# Patient Record
Sex: MALE | Race: WHITE | NOT HISPANIC OR LATINO | Employment: OTHER | URBAN - METROPOLITAN AREA
[De-identification: names, ages, dates, MRNs, and addresses within clinical notes are randomized per-mention and may not be internally consistent; named-entity substitution may affect disease eponyms.]

---

## 2017-01-11 ENCOUNTER — HOSPITAL ENCOUNTER (OUTPATIENT)
Dept: NUCLEAR MEDICINE | Facility: CLINIC | Age: 52
Setting detail: NUCLEAR MEDICINE
Discharge: HOME | End: 2017-01-11
Attending: PSYCHIATRY & NEUROLOGY | Admitting: PSYCHIATRY & NEUROLOGY
Payer: MEDICAID

## 2017-01-11 ENCOUNTER — HOSPITAL ENCOUNTER (OUTPATIENT)
Dept: NUCLEAR MEDICINE | Facility: CLINIC | Age: 52
Setting detail: NUCLEAR MEDICINE
End: 2017-01-11
Attending: PSYCHIATRY & NEUROLOGY
Payer: MEDICAID

## 2017-01-11 DIAGNOSIS — R53.1 LEFT-SIDED WEAKNESS: ICD-10-CM

## 2017-01-11 PROCEDURE — 34300033 ZZH RX 343

## 2017-01-11 PROCEDURE — A9584 IODINE I-123 IOFLUPANE: HCPCS

## 2017-01-11 PROCEDURE — 25000132 ZZH RX MED GY IP 250 OP 250 PS 637

## 2017-01-11 PROCEDURE — 78607 NM BRAIN IMAGING TOMOGRAPHIC (SPECT) DATSCAN: CPT

## 2017-01-11 RX ADMIN — POTASSIUM IODIDE 130 MG: 1 SOLUTION ORAL at 09:55

## 2017-01-11 RX ADMIN — IOFLUPANE I-123 4.17 MCI.: 2 INJECTION, SOLUTION INTRAVENOUS at 12:07

## 2017-05-16 ENCOUNTER — RECORDS - HEALTHEAST (OUTPATIENT)
Dept: LAB | Facility: CLINIC | Age: 52
End: 2017-05-16

## 2017-05-16 LAB
CHOLEST SERPL-MCNC: 120 MG/DL
FASTING STATUS PATIENT QL REPORTED: NORMAL
HDLC SERPL-MCNC: 44 MG/DL
LDLC SERPL CALC-MCNC: 51 MG/DL
PSA SERPL-MCNC: 0.5 NG/ML (ref 0–3.5)
TRIGL SERPL-MCNC: 127 MG/DL

## 2019-04-22 ENCOUNTER — RECORDS - HEALTHEAST (OUTPATIENT)
Dept: LAB | Facility: CLINIC | Age: 54
End: 2019-04-22

## 2019-04-22 LAB
BASOPHILS # BLD AUTO: 0 THOU/UL (ref 0–0.2)
BASOPHILS NFR BLD AUTO: 0 % (ref 0–2)
EOSINOPHIL # BLD AUTO: 0.2 THOU/UL (ref 0–0.4)
EOSINOPHIL NFR BLD AUTO: 3 % (ref 0–6)
ERYTHROCYTE [DISTWIDTH] IN BLOOD BY AUTOMATED COUNT: 15.1 % (ref 11–14.5)
HCT VFR BLD AUTO: 38.1 % (ref 40–54)
HGB BLD-MCNC: 11.5 G/DL (ref 14–18)
LYMPHOCYTES # BLD AUTO: 1.6 THOU/UL (ref 0.8–4.4)
LYMPHOCYTES NFR BLD AUTO: 22 % (ref 20–40)
MCH RBC QN AUTO: 29.9 PG (ref 27–34)
MCHC RBC AUTO-ENTMCNC: 30.2 G/DL (ref 32–36)
MCV RBC AUTO: 99 FL (ref 80–100)
MONOCYTES # BLD AUTO: 0.3 THOU/UL (ref 0–0.9)
MONOCYTES NFR BLD AUTO: 5 % (ref 2–10)
NEUTROPHILS # BLD AUTO: 5.1 THOU/UL (ref 2–7.7)
NEUTROPHILS NFR BLD AUTO: 71 % (ref 50–70)
PLATELET # BLD AUTO: 265 THOU/UL (ref 140–440)
PMV BLD AUTO: 9.3 FL (ref 8.5–12.5)
RBC # BLD AUTO: 3.84 MILL/UL (ref 4.4–6.2)
WBC: 7.4 THOU/UL (ref 4–11)

## 2019-04-30 ENCOUNTER — RECORDS - HEALTHEAST (OUTPATIENT)
Dept: LAB | Facility: CLINIC | Age: 54
End: 2019-04-30

## 2019-04-30 LAB
BASOPHILS # BLD AUTO: 0 THOU/UL (ref 0–0.2)
BASOPHILS NFR BLD AUTO: 0 % (ref 0–2)
EOSINOPHIL # BLD AUTO: 0.5 THOU/UL (ref 0–0.4)
EOSINOPHIL NFR BLD AUTO: 6 % (ref 0–6)
ERYTHROCYTE [DISTWIDTH] IN BLOOD BY AUTOMATED COUNT: 15 % (ref 11–14.5)
HCT VFR BLD AUTO: 37.6 % (ref 40–54)
HGB BLD-MCNC: 11.6 G/DL (ref 14–18)
LYMPHOCYTES # BLD AUTO: 2.4 THOU/UL (ref 0.8–4.4)
LYMPHOCYTES NFR BLD AUTO: 29 % (ref 20–40)
MCH RBC QN AUTO: 30.4 PG (ref 27–34)
MCHC RBC AUTO-ENTMCNC: 30.9 G/DL (ref 32–36)
MCV RBC AUTO: 99 FL (ref 80–100)
MONOCYTES # BLD AUTO: 0.5 THOU/UL (ref 0–0.9)
MONOCYTES NFR BLD AUTO: 6 % (ref 2–10)
NEUTROPHILS # BLD AUTO: 4.8 THOU/UL (ref 2–7.7)
NEUTROPHILS NFR BLD AUTO: 59 % (ref 50–70)
PLATELET # BLD AUTO: 288 THOU/UL (ref 140–440)
PMV BLD AUTO: 9.4 FL (ref 8.5–12.5)
RBC # BLD AUTO: 3.81 MILL/UL (ref 4.4–6.2)
WBC: 8.2 THOU/UL (ref 4–11)

## 2019-05-06 ENCOUNTER — RECORDS - HEALTHEAST (OUTPATIENT)
Dept: LAB | Facility: CLINIC | Age: 54
End: 2019-05-06

## 2019-05-06 LAB
BASOPHILS # BLD AUTO: 0 THOU/UL (ref 0–0.2)
BASOPHILS NFR BLD AUTO: 1 % (ref 0–2)
EOSINOPHIL # BLD AUTO: 0.4 THOU/UL (ref 0–0.4)
EOSINOPHIL NFR BLD AUTO: 5 % (ref 0–6)
ERYTHROCYTE [DISTWIDTH] IN BLOOD BY AUTOMATED COUNT: 15 % (ref 11–14.5)
HCT VFR BLD AUTO: 39.6 % (ref 40–54)
HGB BLD-MCNC: 12.2 G/DL (ref 14–18)
LYMPHOCYTES # BLD AUTO: 2.3 THOU/UL (ref 0.8–4.4)
LYMPHOCYTES NFR BLD AUTO: 29 % (ref 20–40)
MCH RBC QN AUTO: 30.4 PG (ref 27–34)
MCHC RBC AUTO-ENTMCNC: 30.8 G/DL (ref 32–36)
MCV RBC AUTO: 99 FL (ref 80–100)
MONOCYTES # BLD AUTO: 0.6 THOU/UL (ref 0–0.9)
MONOCYTES NFR BLD AUTO: 7 % (ref 2–10)
NEUTROPHILS # BLD AUTO: 4.7 THOU/UL (ref 2–7.7)
NEUTROPHILS NFR BLD AUTO: 59 % (ref 50–70)
PLATELET # BLD AUTO: 312 THOU/UL (ref 140–440)
PMV BLD AUTO: 9.1 FL (ref 8.5–12.5)
RBC # BLD AUTO: 4.01 MILL/UL (ref 4.4–6.2)
WBC: 8.2 THOU/UL (ref 4–11)

## 2019-05-13 ENCOUNTER — RECORDS - HEALTHEAST (OUTPATIENT)
Dept: LAB | Facility: CLINIC | Age: 54
End: 2019-05-13

## 2019-05-13 LAB
BASOPHILS # BLD AUTO: 0 THOU/UL (ref 0–0.2)
BASOPHILS NFR BLD AUTO: 0 % (ref 0–2)
EOSINOPHIL # BLD AUTO: 0.3 THOU/UL (ref 0–0.4)
EOSINOPHIL NFR BLD AUTO: 4 % (ref 0–6)
ERYTHROCYTE [DISTWIDTH] IN BLOOD BY AUTOMATED COUNT: 14.8 % (ref 11–14.5)
HCT VFR BLD AUTO: 40.7 % (ref 40–54)
HGB BLD-MCNC: 12.4 G/DL (ref 14–18)
LYMPHOCYTES # BLD AUTO: 2 THOU/UL (ref 0.8–4.4)
LYMPHOCYTES NFR BLD AUTO: 26 % (ref 20–40)
MCH RBC QN AUTO: 30.3 PG (ref 27–34)
MCHC RBC AUTO-ENTMCNC: 30.5 G/DL (ref 32–36)
MCV RBC AUTO: 100 FL (ref 80–100)
MONOCYTES # BLD AUTO: 0.4 THOU/UL (ref 0–0.9)
MONOCYTES NFR BLD AUTO: 6 % (ref 2–10)
NEUTROPHILS # BLD AUTO: 4.9 THOU/UL (ref 2–7.7)
NEUTROPHILS NFR BLD AUTO: 64 % (ref 50–70)
PLATELET # BLD AUTO: 272 THOU/UL (ref 140–440)
PMV BLD AUTO: 8.9 FL (ref 8.5–12.5)
RBC # BLD AUTO: 4.09 MILL/UL (ref 4.4–6.2)
WBC: 7.7 THOU/UL (ref 4–11)

## 2019-05-31 ENCOUNTER — RECORDS - HEALTHEAST (OUTPATIENT)
Dept: LAB | Facility: CLINIC | Age: 54
End: 2019-05-31

## 2019-05-31 LAB
ALBUMIN SERPL-MCNC: 3.4 G/DL (ref 3.5–5)
ALP SERPL-CCNC: 96 U/L (ref 45–120)
ALT SERPL W P-5'-P-CCNC: 20 U/L (ref 0–45)
AST SERPL W P-5'-P-CCNC: 14 U/L (ref 0–40)
BILIRUB DIRECT SERPL-MCNC: 0.1 MG/DL
BILIRUB SERPL-MCNC: 0.2 MG/DL (ref 0–1)
CHOLEST SERPL-MCNC: 131 MG/DL
FASTING STATUS PATIENT QL REPORTED: NORMAL
HDLC SERPL-MCNC: 48 MG/DL
LDLC SERPL CALC-MCNC: 54 MG/DL
PROT SERPL-MCNC: 7.1 G/DL (ref 6–8)
TRIGL SERPL-MCNC: 144 MG/DL

## 2019-07-15 ENCOUNTER — RECORDS - HEALTHEAST (OUTPATIENT)
Dept: LAB | Facility: CLINIC | Age: 54
End: 2019-07-15

## 2019-07-15 LAB
ALBUMIN SERPL-MCNC: 3.8 G/DL (ref 3.5–5)
ANION GAP SERPL CALCULATED.3IONS-SCNC: 12 MMOL/L (ref 5–18)
BNP SERPL-MCNC: <10 PG/ML (ref 0–45)
BUN SERPL-MCNC: 24 MG/DL (ref 8–22)
CALCIUM SERPL-MCNC: 9.8 MG/DL (ref 8.5–10.5)
CHLORIDE BLD-SCNC: 103 MMOL/L (ref 98–107)
CO2 SERPL-SCNC: 26 MMOL/L (ref 22–31)
CREAT SERPL-MCNC: 1 MG/DL (ref 0.7–1.3)
GFR SERPL CREATININE-BSD FRML MDRD: >60 ML/MIN/1.73M2
GLUCOSE BLD-MCNC: 130 MG/DL (ref 70–125)
POTASSIUM BLD-SCNC: 4.2 MMOL/L (ref 3.5–5)
PROT SERPL-MCNC: 7.8 G/DL (ref 6–8)
SODIUM SERPL-SCNC: 141 MMOL/L (ref 136–145)

## 2019-10-07 ENCOUNTER — RECORDS - HEALTHEAST (OUTPATIENT)
Dept: LAB | Facility: CLINIC | Age: 54
End: 2019-10-07

## 2019-10-07 LAB
CHOLEST SERPL-MCNC: 160 MG/DL
FASTING STATUS PATIENT QL REPORTED: ABNORMAL
HDLC SERPL-MCNC: 46 MG/DL
LDLC SERPL CALC-MCNC: 82 MG/DL
TRIGL SERPL-MCNC: 158 MG/DL

## 2020-01-03 ENCOUNTER — RECORDS - HEALTHEAST (OUTPATIENT)
Dept: LAB | Facility: CLINIC | Age: 55
End: 2020-01-03

## 2020-01-06 LAB
CHOLEST SERPL-MCNC: 214 MG/DL
FASTING STATUS PATIENT QL REPORTED: YES
HDLC SERPL-MCNC: 44 MG/DL
LDLC SERPL CALC-MCNC: 120 MG/DL
TRIGL SERPL-MCNC: 252 MG/DL

## 2020-06-24 ENCOUNTER — RECORDS - HEALTHEAST (OUTPATIENT)
Dept: LAB | Facility: CLINIC | Age: 55
End: 2020-06-24

## 2020-06-24 LAB
BASOPHILS # BLD AUTO: 0 THOU/UL (ref 0–0.2)
BASOPHILS NFR BLD AUTO: 0 % (ref 0–2)
EOSINOPHIL # BLD AUTO: 0.1 THOU/UL (ref 0–0.4)
EOSINOPHIL NFR BLD AUTO: 2 % (ref 0–6)
ERYTHROCYTE [DISTWIDTH] IN BLOOD BY AUTOMATED COUNT: 13.2 % (ref 11–14.5)
HCT VFR BLD AUTO: 39.1 % (ref 40–54)
HGB BLD-MCNC: 12.1 G/DL (ref 14–18)
LYMPHOCYTES # BLD AUTO: 2.5 THOU/UL (ref 0.8–4.4)
LYMPHOCYTES NFR BLD AUTO: 40 % (ref 20–40)
MCH RBC QN AUTO: 29.9 PG (ref 27–34)
MCHC RBC AUTO-ENTMCNC: 30.9 G/DL (ref 32–36)
MCV RBC AUTO: 97 FL (ref 80–100)
MONOCYTES # BLD AUTO: 0.4 THOU/UL (ref 0–0.9)
MONOCYTES NFR BLD AUTO: 7 % (ref 2–10)
NEUTROPHILS # BLD AUTO: 3.2 THOU/UL (ref 2–7.7)
NEUTROPHILS NFR BLD AUTO: 51 % (ref 50–70)
PLATELET # BLD AUTO: 250 THOU/UL (ref 140–440)
PMV BLD AUTO: 10.1 FL (ref 8.5–12.5)
RBC # BLD AUTO: 4.05 MILL/UL (ref 4.4–6.2)
WBC: 6.2 THOU/UL (ref 4–11)

## 2020-07-11 ENCOUNTER — HOSPITAL ENCOUNTER (INPATIENT)
Facility: CLINIC | Age: 55
LOS: 18 days | Discharge: PSYCHIATRIC OR SUBSTANCE USE FACILITY | DRG: 885 | End: 2020-07-29
Attending: EMERGENCY MEDICINE | Admitting: PSYCHIATRY & NEUROLOGY
Payer: COMMERCIAL

## 2020-07-11 ENCOUNTER — TELEPHONE (OUTPATIENT)
Dept: BEHAVIORAL HEALTH | Facility: CLINIC | Age: 55
End: 2020-07-11

## 2020-07-11 DIAGNOSIS — F41.9 ANXIETY DISORDER, UNSPECIFIED TYPE: ICD-10-CM

## 2020-07-11 DIAGNOSIS — F33.1 MAJOR DEPRESSIVE DISORDER, RECURRENT EPISODE, MODERATE (H): ICD-10-CM

## 2020-07-11 DIAGNOSIS — R45.851 SUICIDAL IDEATION: ICD-10-CM

## 2020-07-11 DIAGNOSIS — Z20.822 COVID-19 RULED OUT: ICD-10-CM

## 2020-07-11 LAB
AMPHETAMINES UR QL SCN: NEGATIVE
BARBITURATES UR QL: NEGATIVE
BENZODIAZ UR QL: NEGATIVE
CANNABINOIDS UR QL SCN: NEGATIVE
COCAINE UR QL: NEGATIVE
ETHANOL UR QL SCN: NEGATIVE
OPIATES UR QL SCN: NEGATIVE
SARS-COV-2 PCR COMMENT: NORMAL
SARS-COV-2 RNA SPEC QL NAA+PROBE: NEGATIVE
SARS-COV-2 RNA SPEC QL NAA+PROBE: NORMAL
SPECIMEN SOURCE: NORMAL
SPECIMEN SOURCE: NORMAL

## 2020-07-11 PROCEDURE — U0003 INFECTIOUS AGENT DETECTION BY NUCLEIC ACID (DNA OR RNA); SEVERE ACUTE RESPIRATORY SYNDROME CORONAVIRUS 2 (SARS-COV-2) (CORONAVIRUS DISEASE [COVID-19]), AMPLIFIED PROBE TECHNIQUE, MAKING USE OF HIGH THROUGHPUT TECHNOLOGIES AS DESCRIBED BY CMS-2020-01-R: HCPCS | Performed by: EMERGENCY MEDICINE

## 2020-07-11 PROCEDURE — 12400002 ZZH R&B MH SENIOR/ADOLESCENT

## 2020-07-11 PROCEDURE — 90791 PSYCH DIAGNOSTIC EVALUATION: CPT

## 2020-07-11 PROCEDURE — 99285 EMERGENCY DEPT VISIT HI MDM: CPT | Mod: Z6 | Performed by: EMERGENCY MEDICINE

## 2020-07-11 PROCEDURE — 99285 EMERGENCY DEPT VISIT HI MDM: CPT | Mod: 25 | Performed by: EMERGENCY MEDICINE

## 2020-07-11 PROCEDURE — 80307 DRUG TEST PRSMV CHEM ANLYZR: CPT | Performed by: FAMILY MEDICINE

## 2020-07-11 PROCEDURE — 80320 DRUG SCREEN QUANTALCOHOLS: CPT | Performed by: FAMILY MEDICINE

## 2020-07-11 PROCEDURE — C9803 HOPD COVID-19 SPEC COLLECT: HCPCS | Performed by: EMERGENCY MEDICINE

## 2020-07-11 PROCEDURE — 25000132 ZZH RX MED GY IP 250 OP 250 PS 637: Performed by: PSYCHIATRY & NEUROLOGY

## 2020-07-11 RX ORDER — DOCUSATE SODIUM 100 MG/1
100 CAPSULE, LIQUID FILLED ORAL 2 TIMES DAILY
Status: DISCONTINUED | OUTPATIENT
Start: 2020-07-11 | End: 2020-07-29 | Stop reason: HOSPADM

## 2020-07-11 RX ORDER — GABAPENTIN 800 MG/1
800 TABLET ORAL 3 TIMES DAILY
Status: DISCONTINUED | OUTPATIENT
Start: 2020-07-11 | End: 2020-07-29 | Stop reason: HOSPADM

## 2020-07-11 RX ORDER — TAMSULOSIN HYDROCHLORIDE 0.4 MG/1
CAPSULE ORAL
Status: ON HOLD | COMMUNITY
End: 2022-05-31

## 2020-07-11 RX ORDER — VILAZODONE HYDROCHLORIDE 40 MG/1
40 TABLET ORAL DAILY
COMMUNITY
End: 2021-07-07

## 2020-07-11 RX ORDER — ACETAMINOPHEN 500 MG
TABLET ORAL
Status: ON HOLD | COMMUNITY
End: 2023-05-25

## 2020-07-11 RX ORDER — METOPROLOL TARTRATE 50 MG
50 TABLET ORAL 3 TIMES DAILY
COMMUNITY
End: 2020-07-11

## 2020-07-11 RX ORDER — POLYETHYLENE GLYCOL 3350 17 G/17G
17 POWDER, FOR SOLUTION ORAL DAILY
Status: DISCONTINUED | OUTPATIENT
Start: 2020-07-12 | End: 2020-07-29 | Stop reason: HOSPADM

## 2020-07-11 RX ORDER — ACETAMINOPHEN 325 MG/1
650 TABLET ORAL EVERY 4 HOURS PRN
Status: DISCONTINUED | OUTPATIENT
Start: 2020-07-11 | End: 2020-07-11

## 2020-07-11 RX ORDER — CLOZAPINE 50 MG/1
50 TABLET ORAL AT BEDTIME
Status: ON HOLD | COMMUNITY
End: 2023-05-25

## 2020-07-11 RX ORDER — BISACODYL 10 MG
10 SUPPOSITORY, RECTAL RECTAL DAILY PRN
Status: DISCONTINUED | OUTPATIENT
Start: 2020-07-11 | End: 2020-07-29 | Stop reason: HOSPADM

## 2020-07-11 RX ORDER — CLONAZEPAM 1 MG/1
2 TABLET ORAL AT BEDTIME
Status: DISCONTINUED | OUTPATIENT
Start: 2020-07-11 | End: 2020-07-13

## 2020-07-11 RX ORDER — ASPIRIN 81 MG/1
81 TABLET ORAL DAILY
COMMUNITY
End: 2021-07-07

## 2020-07-11 RX ORDER — METOPROLOL SUCCINATE 25 MG/1
TABLET, EXTENDED RELEASE ORAL
Status: ON HOLD | COMMUNITY
End: 2023-05-25

## 2020-07-11 RX ORDER — CYCLOBENZAPRINE HCL 5 MG
5 TABLET ORAL 3 TIMES DAILY PRN
COMMUNITY
End: 2020-07-11

## 2020-07-11 RX ORDER — ACETAMINOPHEN 500 MG
1000 TABLET ORAL 3 TIMES DAILY
Status: DISCONTINUED | OUTPATIENT
Start: 2020-07-11 | End: 2020-07-29 | Stop reason: HOSPADM

## 2020-07-11 RX ORDER — TRAZODONE HYDROCHLORIDE 50 MG/1
50 TABLET, FILM COATED ORAL
Status: DISCONTINUED | OUTPATIENT
Start: 2020-07-11 | End: 2020-07-11

## 2020-07-11 RX ORDER — GABAPENTIN 800 MG/1
TABLET ORAL
Status: ON HOLD | COMMUNITY
End: 2023-05-25

## 2020-07-11 RX ORDER — BACLOFEN 10 MG/1
10 TABLET ORAL 2 TIMES DAILY
Status: DISCONTINUED | OUTPATIENT
Start: 2020-07-11 | End: 2020-07-29 | Stop reason: HOSPADM

## 2020-07-11 RX ORDER — TRIAMCINOLONE ACETONIDE 1 MG/G
CREAM TOPICAL 2 TIMES DAILY PRN
Status: ON HOLD | COMMUNITY
End: 2021-07-08

## 2020-07-11 RX ORDER — DONEPEZIL HYDROCHLORIDE 10 MG/1
10 TABLET, FILM COATED ORAL DAILY
Status: DISCONTINUED | OUTPATIENT
Start: 2020-07-12 | End: 2020-07-29 | Stop reason: HOSPADM

## 2020-07-11 RX ORDER — CARBIDOPA AND LEVODOPA 25; 100 MG/1; MG/1
2 TABLET ORAL 2 TIMES DAILY
Status: DISCONTINUED | OUTPATIENT
Start: 2020-07-12 | End: 2020-07-12

## 2020-07-11 RX ORDER — DONEPEZIL HYDROCHLORIDE 10 MG/1
10 TABLET, FILM COATED ORAL DAILY
COMMUNITY
End: 2021-07-07

## 2020-07-11 RX ORDER — ALUMINA, MAGNESIA, AND SIMETHICONE 2400; 2400; 240 MG/30ML; MG/30ML; MG/30ML
30 SUSPENSION ORAL EVERY 4 HOURS PRN
Status: DISCONTINUED | OUTPATIENT
Start: 2020-07-11 | End: 2020-07-29 | Stop reason: HOSPADM

## 2020-07-11 RX ORDER — SENNOSIDES 8.6 MG
TABLET ORAL
COMMUNITY
End: 2022-08-01

## 2020-07-11 RX ORDER — TRIAMCINOLONE ACETONIDE 1 MG/G
CREAM TOPICAL 2 TIMES DAILY PRN
Status: DISCONTINUED | OUTPATIENT
Start: 2020-07-11 | End: 2020-07-29 | Stop reason: HOSPADM

## 2020-07-11 RX ORDER — HYDROXYZINE HYDROCHLORIDE 25 MG/1
25 TABLET, FILM COATED ORAL EVERY 4 HOURS PRN
Status: DISCONTINUED | OUTPATIENT
Start: 2020-07-11 | End: 2020-07-29 | Stop reason: HOSPADM

## 2020-07-11 RX ORDER — OLANZAPINE 10 MG/2ML
5 INJECTION, POWDER, FOR SOLUTION INTRAMUSCULAR
Status: DISCONTINUED | OUTPATIENT
Start: 2020-07-11 | End: 2020-07-29 | Stop reason: HOSPADM

## 2020-07-11 RX ORDER — DOCUSATE SODIUM 100 MG/1
100 CAPSULE, LIQUID FILLED ORAL 2 TIMES DAILY
Status: ON HOLD | COMMUNITY
End: 2021-07-07

## 2020-07-11 RX ORDER — CARBIDOPA AND LEVODOPA 25; 100 MG/1; MG/1
TABLET ORAL
COMMUNITY
End: 2022-01-19

## 2020-07-11 RX ORDER — ATORVASTATIN CALCIUM 10 MG/1
40 TABLET, FILM COATED ORAL AT BEDTIME
COMMUNITY
End: 2021-07-07

## 2020-07-11 RX ORDER — TRIHEXYPHENIDYL HYDROCHLORIDE 2 MG/1
3 TABLET ORAL 3 TIMES DAILY
COMMUNITY
End: 2021-07-07

## 2020-07-11 RX ORDER — SENNOSIDES 8.6 MG
1 TABLET ORAL 2 TIMES DAILY
Status: DISCONTINUED | OUTPATIENT
Start: 2020-07-11 | End: 2020-07-29 | Stop reason: HOSPADM

## 2020-07-11 RX ORDER — ATORVASTATIN CALCIUM 40 MG/1
40 TABLET, FILM COATED ORAL AT BEDTIME
Status: DISCONTINUED | OUTPATIENT
Start: 2020-07-11 | End: 2020-07-29 | Stop reason: HOSPADM

## 2020-07-11 RX ORDER — OLANZAPINE 2.5 MG/1
2.5-5 TABLET, FILM COATED ORAL
Status: DISCONTINUED | OUTPATIENT
Start: 2020-07-11 | End: 2020-07-29 | Stop reason: HOSPADM

## 2020-07-11 RX ORDER — ASPIRIN 81 MG/1
81 TABLET ORAL DAILY
Status: DISCONTINUED | OUTPATIENT
Start: 2020-07-12 | End: 2020-07-29 | Stop reason: HOSPADM

## 2020-07-11 RX ORDER — CARBIDOPA AND LEVODOPA 25; 100 MG/1; MG/1
2 TABLET ORAL 2 TIMES DAILY
COMMUNITY
End: 2021-07-07

## 2020-07-11 RX ORDER — BACLOFEN 10 MG/1
10 TABLET ORAL 2 TIMES DAILY
COMMUNITY
End: 2021-07-07

## 2020-07-11 RX ORDER — TAMSULOSIN HYDROCHLORIDE 0.4 MG/1
0.4 CAPSULE ORAL DAILY
Status: DISCONTINUED | OUTPATIENT
Start: 2020-07-12 | End: 2020-07-29 | Stop reason: HOSPADM

## 2020-07-11 RX ORDER — BACLOFEN 10 MG/1
TABLET ORAL
COMMUNITY
End: 2022-08-01

## 2020-07-11 RX ORDER — TRAZODONE HYDROCHLORIDE 50 MG/1
75 TABLET, FILM COATED ORAL AT BEDTIME
COMMUNITY
End: 2021-07-07

## 2020-07-11 RX ORDER — CLOZAPINE 25 MG/1
25 TABLET ORAL DAILY
Status: DISCONTINUED | OUTPATIENT
Start: 2020-07-12 | End: 2020-07-12

## 2020-07-11 RX ORDER — VILAZODONE HYDROCHLORIDE 10 MG/1
40 TABLET ORAL DAILY
Status: DISCONTINUED | OUTPATIENT
Start: 2020-07-12 | End: 2020-07-29 | Stop reason: HOSPADM

## 2020-07-11 RX ORDER — CLONAZEPAM 1 MG/1
2 TABLET ORAL AT BEDTIME
Status: ON HOLD | COMMUNITY
End: 2020-07-27

## 2020-07-11 RX ORDER — CARBIDOPA/LEVODOPA 25MG-250MG
1 TABLET ORAL 2 TIMES DAILY
COMMUNITY
End: 2020-07-11

## 2020-07-11 RX ORDER — POLYETHYLENE GLYCOL 3350 17 G/17G
1 POWDER, FOR SOLUTION ORAL DAILY
Status: ON HOLD | COMMUNITY
End: 2021-07-07

## 2020-07-11 RX ORDER — DOXYCYCLINE HYCLATE 50 MG/1
40 CAPSULE ORAL DAILY
COMMUNITY
End: 2020-07-11

## 2020-07-11 RX ORDER — BACLOFEN 10 MG/1
15 TABLET ORAL AT BEDTIME
Status: DISCONTINUED | OUTPATIENT
Start: 2020-07-11 | End: 2020-07-29 | Stop reason: HOSPADM

## 2020-07-11 RX ADMIN — ACETAMINOPHEN 1000 MG: 500 TABLET, FILM COATED ORAL at 22:47

## 2020-07-11 RX ADMIN — Medication 15 MG: at 22:47

## 2020-07-11 RX ADMIN — Medication 3 MG: at 22:47

## 2020-07-11 RX ADMIN — RIVAROXABAN 20 MG: 10 TABLET, FILM COATED ORAL at 22:48

## 2020-07-11 RX ADMIN — SENNOSIDES 1 TABLET: 8.6 TABLET ORAL at 22:47

## 2020-07-11 RX ADMIN — CLONAZEPAM 2 MG: 1 TABLET ORAL at 22:47

## 2020-07-11 RX ADMIN — DOCUSATE SODIUM 100 MG: 100 CAPSULE, LIQUID FILLED ORAL at 22:48

## 2020-07-11 RX ADMIN — Medication 3 HALF-TAB: at 22:47

## 2020-07-11 RX ADMIN — ATORVASTATIN CALCIUM 40 MG: 40 TABLET, FILM COATED ORAL at 22:48

## 2020-07-11 RX ADMIN — GABAPENTIN 800 MG: 800 TABLET, FILM COATED ORAL at 22:48

## 2020-07-11 ASSESSMENT — ACTIVITIES OF DAILY LIVING (ADL)
RETIRED_EATING: 0-->INDEPENDENT
SWALLOWING: 0-->SWALLOWS FOODS/LIQUIDS WITHOUT DIFFICULTY
RETIRED_COMMUNICATION: 0-->UNDERSTANDS/COMMUNICATES WITHOUT DIFFICULTY
BATHING: 2-->ASSISTIVE PERSON
AMBULATION: 1-->ASSISTIVE EQUIPMENT
FALL_HISTORY_WITHIN_LAST_SIX_MONTHS: NO
TRANSFERRING: 0-->INDEPENDENT
COGNITION: 2 - DIFFICULTY WITH ORGANIZING THOUGHTS
DRESS: 0-->INDEPENDENT
TOILETING: 0-->INDEPENDENT

## 2020-07-11 ASSESSMENT — ENCOUNTER SYMPTOMS: DYSPHORIC MOOD: 1

## 2020-07-11 NOTE — ED PROVIDER NOTES
[facility] EMERGENCY DEPARTMENT ([facility])     July 11, 2020    History     Chief Complaint   Patient presents with     Suicidal     Been at [facility]; issues with staff; troubles with med changes and mad about the changes. [place]      The history is provided by the patient and medical records.     Benjamin Mathews is a 55 year old male with a past medical history significant for SI, depression, stroke, DVT, PE, Parkinson's disease, HTN and hyperlipidemia who presents to the Emergency Department for evaluation of SI.    Patient reports SI with a plan. He notes a long history of depression and SI, states this is not uncommon for him. Patient states he is not upset or angry at anyone but is just done with the systems in place against him. Patient notes he dislikes being disabled and his nursing home. Patient reports stronger SI's for the past few weeks due to medication changes. Patient states he was going to use a wire to complete his plan but when he left the facility today, his caretaker rushed out to get him.     PAST MEDICAL HISTORY: No past medical history on file.    PAST SURGICAL HISTORY: No past surgical history on file.    Past medical history, past surgical history, medications, and allergies were reviewed with the patient. Additional pertinent items: None    FAMILY HISTORY: No family history on file.    SOCIAL HISTORY:   Social History     Tobacco Use     Smoking status: Not on file   Substance Use Topics     Alcohol use: Not on file     Social history was reviewed with the patient. Additional pertinent items: None      Patient's Medications    No medications on file        Allergies not on file     Review of Systems   Psychiatric/Behavioral: Positive for dysphoric mood and suicidal ideas.   All other systems reviewed and are negative.    A complete review of systems was performed with pertinent positives and negatives noted in the HPI,  and all other systems negative.    Physical Exam   Pulse: 108  Temp: 98.1  F (36.7  C)  Resp: 18  SpO2: 96 %      Physical Exam  Vitals signs and nursing note reviewed.   Constitutional:       General: He is not in acute distress.     Appearance: Normal appearance. He is not diaphoretic.   HENT:      Head: Atraumatic.   Eyes:      General: No scleral icterus.     Pupils: Pupils are equal, round, and reactive to light.   Cardiovascular:      Rate and Rhythm: Normal rate and regular rhythm.      Heart sounds: Normal heart sounds.   Pulmonary:      Effort: No respiratory distress.      Breath sounds: Normal breath sounds.   Abdominal:      General: Bowel sounds are normal.      Palpations: Abdomen is soft.      Tenderness: There is no abdominal tenderness.   Musculoskeletal:         General: No tenderness.   Skin:     General: Skin is warm.      Findings: No rash.   Neurological:      Mental Status: He is alert and oriented to person, place, and time. Mental status is at baseline.         ED Course   2:15 PM  The patient was seen and examined by Dr. Rich in Room ED10.        Procedures                           No results found for this or any previous visit (from the past 24 hour(s)).  Medications - No data to display          Assessments & Plan (with Medical Decision Making)     55 year old male with a past medical history significant for SI, depression, stroke, DVT, PE, Parkinson's disease, HTN and hyperlipidemia who presents to the Emergency Department for evaluation of SI.  Patient vocalized specific plan which he intended to carry out today while eloping from his nursing home but was stopped with apprehension by police.  Case discussed with behavioral crisis  who will plan more extensive evaluation with likely inpatient hospitalization for suicidal ideation and plan.    I have reviewed the nursing notes.    I have reviewed the findings, diagnosis, plan and need for follow up with the patient.    New  Prescriptions    No medications on file       Final diagnoses:   Suicidal ideation     Ochoa THOMPSON, am serving as a trained medical scribe to document services personally performed by Geri Rich MD, based on the provider's statements to me.     Geri THOMPSON MD, was physically present and have reviewed and verified the accuracy of this note documented by Ochoa Sterling.    7/11/2020   [facility] [place], EMERGENCY DEPARTMENT     Geri Rich MD  07/12/20 1153

## 2020-07-11 NOTE — TELEPHONE ENCOUNTER
S: Pt is a 55 yrs old male in the [facility] ED for a suicidal attempt, report by Dania at 4:17PM.    B:  Pt came in from a skilled nursing facility, [facility] [place]; he has lived there for 1 and 1/2 years.  [place].  SNF staff reports that Pt tried to kill himself rolling wheelchair into traffic.  Pt explained to  what he described as a rope used by both hands to cut his throat.  Pt reports feeling suicidal in the last few weeks due to what's been going on at the nursing facility.  Pt denies HI and psychosis.  Pt is not psychotic.  Pt reports a recent med change.  Pt reports being sober for 1 and 1/2 yrs.  Denies using any substances.  Pt has hx of Parkinson's, Anxiety, Depression and psychotic with delusions and Dementia.  Pt is wheelchair bound.  Cannot ambulate on his own.  Needs transfer assist.  Pt is medically cleared. Pt is asymptomatic.    Pt was swabbed for a COVID test.  He is asymptomatic.  Utox was negative.   Vitals are normal.    A: Vol.  Pt is his own guardian.    R: Due to Dementia dx, Pt is being referred to memory care facility. Intake provided info to .      5:15PM-  called back and said Pt does not have dementia.  [facility] is not sure where the dx came from as it is in their documentation.  Intake inquired a mini cog. test.       5:20PM- Intake discussed with unit charge.  Pt would require a 1:1 sitter due to active SI.  They do not have staffing for that at the moment.     5:54PM- ED MD completed the mini cog test and Pt scored a 5/5.       730PM- [facility] 3B charge confirmed she met with Pt in the ED and said he would be fine for 3B.  Pt does not have dementia. He was able to do and say normal stuff.  He will not need a 1 to 1 and they are over staffed for 1 to 1 anyhow.  They will get a hospital bed for Pt in wheelchair.        7:40PM- Dr. Hernandez accepted for 3B/[facility].  Pt will be seen by Karlee  Varun.  Unit and ED notified.  Text page to ED at 749PM.        Patient cleared and ready for behavioral bed placement: Yes

## 2020-07-11 NOTE — ED NOTES
ED to Behavioral Floor Handoff    SITUATION  Benjamin Mathews is a 55 year old male who speaks English and lives in an assisted living with others The patient arrived in the ED by ambulance from home with a complaint of Suicidal (Been at [facility]; issues with staff; troubles with med changes and mad about the changes. [place] )  .The patient's current symptoms started/worsened 1 and 2 day(s) ago and during this time the symptoms have increased.   In the ED, pt was diagnosed with   Final diagnoses:   None        Initial vitals were: BP: 112/63  Pulse: 108  Temp: 98.1  F (36.7  C)  Resp: 18  SpO2: 96 %   --------  Is the patient diabetic? No   If yes, last blood glucose? --     If yes, was this treated in the ED? --  --------  Is the patient inebriated (ETOH) No or Impaired on other substances? No  MSSA done? N/A  Last MSSA score: --    Were withdrawal symptoms treated? N/A  Does the patient have a seizure history? No. If yes, date of most recent seizure--  --------  Is the patient patient experiencing suicidal ideation? reports the following suicide factors: hanging self    Homicidal ideation? denies current or recent homicidal ideation or behaviors.    Self-injurious behavior/urges? denies current or recent self injurious behavior or ideation.  ------  Was pt aggressive in the ED No  Was a code called No  Is the pt now cooperative? Yes  -------  Meds given in ED: Medications - No data to display   Family present during ED course? No  Family currently present? No    BACKGROUND  Does the patient have a cognitive impairment or developmental disability? No  Allergies:   Allergies   Allergen Reactions     Seroquel [Quetiapine] GI Disturbance   .   Social demographics are   Social History     Socioeconomic History     Marital status: Single     Spouse name: None     Number of children: None     Years of education: None     Highest education level: None    Occupational History     None   Social Needs     Financial resource strain: None     Food insecurity     Worry: None     Inability: None     Transportation needs     Medical: None     Non-medical: None   Tobacco Use     Smoking status: Current Every Day Smoker     Types: Pipe     Smokeless tobacco: Never Used   Substance and Sexual Activity     Alcohol use: Not Currently     Comment: sober x 18 months     Drug use: Not Currently     Sexual activity: None   Lifestyle     Physical activity     Days per week: None     Minutes per session: None     Stress: None   Relationships     Social connections     Talks on phone: None     Gets together: None     Attends Worship service: None     Active member of club or organization: None     Attends meetings of clubs or organizations: None     Relationship status: None     Intimate partner violence     Fear of current or ex partner: None     Emotionally abused: None     Physically abused: None     Forced sexual activity: None   Other Topics Concern     None   Social History Narrative     None        ASSESSMENT  Labs results Labs Ordered and Resulted from Time of ED Arrival Up to the Time of Departure from the ED - No data to display   Imaging Studies: No results found for this or any previous visit (from the past 24 hour(s)).   Most recent vital signs /63   Pulse 105   Temp 99  F (37.2  C)   Resp 18   SpO2 98%    Abnormal labs/tests/findings requiring intervention:---   Pain control: pt had none  Nausea control: pt had none    RECOMMENDATION  Are any infection precautions needed (MRSA, VRE, etc.)? No If yes, what infection? --  ---  Does the patient have mobility issues? with stand-by assist. If yes, what device does the pt use? ---  ---  Is patient on 72 hour hold or commitment? No If on 72 hour hold, have hold and rights been given to patient? N/A  Are admitting orders written if after 10 p.m. ?N/A  Tasks needing to be completed:---     Justa Uribe RN     [phone] [facility]

## 2020-07-11 NOTE — PHARMACY-ADMISSION MEDICATION HISTORY
Admission Medication History Completed by Pharmacy    See [facility] Admission Navigator for allergy information, preferred outpatient pharmacy, prior to admission medications and immunization status.     Medication History Sources:     MAR from nursing home    Changes made to PTA medication list (reason):    Added: Viibrid 40mg    Deleted:   o Metoprolol tartrate 50mg TID (now on succinate)  o Doxycycline hyclate 50mg PO daily (not on MAR)    Changed:   o APAP 500mg tabs: 1-2 tabs TID PRN ---> 2 tabs TID  o Atorvastatin 10mg daily ---> 40mg HS  o Baclofen 10mg TID ---> 10mg BID and 15mg HS  o Carbidopa-levodopa  BID --->  2 tabs BID and 1.5mg BID  o Donepezil 10mg HS --> AM  o Trazodone 50mg HS --> 75mg HS  o Triamcinolone TID --> BID PRN  o trihexyphenidyl 2mg TID --> 3mg TID    Additional Information:    None    Prior to Admission medications    Medication Sig Last Dose Taking? Auth Provider   acetaminophen (TYLENOL) 500 MG tablet Take 1,000 mg by mouth 3 times daily  7/11/2020 at 0900 Yes Reported, Patient   aspirin 81 MG EC tablet Take 81 mg by mouth daily 7/11/2020 at 0900 Yes Reported, Patient   atorvastatin (LIPITOR) 10 MG tablet Take 40 mg by mouth At Bedtime  7/10/2020 at 2100 Yes Reported, Patient   baclofen (LIORESAL) 10 MG tablet Take 10 mg by mouth 2 times daily  7/11/2020 at 0900 Yes Reported, Patient   baclofen (LIORESAL) 10 MG tablet Take 15 mg by mouth At Bedtime 7/10/2020 at 2100 Yes Unknown, Entered By History   carbidopa-levodopa (SINEMET)  MG tablet Take 2 tablets by mouth 2 times daily 7/11/2020 at 1100 Yes Unknown, Entered By History   carbidopa-levodopa (SINEMET)  MG tablet Take 1.5 tablets by mouth 2 times daily 7/10/2020 at 2100 Yes Unknown, Entered By History   clonazePAM (KLONOPIN) 1 MG tablet Take 2 mg by mouth At Bedtime  7/10/2020 at 2100 Yes Reported, Patient   cloZAPine (CLOZARIL) 25 MG tablet Take 25 mg by mouth daily 7/10/2020 at 2100 Yes Reported, Patient    docusate sodium (COLACE) 100 MG capsule Take 100 mg by mouth 2 times daily 7/11/2020 at 0900 Yes Reported, Patient   donepezil (ARICEPT) 10 MG tablet Take 10 mg by mouth daily  7/11/2020 at 0900 Yes Reported, Patient   gabapentin (NEURONTIN) 800 MG tablet Take 800 mg by mouth 3 times daily 7/11/2020 at 0900 Yes Reported, Patient   metoprolol succinate ER (TOPROL-XL) 25 MG 24 hr tablet Take 75 mg by mouth daily  7/11/2020 at 0900 Yes Reported, Patient   polyethylene glycol (MIRALAX) 17 g packet Take 1 packet by mouth daily 7/11/2020 at 0900 Yes Reported, Patient   rivaroxaban ANTICOAGULANT (XARELTO) 20 MG TABS tablet Take 20 mg by mouth daily (with dinner) 7/10/2020 at 1645 Yes Reported, Patient   sennosides (SENOKOT) 8.6 MG tablet Take 1 tablet by mouth 2 times daily 7/11/2020 at 0900 Yes Reported, Patient   tamsulosin (FLOMAX) 0.4 MG capsule Take 0.4 mg by mouth daily 7/11/2020 at 0900 Yes Reported, Patient   traZODone (DESYREL) 50 MG tablet Take 75 mg by mouth At Bedtime  7/10/2020 at 2100 Yes Reported, Patient   triamcinolone (KENALOG) 0.1 % external cream Apply topically 2 times daily as needed (facial dermatitis)  7/8/2020 at 1045 Yes Reported, Patient   trihexyphenidyl (ARTANE) 2 MG tablet Take 3 mg by mouth 3 times daily  7/11/2020 at 0900 Yes Reported, Patient   vilazodone (VIIBRYD) 40 MG TABS tablet Take 40 mg by mouth daily 7/11/2020 at 0900 Yes Unknown, Entered By History       Date completed: 07/11/20    Medication history completed by: Inge Thorne

## 2020-07-12 LAB
ALBUMIN SERPL-MCNC: 4.4 G/DL (ref 3.4–5)
ALP SERPL-CCNC: 94 U/L (ref 40–150)
ALT SERPL W P-5'-P-CCNC: 11 U/L (ref 0–70)
ANION GAP SERPL CALCULATED.3IONS-SCNC: 5 MMOL/L (ref 3–14)
AST SERPL W P-5'-P-CCNC: 12 U/L (ref 0–45)
BASOPHILS # BLD AUTO: 0 10E9/L (ref 0–0.2)
BASOPHILS NFR BLD AUTO: 0.3 %
BILIRUB SERPL-MCNC: 0.4 MG/DL (ref 0.2–1.3)
BUN SERPL-MCNC: 13 MG/DL (ref 7–30)
CALCIUM SERPL-MCNC: 9.6 MG/DL (ref 8.5–10.1)
CHLORIDE SERPL-SCNC: 105 MMOL/L (ref 94–109)
CHOLEST SERPL-MCNC: 121 MG/DL
CO2 SERPL-SCNC: 30 MMOL/L (ref 20–32)
CREAT SERPL-MCNC: 0.87 MG/DL (ref 0.66–1.25)
DIFFERENTIAL METHOD BLD: ABNORMAL
EOSINOPHIL # BLD AUTO: 0.1 10E9/L (ref 0–0.7)
EOSINOPHIL NFR BLD AUTO: 1 %
ERYTHROCYTE [DISTWIDTH] IN BLOOD BY AUTOMATED COUNT: 13.2 % (ref 10–15)
FOLATE SERPL-MCNC: 5.6 NG/ML
GFR SERPL CREATININE-BSD FRML MDRD: >90 ML/MIN/{1.73_M2}
GLUCOSE SERPL-MCNC: 107 MG/DL (ref 70–99)
HCT VFR BLD AUTO: 44.8 % (ref 40–53)
HDLC SERPL-MCNC: 58 MG/DL
HGB BLD-MCNC: 14 G/DL (ref 13.3–17.7)
IMM GRANULOCYTES # BLD: 0 10E9/L (ref 0–0.4)
IMM GRANULOCYTES NFR BLD: 0.4 %
LDLC SERPL CALC-MCNC: 44 MG/DL
LYMPHOCYTES # BLD AUTO: 1.7 10E9/L (ref 0.8–5.3)
LYMPHOCYTES NFR BLD AUTO: 24.7 %
MCH RBC QN AUTO: 29.8 PG (ref 26.5–33)
MCHC RBC AUTO-ENTMCNC: 31.3 G/DL (ref 31.5–36.5)
MCV RBC AUTO: 95 FL (ref 78–100)
MONOCYTES # BLD AUTO: 0.4 10E9/L (ref 0–1.3)
MONOCYTES NFR BLD AUTO: 6.2 %
NEUTROPHILS # BLD AUTO: 4.7 10E9/L (ref 1.6–8.3)
NEUTROPHILS NFR BLD AUTO: 67.4 %
NONHDLC SERPL-MCNC: 63 MG/DL
NRBC # BLD AUTO: 0 10*3/UL
NRBC BLD AUTO-RTO: 0 /100
PLATELET # BLD AUTO: 272 10E9/L (ref 150–450)
POTASSIUM SERPL-SCNC: 3.8 MMOL/L (ref 3.4–5.3)
PROT SERPL-MCNC: 8.9 G/DL (ref 6.8–8.8)
RBC # BLD AUTO: 4.7 10E12/L (ref 4.4–5.9)
SODIUM SERPL-SCNC: 140 MMOL/L (ref 133–144)
TRIGL SERPL-MCNC: 94 MG/DL
TSH SERPL DL<=0.005 MIU/L-ACNC: 2.33 MU/L (ref 0.4–4)
VIT B12 SERPL-MCNC: 305 PG/ML (ref 193–986)
WBC # BLD AUTO: 7 10E9/L (ref 4–11)

## 2020-07-12 PROCEDURE — 84443 ASSAY THYROID STIM HORMONE: CPT | Performed by: PSYCHIATRY & NEUROLOGY

## 2020-07-12 PROCEDURE — 85025 COMPLETE CBC W/AUTO DIFF WBC: CPT | Performed by: PSYCHIATRY & NEUROLOGY

## 2020-07-12 PROCEDURE — 82306 VITAMIN D 25 HYDROXY: CPT | Performed by: PSYCHIATRY & NEUROLOGY

## 2020-07-12 PROCEDURE — 80061 LIPID PANEL: CPT | Performed by: PSYCHIATRY & NEUROLOGY

## 2020-07-12 PROCEDURE — 25000132 ZZH RX MED GY IP 250 OP 250 PS 637: Performed by: PSYCHIATRY & NEUROLOGY

## 2020-07-12 PROCEDURE — 99232 SBSQ HOSP IP/OBS MODERATE 35: CPT | Performed by: PHYSICIAN ASSISTANT

## 2020-07-12 PROCEDURE — 25000132 ZZH RX MED GY IP 250 OP 250 PS 637: Performed by: PHYSICIAN ASSISTANT

## 2020-07-12 PROCEDURE — 80053 COMPREHEN METABOLIC PANEL: CPT | Performed by: PSYCHIATRY & NEUROLOGY

## 2020-07-12 PROCEDURE — 99222 1ST HOSP IP/OBS MODERATE 55: CPT | Mod: GT | Performed by: PSYCHIATRY & NEUROLOGY

## 2020-07-12 PROCEDURE — 82607 VITAMIN B-12: CPT | Performed by: PSYCHIATRY & NEUROLOGY

## 2020-07-12 PROCEDURE — 99207 ZZC CONSULT E&M CHANGED TO SUBSEQUENT LEVEL: CPT | Performed by: PHYSICIAN ASSISTANT

## 2020-07-12 PROCEDURE — 36415 COLL VENOUS BLD VENIPUNCTURE: CPT | Performed by: PSYCHIATRY & NEUROLOGY

## 2020-07-12 PROCEDURE — 82746 ASSAY OF FOLIC ACID SERUM: CPT | Performed by: PSYCHIATRY & NEUROLOGY

## 2020-07-12 PROCEDURE — H2032 ACTIVITY THERAPY, PER 15 MIN: HCPCS

## 2020-07-12 PROCEDURE — 12400002 ZZH R&B MH SENIOR/ADOLESCENT

## 2020-07-12 RX ORDER — CARBIDOPA AND LEVODOPA 25; 100 MG/1; MG/1
2 TABLET ORAL 2 TIMES DAILY
Status: DISCONTINUED | OUTPATIENT
Start: 2020-07-12 | End: 2020-07-29 | Stop reason: HOSPADM

## 2020-07-12 RX ORDER — CLOZAPINE 25 MG/1
25 TABLET ORAL AT BEDTIME
Status: DISCONTINUED | OUTPATIENT
Start: 2020-07-12 | End: 2020-07-29 | Stop reason: HOSPADM

## 2020-07-12 RX ORDER — BUPROPION HYDROCHLORIDE 150 MG/1
150 TABLET ORAL DAILY
Status: DISCONTINUED | OUTPATIENT
Start: 2020-07-12 | End: 2020-07-13

## 2020-07-12 RX ADMIN — SENNOSIDES 1 TABLET: 8.6 TABLET ORAL at 20:59

## 2020-07-12 RX ADMIN — SENNOSIDES 1 TABLET: 8.6 TABLET ORAL at 08:13

## 2020-07-12 RX ADMIN — Medication 3 MG: at 08:13

## 2020-07-12 RX ADMIN — GABAPENTIN 800 MG: 800 TABLET, FILM COATED ORAL at 14:18

## 2020-07-12 RX ADMIN — Medication 3 MG: at 20:59

## 2020-07-12 RX ADMIN — CLOZAPINE 25 MG: 25 TABLET ORAL at 20:59

## 2020-07-12 RX ADMIN — ACETAMINOPHEN 1000 MG: 500 TABLET, FILM COATED ORAL at 14:18

## 2020-07-12 RX ADMIN — ASPIRIN 81 MG: 81 TABLET ORAL at 08:12

## 2020-07-12 RX ADMIN — GABAPENTIN 800 MG: 800 TABLET, FILM COATED ORAL at 08:12

## 2020-07-12 RX ADMIN — Medication 3 MG: at 14:18

## 2020-07-12 RX ADMIN — Medication 3 HALF-TAB: at 20:58

## 2020-07-12 RX ADMIN — Medication 75 MG: at 20:59

## 2020-07-12 RX ADMIN — ACETAMINOPHEN 1000 MG: 500 TABLET, FILM COATED ORAL at 08:12

## 2020-07-12 RX ADMIN — DOCUSATE SODIUM 100 MG: 100 CAPSULE, LIQUID FILLED ORAL at 08:12

## 2020-07-12 RX ADMIN — ACETAMINOPHEN 1000 MG: 500 TABLET, FILM COATED ORAL at 20:59

## 2020-07-12 RX ADMIN — CARBIDOPA AND LEVODOPA 2 TABLET: 25; 100 TABLET ORAL at 12:09

## 2020-07-12 RX ADMIN — Medication 3 HALF-TAB: at 17:13

## 2020-07-12 RX ADMIN — CLONAZEPAM 2 MG: 1 TABLET ORAL at 20:58

## 2020-07-12 RX ADMIN — TAMSULOSIN HYDROCHLORIDE 0.4 MG: 0.4 CAPSULE ORAL at 08:12

## 2020-07-12 RX ADMIN — DONEPEZIL HYDROCHLORIDE 10 MG: 10 TABLET, FILM COATED ORAL at 08:12

## 2020-07-12 RX ADMIN — POLYETHYLENE GLYCOL 3350 17 G: 17 POWDER, FOR SOLUTION ORAL at 08:12

## 2020-07-12 RX ADMIN — BUPROPION HYDROCHLORIDE 150 MG: 150 TABLET, EXTENDED RELEASE ORAL at 12:09

## 2020-07-12 RX ADMIN — ATORVASTATIN CALCIUM 40 MG: 40 TABLET, FILM COATED ORAL at 21:00

## 2020-07-12 RX ADMIN — GABAPENTIN 800 MG: 800 TABLET, FILM COATED ORAL at 21:00

## 2020-07-12 RX ADMIN — Medication 15 MG: at 20:58

## 2020-07-12 RX ADMIN — RIVAROXABAN 20 MG: 10 TABLET, FILM COATED ORAL at 17:13

## 2020-07-12 RX ADMIN — CARBIDOPA AND LEVODOPA 2 TABLET: 25; 100 TABLET ORAL at 08:12

## 2020-07-12 RX ADMIN — VILAZODONE HYDROCHLORIDE 40 MG: 10 TABLET ORAL at 08:12

## 2020-07-12 RX ADMIN — Medication 10 MG: at 08:12

## 2020-07-12 RX ADMIN — DOCUSATE SODIUM 100 MG: 100 CAPSULE, LIQUID FILLED ORAL at 20:59

## 2020-07-12 ASSESSMENT — ACTIVITIES OF DAILY LIVING (ADL)
HYGIENE/GROOMING: INDEPENDENT
DRESS: INDEPENDENT
LAUNDRY: WITH SUPERVISION
ORAL_HYGIENE: INDEPENDENT

## 2020-07-12 NOTE — PHARMACY
Pharmacy Clozapine Note    Date of Service: 2020  Patient's : 1965  55 year old, male    Current clozapine regimen: 25mg po daily  Has there been a known interruption in therapy for greater than/equal to 48 hours? No    Recent ANC Value(s):  Recent Labs   Lab Test 20  0938   ANEU 4.7       Is the patient enrolled in the clozapine REMS program? Yes  Ordering prescriber: Dr. Hernandez  Is this provider certified in the clozapine REMS program? Yes  Is the ANC within recommended limits? Yes  Does the patient have any signs or symptoms of infection, including fever or sore throat? No    Plan:  1. Continue clozapine therapy at 25mg PO daily.  2. A WBC with differential will be ordered at least weekly.  ANC values will be entered into the REMS program.  3. Signs/symptoms of infection will be monitored daily.    Yari Martínez, PharmD, BCPS  Phone: [phone]

## 2020-07-12 NOTE — PROGRESS NOTES
07/11/20 2059   Patient Belongings   Did you bring any home meds/supplements to the hospital?  No   Patient Belongings locker   Patient Belongings Put in Hospital Secure Location (Security or Locker, etc.) jewelry;shoes;clothing   Belongings Search Yes   Clothing Search Yes   Second Staff Dewayne JACK       Pt locker: Gray sweat pants, red hat, watch, two gold rings, gray socks, and black sneakers.    With Pt: Compression wraps and Brown t-shirt.      A               Admission:  I am responsible for any personal items that are not sent to the safe or pharmacy.  [facility] is not responsible for loss, theft or damage of any property in my possession.    Signature:  _________________________________ Date: _______  Time: _____                                              Staff Signature:  ____________________________ Date: ________  Time: _____      2nd Staff person, if patient is unable/unwilling to sign:    Signature: ________________________________ Date: ________  Time: _____     Discharge:  [facility] has returned all of my personal belongings:    Signature: _________________________________ Date: ________  Time: _____                                          Staff Signature:  ____________________________ Date: ________  Time: _____

## 2020-07-12 NOTE — PROGRESS NOTES
Patient reported to this writer that he uses an electrical wheelchair to move around.  However the wheelchair is still at the nursing home where he previously resided. Patient states that due to left sided weakness, he struggles to wheel himself with the a regular wheel chair, especially for long distances.  Patient express the desire to utilize his electronic wheelchair on the unit.    Patient was able to transfer from his medical bed to wheelchair independently this AM.  Patients request passed on to day shift team.

## 2020-07-12 NOTE — PLAN OF CARE
"  ADMISSION    Per Behavioral Intake:    \"S: Pt is a 55 yrs old male in the [facility] ED for a suicidal attempt, report by Dania at 4:17PM.     B:  Pt came in from a skilled nursing facility, [facility] in [place]; he has lived there for 1 and 1/2 years.  [place].  SNF staff reports that Pt tried to kill himself rolling wheelchair into traffic.  Pt explained to  what he described as a rope used by both hands to cut his throat.  Pt reports feeling suicidal in the last few weeks due to what's been going on at the nursing facility.  Pt denies HI and psychosis.  Pt is not psychotic.  Pt reports a recent med change.  Pt reports being sober for 1 and 1/2 yrs.  Denies using any substances.  Pt has hx of Parkinson's, Anxiety, Depression and psychotic with delusions and Dementia.  Pt is wheelchair bound.  Cannot ambulate on his own.  Needs transfer assist.  Pt is medically cleared. Pt is asymptomatic.     Pt was swabbed for a COVID test.  He is asymptomatic.  Utox was negative.   Vitals are normal.     A: Vol.  Pt is his own guardian.     R: Due to Dementia dx, Pt is being referred to memory care facility. Intake provided info to .       5:15PM-  called back and said Pt does not have dementia.  SNF is not sure where the dx came from as it is in their documentation.  Intake inquired a mini cog. test.        5:20PM- Intake discussed with unit charge.  Pt would require a 1:1 sitter due to active SI.  They do not have staffing for that at the moment.      5:54PM- ED MD completed the mini cog test and Pt scored a 5/5.\"     COVID test unresulted at present. Pt is DNR/DNI. POLST document in chart and pt confirms verbally on arrival to unit. In addition to above, pt has following dx/hx: CVA, DVT, PE, Parkinsons, HTN, hyperlipidemia. Also Lewy body dementia. Weak gait, in wheelchair most of day but transfers independently and dresses himself. States he " "may require SBA at night to go to bathroom and agrees to use tap bell provided at bedside. Placed in medical bed. Orthostasis ongoing since arrival on unit. Fluids pushed. See flowsheet. Pt states this is the reason his metoprolol tartrate was changed to metoprolol succinate. \"At first they discontinued it. My blood pressure went up, but my pulse went through the roof.\" Clozapine not verified until CBC results are in in AM per pharmacy. Alert & oriented. Pleasant, polite, calm, cooperative. BLE edema; arrived with long ACE wraps. Anti-embolism stockings ordered instead and placed in pt's locked cabinet for tomorrow. Safety search completed. Belongings documented. Oriented to unit, room. Telephone orders obtained from on-call provider, per protocol. Care plan initiated. Status 15 initiated. Fall & suicide precautions initiated. Patient profile completed by Myranda BURKETT, see her note..   "
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
## 2020-07-12 NOTE — PLAN OF CARE
Patient appeared calm and attending unit activities. He endorses high anxiety and depression related to his living situation. Patient states he wants to find a different living arrangement. He rates anxiety and depression at 8/10, denies any thoughts of self harm.

## 2020-07-12 NOTE — PROGRESS NOTES
07/11/20 2111   Patient Belongings   Did you bring any home meds/supplements to the hospital?  No   Patient Belongings remains with patient;locker   Patient Belongings Remaining with Patient clothing;other (see comments)   Patient Belongings Put in Hospital Secure Location (Security or Locker, etc.) jewelry;shoes;ring;watch;clothing   Belongings Search Yes   Clothing Search Yes   Second Staff Dewayne JACK     Pt locker: [employer] sweatpants, red hat, watch, two gold rings, black sneakers, and socks.    With Pt: Brown t-shirt and compression wraps.    A               Admission:  I am responsible for any personal items that are not sent to the safe or pharmacy.  [facility] is not responsible for loss, theft or damage of any property in my possession.    Signature:  _________________________________ Date: _______  Time: _____                                              Staff Signature:  ____________________________ Date: ________  Time: _____      2nd Staff person, if patient is unable/unwilling to sign:    Signature: ________________________________ Date: ________  Time: _____     Discharge:  [facility] has returned all of my personal belongings:    Signature: _________________________________ Date: ________  Time: _____                                          Staff Signature:  ____________________________ Date: ________  Time: _____
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
## 2020-07-12 NOTE — PROVIDER NOTIFICATION
07/11/20 2030 07/11/20 2038 07/11/20 2219   Sitting Orthostatic BP   Sitting Orthostatic BP 97/66 114/74  (BP was retaken after RN had pt drink a cup of water) 105/70   Sitting Orthostatic Pulse 82 bpm 80 bpm 85 bpm   Standing Orthostatic BP   Standing Orthostatic BP 80/55 87/60  (BP retaken after RN had pt drink a cup of water) 84/51   Standing Orthostatic Pulse 93 bpm 90 bpm 96 bpm     Dr. Hernandez informed by phone. He ordered trazodone be held tonight and metoprolol XL held in AM until pt is seen and assessed by internal medicine.

## 2020-07-13 LAB — DEPRECATED CALCIDIOL+CALCIFEROL SERPL-MC: 35 UG/L (ref 20–75)

## 2020-07-13 PROCEDURE — 99232 SBSQ HOSP IP/OBS MODERATE 35: CPT | Mod: GT | Performed by: NURSE PRACTITIONER

## 2020-07-13 PROCEDURE — 99207 ZZC CDG-MDM COMPONENT: MEETS MODERATE - DOWN CODED: CPT | Performed by: NURSE PRACTITIONER

## 2020-07-13 PROCEDURE — H2032 ACTIVITY THERAPY, PER 15 MIN: HCPCS

## 2020-07-13 PROCEDURE — 25000132 ZZH RX MED GY IP 250 OP 250 PS 637: Performed by: PHYSICIAN ASSISTANT

## 2020-07-13 PROCEDURE — 25000132 ZZH RX MED GY IP 250 OP 250 PS 637: Performed by: PSYCHIATRY & NEUROLOGY

## 2020-07-13 PROCEDURE — 25000132 ZZH RX MED GY IP 250 OP 250 PS 637: Performed by: NURSE PRACTITIONER

## 2020-07-13 PROCEDURE — 12400002 ZZH R&B MH SENIOR/ADOLESCENT

## 2020-07-13 RX ORDER — CLONAZEPAM 1 MG/1
1 TABLET ORAL AT BEDTIME
Status: DISCONTINUED | OUTPATIENT
Start: 2020-07-13 | End: 2020-07-29 | Stop reason: HOSPADM

## 2020-07-13 RX ADMIN — TAMSULOSIN HYDROCHLORIDE 0.4 MG: 0.4 CAPSULE ORAL at 08:14

## 2020-07-13 RX ADMIN — DOCUSATE SODIUM 100 MG: 100 CAPSULE, LIQUID FILLED ORAL at 08:14

## 2020-07-13 RX ADMIN — CLONAZEPAM 1 MG: 1 TABLET ORAL at 20:45

## 2020-07-13 RX ADMIN — SENNOSIDES 1 TABLET: 8.6 TABLET ORAL at 20:46

## 2020-07-13 RX ADMIN — Medication 10 MG: at 13:44

## 2020-07-13 RX ADMIN — Medication 10 MG: at 08:13

## 2020-07-13 RX ADMIN — RIVAROXABAN 20 MG: 10 TABLET, FILM COATED ORAL at 16:35

## 2020-07-13 RX ADMIN — Medication 15 MG: at 20:45

## 2020-07-13 RX ADMIN — ATORVASTATIN CALCIUM 40 MG: 40 TABLET, FILM COATED ORAL at 20:46

## 2020-07-13 RX ADMIN — DONEPEZIL HYDROCHLORIDE 10 MG: 10 TABLET, FILM COATED ORAL at 08:13

## 2020-07-13 RX ADMIN — BUPROPION HYDROCHLORIDE 150 MG: 150 TABLET, EXTENDED RELEASE ORAL at 08:13

## 2020-07-13 RX ADMIN — GABAPENTIN 800 MG: 800 TABLET, FILM COATED ORAL at 13:45

## 2020-07-13 RX ADMIN — CLOZAPINE 25 MG: 25 TABLET ORAL at 20:46

## 2020-07-13 RX ADMIN — Medication 3 HALF-TAB: at 20:45

## 2020-07-13 RX ADMIN — GABAPENTIN 800 MG: 800 TABLET, FILM COATED ORAL at 20:46

## 2020-07-13 RX ADMIN — POLYETHYLENE GLYCOL 3350 17 G: 17 POWDER, FOR SOLUTION ORAL at 08:14

## 2020-07-13 RX ADMIN — DOCUSATE SODIUM 100 MG: 100 CAPSULE, LIQUID FILLED ORAL at 20:46

## 2020-07-13 RX ADMIN — ACETAMINOPHEN 1000 MG: 500 TABLET, FILM COATED ORAL at 13:44

## 2020-07-13 RX ADMIN — Medication 75 MG: at 20:46

## 2020-07-13 RX ADMIN — ACETAMINOPHEN 1000 MG: 500 TABLET, FILM COATED ORAL at 08:14

## 2020-07-13 RX ADMIN — METOPROLOL SUCCINATE 75 MG: 25 TABLET, EXTENDED RELEASE ORAL at 08:14

## 2020-07-13 RX ADMIN — GABAPENTIN 800 MG: 800 TABLET, FILM COATED ORAL at 08:14

## 2020-07-13 RX ADMIN — Medication 3 MG: at 13:44

## 2020-07-13 RX ADMIN — Medication 3 HALF-TAB: at 16:35

## 2020-07-13 RX ADMIN — VILAZODONE HYDROCHLORIDE 40 MG: 10 TABLET ORAL at 08:13

## 2020-07-13 RX ADMIN — Medication 3 MG: at 20:45

## 2020-07-13 RX ADMIN — CARBIDOPA AND LEVODOPA 2 TABLET: 25; 100 TABLET ORAL at 13:44

## 2020-07-13 RX ADMIN — SENNOSIDES 1 TABLET: 8.6 TABLET ORAL at 08:13

## 2020-07-13 RX ADMIN — ACETAMINOPHEN 1000 MG: 500 TABLET, FILM COATED ORAL at 20:46

## 2020-07-13 RX ADMIN — ASPIRIN 81 MG: 81 TABLET ORAL at 08:14

## 2020-07-13 RX ADMIN — Medication 3 MG: at 08:13

## 2020-07-13 RX ADMIN — CARBIDOPA AND LEVODOPA 2 TABLET: 25; 100 TABLET ORAL at 08:14

## 2020-07-13 ASSESSMENT — ACTIVITIES OF DAILY LIVING (ADL)
LAUNDRY: WITH SUPERVISION
ORAL_HYGIENE: INDEPENDENT
DRESS: INDEPENDENT
HYGIENE/GROOMING: INDEPENDENT
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
## 2020-07-13 NOTE — PROGRESS NOTES
"   07/13/20 1500   Music Therapy   Type of Participation Music therapy group   Response Participates independently   Hours 2     Benjamin participated in 2/2 MT groups this afternoon.      Group 1-    \"My Life as a Song\"-    Patients wrote down and discussed how their life would be represented by a song, if it were music.  Tempo, dynamics, radha, harmony, rhythm, instrumentation, theme, and silence were discussed with prompting questions for each category.  A reference sheet for each was also distributed, and patients found helpful.    eBnjamin was not able to write d/t his tremors during group, but did answer the questions as we went around the room.  He stated his song would have: \"no radha and no harmony\".  The theme for his life song, he stated, would be \"loss\".  He spoke of his current understanding is that he is being reduced on his Parkinson's medications.  He is concerned about his because he states he has dystonia in his foot, and it can be very painful.  He also mentioned his heart racing and having tremors.  He did appear uncomfortable.  He stated that people have been telling him \"just have a positive attitude and listen to music\".  He relayed that he did not feel heard, and he wishes he had an advocate.    Group 2-therapeutic group music listening    Benjamin selected several older school country songs and participated respectfully in this group.  Goals of group were relaxation and socialization.  His affect appeared calmer at group end, and he expressed gratitude to MT.    "

## 2020-07-13 NOTE — PROGRESS NOTES
Work Completed: Spoke with pt about CD treatment. Pt reports he does not wish for referral to treatment. Pt would like referral to SNF. Left voice message at [employer]. Spoke to Caro at [facility] to determine if pt is on a commitment. She will return call to provide information.    Update:  Spoke with Caro who reports that pt is NOT committed to their facility, however, is welcome to return.      Discharge plan or goal: Refer to SNF                Barriers to discharge: None identified at this time.

## 2020-07-13 NOTE — PROGRESS NOTES
07/12/20 1300   Art Therapy   Type of Intervention structured groups   Response Participates with encouragement   Hours 1   Treatment Detail    (Art Therapy)drawing- theme of hope   Objective- Patients use art media, the creative process & resulting artwork to:explore feelings,reconcile emotions, gain self-awareness/ self esteem, manage behaviors, develop social skills, improve reality orientation, & reduce anxiety /depression.     Outcome-Pt was pleasant, cooperative and engaged. Pt says his positive coping skills include: radhika, patience and perseverance. When he described art it was an esoteric description, but nothing very tangential. Pt was a positive participant.
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
## 2020-07-13 NOTE — PROGRESS NOTES
PRESCRIPTIONS  Total Prescriptions: 69  Total Private Pay: 0  Fill Date ID Written Drug Qty Days Prescriber Rx # Pharmacy Refill Daily Dose * Pymt Type   07/07/2020 1 03/26/2020 Clonazepam 1 Mg Tablet  28.00 14 Te Taw [id number] Ali ([id number]) 0/0 4.00 [place]  07/04/2020 1 02/03/2020 Gabapentin 800 Mg Tablet  42.00 14 Re Harlan [id number] Ali (1191) 0/0  [place]  06/24/2020 1 03/26/2020 Clonazepam 1 Mg Tablet  28.00 14 Te Taw [id number] Ali (1191) 0/0 4.[place]  06/23/2020 1 02/03/2020 Gabapentin 800 Mg Tablet  42.00 14 Re Harlan [id number] Ali (1191) 0/0  [place]  06/12/2020 1 02/03/2020 Gabapentin 800 Mg Tablet  42.00 14 Re Harlan [id number] Ali (1191) 0/0  [place]  06/12/2020 1 03/26/2020 Clonazepam 1 Mg Tablet  28.00 14 Te Taw [id number] Ali (1191) 0/0 4.[place]  06/01/2020 1 02/03/2020 Gabapentin 800 Mg Tablet  42.00 14 Re Harlan [id number] Ali (1191[place] 0/0  [place]  05/27/2020 1 03/26/2020 Clonazepam 1 Mg Tablet  28.00 14 Te Taw [id number] Ali (1191) 0/0 4.[place]  05/16/2020 1 02/03/2020 Gabapentin 800 Mg Tablet  42.00 14 Re Harlan [id number] Ali (1191[place] 0/0  [place]  05/14/2020 1 03/26/2020 Clonazepam 1 Mg Tablet  28.00 14 Te Taw [id number] Ali (1191) 0/0 4.[place]  05/02/2020 1 02/03/2020 Gabapentin 800 Mg Tablet  42.00 14 Re Harlan [id number] Ali (1191) 0/0  [place]  05/01/2020 1 03/26/2020 Clonazepam 1 Mg Tablet  28.00 14 Te Taw [id number] Ali (1191) 0/0 4.[place]  04/18/2020 1 02/03/2020 Gabapentin 800 Mg Tablet  42.00 14 Re Harlan [id number] Ali (1191) 0/0  [place]  04/18/2020 1 03/26/2020 Clonazepam 1 Mg Tablet  28.00 14 Te Taw [id number] Ali (1191) 0/0 4.[place]  04/03/2020 1 02/03/2020 Gabapentin 800 Mg Tablet  42.00 14 Re Harlan [id number] Ali (1191) 0/0  [place]  04/02/2020 1 03/26/2020 Clonazepam 1 Mg Tablet  28.00 14 Te Taw [id number] Ali (1191) 0/0 4.[place]  03/20/2020 1 02/03/2020 Gabapentin 800 Mg Tablet  42.00 14 Re Harlan [id number] Ali  (1191) 0/0  [place]  03/18/2020 1 03/04/2020 Clonazepam 1 Mg Tablet  28.00 14 Ab Faw [id number] Ali (1191) 0/0 4.[place]  03/09/2020 1 02/03/2020 Gabapentin 800 Mg Tablet  42.00 14 Re Harlan [id number] Ali (1191[place] 0/0  [place]  03/06/2020 1 03/04/2020 Clonazepam 1 Mg Tablet  28.00 14 Ab Faw [id number] Ali (1191) 0/0 4.[place]  02/24/2020 1 02/23/2020 Clonazepam 1 Mg Tablet  28.00 14 Re Harlan [id number] Ali (1191) 0/0 4.[place]  02/22/2020 1 02/03/2020 Gabapentin 800 Mg Tablet  42.00 14 Re Harlan [id number] Ali (1191[place] 0/0  [place]  02/09/2020 1 02/09/2020 Clonazepam 1 Mg Tablet  14.00 4 Me Wag [id number] Ali (1191) 0/0 7.00 [place]  02/07/2020 1 02/07/2020 Clonazepam 1 Mg Tablet  30.00 5 Re Harlan [id number] Ali (1191) 0/0 12.00 [place]  02/04/2020 1 01/13/2020 Clonazepam 2 Mg Tablet  28.00 14 Ab Faw [id number] Ali (1191) 0/0 8.[place]  02/03/2020 1 02/03/2020 Gabapentin 800 Mg Tablet  42.00 14 Re Harlan [id number] Ali (1191[place] 0/0  [place]  01/19/2020 1 01/13/2020 Clonazepam 2 Mg Tablet  28.00 14 Ab Faw [id number] Ali (1191) 0/0 8.[place]  01/15/2020 1 01/13/2020 Clonazepam 1 Mg Tablet  42.00 14 Ab Faw [id number] Ali (1191) 0/0 6.[place]  01/14/2020 1 12/04/2019 Gabapentin 800 Mg Tablet  42.00 14 Ab Faw [id number] Ali (1191) 0/0  [place]  01/13/2020 1 01/12/2020 Clonazepam 2 Mg Tablet  10.00 5 Me Shantell [id number] Ali (1191) 0/0 8.00 [place]  01/04/2020 1 10/09/2019 Clonazepam 1 Mg Tablet  42.00 14 Ab Faw [id number] Ali (1191) 0/0 6.[place]  01/02/2020 1 10/09/2019 Clonazepam 2 Mg Tablet  12.00 6 Ab Faw [id number] Ali (1191) 0/0 8.[place]  01/01/2020 1 12/04/2019 Gabapentin 800 Mg Tablet  42.00 14 Ab Faw [id number] Ali (1191[place] 0/0  [place]  12/22/2019 1 10/09/2019 Clonazepam 1 Mg Tablet  42.00 14 Ab Faw [id number] Ali (1191) 0/0 6.[place]  12/21/2019 1 10/09/2019 Clonazepam 2 Mg Tablet  28.00 14 Ab Faw [id number] N[place]  (1191) 0/0 8.00 [place]  12/16/2019 1 12/04/2019 Gabapentin 800 Mg Tablet  42.00 14 Ab Faw [id number] Ali (1191[place] 0/0  [place]  12/10/2019 1 10/09/2019 Clonazepam 1 Mg Tablet  42.00 14 Ab Faw [id number] Ali (1191) 0/0 6.[place]  12/09/2019 1 10/09/2019 Clonazepam 2 Mg Tablet  28.00 14 Ab Faw [id number] Ali (1191) 0/0 8.[place]  12/05/2019 1 12/04/2019 Gabapentin 800 Mg Tablet  42.00 14 Ab Faw [id number] Ali (1191) 0/0  [place]  11/26/2019 1 10/09/2019 Clonazepam 1 Mg Tablet  42.00 14 Ab Faw [id number] Ali (1191) 0/0 6.[place]  11/23/2019 1 10/09/2019 Clonazepam 2 Mg Tablet  28.00 14 Ab Faw [id number] Ali (1191) 0/0 8.[place]  11/15/2019 1 04/15/2019 Gabapentin 800 Mg Tablet  56.00 14 Re Harlan [id number] [place] (1191[place]  [place]  11/12/2019 1 10/09/2019 Clonazepam 1 Mg Tablet  42.00 14 Ab Faw [id number] Ali (1191) 0/0 6.[place]  11/12/2019 1 10/09/2019 Clonazepam 2 Mg Tablet  28.00 14 Ab Faw [id number] Ali (1191) 0/0 8.[place]  10/30/2019 1 04/15/2019 Gabapentin 800 Mg Tablet  56.00 14 Re Harlan [id number] [place] (1191[place]  10/28/2019 1 10/09/2019 Clonazepam 1 Mg Tablet  42.00 14 Ab Faw [id number] Ali (1191) 0/0 6.[place]  10/28/2019 1 10/09/2019 Clonazepam 2 Mg Tablet  28.00 14 Ab Faw [id number] Ali (1191) 0/0 8.[place]  10/19/2019 1 04/15/2019 Gabapentin 800 Mg Tablet  56.00 14 Re Harlan [id number] [place]1191[place]  10/10/2019 1 10/09/2019 Clonazepam 1 Mg Tablet  42.00 14 Ab Faw [id number] Ali (1191) 0/0 6.[place]  10/10/2019 1 10/09/2019 Clonazepam 2 Mg Tablet  28.00 14 Ab Faw [id number] Ali (1191) 0/0 8.[place]  10/03/2019 1 04/15/2019 Gabapentin 800 Mg Tablet  56.00 14 Re Harlan [id number] [place]1191[place]  09/30/2019 1 04/19/2019 Clonazepam 1 Mg Tablet  42.00 14 Ab Faw [id number] Ali (1191) 0/0 6.[place]  09/29/2019 1 04/19/2019 Clonazepam 2 Mg Tablet  28.00 14 Ab Faw [id number] Ali (1191) 0/0 8.[place]  [place]  09/21/2019 1 04/15/2019 Gabapentin 800 Mg Tablet  56.00 14 Re Harlan [id number] [place]1191[place]  09/16/2019 1 04/19/2019 Clonazepam 2 Mg Tablet  28.00 14 Ab Faw [id number] Ali (1191) 0/0 8.[place]  09/16/2019 1 04/19/2019 Clonazepam 1 Mg Tablet  42.00 14 Ab Faw [id number] Ali (1191) 0/0 6.[place]  09/09/2019 1 04/15/2019 Gabapentin 800 Mg Tablet  56.00 14 Re Harlan [id number] [place]1191[place]  09/02/2019 1 04/19/2019 Clonazepam 2 Mg Tablet  28.00 14 Ab Faw [id number] Ali (1191) 0/0 8.[place]  08/30/2019 1 04/19/2019 Clonazepam 1 Mg Tablet  42.00 14 Ab Faw [id number] Ali (1191[place] 0/0 6.[place]  08/24/2019 1 04/15/2019 Gabapentin 800 Mg Tablet  56.00 14 Re Harlan [id number] [place]1191[place]  08/20/2019 1 04/19/2019 Clonazepam 2 Mg Tablet  28.00 14 Ab Faw [id number] Ali (1191[place] 0/0 8.[place]  08/18/2019 1 04/19/2019 Clonazepam 1 Mg Tablet  42.00 14 Ab Faw [id number] Ali (1191) 0/0 6.[place]  08/06/2019 1 04/19/2019 Clonazepam 1 Mg Tablet  42.00 14 Ab Faw [id number] Ali (1191[place] 0/0 6.[place]  08/05/2019 1 04/15/2019 Gabapentin 800 Mg Tablet  56.00 14 Re Harlan [id number] [place]1191[place]  08/02/2019 1 04/19/2019 Clonazepam 2 Mg Tablet  28.00 14 Ab Faw [id number] Ali (1191) 0/0 8.[place]  07/25/2019 1 04/15/2019 Gabapentin 800 Mg Tablet  56.00 14 Re Harlan [id number] Ali (1191[place] 0/0  [place]  07/23/2019 1 04/19/2019 Clonazepam 1 Mg Tablet  42.00 14 Ab Faw [id number] Ali (1191) 0/0 6.00 [place]  07/20/2019 1 04/19/2019 Clonazepam 2 Mg Tablet  28.00 14 Ab Faw [id number] Ali (1191) 0/0 8.00 [place]  07/14/2019 1 04/15/2019 Gabapentin 800 Mg Tablet  56.00 14 Re Harlan [id number] Ali (1191) 0/0  [place]    Providers  Total Providers: 5  Name Address [facility] Zipcode Phone  Lety Ames Cnp [address] [facility] [id number] [phone]  Ashley Jefferson [address] [facility] [address] [phone]  Juan Pablo ROSS  [place], [place] [address] [facility] [address] [phone]  Rewati Teeparti [address] [place] [address] [phone]  Teresa Marie Peyton [place], [place] [address] [facility] [phone]  Pharmacies  Total Pharmacies: 1  Name Address City State Zipcode Phone  [employer] [phone]) [address] [facility] [address] [phone]

## 2020-07-14 PROCEDURE — 99232 SBSQ HOSP IP/OBS MODERATE 35: CPT | Mod: GT | Performed by: NURSE PRACTITIONER

## 2020-07-14 PROCEDURE — G0177 OPPS/PHP; TRAIN & EDUC SERV: HCPCS

## 2020-07-14 PROCEDURE — H2032 ACTIVITY THERAPY, PER 15 MIN: HCPCS

## 2020-07-14 PROCEDURE — 99207 ZZC CDG-MDM COMPONENT: MEETS MODERATE - DOWN CODED: CPT | Performed by: NURSE PRACTITIONER

## 2020-07-14 PROCEDURE — 12400002 ZZH R&B MH SENIOR/ADOLESCENT

## 2020-07-14 PROCEDURE — 25000132 ZZH RX MED GY IP 250 OP 250 PS 637: Performed by: NURSE PRACTITIONER

## 2020-07-14 PROCEDURE — 25000132 ZZH RX MED GY IP 250 OP 250 PS 637: Performed by: PSYCHIATRY & NEUROLOGY

## 2020-07-14 PROCEDURE — 25000132 ZZH RX MED GY IP 250 OP 250 PS 637: Performed by: PHYSICIAN ASSISTANT

## 2020-07-14 RX ADMIN — CLONAZEPAM 1 MG: 1 TABLET ORAL at 22:12

## 2020-07-14 RX ADMIN — DOCUSATE SODIUM 100 MG: 100 CAPSULE, LIQUID FILLED ORAL at 22:13

## 2020-07-14 RX ADMIN — DOCUSATE SODIUM 100 MG: 100 CAPSULE, LIQUID FILLED ORAL at 08:20

## 2020-07-14 RX ADMIN — Medication 3 MG: at 22:12

## 2020-07-14 RX ADMIN — POLYETHYLENE GLYCOL 3350 17 G: 17 POWDER, FOR SOLUTION ORAL at 08:21

## 2020-07-14 RX ADMIN — TAMSULOSIN HYDROCHLORIDE 0.4 MG: 0.4 CAPSULE ORAL at 08:21

## 2020-07-14 RX ADMIN — METOPROLOL SUCCINATE 75 MG: 25 TABLET, EXTENDED RELEASE ORAL at 08:21

## 2020-07-14 RX ADMIN — ACETAMINOPHEN 1000 MG: 500 TABLET, FILM COATED ORAL at 22:13

## 2020-07-14 RX ADMIN — Medication 75 MG: at 22:12

## 2020-07-14 RX ADMIN — Medication 10 MG: at 08:20

## 2020-07-14 RX ADMIN — Medication 3 MG: at 08:20

## 2020-07-14 RX ADMIN — Medication 10 MG: at 14:09

## 2020-07-14 RX ADMIN — Medication 3 HALF-TAB: at 22:12

## 2020-07-14 RX ADMIN — ASPIRIN 81 MG: 81 TABLET ORAL at 08:21

## 2020-07-14 RX ADMIN — ACETAMINOPHEN 1000 MG: 500 TABLET, FILM COATED ORAL at 14:09

## 2020-07-14 RX ADMIN — GABAPENTIN 800 MG: 800 TABLET, FILM COATED ORAL at 14:09

## 2020-07-14 RX ADMIN — SENNOSIDES 1 TABLET: 8.6 TABLET ORAL at 22:13

## 2020-07-14 RX ADMIN — SENNOSIDES 1 TABLET: 8.6 TABLET ORAL at 08:21

## 2020-07-14 RX ADMIN — CARBIDOPA AND LEVODOPA 2 TABLET: 25; 100 TABLET ORAL at 08:21

## 2020-07-14 RX ADMIN — VILAZODONE HYDROCHLORIDE 40 MG: 10 TABLET ORAL at 08:20

## 2020-07-14 RX ADMIN — CARBIDOPA AND LEVODOPA 2 TABLET: 25; 100 TABLET ORAL at 12:09

## 2020-07-14 RX ADMIN — Medication 3 HALF-TAB: at 16:51

## 2020-07-14 RX ADMIN — ACETAMINOPHEN 1000 MG: 500 TABLET, FILM COATED ORAL at 08:21

## 2020-07-14 RX ADMIN — DONEPEZIL HYDROCHLORIDE 10 MG: 10 TABLET, FILM COATED ORAL at 08:20

## 2020-07-14 RX ADMIN — RIVAROXABAN 20 MG: 10 TABLET, FILM COATED ORAL at 16:51

## 2020-07-14 RX ADMIN — ATORVASTATIN CALCIUM 40 MG: 40 TABLET, FILM COATED ORAL at 22:13

## 2020-07-14 RX ADMIN — CLOZAPINE 25 MG: 25 TABLET ORAL at 22:13

## 2020-07-14 RX ADMIN — GABAPENTIN 800 MG: 800 TABLET, FILM COATED ORAL at 22:13

## 2020-07-14 RX ADMIN — GABAPENTIN 800 MG: 800 TABLET, FILM COATED ORAL at 08:20

## 2020-07-14 RX ADMIN — Medication 3 MG: at 14:09

## 2020-07-14 RX ADMIN — Medication 15 MG: at 22:12

## 2020-07-14 ASSESSMENT — ACTIVITIES OF DAILY LIVING (ADL)
HYGIENE/GROOMING: INDEPENDENT
ORAL_HYGIENE: INDEPENDENT
LAUNDRY: WITH SUPERVISION
DRESS: INDEPENDENT
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
## 2020-07-14 NOTE — PROGRESS NOTES
INITIAL OT ASSESSMENT       07/14/20 1500   General Information   Date Initially Attended OT 07/14/20   Clinical Impression   Affect Flat   Orientation Oriented to person, place and time   Appearance and ADLs General cleanliness observed in most areas   Attention to Internal Stimuli No observed signs   Interaction Skills Withdrawn;Interacts appropriately with staff;Interacts appropriately with peers   Ability to Communicate Needs Does so with prompts   Verbal Content Clear;Appropriate to topic   Ability to Maintain Boundaries Maintains appropriate physical boundaries;Maintains appropriate verbal boundaries   Participation Independently participates   Concentration Concentrates 30+ minutes   Ability to Concentrate With structure   Follows and Comprehends Directions Independently follows multi-step directions   Memory Needs further assessment   Organization Independently organizes medium tasks   Decision Making Independent   Planning and Problem Solving Independently plans ahead   Ability to Apply and Learn Concepts Applies within group structure   Frustrations / Stress Tolerance Independently identifies sources of frustration/stress   Level of Insight Some insight   Self Esteem Can identify positives;Takes risks with support and encouragement;Poor self esteem  (identified goal to improve self-esteem)   Social Supports Has knowledge of support systems     Pt attended 3 out of 3 OT groups offered. Pt actively participated in occupational therapy clinic. Pt was able to ask for assistance as needed, and independently initiated a structured creative expression task. Pt demonstrated good focus, planning, and attention to detail. Pt appeared comfortable interacting with peers when conversation was initiated with him, otherwise he spent a majority of group quietly engaged in his task. Pt actively participated in a mental health management group with a focus on coping through movement to facilitate relaxation and stress  management via chair yoga. Pt followed and engaged in about 25% of the yoga poses, and appeared calm at the end of group. Pt was given and completed a written self-assessment form. OT staff reviewed with pt and explained the value of having them involved in their treatment plan, and provided options to meet current needs/self-identified goals. Selected goals including improve self-esteem, improve focus and concentration, improve problem solving and independence, and feel more comfortable interacting with others. PLAN: Will provide structure, support, and encouragement. Offer education on coping strategies and life management skills. Assist pt to increase self awareness regarding mental health issues and expand ideas. Will assess further in the areas of organization, problem solving, and concentration.

## 2020-07-14 NOTE — PROGRESS NOTES
Sent referrals to following SNF's:  [facility]  [facility]  [facility]  [facility]  [facility]  [facility]

## 2020-07-14 NOTE — PLAN OF CARE
Problem: Adult Inpatient Plan of Care  Goal: Plan of Care Review  Outcome: No Change     Pt denied SI/HI/SIB. Pt was visible in the milieu. He appeared a bit confused and in the beginning of shift. He thought he was being committed which is not the case. Once the plan of care was explained to him, he was appreciative and accepting of the information. Pt stated he would like to go to SNF but not the one he came from. Pt's vitals within normal limits.     /69   Pulse 68   Temp 98  F (36.7  C) (Oral)   Resp 16   Wt 103.3 kg (227 lb 12.8 oz)   SpO2 97%

## 2020-07-14 NOTE — PROGRESS NOTES
PATIENT DEMOGRAPHICS:   Name: Benjamin [facility] MRN: [id number]   Address: [address] [place] [address] Sex: Male   City/St/Zip: [place] [address] : 1965 (55 yrs)   Home Phone: [phone] Work Phone:     [facility]: [place] Cell Phone:      Needed: No [2] Language English [1]   Ethnicity: American [13] Race: White [1]   Country of Origin: [place] [1] Lutheran: None [25]      EMERGENCY CONTACT:  Contact Name: Daniela [facility] Relationship: Relative   Home Phone: [phone] Work Phone:         Cell Phone:        GUARANTOR INFORMATION: Relationship to Patient: Self  Name: Benjamin [facility]       Address: [address] [place] [address]  Account Type: Behavioral   City/St/[place] [address] Employer:     Home Phone: [phone] Work Phone:          COVERAGE INFORMATION:  Primary Payor: [employer] Plan: [employer]*   Subscriber: Benjamin [facility] Group #: [id number]   Subscriber Sex: Male       Subscriber : 1965 Patient Rel'ship: Self   Subscriber Effective Date:   Member Effective Date: 2019    Subscriber ID [id number] Member ID: [id number]      Secondary Payor:   Plan:     Subscriber:   Group #:     Subscriber Sex:         Subscriber :   Patient Rel'ship:     Subscriber Effective Date:   Member Effective Date:     Subscriber ID   Member ID:        PROVIDER INFORMATION:  Referring Physician:   Referring Address:     Referring Phone: N/A Referring Fax:     Primary Physician: No Ref-Primary, Physician Primary Address: No address on file   Primary Phone: None Primary Fax: [phone]

## 2020-07-14 NOTE — PROGRESS NOTES
07/13/20 2100   Therapeutic Recreation   Type of Intervention structured groups   Activity game   Response Participates, initiates socially appropriate     Pt attended the structured Therapeutic Recreation group, participating in a group activity. Pt participated in leisure participation and social engagement to gain self-esteem, manage behaviors, develop social skills, interpersonal skills, communication skills, and reduce anxiety/depression.    Pt entered group really late, participating in the activity for the last 10 minutes. (No charge).

## 2020-07-14 NOTE — PLAN OF CARE
Patient reports high anxiety and depression rating at 8/10 with 10 being the worst. Patient states most of his anxiety is coming from not wanting to return to previous facility. Patient mood appeared flat and depressed, he denies any thoughts of self harm. He attended and participated in group activities.

## 2020-07-15 PROCEDURE — 12400002 ZZH R&B MH SENIOR/ADOLESCENT

## 2020-07-15 PROCEDURE — H2032 ACTIVITY THERAPY, PER 15 MIN: HCPCS

## 2020-07-15 PROCEDURE — 99232 SBSQ HOSP IP/OBS MODERATE 35: CPT | Mod: GT | Performed by: NURSE PRACTITIONER

## 2020-07-15 PROCEDURE — 25000132 ZZH RX MED GY IP 250 OP 250 PS 637: Performed by: PSYCHIATRY & NEUROLOGY

## 2020-07-15 PROCEDURE — 99207 ZZC CDG-MDM COMPONENT: MEETS MODERATE - DOWN CODED: CPT | Performed by: NURSE PRACTITIONER

## 2020-07-15 PROCEDURE — 25000132 ZZH RX MED GY IP 250 OP 250 PS 637: Performed by: NURSE PRACTITIONER

## 2020-07-15 PROCEDURE — 25000132 ZZH RX MED GY IP 250 OP 250 PS 637: Performed by: PHYSICIAN ASSISTANT

## 2020-07-15 RX ADMIN — SENNOSIDES 1 TABLET: 8.6 TABLET ORAL at 08:07

## 2020-07-15 RX ADMIN — GABAPENTIN 800 MG: 800 TABLET, FILM COATED ORAL at 13:35

## 2020-07-15 RX ADMIN — Medication 3 MG: at 08:06

## 2020-07-15 RX ADMIN — CARBIDOPA AND LEVODOPA 2 TABLET: 25; 100 TABLET ORAL at 12:25

## 2020-07-15 RX ADMIN — Medication 3 HALF-TAB: at 21:00

## 2020-07-15 RX ADMIN — ACETAMINOPHEN 1000 MG: 500 TABLET, FILM COATED ORAL at 13:35

## 2020-07-15 RX ADMIN — CARBIDOPA AND LEVODOPA 2 TABLET: 25; 100 TABLET ORAL at 08:07

## 2020-07-15 RX ADMIN — ACETAMINOPHEN 1000 MG: 500 TABLET, FILM COATED ORAL at 08:07

## 2020-07-15 RX ADMIN — ACETAMINOPHEN 1000 MG: 500 TABLET, FILM COATED ORAL at 21:00

## 2020-07-15 RX ADMIN — GABAPENTIN 800 MG: 800 TABLET, FILM COATED ORAL at 08:06

## 2020-07-15 RX ADMIN — DONEPEZIL HYDROCHLORIDE 10 MG: 10 TABLET, FILM COATED ORAL at 08:06

## 2020-07-15 RX ADMIN — Medication 3 HALF-TAB: at 16:48

## 2020-07-15 RX ADMIN — DOCUSATE SODIUM 100 MG: 100 CAPSULE, LIQUID FILLED ORAL at 21:00

## 2020-07-15 RX ADMIN — ATORVASTATIN CALCIUM 40 MG: 40 TABLET, FILM COATED ORAL at 21:00

## 2020-07-15 RX ADMIN — Medication 15 MG: at 21:00

## 2020-07-15 RX ADMIN — GABAPENTIN 800 MG: 800 TABLET, FILM COATED ORAL at 21:00

## 2020-07-15 RX ADMIN — DOCUSATE SODIUM 100 MG: 100 CAPSULE, LIQUID FILLED ORAL at 08:07

## 2020-07-15 RX ADMIN — TAMSULOSIN HYDROCHLORIDE 0.4 MG: 0.4 CAPSULE ORAL at 08:07

## 2020-07-15 RX ADMIN — ASPIRIN 81 MG: 81 TABLET ORAL at 08:07

## 2020-07-15 RX ADMIN — Medication 3 MG: at 21:00

## 2020-07-15 RX ADMIN — VILAZODONE HYDROCHLORIDE 40 MG: 10 TABLET ORAL at 08:06

## 2020-07-15 RX ADMIN — CLOZAPINE 25 MG: 25 TABLET ORAL at 21:00

## 2020-07-15 RX ADMIN — CLONAZEPAM 1 MG: 1 TABLET ORAL at 21:00

## 2020-07-15 RX ADMIN — Medication 3 MG: at 13:35

## 2020-07-15 RX ADMIN — RIVAROXABAN 20 MG: 10 TABLET, FILM COATED ORAL at 16:48

## 2020-07-15 RX ADMIN — SENNOSIDES 1 TABLET: 8.6 TABLET ORAL at 21:00

## 2020-07-15 RX ADMIN — Medication 75 MG: at 21:00

## 2020-07-15 RX ADMIN — POLYETHYLENE GLYCOL 3350 17 G: 17 POWDER, FOR SOLUTION ORAL at 08:07

## 2020-07-15 RX ADMIN — METOPROLOL SUCCINATE 75 MG: 25 TABLET, EXTENDED RELEASE ORAL at 08:07

## 2020-07-15 RX ADMIN — Medication 10 MG: at 08:06

## 2020-07-15 RX ADMIN — Medication 10 MG: at 13:35

## 2020-07-15 ASSESSMENT — ACTIVITIES OF DAILY LIVING (ADL)
DRESS: INDEPENDENT
ORAL_HYGIENE: INDEPENDENT
HYGIENE/GROOMING: WITH ASSISTANCE;INDEPENDENT
LAUNDRY: UNABLE TO COMPLETE
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
## 2020-07-15 NOTE — PROGRESS NOTES
[facility], [place]   Psychiatric Progress Note        Interim History:   Telemedicine Visit: The patient's condition can be safely assessed and treated via synchronous audio and visual telemedicine encounter.    Start time: 0936  Stop time:  0945  Reason for Telemedicine Visit: Covid-19   Originating Site (Patient Location): [facility]   Distant Site (Provider Location): home office   Consent:  The patient/guardian has verbally consented to: the potential risks and benefits of telemedicine (video visit) versus in person care; bill my insurance or make self-payment for services provided; and responsibility for payment of non-covered services.    Mode of Communication:  Video Conference via Skype   As the provider I attest to compliance with applicable laws and regulations related to telemedicine.     From H&P: The patient is a 55-year-old single  male, who arrived to this facility from assisted living facility called [facility] in [place], [place] because of suicide attempt. The patient also has stated to the DEC 's that he would kill himself if he had to return back to his nursing home.     Team meeting report: The patient's care was discussed with the treatment team during the daily team meeting and/or staff's chart notes were reviewed.  Staff report patient has been calm, pleasant, cooperative.  Was still somewhat confused in the evening believing that he is currently on a court hold, calmer after the staff told him this is not the case.  He is currently voluntary.  The patient attend some of the groups.  Is visible in the milieu.  He is eating well.  Slept 9 hours.    Met with patient.  Feels better in terms of depression and anxiety, rating both as moderate.  States that that the medications have been helpful as well as the knowledge that he is on the go back to his nursing home.  He is appreciative of the staff's help.  Denies auditory and visual  hallucinations, paranoia, and delusions.  His slept really good.  Appetite continues to be low, and has been such in the last 6 or 7 months.  Reports losing 25 pounds.  Discussed the level of help he needs.  Patient states that the only help he needs is to transfer from his wheelchair to his bed and help in the shower.  He can change his own clothes and use the bathroom by himself.  The patient is hoping to find a new place soon.         Medications:       acetaminophen  1,000 mg Oral TID     aspirin  81 mg Oral Daily     atorvastatin  40 mg Oral At Bedtime     baclofen  10 mg Oral BID     baclofen  15 mg Oral At Bedtime     carbidopa-levodopa  2 tablet Oral BID     carbidopa-levodopa  3 half-tab Oral BID     clonazePAM  1 mg Oral At Bedtime     cloZAPine  25 mg Oral At Bedtime     docusate sodium  100 mg Oral BID     donepezil  10 mg Oral Daily     gabapentin  800 mg Oral TID     metoprolol succinate ER  75 mg Oral Daily     polyethylene glycol  17 g Oral Daily     rivaroxaban ANTICOAGULANT  20 mg Oral Daily with supper     sennosides  1 tablet Oral BID     tamsulosin  0.4 mg Oral Daily     traZODone  75 mg Oral At Bedtime     trihexyphenidyl  3 mg Oral TID     vilazodone  40 mg Oral Daily          Allergies:     Allergies   Allergen Reactions     Seroquel [Quetiapine] GI Disturbance          Labs:     Recent Results (from the past 672 hour(s))   COVID-19 Virus (Coronavirus) PCR to [facility]    Collection Time: 06/18/20  3:00 PM    Specimen type and source: Respiratory,    Result Value Ref Range    COVID-19 Virus PCR to [facility] - Source Nasopharyngeal     COVID-19 Virus PCR to [facility] - Result Not Detected    COVID-19 VIRUS (CORONAVIRUS) PCR TO [facility]    Collection Time: 07/02/20 10:30 AM    Specimen type and source: Respiratory, Nasopharyngeal swab (specimen)   Result Value Ref Range    COVID-19 Virus PCR Source Nasopharyngeal     COVID-19 Virus PCR to [facility] - Result Undetected  Undetected   Asymptomatic COVID-19 Virus (Coronavirus) by PCR    Collection Time: 07/11/20  5:20 PM    Specimen: Nasopharyngeal   Result Value Ref Range    COVID-19 Virus PCR to [facility] - Source Nasopharyngeal     COVID-19 Virus PCR to [facility] - Result       Test received-See reflex to IDDL test SARS CoV2 (COVID-19) Virus RT-PCR   Drug abuse screen 6 urine (tox)    Collection Time: 07/11/20  5:20 PM   Result Value Ref Range    Amphetamine Qual Urine Negative NEG^Negative    Barbiturates Qual Urine Negative NEG^Negative    Benzodiazepine Qual Urine Negative NEG^Negative    Cannabinoids Qual Urine Negative NEG^Negative    Cocaine Qual Urine Negative NEG^Negative    Ethanol Qual Urine Negative NEG^Negative    Opiates Qualitative Urine Negative NEG^Negative   SARS-CoV-2 COVID-19 Virus (Coronavirus) RT-PCR Nasopharyngeal    Collection Time: 07/11/20  5:20 PM    Specimen: Nasopharyngeal   Result Value Ref Range    SARS-CoV-2 Virus Specimen Source Nasopharyngeal     SARS-CoV-2 PCR Result NEGATIVE     SARS-CoV-2 PCR Comment       Testing was performed using the Xpert Xpress SARS-CoV-2 Assay on the Cepheid Gene-Xpert   Instrument Systems. Additional information about this Emergency Use Authorization (EUA)   assay can be found via the Lab Guide.     CBC with platelets differential    Collection Time: 07/12/20  9:38 AM   Result Value Ref Range    WBC 7.0 4.0 - 11.0 10e9/L    RBC Count 4.70 4.4 - 5.9 10e12/L    Hemoglobin 14.0 13.3 - 17.7 g/dL    Hematocrit 44.8 40.0 - 53.0 %    MCV 95 78 - 100 fl    MCH 29.8 26.5 - 33.0 pg    MCHC 31.3 (L) 31.5 - 36.5 g/dL    RDW 13.2 10.0 - 15.0 %    Platelet Count 272 150 - 450 10e9/L    Diff Method Automated Method     % Neutrophils 67.4 %    % Lymphocytes 24.7 %    % Monocytes 6.2 %    % Eosinophils 1.0 %    % Basophils 0.3 %    % Immature Granulocytes 0.4 %    Nucleated RBCs 0 0 /100    Absolute Neutrophil 4.7 1.6 - 8.3 10e9/L    Absolute Lymphocytes 1.7 0.8 - 5.3 10e9/L    Absolute  Monocytes 0.4 0.0 - 1.3 10e9/L    Absolute Eosinophils 0.1 0.0 - 0.7 10e9/L    Absolute Basophils 0.0 0.0 - 0.2 10e9/L    Abs Immature Granulocytes 0.0 0 - 0.4 10e9/L    Absolute Nucleated RBC 0.0    Comprehensive metabolic panel    Collection Time: 07/12/20  9:38 AM   Result Value Ref Range    Sodium 140 133 - 144 mmol/L    Potassium 3.8 3.4 - 5.3 mmol/L    Chloride 105 94 - 109 mmol/L    Carbon Dioxide 30 20 - 32 mmol/L    Anion Gap 5 3 - 14 mmol/L    Glucose 107 (H) 70 - 99 mg/dL    Urea Nitrogen 13 7 - 30 mg/dL    Creatinine 0.87 0.66 - 1.25 mg/dL    GFR Estimate >90 >60 mL/min/[1.73_m2]    GFR Estimate If Black >90 >60 mL/min/[1.73_m2]    Calcium 9.6 8.5 - 10.1 mg/dL    Bilirubin Total 0.4 0.2 - 1.3 mg/dL    Albumin 4.4 3.4 - 5.0 g/dL    Protein Total 8.9 (H) 6.8 - 8.8 g/dL    Alkaline Phosphatase 94 40 - 150 U/L    ALT 11 0 - 70 U/L    AST 12 0 - 45 U/L   TSH with free T4 reflex and/or T3 as indicated    Collection Time: 07/12/20  9:38 AM   Result Value Ref Range    TSH 2.33 0.40 - 4.00 mU/L   Vitamin B12    Collection Time: 07/12/20  9:38 AM   Result Value Ref Range    Vitamin B12 305 193 - 986 pg/mL   Folate    Collection Time: 07/12/20  9:38 AM   Result Value Ref Range    Folate 5.6 >5.4 ng/mL   Vitamin D Deficiency    Collection Time: 07/12/20  9:38 AM   Result Value Ref Range    Vitamin D Deficiency screening 35 20 - 75 ug/L   Lipid profile    Collection Time: 07/12/20  9:38 AM   Result Value Ref Range    Cholesterol 121 <200 mg/dL    Triglycerides 94 <150 mg/dL    HDL Cholesterol 58 >39 mg/dL    LDL Cholesterol Calculated 44 <100 mg/dL    Non HDL Cholesterol 63 <130 mg/dL            Psychiatric Examination:   Temp: 98.2  F (36.8  C) Temp src: Oral BP: 110/65 Pulse: 81   Resp: 16 SpO2: 93 % O2 Device: None (Room air)    Weight is 227 lbs 12.8 oz  There is no height or weight on file to calculate BMI.  Mental Status Exam  Appearance:  awake, alert, neatly groomed.  Attitude:  pleasant, cooperative  Eye  Contact: Normal intensity  Mood: depressed, anxious  Affect:  Flat, blunted  Speech:  clear, coherent,   Psychomotor Behavior:  no evidence of tardive dyskinesia, dystonia, or tics and intact gait and muscle tone  Thought Process: Linear and goal directed, much organized  Associations:  no looseness of associations  Thought Content:  no evidence of suicidal ideation or homicidal ideation, some delusional thought content  Insight: poor  Judgment:  impaired  Oriented to:  time, person, and place  Attention Span and Concentration:  intact  Recent and Remote Memory:  intact  Language: Intact  Fund of Knowledge: Normal  Muscle Strength and Tone: normal            Precautions:     Behavioral Orders   Procedures     Code 1 - Restrict to Unit     Fall precautions     Routine Programming     As clinically indicated     Status 15     Every 15 minutes.     Suicide precautions     Patients on Suicide Precautions should have a Combination Diet ordered that includes a Diet selection(s) AND a Behavioral Tray selection for Safe Tray - with utensils, or Safe Tray - NO utensils            DIagnoses:   1. Major depressive disorder, recurrent, severe, with possible psychosis  2.  Alcohol use disorder in remission.   3.  Rule out neurocognitive disorder    4.  Parkinson's disease (psychosis could be connected with Parkinson's disease or to the medication the patient has been treated with for Parkinson's.   5.  Medical problems include: Multiple pulmonary embolisms, history of TIA, supraventricular tachycardia, hyperlipidemia, neuropathic pain, BPH.            Plan:   --  Baclofen 10 mg 2 times a day and 15 mg at bedtime.   --  Sinemet 200 mg 2 times a day and Sinemet 150 mg 2 times a day.    --  Artane 3 mg 3 times a day.   --  Decrease Klonopin to 1 mg at bedtime,   --  Clozapine 25 mg daily.   --  Aricept 10 mg daily.   --  Gabapentin 800 mg 3 times a day.   --  Trazodone 75 mg at bedtime.     --  Vilazodone 40 mg daily. May change to  Cymbalta.     Disposition Plan   Reason for ongoing admission: is unable to care for self due to psychosis.  Disposition: TBD  Estimated length of stay: 5-7 days  Legal Status:  Voluntary  Discharge will be granted once symptoms improved.    Karlee PHAM CNP  Date: 07/15/20  Time: 2:00 PM      More than 30 minutes were spent for assessment, documentation, and coordination of care.       This note was created with the help of Dragon dictation system. All grammatical/typing errors or context distortion are unintentional and inherent to software.

## 2020-07-15 NOTE — PROGRESS NOTES
Pt visible in milieu. Pt pleasant upon check in. Pt states he is still feeling depressed but this has improved slightly since admission. Pt denies SI/SIB. No other concerns to report at this time.    Pt transferring independently and uses wheelchair.     07/14/20 1900   Behavioral Health   Hallucinations denies / not responding to hallucinations   Thinking intact   Orientation person: oriented;place: oriented;date: oriented;time: oriented   Memory baseline memory   Insight insight appropriate to situation   Judgement impaired   Eye Contact at examiner   Affect full range affect   Mood depressed;mood is calm   Physical Appearance/Attire disheveled   Hygiene neglected grooming - unclean body, hair, teeth   Suicidality other (see comments)  (denies)   1. Wish to be Dead (Recent) No   2. Non-Specific Active Suicidal Thoughts (Recent) No   Self Injury   (denies)   Elopement   (none)   Activity   (visible)   Speech clear;coherent   Medication Sensitivity no stated side effects;no observed side effects   Psychomotor / Gait   (wheelchair)   Psycho Education   Type of Intervention 1:1 intervention   Response participates, initiates socially appropriate   Hours 0.5   Treatment Detail Check in

## 2020-07-15 NOTE — PROGRESS NOTES
07/14/20 2200   Therapeutic Recreation   Type of Intervention structured groups   Activity game   Response Participates, initiates socially appropriate   Hours 1     Pt participated in Therapeutic Recreation group with focus on leisure participation, communication skills, and critical thinking. Engaged and focused in the group recreational activity via a group game.  Pt was a full participant throughout the entire duration of group. Pt was a quiet participant, but still was actively participating.

## 2020-07-16 PROCEDURE — 25000132 ZZH RX MED GY IP 250 OP 250 PS 637: Performed by: NURSE PRACTITIONER

## 2020-07-16 PROCEDURE — 12400002 ZZH R&B MH SENIOR/ADOLESCENT

## 2020-07-16 PROCEDURE — 25000132 ZZH RX MED GY IP 250 OP 250 PS 637: Performed by: PSYCHIATRY & NEUROLOGY

## 2020-07-16 PROCEDURE — 25000132 ZZH RX MED GY IP 250 OP 250 PS 637: Performed by: PHYSICIAN ASSISTANT

## 2020-07-16 PROCEDURE — G0177 OPPS/PHP; TRAIN & EDUC SERV: HCPCS

## 2020-07-16 PROCEDURE — H2032 ACTIVITY THERAPY, PER 15 MIN: HCPCS

## 2020-07-16 PROCEDURE — 99232 SBSQ HOSP IP/OBS MODERATE 35: CPT | Mod: GT | Performed by: NURSE PRACTITIONER

## 2020-07-16 PROCEDURE — 99207 ZZC CDG-MDM COMPONENT: MEETS MODERATE - DOWN CODED: CPT | Performed by: NURSE PRACTITIONER

## 2020-07-16 RX ADMIN — GABAPENTIN 800 MG: 800 TABLET, FILM COATED ORAL at 21:04

## 2020-07-16 RX ADMIN — CARBIDOPA AND LEVODOPA 2 TABLET: 25; 100 TABLET ORAL at 12:15

## 2020-07-16 RX ADMIN — Medication 10 MG: at 08:05

## 2020-07-16 RX ADMIN — ASPIRIN 81 MG: 81 TABLET ORAL at 08:05

## 2020-07-16 RX ADMIN — DOCUSATE SODIUM 100 MG: 100 CAPSULE, LIQUID FILLED ORAL at 21:05

## 2020-07-16 RX ADMIN — Medication 10 MG: at 14:18

## 2020-07-16 RX ADMIN — Medication 3 HALF-TAB: at 21:04

## 2020-07-16 RX ADMIN — Medication 3 HALF-TAB: at 16:39

## 2020-07-16 RX ADMIN — DOCUSATE SODIUM 100 MG: 100 CAPSULE, LIQUID FILLED ORAL at 08:06

## 2020-07-16 RX ADMIN — SENNOSIDES 1 TABLET: 8.6 TABLET ORAL at 21:04

## 2020-07-16 RX ADMIN — ATORVASTATIN CALCIUM 40 MG: 40 TABLET, FILM COATED ORAL at 21:04

## 2020-07-16 RX ADMIN — ACETAMINOPHEN 1000 MG: 500 TABLET, FILM COATED ORAL at 14:18

## 2020-07-16 RX ADMIN — ACETAMINOPHEN 1000 MG: 500 TABLET, FILM COATED ORAL at 08:04

## 2020-07-16 RX ADMIN — Medication 3 MG: at 14:18

## 2020-07-16 RX ADMIN — POLYETHYLENE GLYCOL 3350 17 G: 17 POWDER, FOR SOLUTION ORAL at 08:06

## 2020-07-16 RX ADMIN — Medication 15 MG: at 21:04

## 2020-07-16 RX ADMIN — Medication 75 MG: at 21:04

## 2020-07-16 RX ADMIN — GABAPENTIN 800 MG: 800 TABLET, FILM COATED ORAL at 14:18

## 2020-07-16 RX ADMIN — Medication 3 MG: at 21:04

## 2020-07-16 RX ADMIN — ACETAMINOPHEN 1000 MG: 500 TABLET, FILM COATED ORAL at 21:04

## 2020-07-16 RX ADMIN — VILAZODONE HYDROCHLORIDE 40 MG: 10 TABLET ORAL at 08:03

## 2020-07-16 RX ADMIN — RIVAROXABAN 20 MG: 10 TABLET, FILM COATED ORAL at 16:39

## 2020-07-16 RX ADMIN — SENNOSIDES 1 TABLET: 8.6 TABLET ORAL at 08:05

## 2020-07-16 RX ADMIN — Medication 3 MG: at 08:03

## 2020-07-16 RX ADMIN — CARBIDOPA AND LEVODOPA 2 TABLET: 25; 100 TABLET ORAL at 08:06

## 2020-07-16 RX ADMIN — CLOZAPINE 25 MG: 25 TABLET ORAL at 21:04

## 2020-07-16 RX ADMIN — METOPROLOL SUCCINATE 75 MG: 25 TABLET, EXTENDED RELEASE ORAL at 08:04

## 2020-07-16 RX ADMIN — DONEPEZIL HYDROCHLORIDE 10 MG: 10 TABLET, FILM COATED ORAL at 08:05

## 2020-07-16 RX ADMIN — GABAPENTIN 800 MG: 800 TABLET, FILM COATED ORAL at 08:05

## 2020-07-16 RX ADMIN — TAMSULOSIN HYDROCHLORIDE 0.4 MG: 0.4 CAPSULE ORAL at 08:05

## 2020-07-16 RX ADMIN — CLONAZEPAM 1 MG: 1 TABLET ORAL at 21:04

## 2020-07-16 ASSESSMENT — ACTIVITIES OF DAILY LIVING (ADL)
ORAL_HYGIENE: INDEPENDENT;PROMPTS
HYGIENE/GROOMING: INDEPENDENT;PROMPTS;WITH ASSISTANCE
HYGIENE/GROOMING: INDEPENDENT;WITH ASSISTANCE
ORAL_HYGIENE: INDEPENDENT;PROMPTS
DRESS: INDEPENDENT
DRESS: INDEPENDENT
LAUNDRY: UNABLE TO COMPLETE

## 2020-07-16 NOTE — PROGRESS NOTES
[facility] [place] [facility], [place]   Psychiatric Progress Note        Interim History:   Telemedicine Visit: The patient's condition can be safely assessed and treated via synchronous audio and visual telemedicine encounter.    Start time: 0943  Stop time:  0957  Reason for Telemedicine Visit: Covid-19   Originating Site (Patient Location): [facility]   Distant Site (Provider Location): home office   Consent:  The patient/guardian has verbally consented to: the potential risks and benefits of telemedicine (video visit) versus in person care; bill my insurance or make self-payment for services provided; and responsibility for payment of non-covered services.    Mode of Communication:  Video Conference via Skype   As the provider I attest to compliance with applicable laws and regulations related to telemedicine.     From H&P: The patient is a 55-year-old single  male, who arrived to this facility from assisted living facility called [facility] in [place], [place] because of suicide attempt. The patient also has stated to the DEC 's that he would kill himself if he had to return back to his nursing home.     Team meeting report: The patient's care was discussed with the treatment team during the daily team meeting and/or staff's chart notes were reviewed.  Staff report patient has been calm, pleasant, cooperative.  Attended groups.  Visible in the milieu.  Denies depression and anxiety in the evening.  He is hopeful.  Anxiety and depression were high in the morning.  He has been bright and social.  Denies suicidal ideation.  He is eating well.  Slept 7 hours.    Met with patient.  He is doing relatively well.  Depression and anxiety are rated as 6 out of 10, 10 being the worst.  No bizarre statements.  No suicidal ideation.  He is eating and sleeping well.  He is attending all groups.  Patient is grateful for the help he is getting.  He is very pleasant.         Medications:        acetaminophen  1,000 mg Oral TID     aspirin  81 mg Oral Daily     atorvastatin  40 mg Oral At Bedtime     baclofen  10 mg Oral BID     baclofen  15 mg Oral At Bedtime     carbidopa-levodopa  2 tablet Oral BID     carbidopa-levodopa  3 half-tab Oral BID     clonazePAM  1 mg Oral At Bedtime     cloZAPine  25 mg Oral At Bedtime     docusate sodium  100 mg Oral BID     donepezil  10 mg Oral Daily     gabapentin  800 mg Oral TID     metoprolol succinate ER  75 mg Oral Daily     polyethylene glycol  17 g Oral Daily     rivaroxaban ANTICOAGULANT  20 mg Oral Daily with supper     sennosides  1 tablet Oral BID     tamsulosin  0.4 mg Oral Daily     traZODone  75 mg Oral At Bedtime     trihexyphenidyl  3 mg Oral TID     vilazodone  40 mg Oral Daily          Allergies:     Allergies   Allergen Reactions     Seroquel [Quetiapine] GI Disturbance          Labs:     Recent Results (from the past 672 hour(s))   COVID-19 Virus (Coronavirus) PCR to [facility]    Collection Time: 06/18/20  3:00 PM    Specimen type and source: Respiratory,    Result Value Ref Range    COVID-19 Virus PCR to [facility] - Source Nasopharyngeal     COVID-19 Virus PCR to [facility] - Result Not Detected    COVID-19 VIRUS (CORONAVIRUS) PCR TO [facility]    Collection Time: 07/02/20 10:30 AM    Specimen type and source: Respiratory, Nasopharyngeal swab (specimen)   Result Value Ref Range    COVID-19 Virus PCR Source Nasopharyngeal     COVID-19 Virus PCR to [facility] - Result Undetected Undetected   Asymptomatic COVID-19 Virus (Coronavirus) by PCR    Collection Time: 07/11/20  5:20 PM    Specimen: Nasopharyngeal   Result Value Ref Range    COVID-19 Virus PCR to [facility] - Source Nasopharyngeal     COVID-19 Virus PCR to [facility] - Result       Test received-See reflex to IDDL test SARS CoV2 (COVID-19) Virus RT-PCR   Drug abuse screen 6 urine (tox)    Collection Time: 07/11/20  5:20 PM   Result Value Ref Range    Amphetamine Qual Urine Negative  NEG^Negative    Barbiturates Qual Urine Negative NEG^Negative    Benzodiazepine Qual Urine Negative NEG^Negative    Cannabinoids Qual Urine Negative NEG^Negative    Cocaine Qual Urine Negative NEG^Negative    Ethanol Qual Urine Negative NEG^Negative    Opiates Qualitative Urine Negative NEG^Negative   SARS-CoV-2 COVID-19 Virus (Coronavirus) RT-PCR Nasopharyngeal    Collection Time: 07/11/20  5:20 PM    Specimen: Nasopharyngeal   Result Value Ref Range    SARS-CoV-2 Virus Specimen Source Nasopharyngeal     SARS-CoV-2 PCR Result NEGATIVE     SARS-CoV-2 PCR Comment       Testing was performed using the Xpert Xpress SARS-CoV-2 Assay on the Cepheid Gene-Xpert   Instrument Systems. Additional information about this Emergency Use Authorization (EUA)   assay can be found via the Lab Guide.     CBC with platelets differential    Collection Time: 07/12/20  9:38 AM   Result Value Ref Range    WBC 7.0 4.0 - 11.0 10e9/L    RBC Count 4.70 4.4 - 5.9 10e12/L    Hemoglobin 14.0 13.3 - 17.7 g/dL    Hematocrit 44.8 40.0 - 53.0 %    MCV 95 78 - 100 fl    MCH 29.8 26.5 - 33.0 pg    MCHC 31.3 (L) 31.5 - 36.5 g/dL    RDW 13.2 10.0 - 15.0 %    Platelet Count 272 150 - 450 10e9/L    Diff Method Automated Method     % Neutrophils 67.4 %    % Lymphocytes 24.7 %    % Monocytes 6.2 %    % Eosinophils 1.0 %    % Basophils 0.3 %    % Immature Granulocytes 0.4 %    Nucleated RBCs 0 0 /100    Absolute Neutrophil 4.7 1.6 - 8.3 10e9/L    Absolute Lymphocytes 1.7 0.8 - 5.3 10e9/L    Absolute Monocytes 0.4 0.0 - 1.3 10e9/L    Absolute Eosinophils 0.1 0.0 - 0.7 10e9/L    Absolute Basophils 0.0 0.0 - 0.2 10e9/L    Abs Immature Granulocytes 0.0 0 - 0.4 10e9/L    Absolute Nucleated RBC 0.0    Comprehensive metabolic panel    Collection Time: 07/12/20  9:38 AM   Result Value Ref Range    Sodium 140 133 - 144 mmol/L    Potassium 3.8 3.4 - 5.3 mmol/L    Chloride 105 94 - 109 mmol/L    Carbon Dioxide 30 20 - 32 mmol/L    Anion Gap 5 3 - 14 mmol/L    Glucose  107 (H) 70 - 99 mg/dL    Urea Nitrogen 13 7 - 30 mg/dL    Creatinine 0.87 0.66 - 1.25 mg/dL    GFR Estimate >90 >60 mL/min/[1.73_m2]    GFR Estimate If Black >90 >60 mL/min/[1.73_m2]    Calcium 9.6 8.5 - 10.1 mg/dL    Bilirubin Total 0.4 0.2 - 1.3 mg/dL    Albumin 4.4 3.4 - 5.0 g/dL    Protein Total 8.9 (H) 6.8 - 8.8 g/dL    Alkaline Phosphatase 94 40 - 150 U/L    ALT 11 0 - 70 U/L    AST 12 0 - 45 U/L   TSH with free T4 reflex and/or T3 as indicated    Collection Time: 07/12/20  9:38 AM   Result Value Ref Range    TSH 2.33 0.40 - 4.00 mU/L   Vitamin B12    Collection Time: 07/12/20  9:38 AM   Result Value Ref Range    Vitamin B12 305 193 - 986 pg/mL   Folate    Collection Time: 07/12/20  9:38 AM   Result Value Ref Range    Folate 5.6 >5.4 ng/mL   Vitamin D Deficiency    Collection Time: 07/12/20  9:38 AM   Result Value Ref Range    Vitamin D Deficiency screening 35 20 - 75 ug/L   Lipid profile    Collection Time: 07/12/20  9:38 AM   Result Value Ref Range    Cholesterol 121 <200 mg/dL    Triglycerides 94 <150 mg/dL    HDL Cholesterol 58 >39 mg/dL    LDL Cholesterol Calculated 44 <100 mg/dL    Non HDL Cholesterol 63 <130 mg/dL            Psychiatric Examination:   Temp: 98.3  F (36.8  C) Temp src: Oral BP: 116/67 Pulse: 73   Resp: 16 SpO2: 97 % O2 Device: None (Room air)    Weight is 230 lbs 4.8 oz  There is no height or weight on file to calculate BMI.  Mental Status Exam  Appearance:  awake, alert, neatly groomed.  Attitude:  pleasant, cooperative  Eye Contact: Normal intensity  Mood: depressed, anxious  Affect:  Flat, blunted  Speech:  clear, coherent,   Psychomotor Behavior:  no evidence of tardive dyskinesia, dystonia, or tics and intact gait and muscle tone  Thought Process: Linear and goal directed, much organized  Associations:  no looseness of associations  Thought Content:  no evidence of suicidal ideation or homicidal ideation, some delusional thought content  Insight: poor  Judgment:  impaired  Oriented  to:  time, person, and place  Attention Span and Concentration:  intact  Recent and Remote Memory:  intact  Language: Intact  Fund of Knowledge: Normal  Muscle Strength and Tone: normal            Precautions:     Behavioral Orders   Procedures     Code 1 - Restrict to Unit     Fall precautions     Routine Programming     As clinically indicated     Status 15     Every 15 minutes.     Suicide precautions     Patients on Suicide Precautions should have a Combination Diet ordered that includes a Diet selection(s) AND a Behavioral Tray selection for Safe Tray - with utensils, or Safe Tray - NO utensils            DIagnoses:   1. Major depressive disorder, recurrent, severe, with possible psychosis  2.  Alcohol use disorder in remission.   3.  Rule out neurocognitive disorder    4.  Parkinson's disease (psychosis could be connected with Parkinson's disease or to the medication the patient has been treated with for Parkinson's.   5.  Medical problems include: Multiple pulmonary embolisms, history of TIA, supraventricular tachycardia, hyperlipidemia, neuropathic pain, BPH.            Plan:   --  Baclofen 10 mg 2 times a day and 15 mg at bedtime.   --  Sinemet 200 mg 2 times a day and Sinemet 150 mg 2 times a day.    --  Artane 3 mg 3 times a day.   --  Decrease Klonopin to 1 mg at bedtime,   --  Clozapine 25 mg daily.   --  Aricept 10 mg daily.   --  Gabapentin 800 mg 3 times a day.   --  Trazodone 75 mg at bedtime.     --  Vilazodone 40 mg daily. May change to Cymbalta.     Disposition Plan   Reason for ongoing admission: is unable to care for self due to psychosis.  Disposition: TBD  Estimated length of stay: 5-7 days  Legal Status:  Voluntary  Discharge will be granted once symptoms improved.    Karlee PHAM CNP  Date: 07/16/20  Time: 3:00 PM      More than 30 minutes were spent for assessment, documentation, and coordination of care.       This note was created with the help of Dragon dictation system. All  grammatical/typing errors or context distortion are unintentional and inherent to software.

## 2020-07-16 NOTE — PROGRESS NOTES
During shift Pt spent time in lounge and attended group, interacting with staff and peers. Pt presented as calm and with elevated mood during check-in. Pt stated they are feeling nothing like when they got admitted, that they no longer possess any suicidal ideation, and that they are feeling pretty good. Pt stated they have some medical concerns but know they will be addressed with primary doctor on discharge. Pt stated they would be even better than good if they found placement. Pt denies SIB, Hallucinations, Wishing to be dead.       07/15/20 2154   Behavioral Health   Hallucinations denies / not responding to hallucinations   Thinking intact   Orientation person: oriented;place: oriented;date: oriented;time: oriented   Memory baseline memory   Insight admits / accepts   Judgement intact   Affect full range affect   Mood mood is calm   Physical Appearance/Attire attire appropriate to age and situation   Hygiene neglected grooming - unclean body, hair, teeth   Suicidality other (see comments)  (denies)   1. Wish to be Dead (Recent) No   2. Non-Specific Active Suicidal Thoughts (Recent) No   Self Injury   (denies)   Activity other (see comment)  (active in milieu and groups, social with staff and peers)   Speech clear;coherent   Medication Sensitivity no stated side effects;no observed side effects   Psychomotor / Gait unsteady;slow   Psycho Education   Type of Intervention 1:1 intervention   Response participates, initiates socially appropriate   Hours 0.5   Treatment Detail check-in   Safety   Suicidality Status 15   Activities of Daily Living   Hygiene/Grooming with assistance;independent   Oral Hygiene independent   Dress independent   Laundry unable to complete   Room Organization independent   Activity   Activity Assistance Provided assistance, 1 person;independent   Assistive Device Utilized other (see comments)  (wheelchair)

## 2020-07-16 NOTE — PROGRESS NOTES
Work Completed: Faxed referrals to [facility], [place], [facility]    Discharge plan or goal: Discharge to SNF                Barriers to discharge: placement issues

## 2020-07-16 NOTE — PROGRESS NOTES
07/15/20 2200   Art Therapy   Type of Intervention structured groups   Response participates with encouragement   Hours 4   Treatment Detail    (Art Therapy) coping skills, coloring and DBT   Art Therapy Goal-to cope, express, contribute, regulate and sublimate emotions through the creative arts process and Art Therapy directives within a group setting.     Outcome- Pt was engaged, pleasant, cooperative and social.  He participated in group conversations and was added a lot to group with philosophical comments. He participated in projects and worked on drawing and coloring sheets.

## 2020-07-16 NOTE — PLAN OF CARE
Problem: OT General Care Plan  Goal: OT Goal 1  Description: Will attend OT groups improve concentration and motivation by engaging in more challenging and success oriented options while also improving insight with comments/answers made about mental health needs      Note: Attended 3 of 3 OT groups. He was actively involved, social, offered support to others and talked about his appreciation of life, his strength and ability to move through his challenges with improving his resiliency and strength. He talked about his time of taking care of a parent for 3 and 1/2 years and the appreciation of his ability to also do that.  He initiated speaking up and participated in an activity also focused on general Resiliency. Comments were in context and offered with clear and elaborated information.

## 2020-07-16 NOTE — PROGRESS NOTES
Pt has been out in the milieu and had a good breakfast. Medication complaint. Pt is pleasant,cooperative, and interacts with peers appropriately. Pt attends groups and participates. Pt states his mood is good and he feels much better. Affect is bright. Pt denies any hallucinations or display of delusional thinking. Pt denies suicidal ideation.

## 2020-07-17 PROCEDURE — 25000132 ZZH RX MED GY IP 250 OP 250 PS 637: Performed by: PHYSICIAN ASSISTANT

## 2020-07-17 PROCEDURE — 25000132 ZZH RX MED GY IP 250 OP 250 PS 637: Performed by: PSYCHIATRY & NEUROLOGY

## 2020-07-17 PROCEDURE — 12400002 ZZH R&B MH SENIOR/ADOLESCENT

## 2020-07-17 PROCEDURE — 99232 SBSQ HOSP IP/OBS MODERATE 35: CPT | Mod: GT | Performed by: NURSE PRACTITIONER

## 2020-07-17 PROCEDURE — H2032 ACTIVITY THERAPY, PER 15 MIN: HCPCS

## 2020-07-17 PROCEDURE — 25000132 ZZH RX MED GY IP 250 OP 250 PS 637: Performed by: NURSE PRACTITIONER

## 2020-07-17 PROCEDURE — G0177 OPPS/PHP; TRAIN & EDUC SERV: HCPCS

## 2020-07-17 PROCEDURE — 99207 ZZC CDG-MDM COMPONENT: MEETS MODERATE - DOWN CODED: CPT | Performed by: NURSE PRACTITIONER

## 2020-07-17 RX ADMIN — METOPROLOL SUCCINATE 75 MG: 25 TABLET, EXTENDED RELEASE ORAL at 08:15

## 2020-07-17 RX ADMIN — VILAZODONE HYDROCHLORIDE 40 MG: 10 TABLET ORAL at 08:14

## 2020-07-17 RX ADMIN — CARBIDOPA AND LEVODOPA 2 TABLET: 25; 100 TABLET ORAL at 08:14

## 2020-07-17 RX ADMIN — SENNOSIDES 1 TABLET: 8.6 TABLET ORAL at 08:14

## 2020-07-17 RX ADMIN — GABAPENTIN 800 MG: 800 TABLET, FILM COATED ORAL at 13:24

## 2020-07-17 RX ADMIN — ACETAMINOPHEN 1000 MG: 500 TABLET, FILM COATED ORAL at 21:05

## 2020-07-17 RX ADMIN — Medication 3 HALF-TAB: at 16:14

## 2020-07-17 RX ADMIN — Medication 3 HALF-TAB: at 21:04

## 2020-07-17 RX ADMIN — SENNOSIDES 1 TABLET: 8.6 TABLET ORAL at 21:05

## 2020-07-17 RX ADMIN — ATORVASTATIN CALCIUM 40 MG: 40 TABLET, FILM COATED ORAL at 21:05

## 2020-07-17 RX ADMIN — DOCUSATE SODIUM 100 MG: 100 CAPSULE, LIQUID FILLED ORAL at 08:16

## 2020-07-17 RX ADMIN — POLYETHYLENE GLYCOL 3350 17 G: 17 POWDER, FOR SOLUTION ORAL at 08:14

## 2020-07-17 RX ADMIN — GABAPENTIN 800 MG: 800 TABLET, FILM COATED ORAL at 21:05

## 2020-07-17 RX ADMIN — RIVAROXABAN 20 MG: 10 TABLET, FILM COATED ORAL at 16:14

## 2020-07-17 RX ADMIN — Medication 10 MG: at 08:14

## 2020-07-17 RX ADMIN — TAMSULOSIN HYDROCHLORIDE 0.4 MG: 0.4 CAPSULE ORAL at 08:15

## 2020-07-17 RX ADMIN — ACETAMINOPHEN 1000 MG: 500 TABLET, FILM COATED ORAL at 08:14

## 2020-07-17 RX ADMIN — DONEPEZIL HYDROCHLORIDE 10 MG: 10 TABLET, FILM COATED ORAL at 08:16

## 2020-07-17 RX ADMIN — ASPIRIN 81 MG: 81 TABLET ORAL at 08:16

## 2020-07-17 RX ADMIN — Medication 3 MG: at 21:05

## 2020-07-17 RX ADMIN — Medication 15 MG: at 21:05

## 2020-07-17 RX ADMIN — DOCUSATE SODIUM 100 MG: 100 CAPSULE, LIQUID FILLED ORAL at 21:05

## 2020-07-17 RX ADMIN — CLOZAPINE 25 MG: 25 TABLET ORAL at 21:04

## 2020-07-17 RX ADMIN — CARBIDOPA AND LEVODOPA 2 TABLET: 25; 100 TABLET ORAL at 13:24

## 2020-07-17 RX ADMIN — Medication 10 MG: at 13:24

## 2020-07-17 RX ADMIN — GABAPENTIN 800 MG: 800 TABLET, FILM COATED ORAL at 08:14

## 2020-07-17 RX ADMIN — Medication 3 MG: at 08:14

## 2020-07-17 RX ADMIN — CLONAZEPAM 1 MG: 1 TABLET ORAL at 21:05

## 2020-07-17 RX ADMIN — Medication 3 MG: at 13:25

## 2020-07-17 RX ADMIN — Medication 75 MG: at 21:05

## 2020-07-17 RX ADMIN — ACETAMINOPHEN 1000 MG: 500 TABLET, FILM COATED ORAL at 13:25

## 2020-07-17 ASSESSMENT — ACTIVITIES OF DAILY LIVING (ADL)
ORAL_HYGIENE: INDEPENDENT
HYGIENE/GROOMING: INDEPENDENT
HYGIENE/GROOMING: INDEPENDENT
LAUNDRY: UNABLE TO COMPLETE
DRESS: INDEPENDENT
DRESS: SCRUBS (BEHAVIORAL HEALTH)
ORAL_HYGIENE: INDEPENDENT

## 2020-07-17 NOTE — PROGRESS NOTES
Contacted [facility] to determine if they received referral for placement. Writer informed to contact Dougie at [facility] ([phone]). Spoke with Dougie who will review referral and get back to writer.

## 2020-07-17 NOTE — PROGRESS NOTES
"Patient attended a mental health education group, duration 1 hour, on the topic of \"Ten Challenging Ideas\".  Participation was appropriate, patient initiated discussion and otherwise contributed in a positive way.    "

## 2020-07-17 NOTE — PROGRESS NOTES
"   07/16/20 2051   Behavioral Health   Hallucinations denies / not responding to hallucinations   Thinking poor concentration   Orientation person: oriented;place: oriented;date: oriented;time: oriented   Memory baseline memory   Insight poor   Judgement   (fair)   Eye Contact at examiner   Affect full range affect   Mood mood is calm   Physical Appearance/Attire untidy   Hygiene neglected grooming - unclean body, hair, teeth   Suicidality other (see comments)  (denies)   1. Wish to be Dead (Recent) No   2. Non-Specific Active Suicidal Thoughts (Recent) No   Self Injury other (see comment)  (denies)   Elopement   (none)   Activity other (see comment)  (visible in the milieu)   Speech clear;coherent   Medication Sensitivity no stated side effects;no observed side effects   Psychomotor / Gait slow;unsteady  (uses wheelchair)   Psycho Education   Type of Intervention 1:1 intervention   Response participates, initiates socially appropriate   Hours 0.5   Treatment Detail check in   Activities of Daily Living   Hygiene/Grooming independent;with assistance   Oral Hygiene independent;prompts   Dress independent   Laundry unable to complete   Room Organization independent     Pt says that he feels depressed but improving. Pt rated feelings of depression and anxiety at a \"6\" (on a scale from 1 to 10, 10 being the most severe). Pt said that he has been sleeping well and his appetite is poor (attributed to depression). Pt attended both community meeting and the music group this evening. Pt has been in the lounge for most of the shift. Pt said that he feels more tired during the evening. No further concerns.  "

## 2020-07-17 NOTE — PROGRESS NOTES
[facility], [place]   Psychiatric Progress Note        Interim History:   Telemedicine Visit: The patient's condition can be safely assessed and treated via synchronous audio and visual telemedicine encounter.    Start time: 0923  Stop time:  0940  Reason for Telemedicine Visit: Covid-19   Originating Site (Patient Location): [facility]   Distant Site (Provider Location): home office   Consent:  The patient/guardian has verbally consented to: the potential risks and benefits of telemedicine (video visit) versus in person care; bill my insurance or make self-payment for services provided; and responsibility for payment of non-covered services.    Mode of Communication:  Video Conference via Skype   As the provider I attest to compliance with applicable laws and regulations related to telemedicine.     From H&P: The patient is a 55-year-old single  male, who arrived to this facility from assisted living facility called [facility] in [place], [place] because of suicide attempt. The patient also has stated to the DEC 's that he would kill himself if he had to return back to his nursing home.     Team meeting report: The patient's care was discussed with the treatment team during the daily team meeting and/or staff's chart notes were reviewed.  Staff report patient has been calm, pleasant, cooperative.  Attended groups.  Visible in the milieu.  Denies depression and anxiety in the evening.  He is hopeful.  Anxiety and depression are rated as 6/10 (10 being the worst).  He has been bright and social.  Denies suicidal ideation.  Appetite is poor.  Slept 7 hours.    Met with patient. He is doing relatively well, states it is about the same as yesterday.  Depression and anxiety are rated as 6 out of 10, 10 being the worst.  No bizarre statements.  No suicidal ideation.  He is eating and sleeping well.  He is attending all groups.  Patient is grateful for the help he is  getting.  He is very pleasant.  No questions or concerns.         Medications:       acetaminophen  1,000 mg Oral TID     aspirin  81 mg Oral Daily     atorvastatin  40 mg Oral At Bedtime     baclofen  10 mg Oral BID     baclofen  15 mg Oral At Bedtime     carbidopa-levodopa  2 tablet Oral BID     carbidopa-levodopa  3 half-tab Oral BID     clonazePAM  1 mg Oral At Bedtime     cloZAPine  25 mg Oral At Bedtime     docusate sodium  100 mg Oral BID     donepezil  10 mg Oral Daily     gabapentin  800 mg Oral TID     metoprolol succinate ER  75 mg Oral Daily     polyethylene glycol  17 g Oral Daily     rivaroxaban ANTICOAGULANT  20 mg Oral Daily with supper     sennosides  1 tablet Oral BID     tamsulosin  0.4 mg Oral Daily     traZODone  75 mg Oral At Bedtime     trihexyphenidyl  3 mg Oral TID     vilazodone  40 mg Oral Daily          Allergies:     Allergies   Allergen Reactions     Seroquel [Quetiapine] GI Disturbance          Labs:     Recent Results (from the past 672 hour(s))   COVID-19 VIRUS (CORONAVIRUS) PCR TO [facility]    Collection Time: 07/02/20 10:30 AM    Specimen type and source: Respiratory, Nasopharyngeal swab (specimen)   Result Value Ref Range    COVID-19 Virus PCR Source Nasopharyngeal     COVID-19 Virus PCR to [facility] - Result Undetected Undetected   Asymptomatic COVID-19 Virus (Coronavirus) by PCR    Collection Time: 07/11/20  5:20 PM    Specimen: Nasopharyngeal   Result Value Ref Range    COVID-19 Virus PCR to [facility] - Source Nasopharyngeal     COVID-19 Virus PCR to [facility] - Result       Test received-See reflex to IDDL test SARS CoV2 (COVID-19) Virus RT-PCR   Drug abuse screen 6 urine (tox)    Collection Time: 07/11/20  5:20 PM   Result Value Ref Range    Amphetamine Qual Urine Negative NEG^Negative    Barbiturates Qual Urine Negative NEG^Negative    Benzodiazepine Qual Urine Negative NEG^Negative    Cannabinoids Qual Urine Negative NEG^Negative    Cocaine Qual Urine Negative NEG^Negative     Ethanol Qual Urine Negative NEG^Negative    Opiates Qualitative Urine Negative NEG^Negative   SARS-CoV-2 COVID-19 Virus (Coronavirus) RT-PCR Nasopharyngeal    Collection Time: 07/11/20  5:20 PM    Specimen: Nasopharyngeal   Result Value Ref Range    SARS-CoV-2 Virus Specimen Source Nasopharyngeal     SARS-CoV-2 PCR Result NEGATIVE     SARS-CoV-2 PCR Comment       Testing was performed using the Xpert Xpress SARS-CoV-2 Assay on the Cepheid Gene-Xpert   Instrument Systems. Additional information about this Emergency Use Authorization (EUA)   assay can be found via the Lab Guide.     CBC with platelets differential    Collection Time: 07/12/20  9:38 AM   Result Value Ref Range    WBC 7.0 4.0 - 11.0 10e9/L    RBC Count 4.70 4.4 - 5.9 10e12/L    Hemoglobin 14.0 13.3 - 17.7 g/dL    Hematocrit 44.8 40.0 - 53.0 %    MCV 95 78 - 100 fl    MCH 29.8 26.5 - 33.0 pg    MCHC 31.3 (L) 31.5 - 36.5 g/dL    RDW 13.2 10.0 - 15.0 %    Platelet Count 272 150 - 450 10e9/L    Diff Method Automated Method     % Neutrophils 67.4 %    % Lymphocytes 24.7 %    % Monocytes 6.2 %    % Eosinophils 1.0 %    % Basophils 0.3 %    % Immature Granulocytes 0.4 %    Nucleated RBCs 0 0 /100    Absolute Neutrophil 4.7 1.6 - 8.3 10e9/L    Absolute Lymphocytes 1.7 0.8 - 5.3 10e9/L    Absolute Monocytes 0.4 0.0 - 1.3 10e9/L    Absolute Eosinophils 0.1 0.0 - 0.7 10e9/L    Absolute Basophils 0.0 0.0 - 0.2 10e9/L    Abs Immature Granulocytes 0.0 0 - 0.4 10e9/L    Absolute Nucleated RBC 0.0    Comprehensive metabolic panel    Collection Time: 07/12/20  9:38 AM   Result Value Ref Range    Sodium 140 133 - 144 mmol/L    Potassium 3.8 3.4 - 5.3 mmol/L    Chloride 105 94 - 109 mmol/L    Carbon Dioxide 30 20 - 32 mmol/L    Anion Gap 5 3 - 14 mmol/L    Glucose 107 (H) 70 - 99 mg/dL    Urea Nitrogen 13 7 - 30 mg/dL    Creatinine 0.87 0.66 - 1.25 mg/dL    GFR Estimate >90 >60 mL/min/[1.73_m2]    GFR Estimate If Black >90 >60 mL/min/[1.73_m2]    Calcium 9.6 8.5 - 10.1  mg/dL    Bilirubin Total 0.4 0.2 - 1.3 mg/dL    Albumin 4.4 3.4 - 5.0 g/dL    Protein Total 8.9 (H) 6.8 - 8.8 g/dL    Alkaline Phosphatase 94 40 - 150 U/L    ALT 11 0 - 70 U/L    AST 12 0 - 45 U/L   TSH with free T4 reflex and/or T3 as indicated    Collection Time: 07/12/20  9:38 AM   Result Value Ref Range    TSH 2.33 0.40 - 4.00 mU/L   Vitamin B12    Collection Time: 07/12/20  9:38 AM   Result Value Ref Range    Vitamin B12 305 193 - 986 pg/mL   Folate    Collection Time: 07/12/20  9:38 AM   Result Value Ref Range    Folate 5.6 >5.4 ng/mL   Vitamin D Deficiency    Collection Time: 07/12/20  9:38 AM   Result Value Ref Range    Vitamin D Deficiency screening 35 20 - 75 ug/L   Lipid profile    Collection Time: 07/12/20  9:38 AM   Result Value Ref Range    Cholesterol 121 <200 mg/dL    Triglycerides 94 <150 mg/dL    HDL Cholesterol 58 >39 mg/dL    LDL Cholesterol Calculated 44 <100 mg/dL    Non HDL Cholesterol 63 <130 mg/dL            Psychiatric Examination:   Temp: 99.1  F (37.3  C) Temp src: Oral BP: 112/80 Pulse: 76     SpO2: 96 %      Weight is 230 lbs 4.8 oz  There is no height or weight on file to calculate BMI.  Mental Status Exam  Appearance:  awake, alert, neatly groomed.  Attitude:  pleasant, cooperative  Eye Contact: Normal intensity  Mood: depressed, anxious  Affect:  Flat, blunted  Speech:  clear, coherent,   Psychomotor Behavior:  no evidence of tardive dyskinesia, dystonia, or tics and intact gait and muscle tone  Thought Process: Linear and goal directed, much organized  Associations:  no looseness of associations  Thought Content:  no evidence of suicidal ideation or homicidal ideation, some delusional thought content  Insight: poor  Judgment:  impaired  Oriented to:  time, person, and place  Attention Span and Concentration:  intact  Recent and Remote Memory:  intact  Language: Intact  Fund of Knowledge: Normal  Muscle Strength and Tone: normal            Precautions:     Behavioral Orders    Procedures     Code 1 - Restrict to Unit     Fall precautions     Routine Programming     As clinically indicated     Status 15     Every 15 minutes.     Suicide precautions     Patients on Suicide Precautions should have a Combination Diet ordered that includes a Diet selection(s) AND a Behavioral Tray selection for Safe Tray - with utensils, or Safe Tray - NO utensils            DIagnoses:   1. Major depressive disorder, recurrent, severe, with possible psychosis  2.  Alcohol use disorder in remission.   3.  Rule out neurocognitive disorder    4.  Parkinson's disease (psychosis could be connected with Parkinson's disease or to the medication the patient has been treated with for Parkinson's.   5.  Medical problems include: Multiple pulmonary embolisms, history of TIA, supraventricular tachycardia, hyperlipidemia, neuropathic pain, BPH.            Plan:   --  Baclofen 10 mg 2 times a day and 15 mg at bedtime.   --  Sinemet 200 mg 2 times a day and Sinemet 150 mg 2 times a day.    --  Artane 3 mg 3 times a day.   --  Decrease Klonopin to 1 mg at bedtime,   --  Clozapine 25 mg daily.   --  Aricept 10 mg daily.   --  Gabapentin 800 mg 3 times a day.   --  Trazodone 75 mg at bedtime.     --  Vilazodone 40 mg daily. May change to Cymbalta.     Disposition Plan   Reason for ongoing admission: is unable to care for self due to psychosis.  Disposition: TBD  Estimated length of stay: 5-7 days  Legal Status:  Voluntary  Discharge will be granted once symptoms improved.    Karlee PHAM CNP  Date: 07/17/20  Time: 1:30 PM        More than 30 minutes were spent for assessment, documentation, and coordination of care.       This note was created with the help of Dragon dictation system. All grammatical/typing errors or context distortion are unintentional and inherent to software.

## 2020-07-17 NOTE — PLAN OF CARE
Problem: OT General Care Plan  Goal: OT Goal 1  Description: Will attend OT groups improve concentration and motivation by engaging in more challenging and success oriented options while also improving insight with comments/answers made about mental health needs      7/17/2020 1503 by Hawa Jefferson, OT  Note: Attended 2 of 2 OT groups. He was quick to be involved, worked on a less complex 1 step task though was assertive in explaining this to be helpful and preferred. He was social with others, joe others into conversation. He participated in an activity on the topic of Affirmations, choosing ones that would be helpful to work on and explain the reasons why they were chosen. He added some creative aspects to his work and talked about how writing things down helps him remember better. He offered insightful comments.

## 2020-07-17 NOTE — PLAN OF CARE
Pt is out in the milieu and social with peers. Pt is medication complaint. Pt states he feels his depression is much less and rates it at a 3/10. Pt states he feels he will always have anxiety of some kind and rates it at 5/10.Suresh socks on. Pt denies suicidal ideation or self injury. Pt has has made no delusional or paranoid statements. Pleasant and cooperative. Pt participates in routine programming.    48 Hour Nursing Observation     Suicidal ideation    Admit Date: 7/11/2020    Length of Stay: 6    Patient evaluation continues. Assessed mood,anxiety,thoughts and behavior. Patient is progressing towards goals. Patient is encouraged to participate in groups and assisted to develop healthy coping skills.  Patient denies auditory or visual hallucinations. /80   Pulse 76   Temp 97.7  F (36.5  C) (Oral)   Resp 16   Wt 104.5 kg (230 lb 4.8 oz)   SpO2 96%     Mood: good    Patient reports depression 3/10 and reports anxiety 5/10  Affect: bright    Sleep: 6-7 hours    Appetite: good    SI: denies    HI: denies    SIB: denies    Group participation: participates    ADL's: independant    Fall risk interventions: proper footwear, monitor B/P     Guillermo Score Interventions: none    Discharge planning waiting placement    Refer to daily team meeting notes for individualized plan of care. Nursing will continue to assess.    *Scale is 1-10 and 10 is the worst.

## 2020-07-17 NOTE — PROGRESS NOTES
07/16/20 2100   Music Therapy   Type of Participation Music therapy group   Response Participates independently   Hours 1   Benjamin appeared much more relaxed today.  He played in [employer] choir and also played the [employer].  He would at times confused which hand of chime to play when it was his turn, but remained in good spirits.  Goals of group were social cohesion, sustaining attention and relaxation.

## 2020-07-17 NOTE — PLAN OF CARE
Problem: Adult Behavioral Health Plan of Care  Goal: Team Discussion  Description: Team Plan:  Outcome: Improving  Note: BEHAVIORAL TEAM DISCUSSION    Participants: VERO Barraza, GRETTA, Charmaine Camarena RN, KIRA Wilder, Hawa Jefferson, OT  Progress: Improving  Anticipated length of stay: 7-10 days  Continued Stay Criteria/Rationale: Waiting to obtain placement  Medical/Physical: See medical notes  Precautions:   Behavioral Orders   Procedures    Code 1 - Restrict to Unit    Fall precautions    Routine Programming     As clinically indicated    Status 15     Every 15 minutes.    Suicide precautions     Patients on Suicide Precautions should have a Combination Diet ordered that includes a Diet selection(s) AND a Behavioral Tray selection for Safe Tray - with utensils, or Safe Tray - NO utensils       Plan: Pt will discharge once placement has been obtained  Rationale for change in precautions or plan: N/A

## 2020-07-18 PROCEDURE — 25000132 ZZH RX MED GY IP 250 OP 250 PS 637: Performed by: PSYCHIATRY & NEUROLOGY

## 2020-07-18 PROCEDURE — 25000132 ZZH RX MED GY IP 250 OP 250 PS 637: Performed by: PHYSICIAN ASSISTANT

## 2020-07-18 PROCEDURE — 12400002 ZZH R&B MH SENIOR/ADOLESCENT

## 2020-07-18 PROCEDURE — 25000132 ZZH RX MED GY IP 250 OP 250 PS 637: Performed by: NURSE PRACTITIONER

## 2020-07-18 RX ADMIN — Medication 3 MG: at 13:44

## 2020-07-18 RX ADMIN — SENNOSIDES 1 TABLET: 8.6 TABLET ORAL at 21:26

## 2020-07-18 RX ADMIN — Medication 3 MG: at 21:26

## 2020-07-18 RX ADMIN — TAMSULOSIN HYDROCHLORIDE 0.4 MG: 0.4 CAPSULE ORAL at 08:51

## 2020-07-18 RX ADMIN — METOPROLOL SUCCINATE 75 MG: 25 TABLET, EXTENDED RELEASE ORAL at 08:51

## 2020-07-18 RX ADMIN — CLOZAPINE 25 MG: 25 TABLET ORAL at 21:26

## 2020-07-18 RX ADMIN — RIVAROXABAN 20 MG: 10 TABLET, FILM COATED ORAL at 16:03

## 2020-07-18 RX ADMIN — Medication 15 MG: at 21:26

## 2020-07-18 RX ADMIN — GABAPENTIN 800 MG: 800 TABLET, FILM COATED ORAL at 13:44

## 2020-07-18 RX ADMIN — VILAZODONE HYDROCHLORIDE 40 MG: 10 TABLET ORAL at 08:48

## 2020-07-18 RX ADMIN — CLONAZEPAM 1 MG: 1 TABLET ORAL at 21:26

## 2020-07-18 RX ADMIN — ACETAMINOPHEN 1000 MG: 500 TABLET, FILM COATED ORAL at 13:44

## 2020-07-18 RX ADMIN — ACETAMINOPHEN 1000 MG: 500 TABLET, FILM COATED ORAL at 08:50

## 2020-07-18 RX ADMIN — DONEPEZIL HYDROCHLORIDE 10 MG: 10 TABLET, FILM COATED ORAL at 08:48

## 2020-07-18 RX ADMIN — GABAPENTIN 800 MG: 800 TABLET, FILM COATED ORAL at 21:26

## 2020-07-18 RX ADMIN — Medication 3 HALF-TAB: at 16:03

## 2020-07-18 RX ADMIN — SENNOSIDES 1 TABLET: 8.6 TABLET ORAL at 08:49

## 2020-07-18 RX ADMIN — POLYETHYLENE GLYCOL 3350 17 G: 17 POWDER, FOR SOLUTION ORAL at 08:48

## 2020-07-18 RX ADMIN — Medication 10 MG: at 08:49

## 2020-07-18 RX ADMIN — CARBIDOPA AND LEVODOPA 2 TABLET: 25; 100 TABLET ORAL at 12:22

## 2020-07-18 RX ADMIN — CARBIDOPA AND LEVODOPA 2 TABLET: 25; 100 TABLET ORAL at 08:49

## 2020-07-18 RX ADMIN — ATORVASTATIN CALCIUM 40 MG: 40 TABLET, FILM COATED ORAL at 21:25

## 2020-07-18 RX ADMIN — GABAPENTIN 800 MG: 800 TABLET, FILM COATED ORAL at 08:50

## 2020-07-18 RX ADMIN — DOCUSATE SODIUM 100 MG: 100 CAPSULE, LIQUID FILLED ORAL at 21:25

## 2020-07-18 RX ADMIN — Medication 75 MG: at 21:25

## 2020-07-18 RX ADMIN — Medication 3 MG: at 08:50

## 2020-07-18 RX ADMIN — DOCUSATE SODIUM 100 MG: 100 CAPSULE, LIQUID FILLED ORAL at 08:50

## 2020-07-18 RX ADMIN — Medication 3 HALF-TAB: at 21:24

## 2020-07-18 RX ADMIN — ACETAMINOPHEN 1000 MG: 500 TABLET, FILM COATED ORAL at 21:25

## 2020-07-18 RX ADMIN — ASPIRIN 81 MG: 81 TABLET ORAL at 08:50

## 2020-07-18 RX ADMIN — Medication 10 MG: at 13:44

## 2020-07-18 ASSESSMENT — ACTIVITIES OF DAILY LIVING (ADL)
LAUNDRY: UNABLE TO COMPLETE
DRESS: WITH ASSISTANCE
HYGIENE/GROOMING: SHOWER;WITH ASSISTANCE
HYGIENE/GROOMING: INDEPENDENT
ORAL_HYGIENE: INDEPENDENT
DRESS: WITH ASSISTANCE
LAUNDRY: UNABLE TO COMPLETE
ORAL_HYGIENE: INDEPENDENT

## 2020-07-18 NOTE — PLAN OF CARE
"Pt presents with blunted, flat affect and calm mood. Denies SI/SIB and hallucinations at this time. Rates anxiety and depression 6/10 which he reports is better for him. Pt spent majority of the evening in the lounge watching television with peers. Pt utilizes wheelchair due to Parkinson's. At the beginning of the shift pt reported that he had a wound on his buttocks that he wanted staff to look out. He reports that this has been an issue for him before hospitalization but has come back. Pt was brought to his room and it was noted that he has a 2\" abrasion/skin tear to the L gluteal fold. There was no drainage noted from the site. Mepilex was placed on the abrasion to protect the skin. WOC consult was placed and patient care order was placed so area can be monitored until seen by WOC. At the end of the evening pt was assisted back to his room, he reports that he was exhausted from the day. Pt states that he has been pushing himself a lot. No other concerns or complaints noted.   "

## 2020-07-18 NOTE — PLAN OF CARE
Pt with skin sheer on Bilateral buttocks. Pt provided with cushion for to sit on while in a chair. Pt is compliant with this.

## 2020-07-18 NOTE — PROGRESS NOTES
07/17/20 2100   Therapeutic Recreation   Type of Intervention structured groups   Activity exercise   Response Participates, initiates socially appropriate   Hours 1     Pt actively participated in a structured Therapeutic Recreation group with a focus on leisure participation, relaxation, and exercise. Pt participated in the guided exercise for the full duration of the group. Pt followed along with a majority of the movements, engaged in the guided chair exercise routine. Pt also slightly added to the discussion prior to the exercise.

## 2020-07-18 NOTE — PROGRESS NOTES
Pt is very pleasant. Pt has been up for meals and is eating/drinking well. Pt requested a shower and this was completed. Pt needed assist with this.      Pt was noted yesterday to have a skin sheer abrasion on his buttocks. This was observed. Pt with reddened R buttock. No open area noted. On Pt L buttock there is also redness and a narrow, maybe 1 inch superficial abrasion. This is cleaned and a mepilex border is applied for protection from further sheer. A mepilex is also applied to R buttocks for protection. Pt is also provided with a cushion for his wheelchair and or chair to prevent future injury. Pt also encouraged to lay on side when in bed. Pt is compliant and understands these interventions.     Pt mood is pleasant. He visits and jokes some. Pt dislikes the group home he lives at.

## 2020-07-19 LAB
BASOPHILS # BLD AUTO: 0 10E9/L (ref 0–0.2)
BASOPHILS NFR BLD AUTO: 0.2 %
DIFFERENTIAL METHOD BLD: NORMAL
EOSINOPHIL # BLD AUTO: 0.1 10E9/L (ref 0–0.7)
EOSINOPHIL NFR BLD AUTO: 2.4 %
IMM GRANULOCYTES # BLD: 0 10E9/L (ref 0–0.4)
IMM GRANULOCYTES NFR BLD: 0.4 %
LYMPHOCYTES # BLD AUTO: 2 10E9/L (ref 0.8–5.3)
LYMPHOCYTES NFR BLD AUTO: 36.1 %
MONOCYTES # BLD AUTO: 0.5 10E9/L (ref 0–1.3)
MONOCYTES NFR BLD AUTO: 8.4 %
NEUTROPHILS # BLD AUTO: 2.9 10E9/L (ref 1.6–8.3)
NEUTROPHILS NFR BLD AUTO: 52.5 %
NRBC # BLD AUTO: 0 10*3/UL
NRBC BLD AUTO-RTO: 0 /100
WBC # BLD AUTO: 5.5 10E9/L (ref 4–11)

## 2020-07-19 PROCEDURE — 25000132 ZZH RX MED GY IP 250 OP 250 PS 637: Performed by: NURSE PRACTITIONER

## 2020-07-19 PROCEDURE — 25000132 ZZH RX MED GY IP 250 OP 250 PS 637: Performed by: PHYSICIAN ASSISTANT

## 2020-07-19 PROCEDURE — 25000132 ZZH RX MED GY IP 250 OP 250 PS 637: Performed by: PSYCHIATRY & NEUROLOGY

## 2020-07-19 PROCEDURE — 36415 COLL VENOUS BLD VENIPUNCTURE: CPT | Performed by: PSYCHIATRY & NEUROLOGY

## 2020-07-19 PROCEDURE — H2032 ACTIVITY THERAPY, PER 15 MIN: HCPCS

## 2020-07-19 PROCEDURE — 85048 AUTOMATED LEUKOCYTE COUNT: CPT | Performed by: PSYCHIATRY & NEUROLOGY

## 2020-07-19 PROCEDURE — 12400002 ZZH R&B MH SENIOR/ADOLESCENT

## 2020-07-19 PROCEDURE — 85004 AUTOMATED DIFF WBC COUNT: CPT | Performed by: PSYCHIATRY & NEUROLOGY

## 2020-07-19 RX ADMIN — Medication 3 MG: at 13:48

## 2020-07-19 RX ADMIN — ASPIRIN 81 MG: 81 TABLET ORAL at 09:14

## 2020-07-19 RX ADMIN — POLYETHYLENE GLYCOL 3350 17 G: 17 POWDER, FOR SOLUTION ORAL at 09:13

## 2020-07-19 RX ADMIN — ACETAMINOPHEN 1000 MG: 500 TABLET, FILM COATED ORAL at 21:16

## 2020-07-19 RX ADMIN — CARBIDOPA AND LEVODOPA 2 TABLET: 25; 100 TABLET ORAL at 09:13

## 2020-07-19 RX ADMIN — Medication 75 MG: at 21:17

## 2020-07-19 RX ADMIN — Medication 10 MG: at 09:13

## 2020-07-19 RX ADMIN — Medication 15 MG: at 21:15

## 2020-07-19 RX ADMIN — Medication 3 MG: at 09:13

## 2020-07-19 RX ADMIN — Medication 3 HALF-TAB: at 22:24

## 2020-07-19 RX ADMIN — TAMSULOSIN HYDROCHLORIDE 0.4 MG: 0.4 CAPSULE ORAL at 09:14

## 2020-07-19 RX ADMIN — Medication 10 MG: at 13:48

## 2020-07-19 RX ADMIN — GABAPENTIN 800 MG: 800 TABLET, FILM COATED ORAL at 09:14

## 2020-07-19 RX ADMIN — ATORVASTATIN CALCIUM 40 MG: 40 TABLET, FILM COATED ORAL at 21:17

## 2020-07-19 RX ADMIN — ACETAMINOPHEN 1000 MG: 500 TABLET, FILM COATED ORAL at 13:48

## 2020-07-19 RX ADMIN — DONEPEZIL HYDROCHLORIDE 10 MG: 10 TABLET, FILM COATED ORAL at 09:13

## 2020-07-19 RX ADMIN — GABAPENTIN 800 MG: 800 TABLET, FILM COATED ORAL at 21:17

## 2020-07-19 RX ADMIN — SENNOSIDES 1 TABLET: 8.6 TABLET ORAL at 21:15

## 2020-07-19 RX ADMIN — Medication 3 MG: at 21:16

## 2020-07-19 RX ADMIN — CLONAZEPAM 1 MG: 1 TABLET ORAL at 21:17

## 2020-07-19 RX ADMIN — DOCUSATE SODIUM 100 MG: 100 CAPSULE, LIQUID FILLED ORAL at 21:17

## 2020-07-19 RX ADMIN — Medication 3 HALF-TAB: at 16:49

## 2020-07-19 RX ADMIN — METOPROLOL SUCCINATE 75 MG: 25 TABLET, EXTENDED RELEASE ORAL at 09:14

## 2020-07-19 RX ADMIN — VILAZODONE HYDROCHLORIDE 40 MG: 10 TABLET ORAL at 09:13

## 2020-07-19 RX ADMIN — ACETAMINOPHEN 1000 MG: 500 TABLET, FILM COATED ORAL at 09:14

## 2020-07-19 RX ADMIN — RIVAROXABAN 20 MG: 10 TABLET, FILM COATED ORAL at 17:24

## 2020-07-19 RX ADMIN — CARBIDOPA AND LEVODOPA 2 TABLET: 25; 100 TABLET ORAL at 12:13

## 2020-07-19 RX ADMIN — DOCUSATE SODIUM 100 MG: 100 CAPSULE, LIQUID FILLED ORAL at 09:13

## 2020-07-19 RX ADMIN — GABAPENTIN 800 MG: 800 TABLET, FILM COATED ORAL at 13:48

## 2020-07-19 RX ADMIN — SENNOSIDES 1 TABLET: 8.6 TABLET ORAL at 09:14

## 2020-07-19 RX ADMIN — CLOZAPINE 25 MG: 25 TABLET ORAL at 21:17

## 2020-07-19 ASSESSMENT — ACTIVITIES OF DAILY LIVING (ADL)
LAUNDRY: UNABLE TO COMPLETE
HYGIENE/GROOMING: HANDWASHING;INDEPENDENT
ORAL_HYGIENE: INDEPENDENT
DRESS: WITH ASSISTANCE

## 2020-07-19 NOTE — PROGRESS NOTES
Pt with bilateral reddened buttocks. Pt wheel chair bound. Pt allowed mepilex  removal, cleansing with scrub stat,redressing with mepilex.     Bilateral buttocks continue reddened. L cheek continues with superficially abraded area. No drainage,edema etc noted.     Pt is re encouraged to use his chair cushion when up in the chair, and to lay on his side when in bed.

## 2020-07-19 NOTE — PROGRESS NOTES
Pt reports he slept well, but feels stiff in the AM.Pt up for breakfast, ate fair, reports his appetite is still marginal. Pt tegaderm on his buttocks for protection from sheer is intact. Pt DIONISIO socks applied. Pt is med compliant. Pt visits with this staff. Jokes some. Pt does walk steps using hand holds to bathroom from bed.     Order placed for P.T. to assess and treat pt. ambulation abilities.

## 2020-07-19 NOTE — PLAN OF CARE
"S: Safety shift check-in    B: Pt. assessed for mood, anxiety, thoughts and behavior. Pt immediately, during the check in said, \"I'm not suicidal, I don't plan to hurt myself or someone else, I have been eating good and I don't hear any voices.\" Rated both anxiety and depression at 6/10 and thinks both are related to \"my parkinson's.\" Denies SI/SIB/AH/VH/HI. Endorses good appetite       A: Provided with active listening, emotional encouragement and goal setting, discussed on utilizing coping skills and identifying needed safety resources. On safety checks every 15 min.      R: Pt demonstrated appropriate behavior this shift and coping strategies.     "

## 2020-07-20 ENCOUNTER — APPOINTMENT (OUTPATIENT)
Dept: PHYSICAL THERAPY | Facility: CLINIC | Age: 55
DRG: 885 | End: 2020-07-20
Attending: NURSE PRACTITIONER
Payer: COMMERCIAL

## 2020-07-20 PROCEDURE — 99232 SBSQ HOSP IP/OBS MODERATE 35: CPT | Mod: GT | Performed by: NURSE PRACTITIONER

## 2020-07-20 PROCEDURE — 25000132 ZZH RX MED GY IP 250 OP 250 PS 637: Performed by: NURSE PRACTITIONER

## 2020-07-20 PROCEDURE — 99207 ZZC CDG-MDM COMPONENT: MEETS MODERATE - DOWN CODED: CPT | Performed by: NURSE PRACTITIONER

## 2020-07-20 PROCEDURE — 25000132 ZZH RX MED GY IP 250 OP 250 PS 637: Performed by: PHYSICIAN ASSISTANT

## 2020-07-20 PROCEDURE — G0463 HOSPITAL OUTPT CLINIC VISIT: HCPCS

## 2020-07-20 PROCEDURE — 97530 THERAPEUTIC ACTIVITIES: CPT | Mod: GP

## 2020-07-20 PROCEDURE — 25000132 ZZH RX MED GY IP 250 OP 250 PS 637: Performed by: PSYCHIATRY & NEUROLOGY

## 2020-07-20 PROCEDURE — 97116 GAIT TRAINING THERAPY: CPT | Mod: GP

## 2020-07-20 PROCEDURE — 12400002 ZZH R&B MH SENIOR/ADOLESCENT

## 2020-07-20 PROCEDURE — 97161 PT EVAL LOW COMPLEX 20 MIN: CPT | Mod: GP

## 2020-07-20 PROCEDURE — G0177 OPPS/PHP; TRAIN & EDUC SERV: HCPCS

## 2020-07-20 RX ADMIN — Medication 10 MG: at 08:13

## 2020-07-20 RX ADMIN — Medication 10 MG: at 14:21

## 2020-07-20 RX ADMIN — CARBIDOPA AND LEVODOPA 2 TABLET: 25; 100 TABLET ORAL at 12:13

## 2020-07-20 RX ADMIN — Medication 75 MG: at 21:06

## 2020-07-20 RX ADMIN — Medication 3 MG: at 14:21

## 2020-07-20 RX ADMIN — TAMSULOSIN HYDROCHLORIDE 0.4 MG: 0.4 CAPSULE ORAL at 08:13

## 2020-07-20 RX ADMIN — RIVAROXABAN 20 MG: 10 TABLET, FILM COATED ORAL at 16:33

## 2020-07-20 RX ADMIN — CLONAZEPAM 1 MG: 1 TABLET ORAL at 21:06

## 2020-07-20 RX ADMIN — Medication 3 HALF-TAB: at 16:33

## 2020-07-20 RX ADMIN — SENNOSIDES 1 TABLET: 8.6 TABLET ORAL at 08:13

## 2020-07-20 RX ADMIN — Medication 3 MG: at 21:06

## 2020-07-20 RX ADMIN — POLYETHYLENE GLYCOL 3350 17 G: 17 POWDER, FOR SOLUTION ORAL at 08:13

## 2020-07-20 RX ADMIN — ACETAMINOPHEN 1000 MG: 500 TABLET, FILM COATED ORAL at 21:06

## 2020-07-20 RX ADMIN — VILAZODONE HYDROCHLORIDE 40 MG: 10 TABLET ORAL at 08:13

## 2020-07-20 RX ADMIN — ATORVASTATIN CALCIUM 40 MG: 40 TABLET, FILM COATED ORAL at 21:06

## 2020-07-20 RX ADMIN — DOCUSATE SODIUM 100 MG: 100 CAPSULE, LIQUID FILLED ORAL at 21:05

## 2020-07-20 RX ADMIN — CLOZAPINE 25 MG: 25 TABLET ORAL at 21:06

## 2020-07-20 RX ADMIN — Medication 3 HALF-TAB: at 21:06

## 2020-07-20 RX ADMIN — METOPROLOL SUCCINATE 75 MG: 25 TABLET, EXTENDED RELEASE ORAL at 08:13

## 2020-07-20 RX ADMIN — ACETAMINOPHEN 1000 MG: 500 TABLET, FILM COATED ORAL at 08:14

## 2020-07-20 RX ADMIN — DONEPEZIL HYDROCHLORIDE 10 MG: 10 TABLET, FILM COATED ORAL at 08:14

## 2020-07-20 RX ADMIN — CARBIDOPA AND LEVODOPA 2 TABLET: 25; 100 TABLET ORAL at 08:14

## 2020-07-20 RX ADMIN — ASPIRIN 81 MG: 81 TABLET ORAL at 08:14

## 2020-07-20 RX ADMIN — DOCUSATE SODIUM 100 MG: 100 CAPSULE, LIQUID FILLED ORAL at 08:14

## 2020-07-20 RX ADMIN — GABAPENTIN 800 MG: 800 TABLET, FILM COATED ORAL at 14:21

## 2020-07-20 RX ADMIN — Medication 15 MG: at 21:06

## 2020-07-20 RX ADMIN — SENNOSIDES 1 TABLET: 8.6 TABLET ORAL at 21:05

## 2020-07-20 RX ADMIN — GABAPENTIN 800 MG: 800 TABLET, FILM COATED ORAL at 08:14

## 2020-07-20 RX ADMIN — Medication 3 MG: at 08:13

## 2020-07-20 RX ADMIN — GABAPENTIN 800 MG: 800 TABLET, FILM COATED ORAL at 21:06

## 2020-07-20 RX ADMIN — ACETAMINOPHEN 1000 MG: 500 TABLET, FILM COATED ORAL at 14:21

## 2020-07-20 ASSESSMENT — ACTIVITIES OF DAILY LIVING (ADL)
ORAL_HYGIENE: INDEPENDENT
LAUNDRY: WITH SUPERVISION
LAUNDRY: WITH SUPERVISION
HYGIENE/GROOMING: INDEPENDENT
ORAL_HYGIENE: INDEPENDENT
DRESS: SCRUBS (BEHAVIORAL HEALTH)
HYGIENE/GROOMING: INDEPENDENT
DRESS: INDEPENDENT

## 2020-07-20 NOTE — PROGRESS NOTES
07/19/20 2200   Therapeutic Recreation   Type of Intervention structured groups   Activity game   Response Participates, initiates socially appropriate   Hours 1     Pt attended the structured Therapeutic Recreation group, participating in a group activity. Pt participated in group discussion, leisure participation, and social engagement to gain self-esteem, manage behaviors, develop social skills, interpersonal skills, communication skills, and reduce anxiety/depression.   Pt remained focused and engaged throughout group activity.  Pt mood was sociable and was appropriate with interactions. Pt actively gave clues and descriptions for others for their turns.

## 2020-07-20 NOTE — PROGRESS NOTES
Patient requested and received prn Zyprexa 5 mg for increased agitation and AH. No other complaints or concern voiced by patient. Will continue to monitor and update if there are changes.

## 2020-07-20 NOTE — PROGRESS NOTES
[facility], [place]   Psychiatric Progress Note        Interim History:   Telemedicine Visit: The patient's condition can be safely assessed and treated via synchronous audio and visual telemedicine encounter.    Start time: 0923  Stop time:  0940  Reason for Telemedicine Visit: Covid-19   Originating Site (Patient Location): Station [facility]   Distant Site (Provider Location): home office   Consent:  The patient/guardian has verbally consented to: the potential risks and benefits of telemedicine (video visit) versus in person care; bill my insurance or make self-payment for services provided; and responsibility for payment of non-covered services.    Mode of Communication:  Video Conference via Skype   As the provider I attest to compliance with applicable laws and regulations related to telemedicine.     From H&P: The patient is a 55-year-old single  male, who arrived to this facility from assisted living facility called [facility] in [place], [place] because of suicide attempt. The patient also has stated to the DEC 's that he would kill himself if he had to return back to his nursing home.     Team meeting report: The patient's care was discussed with the treatment team during the daily team meeting and/or staff's chart notes were reviewed.  Staff report patient has been calm, pleasant, cooperative.  Attended groups.  Visible in the milieu.  Denies depression and anxiety in the evening.  He is hopeful.  Anxiety and depression are rated as 6/10 (10 being the worst).  He has been bright and social.  Denies suicidal ideation.  Appetite is poor.  Slept 7 hours.    Met with patient.  Reports having a good weekend.  He was watching movies with other patients, attending groups, and socializing.  Sleep is good, appetite is getting better.  No suicidal ideation.  No paranoia or complaints about the staff.         Medications:       acetaminophen  1,000 mg Oral TID     aspirin   81 mg Oral Daily     atorvastatin  40 mg Oral At Bedtime     baclofen  10 mg Oral BID     baclofen  15 mg Oral At Bedtime     carbidopa-levodopa  2 tablet Oral BID     carbidopa-levodopa  3 half-tab Oral BID     clonazePAM  1 mg Oral At Bedtime     cloZAPine  25 mg Oral At Bedtime     docusate sodium  100 mg Oral BID     donepezil  10 mg Oral Daily     gabapentin  800 mg Oral TID     metoprolol succinate ER  75 mg Oral Daily     polyethylene glycol  17 g Oral Daily     rivaroxaban ANTICOAGULANT  20 mg Oral Daily with supper     sennosides  1 tablet Oral BID     tamsulosin  0.4 mg Oral Daily     traZODone  75 mg Oral At Bedtime     trihexyphenidyl  3 mg Oral TID     vilazodone  40 mg Oral Daily          Allergies:     Allergies   Allergen Reactions     Seroquel [Quetiapine] GI Disturbance          Labs:     Recent Results (from the past 672 hour(s))   COVID-19 VIRUS (CORONAVIRUS) PCR TO [facility]    Collection Time: 07/02/20 10:30 AM    Specimen type and source: Respiratory, Nasopharyngeal swab (specimen)   Result Value Ref Range    COVID-19 Virus PCR Source Nasopharyngeal     COVID-19 Virus PCR to [place] - Result Undetected Undetected   Asymptomatic COVID-19 Virus (Coronavirus) by PCR    Collection Time: 07/11/20  5:20 PM    Specimen: Nasopharyngeal   Result Value Ref Range    COVID-19 Virus PCR to [facility] - Source Nasopharyngeal     COVID-19 Virus PCR to [facility] - Result       Test received-See reflex to IDDL test SARS CoV2 (COVID-19) Virus RT-PCR   Drug abuse screen 6 urine (tox)    Collection Time: 07/11/20  5:20 PM   Result Value Ref Range    Amphetamine Qual Urine Negative NEG^Negative    Barbiturates Qual Urine Negative NEG^Negative    Benzodiazepine Qual Urine Negative NEG^Negative    Cannabinoids Qual Urine Negative NEG^Negative    Cocaine Qual Urine Negative NEG^Negative    Ethanol Qual Urine Negative NEG^Negative    Opiates Qualitative Urine Negative NEG^Negative   SARS-CoV-2 COVID-19 Virus  (Coronavirus) RT-PCR Nasopharyngeal    Collection Time: 07/11/20  5:20 PM    Specimen: Nasopharyngeal   Result Value Ref Range    SARS-CoV-2 Virus Specimen Source Nasopharyngeal     SARS-CoV-2 PCR Result NEGATIVE     SARS-CoV-2 PCR Comment       Testing was performed using the Xpert Xpress SARS-CoV-2 Assay on the Cepheid Gene-Xpert   Instrument Systems. Additional information about this Emergency Use Authorization (EUA)   assay can be found via the Lab Guide.     CBC with platelets differential    Collection Time: 07/12/20  9:38 AM   Result Value Ref Range    WBC 7.0 4.0 - 11.0 10e9/L    RBC Count 4.70 4.4 - 5.9 10e12/L    Hemoglobin 14.0 13.3 - 17.7 g/dL    Hematocrit 44.8 40.0 - 53.0 %    MCV 95 78 - 100 fl    MCH 29.8 26.5 - 33.0 pg    MCHC 31.3 (L) 31.5 - 36.5 g/dL    RDW 13.2 10.0 - 15.0 %    Platelet Count 272 150 - 450 10e9/L    Diff Method Automated Method     % Neutrophils 67.4 %    % Lymphocytes 24.7 %    % Monocytes 6.2 %    % Eosinophils 1.0 %    % Basophils 0.3 %    % Immature Granulocytes 0.4 %    Nucleated RBCs 0 0 /100    Absolute Neutrophil 4.7 1.6 - 8.3 10e9/L    Absolute Lymphocytes 1.7 0.8 - 5.3 10e9/L    Absolute Monocytes 0.4 0.0 - 1.3 10e9/L    Absolute Eosinophils 0.1 0.0 - 0.7 10e9/L    Absolute Basophils 0.0 0.0 - 0.2 10e9/L    Abs Immature Granulocytes 0.0 0 - 0.4 10e9/L    Absolute Nucleated RBC 0.0    Comprehensive metabolic panel    Collection Time: 07/12/20  9:38 AM   Result Value Ref Range    Sodium 140 133 - 144 mmol/L    Potassium 3.8 3.4 - 5.3 mmol/L    Chloride 105 94 - 109 mmol/L    Carbon Dioxide 30 20 - 32 mmol/L    Anion Gap 5 3 - 14 mmol/L    Glucose 107 (H) 70 - 99 mg/dL    Urea Nitrogen 13 7 - 30 mg/dL    Creatinine 0.87 0.66 - 1.25 mg/dL    GFR Estimate >90 >60 mL/min/[1.73_m2]    GFR Estimate If Black >90 >60 mL/min/[1.73_m2]    Calcium 9.6 8.5 - 10.1 mg/dL    Bilirubin Total 0.4 0.2 - 1.3 mg/dL    Albumin 4.4 3.4 - 5.0 g/dL    Protein Total 8.9 (H) 6.8 - 8.8 g/dL     Alkaline Phosphatase 94 40 - 150 U/L    ALT 11 0 - 70 U/L    AST 12 0 - 45 U/L   TSH with free T4 reflex and/or T3 as indicated    Collection Time: 07/12/20  9:38 AM   Result Value Ref Range    TSH 2.33 0.40 - 4.00 mU/L   Vitamin B12    Collection Time: 07/12/20  9:38 AM   Result Value Ref Range    Vitamin B12 305 193 - 986 pg/mL   Folate    Collection Time: 07/12/20  9:38 AM   Result Value Ref Range    Folate 5.6 >5.4 ng/mL   Vitamin D Deficiency    Collection Time: 07/12/20  9:38 AM   Result Value Ref Range    Vitamin D Deficiency screening 35 20 - 75 ug/L   Lipid profile    Collection Time: 07/12/20  9:38 AM   Result Value Ref Range    Cholesterol 121 <200 mg/dL    Triglycerides 94 <150 mg/dL    HDL Cholesterol 58 >39 mg/dL    LDL Cholesterol Calculated 44 <100 mg/dL    Non HDL Cholesterol 63 <130 mg/dL   WBC and differential    Collection Time: 07/19/20  7:26 AM   Result Value Ref Range    WBC 5.5 4.0 - 11.0 10e9/L    Diff Method Automated Method     % Neutrophils 52.5 %    % Lymphocytes 36.1 %    % Monocytes 8.4 %    % Eosinophils 2.4 %    % Basophils 0.2 %    % Immature Granulocytes 0.4 %    Nucleated RBCs 0 0 /100    Absolute Neutrophil 2.9 1.6 - 8.3 10e9/L    Absolute Lymphocytes 2.0 0.8 - 5.3 10e9/L    Absolute Monocytes 0.5 0.0 - 1.3 10e9/L    Absolute Eosinophils 0.1 0.0 - 0.7 10e9/L    Absolute Basophils 0.0 0.0 - 0.2 10e9/L    Abs Immature Granulocytes 0.0 0 - 0.4 10e9/L    Absolute Nucleated RBC 0.0             Psychiatric Examination:   Temp: 98.8  F (37.1  C) Temp src: Tympanic BP: 107/69 Pulse: 62   Resp: 16 SpO2: 92 % O2 Device: None (Room air)    Weight is 230 lbs 4.8 oz  There is no height or weight on file to calculate BMI.  Mental Status Exam  Appearance:  awake, alert, neatly groomed.  Attitude:  pleasant, cooperative  Eye Contact: Normal intensity  Mood: depressed, anxious  Affect:  Flat, blunted  Speech:  clear, coherent,   Psychomotor Behavior:  no evidence of tardive dyskinesia, dystonia,  or tics and intact gait and muscle tone  Thought Process: Linear and goal directed, much organized  Associations:  no looseness of associations  Thought Content:  no evidence of suicidal ideation or homicidal ideation, some delusional thought content  Insight: poor  Judgment:  impaired  Oriented to:  time, person, and place  Attention Span and Concentration:  intact  Recent and Remote Memory:  intact  Language: Intact  Fund of Knowledge: Normal  Muscle Strength and Tone: normal            Precautions:     Behavioral Orders   Procedures     Code 1 - Restrict to Unit     Fall precautions     Routine Programming     As clinically indicated     Status 15     Every 15 minutes.     Suicide precautions     Patients on Suicide Precautions should have a Combination Diet ordered that includes a Diet selection(s) AND a Behavioral Tray selection for Safe Tray - with utensils, or Safe Tray - NO utensils            DIagnoses:   1. Major depressive disorder, recurrent, severe, with possible psychosis  2.  Alcohol use disorder in remission.   3.  Rule out neurocognitive disorder    4.  Parkinson's disease (psychosis could be connected with Parkinson's disease or to the medication the patient has been treated with for Parkinson's.   5.  Medical problems include: Multiple pulmonary embolisms, history of TIA, supraventricular tachycardia, hyperlipidemia, neuropathic pain, BPH.            Plan:   --  Baclofen 10 mg 2 times a day and 15 mg at bedtime.   --  Sinemet 200 mg 2 times a day and Sinemet 150 mg 2 times a day.    --  Artane 3 mg 3 times a day.   --  Decrease Klonopin to 1 mg at bedtime,   --  Clozapine 25 mg daily.   --  Aricept 10 mg daily.   --  Gabapentin 800 mg 3 times a day.   --  Trazodone 75 mg at bedtime.     --  Vilazodone 40 mg daily. May change to Cymbalta.     Disposition Plan   Reason for ongoing admission: is unable to care for self due to psychosis.  Disposition: TBD  Estimated length of stay: 5-7 days  Legal  Status:  Voluntary  Discharge will be granted once symptoms improved.    Karlee PHAM CNP  Date: 07/20/20  Time: 3:03 PM    More than 30 minutes were spent for assessment, documentation, and coordination of care.       This note was created with the help of Dragon dictation system. All grammatical/typing errors or context distortion are unintentional and inherent to software.

## 2020-07-20 NOTE — CONSULTS
WO Nurse Inpatient Wound Assessment   Reason for consultation: Bilateral buttocks wound     Assessment  Inner gluteus wound due to Friction and Moisture Associated Skin Damage (MASD)  Status: initial assessment    Treatment Plan  Inner gluteus  wound: Every 3 days:  Cleanse with saline or wound cleanser, cover with Mepilex dressing; use foam cushion for sitting, encourage to shift weight every hour    Orders Written  Recommended provider order: NA  WOC Nurse follow-up plan:weekly  Nursing to notify the Provider(s) and re-consult the WOC Nurse if wound(s) deteriorates or new skin concern.    Patient History  According to provider note(s):  Major depressive disorder, recurrent, severe, with possible psychosis  2.  Alcohol use disorder in remission.   3.  Rule out neurocognitive disorder    4.  Parkinson's disease (psychosis could be connected with Parkinson's disease or to the medication the patient has been treated with for Parkinson's.   5.  Medical problems include: Multiple pulmonary embolisms, history of TIA, supraventricular tachycardia, hyperlipidemia, neuropathic pain, BPH.     Objective Data  Containment of urine/stool: Continent of bladder and Continent of bowel    Active Diet Order  Orders Placed This Encounter      Regular Diet Adult      Output:   No intake/output data recorded.    Risk Assessment:   Sensory Perception: 3-->slightly limited  Moisture: 4-->rarely moist  Activity: 3-->walks occasionally  Mobility: 2-->very limited  Nutrition: 4-->excellent  Friction and Shear: 3-->no apparent problem  Guillermo Score: 19                          Labs:   Recent Labs   Lab 07/19/20  0726   WBC 5.5       Physical Exam  Skin inspection: focused Bilateral buttocks    7/20  Wound Location:  Left inner gluteus  Date of last photo 7/20  Wound History: He reports previously having diarrhea , he is somewhat immobile and uses Wheelchair for mobility, however he can transfer with standby assist   Measurements (length x  width x depth, in cm) 3.2  x 0.5  x  0.1 cm   Wound Base: 100 % dermis and superficial scab  Palpation of the wound bed: normal   Periwound skin: erythema- blanchable  Periwound Color: red  Periwound Temperature: normal   Drainage:, scant  Description of drainage: serosanguinous  Odor: none  Pain: denies ,     Interventions  Current support surface: Standard  Foam mattress  Current off-loading measures: Chair cushion  Visual inspection and assessment completed   Wound Care: completed by RN  Supplies: reviewed  Education provided: plan of care  Discussed plan of care with Patient    PHILL DUMONT RN, CWON

## 2020-07-20 NOTE — PLAN OF CARE
Problem: OT General Care Plan  Goal: OT Goal 1  Description: Will attend OT groups improve concentration and motivation by engaging in more challenging and success oriented options while also improving insight with comments/answers made about mental health needs      Note: Attended 2 of 2 OT groups. He was pleasant, worked on a task requiring using visuospatial problem solving. He was successful in finding solutions. Participated in a group focused on the topic of identifying progress noted since admission, coping strategies that were helpful in the past, what is helping currently and setting a goal for the day.  He stated feeling better and appreciates  being busy, having support with staff and opportunities to be involved.

## 2020-07-20 NOTE — PLAN OF CARE
Patient appearing calm and visible in the milieu. He attended and participated group activities. Patient was seen by PT and WOC nurse. Patient reports mild anxiety and depression, denies SI/SIB.

## 2020-07-20 NOTE — PROGRESS NOTES
Faxed referral to the [facility].    Update:  Dougie from the [facility] contacted writer and is unable to offer placement due to suicidal history.

## 2020-07-20 NOTE — PROGRESS NOTES
"   07/20/20 1145   Quick Adds   Type of Visit Initial PT Evaluation       Present no   Language [place]   Living Environment   Lives With facility resident   Living Arrangements assisted living   Home Accessibility no concerns   Transportation Anticipated health plan transportation   Self-Care   Usual Activity Tolerance moderate   Current Activity Tolerance fair   Regular Exercise Yes   Activity/Exercise Type   (\"stretches\")   Exercise Amount/Frequency daily   Equipment Currently Used at Home wheelchair, power;walker, rolling   Functional Level Prior   Ambulation 1-->assistive equipment   Transferring 1-->assistive equipment   Toileting 0-->independent   Bathing 0-->independent   Communication 0-->understands/communicates without difficulty   Swallowing 0-->swallows foods/liquids without difficulty   Cognition 2 - difficulty with organizing thoughts   Fall history within last six months no   Which of the above functional risks had a recent onset or change? ambulation;transferring   General Information   Onset of Illness/Injury or Date of Surgery - Date 07/20/20   Referring Physician Karlee Bond, [facility]    Patient/Family Goals Statement Did not endorse   Pertinent History of Current Problem (include personal factors and/or comorbidities that impact the POC) 55-year-old single  male, who arrived to this facility from assisted living facility called [facility] in [place], [place] because of suicide attempt. The patient also has stated to the DEC 's that he would kill himself if he had to return back to his nursing home. History of parkinson's   Precautions/Limitations fall precautions   Weight-Bearing Status - LUE full weight-bearing   Weight-Bearing Status - RUE full weight-bearing   Weight-Bearing Status - LLE full weight-bearing   Weight-Bearing Status - RLE full weight-bearing   General Observations sitting in up w/c upon arrival, agreeable   Cognitive " Status Examination   Orientation orientation to person, place and time   Level of Consciousness alert   Follows Commands and Answers Questions 100% of the time;able to follow multistep instructions   Personal Safety and Judgment intact   Memory impaired   Pain Assessment   Patient Currently in Pain No   Integumentary/Edema   Integumentary/Edema no deficits were identifed   Posture    Posture Forward head position;Protracted shoulders;Kyphosis   Posture Comments Moderate sitting EOB and standing   Range of Motion (ROM)   ROM Comment Did not formally assess, demonstrates functional ROm with mobility   Strength   Strength Comments Did not formally assess, functional strength noted but generally deconditioned with decreased activity tolerance   Bed Mobility   Bed Mobility Comments Completes supine<>sit transfer with supervision   Transfer Skills   Transfer Comments Completes sit<>stand transfer and bed<>w/c transfer with supervision   Gait   Gait Comments Tolerated ambulating in sloan with walker and close SBA, w/c follow for safety and fatigue   Balance   Balance Comments Independent sitting balance, supervision level standing balance with UE support from walker   Sensory Examination   Sensory Perception no deficits were identified   General Therapy Interventions   Planned Therapy Interventions balance training;gait training;strengthening;transfer training;risk factor education;home program guidelines;progressive activity/exercise   Clinical Impression   Criteria for Skilled Therapeutic Intervention yes, treatment indicated   PT Diagnosis impaired tolerance for functional mobility   Influenced by the following impairments impaired activity tolerance, decreased standing balance   Functional limitations due to impairments impaired tolerance for functional activity   Clinical Presentation Stable/Uncomplicated   Clinical Presentation Rationale 55-year-old single  male, who arrived to this facility from assisted  "living facility called [facility] in [place], [place] because of suicide attempt. The patient also has stated to the DEC 's that he would kill himself if he had to return back to his nursing home. Mobilizing well   Clinical Decision Making (Complexity) Low complexity   Therapy Frequency 3x/week   Predicted Duration of Therapy Intervention (days/wks) 7 days   Anticipated Equipment Needs at Discharge   (has equipment)   Anticipated Discharge Disposition Long Term Care Facility  (Return to NH)   Risk & Benefits of therapy have been explained Yes   Patient, Family & other staff in agreement with plan of care Yes   [facility] TM \"6 Clicks\"   2016, Trustees of [facility], under license to [employer].  All rights reserved.   6 Clicks Short Forms Basic Mobility Inpatient Short Form   [facility]  \"6 Clicks\" V.2 Basic Mobility Inpatient Short Form   1. Turning from your back to your side while in a flat bed without using bedrails? 4 - None   2. Moving from lying on your back to sitting on the side of a flat bed without using bedrails? 4 - None   3. Moving to and from a bed to a chair (including a wheelchair)? 4 - None   4. Standing up from a chair using your arms (e.g., wheelchair, or bedside chair)? 4 - None   5. To walk in hospital room? 4 - None   6. Climbing 3-5 steps with a railing? 3 - A Little   Basic Mobility Raw Score (Score out of 24.Lower scores equate to lower levels of function) 23   Total Evaluation Time   Total Evaluation Time (Minutes) 10     "

## 2020-07-20 NOTE — PLAN OF CARE
Discharge Planner PT   Patient plan for discharge: NH  Current status: PT evaluation complete and treatment initiated. Completes supine<>sit transfer with supervision. Sit EOB with good tolerance. Completes sit<>stand transfer with supervision with and without use of walker. Completes bed<>w/c transfer supervision level. WOrked on propelling w/c in sloan, some difficulty noted with veering to the R (despite L sided weakness). Ambulates with walker and SBA, w/c follow for safety and fatigue,. Ended in group. Recommend patient ambulate with staff 3-4 times/day.  Barriers to return to prior living situation: No PT barriers  Recommendations for discharge: NH  Rationale for recommendations: Near baseline level of function       Entered by: Lety Maldonado 07/20/2020 12:04 PM

## 2020-07-20 NOTE — PROGRESS NOTES
1 Hour Skills Group  Topic- Mindful Breathing  Pt joined in guided meditation focusing on mindful breathing.  Joined discuss after meditation and shared with group his feelings of relaxation and also noticing how his mind wandered off.

## 2020-07-20 NOTE — PROGRESS NOTES
07/19/20 2043   Behavioral Health   Thoughts/Cognition (WDL) ex   Hallucinations denies / not responding to hallucinations   Orientation person: oriented;place: oriented;date: oriented;time: oriented   Memory baseline memory   Insight poor   Judgement impaired   Affect/Mood (WDL) WDL   Affect blunted, flat;sad   Mood depressed;anxious;mood is calm   ADL Assessment (WDL) WDL   Physical Appearance/Attire attire appropriate to age and situation;neat   Hygiene well groomed   Suicidality (WDL) WDL   Suicidality chronic thoughts with no stated plan   1. Wish to be Dead (Recent) No   2. Non-Specific Active Suicidal Thoughts (Recent) No   3. Active Sucidal Ideation with any Methods (Not Plan) Without Intent to Act (Recent) No   4. Active Suicidal Ideation with Some Intent to Act, Without Specific Plan (Recent) No   5. Active Suicidal Ideation with Specific Plan and Intent (Recent) No   Elopement (WDL) WDL   Activity (WDL) WDL   Speech (WDL) WDL   Medication Sensitivity (WDL) WDL   pt was visible in the milieu watching tv for about 75 percent of the evening shift. Pt attended community meeting/group activities and participated. Patient stated that his depression and anxiety rate is at 6. His appetite (is getting better) and he has been sleeping well. He also complained about being a little tired during the evening.  Patient denied SI/Sibs, Overall patient had a good shift.

## 2020-07-21 PROCEDURE — 25000132 ZZH RX MED GY IP 250 OP 250 PS 637: Performed by: PSYCHIATRY & NEUROLOGY

## 2020-07-21 PROCEDURE — H2032 ACTIVITY THERAPY, PER 15 MIN: HCPCS

## 2020-07-21 PROCEDURE — G0177 OPPS/PHP; TRAIN & EDUC SERV: HCPCS

## 2020-07-21 PROCEDURE — 25000132 ZZH RX MED GY IP 250 OP 250 PS 637: Performed by: PHYSICIAN ASSISTANT

## 2020-07-21 PROCEDURE — 99232 SBSQ HOSP IP/OBS MODERATE 35: CPT | Mod: GT | Performed by: NURSE PRACTITIONER

## 2020-07-21 PROCEDURE — 12400002 ZZH R&B MH SENIOR/ADOLESCENT

## 2020-07-21 PROCEDURE — 99207 ZZC CDG-MDM COMPONENT: MEETS MODERATE - DOWN CODED: CPT | Performed by: NURSE PRACTITIONER

## 2020-07-21 PROCEDURE — 25000132 ZZH RX MED GY IP 250 OP 250 PS 637: Performed by: NURSE PRACTITIONER

## 2020-07-21 RX ADMIN — DOCUSATE SODIUM 100 MG: 100 CAPSULE, LIQUID FILLED ORAL at 08:26

## 2020-07-21 RX ADMIN — Medication 10 MG: at 08:25

## 2020-07-21 RX ADMIN — CARBIDOPA AND LEVODOPA 2 TABLET: 25; 100 TABLET ORAL at 08:25

## 2020-07-21 RX ADMIN — Medication 3 MG: at 14:31

## 2020-07-21 RX ADMIN — Medication 3 HALF-TAB: at 20:40

## 2020-07-21 RX ADMIN — POLYETHYLENE GLYCOL 3350 17 G: 17 POWDER, FOR SOLUTION ORAL at 08:25

## 2020-07-21 RX ADMIN — DOCUSATE SODIUM 100 MG: 100 CAPSULE, LIQUID FILLED ORAL at 20:41

## 2020-07-21 RX ADMIN — Medication 15 MG: at 20:40

## 2020-07-21 RX ADMIN — GABAPENTIN 800 MG: 800 TABLET, FILM COATED ORAL at 20:41

## 2020-07-21 RX ADMIN — Medication 3 MG: at 20:41

## 2020-07-21 RX ADMIN — ACETAMINOPHEN 1000 MG: 500 TABLET, FILM COATED ORAL at 08:26

## 2020-07-21 RX ADMIN — Medication 10 MG: at 14:31

## 2020-07-21 RX ADMIN — TAMSULOSIN HYDROCHLORIDE 0.4 MG: 0.4 CAPSULE ORAL at 08:26

## 2020-07-21 RX ADMIN — Medication 75 MG: at 20:41

## 2020-07-21 RX ADMIN — CLONAZEPAM 1 MG: 1 TABLET ORAL at 20:40

## 2020-07-21 RX ADMIN — CARBIDOPA AND LEVODOPA 2 TABLET: 25; 100 TABLET ORAL at 12:04

## 2020-07-21 RX ADMIN — ATORVASTATIN CALCIUM 40 MG: 40 TABLET, FILM COATED ORAL at 20:41

## 2020-07-21 RX ADMIN — ASPIRIN 81 MG: 81 TABLET ORAL at 08:26

## 2020-07-21 RX ADMIN — ACETAMINOPHEN 1000 MG: 500 TABLET, FILM COATED ORAL at 20:41

## 2020-07-21 RX ADMIN — Medication 3 HALF-TAB: at 16:28

## 2020-07-21 RX ADMIN — VILAZODONE HYDROCHLORIDE 40 MG: 10 TABLET ORAL at 08:25

## 2020-07-21 RX ADMIN — SENNOSIDES 1 TABLET: 8.6 TABLET ORAL at 08:26

## 2020-07-21 RX ADMIN — Medication 3 MG: at 08:25

## 2020-07-21 RX ADMIN — CLOZAPINE 25 MG: 25 TABLET ORAL at 20:41

## 2020-07-21 RX ADMIN — METOPROLOL SUCCINATE 75 MG: 25 TABLET, EXTENDED RELEASE ORAL at 08:25

## 2020-07-21 RX ADMIN — ACETAMINOPHEN 1000 MG: 500 TABLET, FILM COATED ORAL at 14:31

## 2020-07-21 RX ADMIN — GABAPENTIN 800 MG: 800 TABLET, FILM COATED ORAL at 14:31

## 2020-07-21 RX ADMIN — GABAPENTIN 800 MG: 800 TABLET, FILM COATED ORAL at 08:26

## 2020-07-21 RX ADMIN — RIVAROXABAN 20 MG: 10 TABLET, FILM COATED ORAL at 16:28

## 2020-07-21 RX ADMIN — TRIAMCINOLONE ACETONIDE: 1 CREAM TOPICAL at 19:43

## 2020-07-21 RX ADMIN — SENNOSIDES 1 TABLET: 8.6 TABLET ORAL at 20:42

## 2020-07-21 RX ADMIN — DONEPEZIL HYDROCHLORIDE 10 MG: 10 TABLET, FILM COATED ORAL at 08:25

## 2020-07-21 ASSESSMENT — ACTIVITIES OF DAILY LIVING (ADL)
DRESS: INDEPENDENT
DRESS: SCRUBS (BEHAVIORAL HEALTH);INDEPENDENT
HYGIENE/GROOMING: INDEPENDENT
HYGIENE/GROOMING: INDEPENDENT
ORAL_HYGIENE: INDEPENDENT
LAUNDRY: WITH SUPERVISION
ORAL_HYGIENE: INDEPENDENT
LAUNDRY: WITH SUPERVISION

## 2020-07-21 NOTE — PLAN OF CARE
"Patient reports increased anxiety and depression related to not being able to find placement. He stated \"I did not know I was such a terrible person\". Pt re assured and encouraged to use positive thinking. Patient has been calm, polite, attending and participating in group activities. He rates anxiety and depression at 8/10, denies any thoughts of self harm.   "
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
## 2020-07-21 NOTE — PROGRESS NOTES
Work Completed: Contacted [facility]. No availability. [facility] - They sent application for pt to complete.  Contacted [facility]. Sent referral at [phone].Contacted [facility] at [phone]. They will return call.    Discharge plan or goal: Discharge to SNF                Barriers to discharge: mental health history

## 2020-07-21 NOTE — PLAN OF CARE
Problem: OT General Care Plan  Goal: OT Goal 1  Description: Will attend OT groups improve concentration and motivation by engaging in more challenging and success oriented options while also improving insight with comments/answers made about mental health needs      Note: Attended 3 of 3 OT groups. He worked at a relaxed pace on a task requiring using visuospatial concepts which he was successful with. He participated in an activity focused on defining stress and identifying symptoms and also the benefits of stress. He explained benefit of stress in a clear manner. He offered elaboration on comments on approach though was a bit quieter today. He stated having difficulty thinking of positive ideas or advice though with time and encouragement offered an insightful idea.  In the afternoon group, he participated in a continuation of the activity about Stress management, identifying areas on one's life that are balanced and areas to chosen to focus on by setting helpful goals. He offered positive affirmations about self and joked on several occasions.  He again talked about how he appreciates being busy though stated his Parkinson's interferes with much of his physical activities. He was pleasant and more social on approach.

## 2020-07-21 NOTE — PROGRESS NOTES
Pt observed in milieu however mostly kept to himself. Rated 5/10 on both depression and anxiety. Had not questions or concerns and denied SI/SIB.            07/20/20 2200   Behavioral Health   Hallucinations denies / not responding to hallucinations   Thinking distractable   Orientation time: oriented;date: oriented;place: oriented;person: oriented   Memory baseline memory   Insight poor   Judgement impaired   Eye Contact at examiner   Affect full range affect   Mood anxious   Physical Appearance/Attire disheveled   Hygiene neglected grooming - unclean body, hair, teeth   1. Wish to be Dead (Recent) No   2. Non-Specific Active Suicidal Thoughts (Recent) No   Elopement   (no concerns)   Activity isolative   Speech clear;coherent   Medication Sensitivity no stated side effects   Psychomotor / Gait balanced;steady   Activities of Daily Living   Hygiene/Grooming independent   Oral Hygiene independent   Dress scrubs (behavioral health)   Laundry with supervision   Room Organization independent

## 2020-07-22 PROCEDURE — 25000132 ZZH RX MED GY IP 250 OP 250 PS 637: Performed by: PHYSICIAN ASSISTANT

## 2020-07-22 PROCEDURE — H2032 ACTIVITY THERAPY, PER 15 MIN: HCPCS

## 2020-07-22 PROCEDURE — 12400002 ZZH R&B MH SENIOR/ADOLESCENT

## 2020-07-22 PROCEDURE — G0177 OPPS/PHP; TRAIN & EDUC SERV: HCPCS

## 2020-07-22 PROCEDURE — 25000132 ZZH RX MED GY IP 250 OP 250 PS 637: Performed by: NURSE PRACTITIONER

## 2020-07-22 PROCEDURE — 25000132 ZZH RX MED GY IP 250 OP 250 PS 637: Performed by: PSYCHIATRY & NEUROLOGY

## 2020-07-22 PROCEDURE — 99207 ZZC CDG-MDM COMPONENT: MEETS MODERATE - DOWN CODED: CPT | Performed by: NURSE PRACTITIONER

## 2020-07-22 PROCEDURE — 99232 SBSQ HOSP IP/OBS MODERATE 35: CPT | Mod: GT | Performed by: NURSE PRACTITIONER

## 2020-07-22 RX ADMIN — ACETAMINOPHEN 1000 MG: 500 TABLET, FILM COATED ORAL at 14:41

## 2020-07-22 RX ADMIN — GABAPENTIN 800 MG: 800 TABLET, FILM COATED ORAL at 08:23

## 2020-07-22 RX ADMIN — Medication 15 MG: at 21:56

## 2020-07-22 RX ADMIN — SENNOSIDES 1 TABLET: 8.6 TABLET ORAL at 08:23

## 2020-07-22 RX ADMIN — Medication 3 MG: at 14:41

## 2020-07-22 RX ADMIN — DONEPEZIL HYDROCHLORIDE 10 MG: 10 TABLET, FILM COATED ORAL at 08:23

## 2020-07-22 RX ADMIN — Medication 3 HALF-TAB: at 17:20

## 2020-07-22 RX ADMIN — CLONAZEPAM 1 MG: 1 TABLET ORAL at 21:56

## 2020-07-22 RX ADMIN — Medication 3 MG: at 21:56

## 2020-07-22 RX ADMIN — ACETAMINOPHEN 1000 MG: 500 TABLET, FILM COATED ORAL at 08:23

## 2020-07-22 RX ADMIN — Medication 3 MG: at 08:23

## 2020-07-22 RX ADMIN — Medication 75 MG: at 21:56

## 2020-07-22 RX ADMIN — DOCUSATE SODIUM 100 MG: 100 CAPSULE, LIQUID FILLED ORAL at 08:23

## 2020-07-22 RX ADMIN — RIVAROXABAN 20 MG: 10 TABLET, FILM COATED ORAL at 17:19

## 2020-07-22 RX ADMIN — VILAZODONE HYDROCHLORIDE 40 MG: 10 TABLET ORAL at 08:23

## 2020-07-22 RX ADMIN — Medication 3 HALF-TAB: at 21:56

## 2020-07-22 RX ADMIN — GABAPENTIN 800 MG: 800 TABLET, FILM COATED ORAL at 21:56

## 2020-07-22 RX ADMIN — METOPROLOL SUCCINATE 75 MG: 25 TABLET, EXTENDED RELEASE ORAL at 08:23

## 2020-07-22 RX ADMIN — TRIAMCINOLONE ACETONIDE: 1 CREAM TOPICAL at 08:29

## 2020-07-22 RX ADMIN — TAMSULOSIN HYDROCHLORIDE 0.4 MG: 0.4 CAPSULE ORAL at 08:23

## 2020-07-22 RX ADMIN — Medication 10 MG: at 08:22

## 2020-07-22 RX ADMIN — ASPIRIN 81 MG: 81 TABLET ORAL at 08:23

## 2020-07-22 RX ADMIN — GABAPENTIN 800 MG: 800 TABLET, FILM COATED ORAL at 14:41

## 2020-07-22 RX ADMIN — Medication 10 MG: at 14:40

## 2020-07-22 RX ADMIN — CLOZAPINE 25 MG: 25 TABLET ORAL at 21:56

## 2020-07-22 RX ADMIN — POLYETHYLENE GLYCOL 3350 17 G: 17 POWDER, FOR SOLUTION ORAL at 08:23

## 2020-07-22 RX ADMIN — ACETAMINOPHEN 1000 MG: 500 TABLET, FILM COATED ORAL at 21:56

## 2020-07-22 RX ADMIN — CARBIDOPA AND LEVODOPA 2 TABLET: 25; 100 TABLET ORAL at 08:23

## 2020-07-22 RX ADMIN — CARBIDOPA AND LEVODOPA 2 TABLET: 25; 100 TABLET ORAL at 12:09

## 2020-07-22 RX ADMIN — SENNOSIDES 1 TABLET: 8.6 TABLET ORAL at 21:56

## 2020-07-22 RX ADMIN — DOCUSATE SODIUM 100 MG: 100 CAPSULE, LIQUID FILLED ORAL at 21:56

## 2020-07-22 RX ADMIN — ATORVASTATIN CALCIUM 40 MG: 40 TABLET, FILM COATED ORAL at 21:56

## 2020-07-22 ASSESSMENT — ACTIVITIES OF DAILY LIVING (ADL)
ORAL_HYGIENE: INDEPENDENT
HYGIENE/GROOMING: INDEPENDENT
DRESS: INDEPENDENT
HYGIENE/GROOMING: INDEPENDENT;WITH ASSISTANCE
DRESS: INDEPENDENT;SCRUBS (BEHAVIORAL HEALTH)
LAUNDRY: WITH SUPERVISION
LAUNDRY: WITH SUPERVISION
ORAL_HYGIENE: INDEPENDENT

## 2020-07-22 NOTE — PROGRESS NOTES
Pt appeared to sleep all night and was checked on q15 min. Nursing will handoff and continue to monitor and assess.     Sleep Hours: 9

## 2020-07-22 NOTE — PROGRESS NOTES
07/21/20 1900   Therapeutic Recreation   Type of Intervention structured groups   Activity exercise   Response Participates, initiates socially appropriate   Hours 0.5     Pt actively participated in a structured Therapeutic Recreation group with a focus on leisure participation, relaxation, and exercise. Pt participated in the guided exercise for the full duration of the group. Pt followed along with a majority of the movements, engaged in the guided chair exercise routine. Pt was a quiet participant during the activity, but contributed prior to the exercise. Pt used positive imagery and guided exercises to end group on a calming, peaceful note. Pt stated he thought of [place] as a positive image.
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
## 2020-07-22 NOTE — PLAN OF CARE
Patient calm, cooperative and attending group activities. He reports sadness regarding difficulty finding placement, denies other mental health symptoms, no SI/SIB.

## 2020-07-22 NOTE — PROGRESS NOTES
"   07/22/20 1813   Behavioral Health   Hallucinations denies / not responding to hallucinations   Thinking intact   Orientation person: oriented;place: oriented;date: oriented;time: oriented   Memory baseline memory   Insight admits / accepts   Judgement impaired   Eye Contact at examiner   Affect full range affect   Mood depressed;anxious   Physical Appearance/Attire untidy   Hygiene neglected grooming - unclean body, hair, teeth   Suicidality other (see comments)  (denies)   1. Wish to be Dead (Recent) No   2. Non-Specific Active Suicidal Thoughts (Recent) No   Self Injury other (see comment)  (none)   Elopement   (none)   Activity withdrawn;other (see comment)  (visible in the milieu)   Speech clear;coherent   Medication Sensitivity no stated side effects;no observed side effects   Psychomotor / Gait unsteady;slow  (uses wheelchair)   Psycho Education   Type of Intervention 1:1 intervention   Response participates, initiates socially appropriate   Hours 0.5   Treatment Detail check in   Activities of Daily Living   Hygiene/Grooming independent;with assistance   Oral Hygiene independent   Dress independent;scrubs (behavioral health)   Laundry with supervision   Room Organization independent     Pt said that he feels good this evening. Pt said that he feels better this evening compared to last evening. Pt stated that he would rate his anxiety and depression at a \"7\" (on a scale from 1 to 10; 10 being the most severe). Pt said that his appetite has improved. Pt said that he has been sleeping well. Pt attended community meeting. Pt has been out in the lounge for most of the evening and keeps to himself. Pt attended art therapy.  "

## 2020-07-22 NOTE — PLAN OF CARE
"Patient visible and present in milieu this evening, affect full range, patient reports \"staying patient until they can find a place for me\", pt denies any new concerns this evening and rates his depression and anxiety about a 5-6/10 being worst, chronic SI with no plan or intent and denies any concerns with medications. Patient social, makes jokes about his situation to cope and watching television; no aggression toward self or others and no falls.  "

## 2020-07-22 NOTE — PROGRESS NOTES
Work Completed: Faxed referral to [facility] at [phone].    Discharge plan or goal: Discharge to [facility]                Barriers to discharge: Obtaining placement.

## 2020-07-22 NOTE — PLAN OF CARE
Problem: OT General Care Plan  Goal: OT Goal 1  Description: Will attend OT groups improve concentration and motivation by engaging in more challenging and success oriented options while also improving insight with comments/answers made about mental health needs      Note: Attended 2 of 2 OT groups. He was quick to be involved, worked successfully on a familiar step though complex task requiring finding solutions using problem solving. He initiated occasional comments. He participated in a group focused on the Process of Recovery, identifying healthy perspectives and ways in managing one's positive growth. He initiated speaking up, appeared insightful, interested in being involved and attentive to others.

## 2020-07-23 ENCOUNTER — APPOINTMENT (OUTPATIENT)
Dept: PHYSICAL THERAPY | Facility: CLINIC | Age: 55
DRG: 885 | End: 2020-07-23
Payer: COMMERCIAL

## 2020-07-23 PROCEDURE — 25000132 ZZH RX MED GY IP 250 OP 250 PS 637: Performed by: PHYSICIAN ASSISTANT

## 2020-07-23 PROCEDURE — G0177 OPPS/PHP; TRAIN & EDUC SERV: HCPCS

## 2020-07-23 PROCEDURE — 97110 THERAPEUTIC EXERCISES: CPT | Mod: GP

## 2020-07-23 PROCEDURE — 97112 NEUROMUSCULAR REEDUCATION: CPT | Mod: GP

## 2020-07-23 PROCEDURE — 12400002 ZZH R&B MH SENIOR/ADOLESCENT

## 2020-07-23 PROCEDURE — 25000132 ZZH RX MED GY IP 250 OP 250 PS 637: Performed by: PSYCHIATRY & NEUROLOGY

## 2020-07-23 PROCEDURE — 25000132 ZZH RX MED GY IP 250 OP 250 PS 637: Performed by: NURSE PRACTITIONER

## 2020-07-23 PROCEDURE — 99232 SBSQ HOSP IP/OBS MODERATE 35: CPT | Mod: GT | Performed by: NURSE PRACTITIONER

## 2020-07-23 PROCEDURE — 99207 ZZC CDG-MDM COMPONENT: MEETS MODERATE - DOWN CODED: CPT | Performed by: NURSE PRACTITIONER

## 2020-07-23 RX ADMIN — CARBIDOPA AND LEVODOPA 2 TABLET: 25; 100 TABLET ORAL at 12:12

## 2020-07-23 RX ADMIN — CLONAZEPAM 1 MG: 1 TABLET ORAL at 20:58

## 2020-07-23 RX ADMIN — Medication 10 MG: at 08:36

## 2020-07-23 RX ADMIN — ASPIRIN 81 MG: 81 TABLET ORAL at 08:37

## 2020-07-23 RX ADMIN — TAMSULOSIN HYDROCHLORIDE 0.4 MG: 0.4 CAPSULE ORAL at 08:37

## 2020-07-23 RX ADMIN — GABAPENTIN 800 MG: 800 TABLET, FILM COATED ORAL at 14:19

## 2020-07-23 RX ADMIN — DOCUSATE SODIUM 100 MG: 100 CAPSULE, LIQUID FILLED ORAL at 08:37

## 2020-07-23 RX ADMIN — ATORVASTATIN CALCIUM 40 MG: 40 TABLET, FILM COATED ORAL at 20:58

## 2020-07-23 RX ADMIN — Medication 3 HALF-TAB: at 20:58

## 2020-07-23 RX ADMIN — GABAPENTIN 800 MG: 800 TABLET, FILM COATED ORAL at 08:37

## 2020-07-23 RX ADMIN — SENNOSIDES 1 TABLET: 8.6 TABLET ORAL at 20:57

## 2020-07-23 RX ADMIN — TRIAMCINOLONE ACETONIDE: 1 CREAM TOPICAL at 09:05

## 2020-07-23 RX ADMIN — DOCUSATE SODIUM 100 MG: 100 CAPSULE, LIQUID FILLED ORAL at 20:57

## 2020-07-23 RX ADMIN — Medication 10 MG: at 14:19

## 2020-07-23 RX ADMIN — Medication 3 HALF-TAB: at 16:39

## 2020-07-23 RX ADMIN — POLYETHYLENE GLYCOL 3350 17 G: 17 POWDER, FOR SOLUTION ORAL at 08:38

## 2020-07-23 RX ADMIN — METOPROLOL SUCCINATE 75 MG: 25 TABLET, EXTENDED RELEASE ORAL at 08:38

## 2020-07-23 RX ADMIN — ACETAMINOPHEN 1000 MG: 500 TABLET, FILM COATED ORAL at 08:36

## 2020-07-23 RX ADMIN — ACETAMINOPHEN 1000 MG: 500 TABLET, FILM COATED ORAL at 14:19

## 2020-07-23 RX ADMIN — GABAPENTIN 800 MG: 800 TABLET, FILM COATED ORAL at 20:58

## 2020-07-23 RX ADMIN — CLOZAPINE 25 MG: 25 TABLET ORAL at 20:57

## 2020-07-23 RX ADMIN — Medication 75 MG: at 20:58

## 2020-07-23 RX ADMIN — DONEPEZIL HYDROCHLORIDE 10 MG: 10 TABLET, FILM COATED ORAL at 08:37

## 2020-07-23 RX ADMIN — Medication 3 MG: at 20:57

## 2020-07-23 RX ADMIN — Medication 3 MG: at 14:19

## 2020-07-23 RX ADMIN — RIVAROXABAN 20 MG: 10 TABLET, FILM COATED ORAL at 17:23

## 2020-07-23 RX ADMIN — SENNOSIDES 1 TABLET: 8.6 TABLET ORAL at 08:37

## 2020-07-23 RX ADMIN — Medication 15 MG: at 20:57

## 2020-07-23 RX ADMIN — ACETAMINOPHEN 1000 MG: 500 TABLET, FILM COATED ORAL at 20:57

## 2020-07-23 RX ADMIN — CARBIDOPA AND LEVODOPA 2 TABLET: 25; 100 TABLET ORAL at 08:37

## 2020-07-23 RX ADMIN — VILAZODONE HYDROCHLORIDE 40 MG: 10 TABLET ORAL at 08:36

## 2020-07-23 RX ADMIN — Medication 3 MG: at 08:36

## 2020-07-23 ASSESSMENT — ACTIVITIES OF DAILY LIVING (ADL)
ORAL_HYGIENE: INDEPENDENT
DRESS: WITH ASSISTANCE
LAUNDRY: UNABLE TO COMPLETE
HYGIENE/GROOMING: HANDWASHING;INDEPENDENT

## 2020-07-23 NOTE — PROGRESS NOTES
Work Completed: Received call from [facility] who report that they cannot meet pt's needs at their facility. Left voice message for Lana [facility] [place] ([phone]).    Discharge plan or goal: Discharge to [facility].                Barriers to discharge: Obtaining placement.

## 2020-07-23 NOTE — PLAN OF CARE
Discharge Planner PT   [facility]  Current status: Pt is SBA for transfers and gait w/ FWW. Focus on endurance w/ three 100' bouts, pt tolerates well. Engaged in static balance training w/ close SBA and intermittent UE support needed. Pt would benefit from ambulation bouts throughout the day w/ supervision from nursing staff.   Barriers to return to prior living situation: no PT barriers  Recommendations for discharge: [facility]  Rationale for recommendations: Pt mobilizing well, will continue to see to progress endurance and functional mobility.       Entered by: Juju Madrid 07/23/2020 1:36 PM

## 2020-07-23 NOTE — PROGRESS NOTES
[facility], [place]   Psychiatric Progress Note        Interim History:   Telemedicine Visit: The patient's condition can be safely assessed and treated via synchronous audio and visual telemedicine encounter.    Start time: 1027  Stop time:  1040  Reason for Telemedicine Visit: Covid-19   Originating Site (Patient Location): [facility]   Distant Site (Provider Location): home office   Consent:  The patient/guardian has verbally consented to: the potential risks and benefits of telemedicine (video visit) versus in person care; bill my insurance or make self-payment for services provided; and responsibility for payment of non-covered services.    Mode of Communication:  Video Conference via Skype   As the provider I attest to compliance with applicable laws and regulations related to telemedicine.     From H&P: The patient is a 55-year-old single  male, who arrived to this facility from assisted living facility called [facility] in [place], [place] because of suicide attempt. The patient also has stated to the DEC 's that he would kill himself if he had to return back to his nursing home.     Team meeting report: The patient's care was discussed with the treatment team during the daily team meeting and/or staff's chart notes were reviewed.  Staff report patient has been calm, pleasant, cooperative.  Attended groups.  Visible in the milieu. He is hopeful.  Anxiety and depression are improving. He has been bright and social.  Denies suicidal ideation.  Appetite is better. Slept 9 hours.    Met with patient.  No changes in his mood since yesterday.  Depression and anxiety are moderate.  No suicidal ideation.  No manic and psychotic symptoms.  No complaints of the staff.  The patient is very pleasant.  He goes to all groups.  He is cooperative with treatment.  We had a lengthy conversation about discharge.  The patient is disappointed there is no place that wants to  "take him.  Discussed the reason for it, he understands.  \"People look at the papers and not me\".  He is hopeful that something will come up.         Medications:       acetaminophen  1,000 mg Oral TID     aspirin  81 mg Oral Daily     atorvastatin  40 mg Oral At Bedtime     baclofen  10 mg Oral BID     baclofen  15 mg Oral At Bedtime     carbidopa-levodopa  2 tablet Oral BID     carbidopa-levodopa  3 half-tab Oral BID     clonazePAM  1 mg Oral At Bedtime     cloZAPine  25 mg Oral At Bedtime     docusate sodium  100 mg Oral BID     donepezil  10 mg Oral Daily     gabapentin  800 mg Oral TID     metoprolol succinate ER  75 mg Oral Daily     polyethylene glycol  17 g Oral Daily     rivaroxaban ANTICOAGULANT  20 mg Oral Daily with supper     sennosides  1 tablet Oral BID     tamsulosin  0.4 mg Oral Daily     traZODone  75 mg Oral At Bedtime     trihexyphenidyl  3 mg Oral TID     vilazodone  40 mg Oral Daily          Allergies:     Allergies   Allergen Reactions     Seroquel [Quetiapine] GI Disturbance          Labs:     Recent Results (from the past 672 hour(s))   COVID-19 VIRUS (CORONAVIRUS) PCR TO [facility]    Collection Time: 07/02/20 10:30 AM    Specimen type and source: Respiratory, Nasopharyngeal swab (specimen)   Result Value Ref Range    COVID-19 Virus PCR Source Nasopharyngeal     COVID-19 Virus PCR to [facility] - Result Undetected Undetected   Asymptomatic COVID-19 Virus (Coronavirus) by PCR    Collection Time: 07/11/20  5:20 PM    Specimen: Nasopharyngeal   Result Value Ref Range    COVID-19 Virus PCR to [facility] - Source Nasopharyngeal     COVID-19 Virus PCR to [facility] - Result       Test received-See reflex to IDDL test SARS CoV2 (COVID-19) Virus RT-PCR   Drug abuse screen 6 urine (tox)    Collection Time: 07/11/20  5:20 PM   Result Value Ref Range    Amphetamine Qual Urine Negative NEG^Negative    Barbiturates Qual Urine Negative NEG^Negative    Benzodiazepine Qual Urine Negative NEG^Negative    " Cannabinoids Qual Urine Negative NEG^Negative    Cocaine Qual Urine Negative NEG^Negative    Ethanol Qual Urine Negative NEG^Negative    Opiates Qualitative Urine Negative NEG^Negative   SARS-CoV-2 COVID-19 Virus (Coronavirus) RT-PCR Nasopharyngeal    Collection Time: 07/11/20  5:20 PM    Specimen: Nasopharyngeal   Result Value Ref Range    SARS-CoV-2 Virus Specimen Source Nasopharyngeal     SARS-CoV-2 PCR Result NEGATIVE     SARS-CoV-2 PCR Comment       Testing was performed using the Xpert Xpress SARS-CoV-2 Assay on the Cepheid Gene-Xpert   Instrument Systems. Additional information about this Emergency Use Authorization (EUA)   assay can be found via the Lab Guide.     CBC with platelets differential    Collection Time: 07/12/20  9:38 AM   Result Value Ref Range    WBC 7.0 4.0 - 11.0 10e9/L    RBC Count 4.70 4.4 - 5.9 10e12/L    Hemoglobin 14.0 13.3 - 17.7 g/dL    Hematocrit 44.8 40.0 - 53.0 %    MCV 95 78 - 100 fl    MCH 29.8 26.5 - 33.0 pg    MCHC 31.3 (L) 31.5 - 36.5 g/dL    RDW 13.2 10.0 - 15.0 %    Platelet Count 272 150 - 450 10e9/L    Diff Method Automated Method     % Neutrophils 67.4 %    % Lymphocytes 24.7 %    % Monocytes 6.2 %    % Eosinophils 1.0 %    % Basophils 0.3 %    % Immature Granulocytes 0.4 %    Nucleated RBCs 0 0 /100    Absolute Neutrophil 4.7 1.6 - 8.3 10e9/L    Absolute Lymphocytes 1.7 0.8 - 5.3 10e9/L    Absolute Monocytes 0.4 0.0 - 1.3 10e9/L    Absolute Eosinophils 0.1 0.0 - 0.7 10e9/L    Absolute Basophils 0.0 0.0 - 0.2 10e9/L    Abs Immature Granulocytes 0.0 0 - 0.4 10e9/L    Absolute Nucleated RBC 0.0    Comprehensive metabolic panel    Collection Time: 07/12/20  9:38 AM   Result Value Ref Range    Sodium 140 133 - 144 mmol/L    Potassium 3.8 3.4 - 5.3 mmol/L    Chloride 105 94 - 109 mmol/L    Carbon Dioxide 30 20 - 32 mmol/L    Anion Gap 5 3 - 14 mmol/L    Glucose 107 (H) 70 - 99 mg/dL    Urea Nitrogen 13 7 - 30 mg/dL    Creatinine 0.87 0.66 - 1.25 mg/dL    GFR Estimate >90 >60  mL/min/[1.73_m2]    GFR Estimate If Black >90 >60 mL/min/[1.73_m2]    Calcium 9.6 8.5 - 10.1 mg/dL    Bilirubin Total 0.4 0.2 - 1.3 mg/dL    Albumin 4.4 3.4 - 5.0 g/dL    Protein Total 8.9 (H) 6.8 - 8.8 g/dL    Alkaline Phosphatase 94 40 - 150 U/L    ALT 11 0 - 70 U/L    AST 12 0 - 45 U/L   TSH with free T4 reflex and/or T3 as indicated    Collection Time: 07/12/20  9:38 AM   Result Value Ref Range    TSH 2.33 0.40 - 4.00 mU/L   Vitamin B12    Collection Time: 07/12/20  9:38 AM   Result Value Ref Range    Vitamin B12 305 193 - 986 pg/mL   Folate    Collection Time: 07/12/20  9:38 AM   Result Value Ref Range    Folate 5.6 >5.4 ng/mL   Vitamin D Deficiency    Collection Time: 07/12/20  9:38 AM   Result Value Ref Range    Vitamin D Deficiency screening 35 20 - 75 ug/L   Lipid profile    Collection Time: 07/12/20  9:38 AM   Result Value Ref Range    Cholesterol 121 <200 mg/dL    Triglycerides 94 <150 mg/dL    HDL Cholesterol 58 >39 mg/dL    LDL Cholesterol Calculated 44 <100 mg/dL    Non HDL Cholesterol 63 <130 mg/dL   WBC and differential    Collection Time: 07/19/20  7:26 AM   Result Value Ref Range    WBC 5.5 4.0 - 11.0 10e9/L    Diff Method Automated Method     % Neutrophils 52.5 %    % Lymphocytes 36.1 %    % Monocytes 8.4 %    % Eosinophils 2.4 %    % Basophils 0.2 %    % Immature Granulocytes 0.4 %    Nucleated RBCs 0 0 /100    Absolute Neutrophil 2.9 1.6 - 8.3 10e9/L    Absolute Lymphocytes 2.0 0.8 - 5.3 10e9/L    Absolute Monocytes 0.5 0.0 - 1.3 10e9/L    Absolute Eosinophils 0.1 0.0 - 0.7 10e9/L    Absolute Basophils 0.0 0.0 - 0.2 10e9/L    Abs Immature Granulocytes 0.0 0 - 0.4 10e9/L    Absolute Nucleated RBC 0.0             Psychiatric Examination:   Temp: 97.3  F (36.3  C) Temp src: Oral BP: 129/75 Pulse: 63     SpO2: 96 %      Weight is 234 lbs 9.6 oz  There is no height or weight on file to calculate BMI.  Mental Status Exam  Appearance:  awake, alert, neatly groomed.  Attitude:  pleasant,  cooperative  Eye Contact: Normal intensity  Mood: depressed, anxious  Affect:  Flat, blunted  Speech:  clear, coherent,   Psychomotor Behavior:  no evidence of tardive dyskinesia, dystonia, or tics and intact gait and muscle tone  Thought Process: Linear and goal directed, much organized  Associations:  no looseness of associations  Thought Content:  no evidence of suicidal ideation or homicidal ideation, some delusional thought content  Insight: poor  Judgment:  impaired  Oriented to:  time, person, and place  Attention Span and Concentration:  intact  Recent and Remote Memory:  intact  Language: Intact  Fund of Knowledge: Normal  Muscle Strength and Tone: normal            Precautions:     Behavioral Orders   Procedures     Code 1 - Restrict to Unit     Fall precautions     Routine Programming     As clinically indicated     Status 15     Every 15 minutes.     Suicide precautions     Patients on Suicide Precautions should have a Combination Diet ordered that includes a Diet selection(s) AND a Behavioral Tray selection for Safe Tray - with utensils, or Safe Tray - NO utensils            DIagnoses:   1. Major depressive disorder, recurrent, severe, with possible psychosis  2.  Alcohol use disorder in remission.   3.  Rule out neurocognitive disorder    4.  Parkinson's disease (psychosis could be connected with Parkinson's disease or to the medication the patient has been treated with for Parkinson's.   5.  Medical problems include: Multiple pulmonary embolisms, history of TIA, supraventricular tachycardia, hyperlipidemia, neuropathic pain, BPH.            Plan:   --  Baclofen 10 mg 2 times a day and 15 mg at bedtime.   --  Sinemet 200 mg 2 times a day and Sinemet 150 mg 2 times a day.    --  Artane 3 mg 3 times a day.   --  Decrease Klonopin to 1 mg at bedtime   --  Clozapine 25 mg daily.   The patient will remain on Klonopin and Clozaril due to risk of reemerging psychosis and agitation.  Klonopin was decreased to  1 mg.  We will continue to taper down as tolerated.   --  Aricept 10 mg daily.   --  Gabapentin 800 mg 3 times a day.   --  Trazodone 75 mg at bedtime.     --  Vilazodone 40 mg daily. May change to Cymbalta.     Disposition Plan   Reason for ongoing admission: is unable to care for self due to psychosis.  Disposition: TBD  Estimated length of stay: 5-7 days  Legal Status:  Voluntary  Discharge will be granted once symptoms improved.    Karlee PHAM CNP  Date: 07/23/20  Time: 2:38 PM    More than 30 minutes were spent for assessment, documentation, and coordination of care.       This note was created with the help of Dragon dictation system. All grammatical/typing errors or context distortion are unintentional and inherent to software.

## 2020-07-23 NOTE — PROGRESS NOTES
Pt has been in the lounge this AM. Pt is pleasant. Pt has eaten well. Pt is med compliant. Pt DIONISIO socks are applied. Pt has been sitting on his chair cushion while in his wheel chair and moves it to a regular chair PRN. Pt mood seems good. Pleasant. laughes some.

## 2020-07-23 NOTE — PROGRESS NOTES
07/22/20 1500   Art Therapy   Type of Intervention structured groups   Response participates with encouragement   Hours 1   Treatment Detail Art Therapy- journal making and miladys prompt   Art Therapy Goal-to cope, express, contribute, regulate and sublimate emotions through the creative arts process and Art Therapy directives within a group setting.     Outcome- pt attended evening group. He was quietly engaged. He participated in journal making and group discussion about miladys, he has good insights and philosophies in group discussions. Pt was pleasant and cooperative.

## 2020-07-23 NOTE — PLAN OF CARE
Problem: OT General Care Plan  Goal: OT Goal 1  Description: Will attend OT groups improve concentration and motivation by engaging in more challenging and success oriented options while also improving insight with comments/answers made about mental health needs      Note: Attended 3 of 3 OT groups. He continued work on a task of more complex problem solving with success. He participated in an activity on Gratitude, identifying things and people he felt thankful for, ways to show gratitude and the personal effect having gratitude has. He stated feeling badly it has been difficult finding a placement for him and stated he is feeling so much better here and now. He talked and explained thoughts in more detail and with more elaboration than previous. He offered more abstract and in context answers.He appears involved, engaged, interested. In the afternoon group, he participated in an activity requiring quick and creative thinking for a shortened group. He offered answers and appeared attentive. He was pleasant with others.

## 2020-07-24 ENCOUNTER — APPOINTMENT (OUTPATIENT)
Dept: PHYSICAL THERAPY | Facility: CLINIC | Age: 55
DRG: 885 | End: 2020-07-24
Payer: COMMERCIAL

## 2020-07-24 PROCEDURE — 97110 THERAPEUTIC EXERCISES: CPT | Mod: GP

## 2020-07-24 PROCEDURE — 25000132 ZZH RX MED GY IP 250 OP 250 PS 637: Performed by: PSYCHIATRY & NEUROLOGY

## 2020-07-24 PROCEDURE — 12400002 ZZH R&B MH SENIOR/ADOLESCENT

## 2020-07-24 PROCEDURE — G0177 OPPS/PHP; TRAIN & EDUC SERV: HCPCS

## 2020-07-24 PROCEDURE — 99232 SBSQ HOSP IP/OBS MODERATE 35: CPT | Mod: GT | Performed by: NURSE PRACTITIONER

## 2020-07-24 PROCEDURE — 99207 ZZC CDG-MDM COMPONENT: MEETS MODERATE - DOWN CODED: CPT | Performed by: NURSE PRACTITIONER

## 2020-07-24 PROCEDURE — 25000132 ZZH RX MED GY IP 250 OP 250 PS 637: Performed by: PHYSICIAN ASSISTANT

## 2020-07-24 PROCEDURE — H2032 ACTIVITY THERAPY, PER 15 MIN: HCPCS

## 2020-07-24 PROCEDURE — 25000132 ZZH RX MED GY IP 250 OP 250 PS 637: Performed by: NURSE PRACTITIONER

## 2020-07-24 RX ADMIN — POLYETHYLENE GLYCOL 3350 17 G: 17 POWDER, FOR SOLUTION ORAL at 08:15

## 2020-07-24 RX ADMIN — Medication 3 HALF-TAB: at 21:23

## 2020-07-24 RX ADMIN — METOPROLOL SUCCINATE 75 MG: 25 TABLET, EXTENDED RELEASE ORAL at 08:12

## 2020-07-24 RX ADMIN — Medication 3 HALF-TAB: at 17:22

## 2020-07-24 RX ADMIN — DOCUSATE SODIUM 100 MG: 100 CAPSULE, LIQUID FILLED ORAL at 21:23

## 2020-07-24 RX ADMIN — CLOZAPINE 25 MG: 25 TABLET ORAL at 21:23

## 2020-07-24 RX ADMIN — SENNOSIDES 1 TABLET: 8.6 TABLET ORAL at 08:15

## 2020-07-24 RX ADMIN — TRIAMCINOLONE ACETONIDE: 1 CREAM TOPICAL at 09:18

## 2020-07-24 RX ADMIN — ACETAMINOPHEN 1000 MG: 500 TABLET, FILM COATED ORAL at 08:15

## 2020-07-24 RX ADMIN — VILAZODONE HYDROCHLORIDE 40 MG: 10 TABLET ORAL at 08:15

## 2020-07-24 RX ADMIN — CARBIDOPA AND LEVODOPA 2 TABLET: 25; 100 TABLET ORAL at 08:15

## 2020-07-24 RX ADMIN — GABAPENTIN 800 MG: 800 TABLET, FILM COATED ORAL at 13:54

## 2020-07-24 RX ADMIN — Medication 3 MG: at 13:53

## 2020-07-24 RX ADMIN — SENNOSIDES 1 TABLET: 8.6 TABLET ORAL at 21:23

## 2020-07-24 RX ADMIN — ACETAMINOPHEN 1000 MG: 500 TABLET, FILM COATED ORAL at 21:23

## 2020-07-24 RX ADMIN — ACETAMINOPHEN 1000 MG: 500 TABLET, FILM COATED ORAL at 13:53

## 2020-07-24 RX ADMIN — Medication 15 MG: at 21:24

## 2020-07-24 RX ADMIN — ASPIRIN 81 MG: 81 TABLET ORAL at 08:15

## 2020-07-24 RX ADMIN — Medication 75 MG: at 21:23

## 2020-07-24 RX ADMIN — DOCUSATE SODIUM 100 MG: 100 CAPSULE, LIQUID FILLED ORAL at 08:15

## 2020-07-24 RX ADMIN — Medication 3 MG: at 21:23

## 2020-07-24 RX ADMIN — GABAPENTIN 800 MG: 800 TABLET, FILM COATED ORAL at 21:23

## 2020-07-24 RX ADMIN — GABAPENTIN 800 MG: 800 TABLET, FILM COATED ORAL at 08:15

## 2020-07-24 RX ADMIN — Medication 10 MG: at 09:24

## 2020-07-24 RX ADMIN — TAMSULOSIN HYDROCHLORIDE 0.4 MG: 0.4 CAPSULE ORAL at 08:12

## 2020-07-24 RX ADMIN — CARBIDOPA AND LEVODOPA 2 TABLET: 25; 100 TABLET ORAL at 12:23

## 2020-07-24 RX ADMIN — Medication 10 MG: at 14:30

## 2020-07-24 RX ADMIN — Medication 3 MG: at 08:12

## 2020-07-24 RX ADMIN — DONEPEZIL HYDROCHLORIDE 10 MG: 10 TABLET, FILM COATED ORAL at 08:15

## 2020-07-24 RX ADMIN — RIVAROXABAN 20 MG: 10 TABLET, FILM COATED ORAL at 17:22

## 2020-07-24 RX ADMIN — ATORVASTATIN CALCIUM 40 MG: 40 TABLET, FILM COATED ORAL at 21:23

## 2020-07-24 RX ADMIN — CLONAZEPAM 1 MG: 1 TABLET ORAL at 21:23

## 2020-07-24 ASSESSMENT — ACTIVITIES OF DAILY LIVING (ADL)
LAUNDRY: UNABLE TO COMPLETE
DRESS: INDEPENDENT;SCRUBS (BEHAVIORAL HEALTH)
DRESS: INDEPENDENT;PROMPTS
HYGIENE/GROOMING: INDEPENDENT
ORAL_HYGIENE: INDEPENDENT
ORAL_HYGIENE: INDEPENDENT;PROMPTS
HYGIENE/GROOMING: INDEPENDENT;PROMPTS

## 2020-07-24 NOTE — PLAN OF CARE
"Patient visible in milieu, affect brighter with engagement, mood is \"okay\" rates his depression and anxiety 5-6/10, 10 being worst, denies SI/SIB, appetite good; quiet/calm demeanor, responses simple and brief with occasional elaboration.  Assisted with shower this evening, buttock area breakdown with redness and medical acuity nurse recommend Ilex barrier cream for gluteal folds in the upper area in between the mepilex dressings; patient reports occasional incontinence and doesn't or is not able to advocate for himself;  Patient care order of Toilet and/or promote activity every two hours placed to ensure patient receives care as needed to prevent further breakdown.  Takes medications, attends group,  watching television and very social; cooperative, and thankful for care and help.  "

## 2020-07-24 NOTE — PROGRESS NOTES
[facility], [place]   Psychiatric Progress Note        Interim History:   Telemedicine Visit: The patient's condition can be safely assessed and treated via synchronous audio and visual telemedicine encounter.    Start time: 1042  Stop time:  1057  Reason for Telemedicine Visit: Covid-19   Originating Site (Patient Location): [facility]   Distant Site (Provider Location): home office   Consent:  The patient/guardian has verbally consented to: the potential risks and benefits of telemedicine (video visit) versus in person care; bill my insurance or make self-payment for services provided; and responsibility for payment of non-covered services.    Mode of Communication:  Video Conference via Skype   As the provider I attest to compliance with applicable laws and regulations related to telemedicine.     From H&P: The patient is a 55-year-old single  male, who arrived to this facility from assisted living facility called [facility] in [place], [place] because of suicide attempt. The patient also has stated to the DEC 's that he would kill himself if he had to return back to his nursing home.     Team meeting report: The patient's care was discussed with the treatment team during the daily team meeting and/or staff's chart notes were reviewed.  Staff report patient has been calm, pleasant, cooperative.  Attended groups.  Visible in the milieu. He is hopeful.  Anxiety and depression are improving. He has been bright and social.  Denies suicidal ideation.  Appetite is better. Took a shower with staff's help. Attending groups. Slept 7 hours.    Met with patient.  No changes in his mood since yesterday.  Depression and anxiety are moderate.  No suicidal ideation.  No manic and psychotic symptoms.  No complaints of the staff.  The patient is very pleasant.  He goes to all groups.  He is cooperative with treatment.  We had a lengthy conversation about discharge.  He is  agreeable with going to a group home.          Medications:       acetaminophen  1,000 mg Oral TID     aspirin  81 mg Oral Daily     atorvastatin  40 mg Oral At Bedtime     baclofen  10 mg Oral BID     baclofen  15 mg Oral At Bedtime     carbidopa-levodopa  2 tablet Oral BID     carbidopa-levodopa  3 half-tab Oral BID     clonazePAM  1 mg Oral At Bedtime     cloZAPine  25 mg Oral At Bedtime     docusate sodium  100 mg Oral BID     donepezil  10 mg Oral Daily     gabapentin  800 mg Oral TID     metoprolol succinate ER  75 mg Oral Daily     polyethylene glycol  17 g Oral Daily     rivaroxaban ANTICOAGULANT  20 mg Oral Daily with supper     sennosides  1 tablet Oral BID     tamsulosin  0.4 mg Oral Daily     traZODone  75 mg Oral At Bedtime     trihexyphenidyl  3 mg Oral TID     vilazodone  40 mg Oral Daily          Allergies:     Allergies   Allergen Reactions     Seroquel [Quetiapine] GI Disturbance          Labs:     Recent Results (from the past 672 hour(s))   COVID-19 VIRUS (CORONAVIRUS) PCR TO [facility]    Collection Time: 07/02/20 10:30 AM    Specimen type and source: Respiratory, Nasopharyngeal swab (specimen)   Result Value Ref Range    COVID-19 Virus PCR Source Nasopharyngeal     COVID-19 Virus PCR to [facility] - Result Undetected Undetected   Asymptomatic COVID-19 Virus (Coronavirus) by PCR    Collection Time: 07/11/20  5:20 PM    Specimen: Nasopharyngeal   Result Value Ref Range    COVID-19 Virus PCR to [facility] - Source Nasopharyngeal     COVID-19 Virus PCR to [facility] - Result       Test received-See reflex to IDDL test SARS CoV2 (COVID-19) Virus RT-PCR   Drug abuse screen 6 urine (tox)    Collection Time: 07/11/20  5:20 PM   Result Value Ref Range    Amphetamine Qual Urine Negative NEG^Negative    Barbiturates Qual Urine Negative NEG^Negative    Benzodiazepine Qual Urine Negative NEG^Negative    Cannabinoids Qual Urine Negative NEG^Negative    Cocaine Qual Urine Negative NEG^Negative    Ethanol Qual  Urine Negative NEG^Negative    Opiates Qualitative Urine Negative NEG^Negative   SARS-CoV-2 COVID-19 Virus (Coronavirus) RT-PCR Nasopharyngeal    Collection Time: 07/11/20  5:20 PM    Specimen: Nasopharyngeal   Result Value Ref Range    SARS-CoV-2 Virus Specimen Source Nasopharyngeal     SARS-CoV-2 PCR Result NEGATIVE     SARS-CoV-2 PCR Comment       Testing was performed using the Xpert Xpress SARS-CoV-2 Assay on the Cepheid Gene-Xpert   Instrument Systems. Additional information about this Emergency Use Authorization (EUA)   assay can be found via the Lab Guide.     CBC with platelets differential    Collection Time: 07/12/20  9:38 AM   Result Value Ref Range    WBC 7.0 4.0 - 11.0 10e9/L    RBC Count 4.70 4.4 - 5.9 10e12/L    Hemoglobin 14.0 13.3 - 17.7 g/dL    Hematocrit 44.8 40.0 - 53.0 %    MCV 95 78 - 100 fl    MCH 29.8 26.5 - 33.0 pg    MCHC 31.3 (L) 31.5 - 36.5 g/dL    RDW 13.2 10.0 - 15.0 %    Platelet Count 272 150 - 450 10e9/L    Diff Method Automated Method     % Neutrophils 67.4 %    % Lymphocytes 24.7 %    % Monocytes 6.2 %    % Eosinophils 1.0 %    % Basophils 0.3 %    % Immature Granulocytes 0.4 %    Nucleated RBCs 0 0 /100    Absolute Neutrophil 4.7 1.6 - 8.3 10e9/L    Absolute Lymphocytes 1.7 0.8 - 5.3 10e9/L    Absolute Monocytes 0.4 0.0 - 1.3 10e9/L    Absolute Eosinophils 0.1 0.0 - 0.7 10e9/L    Absolute Basophils 0.0 0.0 - 0.2 10e9/L    Abs Immature Granulocytes 0.0 0 - 0.4 10e9/L    Absolute Nucleated RBC 0.0    Comprehensive metabolic panel    Collection Time: 07/12/20  9:38 AM   Result Value Ref Range    Sodium 140 133 - 144 mmol/L    Potassium 3.8 3.4 - 5.3 mmol/L    Chloride 105 94 - 109 mmol/L    Carbon Dioxide 30 20 - 32 mmol/L    Anion Gap 5 3 - 14 mmol/L    Glucose 107 (H) 70 - 99 mg/dL    Urea Nitrogen 13 7 - 30 mg/dL    Creatinine 0.87 0.66 - 1.25 mg/dL    GFR Estimate >90 >60 mL/min/[1.73_m2]    GFR Estimate If Black >90 >60 mL/min/[1.73_m2]    Calcium 9.6 8.5 - 10.1 mg/dL     Bilirubin Total 0.4 0.2 - 1.3 mg/dL    Albumin 4.4 3.4 - 5.0 g/dL    Protein Total 8.9 (H) 6.8 - 8.8 g/dL    Alkaline Phosphatase 94 40 - 150 U/L    ALT 11 0 - 70 U/L    AST 12 0 - 45 U/L   TSH with free T4 reflex and/or T3 as indicated    Collection Time: 07/12/20  9:38 AM   Result Value Ref Range    TSH 2.33 0.40 - 4.00 mU/L   Vitamin B12    Collection Time: 07/12/20  9:38 AM   Result Value Ref Range    Vitamin B12 305 193 - 986 pg/mL   Folate    Collection Time: 07/12/20  9:38 AM   Result Value Ref Range    Folate 5.6 >5.4 ng/mL   Vitamin D Deficiency    Collection Time: 07/12/20  9:38 AM   Result Value Ref Range    Vitamin D Deficiency screening 35 20 - 75 ug/L   Lipid profile    Collection Time: 07/12/20  9:38 AM   Result Value Ref Range    Cholesterol 121 <200 mg/dL    Triglycerides 94 <150 mg/dL    HDL Cholesterol 58 >39 mg/dL    LDL Cholesterol Calculated 44 <100 mg/dL    Non HDL Cholesterol 63 <130 mg/dL   WBC and differential    Collection Time: 07/19/20  7:26 AM   Result Value Ref Range    WBC 5.5 4.0 - 11.0 10e9/L    Diff Method Automated Method     % Neutrophils 52.5 %    % Lymphocytes 36.1 %    % Monocytes 8.4 %    % Eosinophils 2.4 %    % Basophils 0.2 %    % Immature Granulocytes 0.4 %    Nucleated RBCs 0 0 /100    Absolute Neutrophil 2.9 1.6 - 8.3 10e9/L    Absolute Lymphocytes 2.0 0.8 - 5.3 10e9/L    Absolute Monocytes 0.5 0.0 - 1.3 10e9/L    Absolute Eosinophils 0.1 0.0 - 0.7 10e9/L    Absolute Basophils 0.0 0.0 - 0.2 10e9/L    Abs Immature Granulocytes 0.0 0 - 0.4 10e9/L    Absolute Nucleated RBC 0.0             Psychiatric Examination:   Temp: 98.4  F (36.9  C) Temp src: Oral BP: 106/68 Pulse: 63   Resp: 18 SpO2: 96 % O2 Device: None (Room air)    Weight is 234 lbs 9.6 oz  There is no height or weight on file to calculate BMI.  Mental Status Exam  Appearance:  awake, alert, neatly groomed.  Attitude:  pleasant, cooperative  Eye Contact: Normal intensity  Mood: depressed, anxious  Affect:  Flat,  blunted  Speech:  clear, coherent,   Psychomotor Behavior:  no evidence of tardive dyskinesia, dystonia, or tics and intact gait and muscle tone  Thought Process: Linear and goal directed, much organized  Associations:  no looseness of associations  Thought Content:  no evidence of suicidal ideation or homicidal ideation, some delusional thought content  Insight: poor  Judgment:  impaired  Oriented to:  time, person, and place  Attention Span and Concentration:  intact  Recent and Remote Memory:  intact  Language: Intact  Fund of Knowledge: Normal  Muscle Strength and Tone: normal            Precautions:     Behavioral Orders   Procedures     Code 1 - Restrict to Unit     Fall precautions     Routine Programming     As clinically indicated     Status 15     Every 15 minutes.     Suicide precautions     Patients on Suicide Precautions should have a Combination Diet ordered that includes a Diet selection(s) AND a Behavioral Tray selection for Safe Tray - with utensils, or Safe Tray - NO utensils            DIagnoses:   1. Major depressive disorder, recurrent, severe, with possible psychosis  2.  Alcohol use disorder in remission.   3.  Rule out neurocognitive disorder    4.  Parkinson's disease (psychosis could be connected with Parkinson's disease or to the medication the patient has been treated with for Parkinson's.   5.  Medical problems include: Multiple pulmonary embolisms, history of TIA, supraventricular tachycardia, hyperlipidemia, neuropathic pain, BPH.            Plan:   --  Baclofen 10 mg 2 times a day and 15 mg at bedtime.   --  Sinemet 200 mg 2 times a day and Sinemet 150 mg 2 times a day.    --  Artane 3 mg 3 times a day.   --  Decrease Klonopin to 1 mg at bedtime   --  Clozapine 25 mg daily.   The patient will remain on Klonopin and Clozaril due to risk of reemerging psychosis and agitation.  Klonopin was decreased to 1 mg.  We will continue to taper down as tolerated.   --  Aricept 10 mg daily.   --   Gabapentin 800 mg 3 times a day.   --  Trazodone 75 mg at bedtime.     --  Vilazodone 40 mg daily. May change to Cymbalta.     Disposition Plan   Reason for ongoing admission: is unable to care for self due to psychosis.  Disposition: TBD  Estimated length of stay: 5-7 days  Legal Status:  Voluntary  Discharge will be granted once symptoms improved.    Karlee PHAM CNP  Date: 07/24/20  Time: 12:23 PM      More than 30 minutes were spent for assessment, documentation, and coordination of care.       This note was created with the help of Dragon dictation system. All grammatical/typing errors or context distortion are unintentional and inherent to software.

## 2020-07-24 NOTE — PLAN OF CARE
Karlee PHAM CNP  Date: 07/27/20  Time: 7:19 AM      Problem: Adult Behavioral Health Plan of Care  Goal: Team Discussion  Description: Team Plan:  Outcome: Improving  Note: BEHAVIORAL TEAM DISCUSSION    Participants: VERO Barraza, GRETTA, Charmaine Camarena RN, Betty Gamez, [facility], [place]a [place], OT  Progress: Significant improvement  Anticipated length of stay: 7-10 days  Continued Stay Criteria/Rationale: Placement  Medical/Physical: See medical notes  Precautions:   Behavioral Orders   Procedures     Code 1 - Restrict to Unit     Fall precautions     Routine Programming     As clinically indicated     Status 15     Every 15 minutes.     Suicide precautions     Patients on Suicide Precautions should have a Combination Diet ordered that includes a Diet selection(s) AND a Behavioral Tray selection for Safe Tray - with utensils, or Safe Tray - NO utensils       Plan: Pt will discharge when an appropriate placement has been obtained.  Rationale for change in precautions or plan: N/A

## 2020-07-24 NOTE — PROGRESS NOTES
Dressings changed to buttocks as they are peeling off. Skin has improved from prior assessment. Patient states he is trying to move around so that he keeps pressure off of the area for too long. Ilex cream applied to gluteal cleft. This area appears less macerated that previously. Encouraged patient to continue with habit of movement. Patient was calm and cooperative throughout interaction. Will continue to monitor.

## 2020-07-24 NOTE — PROGRESS NOTES
Pt was up and out in the milieu and medication compliant this shift. Pt pleasant and cooperative with all cares. Affect is flat until approached and then brightens. Pt appetite good. Suresh socks applied. Cream to reddened areas on face applied. Pt is encouraged to use bathroom and move in chair every hour. Pt reports depression at a 6/10 and anxiety 6/10. Denies suicidal ideation.

## 2020-07-24 NOTE — PLAN OF CARE
Problem: OT General Care Plan  Goal: OT Goal 1  Description: Will attend OT groups improve concentration and motivation by engaging in more challenging and success oriented options while also improving insight with comments/answers made about mental health needs      Note: Attended 2 of 2 OT groups. He was more social, initiated comments and elaborated on ideas. He worked successfully on a familiar task using visuospatial concepts and requested a more challenging same step activity. He joked and teased some with conversation. He appears engaged, interested and motivated in taking an active part in all opportunities.

## 2020-07-24 NOTE — PLAN OF CARE
Discharge Planner PT   Patient plan for discharge: return to [facility]  Current status: Pt engaged in ambulation bouts for activity tolerance 150' x2 w/ FWW and standing LE exercises. Pt w/ fair tolerance, needs seated rest breaks between each activity for fatigue management. Steady on feet.   Barriers to return to prior living situation: medical, mobility  Recommendations for discharge: HANNAH  Rationale for recommendations: Pt mobilizing well, will continue to see to progress activity tolerance, strength and balance while inpatient.        Entered by: Juju Madrid 07/24/2020 12:20 PM

## 2020-07-24 NOTE — PROGRESS NOTES
Left voice message for Trinity Richard from [facility] ([phone]) who is reportedly working on CADI waiver services for pt.

## 2020-07-25 PROCEDURE — 25000132 ZZH RX MED GY IP 250 OP 250 PS 637: Performed by: PSYCHIATRY & NEUROLOGY

## 2020-07-25 PROCEDURE — 25000132 ZZH RX MED GY IP 250 OP 250 PS 637: Performed by: NURSE PRACTITIONER

## 2020-07-25 PROCEDURE — 25000132 ZZH RX MED GY IP 250 OP 250 PS 637: Performed by: PHYSICIAN ASSISTANT

## 2020-07-25 PROCEDURE — 12400002 ZZH R&B MH SENIOR/ADOLESCENT

## 2020-07-25 RX ADMIN — GABAPENTIN 800 MG: 800 TABLET, FILM COATED ORAL at 08:45

## 2020-07-25 RX ADMIN — ATORVASTATIN CALCIUM 40 MG: 40 TABLET, FILM COATED ORAL at 21:31

## 2020-07-25 RX ADMIN — GABAPENTIN 800 MG: 800 TABLET, FILM COATED ORAL at 14:46

## 2020-07-25 RX ADMIN — ACETAMINOPHEN 1000 MG: 500 TABLET, FILM COATED ORAL at 08:43

## 2020-07-25 RX ADMIN — VILAZODONE HYDROCHLORIDE 40 MG: 10 TABLET ORAL at 08:44

## 2020-07-25 RX ADMIN — RIVAROXABAN 20 MG: 10 TABLET, FILM COATED ORAL at 17:25

## 2020-07-25 RX ADMIN — Medication 15 MG: at 21:31

## 2020-07-25 RX ADMIN — Medication 3 MG: at 21:31

## 2020-07-25 RX ADMIN — Medication 10 MG: at 14:46

## 2020-07-25 RX ADMIN — CLOZAPINE 25 MG: 25 TABLET ORAL at 21:31

## 2020-07-25 RX ADMIN — CARBIDOPA AND LEVODOPA 2 TABLET: 25; 100 TABLET ORAL at 11:58

## 2020-07-25 RX ADMIN — Medication 3 MG: at 14:46

## 2020-07-25 RX ADMIN — Medication 10 MG: at 08:42

## 2020-07-25 RX ADMIN — ACETAMINOPHEN 1000 MG: 500 TABLET, FILM COATED ORAL at 14:46

## 2020-07-25 RX ADMIN — CLONAZEPAM 1 MG: 1 TABLET ORAL at 21:31

## 2020-07-25 RX ADMIN — TAMSULOSIN HYDROCHLORIDE 0.4 MG: 0.4 CAPSULE ORAL at 08:44

## 2020-07-25 RX ADMIN — GABAPENTIN 800 MG: 800 TABLET, FILM COATED ORAL at 21:32

## 2020-07-25 RX ADMIN — DONEPEZIL HYDROCHLORIDE 10 MG: 10 TABLET, FILM COATED ORAL at 08:46

## 2020-07-25 RX ADMIN — DOCUSATE SODIUM 100 MG: 100 CAPSULE, LIQUID FILLED ORAL at 21:31

## 2020-07-25 RX ADMIN — POLYETHYLENE GLYCOL 3350 17 G: 17 POWDER, FOR SOLUTION ORAL at 08:46

## 2020-07-25 RX ADMIN — METOPROLOL SUCCINATE 75 MG: 25 TABLET, EXTENDED RELEASE ORAL at 08:45

## 2020-07-25 RX ADMIN — SENNOSIDES 1 TABLET: 8.6 TABLET ORAL at 21:31

## 2020-07-25 RX ADMIN — Medication 3 MG: at 08:42

## 2020-07-25 RX ADMIN — ACETAMINOPHEN 1000 MG: 500 TABLET, FILM COATED ORAL at 21:31

## 2020-07-25 RX ADMIN — DOCUSATE SODIUM 100 MG: 100 CAPSULE, LIQUID FILLED ORAL at 08:46

## 2020-07-25 RX ADMIN — Medication 3 HALF-TAB: at 21:31

## 2020-07-25 RX ADMIN — CARBIDOPA AND LEVODOPA 2 TABLET: 25; 100 TABLET ORAL at 08:45

## 2020-07-25 RX ADMIN — Medication 75 MG: at 21:31

## 2020-07-25 RX ADMIN — ASPIRIN 81 MG: 81 TABLET ORAL at 08:43

## 2020-07-25 RX ADMIN — SENNOSIDES 1 TABLET: 8.6 TABLET ORAL at 08:44

## 2020-07-25 RX ADMIN — Medication 3 HALF-TAB: at 16:05

## 2020-07-25 ASSESSMENT — ACTIVITIES OF DAILY LIVING (ADL)
HYGIENE/GROOMING: INDEPENDENT
ORAL_HYGIENE: INDEPENDENT;PROMPTS

## 2020-07-25 NOTE — PLAN OF CARE
Pt visible in milieu. Affect blunted. Calm. Reports improvement in depressive symptoms and anxiety since admission, both manageable. No PRN's requested or given past 48 hours except for facial dermatitis, which also appears improved. Denies SI. Uses wheelchair consistently; independent with transferring & toileting. Wears compression stockings during day, off at HS. Pleasant, cooperative, med-compliant. Sleep and appetite good. Hygiene adequate, showered yesterday. Continue with current treatment plan and recommendations. Continue to monitor and reassess symptoms. Monitor response to medications. Monitor progress towards treatment goals. Encourage groups and participation.

## 2020-07-25 NOTE — PROGRESS NOTES
Pt was in the milieu most of the shift. Pt was out for meals and groups. Pt attended and participated in all groups this evening. Pt reports good appetite and sleep. Pt is pleasant, cooperative and mood is calm. Pt is withdrawn and isolative but brightens upon approach. Pt denied all mental health symptoms including SI/SIB. Pt had no other concerns.         07/24/20 2200   Behavioral Health   Hallucinations denies / not responding to hallucinations   Thinking poor concentration   Orientation person: oriented;place: oriented   Memory baseline memory   Insight poor   Judgement intact   Eye Contact at examiner   Affect blunted, flat;tense   Mood mood is calm   Physical Appearance/Attire disheveled   Hygiene neglected grooming - unclean body, hair, teeth   Suicidality   (pt denies)   1. Wish to be Dead (Recent) No   2. Non-Specific Active Suicidal Thoughts (Recent) No   Self Injury   (None )   Elopement   (None )   Activity withdrawn;isolative   Speech coherent;clear   Medication Sensitivity no observed side effects;no stated side effects   Psychomotor / Gait unsteady   Activities of Daily Living   Hygiene/Grooming independent   Oral Hygiene independent   Dress independent;scrubs (behavioral health)   Laundry unable to complete   Room Organization independent   Activity   Activity Assistance Provided independent

## 2020-07-25 NOTE — PROGRESS NOTES
Pt has been out in milieu. Pt up in wheel chair and doing exercises. Pt medication compliant. Pt attended group. Denies suicidal ideation or self injury. Pt pleasant and cooperative with all aspects of care. Suresh socks applied.Depression 5/10 and anxiety 5/10.

## 2020-07-25 NOTE — PROGRESS NOTES
" 07/24/20 1500   Art Therapy   Type of Intervention structured groups   Response participates with encouragement   Hours 1   Treatment Detail Art Therapy- mindfulness art process   Art Therapy Goal-to cope, express, contribute, regulate and sublimate emotions through the creative arts process and Art Therapy directives within a group setting.     Outcome- pt attended evening group. He was pleasant, cooperative, social and engaged. He was very free with the process. He talked about \" dadaism\". He know a lot about philosophy and history. He was very experimental, not only dotting with qtips, but splattering and tossing them on the page, not making a mess but just being free and playful with the art. He seemed pleased with his process.    "

## 2020-07-26 LAB
BASOPHILS # BLD AUTO: 0 10E9/L (ref 0–0.2)
BASOPHILS NFR BLD AUTO: 0 %
DIFFERENTIAL METHOD BLD: NORMAL
EOSINOPHIL # BLD AUTO: 0.1 10E9/L (ref 0–0.7)
EOSINOPHIL NFR BLD AUTO: 1.7 %
IMM GRANULOCYTES # BLD: 0 10E9/L (ref 0–0.4)
IMM GRANULOCYTES NFR BLD: 0.5 %
LYMPHOCYTES # BLD AUTO: 1.9 10E9/L (ref 0.8–5.3)
LYMPHOCYTES NFR BLD AUTO: 33 %
MONOCYTES # BLD AUTO: 0.4 10E9/L (ref 0–1.3)
MONOCYTES NFR BLD AUTO: 7.4 %
NEUTROPHILS # BLD AUTO: 3.3 10E9/L (ref 1.6–8.3)
NEUTROPHILS NFR BLD AUTO: 57.4 %
NRBC # BLD AUTO: 0 10*3/UL
NRBC BLD AUTO-RTO: 0 /100
WBC # BLD AUTO: 5.8 10E9/L (ref 4–11)

## 2020-07-26 PROCEDURE — 12400002 ZZH R&B MH SENIOR/ADOLESCENT

## 2020-07-26 PROCEDURE — 25000132 ZZH RX MED GY IP 250 OP 250 PS 637: Performed by: PSYCHIATRY & NEUROLOGY

## 2020-07-26 PROCEDURE — 36415 COLL VENOUS BLD VENIPUNCTURE: CPT | Performed by: PSYCHIATRY & NEUROLOGY

## 2020-07-26 PROCEDURE — 85004 AUTOMATED DIFF WBC COUNT: CPT | Performed by: PSYCHIATRY & NEUROLOGY

## 2020-07-26 PROCEDURE — 25000132 ZZH RX MED GY IP 250 OP 250 PS 637: Performed by: NURSE PRACTITIONER

## 2020-07-26 PROCEDURE — 85048 AUTOMATED LEUKOCYTE COUNT: CPT | Performed by: PSYCHIATRY & NEUROLOGY

## 2020-07-26 PROCEDURE — 25000132 ZZH RX MED GY IP 250 OP 250 PS 637: Performed by: PHYSICIAN ASSISTANT

## 2020-07-26 RX ADMIN — ACETAMINOPHEN 1000 MG: 500 TABLET, FILM COATED ORAL at 14:09

## 2020-07-26 RX ADMIN — METOPROLOL SUCCINATE 75 MG: 25 TABLET, EXTENDED RELEASE ORAL at 09:11

## 2020-07-26 RX ADMIN — Medication 3 HALF-TAB: at 21:02

## 2020-07-26 RX ADMIN — GABAPENTIN 800 MG: 800 TABLET, FILM COATED ORAL at 14:10

## 2020-07-26 RX ADMIN — GABAPENTIN 800 MG: 800 TABLET, FILM COATED ORAL at 21:03

## 2020-07-26 RX ADMIN — TAMSULOSIN HYDROCHLORIDE 0.4 MG: 0.4 CAPSULE ORAL at 09:12

## 2020-07-26 RX ADMIN — DONEPEZIL HYDROCHLORIDE 10 MG: 10 TABLET, FILM COATED ORAL at 09:10

## 2020-07-26 RX ADMIN — Medication 3 HALF-TAB: at 16:07

## 2020-07-26 RX ADMIN — DOCUSATE SODIUM 100 MG: 100 CAPSULE, LIQUID FILLED ORAL at 21:03

## 2020-07-26 RX ADMIN — Medication 15 MG: at 21:02

## 2020-07-26 RX ADMIN — RIVAROXABAN 20 MG: 10 TABLET, FILM COATED ORAL at 17:34

## 2020-07-26 RX ADMIN — Medication 3 MG: at 14:10

## 2020-07-26 RX ADMIN — TRIAMCINOLONE ACETONIDE: 1 CREAM TOPICAL at 10:39

## 2020-07-26 RX ADMIN — Medication 10 MG: at 09:07

## 2020-07-26 RX ADMIN — DOCUSATE SODIUM 100 MG: 100 CAPSULE, LIQUID FILLED ORAL at 09:08

## 2020-07-26 RX ADMIN — VILAZODONE HYDROCHLORIDE 40 MG: 10 TABLET ORAL at 09:12

## 2020-07-26 RX ADMIN — ACETAMINOPHEN 1000 MG: 500 TABLET, FILM COATED ORAL at 21:03

## 2020-07-26 RX ADMIN — Medication 75 MG: at 21:02

## 2020-07-26 RX ADMIN — Medication 3 MG: at 21:02

## 2020-07-26 RX ADMIN — CLONAZEPAM 1 MG: 1 TABLET ORAL at 21:02

## 2020-07-26 RX ADMIN — GABAPENTIN 800 MG: 800 TABLET, FILM COATED ORAL at 09:10

## 2020-07-26 RX ADMIN — ASPIRIN 81 MG: 81 TABLET ORAL at 09:07

## 2020-07-26 RX ADMIN — Medication 10 MG: at 14:10

## 2020-07-26 RX ADMIN — Medication 3 MG: at 09:12

## 2020-07-26 RX ADMIN — CARBIDOPA AND LEVODOPA 2 TABLET: 25; 100 TABLET ORAL at 13:01

## 2020-07-26 RX ADMIN — ATORVASTATIN CALCIUM 40 MG: 40 TABLET, FILM COATED ORAL at 21:03

## 2020-07-26 RX ADMIN — CLOZAPINE 25 MG: 25 TABLET ORAL at 21:03

## 2020-07-26 RX ADMIN — ACETAMINOPHEN 1000 MG: 500 TABLET, FILM COATED ORAL at 09:07

## 2020-07-26 RX ADMIN — SENNOSIDES 1 TABLET: 8.6 TABLET ORAL at 09:11

## 2020-07-26 RX ADMIN — SENNOSIDES 1 TABLET: 8.6 TABLET ORAL at 21:03

## 2020-07-26 RX ADMIN — CARBIDOPA AND LEVODOPA 2 TABLET: 25; 100 TABLET ORAL at 09:08

## 2020-07-26 ASSESSMENT — ACTIVITIES OF DAILY LIVING (ADL)
ORAL_HYGIENE: INDEPENDENT
DRESS: SCRUBS (BEHAVIORAL HEALTH)
HYGIENE/GROOMING: INDEPENDENT
LAUNDRY: UNABLE TO COMPLETE

## 2020-07-26 NOTE — PROGRESS NOTES
Patient showered and dressings replaced to buttocks. See flowsheet for details. Patient was calm and cooperative throughout interaction.

## 2020-07-26 NOTE — PLAN OF CARE
Patient pleasant and cooperative, visible in milieu.  Flat affect, denies SI/SIB/HI, rated depression 5/10, anxiety 5/10.  Patient quiet, watched movie with peers.  Good appetite at breakfast and lunch.  Took scheduled medications with no problem.  Currently putting on his sam stockings, also dressing change done by resource nurse.  Patient resting comfortably in room, will continue to monitor closely.

## 2020-07-26 NOTE — PROGRESS NOTES
07/25/20 1500    Art Therapy   Type of Intervention structured groups   Response participates with encouragement   Hours 1   Treatment Detail Art Therapy- mindfulness art process   Art Therapy Goal-to cope, express, contribute, regulate and sublimate emotions through the creative arts process and Art Therapy directives within a group setting.     Outcome- pt attended evening group. He was fairly talkative this eveing. He was talking about the history of pandemics. He made a thoughtful abstract piece. He is comfortable and has confidence when making abstract art. He also , when others present their art, he at times gives historical references of what art movement anabell period their art looks like it fits. Pt is pleasant, cooperative and engaged.

## 2020-07-27 LAB
LABORATORY COMMENT REPORT: NORMAL
SARS-COV-2 RNA SPEC QL NAA+PROBE: NEGATIVE
SARS-COV-2 RNA SPEC QL NAA+PROBE: NORMAL
SPECIMEN SOURCE: NORMAL
SPECIMEN SOURCE: NORMAL

## 2020-07-27 PROCEDURE — 25000132 ZZH RX MED GY IP 250 OP 250 PS 637: Performed by: PHYSICIAN ASSISTANT

## 2020-07-27 PROCEDURE — 99207 ZZC CDG-MDM COMPONENT: MEETS MODERATE - DOWN CODED: CPT | Performed by: NURSE PRACTITIONER

## 2020-07-27 PROCEDURE — 25000132 ZZH RX MED GY IP 250 OP 250 PS 637: Performed by: PSYCHIATRY & NEUROLOGY

## 2020-07-27 PROCEDURE — U0003 INFECTIOUS AGENT DETECTION BY NUCLEIC ACID (DNA OR RNA); SEVERE ACUTE RESPIRATORY SYNDROME CORONAVIRUS 2 (SARS-COV-2) (CORONAVIRUS DISEASE [COVID-19]), AMPLIFIED PROBE TECHNIQUE, MAKING USE OF HIGH THROUGHPUT TECHNOLOGIES AS DESCRIBED BY CMS-2020-01-R: HCPCS | Performed by: NURSE PRACTITIONER

## 2020-07-27 PROCEDURE — 99232 SBSQ HOSP IP/OBS MODERATE 35: CPT | Mod: GT | Performed by: NURSE PRACTITIONER

## 2020-07-27 PROCEDURE — 12400002 ZZH R&B MH SENIOR/ADOLESCENT

## 2020-07-27 PROCEDURE — 25000132 ZZH RX MED GY IP 250 OP 250 PS 637: Performed by: NURSE PRACTITIONER

## 2020-07-27 RX ORDER — CLONAZEPAM 1 MG/1
1 TABLET ORAL AT BEDTIME
Qty: 30 TABLET | Refills: 0 | Status: SHIPPED | OUTPATIENT
Start: 2020-07-27 | End: 2020-07-28

## 2020-07-27 RX ADMIN — ACETAMINOPHEN 1000 MG: 500 TABLET, FILM COATED ORAL at 14:18

## 2020-07-27 RX ADMIN — Medication 3 MG: at 12:57

## 2020-07-27 RX ADMIN — DOCUSATE SODIUM 100 MG: 100 CAPSULE, LIQUID FILLED ORAL at 20:21

## 2020-07-27 RX ADMIN — SENNOSIDES 1 TABLET: 8.6 TABLET ORAL at 20:20

## 2020-07-27 RX ADMIN — Medication 3 MG: at 20:20

## 2020-07-27 RX ADMIN — Medication 3 HALF-TAB: at 20:19

## 2020-07-27 RX ADMIN — CARBIDOPA AND LEVODOPA 2 TABLET: 25; 100 TABLET ORAL at 08:11

## 2020-07-27 RX ADMIN — Medication 75 MG: at 21:22

## 2020-07-27 RX ADMIN — ATORVASTATIN CALCIUM 40 MG: 40 TABLET, FILM COATED ORAL at 21:23

## 2020-07-27 RX ADMIN — CLOZAPINE 25 MG: 25 TABLET ORAL at 21:22

## 2020-07-27 RX ADMIN — Medication 10 MG: at 08:12

## 2020-07-27 RX ADMIN — CLONAZEPAM 1 MG: 1 TABLET ORAL at 21:22

## 2020-07-27 RX ADMIN — Medication 10 MG: at 12:57

## 2020-07-27 RX ADMIN — GABAPENTIN 800 MG: 800 TABLET, FILM COATED ORAL at 20:20

## 2020-07-27 RX ADMIN — RIVAROXABAN 20 MG: 10 TABLET, FILM COATED ORAL at 17:26

## 2020-07-27 RX ADMIN — DOCUSATE SODIUM 100 MG: 100 CAPSULE, LIQUID FILLED ORAL at 08:12

## 2020-07-27 RX ADMIN — SENNOSIDES 1 TABLET: 8.6 TABLET ORAL at 08:12

## 2020-07-27 RX ADMIN — TAMSULOSIN HYDROCHLORIDE 0.4 MG: 0.4 CAPSULE ORAL at 08:11

## 2020-07-27 RX ADMIN — Medication 3 MG: at 08:11

## 2020-07-27 RX ADMIN — GABAPENTIN 800 MG: 800 TABLET, FILM COATED ORAL at 13:00

## 2020-07-27 RX ADMIN — ACETAMINOPHEN 1000 MG: 500 TABLET, FILM COATED ORAL at 20:20

## 2020-07-27 RX ADMIN — METOPROLOL SUCCINATE 75 MG: 25 TABLET, EXTENDED RELEASE ORAL at 08:11

## 2020-07-27 RX ADMIN — Medication 15 MG: at 21:23

## 2020-07-27 RX ADMIN — Medication 3 HALF-TAB: at 17:26

## 2020-07-27 RX ADMIN — POLYETHYLENE GLYCOL 3350 17 G: 17 POWDER, FOR SOLUTION ORAL at 08:09

## 2020-07-27 RX ADMIN — VILAZODONE HYDROCHLORIDE 40 MG: 10 TABLET ORAL at 08:10

## 2020-07-27 RX ADMIN — GABAPENTIN 800 MG: 800 TABLET, FILM COATED ORAL at 08:11

## 2020-07-27 RX ADMIN — ASPIRIN 81 MG: 81 TABLET ORAL at 08:11

## 2020-07-27 RX ADMIN — DONEPEZIL HYDROCHLORIDE 10 MG: 10 TABLET, FILM COATED ORAL at 08:11

## 2020-07-27 RX ADMIN — ACETAMINOPHEN 1000 MG: 500 TABLET, FILM COATED ORAL at 08:10

## 2020-07-27 RX ADMIN — CARBIDOPA AND LEVODOPA 2 TABLET: 25; 100 TABLET ORAL at 12:56

## 2020-07-27 NOTE — PLAN OF CARE
Problem: OT General Care Plan  Goal: OT Goal 1  Description: Will attend OT groups improve concentration and motivation by engaging in more challenging and success oriented options while also improving insight with comments/answers made about mental health needs      Note: Attended 2 of 2 OT groups. He stated feeling badly about whether he had a place to move or return to from here. He initially came [place] group, hands seemed shaky with him staring ahead, pausing before beginning work and quick to answer questions though. With encouragement, he started working, appeared able to re focus, shakiness in hands resolved and appearing more relaxed. With encouragement he seems able to redirect his attention and become easily involved.  He also participated in an activity focused on identifying helpful ideas for calming using the 5 senses, and practicing a grounding technique with this focus. He was quick to speak up, offer answers in context and appeared comfortable being involved.

## 2020-07-27 NOTE — PROGRESS NOTES
Pt visible in milieu. Affect blunted but occasionally smiles and laughs during conversation at appropriate moments. Calm. Reports improvement in depressive symptoms and anxiety since admission, rates both at 5/10 in severity. No PRN's requested or given past 48 hours except for facial dermatitis, which also appears improved. Denies SI. Uses wheelchair consistently; independent with transferring & toileting. Wears compression stockings during day, off at HS. Pleasant, cooperative, med-compliant. Sleep and appetite good. Hygiene good, showered this evening. Dressings at upper gluteal fold changed by behavioral medical flyer (see flowsheet). She reported to writer a significant improvement in the wound since she first saw it. Continue with current treatment plan and recommendations. Continue to monitor and reassess symptoms. Monitor response to medications. Monitor progress towards treatment goals. Encourage groups and participation.

## 2020-07-27 NOTE — PROGRESS NOTES
[facility], [place]   Psychiatric Progress Note        Interim History:   Telemedicine Visit: The patient's condition can be safely assessed and treated via synchronous audio and visual telemedicine encounter.    Start time: 0937  Stop time:  0950  Reason for Telemedicine Visit: Covid-19   Originating Site (Patient Location): [facility]   Distant Site (Provider Location): home office   Consent:  The patient/guardian has verbally consented to: the potential risks and benefits of telemedicine (video visit) versus in person care; bill my insurance or make self-payment for services provided; and responsibility for payment of non-covered services.    Mode of Communication:  Video Conference via Skype   As the provider I attest to compliance with applicable laws and regulations related to telemedicine.     From H&P: The patient is a 55-year-old single  male, who arrived to this facility from assisted living facility called [facility] in [place], [place] because of suicide attempt. The patient also has stated to the DEC 's that he would kill himself if he had to return back to his nursing home.     Team meeting report: The patient's care was discussed with the treatment team during the daily team meeting and/or staff's chart notes were reviewed.  Staff report patient has been calm, pleasant, cooperative. He had a good weekend.  Attended groups.  Visible in the milieu. He is hopeful.  Anxiety and depression are improving. He has been bright and social.  Denies suicidal ideation.  Appetite is better. Took a shower with staff's help. Attending groups. Slept 7 hours.    Met with patient.  The weekend was good. No changes in his mood since last week.  Depression and anxiety are moderate.  No suicidal ideation.  No manic and psychotic symptoms.  No complaints of the staff.  The patient is very pleasant.  He goes to all groups.  He is cooperative with treatment.  We had a  lengthy conversation about discharge.  He is agreeable to discharge to his old nursing home.         Medications:       acetaminophen  1,000 mg Oral TID     aspirin  81 mg Oral Daily     atorvastatin  40 mg Oral At Bedtime     baclofen  10 mg Oral BID     baclofen  15 mg Oral At Bedtime     carbidopa-levodopa  2 tablet Oral BID     carbidopa-levodopa  3 half-tab Oral BID     clonazePAM  1 mg Oral At Bedtime     cloZAPine  25 mg Oral At Bedtime     docusate sodium  100 mg Oral BID     donepezil  10 mg Oral Daily     gabapentin  800 mg Oral TID     metoprolol succinate ER  75 mg Oral Daily     polyethylene glycol  17 g Oral Daily     rivaroxaban ANTICOAGULANT  20 mg Oral Daily with supper     sennosides  1 tablet Oral BID     tamsulosin  0.4 mg Oral Daily     traZODone  75 mg Oral At Bedtime     trihexyphenidyl  3 mg Oral TID     vilazodone  40 mg Oral Daily          Allergies:     Allergies   Allergen Reactions     Seroquel [Quetiapine] GI Disturbance          Labs:     Recent Results (from the past 672 hour(s))   COVID-19 VIRUS (CORONAVIRUS) PCR TO [facility]    Collection Time: 07/02/20 10:30 AM    Specimen type and source: Respiratory, Nasopharyngeal swab (specimen)   Result Value Ref Range    COVID-19 Virus PCR Source Nasopharyngeal     COVID-19 Virus PCR to [facility] - Result Undetected Undetected   Asymptomatic COVID-19 Virus (Coronavirus) by PCR    Collection Time: 07/11/20  5:20 PM    Specimen: Nasopharyngeal   Result Value Ref Range    COVID-19 Virus PCR to [facility] - Source Nasopharyngeal     COVID-19 Virus PCR to [facility] - Result       Test received-See reflex to IDDL test SARS CoV2 (COVID-19) Virus RT-PCR   Drug abuse screen 6 urine (tox)    Collection Time: 07/11/20  5:20 PM   Result Value Ref Range    Amphetamine Qual Urine Negative NEG^Negative    Barbiturates Qual Urine Negative NEG^Negative    Benzodiazepine Qual Urine Negative NEG^Negative    Cannabinoids Qual Urine Negative NEG^Negative    Cocaine  Qual Urine Negative NEG^Negative    Ethanol Qual Urine Negative NEG^Negative    Opiates Qualitative Urine Negative NEG^Negative   SARS-CoV-2 COVID-19 Virus (Coronavirus) RT-PCR Nasopharyngeal    Collection Time: 07/11/20  5:20 PM    Specimen: Nasopharyngeal   Result Value Ref Range    SARS-CoV-2 Virus Specimen Source Nasopharyngeal     SARS-CoV-2 PCR Result NEGATIVE     SARS-CoV-2 PCR Comment       Testing was performed using the Xpert Xpress SARS-CoV-2 Assay on the Cepheid Gene-Xpert   Instrument Systems. Additional information about this Emergency Use Authorization (EUA)   assay can be found via the Lab Guide.     CBC with platelets differential    Collection Time: 07/12/20  9:38 AM   Result Value Ref Range    WBC 7.0 4.0 - 11.0 10e9/L    RBC Count 4.70 4.4 - 5.9 10e12/L    Hemoglobin 14.0 13.3 - 17.7 g/dL    Hematocrit 44.8 40.0 - 53.0 %    MCV 95 78 - 100 fl    MCH 29.8 26.5 - 33.0 pg    MCHC 31.3 (L) 31.5 - 36.5 g/dL    RDW 13.2 10.0 - 15.0 %    Platelet Count 272 150 - 450 10e9/L    Diff Method Automated Method     % Neutrophils 67.4 %    % Lymphocytes 24.7 %    % Monocytes 6.2 %    % Eosinophils 1.0 %    % Basophils 0.3 %    % Immature Granulocytes 0.4 %    Nucleated RBCs 0 0 /100    Absolute Neutrophil 4.7 1.6 - 8.3 10e9/L    Absolute Lymphocytes 1.7 0.8 - 5.3 10e9/L    Absolute Monocytes 0.4 0.0 - 1.3 10e9/L    Absolute Eosinophils 0.1 0.0 - 0.7 10e9/L    Absolute Basophils 0.0 0.0 - 0.2 10e9/L    Abs Immature Granulocytes 0.0 0 - 0.4 10e9/L    Absolute Nucleated RBC 0.0    Comprehensive metabolic panel    Collection Time: 07/12/20  9:38 AM   Result Value Ref Range    Sodium 140 133 - 144 mmol/L    Potassium 3.8 3.4 - 5.3 mmol/L    Chloride 105 94 - 109 mmol/L    Carbon Dioxide 30 20 - 32 mmol/L    Anion Gap 5 3 - 14 mmol/L    Glucose 107 (H) 70 - 99 mg/dL    Urea Nitrogen 13 7 - 30 mg/dL    Creatinine 0.87 0.66 - 1.25 mg/dL    GFR Estimate >90 >60 mL/min/[1.73_m2]    GFR Estimate If Black >90 >60  mL/min/[1.73_m2]    Calcium 9.6 8.5 - 10.1 mg/dL    Bilirubin Total 0.4 0.2 - 1.3 mg/dL    Albumin 4.4 3.4 - 5.0 g/dL    Protein Total 8.9 (H) 6.8 - 8.8 g/dL    Alkaline Phosphatase 94 40 - 150 U/L    ALT 11 0 - 70 U/L    AST 12 0 - 45 U/L   TSH with free T4 reflex and/or T3 as indicated    Collection Time: 07/12/20  9:38 AM   Result Value Ref Range    TSH 2.33 0.40 - 4.00 mU/L   Vitamin B12    Collection Time: 07/12/20  9:38 AM   Result Value Ref Range    Vitamin B12 305 193 - 986 pg/mL   Folate    Collection Time: 07/12/20  9:38 AM   Result Value Ref Range    Folate 5.6 >5.4 ng/mL   Vitamin D Deficiency    Collection Time: 07/12/20  9:38 AM   Result Value Ref Range    Vitamin D Deficiency screening 35 20 - 75 ug/L   Lipid profile    Collection Time: 07/12/20  9:38 AM   Result Value Ref Range    Cholesterol 121 <200 mg/dL    Triglycerides 94 <150 mg/dL    HDL Cholesterol 58 >39 mg/dL    LDL Cholesterol Calculated 44 <100 mg/dL    Non HDL Cholesterol 63 <130 mg/dL   WBC and differential    Collection Time: 07/19/20  7:26 AM   Result Value Ref Range    WBC 5.5 4.0 - 11.0 10e9/L    Diff Method Automated Method     % Neutrophils 52.5 %    % Lymphocytes 36.1 %    % Monocytes 8.4 %    % Eosinophils 2.4 %    % Basophils 0.2 %    % Immature Granulocytes 0.4 %    Nucleated RBCs 0 0 /100    Absolute Neutrophil 2.9 1.6 - 8.3 10e9/L    Absolute Lymphocytes 2.0 0.8 - 5.3 10e9/L    Absolute Monocytes 0.5 0.0 - 1.3 10e9/L    Absolute Eosinophils 0.1 0.0 - 0.7 10e9/L    Absolute Basophils 0.0 0.0 - 0.2 10e9/L    Abs Immature Granulocytes 0.0 0 - 0.4 10e9/L    Absolute Nucleated RBC 0.0    WBC and differential    Collection Time: 07/26/20  7:28 AM   Result Value Ref Range    WBC 5.8 4.0 - 11.0 10e9/L    Diff Method Automated Method     % Neutrophils 57.4 %    % Lymphocytes 33.0 %    % Monocytes 7.4 %    % Eosinophils 1.7 %    % Basophils 0.0 %    % Immature Granulocytes 0.5 %    Nucleated RBCs 0 0 /100    Absolute Neutrophil 3.3  1.6 - 8.3 10e9/L    Absolute Lymphocytes 1.9 0.8 - 5.3 10e9/L    Absolute Monocytes 0.4 0.0 - 1.3 10e9/L    Absolute Eosinophils 0.1 0.0 - 0.7 10e9/L    Absolute Basophils 0.0 0.0 - 0.2 10e9/L    Abs Immature Granulocytes 0.0 0 - 0.4 10e9/L    Absolute Nucleated RBC 0.0             Psychiatric Examination:   Temp: 97.7  F (36.5  C) Temp src: Oral BP: 112/69 Pulse: 62     SpO2: 97 % O2 Device: None (Room air)    Weight is 234 lbs 4.8 oz  There is no height or weight on file to calculate BMI.  Mental Status Exam  Appearance:  awake, alert, neatly groomed.  Attitude:  pleasant, cooperative  Eye Contact: Normal intensity  Mood: depressed, anxious  Affect:  Flat, blunted  Speech:  clear, coherent,   Psychomotor Behavior:  no evidence of tardive dyskinesia, dystonia, or tics and intact gait and muscle tone  Thought Process: Linear and goal directed, much organized  Associations:  no looseness of associations  Thought Content:  no evidence of suicidal ideation or homicidal ideation, some delusional thought content  Insight: poor  Judgment:  impaired  Oriented to:  time, person, and place  Attention Span and Concentration:  intact  Recent and Remote Memory:  intact  Language: Intact  Fund of Knowledge: Normal  Muscle Strength and Tone: normal            Precautions:     Behavioral Orders   Procedures     Code 1 - Restrict to Unit     Fall precautions     Routine Programming     As clinically indicated     Status 15     Every 15 minutes.     Suicide precautions     Patients on Suicide Precautions should have a Combination Diet ordered that includes a Diet selection(s) AND a Behavioral Tray selection for Safe Tray - with utensils, or Safe Tray - NO utensils            DIagnoses:   1. Major depressive disorder, recurrent, severe, with possible psychosis  2.  Alcohol use disorder in remission.   3.  Rule out neurocognitive disorder    4.  Parkinson's disease (psychosis could be connected with Parkinson's disease or to the  medication the patient has been treated with for Parkinson's.   5.  Medical problems include: Multiple pulmonary embolisms, history of TIA, supraventricular tachycardia, hyperlipidemia, neuropathic pain, BPH.            Plan:   --  Baclofen 10 mg 2 times a day and 15 mg at bedtime.   --  Sinemet 200 mg 2 times a day and Sinemet 150 mg 2 times a day.    --  Artane 3 mg 3 times a day.   --  Decrease Klonopin to 1 mg at bedtime   --  Clozapine 25 mg daily.   The patient will remain on Klonopin and Clozaril due to risk of reemerging psychosis and agitation.  Klonopin was decreased to 1 mg.  We will continue to taper down as tolerated.   --  Aricept 10 mg daily.   --  Gabapentin 800 mg 3 times a day.   --  Trazodone 75 mg at bedtime.     --  Vilazodone 40 mg daily. May change to Cymbalta.     Disposition Plan   Reason for ongoing admission: is unable to care for self due to psychosis.  Disposition: TBD  Estimated length of stay: 5-7 days  Legal Status:  Voluntary  Discharge will be granted once symptoms improved.    Karlee PHAM CNP  Date: 07/27/20  Time: 1:32 PM    More than 30 minutes were spent for assessment, documentation, and coordination of care.       This note was created with the help of Dragon dictation system. All grammatical/typing errors or context distortion are unintentional and inherent to software.

## 2020-07-27 NOTE — PROGRESS NOTES
07/26/20 1500     Art Therapy   Type of Intervention structured groups   Response participates with encouragement   Hours 1   Treatment Detail Art Therapy- collage   Art Therapy Goal-to cope, express, contribute, regulate and sublimate emotions through the creative arts process and Art Therapy directives within a group setting.     Outcome- pt attended evening group. He said he felt good. He was very engaged in the collage process and made several small collages. His art showed dry wit and humor. Pt was pleasant, cooperative and engaged.

## 2020-07-27 NOTE — PLAN OF CARE
"  Problem: General Plan of Care (Inpatient Behavioral)  Goal: Discharge Planning  Outcome: Improving     Behavioral  Pt oriented x 4; denied SI, SIB, HI, and hallucinations. Pt pleasant and cooperative upon appraoch; isolative and withdrawn to room/self; pt attended groups. Pt affect flat and blunted; pt exhibits poor concentration. Pt indicated that he does not want to return to his residence. He did not elaborate on why he did not go back, he only said \"I do not have a choice.\" He said he did not make complaints about the nursing facility, they  were about the group home. Missions attempted to speak with pt. However pt refused.     Medical  Pt endorses no new medical complaints    Plan  Possible discharge 7/28/20 to [facility].  "

## 2020-07-27 NOTE — PROGRESS NOTES
Pt reports no SI or SIB. Pt reports no hallucinations or hearing voices. Pt has been activity on the milieu either socializing in the lounge, watching tv and attending groups. Pt reported being in a good mood but wasn't interested in conversing for long. Pt had all meals and took a nap after lunch. Pt meet with providers during the afternoon and appeared to have a constructive conversation with staff.        07/27/20 1300   Behavioral Health   Hallucinations denies / not responding to hallucinations   Thinking poor concentration   Orientation person: oriented;place: oriented;time: oriented   Memory baseline memory   Insight poor   Judgement impaired   Eye Contact at examiner   Affect full range affect;blunted, flat   Mood mood is calm   Physical Appearance/Attire attire appropriate to age and situation   Hygiene well groomed   Suicidality other (see comments)  (Denies)   1. Wish to be Dead (Recent) No   2. Non-Specific Active Suicidal Thoughts (Recent) No   3. Active Sucidal Ideation with any Methods (Not Plan) Without Intent to Act (Recent) No

## 2020-07-27 NOTE — PROGRESS NOTES
Contacted Adult [employer] to determine if pt is able to return back to his prior facility given the accusations he reported to hospital staff and, thus, reported to APS. Informed by APS that writer would need to contact [facility] at [phone]. Left voice message at that number.

## 2020-07-28 ENCOUNTER — APPOINTMENT (OUTPATIENT)
Dept: PHYSICAL THERAPY | Facility: CLINIC | Age: 55
DRG: 885 | End: 2020-07-28
Payer: COMMERCIAL

## 2020-07-28 PROCEDURE — 97110 THERAPEUTIC EXERCISES: CPT | Mod: GP

## 2020-07-28 PROCEDURE — 99207 ZZC CDG-MDM COMPONENT: MEETS MODERATE - DOWN CODED: CPT | Performed by: NURSE PRACTITIONER

## 2020-07-28 PROCEDURE — 25000132 ZZH RX MED GY IP 250 OP 250 PS 637: Performed by: PSYCHIATRY & NEUROLOGY

## 2020-07-28 PROCEDURE — 25000132 ZZH RX MED GY IP 250 OP 250 PS 637: Performed by: NURSE PRACTITIONER

## 2020-07-28 PROCEDURE — G0463 HOSPITAL OUTPT CLINIC VISIT: HCPCS

## 2020-07-28 PROCEDURE — 12400002 ZZH R&B MH SENIOR/ADOLESCENT

## 2020-07-28 PROCEDURE — 25000132 ZZH RX MED GY IP 250 OP 250 PS 637: Performed by: PHYSICIAN ASSISTANT

## 2020-07-28 PROCEDURE — 97116 GAIT TRAINING THERAPY: CPT | Mod: GP

## 2020-07-28 PROCEDURE — 99232 SBSQ HOSP IP/OBS MODERATE 35: CPT | Mod: GT | Performed by: NURSE PRACTITIONER

## 2020-07-28 RX ORDER — CLONAZEPAM 1 MG/1
1 TABLET ORAL AT BEDTIME
Qty: 30 TABLET | Refills: 0 | Status: ON HOLD | OUTPATIENT
Start: 2020-07-28 | End: 2021-07-09

## 2020-07-28 RX ADMIN — Medication 75 MG: at 21:10

## 2020-07-28 RX ADMIN — DOCUSATE SODIUM 100 MG: 100 CAPSULE, LIQUID FILLED ORAL at 21:10

## 2020-07-28 RX ADMIN — CARBIDOPA AND LEVODOPA 2 TABLET: 25; 100 TABLET ORAL at 12:07

## 2020-07-28 RX ADMIN — GABAPENTIN 800 MG: 800 TABLET, FILM COATED ORAL at 08:14

## 2020-07-28 RX ADMIN — ATORVASTATIN CALCIUM 40 MG: 40 TABLET, FILM COATED ORAL at 21:10

## 2020-07-28 RX ADMIN — ACETAMINOPHEN 1000 MG: 500 TABLET, FILM COATED ORAL at 14:19

## 2020-07-28 RX ADMIN — GABAPENTIN 800 MG: 800 TABLET, FILM COATED ORAL at 14:19

## 2020-07-28 RX ADMIN — Medication 3 HALF-TAB: at 21:10

## 2020-07-28 RX ADMIN — ACETAMINOPHEN 1000 MG: 500 TABLET, FILM COATED ORAL at 21:10

## 2020-07-28 RX ADMIN — CARBIDOPA AND LEVODOPA 2 TABLET: 25; 100 TABLET ORAL at 08:14

## 2020-07-28 RX ADMIN — CLOZAPINE 25 MG: 25 TABLET ORAL at 21:10

## 2020-07-28 RX ADMIN — ACETAMINOPHEN 1000 MG: 500 TABLET, FILM COATED ORAL at 08:14

## 2020-07-28 RX ADMIN — Medication 10 MG: at 14:19

## 2020-07-28 RX ADMIN — Medication 3 MG: at 14:19

## 2020-07-28 RX ADMIN — Medication 3 MG: at 21:10

## 2020-07-28 RX ADMIN — DONEPEZIL HYDROCHLORIDE 10 MG: 10 TABLET, FILM COATED ORAL at 08:14

## 2020-07-28 RX ADMIN — Medication 10 MG: at 08:14

## 2020-07-28 RX ADMIN — POLYETHYLENE GLYCOL 3350 17 G: 17 POWDER, FOR SOLUTION ORAL at 08:15

## 2020-07-28 RX ADMIN — ASPIRIN 81 MG: 81 TABLET ORAL at 08:14

## 2020-07-28 RX ADMIN — VILAZODONE HYDROCHLORIDE 40 MG: 10 TABLET ORAL at 08:14

## 2020-07-28 RX ADMIN — Medication 15 MG: at 21:10

## 2020-07-28 RX ADMIN — GABAPENTIN 800 MG: 800 TABLET, FILM COATED ORAL at 21:10

## 2020-07-28 RX ADMIN — Medication 3 HALF-TAB: at 16:21

## 2020-07-28 RX ADMIN — TRIAMCINOLONE ACETONIDE: 1 CREAM TOPICAL at 12:11

## 2020-07-28 RX ADMIN — SENNOSIDES 1 TABLET: 8.6 TABLET ORAL at 21:10

## 2020-07-28 RX ADMIN — SENNOSIDES 1 TABLET: 8.6 TABLET ORAL at 08:14

## 2020-07-28 RX ADMIN — DOCUSATE SODIUM 100 MG: 100 CAPSULE, LIQUID FILLED ORAL at 08:14

## 2020-07-28 RX ADMIN — RIVAROXABAN 20 MG: 10 TABLET, FILM COATED ORAL at 17:32

## 2020-07-28 RX ADMIN — CLONAZEPAM 1 MG: 1 TABLET ORAL at 21:10

## 2020-07-28 RX ADMIN — Medication 3 MG: at 08:14

## 2020-07-28 RX ADMIN — METOPROLOL SUCCINATE 75 MG: 25 TABLET, EXTENDED RELEASE ORAL at 08:14

## 2020-07-28 RX ADMIN — TAMSULOSIN HYDROCHLORIDE 0.4 MG: 0.4 CAPSULE ORAL at 08:14

## 2020-07-28 NOTE — PROGRESS NOTES
Spoke with Jozef at [facility] who reports that pt can discharge to their facility tomorrow at approximately noon.  Scheduled transportation for 1:30pm. Jozef requests signed orders faxed to [phone]. Jozef requested scripts for narcotics and Schedule 2 medications. She also requested copy of face sheet and COVID-19 test. Faxed test results and face sheet to [phone].

## 2020-07-28 NOTE — PLAN OF CARE
Discharge Planner PT   Patient plan for discharge: Return to [facility]  Current status: Goals adequately met for discharge. Independent in room. Ambulates with use of walker, safe throughout. Able to self propel w/c. Issued and reviewed standing HEP. No further questions or concerns endorsed.   Barriers to return to prior living situation: No PT barriers  Recommendations for discharge: Return to [facility]  Rationale for recommendations: Patient at or near baseline level of function    Physical Therapy Discharge Summary    Reason for therapy discharge:    All goals and outcomes met, no further needs identified.    Progress towards therapy goal(s). See goals on Care Plan in [facility] electronic health record for goal details.  Goals met    Therapy recommendation(s):    Continue home exercise program.           Entered by: Lety Maldonado 07/28/2020 2:33 PM

## 2020-07-28 NOTE — PLAN OF CARE
"  Problem: OT General Care Plan  Goal: OT Goal 1  Description: Will attend OT groups improve concentration and motivation by engaging in more challenging and success oriented options while also improving insight with comments/answers made about mental health needs      Note: Attended 3 of 3 OT groups. He was social, stated being pleased he has discharge plans tomorrow and teased on and off during the group. He initiated comments and joe peers into conversation. 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 He became an RN after taking care of his parents in the last 3 years of their life and decided Nursing would have the impact he felt in his life to help others. 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 He talked about the difficulty he feels being on the \"cared for\" end instead of \"caring for\" side since his Parkinson's was diagnosed. We discussed the idea of writing an article or a book about this which seemed to be an idea he might consider. \"I could do that, that would be good for me\". He participated in an activity focused on Affirmations and identified several of value and explained the reasons why. He was pleasant, involved and seemed attentive to others. \"I like to be involved.\"    "

## 2020-07-28 NOTE — PLAN OF CARE
Patient was calm, cooperative and attending group activities. He reports mild anxiety and depression, denies SI/SIB. Pt is agreeing to discharge back to [facility] tomorrow.

## 2020-07-28 NOTE — PROGRESS NOTES
WO Nurse Inpatient Wound Assessment   Reason for consultation: Bilateral buttocks wound     Assessment  Inner gluteus wound due to Friction and Moisture Associated Skin Damage (MASD)  Status:healed    Treatment Plan  Inner gluteus wound: No treatment needed, if becomes incontinent protect skin with Criticaide   Orders Revised  Recommended provider order: NA  WOC Nurse follow-up plan:weekly  Nursing to notify the Provider(s) and re-consult the WOC Nurse if wound(s) deteriorates or new skin concern.    Patient History  According to provider note(s):  Major depressive disorder, recurrent, severe, with possible psychosis  2.  Alcohol use disorder in remission.   3.  Rule out neurocognitive disorder    4.  Parkinson's disease (psychosis could be connected with Parkinson's disease or to the medication the patient has been treated with for Parkinson's.   5.  Medical problems include: Multiple pulmonary embolisms, history of TIA, supraventricular tachycardia, hyperlipidemia, neuropathic pain, BPH.     Objective Data  Containment of urine/stool: Continent of bladder and Continent of bowel    Active Diet Order  Orders Placed This Encounter      Regular Diet Adult      Output:   No intake/output data recorded.    Risk Assessment:   Sensory Perception: 3-->slightly limited  Moisture: 3-->occasionally moist  Activity: 2-->chairfast  Mobility: 2-->very limited  Nutrition: 2-->probably inadequate  Friction and Shear: 2-->potential problem  Guillermo Score: 14                          Labs:   Recent Labs   Lab 07/26/20  0728   WBC 5.8       Physical Exam  Skin inspection: focused Bilateral buttocks    7/20 7/28  Wound Location:  Left inner gluteus  Date of last photo 7/28  Wound History: He reports previously having diarrhea , he is somewhat immobile and uses Wheelchair for mobility, however he can transfer with standby assist   Measurements (length x  width x depth, in cm) skin healed and intact  Wound Base: 100 % epidermois      Interventions  Current support surface: Standard  Foam mattress  Current off-loading measures: Chair cushion  Visual inspection and assessment completed   Wound Care: NA  Supplies: reviewed  Education provided: plan of care  Discussed plan of care with Patient,RN    PHILL DUMONT RN, CWON

## 2020-07-28 NOTE — PROGRESS NOTES
07/27/20 [facility]   Therapeutic Recreation   Type of Intervention structured groups   Activity game   Response Participates, initiates socially appropriate   Hours 1     Pt participated in Therapeutic Recreation group with focus on leisure participation, communication skills, and critical thinking. Engaged and focused in the group recreational activity via a group game.  Pt was a full participant throughout the entire duration of group. Pt was cooperative in the activity as well as the group discussion prior.

## 2020-07-28 NOTE — PROGRESS NOTES
Spoke with Meagan Downs at [facility] (as directed by [facility]) [phone] to determine if it was safe for pt to return to [facility] given the complaints he has made against [facility] (and has since detracted). Meagan reports that there is no reason that pt will not be able to return to [facility] as their are no complaints made by pt.

## 2020-07-28 NOTE — PROGRESS NOTES
[facility], [place]   Psychiatric Progress Note        Interim History:   Telemedicine Visit: The patient's condition can be safely assessed and treated via synchronous audio and visual telemedicine encounter.    Start time: 0945  Stop time:  0959  Reason for Telemedicine Visit: Covid-19   Originating Site (Patient Location): [facility]   Distant Site (Provider Location): home office   Consent:  The patient/guardian has verbally consented to: the potential risks and benefits of telemedicine (video visit) versus in person care; bill my insurance or make self-payment for services provided; and responsibility for payment of non-covered services.    Mode of Communication:  Video Conference via Skype   As the provider I attest to compliance with applicable laws and regulations related to telemedicine.     From H&P: The patient is a 55-year-old single  male, who arrived to this facility from assisted living facility called [facility] in [place], [place] because of suicide attempt. The patient also has stated to the DEC 's that he would kill himself if he had to return back to his nursing home.     Team meeting report: The patient's care was discussed with the treatment team during the daily team meeting and/or staff's chart notes were reviewed.  Staff report patient has been calm, pleasant, cooperative. He had a good weekend.  Attended groups.  Visible in the milieu. He is hopeful.  Anxiety and depression are improving. He has been bright and social.  Denies suicidal ideation.  Appetite is better. Took a shower with staff's help. Attending groups. Slept 7 hours.    Met with patient.  The weekend was good. No changes in his mood since last week.  Depression and anxiety are moderate.  No suicidal ideation.  No manic and psychotic symptoms.  No complaints of the staff.  The patient is very pleasant.  He goes to all groups.  He is cooperative with treatment.           Medications:       acetaminophen  1,000 mg Oral TID     aspirin  81 mg Oral Daily     atorvastatin  40 mg Oral At Bedtime     baclofen  10 mg Oral BID     baclofen  15 mg Oral At Bedtime     carbidopa-levodopa  2 tablet Oral BID     carbidopa-levodopa  3 half-tab Oral BID     clonazePAM  1 mg Oral At Bedtime     cloZAPine  25 mg Oral At Bedtime     docusate sodium  100 mg Oral BID     donepezil  10 mg Oral Daily     gabapentin  800 mg Oral TID     metoprolol succinate ER  75 mg Oral Daily     polyethylene glycol  17 g Oral Daily     rivaroxaban ANTICOAGULANT  20 mg Oral Daily with supper     sennosides  1 tablet Oral BID     tamsulosin  0.4 mg Oral Daily     traZODone  75 mg Oral At Bedtime     trihexyphenidyl  3 mg Oral TID     vilazodone  40 mg Oral Daily          Allergies:     Allergies   Allergen Reactions     Seroquel [Quetiapine] GI Disturbance          Labs:     Recent Results (from the past 672 hour(s))   COVID-19 VIRUS (CORONAVIRUS) PCR TO [facility]    Collection Time: 07/02/20 10:30 AM    Specimen type and source: Respiratory, Nasopharyngeal swab (specimen)   Result Value Ref Range    COVID-19 Virus PCR Source Nasopharyngeal     COVID-19 Virus PCR to [facility] - Result Undetected Undetected   Asymptomatic COVID-19 Virus (Coronavirus) by PCR    Collection Time: 07/11/20  5:20 PM    Specimen: Nasopharyngeal   Result Value Ref Range    COVID-19 Virus PCR to [facility] - Source Nasopharyngeal     COVID-19 Virus PCR to [facility] - Result       Test received-See reflex to IDDL test SARS CoV2 (COVID-19) Virus RT-PCR   Drug abuse screen 6 urine (tox)    Collection Time: 07/11/20  5:20 PM   Result Value Ref Range    Amphetamine Qual Urine Negative NEG^Negative    Barbiturates Qual Urine Negative NEG^Negative    Benzodiazepine Qual Urine Negative NEG^Negative    Cannabinoids Qual Urine Negative NEG^Negative    Cocaine Qual Urine Negative NEG^Negative    Ethanol Qual Urine Negative NEG^Negative    Opiates Qualitative  Urine Negative NEG^Negative   SARS-CoV-2 COVID-19 Virus (Coronavirus) RT-PCR Nasopharyngeal    Collection Time: 07/11/20  5:20 PM    Specimen: Nasopharyngeal   Result Value Ref Range    SARS-CoV-2 Virus Specimen Source Nasopharyngeal     SARS-CoV-2 PCR Result NEGATIVE     SARS-CoV-2 PCR Comment       Testing was performed using the Xpert Xpress SARS-CoV-2 Assay on the Cepheid Gene-Xpert   Instrument Systems. Additional information about this Emergency Use Authorization (EUA)   assay can be found via the Lab Guide.     CBC with platelets differential    Collection Time: 07/12/20  9:38 AM   Result Value Ref Range    WBC 7.0 4.0 - 11.0 10e9/L    RBC Count 4.70 4.4 - 5.9 10e12/L    Hemoglobin 14.0 13.3 - 17.7 g/dL    Hematocrit 44.8 40.0 - 53.0 %    MCV 95 78 - 100 fl    MCH 29.8 26.5 - 33.0 pg    MCHC 31.3 (L) 31.5 - 36.5 g/dL    RDW 13.2 10.0 - 15.0 %    Platelet Count 272 150 - 450 10e9/L    Diff Method Automated Method     % Neutrophils 67.4 %    % Lymphocytes 24.7 %    % Monocytes 6.2 %    % Eosinophils 1.0 %    % Basophils 0.3 %    % Immature Granulocytes 0.4 %    Nucleated RBCs 0 0 /100    Absolute Neutrophil 4.7 1.6 - 8.3 10e9/L    Absolute Lymphocytes 1.7 0.8 - 5.3 10e9/L    Absolute Monocytes 0.4 0.0 - 1.3 10e9/L    Absolute Eosinophils 0.1 0.0 - 0.7 10e9/L    Absolute Basophils 0.0 0.0 - 0.2 10e9/L    Abs Immature Granulocytes 0.0 0 - 0.4 10e9/L    Absolute Nucleated RBC 0.0    Comprehensive metabolic panel    Collection Time: 07/12/20  9:38 AM   Result Value Ref Range    Sodium 140 133 - 144 mmol/L    Potassium 3.8 3.4 - 5.3 mmol/L    Chloride 105 94 - 109 mmol/L    Carbon Dioxide 30 20 - 32 mmol/L    Anion Gap 5 3 - 14 mmol/L    Glucose 107 (H) 70 - 99 mg/dL    Urea Nitrogen 13 7 - 30 mg/dL    Creatinine 0.87 0.66 - 1.25 mg/dL    GFR Estimate >90 >60 mL/min/[1.73_m2]    GFR Estimate If Black >90 >60 mL/min/[1.73_m2]    Calcium 9.6 8.5 - 10.1 mg/dL    Bilirubin Total 0.4 0.2 - 1.3 mg/dL    Albumin 4.4 3.4 -  5.0 g/dL    Protein Total 8.9 (H) 6.8 - 8.8 g/dL    Alkaline Phosphatase 94 40 - 150 U/L    ALT 11 0 - 70 U/L    AST 12 0 - 45 U/L   TSH with free T4 reflex and/or T3 as indicated    Collection Time: 07/12/20  9:38 AM   Result Value Ref Range    TSH 2.33 0.40 - 4.00 mU/L   Vitamin B12    Collection Time: 07/12/20  9:38 AM   Result Value Ref Range    Vitamin B12 305 193 - 986 pg/mL   Folate    Collection Time: 07/12/20  9:38 AM   Result Value Ref Range    Folate 5.6 >5.4 ng/mL   Vitamin D Deficiency    Collection Time: 07/12/20  9:38 AM   Result Value Ref Range    Vitamin D Deficiency screening 35 20 - 75 ug/L   Lipid profile    Collection Time: 07/12/20  9:38 AM   Result Value Ref Range    Cholesterol 121 <200 mg/dL    Triglycerides 94 <150 mg/dL    HDL Cholesterol 58 >39 mg/dL    LDL Cholesterol Calculated 44 <100 mg/dL    Non HDL Cholesterol 63 <130 mg/dL   WBC and differential    Collection Time: 07/19/20  7:26 AM   Result Value Ref Range    WBC 5.5 4.0 - 11.0 10e9/L    Diff Method Automated Method     % Neutrophils 52.5 %    % Lymphocytes 36.1 %    % Monocytes 8.4 %    % Eosinophils 2.4 %    % Basophils 0.2 %    % Immature Granulocytes 0.4 %    Nucleated RBCs 0 0 /100    Absolute Neutrophil 2.9 1.6 - 8.3 10e9/L    Absolute Lymphocytes 2.0 0.8 - 5.3 10e9/L    Absolute Monocytes 0.5 0.0 - 1.3 10e9/L    Absolute Eosinophils 0.1 0.0 - 0.7 10e9/L    Absolute Basophils 0.0 0.0 - 0.2 10e9/L    Abs Immature Granulocytes 0.0 0 - 0.4 10e9/L    Absolute Nucleated RBC 0.0    WBC and differential    Collection Time: 07/26/20  7:28 AM   Result Value Ref Range    WBC 5.8 4.0 - 11.0 10e9/L    Diff Method Automated Method     % Neutrophils 57.4 %    % Lymphocytes 33.0 %    % Monocytes 7.4 %    % Eosinophils 1.7 %    % Basophils 0.0 %    % Immature Granulocytes 0.5 %    Nucleated RBCs 0 0 /100    Absolute Neutrophil 3.3 1.6 - 8.3 10e9/L    Absolute Lymphocytes 1.9 0.8 - 5.3 10e9/L    Absolute Monocytes 0.4 0.0 - 1.3 10e9/L     Absolute Eosinophils 0.1 0.0 - 0.7 10e9/L    Absolute Basophils 0.0 0.0 - 0.2 10e9/L    Abs Immature Granulocytes 0.0 0 - 0.4 10e9/L    Absolute Nucleated RBC 0.0    Asymptomatic COVID-19 Virus (Coronavirus) by PCR    Collection Time: 07/27/20  2:25 PM    Specimen: Nasopharyngeal   Result Value Ref Range    COVID-19 Virus PCR to [facility] - Source Nasopharyngeal     COVID-19 Virus PCR to [facility] - Result       Test received-See reflex to IDDL test SARS CoV2 (COVID-19) Virus RT-PCR   SARS-CoV-2 COVID-19 Virus (Coronavirus) RT-PCR Nasopharyngeal    Collection Time: 07/27/20  2:25 PM    Specimen: Nasopharyngeal   Result Value Ref Range    SARS-CoV-2 Virus Specimen Source Nasopharyngeal     SARS-CoV-2 PCR Result NEGATIVE     SARS-CoV-2 PCR Comment       Testing was performed using the Xpert Xpress SARS-CoV-2 Assay on the Cepheid Gene-Xpert   Instrument Systems. Additional information about this Emergency Use Authorization (EUA)   assay can be found via the Lab Guide.              Psychiatric Examination:   Temp: 98.4  F (36.9  C) Temp src: Oral BP: 136/84 Pulse: 101   Resp: 16 SpO2: 90 %      Weight is 231 lbs 3.2 oz  There is no height or weight on file to calculate BMI.  Mental Status Exam  Appearance:  awake, alert, neatly groomed.  Attitude:  pleasant, cooperative  Eye Contact: Normal intensity  Mood: depressed, anxious  Affect:  Flat, blunted  Speech:  clear, coherent,   Psychomotor Behavior:  no evidence of tardive dyskinesia, dystonia, or tics and intact gait and muscle tone  Thought Process: Linear and goal directed, much organized  Associations:  no looseness of associations  Thought Content:  no evidence of suicidal ideation or homicidal ideation, some delusional thought content  Insight: poor  Judgment:  impaired  Oriented to:  time, person, and place  Attention Span and Concentration:  intact  Recent and Remote Memory:  intact  Language: Intact  Fund of Knowledge: Normal  Muscle Strength and Tone: normal             Precautions:     Behavioral Orders   Procedures     Code 1 - Restrict to Unit     Fall precautions     Routine Programming     As clinically indicated     Status 15     Every 15 minutes.     Suicide precautions     Patients on Suicide Precautions should have a Combination Diet ordered that includes a Diet selection(s) AND a Behavioral Tray selection for Safe Tray - with utensils, or Safe Tray - NO utensils            DIagnoses:   1. Major depressive disorder, recurrent, severe, with possible psychosis  2.  Alcohol use disorder in remission.   3.  Rule out neurocognitive disorder    4.  Parkinson's disease (psychosis could be connected with Parkinson's disease or to the medication the patient has been treated with for Parkinson's.   5.  Medical problems include: Multiple pulmonary embolisms, history of TIA, supraventricular tachycardia, hyperlipidemia, neuropathic pain, BPH.            Plan:   --  Baclofen 10 mg 2 times a day and 15 mg at bedtime.   --  Sinemet 200 mg 2 times a day and Sinemet 150 mg 2 times a day.    --  Artane 3 mg 3 times a day.   --  Decrease Klonopin to 1 mg at bedtime   --  Clozapine 25 mg daily.   The patient will remain on Klonopin and Clozaril due to risk of reemerging psychosis and agitation.  Klonopin was decreased to 1 mg.  We will continue to taper down as tolerated.   --  Aricept 10 mg daily.   --  Gabapentin 800 mg 3 times a day.   --  Trazodone 75 mg at bedtime.     --  Vilazodone 40 mg daily. May change to Cymbalta.     Disposition Plan   Reason for ongoing admission: is unable to care for self due to psychosis.  Disposition: TBD  Estimated length of stay: 5-7 days  Legal Status:  Voluntary  Discharge will be granted once symptoms improved.    Karlee PHAM CNP  Date: 07/28/20  Time: 1:23 PM      More than 30 minutes were spent for assessment, documentation, and coordination of care.       This note was created with the help of Dragon dictation system. All  grammatical/typing errors or context distortion are unintentional and inherent to software.

## 2020-07-28 NOTE — DISCHARGE INSTRUCTIONS
Behavioral Discharge Planning and Instructions      Summary:  You were admitted on 7/11/2020  due to Suicidal Ideations.  You were treated by VERO Barraza DNP and discharged on 07/29/2020 from [facility] to Skilled Nursing.    [facility]  [address] [place] [address]   Phone: [phone]    Principal Diagnosis:   1.  Major depressive disorder, recurrent, severe.   2.  Alcohol use disorder in remission.   3.  Unspecified psychosis?     4.  Parkinson's disease (psychosis could be connected with Parkinson's disease or to the medication the patient has been treated with for Parkinson's.     Health Care Follow-up Appointments:   PCP:   David Thao MD - (Pt will be seen by medical and psychiatric providers at the facility)  [phone]    Attend all scheduled appointments with your outpatient providers. Call at least 24 hours in advance if you need to reschedule an appointment to ensure continued access to your outpatient providers.   Major Treatments, Procedures and Findings:  You were provided with: a psychiatric assessment, assessed for medical stability, medication evaluation and/or management, milieu management and medical interventions    Occupational Therapy: You attended Occupational Therapy groups. You seemed interested in being involved and took active roles in attending. You were a delight to have in OT groups with your insightful comments and interest as well as willingness to actively participate in all opportunities.    Symptoms to Report: feeling more aggressive, increased confusion, losing more sleep, mood getting worse or thoughts of suicide    Early warning signs can include: increased depression or anxiety sleep disturbances increased thoughts or behaviors of suicide or self-harm  increased unusual thinking, such as paranoia or hearing voices    Safety and Wellness:  Take all medicines as directed.  Make no changes unless your doctor suggests them.       Follow treatment recommendations.  Refrain from alcohol and non-prescribed drugs.  If there is a concern for safety, call 911.    Resources:   Crisis Intervention: 717.496.2454 or 950-579-1977 (TTY: 171.697.2600).  Call anytime for help.  National [place] on Mental Illness (www.[place].jay jay.org): 492.765.2774 or 096-456-4407.  Suicide Awareness Voices of Education (SAVE) (www.save.org): 314-150-VHDS (9624)  National Suicide Prevention Line (www.mentalhealthmn.org): 735-904-VZTJ (0289)  Mental Health Consumer/Survivor Network of [place] (www.mhcsn.net): 621.603.9076 or 967-899-7307  Mental Health Association of [place] (www.mentalhealth.org): 210.619.6867 or 080-746-4955  Self- Management and Recovery Training., SMART-- Toll free: 424.168.2197  www.[employer].org  [facility] Crisis (COPE) Response - Adult [phone]      The treatment team has appreciated the opportunity to work with you.     If you have any questions or concerns our unit number is [phone].

## 2020-07-29 VITALS
OXYGEN SATURATION: 95 % | DIASTOLIC BLOOD PRESSURE: 57 MMHG | TEMPERATURE: 98.2 F | SYSTOLIC BLOOD PRESSURE: 99 MMHG | WEIGHT: 231.2 LBS | RESPIRATION RATE: 16 BRPM | HEART RATE: 74 BPM

## 2020-07-29 PROCEDURE — 25000132 ZZH RX MED GY IP 250 OP 250 PS 637: Performed by: PSYCHIATRY & NEUROLOGY

## 2020-07-29 PROCEDURE — 99239 HOSP IP/OBS DSCHRG MGMT >30: CPT | Mod: GT | Performed by: NURSE PRACTITIONER

## 2020-07-29 PROCEDURE — 25000132 ZZH RX MED GY IP 250 OP 250 PS 637: Performed by: PHYSICIAN ASSISTANT

## 2020-07-29 RX ADMIN — SENNOSIDES 1 TABLET: 8.6 TABLET ORAL at 08:31

## 2020-07-29 RX ADMIN — TAMSULOSIN HYDROCHLORIDE 0.4 MG: 0.4 CAPSULE ORAL at 08:32

## 2020-07-29 RX ADMIN — ACETAMINOPHEN 1000 MG: 500 TABLET, FILM COATED ORAL at 13:25

## 2020-07-29 RX ADMIN — VILAZODONE HYDROCHLORIDE 40 MG: 10 TABLET ORAL at 08:31

## 2020-07-29 RX ADMIN — DONEPEZIL HYDROCHLORIDE 10 MG: 10 TABLET, FILM COATED ORAL at 08:30

## 2020-07-29 RX ADMIN — Medication 10 MG: at 08:30

## 2020-07-29 RX ADMIN — Medication 3 MG: at 13:25

## 2020-07-29 RX ADMIN — ACETAMINOPHEN 1000 MG: 500 TABLET, FILM COATED ORAL at 08:31

## 2020-07-29 RX ADMIN — METOPROLOL SUCCINATE 75 MG: 25 TABLET, EXTENDED RELEASE ORAL at 08:31

## 2020-07-29 RX ADMIN — DOCUSATE SODIUM 100 MG: 100 CAPSULE, LIQUID FILLED ORAL at 08:31

## 2020-07-29 RX ADMIN — CARBIDOPA AND LEVODOPA 2 TABLET: 25; 100 TABLET ORAL at 08:31

## 2020-07-29 RX ADMIN — POLYETHYLENE GLYCOL 3350 17 G: 17 POWDER, FOR SOLUTION ORAL at 08:32

## 2020-07-29 RX ADMIN — CARBIDOPA AND LEVODOPA 2 TABLET: 25; 100 TABLET ORAL at 12:00

## 2020-07-29 RX ADMIN — GABAPENTIN 800 MG: 800 TABLET, FILM COATED ORAL at 08:31

## 2020-07-29 RX ADMIN — Medication 3 MG: at 08:31

## 2020-07-29 RX ADMIN — Medication 10 MG: at 13:25

## 2020-07-29 RX ADMIN — ASPIRIN 81 MG: 81 TABLET ORAL at 08:30

## 2020-07-29 RX ADMIN — GABAPENTIN 800 MG: 800 TABLET, FILM COATED ORAL at 13:25

## 2020-07-29 ASSESSMENT — ACTIVITIES OF DAILY LIVING (ADL)
ORAL_HYGIENE: INDEPENDENT
LAUNDRY: UNABLE TO COMPLETE
HYGIENE/GROOMING: INDEPENDENT
DRESS: INDEPENDENT

## 2020-07-29 NOTE — PROGRESS NOTES
07/28/20 2200   Therapeutic Recreation   Type of Intervention structured groups   Activity exercise   Response Participates, initiates socially appropriate   Hours 1     Pt actively participated in a structured Therapeutic Recreation group with a focus on leisure participation, relaxation, and exercise. Pt participated in the guided exercise for the full duration of the group. Pt followed along to the majority of the poses, engaged in the guided chair exercise routine. Pt stated he was able to clear his thoughts, instead of thinking of positive imagery, while closing the group and it helped him relax.

## 2020-07-29 NOTE — DISCHARGE SUMMARY
Telemedicine Visit: The patient's condition can be safely assessed and treated via synchronous audio and visual telemedicine encounter.    Start time: 0945  Stop time: 0959  Reason for Telemedicine Visit: Covid-19   Originating Site (Patient Location): [facility]   Distant Site (Provider Location): home office   Consent:  The patient/guardian has verbally consented to: the potential risks and benefits of telemedicine (video visit) versus in person care; bill my insurance or make self-payment for services provided; and responsibility for payment of non-covered services.    Mode of Communication:  Video Conference via Skype   As the provider I attest to compliance with applicable laws and regulations related to telemedicine.    Psychiatric Discharge Summary    Benjamin Mathews MRN# [id number]   Age: 55 year old YOB: 1965     Date of Admission:  7/11/2020  Date of Discharge:  7/29/2020  Admitting Provider:  Jeremy Osei MD  Discharge Provider:  VERO Nielsen CNP         Event Leading to Hospitalization:   The patient is a 55-year-old single  male, who arrived to this facility from assisted living facility called [facility] in [place], [place] because of suicide attempt, so came because of suicidal thoughts. The patient also has stated to the DEC 's that he would kill himself if he had to return back to his nursing home. The patient presents as pretty open, appears to be at least a somewhat reliable historian.  Admits that he has been feeling pretty depressed.  He told us that  he was upset by the nursing home.  He denied homicidal ideation, denied psychosis.  He said that he would like to roll his wheelchair into traffic or use a rope to cut his throat or strangulate himself.  Reported difficulties with sleep, fluctuating appetite.  He said that he felt pretty hopeless, helpless and apparently he also cannot accept due to deteriorating of his physical functions  "because of Parkinson's.  His Parkinson symptoms are worsening.  Describes his depression and \"Parkinson\" and situation.  He also made quite a number of allegations that people at his nursing home steal from him and even they were trying to overmedicate him by giving him an excessive amount of pain killers.  When I asked him to explain how people would intentionally give him controlled substances if he was prescribed only a limited number of pills per day, he responded rather vaguely saying that people who work there can do whatever they want and take medication from him and from other patients and give it to him.  He made it clear that he would try to harm himself if he returns back to that facility.      See Admission note by Russel Hernandez MD, on 7/12/2020 for additional details.          DIagnoses:   1. Major depressive disorder, recurrent, severe, with possible psychosis  2.  Alcohol use disorder in remission.   3.  Rule out neurocognitive disorder    4.  Parkinson's disease (psychosis could be connected with Parkinson's disease or to the medication the patient has been treated with for Parkinson's.   5.  Medical problems include: Multiple pulmonary embolisms, history of TIA, supraventricular tachycardia, hyperlipidemia, neuropathic pain, BPH.          Labs:     Recent Results (from the past 504 hour(s))   Asymptomatic COVID-19 Virus (Coronavirus) by PCR    Collection Time: 07/11/20  5:20 PM    Specimen: Nasopharyngeal   Result Value Ref Range    COVID-19 Virus PCR to [facility] - Source Nasopharyngeal     COVID-19 Virus PCR to [facility] - Result       Test received-See reflex to IDDL test SARS CoV2 (COVID-19) Virus RT-PCR   Drug abuse screen 6 urine (tox)    Collection Time: 07/11/20  5:20 PM   Result Value Ref Range    Amphetamine Qual Urine Negative NEG^Negative    Barbiturates Qual Urine Negative NEG^Negative    Benzodiazepine Qual Urine Negative NEG^Negative    Cannabinoids Qual Urine Negative " NEG^Negative    Cocaine Qual Urine Negative NEG^Negative    Ethanol Qual Urine Negative NEG^Negative    Opiates Qualitative Urine Negative NEG^Negative   SARS-CoV-2 COVID-19 Virus (Coronavirus) RT-PCR Nasopharyngeal    Collection Time: 07/11/20  5:20 PM    Specimen: Nasopharyngeal   Result Value Ref Range    SARS-CoV-2 Virus Specimen Source Nasopharyngeal     SARS-CoV-2 PCR Result NEGATIVE     SARS-CoV-2 PCR Comment       Testing was performed using the [employer] Xpress SARS-CoV-2 Assay on the Cepheid Gene-Xpert   Instrument Systems. Additional information about this Emergency Use Authorization (EUA)   assay can be found via the Lab Guide.     CBC with platelets differential    Collection Time: 07/12/20  9:38 AM   Result Value Ref Range    WBC 7.0 4.0 - 11.0 10e9/L    RBC Count 4.70 4.4 - 5.9 10e12/L    Hemoglobin 14.0 13.3 - 17.7 g/dL    Hematocrit 44.8 40.0 - 53.0 %    MCV 95 78 - 100 fl    MCH 29.8 26.5 - 33.0 pg    MCHC 31.3 (L) 31.5 - 36.5 g/dL    RDW 13.2 10.0 - 15.0 %    Platelet Count 272 150 - 450 10e9/L    Diff Method Automated Method     % Neutrophils 67.4 %    % Lymphocytes 24.7 %    % Monocytes 6.2 %    % Eosinophils 1.0 %    % Basophils 0.3 %    % Immature Granulocytes 0.4 %    Nucleated RBCs 0 0 /100    Absolute Neutrophil 4.7 1.6 - 8.3 10e9/L    Absolute Lymphocytes 1.7 0.8 - 5.3 10e9/L    Absolute Monocytes 0.4 0.0 - 1.3 10e9/L    Absolute Eosinophils 0.1 0.0 - 0.7 10e9/L    Absolute Basophils 0.0 0.0 - 0.2 10e9/L    Abs Immature Granulocytes 0.0 0 - 0.4 10e9/L    Absolute Nucleated RBC 0.0    Comprehensive metabolic panel    Collection Time: 07/12/20  9:38 AM   Result Value Ref Range    Sodium 140 133 - 144 mmol/L    Potassium 3.8 3.4 - 5.3 mmol/L    Chloride 105 94 - 109 mmol/L    Carbon Dioxide 30 20 - 32 mmol/L    Anion Gap 5 3 - 14 mmol/L    Glucose 107 (H) 70 - 99 mg/dL    Urea Nitrogen 13 7 - 30 mg/dL    Creatinine 0.87 0.66 - 1.25 mg/dL    GFR Estimate >90 >60 mL/min/[1.73_m2]    GFR Estimate If  Black >90 >60 mL/min/[1.73_m2]    Calcium 9.6 8.5 - 10.1 mg/dL    Bilirubin Total 0.4 0.2 - 1.3 mg/dL    Albumin 4.4 3.4 - 5.0 g/dL    Protein Total 8.9 (H) 6.8 - 8.8 g/dL    Alkaline Phosphatase 94 40 - 150 U/L    ALT 11 0 - 70 U/L    AST 12 0 - 45 U/L   TSH with free T4 reflex and/or T3 as indicated    Collection Time: 07/12/20  9:38 AM   Result Value Ref Range    TSH 2.33 0.40 - 4.00 mU/L   Vitamin B12    Collection Time: 07/12/20  9:38 AM   Result Value Ref Range    Vitamin B12 305 193 - 986 pg/mL   Folate    Collection Time: 07/12/20  9:38 AM   Result Value Ref Range    Folate 5.6 >5.4 ng/mL   Vitamin D Deficiency    Collection Time: 07/12/20  9:38 AM   Result Value Ref Range    Vitamin D Deficiency screening 35 20 - 75 ug/L   Lipid profile    Collection Time: 07/12/20  9:38 AM   Result Value Ref Range    Cholesterol 121 <200 mg/dL    Triglycerides 94 <150 mg/dL    HDL Cholesterol 58 >39 mg/dL    LDL Cholesterol Calculated 44 <100 mg/dL    Non HDL Cholesterol 63 <130 mg/dL   WBC and differential    Collection Time: 07/19/20  7:26 AM   Result Value Ref Range    WBC 5.5 4.0 - 11.0 10e9/L    Diff Method Automated Method     % Neutrophils 52.5 %    % Lymphocytes 36.1 %    % Monocytes 8.4 %    % Eosinophils 2.4 %    % Basophils 0.2 %    % Immature Granulocytes 0.4 %    Nucleated RBCs 0 0 /100    Absolute Neutrophil 2.9 1.6 - 8.3 10e9/L    Absolute Lymphocytes 2.0 0.8 - 5.3 10e9/L    Absolute Monocytes 0.5 0.0 - 1.3 10e9/L    Absolute Eosinophils 0.1 0.0 - 0.7 10e9/L    Absolute Basophils 0.0 0.0 - 0.2 10e9/L    Abs Immature Granulocytes 0.0 0 - 0.4 10e9/L    Absolute Nucleated RBC 0.0    WBC and differential    Collection Time: 07/26/20  7:28 AM   Result Value Ref Range    WBC 5.8 4.0 - 11.0 10e9/L    Diff Method Automated Method     % Neutrophils 57.4 %    % Lymphocytes 33.0 %    % Monocytes 7.4 %    % Eosinophils 1.7 %    % Basophils 0.0 %    % Immature Granulocytes 0.5 %    Nucleated RBCs 0 0 /100    Absolute  Neutrophil 3.3 1.6 - 8.3 10e9/L    Absolute Lymphocytes 1.9 0.8 - 5.3 10e9/L    Absolute Monocytes 0.4 0.0 - 1.3 10e9/L    Absolute Eosinophils 0.1 0.0 - 0.7 10e9/L    Absolute Basophils 0.0 0.0 - 0.2 10e9/L    Abs Immature Granulocytes 0.0 0 - 0.4 10e9/L    Absolute Nucleated RBC 0.0    Asymptomatic COVID-19 Virus (Coronavirus) by PCR    Collection Time: 07/27/20  2:25 PM    Specimen: Nasopharyngeal   Result Value Ref Range    COVID-19 Virus PCR to [facility] - Source Nasopharyngeal     COVID-19 Virus PCR to [facility] - Result       Test received-See reflex to IDDL test SARS CoV2 (COVID-19) Virus RT-PCR   SARS-CoV-2 COVID-19 Virus (Coronavirus) RT-PCR Nasopharyngeal    Collection Time: 07/27/20  2:25 PM    Specimen: Nasopharyngeal   Result Value Ref Range    SARS-CoV-2 Virus Specimen Source Nasopharyngeal     SARS-CoV-2 PCR Result NEGATIVE     SARS-CoV-2 PCR Comment       Testing was performed using the Xpert Xpress SARS-CoV-2 Assay on the Cepheid Gene-Xpert   Instrument Systems. Additional information about this Emergency Use Authorization (EUA)   assay can be found via the Lab Guide.              Consults:   Consultation during this admission received from internal medicine         Hospital Course:   Benjamin Mathews was admitted to Station [address] [place] [address] with attending Karlee PHAM CNP, as a voluntary patient. The patient was placed under status 15 (15 minute checks) to ensure patient safety.     The following medication changes took place:   --  Baclofen 10 mg 2 times a day and 15 mg at bedtime.   --  Sinemet 200 mg 2 times a day and Sinemet 150 mg 2 times a day.    --  Artane 3 mg 3 times a day.   --  Decrease Klonopin to 1 mg at bedtime   --  Clozapine 25 mg daily.   The patient will remain on Klonopin and Clozaril due to risk of reemerging psychosis and agitation.  Klonopin was decreased to 1 mg.  Will continue to taper down as tolerated.   --  Aricept 10 mg daily.   --  Gabapentin 800 mg 3 times a day.   --   Trazodone 75 mg at bedtime.     --  Vilazodone 40 mg daily. Consider changing to Cymbalta.     The patient tolerated medications well. Reported mood symptoms improved. The patient was active on the unit. The patient was social, engaged and attended groups. No overt caryn, confusion or psychosis noted. The patient maintained denial of SI, HI and YAMILKA. The patient reported moderate to depression and anxiety mainly related to his living situation.  Patient was hoping to find a different living arrangement however, understands that because of his suicidal ideation and allegations against his current nursing home in the previous group home, it is highly unlikely that he will find a suitable placement.  Patient was advised to work with his  in getting a different housing once discharged.  He is aware that there is a better chance for him to find housing from his current nursing home then from the hospital.  Future oriented, feeling hopeful for the future. The patient slept well. Appetite was intact. The patient was compliant with medications and care.     Benjamin Mathews was released to home. At the time of this encounter, Benjamin Mathews was determined to not be a danger to himself or others and symptoms did not meet criteria for involuntary hospitalization.      Safety plan, post discharge recommendations and relapse prevention were discussed with the patient. The patient agreed to call 911 or present to ED if symptoms worsen or developed thoughts of suicide, self harm or homicide.  The patient agreed to continue medications and outpatient care.         Discharge Medications:     Current Discharge Medication List      CONTINUE these medications which have CHANGED    Details   clonazePAM (KLONOPIN) 1 MG tablet Take 1 tablet (1 mg) by mouth At Bedtime  Qty: 30 tablet, Refills: 0    Associated Diagnoses: Major depressive disorder, recurrent episode, moderate (H); Anxiety disorder, unspecified type         CONTINUE  these medications which have NOT CHANGED    Details   acetaminophen (TYLENOL) 500 MG tablet Take 1,000 mg by mouth 3 times daily       aspirin 81 MG EC tablet Take 81 mg by mouth daily      atorvastatin (LIPITOR) 10 MG tablet Take 40 mg by mouth At Bedtime       !! baclofen (LIORESAL) 10 MG tablet Take 10 mg by mouth 2 times daily       !! baclofen (LIORESAL) 10 MG tablet Take 15 mg by mouth At Bedtime      !! carbidopa-levodopa (SINEMET)  MG tablet Take 2 tablets by mouth 2 times daily      !! carbidopa-levodopa (SINEMET)  MG tablet Take 1.5 tablets by mouth 2 times daily      cloZAPine (CLOZARIL) 25 MG tablet Take 25 mg by mouth daily      docusate sodium (COLACE) 100 MG capsule Take 100 mg by mouth 2 times daily      donepezil (ARICEPT) 10 MG tablet Take 10 mg by mouth daily       gabapentin (NEURONTIN) 800 MG tablet Take 800 mg by mouth 3 times daily      metoprolol succinate ER (TOPROL-XL) 25 MG 24 hr tablet Take 75 mg by mouth daily       polyethylene glycol (MIRALAX) 17 g packet Take 1 packet by mouth daily      rivaroxaban ANTICOAGULANT (XARELTO) 20 MG TABS tablet Take 20 mg by mouth daily (with dinner)      sennosides (SENOKOT) 8.6 MG tablet Take 1 tablet by mouth 2 times daily      tamsulosin (FLOMAX) 0.4 MG capsule Take 0.4 mg by mouth daily      traZODone (DESYREL) 50 MG tablet Take 75 mg by mouth At Bedtime       triamcinolone (KENALOG) 0.1 % external cream Apply topically 2 times daily as needed (facial dermatitis)       trihexyphenidyl (ARTANE) 2 MG tablet Take 3 mg by mouth 3 times daily       vilazodone (VIIBRYD) 40 MG TABS tablet Take 40 mg by mouth daily       !! - Potential duplicate medications found. Please discuss with provider.               Psychiatric and Physical Examinations:   Appearance:  well groomed, awake, alert, cooperative, no apparent distress and mildly obese  Attitude:  cooperative  Eye Contact:  good  Mood:  anxious, depressed, better and moderate  Affect:   appropriate and in normal range  Speech:  clear, coherent  Psychomotor Behavior:  no evidence of tardive dyskinesia, dystonia, or tics  Thought Process:  logical and goal oriented  Associations:  no loose associations  Thought Content:  no evidence of suicidal ideation or homicidal ideation  Insight:  good  Judgment:  intact  Oriented to:  time, person, and place  Attention Span and Concentration:  intact  Recent and Remote Memory:  intact  Language and Fund of Knowledge: appropriate  Muscle Strength and Tone: normal  Gait and Station: uses a wheelchair   Vitals:    07/27/20 0700 07/27/20 1628 07/28/20 0924 07/28/20 1746   BP: 134/79  136/84 99/57   Pulse: 75  101 74   Resp: 16  16    Temp: 97.6  F (36.4  C) 97.4  F (36.3  C) 98.4  F (36.9  C) 97.9  F (36.6  C)   TempSrc: Oral Oral Oral Oral   SpO2: 95% 90%     Weight:   104.9 kg (231 lb 3.2 oz)             Discharge Plan:     Follow up Appointment:  Discharged to:   [place] [address]   Phone: [phone]        Health Care Follow-up Appointments:   PCP:   David Thao MD - (Pt will be seen by medical and psychiatric providers at the facility)  [phone]      Attestation:  The patient has been seen and evaluated by me,  Karlee PHAM CNP    More than 30 minutes were spent for assessment, documentation, and coordination of care.

## 2020-07-29 NOTE — PROGRESS NOTES
Pt visible in milieu. Attends programming. Pleasant, polite, cooperative. Affect restricted. Mood calm. Reports he continues to have mild-moderate depressive symptoms and anxiety but they are manageable. Denies SI/SIB. No observable symptoms of psychosis. Thinking appears linear & logical. Feels ready for discharge. Med-compliant. Continues to use wheelchair mostly and transfers/toilets independently. Hygiene, appetite, sleep good.

## 2020-08-12 ENCOUNTER — RECORDS - HEALTHEAST (OUTPATIENT)
Dept: LAB | Facility: CLINIC | Age: 55
End: 2020-08-12

## 2020-08-12 LAB
BASOPHILS # BLD AUTO: 0 THOU/UL (ref 0–0.2)
BASOPHILS NFR BLD AUTO: 0 % (ref 0–2)
EOSINOPHIL # BLD AUTO: 0.1 THOU/UL (ref 0–0.4)
EOSINOPHIL NFR BLD AUTO: 2 % (ref 0–6)
ERYTHROCYTE [DISTWIDTH] IN BLOOD BY AUTOMATED COUNT: 14.1 % (ref 11–14.5)
HCT VFR BLD AUTO: 41 % (ref 40–54)
HGB BLD-MCNC: 12.3 G/DL (ref 14–18)
LYMPHOCYTES # BLD AUTO: 2.1 THOU/UL (ref 0.8–4.4)
LYMPHOCYTES NFR BLD AUTO: 35 % (ref 20–40)
MCH RBC QN AUTO: 29.6 PG (ref 27–34)
MCHC RBC AUTO-ENTMCNC: 30 G/DL (ref 32–36)
MCV RBC AUTO: 99 FL (ref 80–100)
MONOCYTES # BLD AUTO: 0.4 THOU/UL (ref 0–0.9)
MONOCYTES NFR BLD AUTO: 6 % (ref 2–10)
NEUTROPHILS # BLD AUTO: 3.4 THOU/UL (ref 2–7.7)
NEUTROPHILS NFR BLD AUTO: 57 % (ref 50–70)
PLATELET # BLD AUTO: 249 THOU/UL (ref 140–440)
PMV BLD AUTO: 10.2 FL (ref 8.5–12.5)
RBC # BLD AUTO: 4.15 MILL/UL (ref 4.4–6.2)
WBC: 5.9 THOU/UL (ref 4–11)

## 2020-09-10 ENCOUNTER — RECORDS - HEALTHEAST (OUTPATIENT)
Dept: LAB | Facility: CLINIC | Age: 55
End: 2020-09-10

## 2020-09-10 LAB
BASOPHILS # BLD AUTO: 0 THOU/UL (ref 0–0.2)
BASOPHILS NFR BLD AUTO: 0 % (ref 0–2)
EOSINOPHIL # BLD AUTO: 0.1 THOU/UL (ref 0–0.4)
EOSINOPHIL NFR BLD AUTO: 1 % (ref 0–6)
ERYTHROCYTE [DISTWIDTH] IN BLOOD BY AUTOMATED COUNT: 14.4 % (ref 11–14.5)
HCT VFR BLD AUTO: 44.5 % (ref 40–54)
HGB BLD-MCNC: 13.7 G/DL (ref 14–18)
IMM GRANULOCYTES # BLD: 0 THOU/UL
IMM GRANULOCYTES NFR BLD: 1 %
LYMPHOCYTES # BLD AUTO: 2.4 THOU/UL (ref 0.8–4.4)
LYMPHOCYTES NFR BLD AUTO: 40 % (ref 20–40)
MCH RBC QN AUTO: 30.2 PG (ref 27–34)
MCHC RBC AUTO-ENTMCNC: 30.8 G/DL (ref 32–36)
MCV RBC AUTO: 98 FL (ref 80–100)
MONOCYTES # BLD AUTO: 0.4 THOU/UL (ref 0–0.9)
MONOCYTES NFR BLD AUTO: 6 % (ref 2–10)
NEUTROPHILS # BLD AUTO: 3.2 THOU/UL (ref 2–7.7)
NEUTROPHILS NFR BLD AUTO: 52 % (ref 50–70)
PLATELET # BLD AUTO: 242 THOU/UL (ref 140–440)
PMV BLD AUTO: 10.1 FL (ref 8.5–12.5)
RBC # BLD AUTO: 4.54 MILL/UL (ref 4.4–6.2)
WBC: 6.1 THOU/UL (ref 4–11)

## 2020-09-28 ENCOUNTER — RECORDS - HEALTHEAST (OUTPATIENT)
Dept: LAB | Facility: CLINIC | Age: 55
End: 2020-09-28

## 2020-09-28 LAB
ANION GAP SERPL CALCULATED.3IONS-SCNC: 10 MMOL/L (ref 5–18)
BASOPHILS # BLD AUTO: 0 THOU/UL (ref 0–0.2)
BASOPHILS NFR BLD AUTO: 0 % (ref 0–2)
BUN SERPL-MCNC: 15 MG/DL (ref 8–22)
CALCIUM SERPL-MCNC: 9.7 MG/DL (ref 8.5–10.5)
CHLORIDE BLD-SCNC: 102 MMOL/L (ref 98–107)
CO2 SERPL-SCNC: 27 MMOL/L (ref 22–31)
CREAT SERPL-MCNC: 0.9 MG/DL (ref 0.7–1.3)
EOSINOPHIL # BLD AUTO: 0.1 THOU/UL (ref 0–0.4)
EOSINOPHIL NFR BLD AUTO: 2 % (ref 0–6)
ERYTHROCYTE [DISTWIDTH] IN BLOOD BY AUTOMATED COUNT: 14.1 % (ref 11–14.5)
FERRITIN SERPL-MCNC: 104 NG/ML (ref 27–300)
GFR SERPL CREATININE-BSD FRML MDRD: >60 ML/MIN/1.73M2
GLUCOSE BLD-MCNC: 77 MG/DL (ref 70–125)
HCT VFR BLD AUTO: 41.7 % (ref 40–54)
HGB BLD-MCNC: 12.8 G/DL (ref 14–18)
IMM GRANULOCYTES # BLD: 0 THOU/UL
IMM GRANULOCYTES NFR BLD: 0 %
LYMPHOCYTES # BLD AUTO: 1.7 THOU/UL (ref 0.8–4.4)
LYMPHOCYTES NFR BLD AUTO: 30 % (ref 20–40)
MCH RBC QN AUTO: 29.9 PG (ref 27–34)
MCHC RBC AUTO-ENTMCNC: 30.7 G/DL (ref 32–36)
MCV RBC AUTO: 97 FL (ref 80–100)
MONOCYTES # BLD AUTO: 0.4 THOU/UL (ref 0–0.9)
MONOCYTES NFR BLD AUTO: 6 % (ref 2–10)
NEUTROPHILS # BLD AUTO: 3.5 THOU/UL (ref 2–7.7)
NEUTROPHILS NFR BLD AUTO: 62 % (ref 50–70)
PLATELET # BLD AUTO: 265 THOU/UL (ref 140–440)
PMV BLD AUTO: 9.9 FL (ref 8.5–12.5)
POTASSIUM BLD-SCNC: 5.2 MMOL/L (ref 3.5–5)
RBC # BLD AUTO: 4.28 MILL/UL (ref 4.4–6.2)
SODIUM SERPL-SCNC: 139 MMOL/L (ref 136–145)
TSH SERPL DL<=0.005 MIU/L-ACNC: 1.32 UIU/ML (ref 0.3–5)
WBC: 5.6 THOU/UL (ref 4–11)

## 2020-10-14 ENCOUNTER — AMBULATORY - HEALTHEAST (OUTPATIENT)
Dept: OTHER | Facility: CLINIC | Age: 55
End: 2020-10-14

## 2020-10-14 ENCOUNTER — DOCUMENTATION ONLY (OUTPATIENT)
Dept: OTHER | Facility: CLINIC | Age: 55
End: 2020-10-14

## 2020-10-26 ENCOUNTER — RECORDS - HEALTHEAST (OUTPATIENT)
Dept: LAB | Facility: CLINIC | Age: 55
End: 2020-10-26

## 2020-10-26 LAB
ANION GAP SERPL CALCULATED.3IONS-SCNC: 10 MMOL/L (ref 5–18)
BUN SERPL-MCNC: 20 MG/DL (ref 8–22)
CALCIUM SERPL-MCNC: 9.4 MG/DL (ref 8.5–10.5)
CHLORIDE BLD-SCNC: 105 MMOL/L (ref 98–107)
CO2 SERPL-SCNC: 26 MMOL/L (ref 22–31)
CREAT SERPL-MCNC: 0.85 MG/DL (ref 0.7–1.3)
GFR SERPL CREATININE-BSD FRML MDRD: >60 ML/MIN/1.73M2
GLUCOSE BLD-MCNC: 117 MG/DL (ref 70–125)
POTASSIUM BLD-SCNC: 4.8 MMOL/L (ref 3.5–5)
SODIUM SERPL-SCNC: 141 MMOL/L (ref 136–145)
TSH SERPL DL<=0.005 MIU/L-ACNC: 1 UIU/ML (ref 0.3–5)

## 2020-11-04 ENCOUNTER — RECORDS - HEALTHEAST (OUTPATIENT)
Dept: LAB | Facility: CLINIC | Age: 55
End: 2020-11-04

## 2020-11-04 LAB
BASOPHILS # BLD AUTO: 0 THOU/UL (ref 0–0.2)
BASOPHILS NFR BLD AUTO: 0 % (ref 0–2)
EOSINOPHIL # BLD AUTO: 0.1 THOU/UL (ref 0–0.4)
EOSINOPHIL NFR BLD AUTO: 1 % (ref 0–6)
ERYTHROCYTE [DISTWIDTH] IN BLOOD BY AUTOMATED COUNT: 13.5 % (ref 11–14.5)
HCT VFR BLD AUTO: 42.2 % (ref 40–54)
HGB BLD-MCNC: 12.9 G/DL (ref 14–18)
IMM GRANULOCYTES # BLD: 0 THOU/UL
IMM GRANULOCYTES NFR BLD: 0 %
LYMPHOCYTES # BLD AUTO: 1.6 THOU/UL (ref 0.8–4.4)
LYMPHOCYTES NFR BLD AUTO: 27 % (ref 20–40)
MCH RBC QN AUTO: 29.6 PG (ref 27–34)
MCHC RBC AUTO-ENTMCNC: 30.6 G/DL (ref 32–36)
MCV RBC AUTO: 97 FL (ref 80–100)
MONOCYTES # BLD AUTO: 0.3 THOU/UL (ref 0–0.9)
MONOCYTES NFR BLD AUTO: 4 % (ref 2–10)
NEUTROPHILS # BLD AUTO: 4 THOU/UL (ref 2–7.7)
NEUTROPHILS NFR BLD AUTO: 67 % (ref 50–70)
PLATELET # BLD AUTO: 251 THOU/UL (ref 140–440)
PMV BLD AUTO: 9.7 FL (ref 8.5–12.5)
RBC # BLD AUTO: 4.36 MILL/UL (ref 4.4–6.2)
WBC: 6 THOU/UL (ref 4–11)

## 2020-12-02 ENCOUNTER — RECORDS - HEALTHEAST (OUTPATIENT)
Dept: LAB | Facility: CLINIC | Age: 55
End: 2020-12-02

## 2020-12-02 LAB
BASOPHILS # BLD AUTO: 0 THOU/UL (ref 0–0.2)
BASOPHILS NFR BLD AUTO: 0 % (ref 0–2)
EOSINOPHIL # BLD AUTO: 0.1 THOU/UL (ref 0–0.4)
EOSINOPHIL NFR BLD AUTO: 2 % (ref 0–6)
ERYTHROCYTE [DISTWIDTH] IN BLOOD BY AUTOMATED COUNT: 13.1 % (ref 11–14.5)
HCT VFR BLD AUTO: 43.4 % (ref 40–54)
HGB BLD-MCNC: 13.4 G/DL (ref 14–18)
IMM GRANULOCYTES # BLD: 0 THOU/UL
IMM GRANULOCYTES NFR BLD: 0 %
LYMPHOCYTES # BLD AUTO: 2.2 THOU/UL (ref 0.8–4.4)
LYMPHOCYTES NFR BLD AUTO: 41 % (ref 20–40)
MCH RBC QN AUTO: 30.3 PG (ref 27–34)
MCHC RBC AUTO-ENTMCNC: 30.9 G/DL (ref 32–36)
MCV RBC AUTO: 98 FL (ref 80–100)
MONOCYTES # BLD AUTO: 0.3 THOU/UL (ref 0–0.9)
MONOCYTES NFR BLD AUTO: 6 % (ref 2–10)
NEUTROPHILS # BLD AUTO: 2.8 THOU/UL (ref 2–7.7)
NEUTROPHILS NFR BLD AUTO: 51 % (ref 50–70)
PLATELET # BLD AUTO: 238 THOU/UL (ref 140–440)
PMV BLD AUTO: 10.3 FL (ref 8.5–12.5)
RBC # BLD AUTO: 4.42 MILL/UL (ref 4.4–6.2)
WBC: 5.5 THOU/UL (ref 4–11)

## 2021-04-08 ENCOUNTER — MEDICAL CORRESPONDENCE (OUTPATIENT)
Dept: HEALTH INFORMATION MANAGEMENT | Facility: CLINIC | Age: 56
End: 2021-04-08

## 2021-07-07 ENCOUNTER — HOSPITAL ENCOUNTER (INPATIENT)
Facility: CLINIC | Age: 56
LOS: 2 days | Discharge: HOME | DRG: 057 | End: 2021-07-09
Attending: EMERGENCY MEDICINE | Admitting: STUDENT IN AN ORGANIZED HEALTH CARE EDUCATION/TRAINING PROGRAM
Payer: COMMERCIAL

## 2021-07-07 DIAGNOSIS — M62.838 MUSCLE SPASM: ICD-10-CM

## 2021-07-07 DIAGNOSIS — Z11.52 ENCOUNTER FOR SCREENING LABORATORY TESTING FOR SEVERE ACUTE RESPIRATORY SYNDROME CORONAVIRUS 2 (SARS-COV-2): ICD-10-CM

## 2021-07-07 DIAGNOSIS — G20.A1 PARKINSON DISEASE (H): ICD-10-CM

## 2021-07-07 LAB
ALBUMIN SERPL-MCNC: 4 G/DL (ref 3.4–5)
ALP SERPL-CCNC: 82 U/L (ref 40–150)
ALT SERPL W P-5'-P-CCNC: 31 U/L (ref 0–70)
ANION GAP SERPL CALCULATED.3IONS-SCNC: 4 MMOL/L (ref 3–14)
AST SERPL W P-5'-P-CCNC: 20 U/L (ref 0–45)
BASOPHILS # BLD AUTO: 0 10E9/L (ref 0–0.2)
BASOPHILS NFR BLD AUTO: 0.2 %
BILIRUB SERPL-MCNC: 0.3 MG/DL (ref 0.2–1.3)
BUN SERPL-MCNC: 17 MG/DL (ref 7–30)
CALCIUM SERPL-MCNC: 9 MG/DL (ref 8.5–10.1)
CHLORIDE SERPL-SCNC: 109 MMOL/L (ref 94–109)
CK SERPL-CCNC: 100 U/L (ref 30–300)
CO2 SERPL-SCNC: 28 MMOL/L (ref 20–32)
CREAT SERPL-MCNC: 0.82 MG/DL (ref 0.66–1.25)
DIFFERENTIAL METHOD BLD: ABNORMAL
EOSINOPHIL # BLD AUTO: 0 10E9/L (ref 0–0.7)
EOSINOPHIL NFR BLD AUTO: 0.7 %
ERYTHROCYTE [DISTWIDTH] IN BLOOD BY AUTOMATED COUNT: 14.6 % (ref 10–15)
GFR SERPL CREATININE-BSD FRML MDRD: >90 ML/MIN/{1.73_M2}
GLUCOSE SERPL-MCNC: 125 MG/DL (ref 70–99)
HCT VFR BLD AUTO: 43.4 % (ref 40–53)
HGB BLD-MCNC: 13.5 G/DL (ref 13.3–17.7)
IMM GRANULOCYTES # BLD: 0 10E9/L (ref 0–0.4)
IMM GRANULOCYTES NFR BLD: 0.4 %
INTERPRETATION ECG - MUSE: NORMAL
LABORATORY COMMENT REPORT: NORMAL
LYMPHOCYTES # BLD AUTO: 1.2 10E9/L (ref 0.8–5.3)
LYMPHOCYTES NFR BLD AUTO: 21.1 %
MCH RBC QN AUTO: 29.2 PG (ref 26.5–33)
MCHC RBC AUTO-ENTMCNC: 31.1 G/DL (ref 31.5–36.5)
MCV RBC AUTO: 94 FL (ref 78–100)
MONOCYTES # BLD AUTO: 0.4 10E9/L (ref 0–1.3)
MONOCYTES NFR BLD AUTO: 6.4 %
NEUTROPHILS # BLD AUTO: 3.9 10E9/L (ref 1.6–8.3)
NEUTROPHILS NFR BLD AUTO: 71.2 %
NRBC # BLD AUTO: 0 10*3/UL
NRBC BLD AUTO-RTO: 0 /100
PLATELET # BLD AUTO: 233 10E9/L (ref 150–450)
POTASSIUM SERPL-SCNC: 4 MMOL/L (ref 3.4–5.3)
PROT SERPL-MCNC: 7.7 G/DL (ref 6.8–8.8)
RBC # BLD AUTO: 4.62 10E12/L (ref 4.4–5.9)
SARS-COV-2 RNA RESP QL NAA+PROBE: NEGATIVE
SODIUM SERPL-SCNC: 141 MMOL/L (ref 133–144)
SPECIMEN SOURCE: NORMAL
WBC # BLD AUTO: 5.5 10E9/L (ref 4–11)

## 2021-07-07 PROCEDURE — 250N000013 HC RX MED GY IP 250 OP 250 PS 637: Performed by: STUDENT IN AN ORGANIZED HEALTH CARE EDUCATION/TRAINING PROGRAM

## 2021-07-07 PROCEDURE — 93005 ELECTROCARDIOGRAM TRACING: CPT | Performed by: EMERGENCY MEDICINE

## 2021-07-07 PROCEDURE — 93010 ELECTROCARDIOGRAM REPORT: CPT | Performed by: INTERNAL MEDICINE

## 2021-07-07 PROCEDURE — 258N000003 HC RX IP 258 OP 636: Performed by: EMERGENCY MEDICINE

## 2021-07-07 PROCEDURE — U0003 INFECTIOUS AGENT DETECTION BY NUCLEIC ACID (DNA OR RNA); SEVERE ACUTE RESPIRATORY SYNDROME CORONAVIRUS 2 (SARS-COV-2) (CORONAVIRUS DISEASE [COVID-19]), AMPLIFIED PROBE TECHNIQUE, MAKING USE OF HIGH THROUGHPUT TECHNOLOGIES AS DESCRIBED BY CMS-2020-01-R: HCPCS | Performed by: EMERGENCY MEDICINE

## 2021-07-07 PROCEDURE — 99285 EMERGENCY DEPT VISIT HI MDM: CPT | Mod: 25 | Performed by: EMERGENCY MEDICINE

## 2021-07-07 PROCEDURE — 99222 1ST HOSP IP/OBS MODERATE 55: CPT | Mod: GC | Performed by: STUDENT IN AN ORGANIZED HEALTH CARE EDUCATION/TRAINING PROGRAM

## 2021-07-07 PROCEDURE — 99284 EMERGENCY DEPT VISIT MOD MDM: CPT | Performed by: EMERGENCY MEDICINE

## 2021-07-07 PROCEDURE — 85025 COMPLETE CBC W/AUTO DIFF WBC: CPT | Performed by: EMERGENCY MEDICINE

## 2021-07-07 PROCEDURE — U0005 INFEC AGEN DETEC AMPLI PROBE: HCPCS | Performed by: EMERGENCY MEDICINE

## 2021-07-07 PROCEDURE — 80053 COMPREHEN METABOLIC PANEL: CPT | Performed by: EMERGENCY MEDICINE

## 2021-07-07 PROCEDURE — 93005 ELECTROCARDIOGRAM TRACING: CPT

## 2021-07-07 PROCEDURE — C9803 HOPD COVID-19 SPEC COLLECT: HCPCS | Performed by: EMERGENCY MEDICINE

## 2021-07-07 PROCEDURE — 96361 HYDRATE IV INFUSION ADD-ON: CPT | Performed by: EMERGENCY MEDICINE

## 2021-07-07 PROCEDURE — 120N000002 HC R&B MED SURG/OB UMMC

## 2021-07-07 PROCEDURE — 82550 ASSAY OF CK (CPK): CPT | Performed by: EMERGENCY MEDICINE

## 2021-07-07 PROCEDURE — 96360 HYDRATION IV INFUSION INIT: CPT | Performed by: EMERGENCY MEDICINE

## 2021-07-07 RX ORDER — BISACODYL 10 MG
10 SUPPOSITORY, RECTAL RECTAL DAILY PRN
Status: ON HOLD | COMMUNITY
End: 2021-07-07

## 2021-07-07 RX ORDER — PANTOPRAZOLE SODIUM 40 MG/1
40 TABLET, DELAYED RELEASE ORAL
COMMUNITY
End: 2022-02-06

## 2021-07-07 RX ORDER — CLOZAPINE 25 MG/1
25 TABLET ORAL DAILY
Status: DISCONTINUED | OUTPATIENT
Start: 2021-07-08 | End: 2021-07-09 | Stop reason: HOSPADM

## 2021-07-07 RX ORDER — AMOXICILLIN 250 MG
2 CAPSULE ORAL 2 TIMES DAILY PRN
Status: DISCONTINUED | OUTPATIENT
Start: 2021-07-07 | End: 2021-07-09 | Stop reason: HOSPADM

## 2021-07-07 RX ORDER — AMOXICILLIN 250 MG
CAPSULE ORAL
COMMUNITY
End: 2022-08-01

## 2021-07-07 RX ORDER — POLYETHYLENE GLYCOL 3350 17 G/17G
POWDER, FOR SOLUTION ORAL
COMMUNITY
End: 2022-02-07

## 2021-07-07 RX ORDER — TRAZODONE HYDROCHLORIDE 100 MG/1
100 TABLET ORAL AT BEDTIME
Status: DISCONTINUED | OUTPATIENT
Start: 2021-07-08 | End: 2021-07-09 | Stop reason: HOSPADM

## 2021-07-07 RX ORDER — ATORVASTATIN CALCIUM 40 MG/1
TABLET, FILM COATED ORAL
Status: ON HOLD | COMMUNITY
Start: 2021-06-12 | End: 2023-05-25

## 2021-07-07 RX ORDER — ASCORBIC ACID 500 MG
500 TABLET ORAL DAILY
Status: ON HOLD | COMMUNITY
Start: 2021-06-12 | End: 2023-05-25

## 2021-07-07 RX ORDER — SODIUM CHLORIDE 9 MG/ML
INJECTION, SOLUTION INTRAVENOUS CONTINUOUS
Status: DISCONTINUED | OUTPATIENT
Start: 2021-07-07 | End: 2021-07-08 | Stop reason: CLARIF

## 2021-07-07 RX ORDER — ACETAMINOPHEN 325 MG/1
650 TABLET ORAL EVERY 4 HOURS PRN
Status: DISCONTINUED | OUTPATIENT
Start: 2021-07-07 | End: 2021-07-08

## 2021-07-07 RX ORDER — ACETAMINOPHEN 500 MG
1000 TABLET ORAL 3 TIMES DAILY
Status: DISCONTINUED | OUTPATIENT
Start: 2021-07-08 | End: 2021-07-09 | Stop reason: HOSPADM

## 2021-07-07 RX ORDER — ASPIRIN 81 MG/1
81 TABLET ORAL DAILY
Status: DISCONTINUED | OUTPATIENT
Start: 2021-07-08 | End: 2021-07-08

## 2021-07-07 RX ORDER — CLONAZEPAM 1 MG/1
TABLET ORAL
Status: ON HOLD | COMMUNITY
End: 2021-07-07

## 2021-07-07 RX ORDER — TRAZODONE HYDROCHLORIDE 100 MG/1
TABLET ORAL
COMMUNITY
Start: 2021-06-13 | End: 2022-02-06

## 2021-07-07 RX ORDER — AMOXICILLIN 250 MG
1 CAPSULE ORAL 2 TIMES DAILY PRN
Status: DISCONTINUED | OUTPATIENT
Start: 2021-07-07 | End: 2021-07-09 | Stop reason: HOSPADM

## 2021-07-07 RX ORDER — CARBIDOPA AND LEVODOPA 25; 100 MG/1; MG/1
1.5 TABLET ORAL 2 TIMES DAILY
Status: DISCONTINUED | OUTPATIENT
Start: 2021-07-07 | End: 2021-07-07

## 2021-07-07 RX ORDER — POLYETHYLENE GLYCOL 3350 17 G/17G
17 POWDER, FOR SOLUTION ORAL DAILY
Status: DISCONTINUED | OUTPATIENT
Start: 2021-07-08 | End: 2021-07-09 | Stop reason: HOSPADM

## 2021-07-07 RX ORDER — DONEPEZIL HYDROCHLORIDE 10 MG/1
10 TABLET, FILM COATED ORAL DAILY
Status: DISCONTINUED | OUTPATIENT
Start: 2021-07-08 | End: 2021-07-08

## 2021-07-07 RX ORDER — DOCUSATE SODIUM 100 MG/1
100 CAPSULE, LIQUID FILLED ORAL 2 TIMES DAILY PRN
COMMUNITY
End: 2022-08-25

## 2021-07-07 RX ORDER — TRIAMCINOLONE ACETONIDE 1 MG/G
CREAM TOPICAL 2 TIMES DAILY PRN
Status: DISCONTINUED | OUTPATIENT
Start: 2021-07-07 | End: 2021-07-09 | Stop reason: HOSPADM

## 2021-07-07 RX ORDER — CARBIDOPA AND LEVODOPA 25; 100 MG/1; MG/1
2 TABLET ORAL 2 TIMES DAILY
Status: DISCONTINUED | OUTPATIENT
Start: 2021-07-07 | End: 2021-07-07

## 2021-07-07 RX ORDER — BACLOFEN 10 MG/1
10 TABLET ORAL 2 TIMES DAILY
Status: DISCONTINUED | OUTPATIENT
Start: 2021-07-08 | End: 2021-07-08 | Stop reason: DRUGHIGH

## 2021-07-07 RX ORDER — DOCUSATE SODIUM 100 MG/1
100 CAPSULE, LIQUID FILLED ORAL 2 TIMES DAILY
Status: DISCONTINUED | OUTPATIENT
Start: 2021-07-07 | End: 2021-07-09 | Stop reason: HOSPADM

## 2021-07-07 RX ORDER — POLYETHYLENE GLYCOL 3350 17 G/17G
17 POWDER, FOR SOLUTION ORAL DAILY
Status: DISCONTINUED | OUTPATIENT
Start: 2021-07-08 | End: 2021-07-07

## 2021-07-07 RX ORDER — VILAZODONE HYDROCHLORIDE 40 MG/1
40 TABLET ORAL DAILY
Status: DISCONTINUED | OUTPATIENT
Start: 2021-07-08 | End: 2021-07-08

## 2021-07-07 RX ORDER — SENNOSIDES 8.6 MG
1 TABLET ORAL 2 TIMES DAILY
Status: DISCONTINUED | OUTPATIENT
Start: 2021-07-07 | End: 2021-07-09 | Stop reason: HOSPADM

## 2021-07-07 RX ORDER — TAMSULOSIN HYDROCHLORIDE 0.4 MG/1
0.4 CAPSULE ORAL DAILY
Status: DISCONTINUED | OUTPATIENT
Start: 2021-07-08 | End: 2021-07-09 | Stop reason: HOSPADM

## 2021-07-07 RX ORDER — HYDROCORTISONE 10 MG/G
CREAM TOPICAL
Status: ON HOLD | COMMUNITY
End: 2023-05-25

## 2021-07-07 RX ORDER — ONDANSETRON 2 MG/ML
4 INJECTION INTRAMUSCULAR; INTRAVENOUS EVERY 6 HOURS PRN
Status: DISCONTINUED | OUTPATIENT
Start: 2021-07-07 | End: 2021-07-09 | Stop reason: HOSPADM

## 2021-07-07 RX ORDER — METOPROLOL SUCCINATE 25 MG/1
12.5 TABLET, EXTENDED RELEASE ORAL 2 TIMES DAILY
Status: DISCONTINUED | OUTPATIENT
Start: 2021-07-08 | End: 2021-07-08 | Stop reason: DRUGHIGH

## 2021-07-07 RX ORDER — FAMOTIDINE 20 MG
TABLET ORAL
Status: ON HOLD | COMMUNITY
End: 2021-07-08

## 2021-07-07 RX ORDER — MULTIVIT WITH IRON,MINERALS
TABLET ORAL
COMMUNITY
End: 2022-02-06

## 2021-07-07 RX ORDER — CARBIDOPA AND LEVODOPA 25; 100 MG/1; MG/1
1 TABLET ORAL EVERY EVENING
Status: DISCONTINUED | OUTPATIENT
Start: 2021-07-07 | End: 2021-07-08

## 2021-07-07 RX ORDER — LIDOCAINE 40 MG/G
CREAM TOPICAL
Status: DISCONTINUED | OUTPATIENT
Start: 2021-07-07 | End: 2021-07-09 | Stop reason: HOSPADM

## 2021-07-07 RX ORDER — KETOCONAZOLE 20 MG/G
CREAM TOPICAL
Status: ON HOLD | COMMUNITY
End: 2021-07-09

## 2021-07-07 RX ORDER — ATORVASTATIN CALCIUM 40 MG/1
40 TABLET, FILM COATED ORAL AT BEDTIME
Status: DISCONTINUED | OUTPATIENT
Start: 2021-07-07 | End: 2021-07-09 | Stop reason: HOSPADM

## 2021-07-07 RX ORDER — MAGNESIUM CARB/ALUMINUM HYDROX 105-160MG
148 TABLET,CHEWABLE ORAL
Status: DISCONTINUED | OUTPATIENT
Start: 2021-07-07 | End: 2021-07-09 | Stop reason: HOSPADM

## 2021-07-07 RX ORDER — ONDANSETRON 4 MG/1
4 TABLET, ORALLY DISINTEGRATING ORAL EVERY 6 HOURS PRN
Status: DISCONTINUED | OUTPATIENT
Start: 2021-07-07 | End: 2021-07-09 | Stop reason: HOSPADM

## 2021-07-07 RX ORDER — CLONAZEPAM 0.5 MG/1
1 TABLET ORAL AT BEDTIME
Status: DISCONTINUED | OUTPATIENT
Start: 2021-07-08 | End: 2021-07-08

## 2021-07-07 RX ORDER — CLONAZEPAM 0.5 MG/1
1 TABLET ORAL 3 TIMES DAILY
Status: DISCONTINUED | OUTPATIENT
Start: 2021-07-08 | End: 2021-07-07

## 2021-07-07 RX ORDER — BISACODYL 10 MG
SUPPOSITORY, RECTAL RECTAL
Status: ON HOLD | COMMUNITY
Start: 2021-05-07 | End: 2021-07-07

## 2021-07-07 RX ORDER — ACETAMINOPHEN 500 MG
TABLET ORAL
Status: ON HOLD | COMMUNITY
End: 2021-07-07

## 2021-07-07 RX ORDER — CALCIUM POLYCARBOPHIL 625 MG/1
TABLET, FILM COATED ORAL
COMMUNITY
End: 2022-02-07

## 2021-07-07 RX ORDER — GABAPENTIN 800 MG/1
800 TABLET ORAL 3 TIMES DAILY
Status: DISCONTINUED | OUTPATIENT
Start: 2021-07-08 | End: 2021-07-09 | Stop reason: HOSPADM

## 2021-07-07 RX ADMIN — ATORVASTATIN CALCIUM 40 MG: 40 TABLET, FILM COATED ORAL at 23:10

## 2021-07-07 RX ADMIN — SODIUM CHLORIDE 1000 ML: 9 INJECTION, SOLUTION INTRAVENOUS at 10:58

## 2021-07-07 ASSESSMENT — ENCOUNTER SYMPTOMS
ACTIVITY CHANGE: 1
FEVER: 0
DIARRHEA: 1
APPETITE CHANGE: 0
CHOKING: 0
VOMITING: 0
CHEST TIGHTNESS: 1
COUGH: 0

## 2021-07-07 ASSESSMENT — MIFFLIN-ST. JEOR: SCORE: 1804.67

## 2021-07-07 NOTE — H&P
"[facility]: [place]  Neurology History and Physical    Patient Name:  Benjamin Mathews  MRN:  [id number]    :  1965  Date of Admission:  2021  Date of Service:  2021  Primary care provider:  No Ref-Primary, Physician      Chief Complaint:  Muscle spasms     History of Present Illness:   Benjamin Mathews is a 56 year old male with history of atypical parkinson's () who presents with muscle spasms.     Patient reports a clinical history that started 8 months ago with increased frequency over the past 2 months characterized for episodes of muscle spasms that gradually increase in intensity, usually starting unilateral, with initial involvement of fine muscles (usually initial manifestation is twitches in the eye muscles, followed by larger muscle involvement) followed by gradual improvement over 6-7 hours. He has been treated with klonopin and baclofen for these episodes that seemed to initially help and decrease the time in between episodes from every 1 to 2 weeks. He relates it being triggered in the past by movement of fine muscles while working with OT, alleviated by klonopin (intermittent response), heat pads, and massages, not aggravated by any factors that he recognizes, but does seem to have them more after he \"over-exerts himself\".  However, 90% of the attacks seem to occur in the mornings. Notes that baclofen used to help also at the beginning but more recently does not help.     He was last seen by his outpatient neurologist (Dr. De Dios) on 21, felt that increasing carbidopa/levodopa did not help with mobility difficulties and seemed to trigger more frequent muscle spasms and was recommended to decrease his AM dose to 2 pills (from 2.5 pills) and had increased klonopin a couple of days prior to that to assist with muscle spasms.  Patient does not feel that this increased klonopin dose has made a difference.    Baseline functional status: between episodes, " "patient able to walk around his unit with his walker without other assistance.  Lives in assisted living facility.  Able to accomplish all of his ADLs otherwise.  Denies recent falls or unsteadiness.  Does have orthostatic hypotension.    On ROS, notable for 40 pound weight loss in last 6 months.  Also with diarrhea over last 4 days - patient has alternating diarrhea and constipation at baseline, but diarrhea seems more substantial to him over last four days.  Approx 6 episodes per day, loose, but not watery.  ROS otherwise negative.     Noted prior ED visits for similar episodes  -6/26/21: CT head negative, EtOH negative, Hgb 13.1, otherwise BMP wnl. No interventions, discharged.  -4/29/21: Lab workup with hgb 11.4, otherwise BMP wnl, received sinemet 1.5 tablet, clonazepam 0.5mg, discharged.  -11/10/20: No labs, received 2mg IV ativan, discharged.  -11/2/20: Noted episode in NH, given 2mg IV ativan to prevent ED visit, helped.  -10/19/20: Initial report of intermittent painful and debilitating sustained muscle spasms, primarily on R side, seemed to happen after activity during the day such as bathing or during exam movements. Was on PRN cyclobenzaprine and oxycodone and scheduled baclofen 10-10-15mg tid.  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 He is  but no children, sister/brother are very involved in his cares.      ROS  A comprehensive ROS was performed and pertinent findings were included in HPI.      PMH  Past Medical History        Past Medical History:   Diagnosis Date     " Clotting disorder (H)       PE 2019     Dystonia       Parkinson disease (H)           Past Surgical History   No past surgical history on file.        Medications   I have personally reviewed the patient's medication list.      Allergies  I have personally reviewed the patient's allergy list.      Social History  Denies tobacco, alcohol, and recreational drug use      Family History    -Father with parkinson disease  - Brother with cervical dystonia        Physical Examination   Vitals: /73   Pulse 94   Temp 97.8  F (36.6  C) (Oral)   Resp 16   SpO2 100%   General: Elderly male, appears older than stated age.  Lying in bed, NAD.  Diaphoretic, with seborrheic dermatitis on forehead. Neck flexed into rightward position, but able to move towards midline on command.  Feet extended 2/2 increased tone of lower extremities.   Head: NC/AT  Eyes: no icterus, op pink and moist  Cardiac: RRR. Extremities warm, no edema.   Respiratory: non-labored on RA  GI: S/NT/ND  Skin: No rash or lesion on exposed skin  Psych: Mood pleasant, affect congruent.  Flat facial features  Neuro:  Mental status: Awake, alert, attentive, oriented to self, time, place, and circumstance. Language is fluent and coherent with intact comprehension of complex commands, naming, repetition.  Patient 1/3 on delayed recall, bu  Cranial nerves: VFF, PERRL, conjugate gaze, EOMI, facial sensation intact, face symmetric, shoulder shrug strong, tongue/uvula midline, no dysarthria.   Motor: Normal bulk.  Tone increased in neck, facial musculature, and lower extremities, especially in feet and ankles. Cogwheel rigidity noted in bilateral upper extremities.  No abnormal movements. 5/5 strength bilaterally in deltoids, biceps, triceps, hand , hip flexors (4+ in L), hip extensors, knee flexion, knee extension, plantarflexion, dorsiflexion.   Reflexes: Normo-reflexic biceps, brachioradialis, triceps, patellae, and achilles on R side.  Mild hyperreflexia  on L biceps, brachioradialis, patellae. Negative Neal, no clonus, toes down-going.  Sensory: Intact to light touch, pin, vibration, and proprioception in proximal and distal aspects of all 4 extremities   Coordination: FNF and HS without ataxia or dysmetria. Rapid alternating movements intact.   Gait: Unable to assess due to LE spasms     Investigations   I have personally reviewed pertinent labs, tests, and radiological imaging. Discussion of notable findings is included under Impression.      Was patient transferred from outside hospital?   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  Patient being admitted for further workup.    Plan:  Hx Atypical parkinsonism  Muscle spasms  Weight loss  Patient reports a clinical history that started 8 months ago with increased frequency over the past 2 months, characterized for episodes of muscle spasms that gradually increase in intensity, and follow predictable pattern, with initial involvement of fine muscles, followed by gradual improvement over 6-7 hours.  Also with 40 lb weight loss over 6 months, though no other localizing symptoms.  VSS, electrolytes wnl.  Ddx includes dystonia, stiff person syndrome, paraneoplastic/rheumatologic, functional, muscle spasms related to exertion in the setting of Parkinson and past CVA.  Predictable pattern of muscle spasm seems to decrease likelihood of simple muscle spasms.  Dystonia a possibility, given patient has been taking clozapine for two years - patient has had episodes that he describes as dystonia, but they improved with PTA trihexyphenidyl.  Stiff person syndrome fits clinical picture, and is further supported by response to klonopin and baclofen, however, further workup would need to be established.  Significant weight loss can be  seen in Parkinson disease, however, patient with fairly significant weight loss, and given his already atypical presentation of parkinsonism, this progression could also be a manifestation of neoplasm.  Patient agrees to be admitted to neurology for further workup.  - MRI brain and cervical spine ordered  - CT CAP w/wo ordered for evaluation of malignancy  - Will pursue paraneoplastic workup that will include:   - LP, with paraneoplastic panel   - Serum anti-DEE DEE  - TSH and HA1C ordered  - Increase klonopin to 2-1-2mg TID (double home dose, as tolerated)   - klonopin 0.5mg BID PRN for breakthrough spasms  - PTA baclofen 10-10-15mg TID  - PTA trihexyphenidyl 3mg TID  - PTA Sinemet  BID  - PTA donepezil 10mg qd  - Will consider EMG if above is inconclusive    Diarrhea  Patient with four days of loose stools.  Has history of alternating constipation and diarrhea, likely related to Parkinson diagnosis and PTA bowel regimen.  Diarrhea could be contributing to muscle spasm severity at presentation.  No recent antibiotic use, GIB, or other risk factors for C diff infection.  - Hold PTA bowel regimen     Chronic medical problems  #Psychosis related to atypical parkinson's - clozapine  #MDD - vilazodone, trazodone  #Hx CVA - atorvastatin, ASA  #DVT/PE - Xarelto  #Tachycardia - metoprolol succinate  #Constipation - hold PTA bowel regimen in setting of diarrhea  #Peripheral neuropathy - gabapentin  #BPH - tamsulosin  #Facial dermatitis - triamcinolone    FEN: Received 1L NS in ED.  Regular diet  PPx: PTA xarelto  Code: FULL    Patient was seen and discussed with Dr. Graf.     Mario Kowalski MD

## 2021-07-07 NOTE — ED TRIAGE NOTES
"-- Message is from the [facility]--    Reason for Call: patient mother would like to know if patient has had her meningitis shot if so please fax it to fax [phone] and call when this is done      Caller Information       Type Contact Phone    09/03/2019 06:43 AM Phone (Incoming) Judie Segal (Mother) [phone]          Alternative phone number: na    Turnaround time given to caller: ""This message will be sent to [facility] Provider's name]. The clinical team will fulfill your request as soon as they review your message. \""    " Pt BIBA from group home for muscle spasms. Per EMS pt reported he had recently been to his PCP to see if he could get his klonopin increased for muscle spasms which pt states was not enough. Pt would like to have this medication increased. Pt also has a hx of parkinson, recent DVT and stoke a year ago with some left sided deficits. Pt also reports he is on a blood thinner.

## 2021-07-07 NOTE — ED PROVIDER NOTES
"ED Provider Note  [facility]      History     Chief Complaint   Patient presents with     Spasms     The history is provided by the patient.     Benjamin Mathews is a 56 year old male with a medical history significant for DVT/PE (on Xarelto), Parkinson's disease, stroke, hypertension, hyperlipidemia and SVT who presents to the Emergency Department for evaluation of muscle spasms. The patient reports he is having a massive body spasm that starts with the fine motor muscles in his eyes, radiates to his neck and shoulder and then down into his toes. He reports that his muscles \"turn inwards\" and contract to the point where he cannot move. He describes these spasms as severely painful and says that they typically last between 6-7 hours. He reports that the spasms are worse on the right side of his body than the left. The patient endorses some difficulty breathing and says this is due to the muscles in his chest being tight. The patient reports that he sometimes gets heart palpitations as a result of his muscle spasms. He reports that his palpitations have not been bad today. The patient reports that he is typically able to walk with the assistance of a walker, but has not been able to walk at all today. He reports having 2-3 episodes of diarrhea yesterday and describes his stool as green. The patient reports that he lives in a nice assisted living facility with 3 other people. He says that he takes his medications as prescribed and has not missed any doses. The patient reports that his Klonopin was recently increased, but that this has not been helpful. He reports eating and drinking normally. He denies coughing, choking, and fever. He denies any urinary symptoms or development of new pain. He reports asking to be brought to the [facility] ED today because he heard good things about the hospital and wanted a different opinion.      Past Medical History  Past Medical History:   Diagnosis Date     " Clotting disorder (H)     PE 2019     Dystonia      Parkinson disease (H)      No past surgical history on file.  acetaminophen (TYLENOL) 500 MG tablet  acetaminophen (TYLENOL) 500 MG tablet  atorvastatin (LIPITOR) 40 MG tablet  baclofen (LIORESAL) 10 MG tablet  bisacodyl (DULCOLAX) 10 MG suppository  bisacodyl (DULCOLAX) 10 MG suppository  calcium polycarbophil (FIBER-LAX) 625 MG tablet  carbidopa-levodopa (SINEMET)  MG tablet  cholecalciferol (VITAMIN D3) 125 mcg (5000 units) capsule  clonazePAM (KLONOPIN) 1 MG tablet  clonazePAM (KLONOPIN) 1 MG tablet  cloZAPine (CLOZARIL) 25 MG tablet  docusate sodium (COLACE) 100 MG capsule  docusate sodium (COLACE) 100 MG capsule  gabapentin (NEURONTIN) 800 MG tablet  hydrocortisone 1 % CREA cream  ketoconazole (NIZORAL) 2 % external cream  metoprolol succinate ER (TOPROL-XL) 25 MG 24 hr tablet  Multiple Vitamins-Minerals (UNICOMPLEX-M) TABS  pantoprazole (PROTONIX) 40 MG EC tablet  polyethylene glycol (GAVILAX) 17 GM/Dose powder  polyethylene glycol (MIRALAX) 17 g packet  Psyllium (REGULOID PO)  rivaroxaban ANTICOAGULANT (XARELTO) 20 MG TABS tablet  senna-docusate (SENOKOT-S/PERICOLACE) 8.6-50 MG tablet  sennosides (SENOKOT) 8.6 MG tablet  tamsulosin (FLOMAX) 0.4 MG capsule  traZODone (DESYREL) 100 MG tablet  vitamin C (ASCORBIC ACID) 500 MG tablet  Vitamin D, Cholecalciferol, 25 MCG (1000 UT) CAPS  triamcinolone (KENALOG) 0.1 % external cream      Allergies   Allergen Reactions     Seroquel [Quetiapine] GI Disturbance     Family History  No family history on file.  Social History   Social History     Tobacco Use     Smoking status: Current Every Day Smoker     Types: Pipe     Smokeless tobacco: Never Used   Substance Use Topics     Alcohol use: Not Currently     Comment: sober x 18 months     Drug use: Not Currently      Past medical history, past surgical history, medications, allergies, family history, and social history were reviewed with the patient. No additional  pertinent items.       Review of Systems   Constitutional: Positive for activity change (unable to walk today). Negative for appetite change and fever.   Respiratory: Positive for chest tightness. Negative for cough and choking.    Gastrointestinal: Positive for diarrhea. Negative for vomiting.         Physical Exam   BP: 121/69  Pulse: 93  Temp: 97.8  F (36.6  C)  Resp: 16  SpO2: 100 %  Physical Exam  Vitals signs and nursing note reviewed.   Constitutional:       General: He is in acute distress.   HENT:      Head: Atraumatic.      Mouth/Throat:      Mouth: Mucous membranes are moist.   Eyes:      General: No scleral icterus.     Extraocular Movements: Extraocular movements intact.   Neck:      Musculoskeletal: Neck supple.   Cardiovascular:      Rate and Rhythm: Normal rate and regular rhythm.      Heart sounds: Normal heart sounds.   Pulmonary:      Effort: Pulmonary effort is normal. No respiratory distress.   Chest:      Chest wall: No tenderness.   Abdominal:      Palpations: Abdomen is soft.      Tenderness: There is no abdominal tenderness. There is no guarding or rebound.   Musculoskeletal:      Right lower leg: No edema.      Left lower leg: No edema.   Skin:     General: Skin is warm.      Coloration: Skin is not pale.   Neurological:      General: No focal deficit present.      Mental Status: He is alert and oriented to person, place, and time.      GCS: GCS eye subscore is 4. GCS verbal subscore is 5. GCS motor subscore is 6.      Cranial Nerves: Cranial nerves are intact.      Comments: Muscle contraction of the biceps on the right more than the left.  Bilateral toe pointing with spasms of the calves.  Mild cogwheeling on the left side but untestable on the right         ED Course      Procedures     10:11 AM  The patient was seen and examined by Remi Berg MD in Room ED21.                  Results for orders placed or performed during the hospital encounter of 07/07/21   CBC with  platelets differential     Status: Abnormal   Result Value Ref Range    WBC 5.5 4.0 - 11.0 10e9/L    RBC Count 4.62 4.4 - 5.9 10e12/L    Hemoglobin 13.5 13.3 - 17.7 g/dL    Hematocrit 43.4 40.0 - 53.0 %    MCV 94 78 - 100 fl    MCH 29.2 26.5 - 33.0 pg    MCHC 31.1 (L) 31.5 - 36.5 g/dL    RDW 14.6 10.0 - 15.0 %    Platelet Count 233 150 - 450 10e9/L    Diff Method Automated Method     % Neutrophils 71.2 %    % Lymphocytes 21.1 %    % Monocytes 6.4 %    % Eosinophils 0.7 %    % Basophils 0.2 %    % Immature Granulocytes 0.4 %    Nucleated RBCs 0 0 /100    Absolute Neutrophil 3.9 1.6 - 8.3 10e9/L    Absolute Lymphocytes 1.2 0.8 - 5.3 10e9/L    Absolute Monocytes 0.4 0.0 - 1.3 10e9/L    Absolute Eosinophils 0.0 0.0 - 0.7 10e9/L    Absolute Basophils 0.0 0.0 - 0.2 10e9/L    Abs Immature Granulocytes 0.0 0 - 0.4 10e9/L    Absolute Nucleated RBC 0.0    Comprehensive metabolic panel     Status: Abnormal   Result Value Ref Range    Sodium 141 133 - 144 mmol/L    Potassium 4.0 3.4 - 5.3 mmol/L    Chloride 109 94 - 109 mmol/L    Carbon Dioxide 28 20 - 32 mmol/L    Anion Gap 4 3 - 14 mmol/L    Glucose 125 (H) 70 - 99 mg/dL    Urea Nitrogen 17 7 - 30 mg/dL    Creatinine 0.82 0.66 - 1.25 mg/dL    GFR Estimate >90 >60 mL/min/[1.73_m2]    GFR Estimate If Black >90 >60 mL/min/[1.73_m2]    Calcium 9.0 8.5 - 10.1 mg/dL    Bilirubin Total 0.3 0.2 - 1.3 mg/dL    Albumin 4.0 3.4 - 5.0 g/dL    Protein Total 7.7 6.8 - 8.8 g/dL    Alkaline Phosphatase 82 40 - 150 U/L    ALT 31 0 - 70 U/L    AST 20 0 - 45 U/L   CK total     Status: None   Result Value Ref Range    CK Total 100 30 - 300 U/L   Asymptomatic SARS-CoV-2 COVID-19 Virus (Coronavirus) by PCR     Status: None    Specimen: Nasopharyngeal   Result Value Ref Range    SARS-CoV-2 Virus Specimen Source Nasopharyngeal     SARS-CoV-2 PCR Result NEGATIVE     SARS-CoV-2 PCR Comment       Testing was performed using the Xpert Xpress SARS-CoV-2 Assay on the Cepheid Gene-Xpert   Instrument  Systems. Additional information about this Emergency Use Authorization (EUA)   assay can be found via the Lab Guide.     EKG 12-lead, tracing only     Status: None   Result Value Ref Range    Interpretation ECG Click View Image link to view waveform and result      Medications   0.9% sodium chloride BOLUS (0 mLs Intravenous Stopped 7/7/21 1639)     Followed by   sodium chloride 0.9% infusion (0 mLs Intravenous Hold 7/7/21 1640)        Assessments & Plan (with Medical Decision Making)   The patient has a history of Parkinson's but also has episodes of severe muscle spasm that contract his flexor muscles in the arms and bilateral calves.  He normally gets around with a walker or scooter but cannot when his symptoms are this bad.  Through care everywhere I was able to find a note from his neurologist that he agrees this is very typical for Parkinson's but no other clear diagnosis.  They have tried treating him with Klonopin and baclofen and even increasing his Sinemet without any relief in the symptoms.  As the episodes are happening more frequently he was seen by neurology who would like to admit him to their service for further work-up for paraneoplastic syndrome or other causes of possible stiff man syndrome.  The patient did get slow relief in his spasms during his ED stay without medication.  He otherwise appears stable with normal laboratory test.    I have reviewed the nursing notes. I have reviewed the findings, diagnosis, plan and need for follow up with the patient.    New Prescriptions    No medications on file       Final diagnoses:   Muscle spasm     IBenjamin, am serving as a trained medical scribe to document services personally performed by Remi Berg MD, based on the provider's statements to me.     I, Remi Berg MD, was physically present and have reviewed and verified the accuracy of this note documented by Benjamin Gunter.  --  Remi Berg MD  [facility]  [facility] EMERGENCY DEPARTMENT  7/7/2021     Remi Berg MD  07/07/21 8201

## 2021-07-07 NOTE — PHARMACY-ADMISSION MEDICATION HISTORY
I have read and reviewed the student's note.     Admission Medication History Completed by Pharmacy    See [facility] Admission Navigator for allergy information, preferred outpatient pharmacy, prior to admission medications and immunization status.     Medication History Sources:   -Dispense report  -Current medications list via [facility]  -Patient's nurse at [facility] ([phone])    Changes made to PTA medication list (reason):    Added:   o Cholecalciferol (Vitamin D3) (per med list)  o Fiber-Lax Tab 625 mg (per med list)  o Hydrocortisone 1% cream (per med list)  o Ketoconazole 2% cream (per med list)  o Pantoprazole 40 mg Dr tabs (per med list)  o Unicomplex-M tab (per med list)  o Vitamin D3 5000iu (125mcg) cap (per med list)  o Bisacodyl Sup 10 mg prn (per med list)  o Clonazepam tab 1 mg prn (per med list)  o Docusate sodium 100 mg cap prn (per med list)  o Gavilax pow prn (per med list)  o Senna-Plus tab 8.6-50 mg prn (per med list)  o Tylenol (acetaminophen) 500 mg prn (per med list)    Deleted:   o Aspirin 81 mg EC tablet, take 1 tablet by mouth daily (per nurse)  o Atorvastatin (LIPITOR) 40 mg tablet, take 1 tablet by mouth at bedtime (per nurse)  o Baclofen (LIORESAL) 10 mg tablet, take 1 tablet by mouth 2 times daily (per med list)  o Carbidopa-levodopa (SINEMET)  mg tablet, take 2 tablets by mouth 2 times daily (per med list)  o Donepezil (ARICEPT) 10 mg tablet, take 1 tablet by mouth daily (per nurse)  o Trazodone (DESYREL) 50 mg tablet, take 75 mg by mouth at bedtime (per med list)  o Trihexyphenidyl (ARTANE) 2 mg tablet, take 3 mg by mouth 3 times daily (per nurse)  o Vilazodone (VIIBRYD)  40 mg tablets, take 40 mg by mouth daily (per nurse)    Changed:   o Baclofen 10  Mg (per med list)  o Carb/Levo  mg (per med list)  o Metoprolol Succ 25 mg Er (per med list)    Additional Information:    None    Prior to Admission medications    Medication Sig Last Dose  Taking? Auth Provider   acetaminophen (TYLENOL) 500 MG tablet Take 2 tablets by mouth every six-hour as needed for pain. Maximum of 4000 mg of acetaminophen/day from all sources.  Yes Unknown, Entered By History   acetaminophen (TYLENOL) 500 MG tablet Take 1,000 mg by mouth 3 times daily  7/7/2021 at 8AM Yes Reported, Patient   atorvastatin (LIPITOR) 40 MG tablet Take 40 mg by mouth At Bedtime 7/6/2021 at 8PM Yes Reported, Patient   baclofen (LIORESAL) 10 MG tablet Take 15 mg by mouth 4 times daily  7/7/2021 at 8AM Yes Unknown, Entered By History   bisacodyl (DULCOLAX) 10 MG suppository Insert 10 mg rectally daily as needed for Constipation.  Yes Reported, Patient   bisacodyl (DULCOLAX) 10 MG suppository Place 10 mg rectally daily as needed for constipation  Yes Unknown, Entered By History   calcium polycarbophil (FIBER-LAX) 625 MG tablet Take 1 tablet by mouth once daily 7/7/2021 at 8AM Yes Unknown, Entered By History   carbidopa-levodopa (SINEMET)  MG tablet *Take 1 tablet by mouth at bedtime 8:00PM  *Take 2 tablets by mouth once daily at 12:00PM  *Take one and one-half tablets by mouth once daily at 4:00PM  *Take 2 tablets by mouth once daily at 8:00AM 7/7/2021 at 8AM Yes Unknown, Entered By History   cholecalciferol (VITAMIN D3) 125 mcg (5000 units) capsule Take 1 tablet by mouth once daily 7/7/2021 at 8AM Yes Unknown, Entered By History   clonazePAM (KLONOPIN) 1 MG tablet Take 1/2 tablet (0.5mg) by mouth once daily as needed  Yes Unknown, Entered By History   clonazePAM (KLONOPIN) 1 MG tablet Take 1 tablet (1 mg) by mouth At Bedtime  Patient taking differently: *Take 1 tablet by mouth twice daily at 8AM and 8PM  *Take 1/2 tablet (0.5mg) by mouth once daily at 12PM 7/7/2021 at 8AM Yes Karlee Bond APRN CNP   cloZAPine (CLOZARIL) 25 MG tablet Take 25 mg by mouth daily 7/6/2021 at 8PM Yes Reported, Patient   docusate sodium (COLACE) 100 MG capsule Take 100 mg by mouth 2 times daily as needed  "for constipation  Yes Unknown, Entered By History   docusate sodium (COLACE) 100 MG capsule Take 100 mg by mouth 2 times daily  Yes Reported, Patient   gabapentin (NEURONTIN) 800 MG tablet Take 800 mg by mouth 3 times daily 7/7/2021 at 8AM Yes Reported, Patient   hydrocortisone 1 % CREA cream Apply topically to nose and other affected area(s) every morning until resolved  Yes Unknown, Entered By History   ketoconazole (NIZORAL) 2 % external cream Apply topically to facial rash every evening until resolved  Yes Unknown, Entered By History   metoprolol succinate ER (TOPROL-XL) 25 MG 24 hr tablet Take 1/2 tablet (12.5mg) by mouth twice daily 7/7/2021 at 8AM Yes Reported, Patient   Multiple Vitamins-Minerals (UNICOMPLEX-M) TABS Take 1 tablet by mouth once daily 7/7/2021 at 8AM Yes Unknown, Entered By History   pantoprazole (PROTONIX) 40 MG EC tablet 40 mg Take 1 tablet (40mg) by mouth daily 7/7/2021 at 8AM Yes Unknown, Entered By History   polyethylene glycol (GAVILAX) 17 GM/Dose powder Take 1 capful by mouth daily as needed  Yes Unknown, Entered By History   polyethylene glycol (MIRALAX) 17 g packet Take 1 packet by mouth daily  Yes Reported, Patient   Psyllium (REGULOID PO) Take 17 grams of Reguloid Pow Orange by mouth daily. Take 1 capful to the \"17 gm\" tiffanie mixed with a full glass of water every day as directed.  Yes Unknown, Entered By History   rivaroxaban ANTICOAGULANT (XARELTO) 20 MG TABS tablet Take 20 mg by mouth daily (with dinner) 7/6/2021 at 5PM Yes Reported, Patient   senna-docusate (SENOKOT-S/PERICOLACE) 8.6-50 MG tablet Take 1 tablet by mouth 2 times daily as needed for constipation  Yes Unknown, Entered By History   sennosides (SENOKOT) 8.6 MG tablet Take 1 tablet by mouth 2 times daily 7/7/2021 at 8AM Yes Reported, Patient   tamsulosin (FLOMAX) 0.4 MG capsule Take 0.4 mg by mouth daily 7/6/2021 at 8PM Yes Reported, Patient   traZODone (DESYREL) 100 MG tablet Take 100 mg by mouth daily at bedtime. " Indications: Trouble Sleeping 7/6/2021 at 8PM Yes Reported, Patient   vitamin C (ASCORBIC ACID) 500 MG tablet Take 500 mg by mouth two times a day. 7/7/2021 at 8AM Yes Reported, Patient   Vitamin D, Cholecalciferol, 25 MCG (1000 UT) CAPS Take 4 soft-gels  (4000 units) by mouth daily at 8:00AM 7/7/2021 at 8AM Yes Unknown, Entered By History   triamcinolone (KENALOG) 0.1 % external cream Apply topically 2 times daily as needed (facial dermatitis)  Unknown at Unknown time  Reported, Patient         Date completed: 07/07/21    Medication history completed by: Veena Neumann

## 2021-07-08 ENCOUNTER — APPOINTMENT (OUTPATIENT)
Dept: SPEECH THERAPY | Facility: CLINIC | Age: 56
DRG: 057 | End: 2021-07-08
Payer: COMMERCIAL

## 2021-07-08 ENCOUNTER — APPOINTMENT (OUTPATIENT)
Dept: MRI IMAGING | Facility: CLINIC | Age: 56
DRG: 057 | End: 2021-07-08
Payer: COMMERCIAL

## 2021-07-08 ENCOUNTER — APPOINTMENT (OUTPATIENT)
Dept: PHYSICAL THERAPY | Facility: CLINIC | Age: 56
DRG: 057 | End: 2021-07-08
Payer: COMMERCIAL

## 2021-07-08 ENCOUNTER — APPOINTMENT (OUTPATIENT)
Dept: CT IMAGING | Facility: CLINIC | Age: 56
DRG: 057 | End: 2021-07-08
Payer: COMMERCIAL

## 2021-07-08 LAB
ANION GAP SERPL CALCULATED.3IONS-SCNC: 4 MMOL/L (ref 3–14)
BUN SERPL-MCNC: 18 MG/DL (ref 7–30)
CALCIUM SERPL-MCNC: 9.1 MG/DL (ref 8.5–10.1)
CHLORIDE SERPL-SCNC: 111 MMOL/L (ref 94–109)
CO2 SERPL-SCNC: 27 MMOL/L (ref 20–32)
CREAT SERPL-MCNC: 0.75 MG/DL (ref 0.66–1.25)
ERYTHROCYTE [DISTWIDTH] IN BLOOD BY AUTOMATED COUNT: 14.6 % (ref 10–15)
GFR SERPL CREATININE-BSD FRML MDRD: >90 ML/MIN/{1.73_M2}
GLUCOSE BLDC GLUCOMTR-MCNC: 84 MG/DL (ref 70–99)
GLUCOSE SERPL-MCNC: 104 MG/DL (ref 70–99)
HBA1C MFR BLD: 6 % (ref 0–5.6)
HCT VFR BLD AUTO: 40.5 % (ref 40–53)
HGB BLD-MCNC: 12.5 G/DL (ref 13.3–17.7)
INTERPRETATION ECG - MUSE: NORMAL
MCH RBC QN AUTO: 29 PG (ref 26.5–33)
MCHC RBC AUTO-ENTMCNC: 30.9 G/DL (ref 31.5–36.5)
MCV RBC AUTO: 94 FL (ref 78–100)
MISCELLANEOUS TEST: NORMAL
PLATELET # BLD AUTO: 243 10E9/L (ref 150–450)
POTASSIUM SERPL-SCNC: 3.8 MMOL/L (ref 3.4–5.3)
RBC # BLD AUTO: 4.31 10E12/L (ref 4.4–5.9)
SODIUM SERPL-SCNC: 142 MMOL/L (ref 133–144)
TSH SERPL DL<=0.005 MIU/L-ACNC: 2.2 MU/L (ref 0.4–4)
WBC # BLD AUTO: 5.8 10E9/L (ref 4–11)

## 2021-07-08 PROCEDURE — 71260 CT THORAX DX C+: CPT | Mod: 26 | Performed by: RADIOLOGY

## 2021-07-08 PROCEDURE — 72156 MRI NECK SPINE W/O & W/DYE: CPT | Mod: 26 | Performed by: STUDENT IN AN ORGANIZED HEALTH CARE EDUCATION/TRAINING PROGRAM

## 2021-07-08 PROCEDURE — 86255 FLUORESCENT ANTIBODY SCREEN: CPT | Performed by: STUDENT IN AN ORGANIZED HEALTH CARE EDUCATION/TRAINING PROGRAM

## 2021-07-08 PROCEDURE — 83036 HEMOGLOBIN GLYCOSYLATED A1C: CPT | Performed by: STUDENT IN AN ORGANIZED HEALTH CARE EDUCATION/TRAINING PROGRAM

## 2021-07-08 PROCEDURE — 36415 COLL VENOUS BLD VENIPUNCTURE: CPT | Performed by: STUDENT IN AN ORGANIZED HEALTH CARE EDUCATION/TRAINING PROGRAM

## 2021-07-08 PROCEDURE — 86341 ISLET CELL ANTIBODY: CPT | Performed by: STUDENT IN AN ORGANIZED HEALTH CARE EDUCATION/TRAINING PROGRAM

## 2021-07-08 PROCEDURE — 999N000127 HC STATISTIC PERIPHERAL IV START W US GUIDANCE

## 2021-07-08 PROCEDURE — 71260 CT THORAX DX C+: CPT

## 2021-07-08 PROCEDURE — 74177 CT ABD & PELVIS W/CONTRAST: CPT | Mod: 26 | Performed by: RADIOLOGY

## 2021-07-08 PROCEDURE — 80048 BASIC METABOLIC PNL TOTAL CA: CPT | Performed by: STUDENT IN AN ORGANIZED HEALTH CARE EDUCATION/TRAINING PROGRAM

## 2021-07-08 PROCEDURE — 255N000002 HC RX 255 OP 636: Performed by: STUDENT IN AN ORGANIZED HEALTH CARE EDUCATION/TRAINING PROGRAM

## 2021-07-08 PROCEDURE — 72157 MRI CHEST SPINE W/O & W/DYE: CPT | Mod: 26 | Performed by: RADIOLOGY

## 2021-07-08 PROCEDURE — 999N001017 HC STATISTIC GLUCOSE BY METER IP

## 2021-07-08 PROCEDURE — 250N000013 HC RX MED GY IP 250 OP 250 PS 637: Performed by: STUDENT IN AN ORGANIZED HEALTH CARE EDUCATION/TRAINING PROGRAM

## 2021-07-08 PROCEDURE — 70553 MRI BRAIN STEM W/O & W/DYE: CPT | Mod: 26 | Performed by: RADIOLOGY

## 2021-07-08 PROCEDURE — 72157 MRI CHEST SPINE W/O & W/DYE: CPT

## 2021-07-08 PROCEDURE — 83519 RIA NONANTIBODY: CPT | Performed by: STUDENT IN AN ORGANIZED HEALTH CARE EDUCATION/TRAINING PROGRAM

## 2021-07-08 PROCEDURE — 92610 EVALUATE SWALLOWING FUNCTION: CPT | Mod: GN

## 2021-07-08 PROCEDURE — 84999 UNLISTED CHEMISTRY PROCEDURE: CPT | Performed by: STUDENT IN AN ORGANIZED HEALTH CARE EDUCATION/TRAINING PROGRAM

## 2021-07-08 PROCEDURE — 72156 MRI NECK SPINE W/O & W/DYE: CPT

## 2021-07-08 PROCEDURE — A9585 GADOBUTROL INJECTION: HCPCS | Performed by: STUDENT IN AN ORGANIZED HEALTH CARE EDUCATION/TRAINING PROGRAM

## 2021-07-08 PROCEDURE — 85027 COMPLETE CBC AUTOMATED: CPT | Performed by: STUDENT IN AN ORGANIZED HEALTH CARE EDUCATION/TRAINING PROGRAM

## 2021-07-08 PROCEDURE — 99231 SBSQ HOSP IP/OBS SF/LOW 25: CPT | Mod: GC | Performed by: STUDENT IN AN ORGANIZED HEALTH CARE EDUCATION/TRAINING PROGRAM

## 2021-07-08 PROCEDURE — 97530 THERAPEUTIC ACTIVITIES: CPT | Mod: GP

## 2021-07-08 PROCEDURE — 92526 ORAL FUNCTION THERAPY: CPT | Mod: GN

## 2021-07-08 PROCEDURE — 97161 PT EVAL LOW COMPLEX 20 MIN: CPT | Mod: GP

## 2021-07-08 PROCEDURE — 97116 GAIT TRAINING THERAPY: CPT | Mod: GP

## 2021-07-08 PROCEDURE — 70553 MRI BRAIN STEM W/O & W/DYE: CPT

## 2021-07-08 PROCEDURE — 83520 IMMUNOASSAY QUANT NOS NONAB: CPT | Performed by: STUDENT IN AN ORGANIZED HEALTH CARE EDUCATION/TRAINING PROGRAM

## 2021-07-08 PROCEDURE — 120N000002 HC R&B MED SURG/OB UMMC

## 2021-07-08 PROCEDURE — 250N000011 HC RX IP 250 OP 636

## 2021-07-08 PROCEDURE — 84443 ASSAY THYROID STIM HORMONE: CPT | Performed by: STUDENT IN AN ORGANIZED HEALTH CARE EDUCATION/TRAINING PROGRAM

## 2021-07-08 RX ORDER — CARBIDOPA AND LEVODOPA 25; 100 MG/1; MG/1
1 TABLET ORAL DAILY
Status: DISCONTINUED | OUTPATIENT
Start: 2021-07-08 | End: 2021-07-09 | Stop reason: HOSPADM

## 2021-07-08 RX ORDER — CLONAZEPAM 0.5 MG/1
1 TABLET ORAL 2 TIMES DAILY
Status: DISCONTINUED | OUTPATIENT
Start: 2021-07-08 | End: 2021-07-08

## 2021-07-08 RX ORDER — CLONAZEPAM 1 MG/1
0.5 TABLET ORAL DAILY PRN
Status: ON HOLD | COMMUNITY
End: 2021-07-09

## 2021-07-08 RX ORDER — BISACODYL 10 MG
10 SUPPOSITORY, RECTAL RECTAL DAILY PRN
COMMUNITY
End: 2023-02-08

## 2021-07-08 RX ORDER — CARBIDOPA AND LEVODOPA 25; 100 MG/1; MG/1
2 TABLET ORAL 2 TIMES DAILY
Status: DISCONTINUED | OUTPATIENT
Start: 2021-07-08 | End: 2021-07-09 | Stop reason: HOSPADM

## 2021-07-08 RX ORDER — IOPAMIDOL 755 MG/ML
118 INJECTION, SOLUTION INTRAVASCULAR ONCE
Status: COMPLETED | OUTPATIENT
Start: 2021-07-08 | End: 2021-07-08

## 2021-07-08 RX ORDER — CLONAZEPAM 0.5 MG/1
0.5 TABLET ORAL DAILY
Status: DISCONTINUED | OUTPATIENT
Start: 2021-07-08 | End: 2021-07-08

## 2021-07-08 RX ORDER — ACETAMINOPHEN 500 MG
1000 TABLET ORAL EVERY 6 HOURS PRN
Status: ON HOLD | COMMUNITY
End: 2021-07-09

## 2021-07-08 RX ORDER — CARBIDOPA AND LEVODOPA 50; 200 MG/1; MG/1
1 TABLET, EXTENDED RELEASE ORAL AT BEDTIME
Status: DISCONTINUED | OUTPATIENT
Start: 2021-07-08 | End: 2021-07-09 | Stop reason: HOSPADM

## 2021-07-08 RX ORDER — GADOBUTROL 604.72 MG/ML
10 INJECTION INTRAVENOUS ONCE
Status: COMPLETED | OUTPATIENT
Start: 2021-07-08 | End: 2021-07-08

## 2021-07-08 RX ORDER — CLONAZEPAM 0.5 MG/1
2 TABLET ORAL 2 TIMES DAILY
Status: DISCONTINUED | OUTPATIENT
Start: 2021-07-08 | End: 2021-07-09 | Stop reason: HOSPADM

## 2021-07-08 RX ORDER — CARBIDOPA AND LEVODOPA 50; 200 MG/1; MG/1
TABLET, EXTENDED RELEASE ORAL
Status: ON HOLD | COMMUNITY
End: 2023-05-25

## 2021-07-08 RX ORDER — CLONAZEPAM 0.5 MG/1
1 TABLET ORAL DAILY
Status: DISCONTINUED | OUTPATIENT
Start: 2021-07-08 | End: 2021-07-09 | Stop reason: HOSPADM

## 2021-07-08 RX ADMIN — CARBIDOPA AND LEVODOPA 2 TABLET: 25; 100 TABLET ORAL at 07:42

## 2021-07-08 RX ADMIN — Medication 15 MG: at 11:58

## 2021-07-08 RX ADMIN — CLOZAPINE 25 MG: 25 TABLET ORAL at 07:43

## 2021-07-08 RX ADMIN — IOPAMIDOL 118 ML: 755 INJECTION, SOLUTION INTRAVENOUS at 10:26

## 2021-07-08 RX ADMIN — GABAPENTIN 800 MG: 800 TABLET, FILM COATED ORAL at 07:43

## 2021-07-08 RX ADMIN — CLONAZEPAM 1 MG: 0.5 TABLET ORAL at 11:58

## 2021-07-08 RX ADMIN — CLONAZEPAM 1 MG: 0.5 TABLET ORAL at 07:42

## 2021-07-08 RX ADMIN — TRAZODONE HYDROCHLORIDE 100 MG: 100 TABLET ORAL at 21:58

## 2021-07-08 RX ADMIN — RIVAROXABAN 20 MG: 20 TABLET, FILM COATED ORAL at 04:33

## 2021-07-08 RX ADMIN — Medication 15 MG: at 19:31

## 2021-07-08 RX ADMIN — ACETAMINOPHEN 1000 MG: 500 TABLET, FILM COATED ORAL at 07:42

## 2021-07-08 RX ADMIN — GABAPENTIN 800 MG: 800 TABLET, FILM COATED ORAL at 19:31

## 2021-07-08 RX ADMIN — TAMSULOSIN HYDROCHLORIDE 0.4 MG: 0.4 CAPSULE ORAL at 07:43

## 2021-07-08 RX ADMIN — SENNOSIDES 1 TABLET: 8.6 TABLET, FILM COATED ORAL at 07:42

## 2021-07-08 RX ADMIN — CARBIDOPA AND LEVODOPA 1 TABLET: 50; 200 TABLET, EXTENDED RELEASE ORAL at 21:58

## 2021-07-08 RX ADMIN — Medication 15 MG: at 16:07

## 2021-07-08 RX ADMIN — Medication 1 HALF-TAB: at 16:05

## 2021-07-08 RX ADMIN — DOCUSATE SODIUM 100 MG: 100 CAPSULE, LIQUID FILLED ORAL at 07:42

## 2021-07-08 RX ADMIN — GADOBUTROL 8.5 ML: 604.72 INJECTION INTRAVENOUS at 21:24

## 2021-07-08 RX ADMIN — CARBIDOPA AND LEVODOPA 1 TABLET: 50; 200 TABLET, EXTENDED RELEASE ORAL at 01:25

## 2021-07-08 RX ADMIN — TRAZODONE HYDROCHLORIDE 100 MG: 100 TABLET ORAL at 01:25

## 2021-07-08 RX ADMIN — CARBIDOPA AND LEVODOPA 1 TABLET: 25; 100 TABLET ORAL at 16:05

## 2021-07-08 RX ADMIN — POLYETHYLENE GLYCOL 3350 17 G: 17 POWDER, FOR SOLUTION ORAL at 07:41

## 2021-07-08 RX ADMIN — CLONAZEPAM 2 MG: 0.5 TABLET ORAL at 19:36

## 2021-07-08 RX ADMIN — ATORVASTATIN CALCIUM 40 MG: 40 TABLET, FILM COATED ORAL at 21:58

## 2021-07-08 RX ADMIN — CARBIDOPA AND LEVODOPA 2 TABLET: 25; 100 TABLET ORAL at 11:58

## 2021-07-08 RX ADMIN — Medication 15 MG: at 00:08

## 2021-07-08 RX ADMIN — Medication 12.5 MG: at 07:43

## 2021-07-08 RX ADMIN — Medication 15 MG: at 07:44

## 2021-07-08 RX ADMIN — GABAPENTIN 800 MG: 800 TABLET, FILM COATED ORAL at 16:05

## 2021-07-08 RX ADMIN — Medication 12.5 MG: at 19:31

## 2021-07-08 RX ADMIN — ACETAMINOPHEN 1000 MG: 500 TABLET, FILM COATED ORAL at 19:31

## 2021-07-08 ASSESSMENT — ACTIVITIES OF DAILY LIVING (ADL)
ADLS_ACUITY_SCORE: 17
ADLS_ACUITY_SCORE: 18
ADLS_ACUITY_SCORE: 18

## 2021-07-08 NOTE — PLAN OF CARE
Admitted for muscle spasms, unintentional weight loss. Hx Parkinson, depression. VSS on RA. Neuros: BUE tremors with intermittent muscle spasms BUE/BLE. BLE neuropathy. A&Ox4, 5/5 strengths throughout. PIV SL. Regular diet. Up 1/gb/walker. Voiding spnt. CT abd,pelvis,chest done; at MRI now; possible LP. Lives at outside facility. Continue to monitor.

## 2021-07-08 NOTE — PLAN OF CARE
Admitted for muscle spasms, unintentional weight loss. Hx Parkinson, depression. VSS on RA. Neuros: BUE tremors with intermittent muscle spasms BUE/BLE. BLE neuropathy. A&Ox4, 5/5 strengths throughout. PIV SL. Regular diet. Up 1/gb/walker. Voids without difficulty. Denies pain. Plan possible LP. Continue to monitor.

## 2021-07-08 NOTE — UTILIZATION REVIEW
Admission Status; Secondary Review Determination       Under the authority of the Utilization Management Committee, the utilization review process indicated a secondary review on the above patient. The review outcome is based on review of the medical records, discussions with staff, and applying clinical experience noted on the date of the review.     (x) Inpatient Status Appropriate - This patient's medical care is consistent with medical management for inpatient care and reasonable inpatient medical practice.     RATIONALE FOR DETERMINATION   56-year-old man with complex history of atypical parkinsonism now with what sounds like dystonic spells involving the whole body.  Per neurologist: He does have motor and nonmotor features suggestive of IPD, but has had suboptimal response to Sinemet therapy. Left arm dystonia and how whole body spells would be atypical of IPD as well. 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 This may be autoimmune or even paraneoplastic, considering weight loss. Plan to admit to neurology for further work up including brain and spine MRIs, DEE DEE-antibody, CSF for paraneoplastic panel and malignancy eval with CT C/A/P.   The expected length of stay at the time of admission was more than 2 nights because of the severity of illness, intensity of service provided, and risk for adverse outcome. Inpatient admission is appropriate.     This document was produced using voice recognition software       The information on this document is developed by the utilization review team in order for the business office to ensure compliance. This only denotes the appropriateness of proper admission status and does not reflect the quality of care rendered.   The definitions of Inpatient Status and Observation Status used in making the determination above are those provided in the CMS Coverage Manual, Chapter 1 and Chapter 6, section 70.4.    Sincerely,   ALISTAIR MUNGUIA MD   System Medical Director   Utilization Management   [facility].

## 2021-07-08 NOTE — PROGRESS NOTES
Arrived from:  ED 2200  Belongings/meds:  With patient   2 RN Skin Assessment Completed by:  Maury/Veronique MORALES  Non-intact findings documented (yes/no/NA): Yes

## 2021-07-08 NOTE — PROGRESS NOTES
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  Patient being admitted for further workup. Pt denies trouble swallowing PTA. Clinical swallow eval completed per MD orders to further assess oropharyngeal swallow function.    Past History of Dysphagia Per care everywhere, pt with VFSS completed 11/2020 with no evidence of aspiration or penetration. Swallowing WFL. Pt denies trouble swallowing PTA.    Type of Evaluation   Type of Evaluation Swallow Evaluation   Oral Motor   Oral Musculature generally intact   Structural Abnormalities none present   Mucosal Quality adequate   Dentition (Oral Motor)   Dentition (Oral Motor) adequate dentition   Facial Symmetry (Oral Motor)   Facial Symmetry (Oral Motor) bilateral impairment   Left Side Facial Asymmetry minimal impairment   Lip Function (Oral Motor)   Lip Range of Motion (Oral Motor) retraction impairment   Protrusion, Lip Range of Motion minimal impairment   Tongue Function (Oral Motor)   Tongue ROM (Oral Motor) WNL   Jaw Function (Oral Motor)   Jaw Function (Oral Motor) WNL   Cough/Swallow/Gag Reflex (Oral Motor)   Volitional Throat Clear/Cough (Oral Motor) WNL   Vocal Quality/Secretion Management (Oral Motor)   Vocal Quality (Oral Motor) WFL   Secretion Management (Oral Motor) WNL   General Swallowing Observations   Current Diet/Method of Nutritional Intake (General Swallowing Observations, NIS) thin liquids;regular diet   Respiratory Support (General Swallowing Observations) none   Swallowing Evaluation Clinical swallow evaluation   Clinical Swallow Evaluation   Feeding  Assistance set up only required   Clinical Swallow Evaluation Textures Trialed Thin Liquids;Puree Textures;Solid Foods   Clinical Swallow Eval: Thin Liquid Texture Trial   Mode of Presentation, Thin Liquids cup;self-fed   Volume of Liquid or Food Presented 5 oz   Oral Phase of Swallow WFL   Pharyngeal Phase of Swallow intact   Diagnostic Statement No overt s/sx of aspiration   Clinical Swallow Evaluation: Puree Solid Texture Trial   Mode of Presentation, Puree spoon;self-fed   Volume of Puree Presented 3 oz   Oral Phase, Puree WFL   Pharyngeal Phase, Puree intact   Diagnostic Statement No overt s/sx of aspiration   Clinical Swallow Evaluation: Solid Food Texture Trial   Mode of Presentation, Solid self-fed   Volume of Solid Food Presented 4 tbsp   Oral Phase, Solid other (see comments)  (mildly prolonged but functional time for mastication)   Pharyngeal Phase, Solid intact   Diagnostic Statement No overt s/sx of aspiration. Pt required mildly prolonged but functional time for mastication   Swallowing Recommendations   Diet Consistency Recommendations thin liquids;regular diet   Supervision Level for Intake patient independent   Mode of Delivery Recommendations bolus size, small;food moistened;slow rate of intake   Swallowing Maneuver Recommendations alternate food and liquid intake   Monitoring/Assistance Required (Eating/Swallowing) stop eating activities when fatigue is present;monitor for cough or change in vocal quality with intake;cue for finger/lingual sweep if oral pocketing present;optimize oral intake to minimize need for tube feeding   Recommended Feeding/Eating Techniques (Swallow Eval) maintain upright sitting position for eating;maintain upright posture during/after eating for 30 minutes;provide 6 smaller meals throughout day   Medication Administration Recommendations, Swallowing (SLP) as tolerated   General Therapy Interventions   Planned Therapy Interventions Dysphagia Treatment   Dysphagia treatment  Compensatory strategies for swallowing;Instruction of safe swallow strategies   SLP Therapy Assessment/Plan   Criteria for Skilled Therapeutic Interventions Met (SLP Eval) current level of function same as previous level of function   SLP Diagnosis Oropharyngeal swallow WFL   Therapy Frequency (SLP Eval) one time eval and treatment only   Comment, Therapy Assessment/Plan (SLP) Clinical swallow eval completed per MD orders. Pt presents with functional oropharyngeal swallowing mechanism. Oral mech exam remarkable for bilateral facial weakness and reduced lip retraction. Pt tolerated thin liquids, pureed textures, and regular solid textures with no overt s/sx of aspiration. Pt required mildly prolonged but functional time for mastication with regular solid textures. Recommend pt continue regular textures and thin liquids. Pt should be fully upright and alert for all PO, take small sips/bites, slow pacing, and alternate between consistencies. Pt verbalized good understanding of safe swallow strategies. No additional ST needs indicated for dysphagia; will complete orders.    Therapy Plan Review/Discharge Plan (SLP)   Therapy Plan Review (SLP) evaluation/treatment results reviewed;care plan/treatment goals reviewed;risks/benefits reviewed;current/potential barriers reviewed;participants voiced agreement with care plan;participants included;patient   Demonstrates Need for Referral to Another Service (SLP) clinical nutrition services/dietitian;occupational therapist;physical therapist   SLP Discharge Planning    SLP Discharge Recommendation (DC Rec)   (defer to PT/OT)   SLP Rationale for DC Rec suspect pt is at baseline oropharyngeal swallow function.    SLP Brief overview of current status   Recommend pt continue regular textures and thin liquids. Pt should be fully upright and alert for all PO, take small sips/bites, slow pacing, and alternate between consistencies.    Total Evaluation Time   Total Evaluation Time  (Minutes) 14

## 2021-07-08 NOTE — CONSULTS
Care Management Initial Consult    General Information  Assessment completed with: KWADWO Clark by phone. Per bedside RN patient is confused. Per [facility], pt is his own decision maker.   Primary Care Provider verified and updated as needed: Yes(Golden Garay)   Readmission within the last 30 days:     Advance Care Planning: Advance Care Planning Reviewed: present on chart        Communication Assessment  Patient's communication style: spoken language (English or Bilingual)    Hearing Difficulty or Deaf: no   Wear Glasses or Blind: no    Cognitive  Cognitive/Neuro/Behavioral: did not assess    Living Environment:   People in home: facility resident     Current living Arrangements: assisted living                                            Name of Facility: [facility] [place]                                           RN phone: [phone]                                          Other phone: [phone]                                          Fx:[phone]                                          Discharge Pharmacy: [facility]                                          Discharge Transport: W/C transport  Able to return to prior arrangements:  Yes, there are no restrictions on when pt can return per [facility] RN.   .    Family/Social Support:  Care provided by: self([facility])  Provides care for: no one, unable/limited ability to care for self       Description of Support System: [facility] staff. Supportive, Involved. He has brother and JASS - not sure how involved they are.      Current Resources:   Patient receiving home care services: No  Community Resources: OP PT at [facility]. Current PT recs are for OP PT.   Equipment currently used at home: wheelchair, power, walker, rolling  Supplies currently used at home:      Employment/Financial:  Employment Status: disabled     Financial Concerns: No concerns identified           Lifestyle & Psychosocial Needs:        Socioeconomic History     Marital  status: Single     Spouse name: Not on file     Number of children: Not on file     Years of education: Not on file     Highest education level: Not on file     Tobacco Use     Smoking status: Current Every Day Smoker     Types: Pipe     Smokeless tobacco: Never Used   Substance and Sexual Activity     Alcohol use: Not Currently     Comment: sober x 18 months     Drug use: Not Currently       Functional Status:  Prior to admission patient needed assistance: gets help from [facility] with meds, meals, bathing, grooming, etc. He uses walker at home and power w/c outside of home. He does not have power w/c at hospital        Additional Information:  Initial assessment completed due to high risk readmission score. Please see above assessment for details. Care management will follow for any discharge needs.          Nanci Bellamy RN

## 2021-07-08 NOTE — PROGRESS NOTES
"[facility]  General Neurology - Progress Note    Patient Name:  Benjamin Mathews  Date of Service:  July 8, 2021    Subjective:    NAEON.  Patient did not get up from the ED until 11pm, and unfortunately, did not receive his noon medications yesterday.  Was able to get night medications prior to sleep.  He feels his \"Parkinson's is worse\" today, but could not elaborate further, just feels more stiff.  Improved after his morning dose of sinemet.  He is appreciative for the thoroughness of the workup he will be receiving during this hospitalization.    Denies f/c, chest pain, SOB, abdominal pain, n/v/d, leg swelling.  No numbness or tingling, besides his chronic peripheral neuropathy.        Objective:    Vitals: /55   Pulse 83   Temp 96.4  F (35.8  C) (Oral)   Resp 18   Ht 1.93 m (6' 4\")   Wt 87.3 kg (192 lb 8 oz)   SpO2 99%   BMI 23.43 kg/m    General: Lying in bed, NAD.  Significant pill-rolling tremor at rest on LUE.  Head: Atraumatic, normocephalic.  No longer rotated to the R side, able to hold head midline.  Tone normal in cervical neck musculature.  Cardiac: no lower extremity edema  Neurologic:  Mental Status: Fully alert, attentive and oriented x 4. Speech clear and fluent.  Unable to spell world backwards (able to do it forwards).  Unable to state the months of the year backwards (made it through 4/12 before becoming distracted).    Cranial Nerves: EOM intact, without nystagmus. Facial movements symmetric at rest and with activation.   Motor: Moving all 4 extremities antigravity.  Tone increased in BLE in AM, though improved to baseline after morning medications.  Sensory: Intact to light touch x 4 extremities, though decreased sensation to touch and pinprick in bilateral feet.  Station/Gait: Gait shortened, though able to pick feet up off the floor.  Appropriate balance, without assistance.  Able to walk across the room without assistive device.     Pertinent " Investigations:    I have personally reviewed most recent and pertinent labs, tests, and radiological images.     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  Patient being admitted for further workup.     Plan:  Hx Atypical parkinsonism  Hx Dystonia  Muscle spasms  Weight loss  Patient reports a clinical history of muscle spasms that started 8 months ago with increased frequency over the past 2 months, characterized by gradual increase in intensity, and following predictable pattern, with initial involvement of fine muscles, followed by gradual improvement over 6-7 hours.  Also with 40 lb weight loss over 6 months, though no other localizing symptoms.  VSS, electrolytes wnl.  TSH wnl, HA1C 6.0%.  Ddx includes dystonia, stiff person syndrome, paraneoplastic/rheumatologic, functional, muscle spasms related to exertion in the setting of Parkinson and past CVA.  Predictable pattern of muscle spasm seems to decrease likelihood of muscle spasms related to exertion or electrolyte imbalance.  Dystonia a possibility, given patient has been taking clozapine for two years - patient has had episodes that he describes as dystonia, but they usually improved with PTA trihexyphenidyl.  Stiff person syndrome fits clinical picture, and is further supported by response to klonopin and baclofen, however, further workup would need to be established - this can also be seen as a paraneoplastic syndrome.  Significant weight loss can be seen in Parkinson disease, however, patient with fairly significant weight loss over months, and given his already atypical presentation of parkinsonism, this progression could also be a manifestation of neoplasm.   - MRI brain, cervical spine, thoracic spine w/wo contrast ordered  - CT CAP w/wo ordered for evaluation of  malignancy  - Will pursue paraneoplastic workup that will include:             - LP, with paraneoplastic panel             - Serum anti-DEE DEE  - Increase klonopin to 2-1-2mg TID (double home dose, as tolerated)             - klonopin 0.5mg BID PRN for breakthrough spasms  - PTA baclofen 10-10-15mg TID  - PTA trihexyphenidyl 3mg TID  - PTA Sinemet  BID  - PTA donepezil 10mg qd  - Will consider EMG if above is inconclusive     Diarrhea - improved  Patient with four days of loose stools PTA.  Has history of alternating constipation and diarrhea, likely related to Parkinson diagnosis and PTA bowel regimen.  Diarrhea could have been contributing to muscle spasm severity at presentation.  No recent antibiotic use, GIB, or other risk factors for C diff infection.  No BM since admission  - Continue PTA bowel regimen      Chronic medical problems  #Psychosis related to atypical parkinson's - clozapine  #MDD - vilazodone, trazodone  #Hx CVA - atorvastatin, ASA  #DVT/PE - Xarelto  #Tachycardia - metoprolol succinate  #Constipation - hold PTA bowel regimen in setting of diarrhea  #Peripheral neuropathy - gabapentin  #BPH - tamsulosin  #Facial dermatitis - triamcinolone     FEN: Received 1L NS in ED.  Regular diet  PPx: PTA xarelto  Code: FULL     Patient was seen and discussed with Dr. Graf.    Mario Kowalski MD

## 2021-07-08 NOTE — PROGRESS NOTES
"   07/08/21 1418   Quick Adds   Type of Visit Initial PT Evaluation   Living Environment   People in home facility resident  (care home in [place] with 3 other pts)   Current Living Arrangements assisted living;extended care facility   Home Accessibility no concerns   Transportation Anticipated health plan transportation   Living Environment Comments assisted living/long term care facility   Self-Care   Usual Activity Tolerance good   Current Activity Tolerance moderate   Regular Exercise Yes   Activity/Exercise Type walking;strength training  (hip strength, PT at [facility])   Exercise Amount/Frequency 3-5 times/wk   Equipment Currently Used at Home wheelchair, power;walker, rolling   Activity/Exercise/Self-Care Comment Pt has attended OP PT at [facility] in the past. Currently able to walk outside with assist at home.   Disability/Function   Difficulty Eating/Swallowing yes   Walking or Climbing Stairs Difficulty yes   Walking or Climbing Stairs ambulation difficulty, requires equipment   Mobility Management walker   Dressing/Bathing Difficulty yes   Dressing/Bathing bathing difficulty, assistance 1 person;dressing difficulty, assistance 1 person   Dressing/Bathing Management assistance with bathing/dressing at group home   Toileting issues no   Doing Errands Independently Difficulty (such as shopping) yes   Errands Management group home provides assistance    Fall history within last six months no   General Information   Onset of Illness/Injury or Date of Surgery 07/07/21   Referring Physician Viri Mills MD   Patient/Family Therapy Goals Statement (PT) return home, get stronger   Pertinent History of Current Problem (include personal factors and/or comorbidities that impact the POC) Per MD: \"Benjamin Mathews is a 56 year old male with history of atypical parkinson's (2005) who presents with muscle spasms.\"   Existing Precautions/Restrictions fall   General Observations Activity: up with assist "   Cognition   Orientation Status (Cognition) oriented x 3  (says July 28, 2021 for date)   Cognitive Status Comments Pt occasionally impulsive/reduced safety awareness   Pain Assessment   Patient Currently in Pain No   Integumentary/Edema   Integumentary/Edema no deficits were identifed   Posture    Posture Forward head position;Protracted shoulders   Range of Motion (ROM)   ROM Quick Adds ROM WFL   ROM Comment minor deficits in knee flexion and ankle dorsiflexion observed during functional screen.   Strength   Strength Comments >3/5 BLE per functional evaluation (sit<>stand SBA)   Bed Mobility   Comment (Bed Mobility) SBA with all bed mobility   Transfers   Transfer Safety Comments Pt transfers sit<>stand from EOB and recliner SBA with occasional cueing for hand placement and occasional CGA d/t resting tremors   Gait/Stairs (Locomotion)   Comment (Gait/Stairs) Ambulates 40ft in rm CGA with fww; 1 bout of 10' with no AD and close CGA; pt unsteady with NBOS but no LOB.   Balance   Balance Comments Increased sway with static balance; dynamic balance deficits observed during gait   Sensory Examination   Sensory Perception patient reports no sensory changes   Coordination   Coordination no deficits were identified   Muscle Tone   Muscle Tone other (see comments)   Muscle Tone Comments Not formally assessed but intermittent dystonia BLE, resting tremors BUE.   Clinical Impression   Criteria for Skilled Therapeutic Intervention yes, treatment indicated   PT Diagnosis (PT) Impaired functional mobility   Influenced by the following impairments Medical status, tone, weakness   Functional limitations due to impairments Gait, community mobility   Clinical Presentation Stable/Uncomplicated   Clinical Presentation Rationale chart review, clinical judgment   Clinical Decision Making (Complexity) low complexity   Therapy Frequency (PT) 4x/week   Predicted Duration of Therapy Intervention (days/wks) 10 days   Planned Therapy  Interventions (PT) balance training;gait training;home exercise program;neuromuscular re-education;patient/family education;strengthening   Risk & Benefits of therapy have been explained evaluation/treatment results reviewed;care plan/treatment goals reviewed;risks/benefits reviewed;current/potential barriers reviewed;participants voiced agreement with care plan;participants included;patient   PT Discharge Planning    PT Discharge Recommendation (DC Rec) home with outpatient physical therapy   PT Rationale for DC Rec PT: pt is near baseline mobility, and has supportive living facility with medical transportation to outpatient PT.   PT Brief overview of current status  RN: up with gait belt CGA, gait with close CGA and w/c follow using FWW   Total Evaluation Time   Total Evaluation Time (Minutes) 5

## 2021-07-08 NOTE — PROGRESS NOTES
"CLINICAL NUTRITION SERVICES - ASSESSMENT NOTE     Nutrition Prescription    RECOMMENDATIONS FOR MDs/PROVIDERS TO ORDER:  - Encouragement of PO with liberal diet; diet per SLP     Malnutrition Status:    - Unable to determine due to unable to complete all parameter of NFPA     Recommendations already ordered by Registered Dietitian (RD):  - Ordered supplements with meals: Ensure Enlive     Future/Additional Recommendations:  - PO/supp adequacy (nader counts if pt remains inpatient)  - Weight trends      REASON FOR ASSESSMENT  Benjamin Mathews is a/an 56 year old male assessed by the dietitian for Admission Nutrition Risk Screen for positive - weight loss not significant per chart review     Medical History: history of atypical parkinson's (2005) who presents with muscle spasms.    NUTRITION HISTORY  Per chart; attempted visits, pt with other cares/therapies  Diarrhea - 4 days of loose stools (improving). H/o alternating constipation and diarrhea. History of weight loss. Lives in assisted living facility.    CURRENT NUTRITION ORDERS  Diet: Regular  Intake/Tolerance: SLP assessed. Recommended pt continue regular textures and thin liquids. Pt should be fully upright and alert for all PO, take small sips/bites, slow pacing, and alternate between consistencies.    LABS  Labs reviewed  A1C: 6.0 (H)    MEDICATIONS  Medications reviewed  Lipitor   Sinemet  Colace   Senokot   Prn Zofran, Mag Cit    ANTHROPOMETRICS  Height: 193 cm (6' 4\") 76\"   Most Recent Weight: 87.3 kg (192 lb 8 oz)    IBW: 92 kg  BMI: Normal BMI  Weight History:   Wt Readings from Last 9 Encounters:   07/07/21 87.3 kg (192 lb 8 oz)   07/28/20 104.9 kg (231 lb 3.2 oz)   Weight history per chart review:  7/8/21: 87.3 kg   4/29/21: 93.4 kg   2/6/21: 92.4 kg  -------------------  6% weight loss over 3 month     Dosing Weight: 87 kg (Admit weight)     ASSESSED NUTRITION NEEDS  Estimated Energy Needs: 0514-7151 kcals/day (25 - 30 kcals/kg)  Justification: " Maintenance  Estimated Protein Needs:  grams protein/day (1 - 1.2 grams of pro/kg)  Justification: Maintenance  Estimated Fluid Needs: (1 mL/kcal)   Justification: Maintenance and Per provider pending fluid status    PHYSICAL FINDINGS  See malnutrition section below.   From chart  Abd: Soft, non-tender, BS+, no masses appreciated  Extremities: Radial and pedal pulses intact and symmetric, no pedal edema  Neuro: No lateralizing symptoms or focal neurologic deficits    MALNUTRITION  % Intake: Unable to assess  % Weight Loss: Up to 7.5% in 3 months (non-severe)  Subcutaneous Fat Loss: Unable to assess  Muscle Loss: Unable to assess  Fluid Accumulation/Edema: Unable to assess  Malnutrition Diagnosis: Unable to determine due to unable to complete all parameter of NFPA     NUTRITION DIAGNOSIS  Inadequate oral intake related to decreased appetite/neuro status as evidenced by decreased PO with corresponding weight loss PTA      INTERVENTIONS  Implementation  Nutrition Education: Will be provided if education needs arise   Medical food supplement therapy     Goals  Patient to consume % of nutritionally adequate meal trays TID, or the equivalent with supplements/snacks.     Monitoring/Evaluation  Progress toward goals will be monitored and evaluated per protocol.     Brice Casanova RD, LD, [facility]  Neuro ICU  Pager: [phone]

## 2021-07-08 NOTE — PHARMACY-ADMISSION MEDICATION HISTORY
Admission Medication History Completed by Pharmacy    See [facility] Admission Navigator for allergy information, preferred outpatient pharmacy, prior to admission medications and immunization status.      Medication History Sources:   -Dispense report  -Current medications list via [facility]  -Patient's nurse at [facility] ([phone])     Changes made to PTA medication list (reason):  Updated:   - Carbidopa/Levodopa 25/100 mg 2 tab by mouth every day @ 8 am and 12pm, 1.5 tab by mouth every day @ 4pm.   - Cholecalciferol: 5000 international unit(s) every day.     Added:   - Carbidopa/Levodopa 50/200 mg ER 1 tab by mouth every day at bedtime.     Medications Prior to Admission   Medication Sig Dispense Refill Last Dose     acetaminophen (TYLENOL) 500 MG tablet Take 1,000 mg by mouth every 6 hours as needed for pain (up to 2 times a day)        acetaminophen (TYLENOL) 500 MG tablet Take 1,000 mg by mouth 3 times daily         atorvastatin (LIPITOR) 40 MG tablet Take 40 mg by mouth At Bedtime        baclofen (LIORESAL) 10 MG tablet Take 15 mg by mouth 4 times daily         bisacodyl (DULCOLAX) 10 MG suppository Place 10 mg rectally daily as needed for constipation        calcium polycarbophil (FIBER-LAX) 625 MG tablet Take 1 tablet by mouth once daily        carbidopa-levodopa (SINEMET CR)  MG CR tablet Take 1 tablet by mouth At Bedtime        carbidopa-levodopa (SINEMET)  MG tablet *Take 2 tablets by mouth once daily at 12:00PM  *Take one and one-half tablets by mouth once daily at 4:00PM  *Take 2 tablets by mouth once daily at 8:00AM        cholecalciferol (VITAMIN D3) 125 mcg (5000 units) capsule Take 125 mcg by mouth daily        clonazePAM (KLONOPIN) 1 MG tablet Take 0.5 mg by mouth daily as needed for anxiety        clonazePAM (KLONOPIN) 1 MG tablet Take 1 tablet (1 mg) by mouth At Bedtime (Patient taking differently: *Take 1 tablet by mouth twice daily at 8AM and 8PM  *Take  1/2 tablet (0.5mg) by mouth once daily at 12PM) 30 tablet 0      cloZAPine (CLOZARIL) 25 MG tablet Take 25 mg by mouth daily        docusate sodium (COLACE) 100 MG capsule Take 100 mg by mouth 2 times daily as needed for constipation        gabapentin (NEURONTIN) 800 MG tablet Take 800 mg by mouth 3 times daily        hydrocortisone 1 % CREA cream Apply topically to nose and other affected area(s) every morning until resolved        ketoconazole (NIZORAL) 2 % external cream Apply topically to facial rash every evening until resolved        metoprolol succinate ER (TOPROL-XL) 25 MG 24 hr tablet Take 1/2 tablet (12.5mg) by mouth twice daily        Multiple Vitamins-Minerals (UNICOMPLEX-M) TABS Take 1 tablet by mouth once daily        pantoprazole (PROTONIX) 40 MG EC tablet 40 mg Take 1 tablet (40mg) by mouth daily        polyethylene glycol (GAVILAX) 17 GM/Dose powder Take 1 capful by mouth daily as needed        rivaroxaban ANTICOAGULANT (XARELTO) 20 MG TABS tablet Take 20 mg by mouth daily (with dinner)        senna-docusate (SENOKOT-S/PERICOLACE) 8.6-50 MG tablet Take 1 tablet by mouth 2 times daily as needed for constipation        sennosides (SENOKOT) 8.6 MG tablet Take 1 tablet by mouth 2 times daily        tamsulosin (FLOMAX) 0.4 MG capsule Take 0.4 mg by mouth daily        traZODone (DESYREL) 100 MG tablet Take 100 mg by mouth daily at bedtime. Indications: Trouble Sleeping        vitamin C (ASCORBIC ACID) 500 MG tablet Take 500 mg by mouth two times a day.

## 2021-07-09 ENCOUNTER — APPOINTMENT (OUTPATIENT)
Dept: OCCUPATIONAL THERAPY | Facility: CLINIC | Age: 56
DRG: 057 | End: 2021-07-09
Payer: COMMERCIAL

## 2021-07-09 VITALS
HEIGHT: 76 IN | HEART RATE: 71 BPM | RESPIRATION RATE: 16 BRPM | BODY MASS INDEX: 23.44 KG/M2 | WEIGHT: 192.5 LBS | DIASTOLIC BLOOD PRESSURE: 89 MMHG | TEMPERATURE: 98.1 F | SYSTOLIC BLOOD PRESSURE: 110 MMHG | OXYGEN SATURATION: 99 %

## 2021-07-09 LAB
ANION GAP SERPL CALCULATED.3IONS-SCNC: 6 MMOL/L (ref 3–14)
APPEARANCE CSF: CLEAR
BUN SERPL-MCNC: 25 MG/DL (ref 7–30)
CALCIUM SERPL-MCNC: 8.8 MG/DL (ref 8.5–10.1)
CHLORIDE SERPL-SCNC: 108 MMOL/L (ref 94–109)
CO2 SERPL-SCNC: 28 MMOL/L (ref 20–32)
COLOR CSF: COLORLESS
CREAT SERPL-MCNC: 0.77 MG/DL (ref 0.66–1.25)
ERYTHROCYTE [DISTWIDTH] IN BLOOD BY AUTOMATED COUNT: 14.6 % (ref 10–15)
GFR SERPL CREATININE-BSD FRML MDRD: >90 ML/MIN/{1.73_M2}
GLUCOSE CSF-MCNC: 60 MG/DL (ref 40–70)
GLUCOSE SERPL-MCNC: 102 MG/DL (ref 70–99)
GRAM STN SPEC: NORMAL
HCT VFR BLD AUTO: 38.8 % (ref 40–53)
HGB BLD-MCNC: 12 G/DL (ref 13.3–17.7)
MCH RBC QN AUTO: 29.3 PG (ref 26.5–33)
MCHC RBC AUTO-ENTMCNC: 30.9 G/DL (ref 31.5–36.5)
MCV RBC AUTO: 95 FL (ref 78–100)
PLATELET # BLD AUTO: 233 10E9/L (ref 150–450)
POTASSIUM SERPL-SCNC: 3.7 MMOL/L (ref 3.4–5.3)
PROT CSF-MCNC: 72 MG/DL (ref 15–60)
RBC # BLD AUTO: 4.09 10E12/L (ref 4.4–5.9)
RBC # CSF MANUAL: 72 /UL (ref 0–2)
SODIUM SERPL-SCNC: 143 MMOL/L (ref 133–144)
SPECIMEN SOURCE: NORMAL
TUBE # CSF: 4 #
WBC # BLD AUTO: 6.6 10E9/L (ref 4–11)
WBC # CSF MANUAL: 0 /UL (ref 0–5)

## 2021-07-09 PROCEDURE — 36415 COLL VENOUS BLD VENIPUNCTURE: CPT | Performed by: STUDENT IN AN ORGANIZED HEALTH CARE EDUCATION/TRAINING PROGRAM

## 2021-07-09 PROCEDURE — 87070 CULTURE OTHR SPECIMN AEROBIC: CPT | Performed by: STUDENT IN AN ORGANIZED HEALTH CARE EDUCATION/TRAINING PROGRAM

## 2021-07-09 PROCEDURE — 97165 OT EVAL LOW COMPLEX 30 MIN: CPT | Mod: GO | Performed by: OCCUPATIONAL THERAPIST

## 2021-07-09 PROCEDURE — 97535 SELF CARE MNGMENT TRAINING: CPT | Mod: GO | Performed by: OCCUPATIONAL THERAPIST

## 2021-07-09 PROCEDURE — 83916 OLIGOCLONAL BANDS: CPT | Performed by: STUDENT IN AN ORGANIZED HEALTH CARE EDUCATION/TRAINING PROGRAM

## 2021-07-09 PROCEDURE — 86341 ISLET CELL ANTIBODY: CPT | Performed by: STUDENT IN AN ORGANIZED HEALTH CARE EDUCATION/TRAINING PROGRAM

## 2021-07-09 PROCEDURE — 84999 UNLISTED CHEMISTRY PROCEDURE: CPT | Performed by: STUDENT IN AN ORGANIZED HEALTH CARE EDUCATION/TRAINING PROGRAM

## 2021-07-09 PROCEDURE — 82784 ASSAY IGA/IGD/IGG/IGM EACH: CPT | Performed by: STUDENT IN AN ORGANIZED HEALTH CARE EDUCATION/TRAINING PROGRAM

## 2021-07-09 PROCEDURE — 80048 BASIC METABOLIC PNL TOTAL CA: CPT | Performed by: STUDENT IN AN ORGANIZED HEALTH CARE EDUCATION/TRAINING PROGRAM

## 2021-07-09 PROCEDURE — 250N000013 HC RX MED GY IP 250 OP 250 PS 637: Performed by: STUDENT IN AN ORGANIZED HEALTH CARE EDUCATION/TRAINING PROGRAM

## 2021-07-09 PROCEDURE — 87205 SMEAR GRAM STAIN: CPT | Performed by: STUDENT IN AN ORGANIZED HEALTH CARE EDUCATION/TRAINING PROGRAM

## 2021-07-09 PROCEDURE — 82040 ASSAY OF SERUM ALBUMIN: CPT | Performed by: STUDENT IN AN ORGANIZED HEALTH CARE EDUCATION/TRAINING PROGRAM

## 2021-07-09 PROCEDURE — 009U3ZX DRAINAGE OF SPINAL CANAL, PERCUTANEOUS APPROACH, DIAGNOSTIC: ICD-10-PCS | Performed by: STUDENT IN AN ORGANIZED HEALTH CARE EDUCATION/TRAINING PROGRAM

## 2021-07-09 PROCEDURE — 86255 FLUORESCENT ANTIBODY SCREEN: CPT | Performed by: STUDENT IN AN ORGANIZED HEALTH CARE EDUCATION/TRAINING PROGRAM

## 2021-07-09 PROCEDURE — 82042 OTHER SOURCE ALBUMIN QUAN EA: CPT | Performed by: STUDENT IN AN ORGANIZED HEALTH CARE EDUCATION/TRAINING PROGRAM

## 2021-07-09 PROCEDURE — 89050 BODY FLUID CELL COUNT: CPT | Performed by: STUDENT IN AN ORGANIZED HEALTH CARE EDUCATION/TRAINING PROGRAM

## 2021-07-09 PROCEDURE — 87015 SPECIMEN INFECT AGNT CONCNTJ: CPT | Performed by: STUDENT IN AN ORGANIZED HEALTH CARE EDUCATION/TRAINING PROGRAM

## 2021-07-09 PROCEDURE — 99238 HOSP IP/OBS DSCHRG MGMT 30/<: CPT | Mod: GC | Performed by: STUDENT IN AN ORGANIZED HEALTH CARE EDUCATION/TRAINING PROGRAM

## 2021-07-09 PROCEDURE — 84157 ASSAY OF PROTEIN OTHER: CPT | Performed by: STUDENT IN AN ORGANIZED HEALTH CARE EDUCATION/TRAINING PROGRAM

## 2021-07-09 PROCEDURE — 250N000009 HC RX 250: Performed by: STUDENT IN AN ORGANIZED HEALTH CARE EDUCATION/TRAINING PROGRAM

## 2021-07-09 PROCEDURE — 82945 GLUCOSE OTHER FLUID: CPT | Performed by: STUDENT IN AN ORGANIZED HEALTH CARE EDUCATION/TRAINING PROGRAM

## 2021-07-09 PROCEDURE — 85027 COMPLETE CBC AUTOMATED: CPT | Performed by: STUDENT IN AN ORGANIZED HEALTH CARE EDUCATION/TRAINING PROGRAM

## 2021-07-09 RX ORDER — CLONAZEPAM 2 MG/1
2 TABLET ORAL 2 TIMES DAILY
Qty: 60 TABLET | Refills: 0 | Status: SHIPPED | OUTPATIENT
Start: 2021-07-09 | End: 2022-02-07

## 2021-07-09 RX ORDER — CLONAZEPAM 1 MG/1
1 TABLET ORAL DAILY
Qty: 30 TABLET | Refills: 0 | Status: SHIPPED | OUTPATIENT
Start: 2021-07-09 | End: 2022-02-06

## 2021-07-09 RX ADMIN — SENNOSIDES 1 TABLET: 8.6 TABLET, FILM COATED ORAL at 08:15

## 2021-07-09 RX ADMIN — POLYETHYLENE GLYCOL 3350 17 G: 17 POWDER, FOR SOLUTION ORAL at 08:14

## 2021-07-09 RX ADMIN — GABAPENTIN 800 MG: 800 TABLET, FILM COATED ORAL at 08:15

## 2021-07-09 RX ADMIN — CARBIDOPA AND LEVODOPA 2 TABLET: 25; 100 TABLET ORAL at 08:15

## 2021-07-09 RX ADMIN — CARBIDOPA AND LEVODOPA 2 TABLET: 25; 100 TABLET ORAL at 12:31

## 2021-07-09 RX ADMIN — GABAPENTIN 800 MG: 800 TABLET, FILM COATED ORAL at 14:27

## 2021-07-09 RX ADMIN — Medication 15 MG: at 10:19

## 2021-07-09 RX ADMIN — ACETAMINOPHEN 1000 MG: 500 TABLET, FILM COATED ORAL at 08:15

## 2021-07-09 RX ADMIN — Medication 12.5 MG: at 08:14

## 2021-07-09 RX ADMIN — ACETAMINOPHEN 1000 MG: 500 TABLET, FILM COATED ORAL at 14:30

## 2021-07-09 RX ADMIN — TAMSULOSIN HYDROCHLORIDE 0.4 MG: 0.4 CAPSULE ORAL at 08:15

## 2021-07-09 RX ADMIN — Medication 15 MG: at 12:31

## 2021-07-09 RX ADMIN — CLONAZEPAM 2 MG: 0.5 TABLET ORAL at 08:14

## 2021-07-09 RX ADMIN — DOCUSATE SODIUM 100 MG: 100 CAPSULE, LIQUID FILLED ORAL at 08:15

## 2021-07-09 RX ADMIN — LIDOCAINE HYDROCHLORIDE 6 ML: 10 INJECTION, SOLUTION EPIDURAL; INFILTRATION; INTRACAUDAL; PERINEURAL at 09:51

## 2021-07-09 ASSESSMENT — ACTIVITIES OF DAILY LIVING (ADL)
ADLS_ACUITY_SCORE: 17
ADLS_ACUITY_SCORE: 18
ADLS_ACUITY_SCORE: 17

## 2021-07-09 NOTE — PLAN OF CARE
Status: Admitted for muscle spasm  Vitals: VSS on RA  Neuros: Intact ex upper extremity tremors and baseline neuropathy to BLE  IV: PIV removed  Resp/trach: WNL  Diet: Regular  Bowel status: BS+  : Voiding spontaneously  Skin: Intact  Pain: Denies  Activity: Up SBA GB walker  Plan: Discharge to [facility] at 3:30pm

## 2021-07-09 NOTE — PLAN OF CARE
Status: Admitted for muscle spasm  Vitals: VSS on RA  Neuros: [id number] to BUE neuropathy to BLE  IV: 2 PIV SL  Labs/Electrolytes: WDL  Resp/trach: LSCA  Diet: Reg  Bowel status: No Bm this shift  : WDL  Skin: WDL  Pain: Denied  Activity: SBA GB Walker  Plan: Monitor POC

## 2021-07-09 NOTE — PROCEDURES
[facility] Procedure Note          Lumbar Puncture:      Time: 10:00 AM  Performed by: Viri Mills MD  Authorized by: Julieth Graf DO    Indications: abnormal neurologic exam    Consent given by: Patient who states understanding of the procedure being performed after discussing the risks, benefits and alternatives.    Prior to the start of the procedure and with procedural staff participation, I verbally confirmed the patient s identity using two indicators, relevant allergies, that the procedure was appropriate and matched the consent or emergent situation, and that the correct equipment/implants were available. Immediately prior to starting the procedure I conducted the Time Out with the procedural staff and re-confirmed the patient s name, procedure, and site/side. (The Joint Commission universal protocol was followed.) Yes    Under sterile conditions the patient was positioned L Lateral decubitus with knees drawn up. Betadine solution and sterile drapes were utilized.  Local anesthetic at the site: 10 ml of lidocaine 1% without epinephrine from the LP tray  A 21 G  spinal needle was inserted at the L 4-5 interspace.  Opening Pressure was not checked.  A total of 14 mL of clear and colorless spinal fluid was obtained and sent to the laboratory.   After the needle was removed, a bandaid and pressure were applied and the patient was instructed to stay horizontal until the results were back.    Complications:  None    Patient tolerance: Patient tolerated the procedure well with no immediate complications.      Viri Mills MD  Neurology PGY-4

## 2021-07-09 NOTE — PLAN OF CARE
Physical Therapy Discharge Summary    Reason for therapy discharge:    Discharged to home with outpatient therapy.    Progress towards therapy goal(s). See goals on Care Plan in [facility] electronic health record for goal details.  Goals partially met.  Barriers to achieving goals:   discharge from facility.    Therapy recommendation(s):    Continued therapy is recommended.  Rationale/Recommendations:  Progress functional mobility, activity tolerance, balance.

## 2021-07-09 NOTE — PROGRESS NOTES
Afebrile. BP softer, 92/66 MAP 77. Pt denies dizziness. OVSS on RA. Pt went down for MRI at the start of the shift. Reported a headache upon returning, declines interventions. Offers no other complaints. Up SBA with walker. Voiding spontaneously. No BM for shift. PIVs SL. Possible LP tomorrow. Continue POC.

## 2021-07-09 NOTE — DISCHARGE SUMMARY
Neurology Discharge Summary  Benjamin Mathews MRN: [id number]  1965  Primary care provider: Dr. De Dios (Neurology)  ___________________________________          Date of Admission:  7/7/2021  Date of Discharge:  7/9/2021   Admitting Physician:  Julieth Graf DO  Discharge Physician:  Julieth Graf DO   Discharging Service:  Neurology     Primary Provider: No Ref-Primary, Physician         Reason for Admission:   Please see H&P dated 7/7/2021 for full details.    Briefly, Benjamin Mathews is a 56 year old male with history of atypical parkinson's (2005) who presents with muscle spasms.     Patient reports a clinical history that started 8 months ago with increased frequency over the past 2 months characterized for episodes of muscle spasms that gradually increase in intensity, usually starting unilateral, with initial involvement of fine muscles (usually initial manifestation is twitches in the eye muscles, followed by larger muscle involvement) followed by gradual improvement over 6-7 hours.  Patient has presented to the ED several times     Patient has been followed by Dr. De Dios as an outpatient, who has been following these muscle spasms and adjusting both his klonopin and sinemet to try and decrease these spells.          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  Goal of hospitalization was to rule out paraneoplastic phenomena, as patient has had 40 pounds of weight loss over past 6 months.   Workup included CT CAP to assess for malignancy, but was negative.  MRI brain, cervical and thoracic spine also negative.  LP performed during this hospitalization, pending results, culture, paraneoplastic panel.  TSH wnl, HA1C 6.0%.  Ddx still includes dystonia, stiff person syndrome, paraneoplastic/rheumatologic, functional, muscle spasms related to exertion in the setting of Parkinson and past CVA.  Patient with history of dystonia (on clozapine).  Stiff person syndrome can be seen in paraneoplastic process as well.  Muscle spasms improved gradually throughout hospitalization, and patient back to baseline at discharge.  - LP labs pending.  To be followed up in clinic by Dr. De Dios and patient's PCP   - PTA xarelto held prior to procedure.  Patient will resume home dose starting in the evening on 7/9 (day of discharge)  - Increase klonopin to 2-1-2mg TID              - klonopin 0.5mg BID PRN for breakthrough spasms  - PTA baclofen 10-10-15mg TID  - PTA trihexyphenidyl 3mg TID  - PTA Sinemet  BID  - PTA donepezil 10mg qd     Colonic polyps  Patient mentioned that he had significant polyps seen on his last colonoscopy.  Colonoscopy was last performed in 2019, and patient was supposed have repeat colonoscopy in 1 year, but appointment was cancelled 2/2 covid.  - GI consult placed for follow-up colonoscopy  - Patient to follow up with PCP to address post-hospital follow up and age-appropriate cancer/disease screening    Diarrhea - improved  Patient with four days of loose stools PTA.  Has history of alternating constipation and diarrhea, likely related to Parkinson diagnosis and PTA bowel regimen.  Diarrhea could have been contributing to muscle spasm severity at presentation.  No recent antibiotic use, GIB, or other risk factors for C diff infection.  Bowel movements normalized prior to discharge  - Continue PTA bowel regimen      Chronic medical problems  #Psychosis related to atypical parkinson's -  "clozapine  #MDD - vilazodone, trazodone  #Hx CVA - atorvastatin, ASA  #DVT/PE - Xarelto  #Tachycardia - metoprolol succinate  #Constipation -PTA bowel regimen  #Peripheral neuropathy - gabapentin  #BPH - tamsulosin    Physical Exam on day of Discharge:  Blood pressure 106/49, pulse 58, temperature 96.3  F (35.7  C), temperature source Oral, resp. rate 16, height 1.93 m (6' 4\"), weight 87.3 kg (192 lb 8 oz), SpO2 98 %.  Physical Exam:  General: AAOx3, NAD  HEENT: MMM, PERRLA, EOM intact  CV: RRR, normal S1S2, no murmurs  Resp: Clear to auscultation bilaterally, no wheezes, rhonchi  Abd: Soft, non-tender, BS+, no masses appreciated  Extremities: Radial and pedal pulses intact and symmetric, no pedal edema  Neuro:   Mental Status: Fully alert, attentive and oriented x 4. Speech clear and fluent.  Unable to spell WORLD backwards  Cranial Nerves: EOM intact, without nystagmus. Facial movements symmetric at rest and with activation.    Motor: Moving all 4 extremities antigravity.  No pill rolling tremor.  Cogwheel rigidity appreciated in bilateral arms and wrists.  Increased tone in lower extremities, improved from admission.  Sensory: Intact to light touch x 4 extremities, though decreased sensation to touch and pinprick in bilateral feet.  Station/Gait: Gait shortened, though able to pick feet up off the floor.  Appropriate balance, without assistance.  Able to walk across the room without assistive device.     Lines/Tubes:  PIV removed upon discharge         Pending Results:     - LP panel (paraneoplastic, encephalitis, glutamic acid, oligoclonal bands, cell count, glucose, protein, culture)  - Paraneoplastic panel         Discharge Medications:     Current Discharge Medication List      CONTINUE these medications which have CHANGED    Details   !! clonazePAM (KLONOPIN) 1 MG tablet Take 1 tablet (1 mg) by mouth daily At noon  Qty: 30 tablet, Refills: 0    Associated Diagnoses: Muscle spasm      !! clonazePAM (KLONOPIN) " 2 MG tablet Take 1 tablet (2 mg) by mouth 2 times daily  Qty: 60 tablet, Refills: 0    Associated Diagnoses: Muscle spasm       !! - Potential duplicate medications found. Please discuss with provider.      CONTINUE these medications which have NOT CHANGED    Details   acetaminophen (TYLENOL) 500 MG tablet Take 1,000 mg by mouth 3 times daily       atorvastatin (LIPITOR) 40 MG tablet Take 40 mg by mouth At Bedtime      baclofen (LIORESAL) 10 MG tablet Take 15 mg by mouth 4 times daily       bisacodyl (DULCOLAX) 10 MG suppository Place 10 mg rectally daily as needed for constipation      calcium polycarbophil (FIBER-LAX) 625 MG tablet Take 1 tablet by mouth once daily      carbidopa-levodopa (SINEMET CR)  MG CR tablet Take 1 tablet by mouth At Bedtime      carbidopa-levodopa (SINEMET)  MG tablet *Take 2 tablets by mouth once daily at 12:00PM  *Take one and one-half tablets by mouth once daily at 4:00PM  *Take 2 tablets by mouth once daily at 8:00AM      cholecalciferol (VITAMIN D3) 125 mcg (5000 units) capsule Take 125 mcg by mouth daily      cloZAPine (CLOZARIL) 25 MG tablet Take 25 mg by mouth daily      docusate sodium (COLACE) 100 MG capsule Take 100 mg by mouth 2 times daily as needed for constipation      gabapentin (NEURONTIN) 800 MG tablet Take 800 mg by mouth 3 times daily      hydrocortisone 1 % CREA cream Apply topically to nose and other affected area(s) every morning until resolved      metoprolol succinate ER (TOPROL-XL) 25 MG 24 hr tablet Take 1/2 tablet (12.5mg) by mouth twice daily      Multiple Vitamins-Minerals (UNICOMPLEX-M) TABS Take 1 tablet by mouth once daily      pantoprazole (PROTONIX) 40 MG EC tablet 40 mg Take 1 tablet (40mg) by mouth daily      polyethylene glycol (GAVILAX) 17 GM/Dose powder Take 1 capful by mouth daily as needed      rivaroxaban ANTICOAGULANT (XARELTO) 20 MG TABS tablet Take 20 mg by mouth daily (with dinner)      senna-docusate (SENOKOT-S/PERICOLACE) 8.6-50  MG tablet Take 1 tablet by mouth 2 times daily as needed for constipation      sennosides (SENOKOT) 8.6 MG tablet Take 1 tablet by mouth 2 times daily      tamsulosin (FLOMAX) 0.4 MG capsule Take 0.4 mg by mouth daily      traZODone (DESYREL) 100 MG tablet Take 100 mg by mouth daily at bedtime. Indications: Trouble Sleeping      vitamin C (ASCORBIC ACID) 500 MG tablet Take 500 mg by mouth two times a day.         STOP taking these medications       ketoconazole (NIZORAL) 2 % external cream Comments:   Reason for Stopping:         triamcinolone (KENALOG) 0.1 % external cream Comments:   Reason for Stopping:                    Discharge Instructions and Follow-Up:     Discharge Procedure Orders   GASTROENTEROLOGY ADULT REF PROCEDURE ONLY   Standing Status: Future Standing Exp. Date: 07/09/22   Referral Priority: Routine   Requested Specialty: Gastroenterology   Number of Visits Requested: 1     Reason for your hospital stay   Order Comments: Dear Benjamin Mathews    Your were hospitalized at [facility] with muscle spasms. Your treatment consisted of increasing your klonipin dose, which has given you relief in the past.  We also broadened the workup to look for any other causes of your muscle spasms, which consisted of CT of your chest, abdomen, and pelvis, MRI of your brain and spinal cord, and lumbar puncture.  Over your hospitalization your muscle spasms improved and today you are ready to be discharged to your assisted living facility.  If you continue your medication and follow up with Dr. De Dios, you should continue to improve but if you develop fever, shortness of breath, light headedness, chest pain or worsening muscle spasms please seek medical attention.  Not all of your labs have resulted at time of discharge, but you will be alerted as they result or at your next appointment with Dr. De Dios.    It was a pleasure meeting with you today. Thank you for allowing me and my team the  privilege of caring for you today. You are the reason we are here, and I truly hope we provided you with the excellent service you deserve. Please let us know if there is anything else we can do for you so that we can be sure you are leaving completely satisfied with your care experience.    Your hospital unit at the time of discharge is 6B so if you have any questions please call the hospital at [phone] and ask to talk to a nurse on 6B.    Take care!    AMEYA Kowalski MD  Internal Medicine/Neurology  [facility]     Follow Up and recommended labs and tests   Order Comments: Follow up with primary care provider, Physician No Ref-Primary, within two weeks, for hospital follow- up and updated age-appropriate cancer screening. No follow up labs or test are needed.    Follow up with Dr. De Dios (neurology) on 8/11/21.     Activity   Order Comments: Your activity upon discharge: activity as tolerated     Order Specific Question Answer Comments   Is discharge order? Yes      Discharge Instructions   Order Comments: We are suggesting the following medication changes:  - Increase klonipin to 2mg morning, 1mg noon, and 2mg at night.  Please avoid driving or using heavy machinery after taking your klonipin doses, especially as you get adjusted to these new dosages.  - Continue all of your other home medications as prescribed  - You will resume your xarelto tonight (7/9).  It was held yesterday prior to your lumbar puncture.    Please keep appointment with:  - Dr. De Dios (neurology) - 8/11/2021.  This appointment will serve to update you on all the lab and imaging tests that you received while in the hospital    Please set up appointment with:  - Your primary care doctor.  It is important for you to have a checkup in the next couple of weeks after your hospitalization, as well as to make sure you are up to date on all of your age-appropriate cancer screening  - Gastroenterology (we will order) - you require  an updated colonoscopy after abnormal findings on your last colonoscopy report.     Full Code     Order Specific Question Answer Comments   Code status determined by: Discussion with patient/ legal decision maker      Diet   Order Comments: Follow this diet upon discharge: Orders Placed This Encounter      Combination Diet Regular Diet Adult     Order Specific Question Answer Comments   Is discharge order? Yes                  Discharge Disposition:     Assisted living facility ([facility])         Condition on Discharge:     Discharge condition: Fair   Code status on discharge: Full Code        Date of service: 7/9/2021    The patient was discussed with Dr. Graf, who is in agreement of above plan.    AMEYA Kowalski MD  Internal Medicine, PGY-2  [facility]

## 2021-07-09 NOTE — PROGRESS NOTES
Care Management Follow Up    Length of Stay (days): 2    Expected Discharge Date:  07/12/21     Concerns to be Addressed:  Discharge planning     Patient plan of care discussed at interdisciplinary rounds: Yes    Anticipated Discharge Disposition:  [facility]    Patient/family educated on Medicare website which has current facility and service quality ratings:  NA  Education Provided on the Discharge Plan:  Yes   Patient/Family in Agreement with the Plan:  Yes    Referrals Placed by CM/SW:  None  Private pay costs discussed: Not applicable    Additional Information:  In 6A Discharge Rounds nursing reported pt is oriented x4, intermittent confusion, on a regular diet, up with SBA & a walker. MRI & chest CT were done. Plan for lumbar puncture today.  After rounds received message from Dr Cabrera that pt is ready for discharge today. Requested he send any new meds or changes to [facility] in [place].     Called [facility] and requested a w/c ride for a hospital discharge. Their vendors do not have a w/c today, she asked if we could send one of our w/c with pt. They scheduled a ride with [employer] for 3:30pm without a w/c. Hospital staff will need to help pt into vehicle; at the assisted living the  will call their staff to assist pt from the vehicle. I will call the assisted living and make sure they can do this. Informed transport pt is on Contact Precautions for MRSA; not on Respiratory Precautions.   Spoke with pt's nurse, Karen, pt has not been out of bed yet; had an LP this AM. She had received report that pt was up with assist and a walker.     Called [facility] nurse and left a voice message requesting a call back. Did not leave identifying info since the voice mail was generic.  Called [facility] and spoke with staff member. She said staff could go out to vehicle and help when he arrives. Confirmed address for [facility].   Received  call back from nurseLizbet. Informed her pt is ready for discharge today. Transport arranged for 3:30pm thru his insurance. Informed her transport does not have a w/c. Will need help out of the vehicle when he arrives. Her only concern was if pt had muscles spasms. She requested I fax Discharge Orders & MAR to her prior to discharge. She inquired about MRI & CT results, will send info with pt.   Informed Dr Cabrera and nurseKaren of discharge time. Requested Karen call nurse to nurse report; requested staff help pt into transport vehicle.    Addendum 3:45pm:  Called [employer] and confirmed ride,  is on his way. Reinforced the  needs to call the staff at [facility] when he arrives; their staff will come out and help pt.       Plan  --Discharge at 3:30pm via [employer]:  Staff to bring pt down to the front door & assist into the vehicle. At [facility] the  will call the assisted living staff to help pt out of the vehicle.   --Fax Discharge Orders & MAR to [facility] nurse.         Claudia Sylvester, RN Care Coordinator  [address], [facility]  [place]   RN phone: [phone]  [facility] phone: [phone]  Fax: [phone]    Pharmacy:  [facility]    Transport:  [facility]  Phone:  [phone]  Transport Trip:  [phone]

## 2021-07-09 NOTE — PROGRESS NOTES
07/09/21 0800   Quick Adds   Type of Visit Initial Occupational Therapy Evaluation   Living Environment   People in home facility resident  (care home in [place] with 3 other pts)   Current Living Arrangements assisted living   Home Accessibility no concerns   Transportation Anticipated health plan transportation   Living Environment Comments assisted living/long term care facility   Self-Care   Usual Activity Tolerance good   Current Activity Tolerance moderate   Regular Exercise Yes   Activity/Exercise Type   (PT at [facility])   Exercise Amount/Frequency 3-5 times/wk   Equipment Currently Used at Home wheelchair, power;walker, rolling   General Information   Onset of Illness/Injury or Date of Surgery 07/07/21   Referring Physician Mario Cabrera MD   Additional Occupational Profile Info/Pertinent History of Current Problem Benjamin Mathews is a 56 year old male with history of atypical parkinson's (2005) with related psychosis, MDD, DVT/PEs, CVA (2020) with residual L sided deficit who presents with progressive worsening of muscle spasms.  Patient being admitted for further workup.   Existing Precautions/Restrictions fall   Cognitive Status Examination   Cognitive Status Comments OT: No concerns at this time   Visual Perception   Impact of Vision Impairment on Function (Vision) OT: No concerns at this time   Range of Motion Comprehensive   Comment, General Range of Motion OT: BUE WFL   Strength Comprehensive (MMT)   Comment, General Manual Muscle Testing (MMT) Assessment OT: overall deconditioned   Instrumental Activities of Daily Living (IADL)   IADL Comments OT: MCFP can A prn   Clinical Impression   Criteria for Skilled Therapeutic Interventions Met (OT) yes   OT Diagnosis decreased ind in I/ADLS   OT Problem List-Impairments impacting ADL problems related to;activity tolerance impaired;motor control;strength   ADL comments/analysis decreased ind in I/ADLS   Assessment of Occupational Performance 1-3  Performance Deficits   Planned Therapy Interventions (OT) ADL retraining;IADL retraining;fine motor coordination training;motor coordination training;strengthening;home program guidelines;progressive activity/exercise   Clinical Decision Making Complexity (OT) low complexity   Therapy Frequency (OT) Daily   Predicted Duration of Therapy 7/21/21   Risk & Benefits of therapy have been explained evaluation/treatment results reviewed;care plan/treatment goals reviewed;patient   OT Discharge Planning    OT Discharge Recommendation (DC Rec) home with outpatient occupational therapy;Home with assist   OT Rationale for DC Rec OT: continued OP OT to increase ind in I/ADLS   Total Evaluation Time (Minutes)   Total Evaluation Time (Minutes) 4

## 2021-07-10 NOTE — PLAN OF CARE
Occupational Therapy Discharge Summary    Reason for therapy discharge:    Discharged to home with outpatient therapy.    Progress towards therapy goal(s). See goals on Care Plan in [facility] electronic health record for goal details.  Goals not met.  Barriers to achieving goals:   discharge on same date as initial evaluation.    Therapy recommendation(s):    Continued therapy is recommended.  Rationale/Recommendations:  OT: continued OP OT to increase ind in I/ADLS.

## 2021-07-12 DIAGNOSIS — Z71.89 OTHER SPECIFIED COUNSELING: ICD-10-CM

## 2021-07-12 LAB
GAD65 AB SER IA-ACNC: <5 IU/ML (ref 0–5)
RESULT: NORMAL
SEND OUTS MISC TEST CODE: NORMAL
SEND OUTS MISC TEST SPECIMEN: NORMAL
TEST NAME: NORMAL

## 2021-07-13 ENCOUNTER — PATIENT OUTREACH (OUTPATIENT)
Dept: CARE COORDINATION | Facility: CLINIC | Age: 56
End: 2021-07-13

## 2021-07-13 LAB
ALB CSF/SERPL: 7.6 RATIO (ref 0–9)
ALBUMIN CSF-MCNC: 29 MG/DL (ref 0–35)
ALBUMIN SERPL-MCNC: 3834 MG/DL (ref 3500–5200)
IGG CSF-MCNC: 5.3 MG/DL (ref 0–6)
IGG SERPL-MCNC: 1007 MG/DL (ref 768–1632)
IGG SYNTH RATE SER+CSF CALC-MRATE: 5.9 MG/D
IGG/ALB CLEAR SER+CSF-RTO: 0.7 RATIO (ref 0.28–0.66)
IGG/ALB CSF: 0.18 RATIO (ref 0.09–0.25)
OLIGOCLONAL BANDS CSF ELPH-IMP: ABNORMAL
OLIGOCLONAL BANDS CSF ELPH-IMP: NEGATIVE
OLIGOCLONAL BANDS CSF IEF: ABNORMAL BANDS (ref 0–1)
PNP ABY SERUM: NORMAL

## 2021-07-13 NOTE — TELEPHONE ENCOUNTER
Clinic Care Coordination Contact  [facility]/Voicemail      Clinical Data: Care Coordinator Outreach  Outreach attempted x 1.  Left message on patient's voicemail. Care Coordinator will try to reach patient again in 1-2 business days.  Gala Salinas RN

## 2021-07-14 LAB
BACTERIA SPEC CULT: NO GROWTH
RESULT: NORMAL
SEND OUTS MISC TEST CODE: NORMAL
SEND OUTS MISC TEST SPECIMEN: NORMAL
SPECIMEN SOURCE: NORMAL
TEST NAME: NORMAL

## 2021-07-14 NOTE — TELEPHONE ENCOUNTER
Clinic Care Coordination Contact  [facility]/Voicemail      Clinical Data: Care Coordinator Outreach  Outreach attempted x 2.  Left message on patient's voicemail to schedule hospital follow up appointments with both PCP and neurologist  Plan: Care Coordinator will do no further outreaches at this time.  Gala Salinas RN

## 2022-01-19 PROBLEM — R53.1 GENERALIZED WEAKNESS: Status: ACTIVE | Noted: 2018-01-12

## 2022-01-19 PROBLEM — L89.613 PRESSURE ULCER OF RIGHT HEEL, STAGE 3 (H): Status: ACTIVE | Noted: 2019-04-12

## 2022-01-19 PROBLEM — D12.6 ADENOMATOUS POLYP OF COLON: Status: ACTIVE | Noted: 2017-10-09

## 2022-01-19 PROBLEM — R13.19 INTERMITTENT DYSPHAGIA: Status: ACTIVE | Noted: 2020-11-12

## 2022-01-19 PROBLEM — R25.2 LEG CRAMPS: Status: ACTIVE | Noted: 2017-10-10

## 2022-01-19 PROBLEM — F43.23 ADJUSTMENT DISORDER WITH MIXED ANXIETY AND DEPRESSED MOOD: Status: ACTIVE | Noted: 2021-01-21

## 2022-01-19 PROBLEM — F13.931 BENZODIAZEPINE WITHDRAWAL WITH DELIRIUM (H): Status: ACTIVE | Noted: 2021-02-06

## 2022-01-19 PROBLEM — T14.91XD: Status: ACTIVE | Noted: 2021-02-06

## 2022-01-19 PROBLEM — E78.5 HYPERLIPIDEMIA: Status: ACTIVE | Noted: 2019-04-14

## 2022-01-19 PROBLEM — G45.8 TRANSIENT ISCHEMIC ATTACK, POSTERIOR CIRCULATION, ACUTE: Status: ACTIVE | Noted: 2017-02-11

## 2022-01-19 PROBLEM — Z86.59 HX OF MAJOR DEPRESSION: Status: ACTIVE | Noted: 2020-03-30

## 2022-01-19 PROBLEM — F13.939 BENZODIAZEPINE WITHDRAWAL (H): Status: ACTIVE | Noted: 2021-01-26

## 2022-01-19 PROBLEM — G89.11 ACUTE PAIN DUE TO TRAUMA: Status: ACTIVE | Noted: 2021-01-21

## 2022-01-19 PROBLEM — E87.6 HYPOKALEMIA: Status: ACTIVE | Noted: 2021-01-26

## 2022-01-19 PROBLEM — Z86.711 HISTORY OF PULMONARY EMBOLISM: Status: ACTIVE | Noted: 2021-01-21

## 2022-01-19 PROBLEM — F05 DELIRIUM DUE TO MULTIPLE ETIOLOGIES, ACUTE, HYPOACTIVE: Status: ACTIVE | Noted: 2020-01-27

## 2022-01-19 PROBLEM — R79.89 LACTATE BLOOD INCREASED: Status: ACTIVE | Noted: 2021-01-21

## 2022-01-19 PROBLEM — I63.331: Status: ACTIVE | Noted: 2017-02-12

## 2022-01-19 PROBLEM — F03.90: Status: ACTIVE | Noted: 2020-01-27

## 2022-01-19 PROBLEM — R19.7 DIARRHEA IN ADULT PATIENT: Status: ACTIVE | Noted: 2020-03-30

## 2022-01-19 PROBLEM — I10 ESSENTIAL HYPERTENSION: Status: ACTIVE | Noted: 2019-04-14

## 2022-01-19 PROBLEM — Z86.73 HX OF STROKE WITHOUT RESIDUAL DEFICITS: Status: ACTIVE | Noted: 2017-04-20

## 2022-01-19 PROBLEM — S31.119A STAB WOUND OF ABDOMEN: Status: ACTIVE | Noted: 2021-01-21

## 2022-01-19 PROBLEM — G89.4 CHRONIC PAIN DISORDER: Status: ACTIVE | Noted: 2018-08-03

## 2022-01-19 PROBLEM — M19.90 ARTHRITIS: Status: ACTIVE | Noted: 2017-02-20

## 2022-01-19 PROBLEM — R20.0 NUMBNESS: Status: ACTIVE | Noted: 2017-02-10

## 2022-01-19 PROBLEM — L03.032 CELLULITIS OF GREAT TOE OF LEFT FOOT: Status: ACTIVE | Noted: 2020-03-30

## 2022-01-19 PROBLEM — R73.9 HYPERGLYCEMIA: Status: ACTIVE | Noted: 2021-01-21

## 2022-01-19 PROBLEM — I63.9 CEREBROVASCULAR ACCIDENT (H): Status: ACTIVE | Noted: 2017-02-20

## 2022-01-19 PROBLEM — G81.94 ACUTE LEFT HEMIPARESIS (H): Status: ACTIVE | Noted: 2019-04-13

## 2022-01-19 PROBLEM — G20.B1 DYSKINESIA DUE TO PARKINSON'S DISEASE (H): Status: ACTIVE | Noted: 2017-03-13
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
## 2022-01-19 PROBLEM — I82.5Y9 CHRONIC DEEP VEIN THROMBOSIS (DVT) OF PROXIMAL VEIN OF LOWER EXTREMITY (H): Status: ACTIVE | Noted: 2018-08-24

## 2022-01-19 PROBLEM — J90 PLEURAL EFFUSION: Status: ACTIVE | Noted: 2022-01-19

## 2022-01-19 PROBLEM — E87.4 ACIDOSIS, METABOLIC, WITH RESPIRATORY ACIDOSIS: Status: ACTIVE | Noted: 2021-01-21

## 2022-01-19 RX ORDER — CARBIDOPA AND LEVODOPA 25; 100 MG/1; MG/1
TABLET ORAL
COMMUNITY
Start: 2022-01-19 | End: 2022-07-31

## 2022-01-19 RX ORDER — ACETAMINOPHEN 500 MG
TABLET ORAL
COMMUNITY
Start: 2021-07-08 | End: 2022-01-19

## 2022-01-19 RX ORDER — NYSTATIN 100000 [USP'U]/G
POWDER TOPICAL
COMMUNITY
Start: 2021-01-30 | End: 2022-02-06

## 2022-01-19 RX ORDER — BENZOCAINE/MENTHOL 6 MG-10 MG
LOZENGE MUCOUS MEMBRANE
COMMUNITY
Start: 2021-07-05 | End: 2022-01-19

## 2022-01-19 RX ORDER — VENLAFAXINE HYDROCHLORIDE 225 MG/1
225 TABLET, EXTENDED RELEASE ORAL DAILY
COMMUNITY
End: 2022-08-01

## 2022-01-19 RX ORDER — CLONAZEPAM 0.5 MG/1
TABLET ORAL
COMMUNITY
Start: 2021-07-09 | End: 2022-08-01

## 2022-01-19 RX ORDER — LACTULOSE 10 G/15ML
SOLUTION ORAL; RECTAL
COMMUNITY
Start: 2021-07-23 | End: 2022-02-06

## 2022-01-19 RX ORDER — TRAZODONE HYDROCHLORIDE 50 MG/1
TABLET, FILM COATED ORAL
COMMUNITY
Start: 2021-08-02 | End: 2022-02-06

## 2022-01-19 RX ORDER — ALBUTEROL SULFATE 90 UG/1
2 AEROSOL, METERED RESPIRATORY (INHALATION) EVERY 6 HOURS PRN
COMMUNITY
End: 2022-02-07

## 2022-01-19 RX ORDER — BACLOFEN 5 MG/1
TABLET ORAL
COMMUNITY
Start: 2021-01-30 | End: 2022-08-25

## 2022-01-19 RX ORDER — HYDROXYZINE HYDROCHLORIDE 25 MG/1
25 TABLET, FILM COATED ORAL EVERY 6 HOURS PRN
COMMUNITY
End: 2022-02-06

## 2022-01-19 RX ORDER — METOPROLOL TARTRATE 50 MG
TABLET ORAL
COMMUNITY
End: 2022-02-06

## 2022-01-19 RX ORDER — MIRTAZAPINE 7.5 MG/1
7.5 TABLET, FILM COATED ORAL AT BEDTIME
COMMUNITY
End: 2022-02-06

## 2022-01-19 RX ORDER — ONDANSETRON 4 MG/1
4 TABLET, ORALLY DISINTEGRATING ORAL
COMMUNITY
Start: 2021-01-30 | End: 2022-02-06
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
## 2022-01-20 ENCOUNTER — TELEPHONE (OUTPATIENT)
Dept: LAB | Facility: CLINIC | Age: 57
End: 2022-01-20
Payer: COMMERCIAL

## 2022-01-20 NOTE — PROGRESS NOTES
GENETIC COUNSELING-Neurology  I left a message for Benjamin Mathews at the request of Dr. Perry. As I called Benjamin, I realized that the referral had been placed pre-emptively and the patient has not yet seen dr. Perry. I indicated that I am happy to schedule a genetic consult- or if he wants to contact me after he has had his evaluation with Dr. Perry, he can contact me then.    Alfa Dunlap MS, [facility]  Certified Genetic Counselor

## 2022-01-21 ENCOUNTER — TELEPHONE (OUTPATIENT)
Dept: NEUROLOGY | Facility: CLINIC | Age: 57
End: 2022-01-21
Payer: COMMERCIAL

## 2022-01-21 NOTE — TELEPHONE ENCOUNTER
Patient has appt with Dr. Perry on 2/7.  Seen at [facility] with Dr. De Dios.    Left voice mail that I was calling to find out what his goals are for his upcoming visit with Dr. Perry.

## 2022-02-04 ENCOUNTER — VIRTUAL VISIT (OUTPATIENT)
Dept: NEUROLOGY | Facility: CLINIC | Age: 57
End: 2022-02-04
Payer: COMMERCIAL

## 2022-02-04 DIAGNOSIS — G20.C ATYPICAL PARKINSONISM (H): Primary | ICD-10-CM

## 2022-02-04 PROCEDURE — 96040 PR GENETIC COUNSELING, EACH 30 MIN: CPT | Mod: TEL | Performed by: GENETIC COUNSELOR, MS

## 2022-02-04 PROCEDURE — 99207 PR NO CHARGE LOS: CPT | Performed by: GENETIC COUNSELOR, MS

## 2022-02-04 NOTE — LETTER
2022       RE: Benjamin Mathews  [address]  [place] [address]     Dear Colleague,    Thank you for referring your patient, Benjamin Mathews, to the [facility] [place] [facility]. Please see a copy of my visit note below.    Benjamin is a 56 year old who is being evaluated via a billable telephone visit.      What phone number would you like to be contacted at? [phone]  How would you like to obtain your AVS? Mail a copy  Phone call duration: 40 minutes      Gene Portillo    GENETIC COUNSELING-Neurology  I met with Benjamin Mathews for a 40 minute telephone visit to review family history and discuss genetic testing options. He is scheduled to see Dr. Perry next week and he had pre-emptively placed a referral for me to meet with Benjamin.    MEDICAL HISTORY-  Benjamin reports that he was diagnosed with 'atypical Parkinsonism' at the [facility]. He notes problems with tremor and rigidity.  As mentioned above, he is scheduled for neurologic evaluation with Dr. Perry next week.    FAMILY HISTORY-see scanned pedigree  1. Benjamin reported that his brother has 'dystonia' impacting primarily his head and neck.  2. Benjamin's father developed what he describes as 'typical' Parkinson disease with tremor and shuffling gait. He was diagnosed in his 60's and  in his 80's.  3. Benjamin reported that his sister may have some suspicious symptoms including 'a masked face' that makes the family suspect that she may have Parkinson disease.    4. No neurologic disease reported in 9 paternal aunts/uncles and paternal cousins.  5. Benjamin's mother  from PE at 80. No history of neurologic disease reported in maternal aunts, uncles, cousins.  6. Ethnic backround is Frisian and Swede.    GENETIC COUNSELING-  We discussed the genetic and environmental basis of Parkinson disease. I explained that in most cases, it results from complex and lifelong interaction of multiple genetic  and environmental factors. In some rare cases, there may be a single gene cause.     We discussed highly penetrant Mendelian forms of PD. I explained that in some rare families, there can be dominant fully penetrant genetic causes (e.g. SNCA, MAPT, GRN). In other families, there may be recessive forms of Parkinson disease (e.g. PRKN, PINK1).  I explained that these forms of Parkinsonism are rare, but the potential impact for other family members is high. Depending on the gene identified, the magnitude of risk to other family members can vary.    We discussed autosomal dominant risk factors for Parkinsonism, such as GBA or LRRK2 mutations.  I explained that mutations in these genes confer a susceptibility, but not certainty for developing Parkinsonism. I explained that if this type of risk factor is identified, it could impact the risk for other family members. However, the presence of a mutation in other family members would not predict with absolute uncertainty that they would develop Parkinsonism.    We also discussed the history of 'dsytonia' in his brother. It is certainly the case that some genetic forms of Parkinson disease can manifest with dystonia and vice versa. Without having formally evaluated his brother, it is difficult to know whether and how his symptoms are related to the Parkinsonism seen in the family.    I indicated that before proceeding with testing, I think it would be helpful for him to see Dr. Perry to get a better sense of what his exact symptoms are. That may help to guide our decision about genetic testing. I will reconnect with Dr. Perry after Benjamin's visit on 2/7 to discuss the best way forward.    We discussed risks, benefits, limitations, and utility of genetic testing. Benjamin indicated that he would like to proceed pending input from Dr. Perry.    Alfa Dunlap MS, [facility]  Certified Genetic Counselor        Again, thank you for allowing me to participate in the care of your patient.       Sincerely,    Marvel Dunlap, GC

## 2022-02-04 NOTE — PROGRESS NOTES
Benjamin is a 56 year old who is being evaluated via a billable telephone visit.      What phone number would you like to be contacted at? [phone]  How would you like to obtain your AVS? Mail a copy  Phone call duration: 40 minutes      Gene Portillo    GENETIC COUNSELING-Neurology  I met with Benjamin Mathews for a 40 minute telephone visit to review family history and discuss genetic testing options. He is scheduled to see Dr. Perry next week and he had pre-emptively placed a referral for me to meet with Benjamin.    MEDICAL HISTORY-  Benjamin reports that he was diagnosed with 'atypical Parkinsonism' at the [facility]. He notes problems with tremor and rigidity.  As mentioned above, he is scheduled for neurologic evaluation with Dr. Perry next week.    FAMILY HISTORY-see scanned pedigree  1. Benjamin reported that his brother has 'dystonia' impacting primarily his head and neck.  2. Benjamin's father developed what he describes as 'typical' Parkinson disease with tremor and shuffling gait. He was diagnosed in his 60's and  in his 80's.  3. Benjamin reported that his sister may have some suspicious symptoms including 'a masked face' that makes the family suspect that she may have Parkinson disease.    4. No neurologic disease reported in 9 paternal aunts/uncles and paternal cousins.  5. Benjamin's mother  from PE at 80. No history of neurologic disease reported in maternal aunts, uncles, cousins.  6. Ethnic backround is Tajik and Swede.    GENETIC COUNSELING-  We discussed the genetic and environmental basis of Parkinson disease. I explained that in most cases, it results from complex and lifelong interaction of multiple genetic and environmental factors. In some rare cases, there may be a single gene cause.     We discussed highly penetrant Mendelian forms of PD. I explained that in some rare families, there can be dominant fully penetrant genetic causes (e.g. SNCA, MAPT, GRN). In other families, there may be recessive forms of  Parkinson disease (e.g. PRKN, PINK1).  I explained that these forms of Parkinsonism are rare, but the potential impact for other family members is high. Depending on the gene identified, the magnitude of risk to other family members can vary.    We discussed autosomal dominant risk factors for Parkinsonism, such as GBA or LRRK2 mutations.  I explained that mutations in these genes confer a susceptibility, but not certainty for developing Parkinsonism. I explained that if this type of risk factor is identified, it could impact the risk for other family members. However, the presence of a mutation in other family members would not predict with absolute uncertainty that they would develop Parkinsonism.    We also discussed the history of 'dsytonia' in his brother. It is certainly the case that some genetic forms of Parkinson disease can manifest with dystonia and vice versa. Without having formally evaluated his brother, it is difficult to know whether and how his symptoms are related to the Parkinsonism seen in the family.    I indicated that before proceeding with testing, I think it would be helpful for him to see Dr. Perry to get a better sense of what his exact symptoms are. That may help to guide our decision about genetic testing. I will reconnect with Dr. Perry after Benjamin's visit on 2/7 to discuss the best way forward.    We discussed risks, benefits, limitations, and utility of genetic testing. Benjamin indicated that he would like to proceed pending input from Dr. Perry.    Alfa Dunlap MS, [facility]  Certified Genetic Counselor

## 2022-02-04 NOTE — LETTER
Date:February 16, 2022      Provider requested that no letter be sent. Do not send.       [facility]

## 2022-02-06 RX ORDER — ASCORBIC ACID 500 MG
TABLET ORAL
COMMUNITY
Start: 2022-01-06 | End: 2022-02-06

## 2022-02-06 RX ORDER — MAGNESIUM CARB/ALUMINUM HYDROX 105-160MG
TABLET,CHEWABLE ORAL
COMMUNITY
Start: 2022-01-20 | End: 2022-02-06

## 2022-02-06 RX ORDER — MULTIVITAMIN WITH FOLIC ACID 400 MCG
TABLET ORAL
Status: ON HOLD | COMMUNITY
Start: 2022-01-25 | End: 2023-05-25

## 2022-02-06 RX ORDER — LUBIPROSTONE 24 UG/1
CAPSULE ORAL
Status: ON HOLD | COMMUNITY
Start: 2022-01-26 | End: 2023-05-25

## 2022-02-06 RX ORDER — PANTOPRAZOLE SODIUM 40 MG/1
TABLET, DELAYED RELEASE ORAL
Status: ON HOLD | COMMUNITY
Start: 2022-02-06 | End: 2023-05-25

## 2022-02-06 RX ORDER — TRAZODONE HYDROCHLORIDE 100 MG/1
TABLET ORAL
Status: ON HOLD | COMMUNITY
Start: 2022-02-06 | End: 2023-05-25

## 2022-02-06 RX ORDER — VENLAFAXINE HYDROCHLORIDE 75 MG/1
75 CAPSULE, EXTENDED RELEASE ORAL DAILY
COMMUNITY
Start: 2021-10-07 | End: 2022-02-07

## 2022-02-06 RX ORDER — LUBIPROSTONE 8 UG/1
CAPSULE ORAL
COMMUNITY
Start: 2021-12-22 | End: 2022-02-07

## 2022-02-06 RX ORDER — BISACODYL 5 MG
TABLET, DELAYED RELEASE (ENTERIC COATED) ORAL
COMMUNITY
Start: 2022-01-20 | End: 2022-02-07

## 2022-02-06 RX ORDER — SODIUM PHOSPHATE,MONO-DIBASIC 19G-7G/118
ENEMA (ML) RECTAL
COMMUNITY
Start: 2021-10-07 | End: 2022-02-06

## 2022-02-07 ENCOUNTER — OFFICE VISIT (OUTPATIENT)
Dept: NEUROLOGY | Facility: CLINIC | Age: 57
End: 2022-02-07
Payer: COMMERCIAL

## 2022-02-07 VITALS — BODY MASS INDEX: 23.87 KG/M2 | HEIGHT: 76 IN | WEIGHT: 196 LBS

## 2022-02-07 DIAGNOSIS — F09 COGNITIVE DISORDER: ICD-10-CM

## 2022-02-07 DIAGNOSIS — F41.9 ANXIETY DISORDER, UNSPECIFIED TYPE: ICD-10-CM

## 2022-02-07 DIAGNOSIS — M62.838 MUSCLE SPASM: ICD-10-CM

## 2022-02-07 DIAGNOSIS — Z82.0 FAMILY HISTORY OF PARKINSONISM: ICD-10-CM

## 2022-02-07 DIAGNOSIS — F80.0 ARTICULATION DISORDER: ICD-10-CM

## 2022-02-07 DIAGNOSIS — F33.1 MAJOR DEPRESSIVE DISORDER, RECURRENT EPISODE, MODERATE (H): ICD-10-CM

## 2022-02-07 DIAGNOSIS — G20.C PARKINSONISM, UNSPECIFIED PARKINSONISM TYPE (H): Primary | ICD-10-CM

## 2022-02-07 PROCEDURE — 99205 OFFICE O/P NEW HI 60 MIN: CPT | Performed by: PSYCHIATRY & NEUROLOGY

## 2022-02-07 RX ORDER — CLONAZEPAM 2 MG/1
TABLET ORAL
Qty: 60 TABLET | Refills: 0 | COMMUNITY
Start: 2022-02-07 | End: 2022-08-25

## 2022-02-07 RX ORDER — CLONAZEPAM 1 MG/1
0.5 TABLET ORAL 2 TIMES DAILY PRN
Qty: 30 TABLET | Refills: 0 | COMMUNITY
Start: 2022-02-07 | End: 2022-08-01

## 2022-02-07 RX ORDER — POLYETHYLENE GLYCOL 3350 17 G/17G
POWDER, FOR SOLUTION ORAL
COMMUNITY
Start: 2022-02-07 | End: 2023-01-09

## 2022-02-07 RX ORDER — HYDROXYZINE HYDROCHLORIDE 25 MG/1
50 TABLET, FILM COATED ORAL EVERY 6 HOURS PRN
Status: ON HOLD | COMMUNITY
Start: 2022-02-07 | End: 2023-05-25

## 2022-02-07 RX ORDER — CALCIUM POLYCARBOPHIL 625 MG/1
TABLET, FILM COATED ORAL
Status: ON HOLD | COMMUNITY
End: 2023-05-25

## 2022-02-07 RX ORDER — LACTULOSE 10 G/15ML
SOLUTION ORAL; RECTAL
COMMUNITY
Start: 2022-02-07 | End: 2023-02-08

## 2022-02-07 ASSESSMENT — PAIN SCALES - GENERAL: PAINLEVEL: NO PAIN (0)

## 2022-02-07 ASSESSMENT — MIFFLIN-ST. JEOR: SCORE: 1820.55
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
## 2022-02-07 NOTE — PATIENT INSTRUCTIONS
(G20) Parkinsonism, unspecified Parkinsonism type (H)  (primary encounter diagnosis)    Carbidopa/levodopa Sinemet CR 50/200 at 8pm  Carbidopa/levodopa Sinemet 25/100  2@8am, 2@12pm and 1.5 @4pm  Amantadine - reaction - hallucinations    Family history of parkinson - father and possibly sister - seen by Alfa Dunlap    1. Benjamin reported that his brother has 'dystonia' impacting primarily his head and neck.  2. Benjamin's father developed what he describes as 'typical' Parkinson disease with tremor and shuffling gait. He was diagnosed in his 60's and  in his 80's.  3. Benjamin reported that his sister may have some suspicious symptoms including 'a masked face' that makes the family suspect that she may have Parkinson disease.    4. No neurologic disease reported in 9 paternal aunts/uncles and paternal cousins.  5. Benjamin's mother  from PE at 80. No history of neurologic disease reported in maternal aunts, uncles, cousins.  6. Ethnic backround is Turkmen and Swede.    Seen by Dr. De Dios  Had been in hospital 2021    Attestation signed by Julieth Graf DO at 2021 12:41 PM   I saw and evaluated the patient on 21 prior to discharge.  I discussed the patient with the resident and agree with the plan of care as documented in resident's note.       Patient with atypical Parkinson's presents with more frequent spells of whole body spasms, often triggered by movement or exertion, that are only partially responsive to clonazepam and baclofen. This is in the setting of a reported 40 lb weight loss. Admitted to evaluate for paraneoplastic/autoimmune etiologies such as Stiff Person Syndrome. Brain, C and T spine MRIs did not reveal a structural lesion. Preliminary CSF appears normal consider elevated RBC. CT C/A/P without malignancy - will need age appropriate malignancy screening and repeat colonoscopy as outpatient.Pending studies include DEE DEE antibody in serum and CSF, as well as Javier Autoimmune Encephalopathy  "panels in serum and CSF. Increased clonazepam dose, no other changes to meds. Follow up with Dr. De Dios one month.      I personally reviewed relevant vital signs, medications, labs and imaging.       I personally spent 20 minutes on discharge activities.       Julieth Graf DO  Neurohospitalist      Brain MRI 2021 Unremarkable MRI of the brain.     Cervical spine mri 7/8/2021 Mild cervical spondylosis as described above. No high-grade spinal canal or neural foraminal stenosis. Minimal bone marrow edema in the superior endplate of C4 with minimal enhancement. No infiltrative pathology. Normal cervical spine.    Thoracic Spine MRI 7/8/2021 Normal thoracic spine.    DatSCAN 1/11/2017 A presynaptic dopaminergic deficit is present in the right putamen.      Review of diagnosis    Parkinsonism - did not have a lot of rigidity  Duration 14 years or so  2017 DaTscan and this did reveal a right putamen dopaminergic consistent with parkinsonism.         RLS    Onset of tremors in 2008 - age 43 years   Presented to [facility] with left>right hand tremors at age 50 years after being seen and managed at [facility] for years - was too hard to travel  [facility]    Avoidance of dopamine blockers   clozapine    Motor complication review   Wearing off  Dyskinesias    Difficulty to figure out if there are changes that need to be change in regards to his sinemet  He has to wait till 1030am till he is optimal in his function  He is off prior to that time    Review of Impulse control disorders   Not presently but states he use to kunz    Review of surgical or medication options   reviewed    Gait/Balance/Falls   Last fall was \"years ago\"    Exercise/Therapy performed/offered   [facility] - walking better  States he used [facility] mobility to go to [facility]  Assisted living drove him over     Cognitive/Driving   Last drove was 2011  Not clear when he had a cognitive " "evaluation  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    Mood   Depression  Anxiety  Alcohol use - last drink was 10 years ago; he had a drinking problem  Had a gambling problem - no \"big\" losses    [facility] psychology  Clonazepam klonopin 0.5mg  Clonazepam klonopin 1mg  Clonazepam klonopin 2mg  Hydroxyzine atarax 25mg  MIrtazapine remeron 7.5mg -not taking  Venlafaxine effexor XR 150mg 24 hr     Duloxetine - suicidal    Psychology [facility] - phone visits    [facility]   Mario Alberto Quiroz  RN visits daily  Clarita Garay is [facility] physician    Single  - no kids  Nurse RN worked in [place]  Disability 2013    slovakian background  Father  Slovenian mother     Sister Ori Koroma with son who  at age 23  Brother mary carbajal with dystonia age 30   Father Sean Carbajal with parkinsonism and \"vascular dementia\" 62 ->82 death    Brother Robert or sister in law arie Carbajal     Hallucinations/delusions   Clozapine clozaril 25mg   Quetiapine - diarrhea - stopped  No hallucinations for years    Sleep  Trazodone desyrel 100mg   Goes to bed at 830pm and can go to sleep right away  Usually does not wake up during the night  Nocturia at 4am or 5am  Then goes back to sleep and sleeps till 830am  He talks in his sleep  He swears in his sleep  Not clear if he has active movements  Believes he snores  \"he rambles in his sheets\" - he is twisted   Has not had a sleep study or consultation    Bladder   Tamsulosin flomax 0.4mg   Nocturia 1/noc  Enlarged prostate  Does not go during the day much - goes in the morning    GI/Constipation/GERD  Bisacodyl dulcolax 10mg supp  Calcium polycarbophil fiber-lax 625mg   Docusate colace 100mg  enulose 10gm/15ml soln  Ondansetron zofran-odt " 4mg  Pantoprazole protonix 40mg   Polyethylene glycol miralax gavilax  Senna-docusate senokot-S 8.6-50  Chronic severe constipation - has bowel movement every week or week and 1/2  Has some swallowing problems - has not had problems for a while  Has had voice therapy in the past     GI Jason Nancejama CORONEL  Atorvastatin lipitor 40mg   Cholecalciferol Vitamin D3 125mcg 5000 units     Cardio/heart   Tachycardia  Hypertension  Metoprolol succinate ER Toprol XL 25mg 24 hr  He may have light headedness at times  He has changes in his heart rate with emotion    Vision   Cataracts - early problems  Has some double vision  Does not have a eye problem    Heme  Rivaroxaban anticoagulant xarelto 20mg  Prior history of a PE and a stroke  Been on this for a while - had been on coumadin in the past     Other:  Alcohol abuse    Acetaminophen tylenol 500mg  Baclofen lioresal 10mg  Baclofen lioresal 5mg   Gabapentin neurontin 800mg    Albuterol proair proventil ventolin 108 90base mcg/act inhaler    Hydrocortisone cortaid 1% cream  MVI   Nystatin mycostatin 100,000 unit/gm powder  Vitamin C ascorbic acid 500mg    Medications     8a 12p 4p 5p 8pm   Acetaminophen tylenol 500mg 2 2   2   Atorvastatin lipitor 40mg      1   Baclofen lioresal 10mg 1 1  1    Baclofen lioresal 5mg  1 1  1    Bisacodyl dulcolax 10mg supp prn       Calcium polycarbophil fiber-lax 625mg  1       Carbidopa/levodopa Sinemet CR 50/200     1   Carbidopa/levodopa Sinemet 25/100 2 2 1.5     Cholecalciferol Vitamin D3 125mcg 5000 units  1       Clonazepam klonopin 0.5mg  2      Clonazepam klonopin 1mg  prn       Clonazepam klonopin 2mg  1    1   Clozapine clozaril 25mg      1   Docusate colace 100mg prn       enulose 10gm/15ml soln 30ml       Gabapentin neurontin 800mg 1 1   1   Hydrocortisone cortaid 1% cream daily       Hydroxyzine atarax 25mg prn       Lubriprostone amitiza 24 mcg 1    1   Lubriprostone amitiza 8 mcg no       Metoprolol succinate ER Toprol XL  25mg 24 hr 1/2    1/2   MVI  1       Pantoprazole protonix 40mg  1       Polyethylene glycol miralax gavilax     dose   Rivaroxaban anticoagulant xarelto 20mg     1    Sennosides senokot 8.6mg 2    2   Senna-docusate senokot-S 8.6-50 prn       Sodium phosphate fleet enema prn       Tamsulosin flomax 0.4mg         Trazodone desyrel 100mg      1   Venlafaxine effexor XR 150mg 24 hr  1       Vitamin C ascorbic acid 500mg 1    1       Plan:    Reviewed history  Had been seeing dr De Dios and followed by Benito  Taking a variety of benzos and clozapine and benito may be managing this    Has ongoing significant constipation    Recommend OT, SLP and PT including a cognitive evaluation    He has parkinson's disease which is long standing and not stiff person syndrome  He has classic motor and non motor issues    He granted proxy access to his records to his brother and sister in law    May need to get healthcare directive documents in chart    Return in 3 months - may be virtual    He has mychart not established for him   Just needs to use the username and password    He is not ready for medication changes.

## 2022-02-09 ENCOUNTER — TELEPHONE (OUTPATIENT)
Dept: NEUROLOGY | Facility: CLINIC | Age: 57
End: 2022-02-09
Payer: COMMERCIAL

## 2022-02-09 NOTE — TELEPHONE ENCOUNTER
MTM referral from: [facility] visit (referral by provider)    MTM referral outreach attempt #2 on February 9, 2022 at 4:50 PM      Outcome: Patient not reachable after several attempts, will route to MTM Pharmacist/Provider as an FYI.  MT scheduling number is [phone].  Thank you for the referral.    Samir Mcdaniel, MTM coordinator

## 2022-03-13 ENCOUNTER — HEALTH MAINTENANCE LETTER (OUTPATIENT)
Age: 57
End: 2022-03-13

## 2022-05-05 NOTE — TELEPHONE ENCOUNTER
DIAGNOSIS: Wound on 2nd toe (L) foot and heel    APPOINTMENT DATE: 05/06/2022   NOTES STATUS DETAILS   OFFICE NOTE from referring provider N/A    OFFICE NOTE from other specialist N/A    DISCHARGE SUMMARY from hospital N/A    DISCHARGE REPORT from the ER N/A    OPERATIVE REPORT N/A    EMG report N/A    MEDICATION LIST N/A    MRI N/A    DEXA (osteoporosis/bone health) N/A    CT SCAN N/A    XRAYS (IMAGES & REPORTS) N/A

## 2022-05-06 ENCOUNTER — OFFICE VISIT (OUTPATIENT)
Dept: PODIATRY | Facility: CLINIC | Age: 57
End: 2022-05-06
Payer: COMMERCIAL

## 2022-05-06 ENCOUNTER — PRE VISIT (OUTPATIENT)
Dept: PODIATRY | Facility: CLINIC | Age: 57
End: 2022-05-06
Payer: COMMERCIAL

## 2022-05-06 DIAGNOSIS — L97.522 SKIN ULCER OF SECOND TOE OF LEFT FOOT WITH FAT LAYER EXPOSED (H): Primary | ICD-10-CM

## 2022-05-06 DIAGNOSIS — M20.41 HAMMER TOES OF BOTH FEET: ICD-10-CM

## 2022-05-06 DIAGNOSIS — M20.42 HAMMER TOES OF BOTH FEET: ICD-10-CM

## 2022-05-06 PROCEDURE — 99204 OFFICE O/P NEW MOD 45 MIN: CPT | Performed by: PODIATRIST

## 2022-05-06 ASSESSMENT — PAIN SCALES - GENERAL: PAINLEVEL: MILD PAIN (3)

## 2022-05-06 NOTE — NURSING NOTE
Benjamin Mathews's chief complaint for this visit includes:  Chief Complaint   Patient presents with     Left Foot - WOUND CARE     Wound care on left foot     PCP: No Ref-Primary, Physician    Referring Provider:  Referred Self  No address on file    There were no vitals taken for this visit.  Mild Pain (3)     Do you need any medication refills at today's visit? NO    Allergies   Allergen Reactions     Amantadine Other (See Comments)     Other reaction(s): Hallucinations  Hallucinations/ lost self control/gambling.     halluicnations  Hallucinations/ lost self control/gambling.     hallucinates  halluicnations  hallucinates  Hallucinations/ lost self control/gambling.        Quetiapine GI Disturbance, Diarrhea and Other (See Comments)     Diarrhea    Diarrhea  Other reaction(s): GI Disturbance  Diarrhea       Duloxetine Other (See Comments)     suicidal  suicidal         Mario Garcia, EMT

## 2022-05-06 NOTE — NURSING NOTE
DME FITTING    Relevant Diagnosis: Ulcer of second toe on left foot  Post of shoe was fit on patient's left foot    Person(s) involved in teaching:   Patient    Brace was applied in standard Manner:  Yes  Brace fit well:  Yes  Patient reports brace to fit comfortably:  Yes    Education:   Patient shown self application and removal of brace: Yes  Patient shown how to adjust brace fit, if necessary: Yes  Patient educated on billing and return policy: Yes  Patient confirmed understanding when and how to contact clinic with concerns: Yes
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
## 2022-05-13 ENCOUNTER — OFFICE VISIT (OUTPATIENT)
Dept: PODIATRY | Facility: CLINIC | Age: 57
End: 2022-05-13
Payer: COMMERCIAL

## 2022-05-13 DIAGNOSIS — M20.41 HAMMER TOES OF BOTH FEET: ICD-10-CM

## 2022-05-13 DIAGNOSIS — M20.42 HAMMER TOES OF BOTH FEET: ICD-10-CM

## 2022-05-13 DIAGNOSIS — L97.522 SKIN ULCER OF SECOND TOE OF LEFT FOOT WITH FAT LAYER EXPOSED (H): Primary | ICD-10-CM

## 2022-05-13 PROCEDURE — 99213 OFFICE O/P EST LOW 20 MIN: CPT | Performed by: PODIATRIST
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
## 2022-05-13 NOTE — NURSING NOTE
Benjamin Mathews's chief complaint for this visit includes:  Chief Complaint   Patient presents with     Follow Up     Left foot heel and second toe wound     PCP: No Ref-Primary, Physician    Referring Provider:  Referred Self  No address on file    There were no vitals taken for this visit.  Data Unavailable        Allergies   Allergen Reactions     Amantadine Other (See Comments)     Other reaction(s): Hallucinations  Hallucinations/ lost self control/gambling.     halluicnations  Hallucinations/ lost self control/gambling.     hallucinates  halluicnations  hallucinates  Hallucinations/ lost self control/gambling.        Quetiapine GI Disturbance, Diarrhea and Other (See Comments)     Diarrhea    Diarrhea  Other reaction(s): GI Disturbance  Diarrhea       Duloxetine Other (See Comments)     suicidal  suicidal           Do you need any medication refills at today's visit?

## 2022-05-20 ENCOUNTER — OFFICE VISIT (OUTPATIENT)
Dept: PODIATRY | Facility: CLINIC | Age: 57
End: 2022-05-20
Payer: COMMERCIAL

## 2022-05-20 DIAGNOSIS — M20.42 HAMMER TOES OF BOTH FEET: ICD-10-CM

## 2022-05-20 DIAGNOSIS — M20.41 HAMMER TOES OF BOTH FEET: ICD-10-CM

## 2022-05-20 DIAGNOSIS — L97.522 SKIN ULCER OF SECOND TOE OF LEFT FOOT WITH FAT LAYER EXPOSED (H): Primary | ICD-10-CM

## 2022-05-20 PROCEDURE — 99213 OFFICE O/P EST LOW 20 MIN: CPT | Performed by: PODIATRIST
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
## 2022-05-20 NOTE — NURSING NOTE
Benjamin Mathews's chief complaint for this visit includes:  Chief Complaint   Patient presents with     Follow Up     Left foot wounds     PCP: No Ref-Primary, Physician    Referring Provider:  Referred Self  No address on file    There were no vitals taken for this visit.  Data Unavailable        Allergies   Allergen Reactions     Amantadine Other (See Comments)     Other reaction(s): Hallucinations  Hallucinations/ lost self control/gambling.     halluicnations  Hallucinations/ lost self control/gambling.     hallucinates  halluicnations  hallucinates  Hallucinations/ lost self control/gambling.        Quetiapine GI Disturbance, Diarrhea and Other (See Comments)     Diarrhea    Diarrhea  Other reaction(s): GI Disturbance  Diarrhea       Duloxetine Other (See Comments)     suicidal  suicidal           Do you need any medication refills at today's visit?
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
## 2022-05-27 ENCOUNTER — HOSPITAL ENCOUNTER (INPATIENT)
Facility: CLINIC | Age: 57
LOS: 3 days | Discharge: HOME HEALTH | DRG: 086 | End: 2022-05-31
Attending: EMERGENCY MEDICINE | Admitting: HOSPITALIST
Payer: COMMERCIAL

## 2022-05-27 ENCOUNTER — APPOINTMENT (OUTPATIENT)
Dept: CT IMAGING | Facility: CLINIC | Age: 57
DRG: 086 | End: 2022-05-27
Attending: EMERGENCY MEDICINE
Payer: COMMERCIAL

## 2022-05-27 DIAGNOSIS — R29.6 MULTIPLE FALLS: ICD-10-CM

## 2022-05-27 DIAGNOSIS — R53.1 GENERALIZED WEAKNESS: Primary | ICD-10-CM

## 2022-05-27 DIAGNOSIS — R25.3 JERKING: ICD-10-CM

## 2022-05-27 DIAGNOSIS — Z20.822 LAB TEST NEGATIVE FOR COVID-19 VIRUS: ICD-10-CM

## 2022-05-27 DIAGNOSIS — Z79.01 LONG TERM (CURRENT) USE OF ANTICOAGULANTS: ICD-10-CM
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
## 2022-05-27 DIAGNOSIS — S00.03XA CONTUSION OF SCALP, INITIAL ENCOUNTER: ICD-10-CM

## 2022-05-27 LAB
ALBUMIN SERPL-MCNC: 3.7 G/DL (ref 3.4–5)
ALP SERPL-CCNC: 75 U/L (ref 40–150)
ALT SERPL W P-5'-P-CCNC: 10 U/L (ref 0–70)
ANION GAP SERPL CALCULATED.3IONS-SCNC: 3 MMOL/L (ref 3–14)
AST SERPL W P-5'-P-CCNC: 21 U/L (ref 0–45)
BASOPHILS # BLD AUTO: 0 10E3/UL (ref 0–0.2)
BASOPHILS NFR BLD AUTO: 0 %
BILIRUB SERPL-MCNC: 0.3 MG/DL (ref 0.2–1.3)
BUN SERPL-MCNC: 18 MG/DL (ref 7–30)
CALCIUM SERPL-MCNC: 8.9 MG/DL (ref 8.5–10.1)
CHLORIDE BLD-SCNC: 105 MMOL/L (ref 94–109)
CO2 SERPL-SCNC: 32 MMOL/L (ref 20–32)
CREAT BLD-MCNC: 0.9 MG/DL (ref 0.7–1.3)
CREAT SERPL-MCNC: 0.94 MG/DL (ref 0.66–1.25)
EOSINOPHIL # BLD AUTO: 0.1 10E3/UL (ref 0–0.7)
EOSINOPHIL NFR BLD AUTO: 2 %
ERYTHROCYTE [DISTWIDTH] IN BLOOD BY AUTOMATED COUNT: 13.2 % (ref 10–15)
GFR SERPL CREATININE-BSD FRML MDRD: >60 ML/MIN/1.73M2
GFR SERPL CREATININE-BSD FRML MDRD: >90 ML/MIN/1.73M2
GLUCOSE BLD-MCNC: 119 MG/DL (ref 70–99)
GLUCOSE BLDC GLUCOMTR-MCNC: 143 MG/DL (ref 70–99)
GLUCOSE BLDC GLUCOMTR-MCNC: 160 MG/DL (ref 70–99)
HCT VFR BLD AUTO: 38.9 % (ref 40–53)
HGB BLD-MCNC: 12.3 G/DL (ref 13.3–17.7)
IMM GRANULOCYTES # BLD: 0 10E3/UL
IMM GRANULOCYTES NFR BLD: 1 %
INR BLD: 2.2 (ref 2–3)
LYMPHOCYTES # BLD AUTO: 2.7 10E3/UL (ref 0.8–5.3)
LYMPHOCYTES NFR BLD AUTO: 34 %
MCH RBC QN AUTO: 31.6 PG (ref 26.5–33)
MCHC RBC AUTO-ENTMCNC: 31.6 G/DL (ref 31.5–36.5)
MCV RBC AUTO: 100 FL (ref 78–100)
MONOCYTES # BLD AUTO: 0.6 10E3/UL (ref 0–1.3)
MONOCYTES NFR BLD AUTO: 7 %
NEUTROPHILS # BLD AUTO: 4.5 10E3/UL (ref 1.6–8.3)
NEUTROPHILS NFR BLD AUTO: 56 %
NRBC # BLD AUTO: 0 10E3/UL
NRBC BLD AUTO-RTO: 0 /100
PLATELET # BLD AUTO: 158 10E3/UL (ref 150–450)
POTASSIUM BLD-SCNC: 3.9 MMOL/L (ref 3.4–5.3)
PROT SERPL-MCNC: 7.2 G/DL (ref 6.8–8.8)
RBC # BLD AUTO: 3.89 10E6/UL (ref 4.4–5.9)
SODIUM SERPL-SCNC: 140 MMOL/L (ref 133–144)
WBC # BLD AUTO: 8 10E3/UL (ref 4–11)

## 2022-05-27 PROCEDURE — U0005 INFEC AGEN DETEC AMPLI PROBE: HCPCS | Performed by: EMERGENCY MEDICINE

## 2022-05-27 PROCEDURE — 82565 ASSAY OF CREATININE: CPT

## 2022-05-27 PROCEDURE — 99285 EMERGENCY DEPT VISIT HI MDM: CPT | Mod: 25 | Performed by: EMERGENCY MEDICINE

## 2022-05-27 PROCEDURE — 72125 CT NECK SPINE W/O DYE: CPT | Mod: 26 | Performed by: RADIOLOGY

## 2022-05-27 PROCEDURE — C9803 HOPD COVID-19 SPEC COLLECT: HCPCS | Performed by: EMERGENCY MEDICINE

## 2022-05-27 PROCEDURE — 84443 ASSAY THYROID STIM HORMONE: CPT | Performed by: STUDENT IN AN ORGANIZED HEALTH CARE EDUCATION/TRAINING PROGRAM

## 2022-05-27 PROCEDURE — 96361 HYDRATE IV INFUSION ADD-ON: CPT | Performed by: EMERGENCY MEDICINE

## 2022-05-27 PROCEDURE — 82607 VITAMIN B-12: CPT | Performed by: STUDENT IN AN ORGANIZED HEALTH CARE EDUCATION/TRAINING PROGRAM

## 2022-05-27 PROCEDURE — 85610 PROTHROMBIN TIME: CPT | Performed by: EMERGENCY MEDICINE

## 2022-05-27 PROCEDURE — 80053 COMPREHEN METABOLIC PANEL: CPT | Performed by: EMERGENCY MEDICINE

## 2022-05-27 PROCEDURE — 70450 CT HEAD/BRAIN W/O DYE: CPT | Mod: 26 | Performed by: RADIOLOGY

## 2022-05-27 PROCEDURE — 72125 CT NECK SPINE W/O DYE: CPT

## 2022-05-27 PROCEDURE — 85610 PROTHROMBIN TIME: CPT

## 2022-05-27 PROCEDURE — 36415 COLL VENOUS BLD VENIPUNCTURE: CPT | Performed by: EMERGENCY MEDICINE

## 2022-05-27 PROCEDURE — 76705 ECHO EXAM OF ABDOMEN: CPT | Mod: 26 | Performed by: EMERGENCY MEDICINE

## 2022-05-27 PROCEDURE — 70450 CT HEAD/BRAIN W/O DYE: CPT

## 2022-05-27 PROCEDURE — 76705 ECHO EXAM OF ABDOMEN: CPT | Performed by: EMERGENCY MEDICINE

## 2022-05-27 PROCEDURE — 85025 COMPLETE CBC W/AUTO DIFF WBC: CPT | Performed by: EMERGENCY MEDICINE

## 2022-05-27 ASSESSMENT — ENCOUNTER SYMPTOMS
NECK PAIN: 1
WEAKNESS: 1
CONFUSION: 1
NECK STIFFNESS: 0
NAUSEA: 1
ABDOMINAL PAIN: 0
FEVER: 0
HEADACHES: 1
ARTHRALGIAS: 0
COLOR CHANGE: 0
SHORTNESS OF BREATH: 0
EYE REDNESS: 0
DIFFICULTY URINATING: 0

## 2022-05-27 NOTE — LETTER
Transition Communication Hand-off for Care Transitions to Next Level of Care Provider    Name: Benjamin Mathews  : 1965  MRN #: [id number]  Primary Care Provider: Physician No Ref-Primary     Primary Clinic: No address on file     Reason for Hospitalization:  Jerking [R25.3]  Multiple falls [R29.6]  Contusion of scalp, initial encounter [S00.03XA]  Admit Date/Time: 2022 10:36 PM  Discharge Date:  2022    Discharge Plan:  Back to assistive living setting.     Discharge Needs Assessment:  Needs    Flowsheet Row Most Recent Value   Anticipated Changes Related to Illness other (see comments)  [Lives at Assistive Living Setting [place]]   Equipment Currently Used at Home walker, rolling, shower chair, other (see comments)  [power scooter]        Follow-up plan:    Future Appointments   Date Time Provider Department Center   2022  8:00 AM Brooklyn Nunn, OT [facility]   2022  9:00 AM Jenn Real, PT [facility]   2022 12:30 PM Dequan York, MICAELA Ashby RN

## 2022-05-28 ENCOUNTER — APPOINTMENT (OUTPATIENT)
Dept: GENERAL RADIOLOGY | Facility: CLINIC | Age: 57
DRG: 086 | End: 2022-05-28
Payer: COMMERCIAL

## 2022-05-28 ENCOUNTER — APPOINTMENT (OUTPATIENT)
Dept: MRI IMAGING | Facility: CLINIC | Age: 57
DRG: 086 | End: 2022-05-28
Attending: STUDENT IN AN ORGANIZED HEALTH CARE EDUCATION/TRAINING PROGRAM
Payer: COMMERCIAL

## 2022-05-28 ENCOUNTER — APPOINTMENT (OUTPATIENT)
Dept: NEUROLOGY | Facility: CLINIC | Age: 57
DRG: 086 | End: 2022-05-28
Attending: STUDENT IN AN ORGANIZED HEALTH CARE EDUCATION/TRAINING PROGRAM
Payer: COMMERCIAL

## 2022-05-28 ENCOUNTER — APPOINTMENT (OUTPATIENT)
Dept: CT IMAGING | Facility: CLINIC | Age: 57
DRG: 086 | End: 2022-05-28
Attending: EMERGENCY MEDICINE
Payer: COMMERCIAL

## 2022-05-28 PROBLEM — R29.6 MULTIPLE FALLS: Status: ACTIVE | Noted: 2022-05-28

## 2022-05-28 PROBLEM — S00.03XA CONTUSION OF SCALP, INITIAL ENCOUNTER: Status: ACTIVE | Noted: 2022-05-28

## 2022-05-28 LAB
ALBUMIN UR-MCNC: NEGATIVE MG/DL
ANION GAP SERPL CALCULATED.3IONS-SCNC: 3 MMOL/L (ref 3–14)
APPEARANCE UR: CLEAR
BASOPHILS # BLD AUTO: 0 10E3/UL (ref 0–0.2)
BASOPHILS NFR BLD AUTO: 0 %
BILIRUB UR QL STRIP: NEGATIVE
BUN SERPL-MCNC: 12 MG/DL (ref 7–30)
CALCIUM SERPL-MCNC: 8.1 MG/DL (ref 8.5–10.1)
CHLORIDE BLD-SCNC: 114 MMOL/L (ref 94–109)
CO2 SERPL-SCNC: 28 MMOL/L (ref 20–32)
COLOR UR AUTO: NORMAL
CREAT SERPL-MCNC: 0.78 MG/DL (ref 0.66–1.25)
EOSINOPHIL # BLD AUTO: 0.1 10E3/UL (ref 0–0.7)
EOSINOPHIL NFR BLD AUTO: 2 %
ERYTHROCYTE [DISTWIDTH] IN BLOOD BY AUTOMATED COUNT: 13.2 % (ref 10–15)
GFR SERPL CREATININE-BSD FRML MDRD: >90 ML/MIN/1.73M2
GLUCOSE BLD-MCNC: 95 MG/DL (ref 70–99)
GLUCOSE UR STRIP-MCNC: NEGATIVE MG/DL
HCT VFR BLD AUTO: 40.5 % (ref 40–53)
HGB BLD-MCNC: 12.3 G/DL (ref 13.3–17.7)
HGB UR QL STRIP: NEGATIVE
HOLD SPECIMEN: NORMAL
IMM GRANULOCYTES # BLD: 0 10E3/UL
IMM GRANULOCYTES NFR BLD: 0 %
INR PPP: 1.91 (ref 0.85–1.15)
KETONES UR STRIP-MCNC: NEGATIVE MG/DL
LACTATE SERPL-SCNC: 0.9 MMOL/L (ref 0.7–2)
LEUKOCYTE ESTERASE UR QL STRIP: NEGATIVE
LYMPHOCYTES # BLD AUTO: 2.2 10E3/UL (ref 0.8–5.3)
LYMPHOCYTES NFR BLD AUTO: 39 %
MCH RBC QN AUTO: 31.3 PG (ref 26.5–33)
MCHC RBC AUTO-ENTMCNC: 30.4 G/DL (ref 31.5–36.5)
MCV RBC AUTO: 103 FL (ref 78–100)
MONOCYTES # BLD AUTO: 0.5 10E3/UL (ref 0–1.3)
MONOCYTES NFR BLD AUTO: 8 %
NEUTROPHILS # BLD AUTO: 2.9 10E3/UL (ref 1.6–8.3)
NEUTROPHILS NFR BLD AUTO: 51 %
NITRATE UR QL: NEGATIVE
NRBC # BLD AUTO: 0 10E3/UL
NRBC BLD AUTO-RTO: 0 /100
PH UR STRIP: 5.5 [PH] (ref 5–7)
PLATELET # BLD AUTO: 188 10E3/UL (ref 150–450)
POTASSIUM BLD-SCNC: 4.6 MMOL/L (ref 3.4–5.3)
RBC # BLD AUTO: 3.93 10E6/UL (ref 4.4–5.9)
RBC URINE: <1 /HPF
SARS-COV-2 RNA RESP QL NAA+PROBE: NEGATIVE
SODIUM SERPL-SCNC: 145 MMOL/L (ref 133–144)
SP GR UR STRIP: 1.01 (ref 1–1.03)
TSH SERPL DL<=0.005 MIU/L-ACNC: 2.99 MU/L (ref 0.4–4)
UROBILINOGEN UR STRIP-MCNC: NORMAL MG/DL
VIT B12 SERPL-MCNC: 308 PG/ML (ref 193–986)
WBC # BLD AUTO: 5.7 10E3/UL (ref 4–11)
WBC URINE: <1 /HPF

## 2022-05-28 PROCEDURE — 74018 RADEX ABDOMEN 1 VIEW: CPT | Mod: 26 | Performed by: RADIOLOGY

## 2022-05-28 PROCEDURE — 258N000003 HC RX IP 258 OP 636: Performed by: STUDENT IN AN ORGANIZED HEALTH CARE EDUCATION/TRAINING PROGRAM

## 2022-05-28 PROCEDURE — 258N000003 HC RX IP 258 OP 636: Performed by: EMERGENCY MEDICINE

## 2022-05-28 PROCEDURE — 70551 MRI BRAIN STEM W/O DYE: CPT

## 2022-05-28 PROCEDURE — 74177 CT ABD & PELVIS W/CONTRAST: CPT

## 2022-05-28 PROCEDURE — 250N000011 HC RX IP 250 OP 636: Performed by: EMERGENCY MEDICINE

## 2022-05-28 PROCEDURE — 81001 URINALYSIS AUTO W/SCOPE: CPT | Performed by: EMERGENCY MEDICINE

## 2022-05-28 PROCEDURE — 87149 DNA/RNA DIRECT PROBE: CPT | Performed by: EMERGENCY MEDICINE

## 2022-05-28 PROCEDURE — 80048 BASIC METABOLIC PNL TOTAL CA: CPT | Performed by: INTERNAL MEDICINE

## 2022-05-28 PROCEDURE — 99221 1ST HOSP IP/OBS SF/LOW 40: CPT | Mod: GC | Performed by: PSYCHIATRY & NEUROLOGY

## 2022-05-28 PROCEDURE — 120N000011 HC R&B TRANSPLANT UMMC

## 2022-05-28 PROCEDURE — 70496 CT ANGIOGRAPHY HEAD: CPT | Mod: 26 | Performed by: RADIOLOGY

## 2022-05-28 PROCEDURE — 250N000013 HC RX MED GY IP 250 OP 250 PS 637: Performed by: STUDENT IN AN ORGANIZED HEALTH CARE EDUCATION/TRAINING PROGRAM

## 2022-05-28 PROCEDURE — 87077 CULTURE AEROBIC IDENTIFY: CPT | Performed by: EMERGENCY MEDICINE

## 2022-05-28 PROCEDURE — 71260 CT THORAX DX C+: CPT | Mod: 26 | Performed by: RADIOLOGY

## 2022-05-28 PROCEDURE — 36415 COLL VENOUS BLD VENIPUNCTURE: CPT | Performed by: EMERGENCY MEDICINE

## 2022-05-28 PROCEDURE — 95718 EEG PHYS/QHP 2-12 HR W/VEEG: CPT | Performed by: PSYCHIATRY & NEUROLOGY

## 2022-05-28 PROCEDURE — 85025 COMPLETE CBC W/AUTO DIFF WBC: CPT | Performed by: INTERNAL MEDICINE

## 2022-05-28 PROCEDURE — 250N000013 HC RX MED GY IP 250 OP 250 PS 637: Performed by: HOSPITALIST

## 2022-05-28 PROCEDURE — 74177 CT ABD & PELVIS W/CONTRAST: CPT | Mod: 26 | Performed by: RADIOLOGY

## 2022-05-28 PROCEDURE — 70551 MRI BRAIN STEM W/O DYE: CPT | Mod: 26 | Performed by: STUDENT IN AN ORGANIZED HEALTH CARE EDUCATION/TRAINING PROGRAM

## 2022-05-28 PROCEDURE — 83605 ASSAY OF LACTIC ACID: CPT | Performed by: EMERGENCY MEDICINE

## 2022-05-28 PROCEDURE — 70496 CT ANGIOGRAPHY HEAD: CPT

## 2022-05-28 PROCEDURE — 36415 COLL VENOUS BLD VENIPUNCTURE: CPT | Performed by: INTERNAL MEDICINE

## 2022-05-28 PROCEDURE — 95711 VEEG 2-12 HR UNMONITORED: CPT

## 2022-05-28 PROCEDURE — 74018 RADEX ABDOMEN 1 VIEW: CPT

## 2022-05-28 PROCEDURE — 70498 CT ANGIOGRAPHY NECK: CPT | Mod: 26 | Performed by: RADIOLOGY

## 2022-05-28 RX ORDER — ATORVASTATIN CALCIUM 40 MG/1
40 TABLET, FILM COATED ORAL DAILY
Status: CANCELLED | OUTPATIENT
Start: 2022-05-28

## 2022-05-28 RX ORDER — PANTOPRAZOLE SODIUM 40 MG/1
40 TABLET, DELAYED RELEASE ORAL
Status: DISCONTINUED | OUTPATIENT
Start: 2022-05-28 | End: 2022-05-31 | Stop reason: HOSPADM

## 2022-05-28 RX ORDER — CARBIDOPA AND LEVODOPA 50; 200 MG/1; MG/1
1 TABLET, EXTENDED RELEASE ORAL AT BEDTIME
Status: DISCONTINUED | OUTPATIENT
Start: 2022-05-28 | End: 2022-05-31 | Stop reason: HOSPADM

## 2022-05-28 RX ORDER — CLONAZEPAM 0.5 MG/1
2 TABLET ORAL 2 TIMES DAILY
Status: CANCELLED | OUTPATIENT
Start: 2022-05-28

## 2022-05-28 RX ORDER — TAMSULOSIN HYDROCHLORIDE 0.4 MG/1
0.4 CAPSULE ORAL EVERY EVENING
Status: DISCONTINUED | OUTPATIENT
Start: 2022-05-28 | End: 2022-05-31 | Stop reason: HOSPADM

## 2022-05-28 RX ORDER — CLONAZEPAM 0.5 MG/1
2 TABLET ORAL 2 TIMES DAILY
Status: DISCONTINUED | OUTPATIENT
Start: 2022-05-28 | End: 2022-05-29

## 2022-05-28 RX ORDER — MULTIVITAMIN,THERAPEUTIC
1 TABLET ORAL DAILY
Status: DISCONTINUED | OUTPATIENT
Start: 2022-05-28 | End: 2022-05-31 | Stop reason: HOSPADM

## 2022-05-28 RX ORDER — VENLAFAXINE HYDROCHLORIDE 150 MG/1
150 TABLET, EXTENDED RELEASE ORAL
Status: CANCELLED | OUTPATIENT
Start: 2022-05-28

## 2022-05-28 RX ORDER — CARBIDOPA AND LEVODOPA 25; 100 MG/1; MG/1
2 TABLET ORAL 3 TIMES DAILY
Status: CANCELLED | OUTPATIENT
Start: 2022-05-28

## 2022-05-28 RX ORDER — GABAPENTIN 800 MG/1
800 TABLET ORAL 3 TIMES DAILY
Status: DISCONTINUED | OUTPATIENT
Start: 2022-05-28 | End: 2022-05-31 | Stop reason: HOSPADM

## 2022-05-28 RX ORDER — MULTIVITAMIN WITH FOLIC ACID 400 MCG
1 TABLET ORAL DAILY
Status: CANCELLED | OUTPATIENT
Start: 2022-05-28

## 2022-05-28 RX ORDER — CALCIUM POLYCARBOPHIL 625 MG 625 MG/1
625 TABLET ORAL DAILY
Status: DISCONTINUED | OUTPATIENT
Start: 2022-05-28 | End: 2022-05-31 | Stop reason: HOSPADM

## 2022-05-28 RX ORDER — LIDOCAINE 40 MG/G
CREAM TOPICAL
Status: DISCONTINUED | OUTPATIENT
Start: 2022-05-28 | End: 2022-05-31 | Stop reason: HOSPADM

## 2022-05-28 RX ORDER — VENLAFAXINE HYDROCHLORIDE 150 MG/1
150 TABLET, EXTENDED RELEASE ORAL
Status: DISCONTINUED | OUTPATIENT
Start: 2022-05-28 | End: 2022-05-28 | Stop reason: CLARIF

## 2022-05-28 RX ORDER — LACTULOSE 10 G/15ML
30 SOLUTION ORAL; RECTAL DAILY
Status: CANCELLED | OUTPATIENT
Start: 2022-05-28

## 2022-05-28 RX ORDER — CLONAZEPAM 0.5 MG/1
1 TABLET ORAL DAILY
Status: CANCELLED | OUTPATIENT
Start: 2022-05-28

## 2022-05-28 RX ORDER — TAMSULOSIN HYDROCHLORIDE 0.4 MG/1
0.4 CAPSULE ORAL EVERY EVENING
Status: CANCELLED | OUTPATIENT
Start: 2022-05-28

## 2022-05-28 RX ORDER — CLONAZEPAM 0.5 MG/1
1 TABLET ORAL DAILY
Status: DISCONTINUED | OUTPATIENT
Start: 2022-05-28 | End: 2022-05-29

## 2022-05-28 RX ORDER — AMOXICILLIN 250 MG
1 CAPSULE ORAL 2 TIMES DAILY PRN
Status: CANCELLED | OUTPATIENT
Start: 2022-05-28

## 2022-05-28 RX ORDER — POLYETHYLENE GLYCOL 3350 17 G/17G
17 POWDER, FOR SOLUTION ORAL AT BEDTIME
Status: DISCONTINUED | OUTPATIENT
Start: 2022-05-28 | End: 2022-05-31 | Stop reason: HOSPADM

## 2022-05-28 RX ORDER — DOCUSATE SODIUM 100 MG/1
100 CAPSULE, LIQUID FILLED ORAL 2 TIMES DAILY PRN
Status: CANCELLED | OUTPATIENT
Start: 2022-05-28

## 2022-05-28 RX ORDER — CLONAZEPAM 0.5 MG/1
0.5 TABLET ORAL 2 TIMES DAILY PRN
Status: DISCONTINUED | OUTPATIENT
Start: 2022-05-28 | End: 2022-05-31 | Stop reason: HOSPADM

## 2022-05-28 RX ORDER — LUBIPROSTONE 24 UG/1
24 CAPSULE ORAL 2 TIMES DAILY
Status: CANCELLED | OUTPATIENT
Start: 2022-05-28

## 2022-05-28 RX ORDER — AMOXICILLIN 250 MG
1 CAPSULE ORAL 2 TIMES DAILY PRN
Status: DISCONTINUED | OUTPATIENT
Start: 2022-05-28 | End: 2022-05-31 | Stop reason: HOSPADM

## 2022-05-28 RX ORDER — LACTULOSE 10 G/15ML
15 SOLUTION ORAL DAILY
Status: DISCONTINUED | OUTPATIENT
Start: 2022-05-28 | End: 2022-05-31 | Stop reason: HOSPADM

## 2022-05-28 RX ORDER — PANTOPRAZOLE SODIUM 40 MG/1
40 TABLET, DELAYED RELEASE ORAL
Status: CANCELLED | OUTPATIENT
Start: 2022-05-28

## 2022-05-28 RX ORDER — SENNOSIDES 8.6 MG
2 TABLET ORAL 2 TIMES DAILY
Status: CANCELLED | OUTPATIENT
Start: 2022-05-28

## 2022-05-28 RX ORDER — CLONAZEPAM 0.5 MG/1
0.5 TABLET ORAL 2 TIMES DAILY PRN
Status: CANCELLED | OUTPATIENT
Start: 2022-05-28

## 2022-05-28 RX ORDER — ACETAMINOPHEN 500 MG
1000 TABLET ORAL 3 TIMES DAILY
Status: DISCONTINUED | OUTPATIENT
Start: 2022-05-28 | End: 2022-05-31 | Stop reason: HOSPADM

## 2022-05-28 RX ORDER — CARBIDOPA AND LEVODOPA 50; 200 MG/1; MG/1
1 TABLET, EXTENDED RELEASE ORAL EVERY 24 HOURS
Status: CANCELLED | OUTPATIENT
Start: 2022-05-28

## 2022-05-28 RX ORDER — CARBIDOPA AND LEVODOPA 25; 100 MG/1; MG/1
2 TABLET ORAL 2 TIMES DAILY
Status: DISCONTINUED | OUTPATIENT
Start: 2022-05-28 | End: 2022-05-31 | Stop reason: HOSPADM

## 2022-05-28 RX ORDER — MULTIVIT WITH MINERALS/LUTEIN
500 TABLET ORAL 2 TIMES DAILY
Status: CANCELLED | OUTPATIENT
Start: 2022-05-28

## 2022-05-28 RX ORDER — CLOZAPINE 25 MG/1
25 TABLET ORAL
Status: CANCELLED | OUTPATIENT
Start: 2022-05-28

## 2022-05-28 RX ORDER — LUBIPROSTONE 24 UG/1
24 CAPSULE ORAL 2 TIMES DAILY
Status: DISCONTINUED | OUTPATIENT
Start: 2022-05-28 | End: 2022-05-31 | Stop reason: HOSPADM

## 2022-05-28 RX ORDER — HYDROXYZINE HYDROCHLORIDE 25 MG/1
25 TABLET, FILM COATED ORAL EVERY 6 HOURS PRN
Status: DISCONTINUED | OUTPATIENT
Start: 2022-05-28 | End: 2022-05-31 | Stop reason: HOSPADM

## 2022-05-28 RX ORDER — CALCIUM POLYCARBOPHIL 625 MG 625 MG/1
625 TABLET ORAL DAILY
Status: CANCELLED | OUTPATIENT
Start: 2022-05-28

## 2022-05-28 RX ORDER — TRAZODONE HYDROCHLORIDE 50 MG/1
100 TABLET, FILM COATED ORAL
Status: DISCONTINUED | OUTPATIENT
Start: 2022-05-28 | End: 2022-05-31 | Stop reason: HOSPADM

## 2022-05-28 RX ORDER — GABAPENTIN 800 MG/1
800 TABLET ORAL 3 TIMES DAILY
Status: CANCELLED | OUTPATIENT
Start: 2022-05-28

## 2022-05-28 RX ORDER — POLYETHYLENE GLYCOL 3350 17 G/17G
17 POWDER, FOR SOLUTION ORAL AT BEDTIME
Status: CANCELLED | OUTPATIENT
Start: 2022-05-28

## 2022-05-28 RX ORDER — SODIUM CHLORIDE 9 MG/ML
INJECTION, SOLUTION INTRAVENOUS CONTINUOUS
Status: ACTIVE | OUTPATIENT
Start: 2022-05-28 | End: 2022-05-28

## 2022-05-28 RX ORDER — CLOZAPINE 25 MG/1
25 TABLET ORAL AT BEDTIME
Status: DISCONTINUED | OUTPATIENT
Start: 2022-05-28 | End: 2022-05-31 | Stop reason: HOSPADM

## 2022-05-28 RX ORDER — BISACODYL 10 MG
10 SUPPOSITORY, RECTAL RECTAL DAILY PRN
Status: DISCONTINUED | OUTPATIENT
Start: 2022-05-28 | End: 2022-05-31 | Stop reason: HOSPADM

## 2022-05-28 RX ORDER — SENNOSIDES 8.6 MG
2 TABLET ORAL 2 TIMES DAILY
Status: DISCONTINUED | OUTPATIENT
Start: 2022-05-28 | End: 2022-05-31 | Stop reason: HOSPADM

## 2022-05-28 RX ORDER — ATORVASTATIN CALCIUM 40 MG/1
40 TABLET, FILM COATED ORAL EVERY EVENING
Status: DISCONTINUED | OUTPATIENT
Start: 2022-05-28 | End: 2022-05-31 | Stop reason: HOSPADM

## 2022-05-28 RX ORDER — BISACODYL 10 MG
10 SUPPOSITORY, RECTAL RECTAL DAILY PRN
Status: CANCELLED | OUTPATIENT
Start: 2022-05-28

## 2022-05-28 RX ORDER — ASCORBIC ACID 500 MG
500 TABLET ORAL 2 TIMES DAILY
Status: DISCONTINUED | OUTPATIENT
Start: 2022-05-28 | End: 2022-05-31 | Stop reason: HOSPADM

## 2022-05-28 RX ORDER — DOCUSATE SODIUM 100 MG/1
100 CAPSULE, LIQUID FILLED ORAL 2 TIMES DAILY PRN
Status: DISCONTINUED | OUTPATIENT
Start: 2022-05-28 | End: 2022-05-31 | Stop reason: HOSPADM

## 2022-05-28 RX ORDER — IOPAMIDOL 755 MG/ML
75 INJECTION, SOLUTION INTRAVASCULAR ONCE
Status: COMPLETED | OUTPATIENT
Start: 2022-05-28 | End: 2022-05-28

## 2022-05-28 RX ORDER — VENLAFAXINE HYDROCHLORIDE 150 MG/1
150 CAPSULE, EXTENDED RELEASE ORAL
Status: DISCONTINUED | OUTPATIENT
Start: 2022-05-28 | End: 2022-05-30

## 2022-05-28 RX ADMIN — SODIUM CHLORIDE 1000 ML: 9 INJECTION, SOLUTION INTRAVENOUS at 02:14

## 2022-05-28 RX ADMIN — ACETAMINOPHEN 1000 MG: 500 TABLET ORAL at 08:37

## 2022-05-28 RX ADMIN — SODIUM CHLORIDE: 9 INJECTION, SOLUTION INTRAVENOUS at 04:53

## 2022-05-28 RX ADMIN — CLOZAPINE 25 MG: 25 TABLET ORAL at 21:37

## 2022-05-28 RX ADMIN — GABAPENTIN 800 MG: 800 TABLET, FILM COATED ORAL at 08:32

## 2022-05-28 RX ADMIN — GABAPENTIN 800 MG: 800 TABLET, FILM COATED ORAL at 19:40

## 2022-05-28 RX ADMIN — Medication 15 MG: at 08:32

## 2022-05-28 RX ADMIN — VENLAFAXINE HYDROCHLORIDE 150 MG: 150 CAPSULE, EXTENDED RELEASE ORAL at 08:32

## 2022-05-28 RX ADMIN — Medication 3 HALF-TAB: at 16:08

## 2022-05-28 RX ADMIN — RIVAROXABAN 20 MG: 20 TABLET, FILM COATED ORAL at 16:08

## 2022-05-28 RX ADMIN — LACTULOSE 15 ML: 10 SOLUTION ORAL at 09:13

## 2022-05-28 RX ADMIN — PANTOPRAZOLE SODIUM 40 MG: 40 TABLET, DELAYED RELEASE ORAL at 08:37

## 2022-05-28 RX ADMIN — CARBIDOPA AND LEVODOPA 1 TABLET: 50; 200 TABLET, EXTENDED RELEASE ORAL at 19:39

## 2022-05-28 RX ADMIN — Medication 15 MG: at 16:10

## 2022-05-28 RX ADMIN — LUBIPROSTONE 24 MCG: 24 CAPSULE, GELATIN COATED ORAL at 08:32

## 2022-05-28 RX ADMIN — IOPAMIDOL 75 ML: 755 INJECTION, SOLUTION INTRAVENOUS at 01:43

## 2022-05-28 RX ADMIN — Medication 15 MG: at 19:40

## 2022-05-28 RX ADMIN — CALCIUM POLYCARBOPHIL 625 MG: 625 TABLET, FILM COATED ORAL at 08:32

## 2022-05-28 RX ADMIN — SODIUM CHLORIDE 1000 ML: 9 INJECTION, SOLUTION INTRAVENOUS at 00:15

## 2022-05-28 RX ADMIN — CARBIDOPA AND LEVODOPA 2 TABLET: 25; 100 TABLET ORAL at 12:01

## 2022-05-28 RX ADMIN — ATORVASTATIN CALCIUM 40 MG: 40 TABLET, FILM COATED ORAL at 19:39

## 2022-05-28 RX ADMIN — THERA TABS 1 TABLET: TAB at 08:32

## 2022-05-28 RX ADMIN — CLONAZEPAM 1 MG: 0.5 TABLET ORAL at 12:01

## 2022-05-28 RX ADMIN — Medication 500 MG: at 19:39

## 2022-05-28 RX ADMIN — Medication 15 MG: at 12:01

## 2022-05-28 RX ADMIN — CARBIDOPA AND LEVODOPA 2 TABLET: 25; 100 TABLET ORAL at 08:31

## 2022-05-28 RX ADMIN — LUBIPROSTONE 24 MCG: 24 CAPSULE, GELATIN COATED ORAL at 19:40

## 2022-05-28 RX ADMIN — Medication 12.5 MG: at 19:40

## 2022-05-28 RX ADMIN — Medication 500 MG: at 08:37

## 2022-05-28 RX ADMIN — SENNOSIDES 2 TABLET: 8.6 TABLET ORAL at 19:38

## 2022-05-28 RX ADMIN — GABAPENTIN 800 MG: 800 TABLET, FILM COATED ORAL at 12:02

## 2022-05-28 RX ADMIN — ACETAMINOPHEN 1000 MG: 500 TABLET ORAL at 19:39

## 2022-05-28 ASSESSMENT — ACTIVITIES OF DAILY LIVING (ADL)
ADLS_ACUITY_SCORE: 35
ADLS_ACUITY_SCORE: 37
ADLS_ACUITY_SCORE: 35
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
## 2022-05-28 NOTE — H&P
[facility]    History and Physical - Medicine Service, MAROON TEAM        Date of Admission:  5/27/2022    Assessment & Plan      Benjamin Mathews is a 57 year old male admitted on 5/27/2022. 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     # Falls x 3 from standing with head injury, brief 4-5 second LOC  Unclear etiology, at least 1 was witnessed with LOC and head hit. Pt with brief nausea and HA after each episode. No visible trauma, CT head, CTA head/neck all unremarkable for acute processes. So far no localizing infectious etiology, though pt hesitantly reports dysuria x 1w, mild SOB (which may be chronic), currently on RA and UTI unremarkable, CT C/A/P also non-localizing. Reports strict med compliance at [facility]; no recent med changes. Confirmed scheduled med rec with paper chart with [facility]. No recollection of dizziness/light-headedness to suggest orthostasis but is relatively hypotensive on admit, mildly improved with IVF, no lactate to indicate sepsis. One episode pt reports L knee buckling/giving out. Does have chronic L sided weakness of which deconditioning may be contributing. On chronic Xarelto and no tachycardia/hypoxia to suggest DVT/PE; will hold off US/CTPE for now. Could also consider vaso-vagal related to chronic constipation.    - IVF: s/p 2L; additional 1L @125/hr ordered for now   - No Abx currently ordered; consider PNA (aspiration?) as possible source if spikes fever   - TSH   - B12   -  "Telemetry   - Orthostatics after IVF   - BCx pending   - KUB to eval stool burden   - PT/OT eval   - Neurology consult for possible med-related side effects adjustment recommendations   - Reordered ~most home meds for now; low threshold to peel off if remains symptomatic    # CVA with left sided deficit, chronic  # Hx DVT on Xarelto    # Atypical Parkinson's  (2005)  # Parkinson's related psychosis  Follows with Dr. De Dios as OP. Pt reports frustration with providers not considering \"Stiff man syndrome\" as a possible etiology of his R sided tingling/weakenss. Described these episodes during 7/2021 admission, as ex.   - PTA Carbidopa/Levodopa   - PTA Clozapine   - Neurology consult per above    # Depression  # Hx suicide attempt 2021 (per chart review)  # Anxiety   - PTA venlafaxine   - PTA PRN hydroxyzine    # Chronic pain, neuropathic pain  # Muscle spasticity  Meds may be contributing to presentation, unclear which would be most beneficial/least harm.    - PTA APAP   - PTA baclofen   - PTA scheduled + PRN clonazepam   - PTA Gabapentin    # Constipation   - PTA Senna-time, PRN Senna-Plus   - PTA PRN bisacodyl   - PTA Scheduled + PRN Clearlax   - PTA Enulose   - PTA Fiber-Lax   - PTA Lubiprostone   - PTA PRN Docusate    # Redemonstrated 4mm RLL nodule  # Hx tachycardia- PTA metoprolol succinate BID   # GERD- PTA pantoprazole  # BPH- PTA tamsulosin  # Vitamin deficiency- PTA Vit C, Daily-Alena  # HLD- PTA atorvastatin       Diet: Combination Diet Regular Diet Adultregular  DVT Prophylaxis: xarelto  Dudley Catheter: Not present  Fluids: s/p 2L + 1L mIVF  Central Lines: None  Cardiac Monitoring: ACTIVE order. Indication: Syncope- low cardiac risk (24 hours)  Code Status: No CPR- Do NOT IntubateDNR/DNI, confirmed w/ pt    Clinically Significant Risk Factors Present on Admission               # Coagulation Defect: home medication list includes an anticoagulant medication        Disposition Plan   Expected Discharge: 3-4 " "days  Anticipated discharge location:  Awaiting care coordination huddle  Delays:     fall workup, med reconciliation, r/o infection, PT/OT eval       The patient will be formally staffed in AM    Mirna Rodriguez MD  Medicine Service, [facility]  Securely message with the Vocera Web Console (learn more here)  Text page via [facility] Paging/Directory   Please see signed in provider for up to date coverage information    ______________________________________________________________________    Chief Complaint   Falls, head hit, LOC    History is obtained from the patient    History of Present Illness   Benjamin Mathews is a 57 year old male who presents with three falls over past 2-3 days, 1 witness with head hit and brief LOC.   Pt states he fell 3 times, 1st was unwitnessed in his closet where his L knee felt like it buckled, gave out and he fell- doesn't remember feeling dizzy/light-headed beforehand. Knows he hit his head, thinks brief LOC, no blood, didn't tell anyone.  Unclear what happened during 2nd fall episode.   Pt had witnessed fall in dining room approx 21:30 which prompted EMS/ED evaluation. Head strike on coffee table, brief 4-5sec LOC.   All 3 episodes pt had subsequent mild nausea without emesis and HA for 30min-1hr. No vision changes, no CP/cough. Has mild Sob which he's unsure if is new or chronic. Denies abdominal pain. Chronic constipation with lots of straining but last BM 1-2d ago, most recently no issues passing that stool. Asked about dysuria, and pt paused for a long time before responding \"yes\" and that it has been going on maybe 1w, mild. Has lost about 30lb over past few years with overall borderline appetite but still eats 3meals/day.       Review of Systems    The 10 point Review of Systems is negative other than noted in the HPI or here.     Past Medical History    I have reviewed this patient's medical history and updated it with " pertinent information if needed.   Past Medical History:   Diagnosis Date     Clotting disorder (H)     PE 2019     Dystonia      Parkinson disease (H)         Past Surgical History   I have reviewed this patient's surgical history and updated it with pertinent information if needed.  No past surgical history on file.     Social History   I have reviewed this patient's social history and updated it with pertinent information if needed. Benjamin Mathews  reports that he has been smoking pipe. He has never used smokeless tobacco. He reports previous alcohol use. He reports previous drug use.    Family History   I have reviewed this patient's family history and updated it with pertinent information if needed.  Family History   Problem Relation Age of Onset     Other - See Comments Mother         pulmonary embolism from hip fracture     Pulmonary Embolism Mother      Parkinsonism Father      Neurologic Disorder Sister      Parkinsonism Sister         ?med related     Other - See Comments Sister         1950     Depression Sister      Neurologic Disorder Brother         dystonia     Dystonia Brother      Other - See Comments Brother              Heart Disease Nephew        Prior to Admission Medications   Prior to Admission Medications   Prescriptions Last Dose Informant Patient Reported? Taking?   Baclofen (LIORESAL) 5 MG tablet   Yes No   Simg tab by mouth 4/day @8am, 12p, 5pm and 8pm   ENULOSE 10 GM/15ML SOLUTION   Yes No   Siml by mouth daily at 8am   Multiple Vitamin (DAILY-ALLAN) TABS   Yes No   Sig: Vitamin by  Mouth daily @8am   acetaminophen (TYLENOL) 500 MG tablet   Yes No   Si x 500mg tabs by mouth 3/day @8am, 12pm and 8pm and 2 x 500mg tab by mouth every 6 hours as needed   atorvastatin (LIPITOR) 40 MG tablet   Yes No   Sig: Take 40 mg by mouth At 8pm   baclofen (LIORESAL) 10 MG tablet   Yes No   Sig: 10mg tab by mouth 4/day @8am, 12p, 5pm and 8pm   bisacodyl (DULCOLAX) 10 MG suppository    Yes No   Sig: Place 10 mg rectally daily as needed for constipation   calcium polycarbophil (FIBER-LAX) 625 MG tablet   Yes No   Si tab by mouth daily at 8am   carbidopa-levodopa (SINEMET CR)  MG CR tablet   Yes No   Si/200 tab by mouth nightly at 8pm   carbidopa-levodopa (SINEMET)  MG tablet   Yes No   Si tabs by mouth @8am, 2 tabs@ 12pm and 1.5 tabs @ 4pm   cholecalciferol (VITAMIN D3) 125 mcg (5000 units) capsule   Yes No   Si mcg (5000 units) by mouth daily at  8am   cloZAPine (CLOZARIL) 25 MG tablet   Yes No   Simg tab by mouth nightly at 8pm   clonazePAM (KLONOPIN) 0.5 MG tablet   Yes No   Sig: 2 x 0.5mg tab by mouth daily at noon   clonazePAM (KLONOPIN) 1 MG tablet   Yes No   Si/2 tablet (0.5mg) by mouth twice daily as needed   clonazePAM (KLONOPIN) 2 MG tablet   Yes No   Simg tab by mouth twice daily at 8am and 8pm   docusate sodium (COLACE) 100 MG capsule   Yes No   Sig: Take 100 mg by mouth 2 times daily as needed for constipation   gabapentin (NEURONTIN) 800 MG tablet   Yes No   Simg tab by mouth 3/day at 8am, 12pm and 8pm   hydrOXYzine (ATARAX) 25 MG tablet   Yes No   Sig: Take 1 tablet (25 mg) by mouth every 6 hours as needed   hydrocortisone 1 % CREA cream   Yes No   Sig: Apply topically to nose and other affected area(s) every morning at 9am   lubiprostone (AMITIZA) 24 MCG capsule   Yes No   Simg tab by mouth twice daily at 8am and 8pm   metoprolol succinate ER (TOPROL-XL) 25 MG 24 hr tablet   Yes No   Sig: Take 1/2 tablet (12.5mg) by mouth twice daily at 8am and 8pm   pantoprazole (PROTONIX) 40 MG EC tablet   Yes No   Sig: Take 1 tablet (40mg) by mouth daily at 8am   polyethylene glycol (MIRALAX) 17 GM/Dose powder   Yes No   Sig: (= clearlax) Capful in liquid by mouth nightly at 8pm and capful daily as needed   rivaroxaban ANTICOAGULANT (XARELTO) 20 MG TABS tablet   Yes No   Sig: Take 20 mg by mouth daily (with dinner) At 5pm   senna-docusate  (SENOKOT-S/PERICOLACE) 8.6-50 MG tablet   Yes No   Si tab by mouth twice daily as needed At 8am and 8pm   sennosides (SENOKOT) 8.6 MG tablet   Yes No   Si tabs by mouth twice daily at 8am and 8pm   sodium phosphate (FLEET ENEMA) 7-19 GM/118ML rectal enema   Yes No   Sig: Place 1 enema rectally daily as needed for constipation   tamsulosin (FLOMAX) 0.4 MG capsule   Yes No   Si.4mg tab by mouth nightly at 8pm   traZODone (DESYREL) 100 MG tablet   Yes No   Sig: Take 100 mg by mouth daily at 8pm   venlafaxine (EFFEXOR-ER) 150 MG 24 hr tablet   Yes No   Simg tab by mouth daily at 8am   vitamin C (ASCORBIC ACID) 500 MG tablet   Yes No   Sig: Take 500 mg by mouth two times a day at 8am and 8pm      Facility-Administered Medications: None     Allergies   Allergies   Allergen Reactions     Amantadine Other (See Comments)     Other reaction(s): Hallucinations  Hallucinations/ lost self control/gambling.     halluicnations  Hallucinations/ lost self control/gambling.     hallucinates  halluicnations  hallucinates  Hallucinations/ lost self control/gambling.        Quetiapine GI Disturbance, Diarrhea and Other (See Comments)     Diarrhea    Diarrhea  Other reaction(s): GI Disturbance  Diarrhea       Duloxetine Other (See Comments)     suicidal  suicidal         Physical Exam   Vital Signs: Temp: 97.6  F (36.4  C) Temp src: Oral BP: 95/56 Pulse: 56   Resp: 16 SpO2: 99 % O2 Device: None (Room air)    Weight: 205 lbs 0 oz    GEN: NAD, sleeping but awoke to light touch and became fully alert after a few prompts, pleasant, occasionally laughs at jokes  HEENT: NCAT- no e/o trauma, EOMI, PERRL, MMM.   CVS: RRR, Normal S1, S2, No M/R/G.  Pulm: CTAB, no W/R/R, on RA  Abd: soft, NTND  Ext: WWP, no KIRILL.  Skin: no rash, lesion, or bruising noted on exposed surfaces  Neuro: A&Ox4, answering questions appropriately, moving all extremities spontaneously. Chronic L facial droop, otherwise no gross CN abnormalities  appreciated. 4+/5 strength in LUE, LLE, 5/5 strength in RUE/RLE. No spasticity noted      Data   Data reviewed today: I reviewed all medications, new labs and imaging results over the last 24 hours.    Recent Labs   Lab 05/27/22 2259 05/27/22 2255 05/27/22 2245 05/27/22 2242   WBC  --  8.0  --   --    HGB  --  12.3*  --   --    MCV  --  100  --   --    PLT  --  158  --   --    INR  --  1.91* 2.2  --    NA  --  140  --   --    POTASSIUM  --  3.9  --   --    CHLORIDE  --  105  --   --    CO2  --  32  --   --    BUN  --  18  --   --    CR  --  0.94 0.9  --    ANIONGAP  --  3  --   --    MARTINA  --  8.9  --   --    * 119*  --  143*   ALBUMIN  --  3.7  --   --    PROTTOTAL  --  7.2  --   --    BILITOTAL  --  0.3  --   --    ALKPHOS  --  75  --   --    ALT  --  10  --   --    AST  --  21  --   --      Recent Results (from the past 24 hour(s))   Cervical spine CT w/o contrast    Narrative    EXAM: CT HEAD W/O CONTRAST, CT CERVICAL SPINE W/O CONTRAST  LOCATION: [facility]  DATE/TIME: 5/27/2022 11:19 PM    INDICATION: Head trauma, abnormal mental status (Age 19-64y)  COMPARISON: MRI head and cervical spine from 07/08/2021  TECHNIQUE:   1) Routine CT Head without IV contrast. Multiplanar reformats. Dose reduction techniques were used.  2) Routine CT Cervical Spine without IV contrast. Multiplanar reformats. Dose reduction techniques were used.    FINDINGS:   HEAD CT:   INTRACRANIAL CONTENTS: No intracranial hemorrhage, extraaxial collection, or mass effect.  No CT evidence of acute infarct. Normal parenchymal attenuation. Normal ventricles and sulci.     VISUALIZED ORBITS/SINUSES/MASTOIDS: No intraorbital abnormality. No paranasal sinus mucosal disease. No middle ear or mastoid effusion.    BONES/SOFT TISSUES: No acute abnormality.    CERVICAL SPINE CT:   VERTEBRA: Mild left convexity cervicothoracic curvature. Slight retrolisthesis of C3 on C4 and C4 on C5. Alignment is  otherwise normal. No acute cervical spine fracture or posttraumatic subluxation. Mild multilevel facet arthropathy.    CANAL/FORAMINA: No significant canal narrowing. Marked degenerative foraminal narrowing on the left at C2-C3.    PARASPINAL: No extraspinal abnormality. Visualized lung fields are clear.      Impression    IMPRESSION:  HEAD CT:  1.  No acute intracranial abnormality.    CERVICAL SPINE CT:  1.  No CT evidence for acute fracture or post traumatic subluxation.   Head CT w/o contrast    Narrative    EXAM: CT HEAD W/O CONTRAST, CT CERVICAL SPINE W/O CONTRAST  LOCATION: [facility]  DATE/TIME: 5/27/2022 11:19 PM    INDICATION: Head trauma, abnormal mental status (Age 19-64y)  COMPARISON: MRI head and cervical spine from 07/08/2021  TECHNIQUE:   1) Routine CT Head without IV contrast. Multiplanar reformats. Dose reduction techniques were used.  2) Routine CT Cervical Spine without IV contrast. Multiplanar reformats. Dose reduction techniques were used.    FINDINGS:   HEAD CT:   INTRACRANIAL CONTENTS: No intracranial hemorrhage, extraaxial collection, or mass effect.  No CT evidence of acute infarct. Normal parenchymal attenuation. Normal ventricles and sulci.     VISUALIZED ORBITS/SINUSES/MASTOIDS: No intraorbital abnormality. No paranasal sinus mucosal disease. No middle ear or mastoid effusion.    BONES/SOFT TISSUES: No acute abnormality.    CERVICAL SPINE CT:   VERTEBRA: Mild left convexity cervicothoracic curvature. Slight retrolisthesis of C3 on C4 and C4 on C5. Alignment is otherwise normal. No acute cervical spine fracture or posttraumatic subluxation. Mild multilevel facet arthropathy.    CANAL/FORAMINA: No significant canal narrowing. Marked degenerative foraminal narrowing on the left at C2-C3.    PARASPINAL: No extraspinal abnormality. Visualized lung fields are clear.      Impression    IMPRESSION:  HEAD CT:  1.  No acute intracranial  abnormality.    CERVICAL SPINE CT:  1.  No CT evidence for acute fracture or post traumatic subluxation.   POC US ABDOMEN LIMITED    Impression    Bedside FAST (Focused Assessment with Sonography in Trauma), performed and interpreted by me.   Indication: Trauma and Hypotension    With the patient in Trendelenburg, the RUQ, LUQ and subxiphoid views were examined for intraabdominal and thoracic free fluid and pericardial effusion. With the patient in reverse Trendelenburg, the suprapubic view was examined for intraabdominal free fluid. Image quality was satisfactory..     Findings: There is no evidence of free fluid above or below bilateral diaphragms, in the splenorenal or hepatorenal space, or in bilateral paracolic gutters. There was no free fluid seen in the pelvis adjacent to the urinary bladder. There is no free fluid within the pericardium.   Extended FAST exam (eFAST):   Indication: Trauma   The chest wall was evaluated for evidence of pneumothorax.     Right side:  Lung sliding artifact  Present     Comet tail artifacts or B-lines  Present   Left side:  Lung sliding artifact  Present     Comet tail artifacts or B-lines Present     IMPRESSION:  Negative FAST     CTA Head Neck with Contrast    Narrative    EXAM: CTA  HEAD NECK WITH CONTRAST  LOCATION: [facility]  DATE/TIME: 5/28/2022 1:40 AM    INDICATION: Stroke/TIA, assess extracranial arteries. Traumatic injury.  COMPARISON: 05/27/2022  CONTRAST: iopamidol (ISOVUE 370) solution 88 mL     TECHNIQUE: Head and neck CT angiogram with IV contrast. Axial helical CT images of the head and neck vessels obtained during the arterial phase of intravenous contrast administration. Axial 2D reconstructed images and multiplanar 3D MIP reconstructed   images of the head and neck vessels were performed by the technologist. Dose reduction techniques were used. All stenosis measurements made according to NASCET criteria unless  otherwise specified.    FINDINGS:     HEAD CTA:  ANTERIOR CIRCULATION: No stenosis/occlusion, aneurysm, or high flow vascular malformation. Patent bilateral posterior communicating arteries with small P1 segments of the posterior cerebral arteries, particularly on the left. Small left A1 segment of the   anterior cerebral artery.    POSTERIOR CIRCULATION: No stenosis/occlusion, aneurysm, or high flow vascular malformation. Congenitally small vertebrobasilar system.     DURAL VENOUS SINUSES: Expected enhancement of the major dural venous sinuses.    NECK CTA:  RIGHT CAROTID: Trace atherosclerotic plaque. No measurable stenosis or dissection.    LEFT CAROTID: Trace atherosclerotic plaque. No measurable stenosis or dissection.    VERTEBRAL ARTERIES: No focal stenosis or dissection. Balanced vertebral arteries.    AORTIC ARCH: Classic aortic arch anatomy with no significant stenosis at the origin of the great vessels.    NONVASCULAR STRUCTURES: See separately dictated head and cervical spine CT. Dependent atelectasis in the lungs.      Impression    IMPRESSION:     HEAD CTA:   1.  No significant stenosis, aneurysm, or high flow vascular malformation identified.  2.  Variant [place] anatomy as above.    NECK CTA:  1.  No flow-limiting stenosis or evidence of dissection.   CT Chest/Abdomen/Pelvis w Contrast    Narrative    EXAM: CT CHEST/ABDOMEN/PELVIS W CONTRAST  LOCATION: [facility]  DATE/TIME: 5/28/2022 1:40 AM    INDICATION: Chest-abdomen-pelvis trauma, blunt. Injury with pain.  COMPARISON: 07/08/2021  TECHNIQUE: CT scan of the chest, abdomen, and pelvis was performed following injection of IV contrast. Multiplanar reformats were obtained. Dose reduction techniques were used.   CONTRAST: iopamidol (ISOVUE 370) solution 75 mL       FINDINGS:   LUNGS AND PLEURA: Basilar atelectasis. No infiltrate, pleural effusion or visible pneumothorax. 4 mm right lower lobe  nodule series 6 image 191.    MEDIASTINUM/AXILLAE: No adenopathy or pericardial effusion.    CORONARY ARTERY CALCIFICATION: No significant visualized.    HEPATOBILIARY: Normal.    PANCREAS: Normal.    SPLEEN: Normal.    ADRENAL GLANDS: Normal.    KIDNEYS/BLADDER: Normal.    BOWEL: Normal caliber. Normal appendix.    LYMPH NODES: Normal.    VASCULATURE: Unremarkable.    PELVIC ORGANS: Normal.    MUSCULOSKELETAL: Minimal degenerative change osseous structures. Remote rib fractures.      Impression    IMPRESSION:  1.  No evidence of injury within the chest, abdomen and pelvis.  2.  4 mm right lower lobe nodule again seen.

## 2022-05-28 NOTE — ED TRIAGE NOTES
Pt arrives via EMS from home, senior half-way    Pt had a witnessed fall from standing today around 2130, hit back of head. + LOC (4-5 sec). On xarelto. 3rd fall since yesterday, hit head yesterday as well w/ brief LOC but did not tell anyone.     VSS for EMS, BP mid 90's to low 100's.     Worsening L sided weakness per pt and R facial droop, difficulty repeating words. Hx CVA w/ L sided deficits. Hx DVTs (reason for xarelto).         EMS gave 4mg zofran IV.

## 2022-05-28 NOTE — PLAN OF CARE
[facility] note      Patient seen and examined in ER at bedside. He reports feeling better overall, however has not got up.   He reports feeling his toes and legs getting contracted because of which he can not walk. He is not sure if this was contributing to falls.  He denies any lightheadedness, chest pain, dyspnea, black out, palpitation      On physical examination is alert and oru=iented x 3, he is following commands. His lungs have decreased breath sounds, but no crackles, heart sounds are normal. He has rigidity in upper and lower extremities. No tremors appreciated during my encounter. Gait was not assessed at this time    A/P     1. His falls are related parkinson's disease, due to rigidity. His parkinson's also could cause autonomic instability. Other possible etiology include medication induced or cardiac in origin  Will check orthostatic vitals.  Awaiting neurology input  Will echocardiogram to rule out any significant cardiac abnormalities  Continue tele monitoring for possible arrythmias  PT/OT on consult    MARIO Bingham MD  [facility]

## 2022-05-28 NOTE — PHARMACY
Pharmacy Clozapine Continuation Note    Patient's Name: Benjamin Mathews  Patient's : 1965    Current cloZAPine regimen: 25 mg every night  Has there been a known interruption in therapy for greater than/equal to 48 hours which would warrant retitration No    Recent ANC Value(s) for last 30 days:  2022: Absolute Neutrophils 4.5 10e3/uL (Ref range: 1.6 - 8.3 10e3/uL)  2022: Absolute Neutrophils 2.9 10e3/uL (Ref range: 1.6 - 8.3 10e3/uL)      A REMS Dispense Authorization was obtained from the clozapine REMS prgram? Yes   If no, why was the REMS Dispense Authorization not successful:  A Dispense Rationale was needed to obtain the REMS Dispense Authorization? Yes   Is the ANC (if available) within recommended limits? Yes  Does the patient have any signs or symptoms of infection, including fever? No        Plan:  1. Continue clozapine therapy at 25 mg PO every night.  2. A WBC with differential will be ordered at least weekly, NEXT DUE 2022. ANC values will be entered into the REMS program.    Handy Upton RPH

## 2022-05-28 NOTE — ED PROVIDER NOTES
[place] EMERGENCY DEPARTMENT ([facility])  5/27/22  History     Chief Complaint   Patient presents with     Fall     The history is provided by the patient and the EMS personnel.     Benjamin Mathews is a 57 year old male who has a significant medical history of stroke with left-sided deficit, DVT (on Xarelto), and Parkinson's who presents to the Emergency Department via EMS with c/o head injury after a fall that occurred at 2130.  EMS reports that the patient fell backward from standing, striking the back of his head on a nearby coffee table, the patient lost consciousness for 4 to 5 seconds.  So the third fall in the last 2 days, the patient also reported a fall occurring yesterday in his closet where he struck his head and lost consciousness for 4 to 5 seconds.  EMS states that the patient is hypotensive in the low 100s (baseline is 110s), BG was 142.  EMS neurological exam was notable for some right-sided deficit along with increased left-sided deficit.  They also state that the patient had difficulty repeating words and difficulty answering questions, he stated to them that the year was 2013.  They also state that the patient's right pupil was slightly larger than his left pupil.  EMS administered 4 mg Zofran in route.  The patient reports some neck stiffness and head pain.  He also reports nausea here in the ED.    Past Medical History  Past Medical History:   Diagnosis Date     Clotting disorder (H)     PE 2019     Dystonia      Parkinson disease (H)      No past surgical history on file.  acetaminophen (TYLENOL) 500 MG tablet  atorvastatin (LIPITOR) 40 MG tablet  baclofen (LIORESAL) 10 MG tablet  Baclofen (LIORESAL) 5 MG tablet  bisacodyl (DULCOLAX) 10 MG suppository  calcium polycarbophil (FIBER-LAX) 625 MG tablet  carbidopa-levodopa (SINEMET CR)  MG CR tablet  carbidopa-levodopa (SINEMET)  MG tablet  cholecalciferol (VITAMIN D3) 125 mcg (5000 units) capsule  clonazePAM (KLONOPIN) 0.5  MG tablet  clonazePAM (KLONOPIN) 1 MG tablet  clonazePAM (KLONOPIN) 2 MG tablet  cloZAPine (CLOZARIL) 25 MG tablet  docusate sodium (COLACE) 100 MG capsule  ENULOSE 10 GM/15ML SOLUTION  gabapentin (NEURONTIN) 800 MG tablet  hydrocortisone 1 % CREA cream  hydrOXYzine (ATARAX) 25 MG tablet  lubiprostone (AMITIZA) 24 MCG capsule  metoprolol succinate ER (TOPROL-XL) 25 MG 24 hr tablet  Multiple Vitamin (DAILY-ALLAN) TABS  pantoprazole (PROTONIX) 40 MG EC tablet  polyethylene glycol (MIRALAX) 17 GM/Dose powder  rivaroxaban ANTICOAGULANT (XARELTO) 20 MG TABS tablet  senna-docusate (SENOKOT-S/PERICOLACE) 8.6-50 MG tablet  sennosides (SENOKOT) 8.6 MG tablet  sodium phosphate (FLEET ENEMA) 7-19 GM/118ML rectal enema  tamsulosin (FLOMAX) 0.4 MG capsule  traZODone (DESYREL) 100 MG tablet  venlafaxine (EFFEXOR-ER) 150 MG 24 hr tablet  vitamin C (ASCORBIC ACID) 500 MG tablet      Allergies   Allergen Reactions     Amantadine Other (See Comments)     Other reaction(s): Hallucinations  Hallucinations/ lost self control/gambling.     halluicnations  Hallucinations/ lost self control/gambling.     hallucinates  halluicnations  hallucinates  Hallucinations/ lost self control/gambling.        Quetiapine GI Disturbance, Diarrhea and Other (See Comments)     Diarrhea    Diarrhea  Other reaction(s): GI Disturbance  Diarrhea       Duloxetine Other (See Comments)     suicidal  suicidal       Family History  Family History   Problem Relation Age of Onset     Other - See Comments Mother         pulmonary embolism from hip fracture     Pulmonary Embolism Mother      Parkinsonism Father      Neurologic Disorder Sister      Parkinsonism Sister         ?med related     Other - See Comments Sister         12/7/1950     Depression Sister      Neurologic Disorder Brother         dystonia     Dystonia Brother      Other - See Comments Brother              Heart Disease Nephew      Social History   Social History     Tobacco Use     Smoking  status: Current Every Day Smoker     Types: Pipe     Smokeless tobacco: Never Used   Substance Use Topics     Alcohol use: Not Currently     Comment: sober x 18 months     Drug use: Not Currently      Past medical history, past surgical history, medications, allergies, family history, and social history were reviewed with the patient. No additional pertinent items.     I have reviewed the Medications, Allergies, Past Medical and Surgical History, and Social History in the Epic system.    Review of Systems   Constitutional: Negative for fever.   HENT: Negative for congestion.    Eyes: Negative for redness.   Respiratory: Negative for shortness of breath.    Cardiovascular: Negative for chest pain.   Gastrointestinal: Positive for nausea. Negative for abdominal pain.   Genitourinary: Negative for difficulty urinating.   Musculoskeletal: Positive for neck pain. Negative for arthralgias and neck stiffness.   Skin: Negative for color change.   Neurological: Positive for weakness and headaches.   Psychiatric/Behavioral: Positive for confusion.     A complete review of systems was performed with pertinent positives and negatives noted in the HPI, and all other systems negative.    Physical Exam   BP: 99/58  Pulse: 76  Temp: 97.6  F (36.4  C)  Resp: 18  Weight: 93 kg (205 lb)  SpO2: 98 %      Physical Exam   Gen: alert, oriented to month, but not year.   HEENT:PERRL, no facial tenderness or wounds, head atraumatic, oropharynx clear, mucous membranes moist, TMs clear bilaterally  Neck:no bony tenderness or step offs, no JVD, trachea midline  Back: no CVA tenderness, no midline bony tenderness  CV:RRR without murmurs  PULM:Clear to auscultation bilaterally  Abd:soft, nontender, nondistended. Bowel sounds present and normal  UE:No traumatic injuries, skin normal  LE:no traumatic injuries, skin normal, no LE edema.   Neuro: left facial droop and 4+/5 strength in the left leg. 5/5 strength in the arms and right leg.   Skin: no  rashes or ecchymoses    ED Course     At 10:44 PM the patient was seen and examined by Veronica Pugh MD in Room ED02.        Procedures  Results for orders placed during the hospital encounter of 05/27/22    POC US ABDOMEN LIMITED    Impression  Bedside FAST (Focused Assessment with Sonography in Trauma), performed and interpreted by me.  Indication: Trauma and Hypotension    With the patient in Trendelenburg, the RUQ, LUQ and subxiphoid views were examined for intraabdominal and thoracic free fluid and pericardial effusion. With the patient in reverse Trendelenburg, the suprapubic view was examined for intraabdominal free fluid. Image quality was satisfactory..    Findings: There is no evidence of free fluid above or below bilateral diaphragms, in the splenorenal or hepatorenal space, or in bilateral paracolic gutters. There was no free fluid seen in the pelvis adjacent to the urinary bladder. There is no free fluid within the pericardium.  Extended FAST exam (eFAST):  Indication: Trauma  The chest wall was evaluated for evidence of pneumothorax.    Right side:  Lung sliding artifact  Present  Comet tail artifacts or B-lines  Present  Left side:  Lung sliding artifact  Present  Comet tail artifacts or B-lines Present    IMPRESSION:  Negative FAST           Results for orders placed or performed during the hospital encounter of 05/27/22 (from the past 24 hour(s))   Glucose by meter   Result Value Ref Range    GLUCOSE BY METER POCT 143 (H) 70 - 99 mg/dL   Creatinine POCT   Result Value Ref Range    Creatinine POCT 0.9 0.7 - 1.3 mg/dL    GFR, ESTIMATED POCT >60 >60 mL/min/1.73m2   iStat INR, POCT   Result Value Ref Range    INR POCT 2.2 2.0 - 3.0   Asymptomatic COVID-19 Virus (Coronavirus) by PCR Nasopharyngeal    Specimen: Nasopharyngeal; Swab   Result Value Ref Range    SARS CoV2 PCR Negative Negative, Testing sent to reference lab. Results will be returned via unsolicited result    Narrative    Testing was  performed using the Xpert Xpress SARS-CoV-2 Assay on the  Cepheid Gene-Xpert Instrument Systems. Additional information about  this Emergency Use Authorization (EUA) assay can be found via the Lab  Guide. This test should be ordered for the detection of SARS-CoV-2 in  individuals who meet SARS-CoV-2 clinical and/or epidemiological  criteria. Test performance is unknown in asymptomatic patients. This  test is for in vitro diagnostic use under the FDA EUA for  laboratories certified under CLIA to perform high complexity testing.  This test has not been FDA cleared or approved. A negative result  does not rule out the presence of PCR inhibitors in the specimen or  target RNA in concentration below the limit of detection for the  assay. The possibility of a false negative should be considered if  the patient's recent exposure or clinical presentation suggests  COVID-19. This test was validated by the [facility]. This laboratory is certified under  the Clinical Laboratory Improvement Amendments of 1988 (CLIA-88) as  qualified to perform high complexity laboratory testing.     [facility] Draw    Narrative    The following orders were created for panel order [place] Draw.  Procedure                               Abnormality         Status                     ---------                               -----------         ------                     Extra Blue Top Tube[[id number]]                              Final result               Extra Red Top Tube[[id number]]                               Final result               Extra Green Top (Lithium...[[id number]]                      Final result               Extra Purple Top Tube[[id number]]                            Final result                 Please view results for these tests on the individual orders.   Extra Blue Top Tube   Result Value Ref Range    Hold Specimen JIC    Extra Red Top Tube   Result Value Ref Range    Hold Specimen  JIC    Extra Green Top (Lithium Heparin) Tube   Result Value Ref Range    Hold Specimen JIC    Extra Purple Top Tube   Result Value Ref Range    Hold Specimen JIC    CBC with platelets differential    Narrative    The following orders were created for panel order CBC with platelets differential.  Procedure                               Abnormality         Status                     ---------                               -----------         ------                     CBC with platelets and d...[[id number]]  Abnormal            Final result                 Please view results for these tests on the individual orders.   Comprehensive metabolic panel   Result Value Ref Range    Sodium 140 133 - 144 mmol/L    Potassium 3.9 3.4 - 5.3 mmol/L    Chloride 105 94 - 109 mmol/L    Carbon Dioxide (CO2) 32 20 - 32 mmol/L    Anion Gap 3 3 - 14 mmol/L    Urea Nitrogen 18 7 - 30 mg/dL    Creatinine 0.94 0.66 - 1.25 mg/dL    Calcium 8.9 8.5 - 10.1 mg/dL    Glucose 119 (H) 70 - 99 mg/dL    Alkaline Phosphatase 75 40 - 150 U/L    AST 21 0 - 45 U/L    ALT 10 0 - 70 U/L    Protein Total 7.2 6.8 - 8.8 g/dL    Albumin 3.7 3.4 - 5.0 g/dL    Bilirubin Total 0.3 0.2 - 1.3 mg/dL    GFR Estimate >90 >60 mL/min/1.73m2   INR   Result Value Ref Range    INR 1.91 (H) 0.85 - 1.15   CBC with platelets and differential   Result Value Ref Range    WBC Count 8.0 4.0 - 11.0 10e3/uL    RBC Count 3.89 (L) 4.40 - 5.90 10e6/uL    Hemoglobin 12.3 (L) 13.3 - 17.7 g/dL    Hematocrit 38.9 (L) 40.0 - 53.0 %     78 - 100 fL    MCH 31.6 26.5 - 33.0 pg    MCHC 31.6 31.5 - 36.5 g/dL    RDW 13.2 10.0 - 15.0 %    Platelet Count 158 150 - 450 10e3/uL    % Neutrophils 56 %    % Lymphocytes 34 %    % Monocytes 7 %    % Eosinophils 2 %    % Basophils 0 %    % Immature Granulocytes 1 %    NRBCs per 100 WBC 0 <1 /100    Absolute Neutrophils 4.5 1.6 - 8.3 10e3/uL    Absolute Lymphocytes 2.7 0.8 - 5.3 10e3/uL    Absolute Monocytes 0.6 0.0 - 1.3 10e3/uL    Absolute  Eosinophils 0.1 0.0 - 0.7 10e3/uL    Absolute Basophils 0.0 0.0 - 0.2 10e3/uL    Absolute Immature Granulocytes 0.0 <=0.4 10e3/uL    Absolute NRBCs 0.0 10e3/uL   Glucose by meter   Result Value Ref Range    GLUCOSE BY METER POCT 160 (H) 70 - 99 mg/dL   Cervical spine CT w/o contrast    Narrative    EXAM: CT HEAD W/O CONTRAST, CT CERVICAL SPINE W/O CONTRAST  LOCATION: [facility]  DATE/TIME: 5/27/2022 11:19 PM    INDICATION: Head trauma, abnormal mental status (Age 19-64y)  COMPARISON: MRI head and cervical spine from 07/08/2021  TECHNIQUE:   1) Routine CT Head without IV contrast. Multiplanar reformats. Dose reduction techniques were used.  2) Routine CT Cervical Spine without IV contrast. Multiplanar reformats. Dose reduction techniques were used.    FINDINGS:   HEAD CT:   INTRACRANIAL CONTENTS: No intracranial hemorrhage, extraaxial collection, or mass effect.  No CT evidence of acute infarct. Normal parenchymal attenuation. Normal ventricles and sulci.     VISUALIZED ORBITS/SINUSES/MASTOIDS: No intraorbital abnormality. No paranasal sinus mucosal disease. No middle ear or mastoid effusion.    BONES/SOFT TISSUES: No acute abnormality.    CERVICAL SPINE CT:   VERTEBRA: Mild left convexity cervicothoracic curvature. Slight retrolisthesis of C3 on C4 and C4 on C5. Alignment is otherwise normal. No acute cervical spine fracture or posttraumatic subluxation. Mild multilevel facet arthropathy.    CANAL/FORAMINA: No significant canal narrowing. Marked degenerative foraminal narrowing on the left at C2-C3.    PARASPINAL: No extraspinal abnormality. Visualized lung fields are clear.      Impression    IMPRESSION:  HEAD CT:  1.  No acute intracranial abnormality.    CERVICAL SPINE CT:  1.  No CT evidence for acute fracture or post traumatic subluxation.   Head CT w/o contrast    Narrative    EXAM: CT HEAD W/O CONTRAST, CT CERVICAL SPINE W/O CONTRAST  LOCATION: [facility]  DATE/TIME: 5/27/2022 11:19 PM    INDICATION: Head trauma, abnormal mental status (Age 19-64y)  COMPARISON: MRI head and cervical spine from 07/08/2021  TECHNIQUE:   1) Routine CT Head without IV contrast. Multiplanar reformats. Dose reduction techniques were used.  2) Routine CT Cervical Spine without IV contrast. Multiplanar reformats. Dose reduction techniques were used.    FINDINGS:   HEAD CT:   INTRACRANIAL CONTENTS: No intracranial hemorrhage, extraaxial collection, or mass effect.  No CT evidence of acute infarct. Normal parenchymal attenuation. Normal ventricles and sulci.     VISUALIZED ORBITS/SINUSES/MASTOIDS: No intraorbital abnormality. No paranasal sinus mucosal disease. No middle ear or mastoid effusion.    BONES/SOFT TISSUES: No acute abnormality.    CERVICAL SPINE CT:   VERTEBRA: Mild left convexity cervicothoracic curvature. Slight retrolisthesis of C3 on C4 and C4 on C5. Alignment is otherwise normal. No acute cervical spine fracture or posttraumatic subluxation. Mild multilevel facet arthropathy.    CANAL/FORAMINA: No significant canal narrowing. Marked degenerative foraminal narrowing on the left at C2-C3.    PARASPINAL: No extraspinal abnormality. Visualized lung fields are clear.      Impression    IMPRESSION:  HEAD CT:  1.  No acute intracranial abnormality.    CERVICAL SPINE CT:  1.  No CT evidence for acute fracture or post traumatic subluxation.   POC US ABDOMEN LIMITED    Impression    Bedside FAST (Focused Assessment with Sonography in Trauma), performed and interpreted by me.   Indication: Trauma and Hypotension    With the patient in Trendelenburg, the RUQ, LUQ and subxiphoid views were examined for intraabdominal and thoracic free fluid and pericardial effusion. With the patient in reverse Trendelenburg, the suprapubic view was examined for intraabdominal free fluid. Image quality was satisfactory..     Findings: There is no evidence of free fluid  above or below bilateral diaphragms, in the splenorenal or hepatorenal space, or in bilateral paracolic gutters. There was no free fluid seen in the pelvis adjacent to the urinary bladder. There is no free fluid within the pericardium.   Extended FAST exam (eFAST):   Indication: Trauma   The chest wall was evaluated for evidence of pneumothorax.     Right side:  Lung sliding artifact  Present     Comet tail artifacts or B-lines  Present   Left side:  Lung sliding artifact  Present     Comet tail artifacts or B-lines Present     IMPRESSION:  Negative FAST     Lactic acid whole blood   Result Value Ref Range    Lactic Acid 0.9 0.7 - 2.0 mmol/L   CTA Head Neck with Contrast    Narrative    EXAM: CTA  HEAD NECK WITH CONTRAST  LOCATION: [facility]  DATE/TIME: 5/28/2022 1:40 AM    INDICATION: Stroke/TIA, assess extracranial arteries. Traumatic injury.  COMPARISON: 05/27/2022  CONTRAST: iopamidol (ISOVUE 370) solution 88 mL     TECHNIQUE: Head and neck CT angiogram with IV contrast. Axial helical CT images of the head and neck vessels obtained during the arterial phase of intravenous contrast administration. Axial 2D reconstructed images and multiplanar 3D MIP reconstructed   images of the head and neck vessels were performed by the technologist. Dose reduction techniques were used. All stenosis measurements made according to NASCET criteria unless otherwise specified.    FINDINGS:     HEAD CTA:  ANTERIOR CIRCULATION: No stenosis/occlusion, aneurysm, or high flow vascular malformation. Patent bilateral posterior communicating arteries with small P1 segments of the posterior cerebral arteries, particularly on the left. Small left A1 segment of the   anterior cerebral artery.    POSTERIOR CIRCULATION: No stenosis/occlusion, aneurysm, or high flow vascular malformation. Congenitally small vertebrobasilar system.     DURAL VENOUS SINUSES: Expected enhancement of the major dural  venous sinuses.    NECK CTA:  RIGHT CAROTID: Trace atherosclerotic plaque. No measurable stenosis or dissection.    LEFT CAROTID: Trace atherosclerotic plaque. No measurable stenosis or dissection.    VERTEBRAL ARTERIES: No focal stenosis or dissection. Balanced vertebral arteries.    AORTIC ARCH: Classic aortic arch anatomy with no significant stenosis at the origin of the great vessels.    NONVASCULAR STRUCTURES: See separately dictated head and cervical spine CT. Dependent atelectasis in the lungs.      Impression    IMPRESSION:     HEAD CTA:   1.  No significant stenosis, aneurysm, or high flow vascular malformation identified.  2.  Variant [place] anatomy as above.    NECK CTA:  1.  No flow-limiting stenosis or evidence of dissection.   CT Chest/Abdomen/Pelvis w Contrast    Narrative    EXAM: CT CHEST/ABDOMEN/PELVIS W CONTRAST  LOCATION: [facility]  DATE/TIME: 5/28/2022 1:40 AM    INDICATION: Chest-abdomen-pelvis trauma, blunt. Injury with pain.  COMPARISON: 07/08/2021  TECHNIQUE: CT scan of the chest, abdomen, and pelvis was performed following injection of IV contrast. Multiplanar reformats were obtained. Dose reduction techniques were used.   CONTRAST: iopamidol (ISOVUE 370) solution 75 mL       FINDINGS:   LUNGS AND PLEURA: Basilar atelectasis. No infiltrate, pleural effusion or visible pneumothorax. 4 mm right lower lobe nodule series 6 image 191.    MEDIASTINUM/AXILLAE: No adenopathy or pericardial effusion.    CORONARY ARTERY CALCIFICATION: No significant visualized.    HEPATOBILIARY: Normal.    PANCREAS: Normal.    SPLEEN: Normal.    ADRENAL GLANDS: Normal.    KIDNEYS/BLADDER: Normal.    BOWEL: Normal caliber. Normal appendix.    LYMPH NODES: Normal.    VASCULATURE: Unremarkable.    PELVIC ORGANS: Normal.    MUSCULOSKELETAL: Minimal degenerative change osseous structures. Remote rib fractures.      Impression    IMPRESSION:  1.  No evidence of  injury within the chest, abdomen and pelvis.  2.  4 mm right lower lobe nodule again seen.   UA with Microscopic reflex to Culture    Specimen: Urine, Clean Catch   Result Value Ref Range    Color Urine Straw Colorless, Straw, Light Yellow, Yellow    Appearance Urine Clear Clear    Glucose Urine Negative Negative mg/dL    Bilirubin Urine Negative Negative    Ketones Urine Negative Negative mg/dL    Specific Gravity Urine 1.007 1.003 - 1.035    Blood Urine Negative Negative    pH Urine 5.5 5.0 - 7.0    Protein Albumin Urine Negative Negative mg/dL    Urobilinogen Urine Normal Normal, 2.0 mg/dL    Nitrite Urine Negative Negative    Leukocyte Esterase Urine Negative Negative    RBC Urine <1 <=2 /HPF    WBC Urine <1 <=5 /HPF    Narrative    Urine Culture not indicated     Medications   0.9% sodium chloride BOLUS (0 mLs Intravenous Stopped 5/28/22 0057)   iopamidol (ISOVUE-370) solution 75 mL (75 mLs Intravenous Given 5/28/22 0143)   sodium chloride (PF) 0.9% PF flush 90 mL (90 mLs Intravenous Given 5/28/22 0143)   0.9% sodium chloride BOLUS (1,000 mLs Intravenous New Bag 5/28/22 0214)             Assessments & Plan (with Medical Decision Making)   Benjamin Mathews is a 57 year old male presenting with multiple falls. Today had head injury while anticoagulated on DOAC.   Arrives afebrile, with soft blood pressure. While sleeping pt's HR decreased from low 60s to upper 50s. SBP decreased from 90s to 80s.   IV access obtained and lab testing done.   CBC, CMP unremarkable.   Lactic acid 0.9. Blood culture x2 sent.   UA without UTI.   CT head negative for intracerebral hemorrhage. CTA head neck without signs of new acute large vessel occlusion to suggest new CVA. Neuro exam also without new neuro deficits.   CT cervical spine negative for spinal injury.   FAST exam negative.   CT CAP negative for intrathoracic and intraabdominal traumatic injuries.   Discussed with IM triage hospitalist and admitted to the IM service.     I  have reviewed the nursing notes.    I have reviewed the findings, diagnosis, plan and need for follow up with the patient.    New Prescriptions    No medications on file       Final diagnoses:   Contusion of scalp, initial encounter   Multiple falls       I, Rahat Lazaro am serving as a trained medical scribe to document services personally performed by Veronica Pugh, based on the provider's statements to me.      I, Veronica Pugh, was physically present and have reviewed and verified the accuracy of this note documented by Rahat Lazaro.     Veronica Pugh MD  5/27/2022   [facility] EMERGENCY DEPARTMENT     Veronica Pugh MD  05/28/22 0326

## 2022-05-29 ENCOUNTER — APPOINTMENT (OUTPATIENT)
Dept: CARDIOLOGY | Facility: CLINIC | Age: 57
DRG: 086 | End: 2022-05-29
Attending: INTERNAL MEDICINE
Payer: COMMERCIAL

## 2022-05-29 ENCOUNTER — APPOINTMENT (OUTPATIENT)
Dept: PHYSICAL THERAPY | Facility: CLINIC | Age: 57
DRG: 086 | End: 2022-05-29
Attending: INTERNAL MEDICINE
Payer: COMMERCIAL

## 2022-05-29 ENCOUNTER — APPOINTMENT (OUTPATIENT)
Dept: NEUROLOGY | Facility: CLINIC | Age: 57
DRG: 086 | End: 2022-05-29
Attending: STUDENT IN AN ORGANIZED HEALTH CARE EDUCATION/TRAINING PROGRAM
Payer: COMMERCIAL

## 2022-05-29 LAB
ANION GAP SERPL CALCULATED.3IONS-SCNC: 3 MMOL/L (ref 3–14)
BASOPHILS # BLD AUTO: 0 10E3/UL (ref 0–0.2)
BASOPHILS NFR BLD AUTO: 0 %
BUN SERPL-MCNC: 14 MG/DL (ref 7–30)
CALCIUM SERPL-MCNC: 8.6 MG/DL (ref 8.5–10.1)
CHLORIDE BLD-SCNC: 111 MMOL/L (ref 94–109)
CO2 SERPL-SCNC: 27 MMOL/L (ref 20–32)
CREAT SERPL-MCNC: 0.66 MG/DL (ref 0.66–1.25)
ENTEROCOCCUS FAECALIS: NOT DETECTED
ENTEROCOCCUS FAECIUM: NOT DETECTED
EOSINOPHIL # BLD AUTO: 0 10E3/UL (ref 0–0.7)
EOSINOPHIL NFR BLD AUTO: 0 %
ERYTHROCYTE [DISTWIDTH] IN BLOOD BY AUTOMATED COUNT: 12.9 % (ref 10–15)
GFR SERPL CREATININE-BSD FRML MDRD: >90 ML/MIN/1.73M2
GLUCOSE BLD-MCNC: 107 MG/DL (ref 70–99)
HCT VFR BLD AUTO: 38.8 % (ref 40–53)
HGB BLD-MCNC: 12.4 G/DL (ref 13.3–17.7)
IMM GRANULOCYTES # BLD: 0 10E3/UL
IMM GRANULOCYTES NFR BLD: 0 %
LISTERIA SPECIES (DETECTED/NOT DETECTED): NOT DETECTED
LVEF ECHO: NORMAL
LYMPHOCYTES # BLD AUTO: 1.3 10E3/UL (ref 0.8–5.3)
LYMPHOCYTES NFR BLD AUTO: 14 %
MCH RBC QN AUTO: 31.7 PG (ref 26.5–33)
MCHC RBC AUTO-ENTMCNC: 32 G/DL (ref 31.5–36.5)
MCV RBC AUTO: 99 FL (ref 78–100)
MONOCYTES # BLD AUTO: 0.5 10E3/UL (ref 0–1.3)
MONOCYTES NFR BLD AUTO: 5 %
NEUTROPHILS # BLD AUTO: 7.6 10E3/UL (ref 1.6–8.3)
NEUTROPHILS NFR BLD AUTO: 81 %
NRBC # BLD AUTO: 0 10E3/UL
NRBC BLD AUTO-RTO: 0 /100
PLATELET # BLD AUTO: 217 10E3/UL (ref 150–450)
POTASSIUM BLD-SCNC: 4.1 MMOL/L (ref 3.4–5.3)
RBC # BLD AUTO: 3.91 10E6/UL (ref 4.4–5.9)
SODIUM SERPL-SCNC: 141 MMOL/L (ref 133–144)
STAPHYLOCOCCUS AUREUS: NOT DETECTED
STAPHYLOCOCCUS EPIDERMIDIS: DETECTED
STAPHYLOCOCCUS LUGDUNENSIS: NOT DETECTED
STREPTOCOCCUS AGALACTIAE: NOT DETECTED
STREPTOCOCCUS ANGINOSUS GROUP: NOT DETECTED
STREPTOCOCCUS PNEUMONIAE: NOT DETECTED
STREPTOCOCCUS PYOGENES: NOT DETECTED
STREPTOCOCCUS SPECIES: NOT DETECTED
WBC # BLD AUTO: 9.5 10E3/UL (ref 4–11)

## 2022-05-29 PROCEDURE — 250N000013 HC RX MED GY IP 250 OP 250 PS 637: Performed by: HOSPITALIST

## 2022-05-29 PROCEDURE — 87040 BLOOD CULTURE FOR BACTERIA: CPT | Performed by: INTERNAL MEDICINE

## 2022-05-29 PROCEDURE — 93306 TTE W/DOPPLER COMPLETE: CPT | Mod: 26 | Performed by: INTERNAL MEDICINE

## 2022-05-29 PROCEDURE — 36415 COLL VENOUS BLD VENIPUNCTURE: CPT | Performed by: INTERNAL MEDICINE

## 2022-05-29 PROCEDURE — 96365 THER/PROPH/DIAG IV INF INIT: CPT | Performed by: EMERGENCY MEDICINE

## 2022-05-29 PROCEDURE — 99232 SBSQ HOSP IP/OBS MODERATE 35: CPT | Mod: GC | Performed by: PSYCHIATRY & NEUROLOGY

## 2022-05-29 PROCEDURE — 250N000011 HC RX IP 250 OP 636: Performed by: INTERNAL MEDICINE

## 2022-05-29 PROCEDURE — 97161 PT EVAL LOW COMPLEX 20 MIN: CPT | Mod: GP

## 2022-05-29 PROCEDURE — 93306 TTE W/DOPPLER COMPLETE: CPT

## 2022-05-29 PROCEDURE — 99207 EEG VIDEO 2-12 HRS UNMONITORED: CPT | Performed by: PSYCHIATRY & NEUROLOGY

## 2022-05-29 PROCEDURE — 250N000013 HC RX MED GY IP 250 OP 250 PS 637: Performed by: INTERNAL MEDICINE

## 2022-05-29 PROCEDURE — 85025 COMPLETE CBC W/AUTO DIFF WBC: CPT | Performed by: INTERNAL MEDICINE

## 2022-05-29 PROCEDURE — 99233 SBSQ HOSP IP/OBS HIGH 50: CPT | Performed by: INTERNAL MEDICINE

## 2022-05-29 PROCEDURE — 82310 ASSAY OF CALCIUM: CPT | Performed by: INTERNAL MEDICINE

## 2022-05-29 PROCEDURE — 250N000013 HC RX MED GY IP 250 OP 250 PS 637: Performed by: STUDENT IN AN ORGANIZED HEALTH CARE EDUCATION/TRAINING PROGRAM

## 2022-05-29 PROCEDURE — 95711 VEEG 2-12 HR UNMONITORED: CPT

## 2022-05-29 PROCEDURE — 96366 THER/PROPH/DIAG IV INF ADDON: CPT | Performed by: EMERGENCY MEDICINE

## 2022-05-29 PROCEDURE — 120N000011 HC R&B TRANSPLANT UMMC

## 2022-05-29 PROCEDURE — 97530 THERAPEUTIC ACTIVITIES: CPT | Mod: GP

## 2022-05-29 PROCEDURE — 258N000003 HC RX IP 258 OP 636: Performed by: INTERNAL MEDICINE

## 2022-05-29 PROCEDURE — 95718 EEG PHYS/QHP 2-12 HR W/VEEG: CPT | Performed by: PSYCHIATRY & NEUROLOGY

## 2022-05-29 RX ORDER — CLONAZEPAM 0.5 MG/1
1.5 TABLET ORAL ONCE
Status: COMPLETED | OUTPATIENT
Start: 2022-05-29 | End: 2022-05-29

## 2022-05-29 RX ORDER — CLONAZEPAM 1 MG/1
1 TABLET ORAL DAILY
Status: DISCONTINUED | OUTPATIENT
Start: 2022-05-29 | End: 2022-05-31 | Stop reason: HOSPADM

## 2022-05-29 RX ORDER — CLONAZEPAM 1 MG/1
2 TABLET ORAL 2 TIMES DAILY
Status: DISCONTINUED | OUTPATIENT
Start: 2022-05-29 | End: 2022-05-31 | Stop reason: HOSPADM

## 2022-05-29 RX ADMIN — GABAPENTIN 800 MG: 800 TABLET, FILM COATED ORAL at 19:44

## 2022-05-29 RX ADMIN — Medication 15 MG: at 13:38

## 2022-05-29 RX ADMIN — Medication 15 MG: at 19:45

## 2022-05-29 RX ADMIN — Medication 15 MG: at 16:07

## 2022-05-29 RX ADMIN — RIVAROXABAN 20 MG: 20 TABLET, FILM COATED ORAL at 16:07

## 2022-05-29 RX ADMIN — LACTULOSE 15 ML: 10 SOLUTION ORAL at 07:50

## 2022-05-29 RX ADMIN — ACETAMINOPHEN 1000 MG: 500 TABLET ORAL at 19:43

## 2022-05-29 RX ADMIN — ATORVASTATIN CALCIUM 40 MG: 40 TABLET, FILM COATED ORAL at 19:43

## 2022-05-29 RX ADMIN — VENLAFAXINE HYDROCHLORIDE 150 MG: 150 CAPSULE, EXTENDED RELEASE ORAL at 07:51

## 2022-05-29 RX ADMIN — CLONAZEPAM 1.5 MG: 0.5 TABLET ORAL at 09:14

## 2022-05-29 RX ADMIN — CARBIDOPA AND LEVODOPA 2 TABLET: 25; 100 TABLET ORAL at 13:37

## 2022-05-29 RX ADMIN — Medication 500 MG: at 19:43

## 2022-05-29 RX ADMIN — CARBIDOPA AND LEVODOPA 1 TABLET: 50; 200 TABLET, EXTENDED RELEASE ORAL at 19:44

## 2022-05-29 RX ADMIN — ACETAMINOPHEN 1000 MG: 500 TABLET ORAL at 13:31

## 2022-05-29 RX ADMIN — CLONAZEPAM 0.5 MG: 0.5 TABLET ORAL at 08:22

## 2022-05-29 RX ADMIN — CLOZAPINE 25 MG: 25 TABLET ORAL at 19:44

## 2022-05-29 RX ADMIN — GABAPENTIN 800 MG: 800 TABLET, FILM COATED ORAL at 13:38

## 2022-05-29 RX ADMIN — VANCOMYCIN HYDROCHLORIDE 2000 MG: 1 INJECTION, POWDER, LYOPHILIZED, FOR SOLUTION INTRAVENOUS at 04:56

## 2022-05-29 RX ADMIN — SENNOSIDES 2 TABLET: 8.6 TABLET ORAL at 07:47

## 2022-05-29 RX ADMIN — LUBIPROSTONE 24 MCG: 24 CAPSULE, GELATIN COATED ORAL at 07:50

## 2022-05-29 RX ADMIN — LUBIPROSTONE 24 MCG: 24 CAPSULE, GELATIN COATED ORAL at 19:44

## 2022-05-29 RX ADMIN — PANTOPRAZOLE SODIUM 40 MG: 40 TABLET, DELAYED RELEASE ORAL at 07:47

## 2022-05-29 RX ADMIN — CLONAZEPAM 1 MG: 1 TABLET ORAL at 13:30

## 2022-05-29 RX ADMIN — Medication 12.5 MG: at 07:50

## 2022-05-29 RX ADMIN — CARBIDOPA AND LEVODOPA 2 TABLET: 25; 100 TABLET ORAL at 07:51

## 2022-05-29 RX ADMIN — VANCOMYCIN HYDROCHLORIDE 1500 MG: 10 INJECTION, POWDER, LYOPHILIZED, FOR SOLUTION INTRAVENOUS at 16:30

## 2022-05-29 RX ADMIN — Medication 12.5 MG: at 19:44

## 2022-05-29 RX ADMIN — Medication 3 HALF-TAB: at 16:07

## 2022-05-29 RX ADMIN — ACETAMINOPHEN 1000 MG: 500 TABLET ORAL at 07:47

## 2022-05-29 RX ADMIN — CALCIUM POLYCARBOPHIL 625 MG: 625 TABLET, FILM COATED ORAL at 07:48

## 2022-05-29 RX ADMIN — Medication 500 MG: at 07:47

## 2022-05-29 RX ADMIN — THERA TABS 1 TABLET: TAB at 07:47

## 2022-05-29 RX ADMIN — GABAPENTIN 800 MG: 800 TABLET, FILM COATED ORAL at 07:52

## 2022-05-29 RX ADMIN — CLONAZEPAM 2 MG: 1 TABLET ORAL at 19:43

## 2022-05-29 RX ADMIN — Medication 15 MG: at 07:49

## 2022-05-29 ASSESSMENT — ACTIVITIES OF DAILY LIVING (ADL)
ADLS_ACUITY_SCORE: 37
ADLS_ACUITY_SCORE: 45
ADLS_ACUITY_SCORE: 37

## 2022-05-29 NOTE — PROGRESS NOTES
INITIAL REPORT of Video-EEG Monitoring              DATE OF RECORDIN2022         I reviewed the first 2 hours of video-EEG monitoring of Benjamin Mathews.         The EEG during waking and drowsiness was normal.    No interictal epileptiform abnormalities and no electrographic seizures were observed.    Screening for intermittent seizures will require a longer period of recording.   Arthur Jorge M.D.

## 2022-05-29 NOTE — PROGRESS NOTES
Shift: [id number]    Pain: denies any pain  Neuro: alert and oriented; on EEG monitoring started at 2100h  Cardiac: regular heart rate and rhythm; BP is WNL  Respiratory: clear breath sounds in all lung fields; on room air with O2 sat within 95-96%  Diet/Appetite:  Combination regular diet  /GI: continent bowel and bladder  LDA's: PIV on left AC and right upper hand  Activity: does use walker per baseline  Pertinent Labs/Lab Collection: Blood culture on right hand: Gram positive cocci in clusters          VS: /58   Pulse 68   Temp 97.8  F (36.6  C) (Axillary)   Resp 12   Wt 93 kg (205 lb)   SpO2 95%   BMI 24.95 kg/m

## 2022-05-29 NOTE — PROGRESS NOTES
Patient took his morning meds and have no difficulty swallowing his meds, but stated that his right hand is painful, and seemed more anxious, gave the scheduled tylenol and 0.5 mg of PRN clonazepam.  MD saw patient and ordered extra 1.5 mg of clonazepam, physical therapist later got patient out of the bed and stood up for a while at the side of the bed with walker/GB. EEG monitoring is on, vitals stable, good urine output. Patient is resting in bed and nurse will continue to monitor.

## 2022-05-29 NOTE — PHARMACY-ADMISSION MEDICATION HISTORY
Admission Medication History Completed by Pharmacy    See [facility] Admission Navigator for allergy information, preferred outpatient pharmacy, prior to admission medications and immunization status.     Medication History Sources:     Patient, [facility] ([phone]), Dispense report    Changes made to PTA medication list (reason):    Added: None    Deleted: None    Changed: Per Medication list from [facility]  o Hydroxyzine HCL 1 tab q6h prn --> 2 tabs q6h prn max TID  o Venlafaxine 150mg ER daily --> 220mg ER daily    Additional Information:    The patient reports [facility] manages all of his medications and are up to date on his current medications, [facility] reports the patient is adherent to his medications    Prior to Admission medications    Medication Sig Last Dose Taking? Auth Provider   acetaminophen (TYLENOL) 500 MG tablet 2 x 500mg tabs by mouth 3/day @8am, 12pm and 8pm and 2 x 500mg tab by mouth every 6 hours as needed 5/27/2022 at Unknown time Yes Reported, Patient   atorvastatin (LIPITOR) 40 MG tablet Take 40 mg by mouth At 8pm 5/26/2022 at Unknown time Yes Reported, Patient   baclofen (LIORESAL) 10 MG tablet 10mg tab by mouth 4/day @8am, 12p, 5pm and 8pm 5/27/2022 at Unknown time Yes Reported, Patient   bisacodyl (DULCOLAX) 10 MG suppository Place 10 mg rectally daily as needed for constipation Unknown at prn Yes Unknown, Entered By History   calcium polycarbophil (FIBER-LAX) 625 MG tablet 1 tab by mouth daily at 8am Past Week at Unknown time Yes Reported, Patient   carbidopa-levodopa (SINEMET CR)  MG CR tablet 50/200 tab by mouth nightly at 8pm 5/26/2022 at Unknown time Yes Reported, Patient   carbidopa-levodopa (SINEMET)  MG tablet 2 tabs by mouth @8am, 2 tabs@ 12pm and 1.5 tabs @ 4pm 5/27/2022 at Unknown time Yes Reported, Patient   cholecalciferol (VITAMIN D3) 125 mcg (5000 units) capsule 125 mcg (5000 units) by mouth daily at  8am 5/27/2022 at Unknown time Yes  Reported, Patient   clonazePAM (KLONOPIN) 0.5 MG tablet 2 x 0.5mg tab by mouth daily at noon 5/27/2022 at Unknown time Yes Reported, Patient   clonazePAM (KLONOPIN) 1 MG tablet Take 0.5 mg by mouth 2 times daily as needed for muscle spasms 5/26/2022 at Unknown time Yes Reported, Patient   clonazePAM (KLONOPIN) 2 MG tablet 2mg tab by mouth twice daily at 8am and 8pm 5/27/2022 at Unknown time Yes Reported, Patient   cloZAPine (CLOZARIL) 25 MG tablet 25mg tab by mouth nightly at 8pm 5/26/2022 at Unknown time Yes Reported, Patient   docusate sodium (COLACE) 100 MG capsule Take 100 mg by mouth 2 times daily as needed for constipation 5/27/2022 at Unknown time Yes Unknown, Entered By History   ENULOSE 10 GM/15ML SOLUTION 30ml by mouth daily at 8am 5/27/2022 at Unknown time Yes Reported, Patient   gabapentin (NEURONTIN) 800 MG tablet 800mg tab by mouth 3/day at 8am, 12pm and 8pm 5/27/2022 at Unknown time Yes Reported, Patient   hydrocortisone 1 % CREA cream Apply topically to nose and other affected area(s) every morning at 9am Past Week at Unknown time Yes Unknown, Entered By History   hydrOXYzine (ATARAX) 25 MG tablet Take 50 mg by mouth every 6 hours as needed for anxiety (up to 3 timrd daily) 5/26/2022 at Unknown time Yes Kennedy Perry MD   lubiprostone (AMITIZA) 24 MCG capsule 24mg tab by mouth twice daily at 8am and 8pm 5/27/2022 at Unknown time Yes Reported, Patient   metoprolol succinate ER (TOPROL-XL) 25 MG 24 hr tablet Take 1/2 tablet (12.5mg) by mouth twice daily at 8am and 8pm 5/27/2022 at Unknown time Yes Reported, Patient   Multiple Vitamin (DAILY-ALLAN) TABS Vitamin by  Mouth daily @8am 5/27/2022 at Unknown time Yes Reported, Patient   pantoprazole (PROTONIX) 40 MG EC tablet Take 1 tablet (40mg) by mouth daily at 8am 5/27/2022 at Unknown time Yes Reported, Patient   polyethylene glycol (MIRALAX) 17 GM/Dose powder (= clearlax) Capful in liquid by mouth nightly at 8pm and capful daily as needed Past Week  at Unknown time Yes Reported, Patient   rivaroxaban ANTICOAGULANT (XARELTO) 20 MG TABS tablet Take 20 mg by mouth daily (with dinner) At 5pm 5/26/2022 at Unknown time Yes Reported, Patient   senna-docusate (SENOKOT-S/PERICOLACE) 8.6-50 MG tablet 1 tab by mouth twice daily as needed At 8am and 8pm Unknown at prn Yes Reported, Patient   sennosides (SENOKOT) 8.6 MG tablet 2 tabs by mouth twice daily at 8am and 8pm 5/27/2022 at Unknown time Yes Reported, Patient   sodium phosphate (FLEET ENEMA) 7-19 GM/118ML rectal enema Place 1 enema rectally daily as needed for constipation Unknown at prn Yes Reported, Patient   tamsulosin (FLOMAX) 0.4 MG capsule 0.4mg tab by mouth nightly at 8pm 5/26/2022 at Unknown time Yes Reported, Patient   traZODone (DESYREL) 100 MG tablet Take 100 mg by mouth daily at 8pm 5/26/2022 at Unknown time Yes Reported, Patient   venlafaxine (EFFEXOR-ER) 225 MG 24 hr tablet Take 225 mg by mouth daily 150mg tab by mouth daily at 8am 5/27/2022 at Unknown time Yes Reported, Patient   vitamin C (ASCORBIC ACID) 500 MG tablet Take 500 mg by mouth two times a day at 8am and 8pm 5/27/2022 at Unknown time Yes Reported, Patient   Baclofen (LIORESAL) 5 MG tablet 5mg tab by mouth 4/day @8am, 12p, 5pm and 8pm 5/27/2022  Reported, Patient       Date completed: 05/29/22    Medication history completed by: Yvette León

## 2022-05-29 NOTE — PROGRESS NOTES
[facility]    Medicine Progress Note - Hospitalist Service, GOLD TEAM 7    Date of Admission:  5/27/2022    Assessment & Plan            Benjamin Mathews is a 57 year old male admitted on 5/27/2022. 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        Falls x 3 times from standing with hitting head followed by syncope 4-5 sec: Falls likely due to underlying movement disorder  No proceeded symptoms suggestive cardiac or vasovagal etiology  CT head, CTA head/neck all unremarkable for acute processes.   MRI brain negative for stroke  CT C/A/P also non-localizing.   No recent med changes.   Does have chronic L sided weakness of which deconditioning may be contributing.   Plan:   Neurology following   Awaiting final EEG read to evaluate for possible seizures causing twitching reported by pt   S/p 2 L IVF, continue maintainence IVF    PT/OT following      Hx of Atypical Parkinson's  (2005)  Follows with Dr. De Dios as OP.  - PTA Carbidopa/Levodopa   - PTA Clonazepam 2mg BID, 1 mg at noon and 05 mg BID prn for dystonia        Hx of CVA with left sided deficit  Residual left sided weakness  Probably contributing to falls  No new stroke on MRI 5/28/22    Hx DVT on Xarelto          Depression and Anxiety  Hx suicide attempt 2021  No current SI   - PTA venlafaxine   - PTA PRN hydroxyzine       Chronic pain, neuropathic pain   - PTA APAP   - PTA baclofen   - PTA scheduled + PRN clonazepam   - PTA Gabapentin     Constipation   - PTA Senna-time, PRN  Senna-Plus, PRN bisacodyl, Scheduled + PRN Clearlax   - PTA Enulose, Fiber-Lax, Lubiprostone, PRN Docusate     Redemonstrated 4mm RLL nodule  Hx tachycardia- PTA metoprolol succinate BID   GERD- PTA pantoprazole  BPH- PTA tamsulosin  Vitamin deficiency- PTA Vit C, Daily-Alena  HLD- PTA atorvastatin          Diet: Combination Diet Regular Diet Adult    DVT Prophylaxis: DOAC  Dudley Catheter: Not present  Central Lines: None  Cardiac Monitoring: ACTIVE order. Indication: Syncope- low cardiac risk (24 hours)  Code Status: No CPR- Do NOT Intubate      Disposition Plan   Expected Discharge:   Anticipated discharge location:  Awaiting care coordination huddle  Delays:            The patient's care was discussed with the Patient.    MARIO BERNSTEIN MD  Hospitalist Service, [address]  Securely message with the Vocera Web Console (learn more here)  Text page via [facility] Paging/Directory   Please see signed in provider for up to date coverage information      Clinically Significant Risk Factors Present on Admission                 ______________________________________________________________________    Interval History   Reports feeling okay. He worked with therapy. He reported episode of clonic event this morning.   Rest of 14 point ROS negative    Data reviewed today: I reviewed all medications, new labs and imaging results over the last 24 hours. I personally reviewed no images or EKG's today.    Physical Exam   Vital Signs: Temp: 97.8  F (36.6  C) Temp src: Axillary BP: 116/62 Pulse: 69   Resp: 15 SpO2: 97 % O2 Device: None (Room air)    Weight: 205 lbs 0 oz  General Appearance: A O x3, no distress  Respiratory: clear to auscultation, no crackles  Cardiovascular: S1 and S2 normal  GI: non tender, non distended  Skin: normal turgor  Other: No pedal edema,+ resting  tremors     Data   Recent Labs   Lab 05/29/22  1018 05/28/22  0929 05/27/22  2259 05/27/22  9410  05/27/22  2245   WBC 9.5 5.7  --  8.0  --    HGB 12.4* 12.3*  --  12.3*  --    MCV 99 103*  --  100  --     188  --  158  --    INR  --   --   --  1.91* 2.2    145*  --  140  --    POTASSIUM 4.1 4.6  --  3.9  --    CHLORIDE 111* 114*  --  105  --    CO2 27 28  --  32  --    BUN 14 12  --  18  --    CR 0.66 0.78  --  0.94 0.9   ANIONGAP 3 3  --  3  --    MARTINA 8.6 8.1*  --  8.9  --    * 95 160* 119*  --    ALBUMIN  --   --   --  3.7  --    PROTTOTAL  --   --   --  7.2  --    BILITOTAL  --   --   --  0.3  --    ALKPHOS  --   --   --  75  --    ALT  --   --   --  10  --    AST  --   --   --  21  --      Recent Results (from the past 24 hour(s))   MR Brain w/o Contrast    Narrative    MR BRAIN W/O CONTRAST 5/28/2022 5:51 PM    Provided History: Stroke, follow up  ICD-10: Stroke    Comparison:  Head CT 5/28/2022     Technique: Sagittal T1-weighted and axial T2-weighted, turboFLAIR and  diffusion-weighted with ADC map images of the brain were obtained  without intravenous contrast.    Findings: These images reveal no intracranial mass lesion, mass  effect, midline shift or abnormal extraaxial fluid collection. The  ventricles and sulci are normal for age. Diffusion-weighted images  demonstrate no abnormality of reduced diffusion. Punctate areas of T2  hyperintensity within the right deep subcortical white matter in the  frontal lobe, within normal limits for patient's age. normal  intravascular flow voids are identified.      Impression    Impression: No evidence for stroke.    I have personally reviewed the examination and initial interpretation  and I agree with the findings.    LAVERNE OTTO MD         SYSTEM ID:  [id number]   Echocardiogram Complete   Result Value    LVEF  65-70%    Narrative    [id number]  [id number]  [id number]  [id number]^NIMAMIRIAM^[facility],[place]  Echocardiography Laboratory  [address] [place] [id number]     Name: MALDONADO PULIDO  MRN:  [id number]  : 1965  Study Date: 2022 07:17 AM  Age: 57 yrs  Gender: Male  Patient Location: [facility]  Reason For Study: Syncope  Ordering Physician: MARIO BERNSTEIN  Performed By: Jimmy Marie     BSA: 2.2 m2  Height: 76 in  Weight: 205 lb  HR: 65  BP: 101/63 mmHg  ______________________________________________________________________________  Procedure  Complete Portable Echo Adult. Echocardiogram with two-dimensional, color and  spectral Doppler performed. Adequate quality two-dimensional was performed and  interpreted.  ______________________________________________________________________________  Interpretation Summary  Global and regional left ventricular function is hyperkinetic with an EF of  65-70%.  Global right ventricular function is normal.  No significant valvular abnormalities present.  The inferior vena cava was normal in size with preserved respiratory  variability.  No pericardial effusion is present.  ______________________________________________________________________________  Left Ventricle  Left ventricular size is normal. Left ventricular wall thickness is normal.  Global and regional left ventricular function is hyperkinetic with an EF of  65-70%. Left ventricular diastolic function is normal. No regional wall motion  abnormalities are seen.     Right Ventricle  The right ventricle is normal size. Global right ventricular function is  normal.     Atria  Both atria appear normal.     Mitral Valve  The mitral valve is normal.     Aortic Valve  Aortic valve is normal in structure and function.     Tricuspid Valve  The tricuspid valve is normal. Trace tricuspid insufficiency is present. The  peak velocity of the tricuspid regurgitant jet is not obtainable. Pulmonary  artery systolic pressure cannot be assessed.     Pulmonic Valve  The pulmonic valve is normal.     Vessels  The aorta root is normal. The inferior vena cava was normal in size with  preserved respiratory variability.  Estimated mean right atrial pressure is  normal.     Pericardium  No pericardial effusion is present.     Miscellaneous  No significant valvular abnormalities present.     Compared to Previous Study  There is no prior study for direct comparison.  ______________________________________________________________________________  MMode/2D Measurements & Calculations  IVSd: 0.69 cm  LVIDd: 5.3 cm  LVIDs: 3.7 cm  LVPWd: 1.0 cm  FS: 31.4 %  LV mass(C)d: 164.0 grams  LV mass(C)dI: 73.3 grams/m2  Ao root diam: 3.7 cm  LA dimension: 3.3 cm  asc Aorta Diam: 3.4 cm  LA/Ao: 0.90  LVOT diam: 2.1 cm  LVOT area: 3.5 cm2  LA Volume (BP): 65.0 ml     LA Volume Index (BP): 29.0 ml/m2  RWT: 0.38     Doppler Measurements & Calculations  MV E max alex: 87.8 cm/sec  MV A max alex: 58.2 cm/sec  MV E/A: 1.5  MV dec slope: 765.0 cm/sec2  Ao V2 max: 105.0 cm/sec  Ao max P.0 mmHg  Ao V2 mean: 76.2 cm/sec  Ao mean PG: 3.0 mmHg  Ao V2 VTI: 22.4 cm  REGULO(I,D): 2.6 cm2  REGULO(V,D): 2.6 cm2  LV V1 max P.4 mmHg  LV V1 max: 77.6 cm/sec  LV V1 VTI: 16.9 cm  SV(LVOT): 58.5 ml  SI(LVOT): 26.1 ml/m2  AV Alex Ratio (DI): 0.74  REGULO Index (cm2/m2): 1.2  E/E' avg: 10.1  Lateral E/e': 10.6  Medial E/e': 9.5     ______________________________________________________________________________  Report approved by: Nasim Moore 2022 12:22 PM           Medications     - MEDICATION INSTRUCTIONS -         acetaminophen  1,000 mg Oral TID     atorvastatin  40 mg Oral QPM     baclofen  15 mg Oral 4x Daily     calcium polycarbophil  625 mg Oral Daily     carbidopa-levodopa  1 tablet Oral At Bedtime     carbidopa-levodopa  2 tablet Oral BID     carbidopa-levodopa  3 half-tab Oral Daily     clonazePAM  1 mg Oral Daily     clonazePAM  2 mg Oral BID     cloZAPine  25 mg Oral At Bedtime     gabapentin  800 mg Oral TID     lactulose  15 mL Oral Daily     lubiprostone  24 mcg Oral BID     metoprolol succinate ER  12.5 mg Oral BID     multivitamin, therapeutic  1  tablet Oral Daily     pantoprazole  40 mg Oral QAM AC     polyethylene glycol  17 g Oral At Bedtime     rivaroxaban ANTICOAGULANT  20 mg Oral Daily with supper     sennosides  2 tablet Oral BID     sodium chloride (PF)  3 mL Intracatheter Q8H     [Held by provider] tamsulosin  0.4 mg Oral QPM     vancomycin  1,500 mg Intravenous Q12H     venlafaxine  150 mg Oral Daily with breakfast     vitamin C  500 mg Oral BID

## 2022-05-29 NOTE — ED NOTES
Report given to KWADWO BENITEZ RN     Plan of care explained. Patient on monitor. Awaiting ECG placement. Denied pain

## 2022-05-29 NOTE — PHARMACY-VANCOMYCIN DOSING SERVICE
Pharmacy Vancomycin Initial Note  Date of Service May 29, 2022  Patient's  1965  57 year old, male    Indication: Bacteremia    Current estimated CrCl = Estimated Creatinine Clearance: 137.4 mL/min (based on SCr of 0.78 mg/dL).    Creatinine for last 3 days  2022: 10:55 PM Creatinine 0.94 mg/dL; 10:45 PM Creatinine POCT 0.9 mg/dL  2022:  9:29 AM Creatinine 0.78 mg/dL    Recent Vancomycin Level(s) for last 3 days  No results found for requested labs within last 72 hours.      Vancomycin IV Administrations (past 72 hours)      No vancomycin orders with administrations in past 72 hours.                Nephrotoxins and other renal medications (From now, onward)    None          Contrast Orders - past 72 hours (72h ago, onward)    Start     Dose/Rate Route Frequency Stop    22 014  iopamidol (ISOVUE-370) solution 75 mL         75 mL Intravenous ONCE 22 0143          [employer] Prediction of Planned Initial Vancomycin Regimen  Loading dose: 2000 mg at 04:30 2022.  Regimen: 1500 mg IV every 12 hours.  Start time: 04:30 on 2022  Exposure target: AUC24 (range)400-600 mg/L.hr   AUC24,ss: 593 mg/L.hr  Probability of AUC24 > 400: 85 %  Ctrough,ss: 18.2 mg/L  Probability of Ctrough,ss > 20: 43 %  Probability of nephrotoxicity (Lodise GRAYSON ): 14 %          Plan:  1. Load Vancomycin 2000mg IV once now  2. Followed by vancomycin  1500 mg IV q12h.   3. Vancomycin monitoring method: AUC  4. Vancomycin therapeutic monitoring goal: 400-600 mg*h/L  5. Pharmacy will check vancomycin levels as appropriate in 1-3 Days.    6. Serum creatinine levels will be ordered daily for the first week of therapy and at least twice weekly for subsequent weeks.      Meño Wright, [facility]

## 2022-05-29 NOTE — PROGRESS NOTES
[facility]  Neurology Progress Note    Patient Name:  Benjamin Mathews    MRN:  [id number]      :  1965  Date of Service:  2022  Primary care provider:  No Ref-Primary, Physician      Subjective:  He was quite dystonic this AM and having some cramping of the entire Left chuckie-body, which accounted for his spells he was previously describing. He never lost consciousness during these events. EEG was without epileptic activity.     ASSESSMENT/PLAN:  Benjamin Mathews is a 57 year old man with PMH pertinent for HTN, chronic dyspnea, presumed previous stroke with residual left-sided weakness, DVT on Xarelto, parkinsonism with atypical features, and dementia with behavioral and psychotic features.  He has several different types of spells, the first being falls which are almost exclusively in the backward direction and are not associated with LOC.  He has these intermittently, but had 2 such falls on the day prior to admission which is unusual for him.  These events sound very much like postural instability and could be exacerbated by numerous factors, most likely metabolic, toxic, or infectious etiologies.  Second event is where the patient's ankle turns inward and he has difficulty moving this leg until he takes next dose of clonazepam, which sounds very classic for focal dystonias which can be seen in parkinsonism syndromes.  Finally, he does have twitching of the right thigh that can spread to his right face and arm and can last on the order of minutes.  These are more concerning for possible seizure activity, but may also be related to his underlying movement disorder.  EEG was without epileptiform activities, specially with Right hand cramping and dystonic posturing of the Right side, which is reassuring that this is not seizure.     #Parkinsonism with atypical features  #Dementia with behavioral and psychotic features  #Spells of shaking  - No changes in patient's  Parkinsons's medications are indicated at this time  - Clonazepam   - 2mg BID (8AM, 8PM), 1mg noon, and 0.5mg BID PRN  - Sinemet   - Continue current dosing as ordered in MAR  - Ongoing PT/OT, may need placement for adequate recovery  - MRI Brain w/ and w/o contrast - no acute pathology  - vEEG without epileptiform activity, will discontinue today  - Management and work-up of the pt's other conditions per the primary team    - Neurology will sign off      Patient discussed with Attending Dr. Karissa Vazquez MD  PGY-3 Neurology Resident  Securely message with the Vocera Web Console (learn more here)  Text page via [facility] Paging/Directory   05/29/2022    Attending physician: I saw and evaluated the patient with Dr. Vazquez and I agree with his findings and plan of care as documented above.    Patient had prominent dystonic posturing this morning before receiving his usual dose of clonazepam, including shaking movements of right upper limb. There is no video EEG correlate to suggest an epileptic etiology.    The patient's presentation is consistent with his known, neurodegenerative Parkinsonian condition. Long term medication management should be deferred to his outpatient neurologist. Recommend ongoing PT/OT evaluation to determine his current rehabilitation needs and maintaining his outpatient medication regimen.    Please call with questions.    Yvon Hancock MD   of Neurology      ______________________________    Physical Examination  Vitals: /62   Pulse 69   Temp 97.8  F (36.6  C) (Axillary)   Resp 15   Wt 93 kg (205 lb)   SpO2 97%   BMI 24.95 kg/m      General: Alert, interactive; Lying in bed, NAD, cooperative  HEENT: Normocephalic, atraumatic, nares patent, no oral lesions, poor dentition  Resp: No increased work of breathing   Cardio: RRR, S1/S2 appropriate   Abdomen:  Soft, non-distended, non-tender  Extremities: Warm and well perfused,  peripheral pulses present, mild edema  Skin: Not jaundiced, no rash, scattered small ecchymoses    Neuro:   Mental status: Attentive, interactive; Recalls remote history with accuracy, and recent history lacks quite a bit of detail; simple calculations intact  Speech/Language: Fluent speech without paraphasic errors; No dysarthria; naming, repetition, comprehension intact  Cranial nerves: Visual fields intact to confrontation, Eyes conjugate, Pupils 5mm and brisk, EOMI w/ normal and smooth pursuit, face expression symmetric, facial sensation intact and symmetric, hearing intact to conversation, shoulder shrug strong, palate rise symmetric, tongue/uvula midline.  Motor:   Bulk: Appropriate  Tone: Very dystonic of the Right hemibody, with paratonia in the Left body  Spontaneous movements: Occasional brief myoclonic jerks of the Right hemibody with breakthrough dystonia  Power: *All strength assessments based on MRC grading  Formal power testing of the Right side prohibited due to significant dystonia, but Left chuckie-body was globally slightly reduced due to deconditioning, but otherwise appropriate      Reflexes: Reflex's[s of the Right were limited due to dystonia, otherwise normal on the Left including biceps, triceps, brachioradialis, patellar, and achilles. Toes down-going  No crossed adductors or spread.     Sensory:   Vibration 6-7 seconds at the MM, pinprick reduced to ankle bilaterally   Coordination:   FNF intact on the Left without dysmetria  Gait: Deferred due to profound dystonia and discomfort       INVESTIGATIONS:  MRI brain:  Grossly unremarkable for patient of this age.       CURRENT MEDICATIONS:   acetaminophen  1,000 mg Oral TID    atorvastatin  40 mg Oral QPM    baclofen  15 mg Oral 4x Daily    calcium polycarbophil  625 mg Oral Daily    carbidopa-levodopa  1 tablet Oral At Bedtime    carbidopa-levodopa  2 tablet Oral BID    carbidopa-levodopa  3 half-tab Oral Daily    clonazePAM  1 mg Oral Daily     [Held by provider] clonazePAM  2 mg Oral BID    cloZAPine  25 mg Oral At Bedtime    gabapentin  800 mg Oral TID    lactulose  15 mL Oral Daily    lubiprostone  24 mcg Oral BID    metoprolol succinate ER  12.5 mg Oral BID    multivitamin, therapeutic  1 tablet Oral Daily    pantoprazole  40 mg Oral QAM AC    polyethylene glycol  17 g Oral At Bedtime    rivaroxaban ANTICOAGULANT  20 mg Oral Daily with supper    sennosides  2 tablet Oral BID    sodium chloride (PF)  3 mL Intracatheter Q8H    [Held by provider] tamsulosin  0.4 mg Oral QPM    vancomycin  1,500 mg Intravenous Q12H    venlafaxine  150 mg Oral Daily with breakfast    vitamin C  500 mg Oral BID       PRN MEDICATIONS:  bisacodyl, clonazePAM, docusate sodium, hydrOXYzine, lidocaine 4%, lidocaine (buffered or not buffered), melatonin, - MEDICATION INSTRUCTIONS -, senna-docusate, sodium chloride (PF), sodium phosphate, traZODone    INFUSIONS:   - MEDICATION INSTRUCTIONS -         ALLERGIES:  Amantadine, Quetiapine, and Duloxetine

## 2022-05-29 NOTE — PROGRESS NOTES
"   05/29/22 1126   Quick Adds   Type of Visit Initial PT Evaluation       Present no   Living Environment   People in Home facility resident   Current Living Arrangements assisted living   Home Accessibility no concerns   Transportation Anticipated agency   Living Environment Comments Pt reports he lives in an [place]. Receives transportation from agency.   Self-Care   Usual Activity Tolerance moderate   Current Activity Tolerance fair   Regular Exercise No   Equipment Currently Used at Home walker, rolling   Fall history within last six months yes   Number of times patient has fallen within last six months 2   Activity/Exercise/Self-Care Comment Pt reports IND with dressing and ambulating with 4WW. Receives A from [facility] staff for dressing. Reports 2 falls within last 6 months. No regular exercise.   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, and hx DVT on Xarelto who presents from his Assisted Living Facility with recurrent falls.\"   Existing Precautions/Restrictions fall   Weight-Bearing Status - LUE full weight-bearing   Weight-Bearing Status - RUE full weight-bearing   Weight-Bearing Status - LLE full weight-bearing   Weight-Bearing Status - RLE full weight-bearing   Cognition   Orientation Status (Cognition) oriented x 3   Pain Assessment   Patient Currently in Pain No   Integumentary/Edema   Integumentary/Edema no deficits were identifed   Posture    Posture Forward head position;Protracted shoulders   Range of Motion (ROM)   ROM Comment   (decreased B hamstring " ROM)   Strength (Manual Muscle Testing)   Strength (Manual Muscle Testing) strength is WFL   Strength Comments   (generalized weakness but grossly 3+/5)   Bed Mobility   Comment, (Bed Mobility) supine <> sit with CGA/min A   Transfers   Comment, (Transfers) sit <> stand with FWW and min A   Gait/Stairs (Locomotion)   Comment, (Gait/Stairs) Unable to assess due to safety concerns; will assess at later time   Balance   Balance Comments fair standing balance   Sensory Examination   Sensory Perception other (describe)   Sensory Perception Comments   (numbness to B feet)   Coordination   Coordination no deficits were identified   Muscle Tone   Muscle Tone no deficits were identified   Clinical Impression   Criteria for Skilled Therapeutic Intervention Yes, treatment indicated   PT Diagnosis (PT) impaired functional mobility, risk for falls   Influenced by the following impairments decreased strength, decreased balance, decreased activity tolerance   Functional limitations due to impairments difficulty with bed mobility, transfers, and walking   Clinical Presentation (PT Evaluation Complexity) Stable/Uncomplicated   Clinical Presentation Rationale per clinical judgment   Clinical Decision Making (Complexity) low complexity   Planned Therapy Interventions (PT) balance training;bed mobility training;gait training;home exercise program;motor coordination training;neuromuscular re-education;patient/family education;ROM (range of motion);strengthening;stretching;transfer training;progressive activity/exercise;risk factor education;home program guidelines   Anticipated Equipment Needs at Discharge (PT)   (tbd)   Risk & Benefits of therapy have been explained evaluation/treatment results reviewed;care plan/treatment goals reviewed;risks/benefits reviewed;current/potential barriers reviewed;participants voiced agreement with care plan;participants included;patient   PT Discharge Planning   PT Discharge Recommendation (DC Rec)  home with assist;home with home care physical therapy;Transitional Care Facility   PT Rationale for DC Rec Pt is demonstrating functional mobility below baseline with an increased risk for falls. Pt with recurrent falls at [facility]. Will benefit from IP PT to maximize mobility prior to d/c. If d/c back to [facility], will require 24/7 assist with ADL's and mobility with FWW to reduce risk for falls. If unable to increase assistance at TCU, will benefit from TCU to improve safety and independence prior to returning to [facility]. Pt in agreement with TCU. Will continue to assess and update as appropriate.   PT Brief overview of current status Ax1 with FWW and gait belt for transfers   Total Evaluation Time   Total Evaluation Time (Minutes) 5   Physical Therapy Goals   PT Frequency 5x/week   PT Predicted Duration/Target Date for Goal Attainment 06/12/22   PT: Bed Mobility Independent;Supine to/from sit;Rolling;Bridging  (with HOB flat)   PT: Transfers Modified independent;Sit to/from stand;Bed to/from chair  (with LRAD)   PT: Gait Modified independent;150 feet  (with LRAD)   PT: Goal 1 IND with HEP to improve B LE strength for transfers and walking to reduce risk for falls and readmission.     Mazin completed in ED    Hannah Powell, PT

## 2022-05-30 ENCOUNTER — APPOINTMENT (OUTPATIENT)
Dept: OCCUPATIONAL THERAPY | Facility: CLINIC | Age: 57
DRG: 086 | End: 2022-05-30
Attending: INTERNAL MEDICINE
Payer: COMMERCIAL

## 2022-05-30 PROCEDURE — 96366 THER/PROPH/DIAG IV INF ADDON: CPT | Performed by: EMERGENCY MEDICINE

## 2022-05-30 PROCEDURE — 97530 THERAPEUTIC ACTIVITIES: CPT | Mod: GO

## 2022-05-30 PROCEDURE — 250N000013 HC RX MED GY IP 250 OP 250 PS 637: Performed by: HOSPITALIST

## 2022-05-30 PROCEDURE — 97165 OT EVAL LOW COMPLEX 30 MIN: CPT | Mod: GO

## 2022-05-30 PROCEDURE — 120N000011 HC R&B TRANSPLANT UMMC

## 2022-05-30 PROCEDURE — 258N000003 HC RX IP 258 OP 636: Performed by: INTERNAL MEDICINE

## 2022-05-30 PROCEDURE — 250N000013 HC RX MED GY IP 250 OP 250 PS 637: Performed by: STUDENT IN AN ORGANIZED HEALTH CARE EDUCATION/TRAINING PROGRAM

## 2022-05-30 PROCEDURE — 250N000013 HC RX MED GY IP 250 OP 250 PS 637: Performed by: INTERNAL MEDICINE

## 2022-05-30 PROCEDURE — 99233 SBSQ HOSP IP/OBS HIGH 50: CPT | Performed by: INTERNAL MEDICINE

## 2022-05-30 PROCEDURE — 96361 HYDRATE IV INFUSION ADD-ON: CPT | Performed by: EMERGENCY MEDICINE

## 2022-05-30 PROCEDURE — 250N000011 HC RX IP 250 OP 636: Performed by: INTERNAL MEDICINE

## 2022-05-30 RX ORDER — SODIUM CHLORIDE 9 MG/ML
INJECTION, SOLUTION INTRAVENOUS CONTINUOUS
Status: DISCONTINUED | OUTPATIENT
Start: 2022-05-30 | End: 2022-05-31

## 2022-05-30 RX ADMIN — CARBIDOPA AND LEVODOPA 1 TABLET: 50; 200 TABLET, EXTENDED RELEASE ORAL at 20:51

## 2022-05-30 RX ADMIN — PANTOPRAZOLE SODIUM 40 MG: 40 TABLET, DELAYED RELEASE ORAL at 07:39

## 2022-05-30 RX ADMIN — Medication 15 MG: at 11:45

## 2022-05-30 RX ADMIN — CARBIDOPA AND LEVODOPA 2 TABLET: 25; 100 TABLET ORAL at 11:45

## 2022-05-30 RX ADMIN — VANCOMYCIN HYDROCHLORIDE 1500 MG: 10 INJECTION, POWDER, LYOPHILIZED, FOR SOLUTION INTRAVENOUS at 04:51

## 2022-05-30 RX ADMIN — ACETAMINOPHEN 1000 MG: 500 TABLET ORAL at 20:52

## 2022-05-30 RX ADMIN — SENNOSIDES 2 TABLET: 8.6 TABLET ORAL at 20:52

## 2022-05-30 RX ADMIN — CLOZAPINE 25 MG: 25 TABLET ORAL at 20:51

## 2022-05-30 RX ADMIN — GABAPENTIN 800 MG: 800 TABLET, FILM COATED ORAL at 11:45

## 2022-05-30 RX ADMIN — Medication 12.5 MG: at 07:41

## 2022-05-30 RX ADMIN — LUBIPROSTONE 24 MCG: 24 CAPSULE, GELATIN COATED ORAL at 07:40

## 2022-05-30 RX ADMIN — TRAZODONE HYDROCHLORIDE 100 MG: 100 TABLET ORAL at 20:52

## 2022-05-30 RX ADMIN — CALCIUM POLYCARBOPHIL 625 MG: 625 TABLET, FILM COATED ORAL at 07:43

## 2022-05-30 RX ADMIN — HYDROXYZINE HYDROCHLORIDE 25 MG: 25 TABLET, FILM COATED ORAL at 20:35

## 2022-05-30 RX ADMIN — Medication 3 HALF-TAB: at 16:06

## 2022-05-30 RX ADMIN — SODIUM CHLORIDE: 9 INJECTION, SOLUTION INTRAVENOUS at 14:32

## 2022-05-30 RX ADMIN — THERA TABS 1 TABLET: TAB at 07:39

## 2022-05-30 RX ADMIN — SENNOSIDES 2 TABLET: 8.6 TABLET ORAL at 07:39

## 2022-05-30 RX ADMIN — Medication 12.5 MG: at 20:51

## 2022-05-30 RX ADMIN — CARBIDOPA AND LEVODOPA 2 TABLET: 25; 100 TABLET ORAL at 07:43

## 2022-05-30 RX ADMIN — ATORVASTATIN CALCIUM 40 MG: 40 TABLET, FILM COATED ORAL at 20:52

## 2022-05-30 RX ADMIN — ACETAMINOPHEN 1000 MG: 500 TABLET ORAL at 07:39

## 2022-05-30 RX ADMIN — VENLAFAXINE HYDROCHLORIDE 150 MG: 150 CAPSULE, EXTENDED RELEASE ORAL at 07:42

## 2022-05-30 RX ADMIN — ACETAMINOPHEN 1000 MG: 500 TABLET ORAL at 14:32

## 2022-05-30 RX ADMIN — Medication 1 MG: at 20:52

## 2022-05-30 RX ADMIN — GABAPENTIN 800 MG: 800 TABLET, FILM COATED ORAL at 07:41

## 2022-05-30 RX ADMIN — LACTULOSE 15 ML: 10 SOLUTION ORAL at 07:39

## 2022-05-30 RX ADMIN — CLONAZEPAM 1 MG: 1 TABLET ORAL at 11:44

## 2022-05-30 RX ADMIN — Medication 15 MG: at 16:04

## 2022-05-30 RX ADMIN — Medication 500 MG: at 20:52

## 2022-05-30 RX ADMIN — CLONAZEPAM 2 MG: 1 TABLET ORAL at 20:51

## 2022-05-30 RX ADMIN — GABAPENTIN 800 MG: 800 TABLET, FILM COATED ORAL at 20:52

## 2022-05-30 RX ADMIN — RIVAROXABAN 20 MG: 20 TABLET, FILM COATED ORAL at 16:04

## 2022-05-30 RX ADMIN — CLONAZEPAM 2 MG: 1 TABLET ORAL at 07:39

## 2022-05-30 RX ADMIN — LUBIPROSTONE 24 MCG: 24 CAPSULE, GELATIN COATED ORAL at 20:52

## 2022-05-30 RX ADMIN — Medication 15 MG: at 20:52

## 2022-05-30 RX ADMIN — Medication 500 MG: at 07:39

## 2022-05-30 RX ADMIN — Medication 15 MG: at 07:42

## 2022-05-30 ASSESSMENT — ACTIVITIES OF DAILY LIVING (ADL)
ADLS_ACUITY_SCORE: 45
ADLS_ACUITY_SCORE: 45
ADLS_ACUITY_SCORE: 43
ADLS_ACUITY_SCORE: 45
ADLS_ACUITY_SCORE: 43
ADLS_ACUITY_SCORE: 45
ADLS_ACUITY_SCORE: 43
ADLS_ACUITY_SCORE: 43
ADLS_ACUITY_SCORE: 45
ADLS_ACUITY_SCORE: 43

## 2022-05-30 NOTE — PROGRESS NOTES
[facility]    Medicine Progress Note - Hospitalist Service, GOLD TEAM 7    Date of Admission:  5/27/2022    Assessment & Plan            Benjamin Mathews is a 57 year old male admitted on 5/27/2022. 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        Falls x 3 times from standing with hitting head followed by syncope 4-5 sec: Falls likely due to underlying movement disorder  No proceeded symptoms suggestive cardiac or vasovagal etiology  Echo cardiogram without any significant abnormalities  CT head, CTA head/neck all unremarkable for acute processes.   MRI brain negative for stroke  CT C/A/P also non-localizing for signs of infection.   No recent med changes.   Does have chronic L sided weakness of which deconditioning may be contributing.   EEG 5/29/22 negative for any epileptiform activity  Plan:   Neurology following   continue maintainence IVF    PT/OT following and recommending TCU, if going directly facility then will require 24/7 assistance. SW on consult      Hx of Atypical Parkinson's  (2005)  Follows with Dr. De Dios as OP.  - PTA Carbidopa/Levodopa   - Continue PTA Clonazepam 2mg BID, 1 mg at noon and 0.5 mg BID prn for dystonia (pt had episode of dystonia 5/29/22 in hospital when missed a dose)    Bacteremia   1/2 Blood Cx 5/28/22 + for Staph Epidermidis- likley contaminent  Subsequent Cx negative  Vancomycin stopped today, follow final blood Cx    Hx of CVA with left sided deficit  Residual left sided weakness  Probably  contributing to falls  No new stroke on MRI 5/28/22    Hx DVT  On Xarelto     Depression and Anxiety  Hx suicide attempt 2021  No current SI   - PTA venlafaxine   - PTA PRN hydroxyzine  - clonazepam       Chronic pain, neuropathic pain   - PTA APAP   - PTA baclofen   - PTA scheduled + PRN clonazepam   - PTA Gabapentin     Constipation   - PTA Senna-time, PRN Senna-Plus, PRN bisacodyl, Scheduled + PRN Clearlax   - PTA Enulose, Fiber-Lax, Lubiprostone, PRN Docusate     Redemonstrated 4mm RLL nodule  Hx tachycardia- PTA metoprolol succinate BID   GERD- PTA pantoprazole  BPH- PTA tamsulosin  Vitamin deficiency- PTA Vit C, Daily-Alena  HLD- PTA atorvastatin          Diet: Combination Diet Regular Diet Adult    DVT Prophylaxis: DOAC  Dudley Catheter: Not present  Central Lines: None  Cardiac Monitoring: ACTIVE order. Indication: Syncope- low cardiac risk (24 hours)  Code Status: No CPR- Do NOT Intubate      Disposition Plan   Expected Discharge:   Anticipated discharge location:  Awaiting care coordination huddle  Delays:            The patient's care was discussed with the Patient.    MARIO BERNSTEIN MD  Hospitalist Service, [address]  Securely message with the Vocera Web Console (learn more here)  Text page via [facility] Paging/Directory   Please see signed in provider for up to date coverage information      Clinically Significant Risk Factors Present on Admission                 ______________________________________________________________________    Interval History   Reports feeling well. No new symptoms. He still having the feeling of increased tone inetrmittently.  Denies any pain.   Rest of 14 point ROS negative    Data reviewed today: I reviewed all medications, new labs and imaging results over the last 24 hours. I personally reviewed no images or EKG's today.    Physical Exam   Vital Signs: Temp: 98.7  F (37.1  C) Temp src: Oral BP: 106/66 Pulse: 83    Resp: 18 SpO2: 99 % O2 Device: None (Room air)    Weight: 205 lbs 0 oz  General Appearance: A O x3, no distress  Respiratory: clear to auscultation, no crackles  Cardiovascular: S1 and S2 normal  GI: non tender, non distended  Skin: normal turgor  Other: No pedal edema,+ resting  tremors     Data   Recent Labs   Lab 05/29/22  1018 05/28/22  0929 05/27/22  2259 05/27/22  2255 05/27/22  2245   WBC 9.5 5.7  --  8.0  --    HGB 12.4* 12.3*  --  12.3*  --    MCV 99 103*  --  100  --     188  --  158  --    INR  --   --   --  1.91* 2.2    145*  --  140  --    POTASSIUM 4.1 4.6  --  3.9  --    CHLORIDE 111* 114*  --  105  --    CO2 27 28  --  32  --    BUN 14 12  --  18  --    CR 0.66 0.78  --  0.94 0.9   ANIONGAP 3 3  --  3  --    MARTINA 8.6 8.1*  --  8.9  --    * 95 160* 119*  --    ALBUMIN  --   --   --  3.7  --    PROTTOTAL  --   --   --  7.2  --    BILITOTAL  --   --   --  0.3  --    ALKPHOS  --   --   --  75  --    ALT  --   --   --  10  --    AST  --   --   --  21  --      No results found for this or any previous visit (from the past 24 hour(s)).  Medications     - MEDICATION INSTRUCTIONS -         acetaminophen  1,000 mg Oral TID     atorvastatin  40 mg Oral QPM     baclofen  15 mg Oral 4x Daily     calcium polycarbophil  625 mg Oral Daily     carbidopa-levodopa  1 tablet Oral At Bedtime     carbidopa-levodopa  2 tablet Oral BID     carbidopa-levodopa  3 half-tab Oral Daily     clonazePAM  1 mg Oral Daily     clonazePAM  2 mg Oral BID     cloZAPine  25 mg Oral At Bedtime     gabapentin  800 mg Oral TID     lactulose  15 mL Oral Daily     lubiprostone  24 mcg Oral BID     metoprolol succinate ER  12.5 mg Oral BID     multivitamin, therapeutic  1 tablet Oral Daily     pantoprazole  40 mg Oral QAM AC     polyethylene glycol  17 g Oral At Bedtime     rivaroxaban ANTICOAGULANT  20 mg Oral Daily with supper     sennosides  2 tablet Oral BID     sodium chloride (PF)  3 mL Intracatheter Q8H     [Held by  provider] tamsulosin  0.4 mg Oral QPM     [START ON 5/31/2022] venlafaxine  225 mg Oral Daily with breakfast     vitamin C  500 mg Oral BID

## 2022-05-30 NOTE — PROGRESS NOTES
Patient rested quietly all night. Denies pain at this time. Vancomycin administered as ordered. Tolerated well. No acute pain or distress noted at this time. Will continue to monitor and address any acute abnormal findings.

## 2022-05-31 ENCOUNTER — APPOINTMENT (OUTPATIENT)
Dept: OCCUPATIONAL THERAPY | Facility: CLINIC | Age: 57
DRG: 086 | End: 2022-05-31
Payer: COMMERCIAL

## 2022-05-31 ENCOUNTER — APPOINTMENT (OUTPATIENT)
Dept: PHYSICAL THERAPY | Facility: CLINIC | Age: 57
DRG: 086 | End: 2022-05-31
Payer: COMMERCIAL

## 2022-05-31 VITALS
DIASTOLIC BLOOD PRESSURE: 67 MMHG | OXYGEN SATURATION: 98 % | BODY MASS INDEX: 24.95 KG/M2 | WEIGHT: 205 LBS | RESPIRATION RATE: 16 BRPM | HEART RATE: 74 BPM | SYSTOLIC BLOOD PRESSURE: 115 MMHG | TEMPERATURE: 97.9 F

## 2022-05-31 LAB
ANION GAP SERPL CALCULATED.3IONS-SCNC: 3 MMOL/L (ref 3–14)
BACTERIA BLD CULT: ABNORMAL
BACTERIA BLD CULT: ABNORMAL
BASOPHILS # BLD AUTO: 0 10E3/UL (ref 0–0.2)
BASOPHILS NFR BLD AUTO: 0 %
BUN SERPL-MCNC: 20 MG/DL (ref 7–30)
CALCIUM SERPL-MCNC: 8.1 MG/DL (ref 8.5–10.1)
CHLORIDE BLD-SCNC: 113 MMOL/L (ref 94–109)
CO2 SERPL-SCNC: 28 MMOL/L (ref 20–32)
CREAT SERPL-MCNC: 0.66 MG/DL (ref 0.66–1.25)
EOSINOPHIL # BLD AUTO: 0.2 10E3/UL (ref 0–0.7)
EOSINOPHIL NFR BLD AUTO: 3 %
ERYTHROCYTE [DISTWIDTH] IN BLOOD BY AUTOMATED COUNT: 13.1 % (ref 10–15)
GFR SERPL CREATININE-BSD FRML MDRD: >90 ML/MIN/1.73M2
GLUCOSE BLD-MCNC: 100 MG/DL (ref 70–99)
HCT VFR BLD AUTO: 35.6 % (ref 40–53)
HGB BLD-MCNC: 11.3 G/DL (ref 13.3–17.7)
IMM GRANULOCYTES # BLD: 0 10E3/UL
IMM GRANULOCYTES NFR BLD: 1 %
LYMPHOCYTES # BLD AUTO: 2.3 10E3/UL (ref 0.8–5.3)
LYMPHOCYTES NFR BLD AUTO: 39 %
MCH RBC QN AUTO: 31.2 PG (ref 26.5–33)
MCHC RBC AUTO-ENTMCNC: 31.7 G/DL (ref 31.5–36.5)
MCV RBC AUTO: 98 FL (ref 78–100)
MONOCYTES # BLD AUTO: 0.4 10E3/UL (ref 0–1.3)
MONOCYTES NFR BLD AUTO: 7 %
NEUTROPHILS # BLD AUTO: 3 10E3/UL (ref 1.6–8.3)
NEUTROPHILS NFR BLD AUTO: 50 %
NRBC # BLD AUTO: 0 10E3/UL
NRBC BLD AUTO-RTO: 0 /100
PLATELET # BLD AUTO: 183 10E3/UL (ref 150–450)
POTASSIUM BLD-SCNC: 3.6 MMOL/L (ref 3.4–5.3)
RBC # BLD AUTO: 3.62 10E6/UL (ref 4.4–5.9)
SODIUM SERPL-SCNC: 144 MMOL/L (ref 133–144)
WBC # BLD AUTO: 6 10E3/UL (ref 4–11)

## 2022-05-31 PROCEDURE — 250N000013 HC RX MED GY IP 250 OP 250 PS 637: Performed by: HOSPITALIST

## 2022-05-31 PROCEDURE — 85025 COMPLETE CBC W/AUTO DIFF WBC: CPT | Performed by: INTERNAL MEDICINE

## 2022-05-31 PROCEDURE — 250N000013 HC RX MED GY IP 250 OP 250 PS 637: Performed by: STUDENT IN AN ORGANIZED HEALTH CARE EDUCATION/TRAINING PROGRAM

## 2022-05-31 PROCEDURE — 97535 SELF CARE MNGMENT TRAINING: CPT | Mod: GO

## 2022-05-31 PROCEDURE — 80048 BASIC METABOLIC PNL TOTAL CA: CPT | Performed by: INTERNAL MEDICINE

## 2022-05-31 PROCEDURE — 97530 THERAPEUTIC ACTIVITIES: CPT | Mod: GP | Performed by: REHABILITATION PRACTITIONER

## 2022-05-31 PROCEDURE — 36415 COLL VENOUS BLD VENIPUNCTURE: CPT | Performed by: INTERNAL MEDICINE

## 2022-05-31 PROCEDURE — 258N000003 HC RX IP 258 OP 636: Performed by: INTERNAL MEDICINE

## 2022-05-31 PROCEDURE — 97116 GAIT TRAINING THERAPY: CPT | Mod: GP | Performed by: REHABILITATION PRACTITIONER

## 2022-05-31 PROCEDURE — 99239 HOSP IP/OBS DSCHRG MGMT >30: CPT | Performed by: INTERNAL MEDICINE

## 2022-05-31 PROCEDURE — 250N000013 HC RX MED GY IP 250 OP 250 PS 637: Performed by: INTERNAL MEDICINE

## 2022-05-31 PROCEDURE — 97110 THERAPEUTIC EXERCISES: CPT | Mod: GP | Performed by: REHABILITATION PRACTITIONER

## 2022-05-31 RX ADMIN — Medication 3 HALF-TAB: at 17:16

## 2022-05-31 RX ADMIN — VENLAFAXINE HYDROCHLORIDE 225 MG: 150 CAPSULE, EXTENDED RELEASE ORAL at 08:13

## 2022-05-31 RX ADMIN — ACETAMINOPHEN 1000 MG: 500 TABLET ORAL at 07:54

## 2022-05-31 RX ADMIN — CLONAZEPAM 2 MG: 1 TABLET ORAL at 08:12

## 2022-05-31 RX ADMIN — PANTOPRAZOLE SODIUM 40 MG: 40 TABLET, DELAYED RELEASE ORAL at 07:54

## 2022-05-31 RX ADMIN — Medication 15 MG: at 07:55

## 2022-05-31 RX ADMIN — GABAPENTIN 800 MG: 800 TABLET, FILM COATED ORAL at 08:04

## 2022-05-31 RX ADMIN — CLONAZEPAM 1 MG: 1 TABLET ORAL at 12:24

## 2022-05-31 RX ADMIN — SODIUM CHLORIDE: 9 INJECTION, SOLUTION INTRAVENOUS at 02:06

## 2022-05-31 RX ADMIN — THERA TABS 1 TABLET: TAB at 07:54

## 2022-05-31 RX ADMIN — SENNOSIDES 2 TABLET: 8.6 TABLET ORAL at 07:54

## 2022-05-31 RX ADMIN — Medication 15 MG: at 17:16

## 2022-05-31 RX ADMIN — GABAPENTIN 800 MG: 800 TABLET, FILM COATED ORAL at 12:24

## 2022-05-31 RX ADMIN — CALCIUM POLYCARBOPHIL 625 MG: 625 TABLET, FILM COATED ORAL at 07:55

## 2022-05-31 RX ADMIN — RIVAROXABAN 20 MG: 20 TABLET, FILM COATED ORAL at 17:15

## 2022-05-31 RX ADMIN — CARBIDOPA AND LEVODOPA 2 TABLET: 25; 100 TABLET ORAL at 07:55

## 2022-05-31 RX ADMIN — ACETAMINOPHEN 1000 MG: 500 TABLET ORAL at 17:17

## 2022-05-31 RX ADMIN — LUBIPROSTONE 24 MCG: 24 CAPSULE, GELATIN COATED ORAL at 07:55

## 2022-05-31 RX ADMIN — Medication 500 MG: at 07:54

## 2022-05-31 RX ADMIN — Medication 15 MG: at 12:29

## 2022-05-31 RX ADMIN — CARBIDOPA AND LEVODOPA 2 TABLET: 25; 100 TABLET ORAL at 12:29

## 2022-05-31 RX ADMIN — Medication 12.5 MG: at 07:56

## 2022-05-31 ASSESSMENT — ACTIVITIES OF DAILY LIVING (ADL)
ADLS_ACUITY_SCORE: 38
ADLS_ACUITY_SCORE: 42
ADLS_ACUITY_SCORE: 43
ADLS_ACUITY_SCORE: 38

## 2022-05-31 NOTE — CONSULTS
Care Management Initial Consult    General Information  Assessment completed with: Care Team Member, Patient, VM-chart review,  (Benjamin)  Type of CM/SW Visit: CM Role Introduction    Primary Care Provider verified and updated as needed: Yes   Readmission within the last 30 days:        Reason for Consult: discharge planning  Advance Care Planning:       General Information Comments:  ([facility] at [facility]    Communication Assessment  Patient's communication style: spoken language (English or Bilingual)    Hearing Difficulty or Deaf: no        Cognitive  Cognitive/Neuro/Behavioral: .WDL except, mood/behavior  Level of Consciousness: alert  Arousal Level: opens eyes spontaneously  Orientation: oriented x 4  Mood/Behavior: cooperative, calm  Best Language: 0 - No aphasia  Speech: spontaneous    Living Environment:   People in home: facility resident     Current living Arrangements: assisted living ([facility])      Able to return to prior arrangements: yes     Family/Social Support:  Assistive Lib[facility] setting supportive.  Care provided by: self  Provides care for:       Current Resources:   Patient receiving home care services: No (Has recommendations for PT and OT at AL setting.)     Community Resources:    Equipment currently used at home: walker, rolling, shower chair, other (see comments) (power scooter)  Supplies currently used at home:      Employment/Financial:  Employment Status: disabled         Lifestyle & Psychosocial Needs:  (From Chart Flow Sheet)  Social Determinants of Health     Tobacco Use: High Risk     Smoking Tobacco Use: Current Every Day Smoker     Smokeless Tobacco Use: Never Used   Alcohol Use: Not on file   Financial Resource Strain: Not on file   Food Insecurity: Not on file   Transportation Needs: Not on file   Physical Activity: Not on file   Stress: Not on file   Social Connections: Not on file   Intimate Partner Violence: Not on file   Depression: Not on file    Housing Stability: Not on file       Functional Status:  Prior to admission patient needed assistance: from assistive livinng staff.      Mental Health Status:  Mental Health Status: No Current Concerns       Chemical Dependency Status:  No concerns        Values/Beliefs:  Spiritual, Cultural Beliefs, Hoahaoism Practices, Values that affect care: no               Care Management Discharge Note    Discharge Date: 05/31/2022       Discharge Disposition:  Back to Assistive living setting [facility].    Discharge Services:  PT and OT support At place of residence    Discharge Transportation: other (see comments) ( Medical Transport pending with [facility])    Private pay costs discussed: Not applicable     Education Provided on the Discharge Plan:  Yes, by interdisciplinary Team.  Persons Notified of Discharge Plans: Assistive living staff after talking with Benjamin to update him on the plan.  Patient/Family in Agreement with the Plan:  Yes      Handoff Referral Completed: Yes, to be sent    Additional Information:    Back to Assistive living setting today.  Therapy services at the Assistive Living setting are pending.  The RN Amber (ph- [phone]) at the [facility] Assistive Living Setting will call back with agency that can follow patient at the assistive living.  [facility] Transport has been arranged for 5:30 p.m.this afternoon with [employer], ph: [phone] and this company will phone PCU 7A if they can arrive sooner. Assistive Living Setting has been updated with time of transport and the phone number at place of residence is .    Patient will transition to A.L facility today and will follow up in clinic as designated in discharge orders.  Benjamin is happy to go and facility staff is excited for him to return.        Haley Ashby,  B.S.N., R.N., P.H.N..  Care Coordinator     Pager: [phone]/Phone: [phone]  [facility]    Addendum:  Discharge orders  faxed to [facility] at .  Please also have patient hand carry copy of discharge orders.  Thank you    ____________________      Addendum:  Final Rehabilitation Plan at Facility:    Skilled agency of choice who can provide a Physical Therapy and Occupational Therapy eval and visit at assistive living facility [facility] as follows after update from the RN Mikhail Guzman RN, BSN, PHN  [phone] at the facility:    Isaac Kidd  Physical Therapist  P [phone]  F [phone]  [email or web address]

## 2022-05-31 NOTE — PLAN OF CARE
Goal Outcome Evaluation: Adequate for discharge.   NEURO: A & O x 4   RESPIRATORY: On Room air.   CARDIAC: On cardiac monitor, Normal sinus rhythm   GI/: No Bowel movement today.   DIET: Regular diet   INCISION/DRAINS: Muscle pain 8/10, clonazepam given.  IV ACCESS: PIV removed upon discharge.   ACTIVITY: Assist of 1 with walker and gait belt in sloan and bathroom.   PLAN:  Patient discharge to [facility]. Discharge packet given to transport.

## 2022-05-31 NOTE — DISCHARGE SUMMARY
[facility]  Hospitalist Discharge Summary      Date of Admission:  5/27/2022  Date of Discharge:  5/31/2022  Discharging Provider: Dulce Biggs MD  Discharge Service: Hospitalist Service, GOLD TEAM 7    Discharge Diagnoses   Recurrent falls  Concern for syncope with head injury  Hx of atypical parkinson's  Hx of CVA with residual left sided weakness  Hx of DVT  BPH  Depression and anxiety  Neuropathic pain    Follow-ups Needed After Discharge   Follow-up Appointments     Adult [facility] Follow-up and recommended labs and tests      [facility]- [facility].  No follow up labs or test are needed.    Follow-up with your outpatient neurologist Dr. De Dios as scheduled on   7/25.    Appointments on [place] and/or [facility] (with [facility] or [facility]   provider or service). Call [phone] if you haven't heard regarding   these appointments within 7 days of discharge.             Unresulted Labs Ordered in the Past 30 Days of this Admission     Date and Time Order Name Status Description    5/29/2022  8:52 AM Blood Culture Artery, Carotid, left Preliminary     5/29/2022  8:52 AM Blood Culture Hand, Right Preliminary     5/28/2022 12:12 AM Blood Culture Hand, Left Preliminary       These results will be followed up by PCP    Discharge Disposition   Discharged to home  Condition at discharge: Stable    Hospital Course   Benjamin Mathews is a 57 year old male admitted on 5/27/2022. 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     Recurrent falls  Head injury  Syncope  Falls x 3 times from standing with hitting head followed by syncope lasting several seconds.  No proceeding symptoms suggestive cardiac or vasovagal etiology. Patient himself feels each fall was preceded by neurological symptoms such as dystonia or difficulty with balance. During his admission, TTE without any significant abnormalities. CT head and CTA head/neck all unremarkable for acute processes. MRI brain negative for stroke. CT C/A/P also non-localizing for signs of infection.  No recent med changes. EEG on 5/29 was negative for epileptiform activity.  Does have chronic L sided weakness of which deconditioning may be contributing.  Neurology was consulted given his neurological history and felt his falls were most likely due to his underlying movement disorder, and patient agreed. He did better with IV hydration and PT/OT following him along. On 5/31, PT and OT felt he was stable to return to his ILF with home services. Patient was discharged in good condition. Discussed need to monitor for falls or difficulty with balance particularly while on anticoagulation, and he expressed understanding with this. Of note - Tamsulosin was felt to contribute to falls and was held during this admission - pt was able to void without this medication so this will continue to be held to reduce likelihood of orthostasis.     Hx of Atypical Parkinson's  (2005)  Follows with Dr. De Dios as OP. Was continued on his PTA regimen including Carbidopa/Levodopa  And PTA Clonazepam 2mg BID, 1 mg at noon and 0.5 mg BID prn for dystonia (pt had episode of dystonia 5/29/22 in hospital when missed a dose). Per patient, the Clonazepam is the only medication which has made a difference for him with respect to his movement symptoms.     Hx of CVA w/ residual left sided weakness  Probably contributing to falls due to persistent weakness. MRI on 5/28 showed  no evidence of new CVA.     Hx DVT  On Xarelto. Counseled on risks/benefits of AC moving forward, he expresses understanding and will continue anticoagulation moving forward.     Depression and Anxiety  Hx suicide attempt 2021  No current SI. Mood feels OK, particularly when he is back at his ILF where he resides. He was continued on PTA venlafaxine, Hydroxyzine, and Clonazepam.     Chronic pain, neuropathic pain   - PTA APAP   - PTA baclofen   - PTA scheduled + PRN clonazepam   - PTA Gabapentin     Constipation   - PTA Senna-time, PRN Senna-Plus, PRN bisacodyl, Scheduled + PRN Clearlax   - PTA Enulose, Fiber-Lax, Lubiprostone, PRN Docusate     Redemonstrated 4mm RLL pulmonary nodule  - Needs outpatient follow-up     Hx tachycardia- PTA metoprolol succinate BID     GERD- PTA pantoprazole    BPH- Holding Tamsulosin on discharge given concern for frequent falls.    Vitamin deficiency- PTA Vit C, Daily-Alena    HLD- PTA atorvastatin      Consultations This Hospital Stay   NEUROLOGY GENERAL ADULT IP CONSULT  PHYSICAL THERAPY ADULT IP CONSULT  OCCUPATIONAL THERAPY ADULT IP CONSULT  SOCIAL WORK IP CONSULT  PHARMACY TO DOSE VANCO  SOCIAL WORK IP CONSULT  CARE MANAGEMENT / SOCIAL WORK IP CONSULT    Code Status   No CPR- Do NOT Intubate    Time Spent on this Encounter   I, Dulce Biggs MD, personally saw the patient today and spent greater than 30 minutes discharging this patient.       Dulce Biggs MD  [facility] [address] [address]  Phone: [phone]  ______________________________________________________________________    Physical Exam   Vital Signs: Temp: 97.9  F (36.6  C) Temp src: Oral BP: 115/67 Pulse: 74   Resp: 16 SpO2: 98 % O2 Device: None (Room air)    Weight: 205 lbs 0 oz  General Appearance:  Pleasant conversant middle aged male, no acute distress appreciated  Respiratory: Breathing comfortably on room air, lungs CTAB  Cardiovascular: RRR,  no m/r/g.  GI: soft, non-distended, non-tender to palpation  Skin: no skin rash, pallor present  Other:  A&Ox3, calm, conversant, slow speech, no tremor appreciated    Primary Care Physician   Physician No Ref-Primary    Discharge Orders      Reason for your hospital stay    You were admitted to the hospital with recurrent falls and a head injury. We evaluated you in conjunction with the Neurologists - there was no evidence of brain injury, stroke, or seizure. We are attributing these falls likely due to your underlying movement disorder, though medications like Tamsulosin could be contributing. We would recommend holding the Tamsulosin moving forward as you were able to empty your bladder well without it. We would recommend outpatient follow-up with your Neurologist after discharge as well as home PT and OT services.     Activity    Your activity upon discharge: activity as tolerated     Adult [facility] Follow-up and recommended labs and tests    [facility].  No follow up labs or test are needed.    Follow-up with your outpatient neurologist Dr. De Dios as scheduled on 7/25.    Appointments on [place] and/or [facility] (with [facility] or [facility] provider or service). Call [phone] if you haven't heard regarding these appointments within 7 days of discharge.     Diet    Follow this diet upon discharge: Orders Placed This Encounter      Combination Diet Regular Diet Adult       Significant Results and Procedures   Results for orders placed or performed during the hospital encounter of 05/27/22   Head CT w/o contrast    Narrative    EXAM: CT HEAD W/O CONTRAST, CT CERVICAL SPINE W/O CONTRAST  LOCATION: [facility]  DATE/TIME: 5/27/2022 11:19 PM    INDICATION: Head trauma, abnormal mental status (Age 19-64y)  COMPARISON: MRI head and cervical spine from 07/08/2021  TECHNIQUE:   1)  Routine CT Head without IV contrast. Multiplanar reformats. Dose reduction techniques were used.  2) Routine CT Cervical Spine without IV contrast. Multiplanar reformats. Dose reduction techniques were used.    FINDINGS:   HEAD CT:   INTRACRANIAL CONTENTS: No intracranial hemorrhage, extraaxial collection, or mass effect.  No CT evidence of acute infarct. Normal parenchymal attenuation. Normal ventricles and sulci.     VISUALIZED ORBITS/SINUSES/MASTOIDS: No intraorbital abnormality. No paranasal sinus mucosal disease. No middle ear or mastoid effusion.    BONES/SOFT TISSUES: No acute abnormality.    CERVICAL SPINE CT:   VERTEBRA: Mild left convexity cervicothoracic curvature. Slight retrolisthesis of C3 on C4 and C4 on C5. Alignment is otherwise normal. No acute cervical spine fracture or posttraumatic subluxation. Mild multilevel facet arthropathy.    CANAL/FORAMINA: No significant canal narrowing. Marked degenerative foraminal narrowing on the left at C2-C3.    PARASPINAL: No extraspinal abnormality. Visualized lung fields are clear.      Impression    IMPRESSION:  HEAD CT:  1.  No acute intracranial abnormality.    CERVICAL SPINE CT:  1.  No CT evidence for acute fracture or post traumatic subluxation.   Cervical spine CT w/o contrast    Narrative    EXAM: CT HEAD W/O CONTRAST, CT CERVICAL SPINE W/O CONTRAST  LOCATION: [facility]  DATE/TIME: 5/27/2022 11:19 PM    INDICATION: Head trauma, abnormal mental status (Age 19-64y)  COMPARISON: MRI head and cervical spine from 07/08/2021  TECHNIQUE:   1) Routine CT Head without IV contrast. Multiplanar reformats. Dose reduction techniques were used.  2) Routine CT Cervical Spine without IV contrast. Multiplanar reformats. Dose reduction techniques were used.    FINDINGS:   HEAD CT:   INTRACRANIAL CONTENTS: No intracranial hemorrhage, extraaxial collection, or mass effect.  No CT evidence of acute infarct. Normal parenchymal  attenuation. Normal ventricles and sulci.     VISUALIZED ORBITS/SINUSES/MASTOIDS: No intraorbital abnormality. No paranasal sinus mucosal disease. No middle ear or mastoid effusion.    BONES/SOFT TISSUES: No acute abnormality.    CERVICAL SPINE CT:   VERTEBRA: Mild left convexity cervicothoracic curvature. Slight retrolisthesis of C3 on C4 and C4 on C5. Alignment is otherwise normal. No acute cervical spine fracture or posttraumatic subluxation. Mild multilevel facet arthropathy.    CANAL/FORAMINA: No significant canal narrowing. Marked degenerative foraminal narrowing on the left at C2-C3.    PARASPINAL: No extraspinal abnormality. Visualized lung fields are clear.      Impression    IMPRESSION:  HEAD CT:  1.  No acute intracranial abnormality.    CERVICAL SPINE CT:  1.  No CT evidence for acute fracture or post traumatic subluxation.   POC US ABDOMEN LIMITED    Impression    Bedside FAST (Focused Assessment with Sonography in Trauma), performed and interpreted by me.   Indication: Trauma and Hypotension    With the patient in Trendelenburg, the RUQ, LUQ and subxiphoid views were examined for intraabdominal and thoracic free fluid and pericardial effusion. With the patient in reverse Trendelenburg, the suprapubic view was examined for intraabdominal free fluid. Image quality was satisfactory..     Findings: There is no evidence of free fluid above or below bilateral diaphragms, in the splenorenal or hepatorenal space, or in bilateral paracolic gutters. There was no free fluid seen in the pelvis adjacent to the urinary bladder. There is no free fluid within the pericardium.   Extended FAST exam (eFAST):   Indication: Trauma   The chest wall was evaluated for evidence of pneumothorax.     Right side:  Lung sliding artifact  Present     Comet tail artifacts or B-lines  Present   Left side:  Lung sliding artifact  Present     Comet tail artifacts or B-lines Present     IMPRESSION:  Negative FAST     CTA Head Neck with  Contrast    Narrative    EXAM: CTA  HEAD NECK WITH CONTRAST  LOCATION: [facility]  DATE/TIME: 5/28/2022 1:40 AM    INDICATION: Stroke/TIA, assess extracranial arteries. Traumatic injury.  COMPARISON: 05/27/2022  CONTRAST: iopamidol (ISOVUE 370) solution 88 mL     TECHNIQUE: Head and neck CT angiogram with IV contrast. Axial helical CT images of the head and neck vessels obtained during the arterial phase of intravenous contrast administration. Axial 2D reconstructed images and multiplanar 3D MIP reconstructed   images of the head and neck vessels were performed by the technologist. Dose reduction techniques were used. All stenosis measurements made according to NASCET criteria unless otherwise specified.    FINDINGS:     HEAD CTA:  ANTERIOR CIRCULATION: No stenosis/occlusion, aneurysm, or high flow vascular malformation. Patent bilateral posterior communicating arteries with small P1 segments of the posterior cerebral arteries, particularly on the left. Small left A1 segment of the   anterior cerebral artery.    POSTERIOR CIRCULATION: No stenosis/occlusion, aneurysm, or high flow vascular malformation. Congenitally small vertebrobasilar system.     DURAL VENOUS SINUSES: Expected enhancement of the major dural venous sinuses.    NECK CTA:  RIGHT CAROTID: Trace atherosclerotic plaque. No measurable stenosis or dissection.    LEFT CAROTID: Trace atherosclerotic plaque. No measurable stenosis or dissection.    VERTEBRAL ARTERIES: No focal stenosis or dissection. Balanced vertebral arteries.    AORTIC ARCH: Classic aortic arch anatomy with no significant stenosis at the origin of the great vessels.    NONVASCULAR STRUCTURES: See separately dictated head and cervical spine CT. Dependent atelectasis in the lungs.      Impression    IMPRESSION:     HEAD CTA:   1.  No significant stenosis, aneurysm, or high flow vascular malformation identified.  2.  Variant [place]  anatomy as above.    NECK CTA:  1.  No flow-limiting stenosis or evidence of dissection.   CT Chest/Abdomen/Pelvis w Contrast    Narrative    EXAM: CT CHEST/ABDOMEN/PELVIS W CONTRAST  LOCATION: [facility]  DATE/TIME: 5/28/2022 1:40 AM    INDICATION: Chest-abdomen-pelvis trauma, blunt. Injury with pain.  COMPARISON: 07/08/2021  TECHNIQUE: CT scan of the chest, abdomen, and pelvis was performed following injection of IV contrast. Multiplanar reformats were obtained. Dose reduction techniques were used.   CONTRAST: iopamidol (ISOVUE 370) solution 75 mL       FINDINGS:   LUNGS AND PLEURA: Basilar atelectasis. No infiltrate, pleural effusion or visible pneumothorax. 4 mm right lower lobe nodule series 6 image 191.    MEDIASTINUM/AXILLAE: No adenopathy or pericardial effusion.    CORONARY ARTERY CALCIFICATION: No significant visualized.    HEPATOBILIARY: Normal.    PANCREAS: Normal.    SPLEEN: Normal.    ADRENAL GLANDS: Normal.    KIDNEYS/BLADDER: Normal.    BOWEL: Normal caliber. Normal appendix.    LYMPH NODES: Normal.    VASCULATURE: Unremarkable.    PELVIC ORGANS: Normal.    MUSCULOSKELETAL: Minimal degenerative change osseous structures. Remote rib fractures.      Impression    IMPRESSION:  1.  No evidence of injury within the chest, abdomen and pelvis.  2.  4 mm right lower lobe nodule again seen.   XR Abdomen 1 View    Narrative    EXAM: XR ABDOMEN 1 VIEW  LOCATION: [facility]  DATE/TIME: 5/28/2022 5:51 AM    INDICATION: chronic constipation  COMPARISON: 05/28/2022      Impression    IMPRESSION: Mild air distention of bowel. Mild colonic stool. No significant stool in the rectal region.I   MR Brain w/o Contrast    Narrative    MR BRAIN W/O CONTRAST 5/28/2022 5:51 PM    Provided History: Stroke, follow up  ICD-10: Stroke    Comparison:  Head CT 5/28/2022     Technique: Sagittal T1-weighted and axial T2-weighted, turboFLAIR  and  diffusion-weighted with ADC map images of the brain were obtained  without intravenous contrast.    Findings: These images reveal no intracranial mass lesion, mass  effect, midline shift or abnormal extraaxial fluid collection. The  ventricles and sulci are normal for age. Diffusion-weighted images  demonstrate no abnormality of reduced diffusion. Punctate areas of T2  hyperintensity within the right deep subcortical white matter in the  frontal lobe, within normal limits for patient's age. normal  intravascular flow voids are identified.      Impression    Impression: No evidence for stroke.    I have personally reviewed the examination and initial interpretation  and I agree with the findings.    LAVERNE OTTO MD         SYSTEM ID:  [id number]   Echocardiogram Complete     Value    LVEF  65-70%    Narrative    [id number]  [id number]  [id number]  [id number]^DAY^MARIO     [facility],[place]  Echocardiography Laboratory  [address] [place] [id number]     Name: MALDONADO PULIDO  MRN: [id number]  : 1965  Study Date: 2022 07:17 AM  Age: 57 yrs  Gender: Male  Patient Location: [facility]  Reason For Study: Syncope  Ordering Physician: MARIO BERNSTEIN  Performed By: Jimmy Marie     BSA: 2.2 m2  Height: 76 in  Weight: 205 lb  HR: 65  BP: 101/63 mmHg  ______________________________________________________________________________  Procedure  Complete Portable Echo Adult. Echocardiogram with two-dimensional, color and  spectral Doppler performed. Adequate quality two-dimensional was performed and  interpreted.  ______________________________________________________________________________  Interpretation Summary  Global and regional left ventricular function is hyperkinetic with an EF of  65-70%.  Global right ventricular function is normal.  No significant valvular abnormalities present.  The inferior vena cava was normal in size with preserved respiratory  variability.  No  pericardial effusion is present.  ______________________________________________________________________________  Left Ventricle  Left ventricular size is normal. Left ventricular wall thickness is normal.  Global and regional left ventricular function is hyperkinetic with an EF of  65-70%. Left ventricular diastolic function is normal. No regional wall motion  abnormalities are seen.     Right Ventricle  The right ventricle is normal size. Global right ventricular function is  normal.     Atria  Both atria appear normal.     Mitral Valve  The mitral valve is normal.     Aortic Valve  Aortic valve is normal in structure and function.     Tricuspid Valve  The tricuspid valve is normal. Trace tricuspid insufficiency is present. The  peak velocity of the tricuspid regurgitant jet is not obtainable. Pulmonary  artery systolic pressure cannot be assessed.     Pulmonic Valve  The pulmonic valve is normal.     Vessels  The aorta root is normal. The inferior vena cava was normal in size with  preserved respiratory variability. Estimated mean right atrial pressure is  normal.     Pericardium  No pericardial effusion is present.     Miscellaneous  No significant valvular abnormalities present.     Compared to Previous Study  There is no prior study for direct comparison.  ______________________________________________________________________________  MMode/2D Measurements & Calculations  IVSd: 0.69 cm  LVIDd: 5.3 cm  LVIDs: 3.7 cm  LVPWd: 1.0 cm  FS: 31.4 %  LV mass(C)d: 164.0 grams  LV mass(C)dI: 73.3 grams/m2  Ao root diam: 3.7 cm  LA dimension: 3.3 cm  asc Aorta Diam: 3.4 cm  LA/Ao: 0.90  LVOT diam: 2.1 cm  LVOT area: 3.5 cm2  LA Volume (BP): 65.0 ml     LA Volume Index (BP): 29.0 ml/m2  RWT: 0.38     Doppler Measurements & Calculations  MV E max ayala: 87.8 cm/sec  MV A max ayala: 58.2 cm/sec  MV E/A: 1.5  MV dec slope: 765.0 cm/sec2  Ao V2 max: 105.0 cm/sec  Ao max P.0 mmHg  Ao V2 mean: 76.2 cm/sec  Ao mean PG: 3.0  mmHg  Ao V2 VTI: 22.4 cm  REGULO(I,D): 2.6 cm2  REGULO(V,D): 2.6 cm2  LV V1 max P.4 mmHg  LV V1 max: 77.6 cm/sec  LV V1 VTI: 16.9 cm  SV(LVOT): 58.5 ml  SI(LVOT): 26.1 ml/m2  AV Alex Ratio (DI): 0.74  REGULO Index (cm2/m2): 1.2  E/E' avg: 10.1  Lateral E/e': 10.6  Medial E/e': 9.5     ______________________________________________________________________________  Report approved by: Nasim Moore 2022 12:22 PM               Discharge Medications   Current Discharge Medication List      CONTINUE these medications which have NOT CHANGED    Details   acetaminophen (TYLENOL) 500 MG tablet 2 x 500mg tabs by mouth 3/day @8am, 12pm and 8pm and 2 x 500mg tab by mouth every 6 hours as needed      atorvastatin (LIPITOR) 40 MG tablet Take 40 mg by mouth At 8pm      !! baclofen (LIORESAL) 10 MG tablet 10mg tab by mouth 4/day @8am, 12p, 5pm and 8pm      bisacodyl (DULCOLAX) 10 MG suppository Place 10 mg rectally daily as needed for constipation      calcium polycarbophil (FIBER-LAX) 625 MG tablet 1 tab by mouth daily at 8am      carbidopa-levodopa (SINEMET CR)  MG CR tablet 50/200 tab by mouth nightly at 8pm      carbidopa-levodopa (SINEMET)  MG tablet 2 tabs by mouth @8am, 2 tabs@ 12pm and 1.5 tabs @ 4pm      cholecalciferol (VITAMIN D3) 125 mcg (5000 units) capsule 125 mcg (5000 units) by mouth daily at  8am      !! clonazePAM (KLONOPIN) 0.5 MG tablet 2 x 0.5mg tab by mouth daily at noon      !! clonazePAM (KLONOPIN) 1 MG tablet Take 0.5 mg by mouth 2 times daily as needed for muscle spasms  Qty: 30 tablet, Refills: 0    Associated Diagnoses: Major depressive disorder, recurrent episode, moderate (H); Anxiety disorder, unspecified type      !! clonazePAM (KLONOPIN) 2 MG tablet 2mg tab by mouth twice daily at 8am and 8pm  Qty: 60 tablet, Refills: 0    Associated Diagnoses: Muscle spasm      cloZAPine (CLOZARIL) 25 MG tablet 25mg tab by mouth nightly at 8pm      docusate sodium (COLACE) 100 MG capsule Take 100 mg  by mouth 2 times daily as needed for constipation      ENULOSE 10 GM/15ML SOLUTION 30ml by mouth daily at 8am      gabapentin (NEURONTIN) 800 MG tablet 800mg tab by mouth 3/day at 8am, 12pm and 8pm      hydrocortisone 1 % CREA cream Apply topically to nose and other affected area(s) every morning at 9am      hydrOXYzine (ATARAX) 25 MG tablet Take 50 mg by mouth every 6 hours as needed for anxiety (up to 3 timrd daily)      lubiprostone (AMITIZA) 24 MCG capsule 24mg tab by mouth twice daily at 8am and 8pm      metoprolol succinate ER (TOPROL-XL) 25 MG 24 hr tablet Take 1/2 tablet (12.5mg) by mouth twice daily at 8am and 8pm      Multiple Vitamin (DAILY-ALLAN) TABS Vitamin by  Mouth daily @8am      pantoprazole (PROTONIX) 40 MG EC tablet Take 1 tablet (40mg) by mouth daily at 8am      polyethylene glycol (MIRALAX) 17 GM/Dose powder (= clearlax) Capful in liquid by mouth nightly at 8pm and capful daily as needed      rivaroxaban ANTICOAGULANT (XARELTO) 20 MG TABS tablet Take 20 mg by mouth daily (with dinner) At 5pm      senna-docusate (SENOKOT-S/PERICOLACE) 8.6-50 MG tablet 1 tab by mouth twice daily as needed At 8am and 8pm      sennosides (SENOKOT) 8.6 MG tablet 2 tabs by mouth twice daily at 8am and 8pm      sodium phosphate (FLEET ENEMA) 7-19 GM/118ML rectal enema Place 1 enema rectally daily as needed for constipation      traZODone (DESYREL) 100 MG tablet Take 100 mg by mouth daily at 8pm      venlafaxine (EFFEXOR-ER) 225 MG 24 hr tablet Take 225 mg by mouth daily 150mg tab by mouth daily at 8am      vitamin C (ASCORBIC ACID) 500 MG tablet Take 500 mg by mouth two times a day at 8am and 8pm      !! Baclofen (LIORESAL) 5 MG tablet 5mg tab by mouth 4/day @8am, 12p, 5pm and 8pm       !! - Potential duplicate medications found. Please discuss with provider.      STOP taking these medications       tamsulosin (FLOMAX) 0.4 MG capsule Comments:   Reason for Stopping:             Allergies   Allergies   Allergen  Reactions     Amantadine Other (See Comments)     Other reaction(s): Hallucinations  Hallucinations/ lost self control/gambling.     halluicnations  Hallucinations/ lost self control/gambling.     hallucinates  halluicnations  hallucinates  Hallucinations/ lost self control/gambling.        Quetiapine GI Disturbance, Diarrhea and Other (See Comments)     Diarrhea    Diarrhea  Other reaction(s): GI Disturbance  Diarrhea       Duloxetine Other (See Comments)     suicidal  suicidal

## 2022-05-31 NOTE — PLAN OF CARE
"[id number]    Pt transferred from emergency room at 2300 last evening.  He had been boarding in the ER since 5/27.  Belongings include watch, 2 rings and clothing.  Pt wished to keep belongings at bedside.       VS: blood pressures 90's/60's.  On room air.  Telemetry shows NSR in the 60's.    Neuro: Pt is alert and oriented X 4 but forgetful.  Conversation illogical at times.    BG: none  Labs: pending collection  Pain/Nausea: denies  Diet: regular  LDA: PIV infusing NS @ 75cc/hr.    GI: Pt reports BM \"since he's been in the ER\", but nothing is charted.    : pullup on.  Skin: rash on back, flaky skin.    Mobility: have not seen pt out of bed but ER nurse said he was up with 1 assist and walker and gait belt.    Plan: fall precautions including bed and chair alarms.  Commode and walker at bedside.  Fall wristband on.   Shower today.                          "

## 2022-05-31 NOTE — PROGRESS NOTES
[facility]    Medicine Progress Note - Hospitalist Service, GOLD TEAM 7    Date of Admission:  5/27/2022    Assessment & Plan          Benjamin Mathews is a 57 year old male admitted on 5/27/2022. 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     Recurrent falls  Head injury  Syncope  Falls x 3 times from standing with hitting head followed by syncope 4-5 sec. No proceeding symptoms suggestive cardiac or vasovagal etiology. Patient himself feels each fall was preceded by neurological symptoms such as dystonia or difficulty with balance. During his admission, TTE without any significant abnormalities CT head, CTA head/neck all unremarkable for acute processes. MRI brain negative for stroke CT C/A/P also non-localizing for signs of infection.  No recent med changes. EEG on 5/29 was negative for epileptiform activity.  Does have chronic L sided weakness of which deconditioning may be contributing.   - Neurology following, feel falls were likely 2/2 underlying movement disorder   - PT/OT following, now recommending discharge home with OP services  - Pt agreeable to return home to [facility], feels he has support necessary to manage     Hx of Atypical Parkinson's  (2005)  Follows with Dr. De Dios as OP.  - PTA Carbidopa/Levodopa  - Continue PTA Clonazepam 2mg BID, 1 mg at noon and 0.5 mg BID prn for dystonia (pt had episode of dystonia 5/29/22 in hospital when missed a dose)     Bacteremia   1/2 Blood Cx 5/28/22 + for Staph Epidermidis- autumnley contaminent  - Subsequent  Cx from 5/29 was negative  - Vancomycin stopped on 5/30, follow final blood Cx results and resume if return positive    Hx of CVA w/ residual left sided weakness  Probably contributing to falls  - No new stroke on MRI 5/28/22    Hx DVT  - On Xarelto  - Counseled on risks/benefits of AC moving forward, he expresses understanding     Depression and Anxiety  Hx suicide attempt 2021  No current SI. Mood feels OK, particularly when he is back at his [facility] where he resides.   - PTA venlafaxine   - PTA PRN hydroxyzine  - PTA clonazepam     Chronic pain, neuropathic pain   - PTA APAP   - PTA baclofen   - PTA scheduled + PRN clonazepam   - PTA Gabapentin     Constipation   - PTA Senna-time, PRN Senna-Plus, PRN bisacodyl, Scheduled + PRN Clearlax   - PTA Enulose, Fiber-Lax, Lubiprostone, PRN Docusate     Redemonstrated 4mm RLL pulmonary nodule  - Needs outpatient follow-up    Hx tachycardia- PTA metoprolol succinate BID   GERD- PTA pantoprazole  BPH- PTA tamsulosin  Vitamin deficiency- PTA Vit C, Daily-Alena  HLD- PTA atorvastatin       Diet: Combination Diet Regular Diet Adult    DVT Prophylaxis: DOAC  Dudley Catheter: Not present  Central Lines: None  Cardiac Monitoring: ACTIVE order. Indication: Syncope- low cardiac risk (24 hours)  Code Status: No CPR- Do NOT Intubate      Disposition Plan   Expected Discharge: 05/31/2022     Anticipated discharge location:  Awaiting care coordination huddle  Delays:            The patient's care was discussed with the Bedside Nurse, Care Coordinator/ and Patient.    Dulce Biggs MD  Hospitalist Service, [facility] TEAM [address] [place] [address]  Securely message with the Vocera Web Console (learn more here)  Text page via [facility] Paging/Directory   Please see signed in provider for up to date coverage information      Clinically Significant Risk Factors Present on Admission                  ______________________________________________________________________    Interval History   Doing better today. States he was able to work with PT today and did much better. Feels steady on his feet as well. Denies any new headache, vision changes, or difficulty swallowing. No chest pain or shortness of breath. Appetite good - he likes the food here, no issues with nausea/vomiting. Discussed plan to return home tomorrow and he is agreeable.    Data reviewed today: I reviewed all medications, new labs and imaging results over the last 24 hours. I personally reviewed no images or EKG's today.    Physical Exam   Vital Signs: Temp: 97.8  F (36.6  C) Temp src: Oral BP: 101/60 Pulse: 59   Resp: 16 SpO2: 96 % O2 Device: None (Room air)    Weight: 205 lbs 0 oz  General Appearance: Pleasant conversant middle aged male, no acute distress appreciated  Respiratory: Breathing comfortably on room air, lungs CTAB  Cardiovascular: RRR, no m/r/g.  GI: soft, non-distended, non-tender to palpation  Skin: no skin rash, pallor present  Other: A&Ox3, calm, conversant, slow speech, no tremor appreciated    Data   Recent Labs   Lab 05/31/22  0544 05/29/22  1018 05/28/22  0929 05/27/22  2259 05/27/22  2255 05/27/22  2245   0000   WBC 6.0 9.5 5.7  --  8.0  --    < >   HGB 11.3* 12.4* 12.3*  --  12.3*  --    < >   MCV 98 99 103*  --  100  --    < >    217 188  --  158  --    < >   INR  --   --   --   --  1.91* 2.2  --     141 145*  --  140  --    < >   POTASSIUM 3.6 4.1 4.6  --  3.9  --    < >   CHLORIDE 113* 111* 114*  --  105  --    < >   CO2 28 27 28  --  32  --    < >   BUN 20 14 12  --  18  --    < >   CR 0.66 0.66 0.78  --  0.94 0.9   < >   ANIONGAP 3 3 3  --  3  --    < >   MARTINA 8.1* 8.6 8.1*  --  8.9  --    < >   * 107* 95   < > 119*  --    < >   ALBUMIN  --   --   --   --  3.7  --   --    PROTTOTAL  --   --   --   --  7.2  --   --    BILITOTAL  --   --   --   --  0.3  --   --    ALKPHOS  --   --   --   --  75   --   --    ALT  --   --   --   --  10  --   --    AST  --   --   --   --  21  --   --     < > = values in this interval not displayed.     No results found for this or any previous visit (from the past 24 hour(s)).

## 2022-05-31 NOTE — PLAN OF CARE
[facility]    The SLUMS ([facility] Mental Status Exam) is a 30-point standardized cognitive screen used to identify the presence of cognitive deficits and/or to identify a change in cognition over time.  This screen assesses cognitive abilities in various domains.  [email or web address]    Patient's performance was as follows:    Total Score: 27/30    Scoring If High School Educated If Less than High School Educated   Normal 27-30 25-30   Mild Neurocognitive Disorder 21-26 20-24   Dementia 1-20 1-19       Score Interpretation: Score places patient in the normal category.  Patient demonstrates deficits in the following areas: He was disoriented to year, recalled 4/5 words following delay, and decreased sequencing with numbers.  Please note that this examination is used to screen individuals to look for the presence of cognitive deficits and to identify changes in cognition over time.  This is not a diagnosis.  This examination can be followed by further cognitive assessments if appropriate and deemed necessary.    Benjamin reports he lives in an [facility] and is able to have 24 hour assistance if needed. At baseline, he receives assistance with higher level tasks such as medication management and cooking.

## 2022-05-31 NOTE — PROGRESS NOTES
Admitted/transferred from: [place] emergency room  Time of arrival on unit 5/30 2300  2 RN full  skin assessment completed by Kori Byrne and Little Zamora.    Skin assessment finding: flaky face, especially ears and scalp.  Rash on back.     Interventions/actions: pt needs a shower and lotion applied.       Will continue to monitor.

## 2022-06-01 NOTE — PLAN OF CARE
Physical Therapy Discharge Summary    Reason for therapy discharge:    Discharged to home with home therapy.    Progress towards therapy goal(s). See goals on Care Plan in [facility] electronic health record for goal details.  Goals partially met.  Barriers to achieving goals:   discharge from facility.    Therapy recommendation(s):    Continued therapy is recommended.  Rationale/Recommendations:  Pt would benefit from additional skilled PT to address functional mobility, gait, balance and strength.

## 2022-06-01 NOTE — PLAN OF CARE
Occupational Therapy Discharge Summary    Reason for therapy discharge:    Discharged to home with home therapy.(Return to [facility] +  Therapies)    Progress towards therapy goal(s). See goals on Care Plan in [facility] electronic [facility] record for goal details.  Goals partially met.  Barriers to achieving goals:   discharge from facility.    Therapy recommendation(s):    Continued therapy is recommended.  Rationale/Recommendations:  To progress functional IND and safety with ADLs.

## 2022-06-02 ENCOUNTER — DOCUMENTATION ONLY (OUTPATIENT)
Dept: OTHER | Facility: CLINIC | Age: 57
End: 2022-06-02
Payer: COMMERCIAL

## 2022-06-02 LAB — BACTERIA BLD CULT: NO GROWTH

## 2022-06-03 LAB
BACTERIA BLD CULT: NO GROWTH
BACTERIA BLD CULT: NO GROWTH

## 2022-06-09 NOTE — PROGRESS NOTES
05/30/22 1115   Quick Adds   Type of Visit Initial Occupational Therapy Evaluation   Living Environment   People in Home other (see comments)   Home Accessibility no concerns   Living Environment Comments Pt reports living in a house with 3 other people and staff present 24/7, reports being an [facility].   Self-Care   Usual Activity Tolerance moderate   Current Activity Tolerance fair   Regular Exercise No   Equipment Currently Used at Home walker, rolling;shower chair;other (see comments)  (power scooter)   Fall history within last six months yes   Activity/Exercise/Self-Care Comment Pt reports staff help with bathing (and dressing after bathing - typically dresses self), meds, meals, laundry. Pt reports being IND with toileting and dressing, and uses his 4WW inside for mobility and his power scooter for out in the community.   Instrumental Activities of Daily Living (IADL)   IADL Comments Staff assist with IADL at facility such as meds, meals, cleaning, and laundry.   General Information   Onset of Illness/Injury or Date of Surgery 05/27/22   Referring Physician Lesly Bingham MD   Patient/Family Therapy Goal Statement (OT) not stated   Additional Occupational Profile Info/Pertinent History of Current Problem Benjamin Mathews is a 57 year old male admitted on 5/27/2022. 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   Existing Precautions/Restrictions fall   General Observations and Info Pt supine in bed in emergency department.   Cognitive Status Examination   Orientation Status  orientation to person, place and time   Cognitive Status Comments Pt frustrated with lack of cares while in ED. History of dementia.   Sensory   Sensory Quick Adds No deficits were identified   Pain Assessment   Patient Currently in Pain No   Range of Motion Comprehensive   General Range of Motion no range of motion deficits identified   Strength Comprehensive (MMT)   Comment, General Manual Muscle Testing (MMT) Assessment generalized weakness   Clinical Impression   Criteria for Skilled Therapeutic Interventions Met (OT) Yes, treatment indicated   OT Diagnosis OT order for debility/falls   Influenced by the following impairments strength, cognition, activity tolerance   OT Problem List-Impairments impacting ADL problems related to;activity tolerance impaired;balance;cognition;strength   Assessment of Occupational Performance 1-3 Performance Deficits   Identified Performance Deficits bathing, dressing, toileting   Planned Therapy Interventions (OT) ADL retraining;strengthening;home program guidelines;progressive activity/exercise;risk factor education   Clinical Decision Making Complexity (OT) low complexity   Anticipated Equipment Needs Upon Discharge (OT)   (TBD with further therapy)   Risk & Benefits of therapy have been explained evaluation/treatment results reviewed;patient   OT Discharge Planning   OT Discharge Recommendation (DC Rec) Transitional Care Facility;home with home care occupational therapy   OT Rationale for DC Rec Pt would likely benefit from short TCU stay prior to return to facility to increase functional strength and IND. If returning directly to facility, would recommend home therapy and increased assist.   OT Brief overview of current status CGA + FWW for functional mobility   Total Evaluation Time (Minutes)   Total Evaluation Time (Minutes) 5   OT Goals   Therapy Frequency (OT) 5 times/wk   OT Predicted Duration/Target Date for Goal Attainment 06/10/22   OT Goals Hygiene/Grooming;Toilet  Transfer/Toileting;Cognition   OT: Hygiene/Grooming supervision/stand-by assist   OT: Toilet Transfer/Toileting Supervision/stand-by assist   OT: Cognitive Patient/caregiver will verbalize understanding of cognitive assessment results/recommendations as needed for safe discharge planning      Tetracycline Counseling: Patient counseled regarding possible photosensitivity and increased risk for sunburn.  Patient instructed to avoid sunlight, if possible.  When exposed to sunlight, patients should wear protective clothing, sunglasses, and sunscreen.  The patient was instructed to call the office immediately if the following severe adverse effects occur:  hearing changes, easy bruising/bleeding, severe headache, or vision changes.  The patient verbalized understanding of the proper use and possible adverse effects of tetracycline.  All of the patient's questions and concerns were addressed. Patient understands to avoid pregnancy while on therapy due to potential birth defects.

## 2022-06-17 ENCOUNTER — OFFICE VISIT (OUTPATIENT)
Dept: PODIATRY | Facility: CLINIC | Age: 57
End: 2022-06-17
Payer: COMMERCIAL

## 2022-06-17 DIAGNOSIS — L97.522 SKIN ULCER OF SECOND TOE OF LEFT FOOT WITH FAT LAYER EXPOSED (H): Primary | ICD-10-CM

## 2022-06-17 DIAGNOSIS — M20.42 HAMMER TOES OF BOTH FEET: ICD-10-CM

## 2022-06-17 DIAGNOSIS — M20.41 HAMMER TOES OF BOTH FEET: ICD-10-CM

## 2022-06-17 PROCEDURE — 99214 OFFICE O/P EST MOD 30 MIN: CPT | Performed by: PODIATRIST

## 2022-06-17 NOTE — LETTER
June 17, 2022      RE: Benjamin Mathews  [id number]  [facility] [id number]        To whom it may concern:    Benjamin Mathews is under my professional care. Left 2nd toe ulcer, Daily rinse with wound vashe, pad dry and apply betadine and a bandaid.     Sincerely,      Dequan York DPM
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
## 2022-07-19 ENCOUNTER — OFFICE VISIT (OUTPATIENT)
Dept: INTERNAL MEDICINE | Facility: CLINIC | Age: 57
End: 2022-07-19
Payer: COMMERCIAL

## 2022-07-19 VITALS
HEIGHT: 76 IN | BODY MASS INDEX: 24.6 KG/M2 | SYSTOLIC BLOOD PRESSURE: 104 MMHG | WEIGHT: 202 LBS | DIASTOLIC BLOOD PRESSURE: 62 MMHG | RESPIRATION RATE: 16 BRPM | OXYGEN SATURATION: 97 % | HEART RATE: 82 BPM

## 2022-07-19 DIAGNOSIS — R33.9 URINARY RETENTION WITH INCOMPLETE BLADDER EMPTYING: ICD-10-CM

## 2022-07-19 DIAGNOSIS — G20.A1 PARKINSON'S DISEASE (H): Primary | ICD-10-CM

## 2022-07-19 PROCEDURE — 99203 OFFICE O/P NEW LOW 30 MIN: CPT | Mod: GC

## 2022-07-19 RX ORDER — METHOCARBAMOL 500 MG/1
TABLET, FILM COATED ORAL
COMMUNITY
Start: 2022-06-06 | End: 2022-08-01

## 2022-07-19 RX ORDER — MAGNESIUM OXIDE 400 MG/1
TABLET ORAL
COMMUNITY
Start: 2022-06-06 | End: 2022-08-01

## 2022-07-19 ASSESSMENT — ANXIETY QUESTIONNAIRES
GAD7 TOTAL SCORE: 8
7. FEELING AFRAID AS IF SOMETHING AWFUL MIGHT HAPPEN: NOT AT ALL
2. NOT BEING ABLE TO STOP OR CONTROL WORRYING: SEVERAL DAYS
1. FEELING NERVOUS, ANXIOUS, OR ON EDGE: MORE THAN HALF THE DAYS
3. WORRYING TOO MUCH ABOUT DIFFERENT THINGS: NOT AT ALL
6. BECOMING EASILY ANNOYED OR IRRITABLE: NOT AT ALL
IF YOU CHECKED OFF ANY PROBLEMS ON THIS QUESTIONNAIRE, HOW DIFFICULT HAVE THESE PROBLEMS MADE IT FOR YOU TO DO YOUR WORK, TAKE CARE OF THINGS AT HOME, OR GET ALONG WITH OTHER PEOPLE: SOMEWHAT DIFFICULT
5. BEING SO RESTLESS THAT IT IS HARD TO SIT STILL: MORE THAN HALF THE DAYS
GAD7 TOTAL SCORE: 8

## 2022-07-19 ASSESSMENT — PATIENT HEALTH QUESTIONNAIRE - PHQ9
SUM OF ALL RESPONSES TO PHQ QUESTIONS 1-9: 12
5. POOR APPETITE OR OVEREATING: NEARLY EVERY DAY

## 2022-07-19 ASSESSMENT — ASTHMA QUESTIONNAIRES: ACT_TOTALSCORE: 17

## 2022-07-19 NOTE — PROGRESS NOTES
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, visual hallucinations, and chronic dyspnea who presents pmhx of Parkinson's, depression with hx suicide attempt 2021, muscle spasticity, constipation, behavioral disorder associated with dementia, 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 It starts as twitching that starts in eyes then face and then the arms. Goes from smaller muscles to the larger ones. It is associated with shaking movement in the arms and the legs which later leads to a spasm of the whole right side of the body including the arm & leg.It is a/w stiffness in the jaw. There is pain associated with his spams which is maximum when the patient has the spasm.There is no problem breathing when the spasms come.He is unable to eat or urinate when there are spasms.The spasm involves the toes which turn outwards and inwards.They are aggravated in the morning and when he tries to move. They are relieved when he takes clonazaepam.  He has hx of stroke that affect the left side. There is weakness and foot drop on the left side. There is continuous rolling and yskinesia in his left hand since he was diagnosed with Parkinson disease.       #Depression   Has been working with a psychologist and that has helped him  He has been taking PT and OT session that have helped his lifestyle.  He doesn't think about the suicidal ideations now as he feels he has been making progress with his PT and psychologist    #Constipation  Has been there ever since he had Parkinson  But has increased in severity since last 6-7 months   He has severe constipation and can go for a week without a bowel movement.   The stool is very hard in the beginning and then soft stool    #Urinary retention  Nurse brought the him to the urgent care and he was retaining 80 ml but thinks on general days he retains more.   He has h/o BPH  Had a prostate exam - Normal       Patient denies any LE edema, exertional dyspnea/orthopnea/PND, dizziness, lightheadedness, nausea, vomiting or diarrhea.        Lifestyle: He feels his living good and goes around on his scooter  tobacco use: Smokes half a pack of ci every day and 5 times pipe (pipe tobacco)  Alcohol use: No  Recreational drug use: No  Sleep: He feels his sleep is good currently  Living situation:  Lives in assisted living house.        Past Medical History:   Diagnosis Date     Clotting disorder (H)     PE 2019     Dystonia      Parkinson disease (H)      No past surgical history on file.  Family History   Problem Relation Age of Onset     Other - See Comments Mother         pulmonary embolism from hip fracture     Pulmonary Embolism Mother      Parkinsonism Father      Neurologic Disorder Sister      Parkinsonism Sister         ?med related     Other - See Comments Sister         12/7/1950     Depression Sister      Neurologic Disorder Brother         dystonia     Dystonia Brother      Other - See Comments Brother              Heart Disease Nephew      Social History     Tobacco Use     Smoking status: Current Every Day Smoker     Types: Pipe     Smokeless tobacco: Never Used   Substance Use Topics     Alcohol use: Not Currently      Comment: sober x 18 months     Drug use: Not Currently     Current Outpatient Medications   Medication Sig Dispense Refill     acetaminophen (TYLENOL) 500 MG tablet 2 x 500mg tabs by mouth 3/day @8am, 12pm and 8pm and 2 x 500mg tab by mouth every 6 hours as needed       atorvastatin (LIPITOR) 40 MG tablet Take 40 mg by mouth At 8pm       baclofen (LIORESAL) 10 MG tablet 10mg tab by mouth 4/day @8am, 12p, 5pm and 8pm       Baclofen (LIORESAL) 5 MG tablet 5mg tab by mouth 4/day @8am, 12p, 5pm and 8pm       bisacodyl (DULCOLAX) 10 MG suppository Place 10 mg rectally daily as needed for constipation       calcium polycarbophil (FIBER-LAX) 625 MG tablet 1 tab by mouth daily at 8am       carbidopa-levodopa (SINEMET CR)  MG CR tablet 50/200 tab by mouth nightly at 8pm       carbidopa-levodopa (SINEMET)  MG tablet 2 tabs by mouth @8am, 2 tabs@ 12pm and 1.5 tabs @ 4pm       cholecalciferol (VITAMIN D3) 125 mcg (5000 units) capsule 125 mcg (5000 units) by mouth daily at  8am       clonazePAM (KLONOPIN) 0.5 MG tablet 2 x 0.5mg tab by mouth daily at noon       clonazePAM (KLONOPIN) 1 MG tablet Take 0.5 mg by mouth 2 times daily as needed for muscle spasms 30 tablet 0     clonazePAM (KLONOPIN) 2 MG tablet 2mg tab by mouth twice daily at 8am and 8pm 60 tablet 0     cloZAPine (CLOZARIL) 25 MG tablet 25mg tab by mouth nightly at 8pm       docusate sodium (COLACE) 100 MG capsule Take 100 mg by mouth 2 times daily as needed for constipation       ENULOSE 10 GM/15ML SOLUTION 30ml by mouth daily at 8am       gabapentin (NEURONTIN) 800 MG tablet 800mg tab by mouth 3/day at 8am, 12pm and 8pm       hydrocortisone 1 % CREA cream Apply topically to nose and other affected area(s) every morning at 9am       hydrOXYzine (ATARAX) 25 MG tablet Take 50 mg by mouth every 6 hours as needed for anxiety (up to 3 timrd daily)       lubiprostone (AMITIZA) 24 MCG capsule 24mg tab by mouth twice daily at 8am and 8pm       magnesium oxide  "(MAG-OX) 400 MG tablet        methocarbamol (ROBAXIN) 500 MG tablet        metoprolol succinate ER (TOPROL-XL) 25 MG 24 hr tablet Take 1/2 tablet (12.5mg) by mouth twice daily at 8am and 8pm       Multiple Vitamin (DAILY-ALLAN) TABS Vitamin by  Mouth daily @8am       pantoprazole (PROTONIX) 40 MG EC tablet Take 1 tablet (40mg) by mouth daily at 8am       polyethylene glycol (MIRALAX) 17 GM/Dose powder (= clearlax) Capful in liquid by mouth nightly at 8pm and capful daily as needed       rivaroxaban ANTICOAGULANT (XARELTO) 20 MG TABS tablet Take 20 mg by mouth daily (with dinner) At 5pm       senna-docusate (SENOKOT-S/PERICOLACE) 8.6-50 MG tablet 1 tab by mouth twice daily as needed At 8am and 8pm       sennosides (SENOKOT) 8.6 MG tablet 2 tabs by mouth twice daily at 8am and 8pm       sodium phosphate (FLEET ENEMA) 7-19 GM/118ML rectal enema Place 1 enema rectally daily as needed for constipation       traZODone (DESYREL) 100 MG tablet Take 100 mg by mouth daily at 8pm       venlafaxine (EFFEXOR-ER) 225 MG 24 hr tablet Take 225 mg by mouth daily 150mg tab by mouth daily at 8am       vitamin C (ASCORBIC ACID) 500 MG tablet Take 500 mg by mouth two times a day at 8am and 8pm          Allergies   Allergen Reactions     Amantadine Other (See Comments)     Other reaction(s): Hallucinations  Hallucinations/ lost self control/gambling.     halluicnations  Hallucinations/ lost self control/gambling.     hallucinates  halluicnations  hallucinates  Hallucinations/ lost self control/gambling.        Quetiapine GI Disturbance, Diarrhea and Other (See Comments)     Diarrhea    Diarrhea  Other reaction(s): GI Disturbance  Diarrhea       Duloxetine Other (See Comments)     suicidal  suicidal           /62 (BP Location: Right arm, Patient Position: Sitting, Cuff Size: Adult Regular)   Pulse 82   Resp 16   Ht 1.93 m (6' 4\")   Wt 91.6 kg (202 lb)   SpO2 97%   BMI 24.59 kg/m      Physical Examination:    General:  " Conversant, generally healthy appearing, no acute distress  Head: atraumatic  Eyes:  Pupils 2-3 mm, sclera white, EOM's full, conjunctiva moist, no periorbital swelling    Ears:  TM's normal, EAC's patent, clear of cerumen  Nose:  Nasal passages clear, turbinates not swollen  Throat/Mouth:  No pharyngeal erythema, exudate, ulcers, oral mucosa and tongue moist, normal hard and soft palate  Neck:  Trachea midline, Full AROM, supple, thyroid smooth, symmetric, not enlarged, no nodules, no neck lymphadenopathy  Lungs:  Clear to auscultation throughout, no wheezes, rhonchi or rales. Normal respiratory effort and no intercostal retractions.  C/V:  Regular rate and rhythm, no murmurs, rubs or gallops.  No JVD, no carotid bruits. Radial and DP pulses 2+, equal and regular.  Abdomen:  Not distended.  Bowel sounds active.  No tenderness, no hepatosplenomegaly or masses.  No CVA tenderness or masses.  Lymph:  No cervical lymph nodes.  Neuro: Alert and oriented, face symmetric. Able to get on/off exam table without assistance.  Strength grossly intact. No tremor.  Gait steady.   M/S:   No joint deformities noted.  No joint swelling.  Skin:   Normal temperature., turgor and texture. No rashes, suspicious lesions, or jaundice on exposed skin surfaces.   Extremities:  No peripheral edema, no digital cyanosis  Psych:  Alert and oriented. Appropriate affect.  Not psychomotor slowed.  No signs of anxiety or agitation.      Assessment and Plan:     #Dystonia  -In view of his chronic dystonia and history of Parkinson's disease he has been advised to consult a neurologist for further care of his symptoms and the medications.    #Constipation  -Kindly continue Fber-lax, Miralax and lactulose.  -Kindly review the constipation a/w parkinson's with the neurologist    #Urinary retention  -Referred to urologist for urinary retention and evaluation      Options for treatment and follow-up care were reviewed with the patient. Benjamin Mathews  engaged in the decision making process and verbalized understanding of the options discussed and agreed with the final plan.  This patient was discussed and seen with Dr. Dylan Lopes    Jul 19, 2022  MANOLO Felix  Internal Medicine Resident (PGY1)  [facility]  Pager: [phone]

## 2022-07-19 NOTE — COMMUNITY RESOURCES LIST (ENGLISH)
07/19/2022   [facility] - Outpatient Clinics  N/A  For questions about this resource list or additional care needs, please contact your primary care clinic or care manager.  Phone: [phone]   Email: N/A   Address: [address] [place] [id number]   Hours: N/A        Hotlines and Helplines       Hotline - Crisis help  1  [facility]  [facility] ([facility]) - Crisis Helpline Distance: 6.95 miles      COVID-19 Status: Phone/Virtual   [address] [place] [address]  Language: Albanian, Portuguese, English, Gujarati, Nanda, Danish, Hungarian, Slovak, Italian, Amharic  Hours: Mon - Sun Open 24 Hours  Fees: Free   Phone: (934) 238-1070 Email: [email or web address] Website: [email or web address]     2  [facility] Distance: 8.51 miles      COVID-19 Status: Phone/Virtual   [address] [place] [address]  Language: English  Hours: Mon - Sun Open 24 Hours   Phone: (662) 419-1192 Website: [email or web address]://www.[employer]/location/[facility]/          Mental Health       Individual counseling  3  [facility] Distance: 1.19 miles      COVID-19 Status: Regular Operations, COVID-19 Status: Phone/Virtual   [address] [facility] [place] [address]  Language: English  Hours: Mon - Thu 7:00 AM - 7:00 PM , Fri 7:00 AM - 5:00 PM  Fees: Insurance, Self Pay, Sliding Fee   Phone: (862) 359-5029 Email: [email or web address]@[email or web address] Website: [email or web address]/our-locations/[place]/[facility]/     4  [facility] Distance: 1.92 miles      COVID-19 Status: Regular Operations, COVID-19 Status: Phone/Virtual   [address] [place] [address]  Language: English  Hours: Mon - Fri 8:00 AM - 7:00 PM  Fees: Insurance, Self Pay, Sliding Fee   Phone: (965) 851-3367 Email: Willian@[employer] Website: https://[employer]/     Mental health crisis care  5   [facility] ([facility]) Distance: 12.82 miles      COVID-19 Status: Regular Operations, COVID-19 Status: Phone/Virtual   [address] [facility] [address] [place] [address]  Language: English, St Helenian, Comoran  Hours: Mon - Sun Open 24 Hours  Fees: Free   Phone: (889) 366-3250 Email: [email or web address] Website: [email or web address]://www.[facility]/emergencies/mental-health-emergencies     6  [facility]'s Crisis Response Services Distance: 12.83 miles      COVID-19 Status: Regular Operations, COVID-19 Status: Phone/Virtual   [address] [place] [address]  Language: English, Hmong, St Helenian, Comoran  Hours: Mon - Sun Open 24 Hours  Fees: Free, Insurance   Phone: (573) 227-5983 Website: http://www.[facility]/health-medical/childrens-mental-health-services#child-crisis-services     Mental health support group  7  [facility] Distance: 7.68 miles      COVID-19 Status: Unable to Verify   [address] [place] [address]  Language: English  Hours: Tue 6:30 PM - 8:30 PM  Fees: Free   Phone: (615) 872-6013 Email: [email or web address] [email or web address] Website: [email or web address]/pages/home     8  [facility] Distance: 8.32 miles      COVID-19 Status: Phone/Virtual   [address] [place] [address]  Language: English  Hours: Mon - Fri 9:00 AM - 3:00 PM  Fees: Free   Phone: (331) 363-1737 Website: [email or web address]          Important Numbers & Websites       Emergency Services   911  City Services   311  Poison Control   (399) 366-1843  Suicide Prevention Lifeline   (336) 901-6649 (TALK)  Child Abuse Hotline   (818) 257-7797 (4-A-Child)  Sexual Assault Hotline   (376) 349-2550 (HOPE)  National Runaway Safeline   (197) 322-5683 (RUNAWAY)  All-Options Talkline   (595) 131-9465  Substance Abuse Referral   (734) 234-2785  (HELP)

## 2022-07-19 NOTE — NURSING NOTE
Benjamin Mathews is a 57 year old male patient that presents today in clinic for the following:    Chief Complaint   Patient presents with     Establish Care     Spasms     Pt complains of muscle spasms on right side x7-8 months      Constipation     The patient's allergies and medications were reviewed as noted. A set of vitals were recorded as noted without incident. The patient does not have any other questions for the provider.    Sara Almazan, EMT at 11:53 AM on 7/19/2022

## 2022-07-19 NOTE — PROGRESS NOTES
"I, Dylan Lopes MD saw the patient with the resident, and agree with the resident's findings and plan of care as documented in the resident's note.  /62 (BP Location: Right arm, Patient Position: Sitting, Cuff Size: Adult Regular)   Pulse 82   Resp 16   Ht 1.93 m (6' 4\")   Wt 91.6 kg (202 lb)   SpO2 97%   BMI 24.59 kg/m    I personally reviewed vital signs and past record.  Key findings: Hx CVA, Parkinson, Depression/SI, and multiple chronic medical problems. Muscle spasms with various movements - start on face and proceed to limbs. Consistent with dystonia. Some mention of these back several months.    "

## 2022-07-26 ENCOUNTER — TELEPHONE (OUTPATIENT)
Dept: UROLOGY | Facility: CLINIC | Age: 57
End: 2022-07-26

## 2022-07-26 NOTE — TELEPHONE ENCOUNTER
[facility] Call Center    Phone Message    May a detailed message be left on voicemail: yes     Reason for Call: Other: Patient is being referred to Urology for Urinary retenion, per guidelines send TE      Action Taken: Message routed to:  [facility] Urology     Travel Screening: Not Applicable

## 2022-07-26 NOTE — TELEPHONE ENCOUNTER
Chart reviewed. Message sent to scheduling team with request to contact patient to schedule new in person appointment with Dr. Serna on 8/2/22 at 10:15am.    Ashley Casanova RN, BSN

## 2022-07-27 NOTE — TELEPHONE ENCOUNTER
Called patient to try to schedule appointment. Left voicemail and gave scheduling number to call back.     Dr. Serna on 8/2/22 at 10:15am. Is held for patient.    Carrie Velez LPN on 7/27/2022 at 9:06 AM

## 2022-07-28 NOTE — TELEPHONE ENCOUNTER
Left voicemail for patient to call back and schedule.    [facility]   Dermatology, General and Plastic Surgery, Urology Specialties   [facility] [place] [phone]

## 2022-07-29 ENCOUNTER — OFFICE VISIT (OUTPATIENT)
Dept: PODIATRY | Facility: CLINIC | Age: 57
End: 2022-07-29
Payer: COMMERCIAL

## 2022-07-29 DIAGNOSIS — M20.41 HAMMER TOES OF BOTH FEET: ICD-10-CM

## 2022-07-29 DIAGNOSIS — G60.9 IDIOPATHIC PERIPHERAL NEUROPATHY: ICD-10-CM

## 2022-07-29 DIAGNOSIS — L97.522 SKIN ULCER OF SECOND TOE OF LEFT FOOT WITH FAT LAYER EXPOSED (H): Primary | ICD-10-CM

## 2022-07-29 DIAGNOSIS — M20.42 HAMMER TOES OF BOTH FEET: ICD-10-CM

## 2022-07-29 DIAGNOSIS — G20.A1 PARKINSON'S DISEASE (H): ICD-10-CM

## 2022-07-29 DIAGNOSIS — B35.1 ONYCHOMYCOSIS: ICD-10-CM

## 2022-07-29 PROCEDURE — 99214 OFFICE O/P EST MOD 30 MIN: CPT | Mod: 25 | Performed by: PODIATRIST

## 2022-07-29 PROCEDURE — 11720 DEBRIDE NAIL 1-5: CPT | Performed by: PODIATRIST

## 2022-07-29 NOTE — NURSING NOTE
Benjamin Mathews's chief complaint for this visit includes:  Chief Complaint   Patient presents with     Follow Up     Left foot toe wound     PCP: No Ref-Primary, Physician    Referring Provider:  Error in [id number] index!    There were no vitals taken for this visit.  Data Unavailable        Allergies   Allergen Reactions     Amantadine Other (See Comments)     Other reaction(s): Hallucinations  Hallucinations/ lost self control/gambling.     halluicnations  Hallucinations/ lost self control/gambling.     hallucinates  halluicnations  hallucinates  Hallucinations/ lost self control/gambling.        Quetiapine GI Disturbance, Diarrhea and Other (See Comments)     Diarrhea    Diarrhea  Other reaction(s): GI Disturbance  Diarrhea       Duloxetine Other (See Comments)     suicidal  suicidal           Do you need any medication refills at today's visit?
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
## 2022-07-31 PROBLEM — H51.11 CONVERGENCE INSUFFICIENCY: Status: ACTIVE | Noted: 2022-03-16

## 2022-07-31 RX ORDER — CARBIDOPA AND LEVODOPA 50; 200 MG/1; MG/1
1 TABLET, EXTENDED RELEASE ORAL AT BEDTIME
COMMUNITY
Start: 2022-07-25 | End: 2022-07-31

## 2022-07-31 RX ORDER — CLONAZEPAM 1 MG/1
1 TABLET ORAL DAILY
COMMUNITY
Start: 2022-07-25 | End: 2022-10-06

## 2022-07-31 RX ORDER — VENLAFAXINE HYDROCHLORIDE 150 MG/1
CAPSULE, EXTENDED RELEASE ORAL
COMMUNITY
Start: 2022-07-06 | End: 2022-08-01

## 2022-07-31 RX ORDER — KETOCONAZOLE 20 MG/G
CREAM TOPICAL
COMMUNITY
Start: 2022-02-12 | End: 2022-07-31

## 2022-07-31 RX ORDER — AMOXICILLIN 250 MG
CAPSULE ORAL
COMMUNITY
End: 2022-08-25 | Stop reason: DRUGHIGH

## 2022-07-31 RX ORDER — TRIAMCINOLONE ACETONIDE 1 MG/G
CREAM TOPICAL 2 TIMES DAILY PRN
Status: ON HOLD | COMMUNITY
End: 2023-05-25

## 2022-07-31 RX ORDER — FLUOXETINE 40 MG/1
40 CAPSULE ORAL DAILY
COMMUNITY
End: 2022-07-31

## 2022-07-31 RX ORDER — VENLAFAXINE HYDROCHLORIDE 75 MG/1
75 CAPSULE, EXTENDED RELEASE ORAL
COMMUNITY
End: 2022-07-31

## 2022-07-31 RX ORDER — VENLAFAXINE HYDROCHLORIDE 75 MG/1
CAPSULE, EXTENDED RELEASE ORAL
COMMUNITY
Start: 2022-07-06 | End: 2022-08-01

## 2022-07-31 RX ORDER — BENZOCAINE/MENTHOL 6 MG-10 MG
LOZENGE MUCOUS MEMBRANE
COMMUNITY
End: 2022-08-01

## 2022-07-31 RX ORDER — TRAZODONE HYDROCHLORIDE 50 MG/1
50 TABLET, FILM COATED ORAL
Status: ON HOLD | COMMUNITY
Start: 2022-07-06 | End: 2023-05-25

## 2022-07-31 RX ORDER — METOPROLOL TARTRATE 50 MG
TABLET ORAL
COMMUNITY
End: 2022-08-25

## 2022-07-31 RX ORDER — GABAPENTIN 600 MG/1
600 TABLET ORAL 3 TIMES DAILY
COMMUNITY
End: 2022-08-01

## 2022-07-31 RX ORDER — CLONAZEPAM 0.5 MG/1
0.5 TABLET ORAL DAILY PRN
COMMUNITY
Start: 2022-07-25 | End: 2022-08-01

## 2022-07-31 RX ORDER — LANOLIN ALCOHOL/MO/W.PET/CERES
100 CREAM (GRAM) TOPICAL DAILY
COMMUNITY
End: 2022-08-11

## 2022-07-31 RX ORDER — HYDROCORTISONE 1 G/100G
CREAM TOPICAL
COMMUNITY
Start: 2022-02-15 | End: 2022-08-11

## 2022-07-31 RX ORDER — CLOZAPINE 50 MG/1
TABLET ORAL
COMMUNITY
Start: 2022-07-19 | End: 2022-08-01

## 2022-07-31 RX ORDER — DONEPEZIL HYDROCHLORIDE 10 MG/1
10 TABLET, FILM COATED ORAL AT BEDTIME
COMMUNITY
End: 2022-08-11

## 2022-07-31 RX ORDER — KETOCONAZOLE 20 MG/G
CREAM TOPICAL
COMMUNITY
End: 2022-08-11

## 2022-07-31 RX ORDER — CARBIDOPA AND LEVODOPA 25; 100 MG/1; MG/1
TABLET ORAL
Status: ON HOLD | COMMUNITY
End: 2023-05-25
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
## 2022-08-01 ENCOUNTER — OFFICE VISIT (OUTPATIENT)
Dept: NEUROLOGY | Facility: CLINIC | Age: 57
End: 2022-08-01
Payer: COMMERCIAL

## 2022-08-01 ENCOUNTER — MEDICAL CORRESPONDENCE (OUTPATIENT)
Dept: NEUROLOGY | Facility: CLINIC | Age: 57
End: 2022-08-01

## 2022-08-01 ENCOUNTER — MYC MEDICAL ADVICE (OUTPATIENT)
Dept: NEUROLOGY | Facility: CLINIC | Age: 57
End: 2022-08-01

## 2022-08-01 VITALS
HEART RATE: 86 BPM | WEIGHT: 204 LBS | BODY MASS INDEX: 24.83 KG/M2 | SYSTOLIC BLOOD PRESSURE: 100 MMHG | DIASTOLIC BLOOD PRESSURE: 58 MMHG

## 2022-08-01 DIAGNOSIS — F80.0 ARTICULATION DISORDER: ICD-10-CM

## 2022-08-01 DIAGNOSIS — G20.C PARKINSONISM, UNSPECIFIED PARKINSONISM TYPE (H): Primary | ICD-10-CM

## 2022-08-01 DIAGNOSIS — R49.9 VOICE DISORDER: ICD-10-CM

## 2022-08-01 DIAGNOSIS — G20.A1 PARKINSON'S DISEASE (H): ICD-10-CM

## 2022-08-01 DIAGNOSIS — G24.1 DYSTONIA, TORSION, FRAGMENTS OF: ICD-10-CM

## 2022-08-01 PROCEDURE — 99215 OFFICE O/P EST HI 40 MIN: CPT | Performed by: PSYCHIATRY & NEUROLOGY

## 2022-08-01 RX ORDER — VENLAFAXINE HYDROCHLORIDE 225 MG/1
225 TABLET, EXTENDED RELEASE ORAL DAILY
COMMUNITY
Start: 2022-08-01 | End: 2022-08-11

## 2022-08-01 RX ORDER — VENLAFAXINE HYDROCHLORIDE 150 MG/1
300 CAPSULE, EXTENDED RELEASE ORAL DAILY
Status: ON HOLD | COMMUNITY
Start: 2022-08-01 | End: 2023-05-25

## 2022-08-01 RX ORDER — VENLAFAXINE HYDROCHLORIDE 75 MG/1
75 CAPSULE, EXTENDED RELEASE ORAL DAILY
Status: ON HOLD | COMMUNITY
Start: 2022-08-01 | End: 2023-05-21

## 2022-08-01 RX ORDER — CLONAZEPAM 0.5 MG/1
0.5 TABLET ORAL DAILY PRN
COMMUNITY
Start: 2022-08-01 | End: 2022-08-25

## 2022-08-01 NOTE — PATIENT INSTRUCTIONS
"Right body symptoms that are a bit inexplicable but sounds like freezing/off like symptoms but could have non neurological factors.     Since he did not bring his medication list today - cannot confirm the following are correct.  taking baclofen - taking 5mg 4/day as best can be determined   May have been on flexeril and was not that h elpful  Taking gabapentin 800mg 3/day   Sinemet is not that helpful  Methocarbamol robaxin 500mg - not clear is taking  He states that botox would need to be the entire right side symptoms  He has spasms and goes into \"tetany\" and freezes up  States he is not taking magnesium  Has involvement in his face - cheek, eyes and goes down - small muscles first     Has more problems in the morning 8-10am    He may have seen palliative/pain specialist in the past  Pain specialist for neck symptoms  Has not been on medical marijuana    Would recommend palliative or/and pain clinic involvement to see if there are remedies to help him  He is reluctant to change sinemet as it reportedly attenuates the benefit from clonazepam    He has ongoing care with dr De Dios at [facility] with followup in December 2022    Chris sahu appointment for urinary symptoms     Dequan reyes podiatry appointment     Physical therapy at [facility]     He has not done a neuropsychological evaluation which would be helpful to get a baseline including MMPI    He is open to SLP and OT    In summary - probably needs to see palliative care/pain clinic - ?medical marijuana    Consideration for pharmacy consultation for possible trial of requip xl 2mg or other medication options  "
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
## 2022-08-02 ENCOUNTER — OFFICE VISIT (OUTPATIENT)
Dept: UROLOGY | Facility: CLINIC | Age: 57
End: 2022-08-02
Payer: COMMERCIAL

## 2022-08-02 DIAGNOSIS — R39.9 LOWER URINARY TRACT SYMPTOMS (LUTS): Primary | ICD-10-CM

## 2022-08-02 DIAGNOSIS — G20.B1 DYSKINESIA DUE TO PARKINSON'S DISEASE (H): ICD-10-CM

## 2022-08-02 PROCEDURE — 99204 OFFICE O/P NEW MOD 45 MIN: CPT | Performed by: UROLOGY

## 2022-08-02 RX ORDER — MAGNESIUM OXIDE 400 MG/1
400 TABLET ORAL DAILY
COMMUNITY
End: 2022-10-20

## 2022-08-02 RX ORDER — METHOCARBAMOL 500 MG/1
500 TABLET, FILM COATED ORAL 4 TIMES DAILY PRN
COMMUNITY
End: 2022-08-25

## 2022-08-02 RX ORDER — ALFUZOSIN HYDROCHLORIDE 10 MG/1
10 TABLET, EXTENDED RELEASE ORAL DAILY
Qty: 90 TABLET | Refills: 3 | Status: ON HOLD | OUTPATIENT
Start: 2022-08-02 | End: 2023-05-25

## 2022-08-02 NOTE — TELEPHONE ENCOUNTER
Writer received updated medication list from Mikhail Guzman -[phone]. All medications checked and entered into patient's chart.  Medication list entered sent to his for scan  RENETTA Jules, MARICRUZ ([facility])

## 2022-08-02 NOTE — PROGRESS NOTES
Benjamin Mathews's goals for this visit include:   Chief Complaint   Patient presents with     New Patient     appt per patient; Urinary retention with incomplete bladder emptying        He requests these members of his care team be copied on today's visit information:     PCP: Stephanie Maldonado    Referring Provider:  Provider Not In System       post void residual: 20 ml      Do you need any medication refills at today's visit?     Carrie Velez LPN on 8/2/2022 at 10:10 AM

## 2022-08-02 NOTE — PROGRESS NOTES
Urology Consult History and Physical  GER [place]   Name: Benjamin Mathews    MRN: [id number]   YOB: 1965       We were asked to see Benjamin Mathews at the request of Dr. Maldonado for evaluation and treatment of LUTS.        Chief Complaint:   LUTS    History is obtained from the patient            History of Present Illness:   Benjamin Mathews is a 57 year old male who is being seen for evaluation of LUTS    4-5 times per month he will wake up soaked  During the day he only voids about 2 times  Symptoms have been worsening for the past 3 months  He notes that his tamsulosin 0.4mg (Flomax) was stopped for some reason - possibly due to fall risk  He had been on this for at least 2 years  He notes a weak and intermittent stream  Some urgency and urge incontinence at times    He previously saw Urology in [place]  He has since moved     AUASS: 4-3-3-0-5-2-5 = 22  QOL = 3  PVR = 20cc             Past Medical History:     Past Medical History:   Diagnosis Date     Clotting disorder (H)     PE 2019     Dystonia      Parkinson disease (H)             Past Surgical History:   No past surgical history on file.         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       History   Smoking Status     Current Every Day Smoker     Types: Pipe   Smokeless Tobacco     Never Used            Family History:     Family History   Problem Relation Age of Onset     Other - See Comments Mother         pulmonary embolism from hip fracture     Pulmonary Embolism Mother      Parkinsonism Father      Neurologic Disorder Sister      Parkinsonism Sister         ?med related     Other - See Comments Sister         12/7/1950     Depression Sister      Neurologic Disorder Brother         dystonia     Dystonia Brother      Other - See Comments Brother               Heart Disease Nephew               Allergies:     Allergies   Allergen Reactions     Amantadine Other (See Comments)     Other reaction(s): Hallucinations  Hallucinations/ lost self control/gambling.     halluicnations  Hallucinations/ lost self control/gambling.     hallucinates  halluicnations  hallucinates  Hallucinations/ lost self control/gambling.        Quetiapine GI Disturbance, Diarrhea and Other (See Comments)     Diarrhea    Diarrhea  Other reaction(s): GI Disturbance  Diarrhea       Duloxetine Other (See Comments)     suicidal  suicidal              Medications:     Current Outpatient Medications   Medication Sig     acetaminophen (TYLENOL) 500 MG tablet 2 x 500mg tabs by mouth 3/day @8am, 12pm and 8pm and 2 x 500mg tab by mouth every 6 hours as needed     ANTI-ITCH MAXIMUM STRENGTH 1 % external cream      atorvastatin (LIPITOR) 40 MG tablet Take 40 mg by mouth At 8pm     Baclofen (LIORESAL) 5 MG tablet 5mg tab by mouth 4/day @8am, 12p, 5pm and 8pm     bisacodyl (DULCOLAX) 10 MG suppository Place 10 mg rectally daily as needed for constipation     calcium polycarbophil (FIBER-LAX) 625 MG tablet 1 tab by mouth daily at 8am     carbidopa-levodopa (SINEMET CR)  MG CR tablet 50/200 tab by mouth nightly at 8pm     carbidopa-levodopa (SINEMET)  MG tablet 2.5 tabs @ 8am, 2 @ noon, 2@4pm and 2 @ 8pm     cholecalciferol (VITAMIN D3) 125 mcg (5000 units) capsule 125 mcg (5000 units) by mouth daily at  8am     clonazePAM (KLONOPIN) 0.5 MG tablet Take 1 tablet (0.5 mg) by mouth daily as needed     clonazePAM (KLONOPIN) 1 MG tablet Take 1 mg by mouth daily At 12pm     clonazePAM (KLONOPIN) 2 MG tablet 2mg tab by mouth twice daily at 8am and 8pm     cloZAPine (CLOZARIL) 25 MG tablet 50 mg At Bedtime 25mg tab by mouth nightly at 8pm     docusate sodium (COLACE) 100 MG capsule Take 100 mg by mouth 2 times daily as needed for constipation     donepezil (ARICEPT) 10 MG tablet Take 10 mg by mouth  At Bedtime     ENULOSE 10 GM/15ML SOLUTION 30ml by mouth daily at 8am     gabapentin (NEURONTIN) 800 MG tablet 800mg tab by mouth 3/day at 8am, 12pm and 8pm     hydrocortisone 1 % CREA cream Apply topically to nose and other affected area(s) every morning at 9am     hydrOXYzine (ATARAX) 25 MG tablet Take 50 mg by mouth every 6 hours as needed for anxiety (up to 3 timrd daily)     ketoconazole (NIZORAL) 2 % external cream      lubiprostone (AMITIZA) 24 MCG capsule 24mg tab by mouth twice daily at 8am and 8pm     magnesium oxide (MAG-OX) 400 MG tablet Take 400 mg by mouth daily     methocarbamol (ROBAXIN) 500 MG tablet Take 500 mg by mouth 4 times daily as needed for muscle spasms     metoprolol succinate ER (TOPROL-XL) 25 MG 24 hr tablet Take 1/2 tablet (12.5mg) by mouth twice daily at 8am and 8pm     metoprolol tartrate (LOPRESSOR) 50 MG tablet 50mg 3/day at 8am, 2pm and 8pm     Multiple Vitamin (DAILY-ALLAN) TABS Vitamin by  Mouth daily @8am     pantoprazole (PROTONIX) 40 MG EC tablet Take 1 tablet (40mg) by mouth daily at 8am     polyethylene glycol (MIRALAX) 17 GM/Dose powder (= clearlax) Capful in liquid by mouth nightly at 8pm and capful daily as needed     rivaroxaban ANTICOAGULANT (XARELTO) 20 MG TABS tablet Take 20 mg by mouth daily (with dinner) At 5pm     senna-docusate (SENOKOT-S/PERICOLACE) 8.6-50 MG tablet 2 tabs 2/day and 1 tab twice a day as needed     sodium phosphate (FLEET ENEMA) 7-19 GM/118ML rectal enema Place 1 enema rectally daily as needed for constipation     thiamine (B-1) 100 MG tablet Take 100 mg by mouth daily     traZODone (DESYREL) 100 MG tablet Take 100 mg by mouth daily at 8pm     traZODone (DESYREL) 50 MG tablet 50 mg In AM     triamcinolone (KENALOG) 0.1 % external cream      venlafaxine (EFFEXOR XR) 150 MG 24 hr capsule Take 1 capsule (150 mg) by mouth daily     venlafaxine (EFFEXOR XR) 75 MG 24 hr capsule Take 1 capsule (75 mg) by mouth daily     venlafaxine (EFFEXOR-ER) 225 MG 24  hr tablet Take 1 tablet (225 mg) by mouth daily     vitamin C (ASCORBIC ACID) 500 MG tablet Take 500 mg by mouth two times a day at 8am and 8pm     No current facility-administered medications for this visit.             Review of Systems:     Skin: negative  Eyes: negative  Ears/Nose/Throat: negative  Respiratory: No shortness of breath, dyspnea on exertion, cough, or hemoptysis  Cardiovascular: negative  Gastrointestinal: negative  Genitourinary: as above  Musculoskeletal: as above  Neurologic: as above  Psychiatric: negative  Hematologic/Lymphatic/Immunologic: negative  Endocrine: negative          Physical Exam:   No data found.  There is no height or weight on file to calculate BMI.     General: age-appropriate appearing male in NAD  HEENT: Head AT/NC, EOMI, CN Grossly intact  Lungs: no respiratory distress, or pursed lip breathing  Heart: No obvious jugular venous distension present  Back: no bony midline tenderness, no CVAT bilaterally.  Abdomen: soft, non-distended, non-tender. No organomegaly  Lymph: no palpable inguinal lymphadenopathy.  LE: no edema.   Musculoskeltal: extremities normal, no peripheral edema  Skin: no suspicious lesions or rashes  Neuro: Alert, oriented, speech and mentation normal;  Parkinsonian movements, wheelchair bound  Psych: affect and mood normal          Data:   All laboratory data reviewed:    UA RESULTS:  Recent Labs   Lab Test 05/28/22  0215   COLOR Straw   APPEARANCE Clear   URINEGLC Negative   URINEBILI Negative   URINEKETONE Negative   SG 1.007   UBLD Negative   URINEPH 5.5   PROTEIN Negative   NITRITE Negative   LEUKEST Negative   RBCU <1   WBCU <1      Prostate Specific Antigen Screen   Date Value Ref Range Status   05/16/2017 0.5 0.0 - 3.5 ng/mL Final   08/23/2018                  0.7     Lab Results   Component Value Date    CR 0.66 05/31/2022    CR 0.77 07/09/2021      IMAGING:  All pertinent imaging reviewed:    All imaging studies reviewed by me.  I personally  reviewed these imaging films.  A formal report from radiology will follow.    CT ABD/PEL 5/28/22:  FINDINGS:   LUNGS AND PLEURA: Basilar atelectasis. No infiltrate, pleural effusion or visible pneumothorax. 4 mm right lower lobe nodule series 6 image 191.     MEDIASTINUM/AXILLAE: No adenopathy or pericardial effusion.     CORONARY ARTERY CALCIFICATION: No significant visualized.     HEPATOBILIARY: Normal.     PANCREAS: Normal.     SPLEEN: Normal.     ADRENAL GLANDS: Normal.     KIDNEYS/BLADDER: Normal.     BOWEL: Normal caliber. Normal appendix.     LYMPH NODES: Normal.     VASCULATURE: Unremarkable.     PELVIC ORGANS: Normal.     MUSCULOSKELETAL: Minimal degenerative change osseous structures. Remote rib fractures.                                                                      IMPRESSION:  1.  No evidence of injury within the chest, abdomen and pelvis.  2.  4 mm right lower lobe nodule again seen.             Impression and Plan:   Impression:   57-year-old man with history of Parkinson's disease as well as prior CVA with LUTS      Plan:   LUTS  - We discussed the pathophysiology of the bladder and the prostate and the normal changes associated with the development of BPH and LUTS  - We discussed that his PVR today is reassuring at 20 cc indicating that he does empty his bladder well  - I reviewed his prior CT scan from May and reviewed these images personally.  He did have a distended urinary bladder on this exam, however no evidence of a protruding median lobe or significant prostate enlargement  - We discussed the impacts of Parkinson's disease on voiding  - Given that he had previously been doing better from a urinary standpoint with tamsulosin, however had some increased falling risk with this, we discussed the possibility for trial of alfuzosin 10 mg daily.  This is also a selective alpha-blocker with less orthostatic hypotension  - Prescription sent to the pharmacy  - Follow-up in 3 months for symptom  check  - If he continues to have bothersome symptoms, we would likely need to proceed with urodynamics  - I reviewed his serum creatinine which is stable and normal and nonconcerning as well as his urinalysis which is nonconcerning     Thank you for the kind consultation.    Time spent: 20 minutes spent on the date of the encounter doing chart review, history and exam, documentation and further activities as noted above.    Chris Serna MD   Urology  [facility]  [facility] Phone: [phone]  [facility] Phone: [phone]

## 2022-08-03 ENCOUNTER — TELEPHONE (OUTPATIENT)
Dept: NEUROLOGY | Facility: CLINIC | Age: 57
End: 2022-08-03

## 2022-08-03 ENCOUNTER — TELEPHONE (OUTPATIENT)
Dept: INTERNAL MEDICINE | Facility: CLINIC | Age: 57
End: 2022-08-03

## 2022-08-03 NOTE — TELEPHONE ENCOUNTER
[facility] Call Center    Phone Message    May a detailed message be left on voicemail: yes     Reason for Call: Other: Pt caregiver calling in requesting for the neuopsychology order to be sent to [facility], [facility]:[phone]    Action Taken: Message routed to:  [facility] ([facility]): neurology    Travel Screening: Not Applicable

## 2022-08-03 NOTE — TELEPHONE ENCOUNTER
MTM referral from: [facility] visit (referral by provider)    MTM referral outreach attempt #2 on August 3, 2022 at 12:52 PM      Outcome: Patient not reachable after several attempts, will route to MT Pharmacist/Provider as an FYI.  [facility] scheduling number is [phone].  Thank you for the referral.    Aaliyah Piper [facility]

## 2022-08-03 NOTE — TELEPHONE ENCOUNTER
Writer faxed Therapy Referrals to Sandie Eaton, fax number [phone].    [facility]   Neurology, NeuroSurgery, NeuroPsychology and Pain Management Specialties  [phone]

## 2022-08-10 NOTE — PROGRESS NOTES
Medication Therapy Management (MTM) Encounter    ASSESSMENT:                            Medication Adherence/Access: No issues identified  Parkinsonism: Pt plans to follow up on Botox referral. Based on med schedule, carbidopa/levodopa and clonazepam are timed together, so need to better clarify when he feels they're working and when they seem to be interfering. There is documented interaction that benzodiazepines can diminish the effect of levodopa.    Lower Urinary Tract Symptoms: Already improving with new medication. Pt to follow up with Urology.    Mental Health: Mental health seems stable. Continue current medications. Will follow up on other medications at next visit, as clozapine and hydroxyzine could be contributing to constipation.    GERD: Stable. Current treatment is effective.    Hyperlipidemia: Stable. Patient is on moderate intensity statin which is indicated based on 2019 ACC/AHA guidelines for lipid management.  He will follow up with PCP for updated lipid panel.  Supplements: Pt would benefit from Vitamin D level to determine if high dose is needed. Pt to follow up with PCP.    Skin: Stable. Continue current medication.    PLAN:                            1. Continue current medications.  Will finish reviewing remaining medications at next visit.  2. Pt would benefit from Vitamin D level and lipid panel- pt will discuss with PCP.    Follow-up: 8/25/22 at 10:00 to finish reviewing meds (constipation, cardiac, consideration for ropinirole?)  8/25 Palliative Care consult  9/16 Comprehensive pain eval for Parkinson's Disease    SUBJECTIVE/OBJECTIVE:                          Benjamin Mathews is a 57 year old male called for an initial visit. He was referred to me from Dr. Perry. Patient was accompanied by assisted living staff at various times throughout the visit to help clarify med list.     Reason for visit: med review    Allergies/ADRs: Reviewed in chart  Past Medical History: Reviewed in  "chart  Tobacco: He reports that he has been smoking pipe. He has never used smokeless tobacco.Nicotine/Tobacco Cessation Plan: Information offered: Patient not interested at this time  Alcohol: not currently using    Medication Adherence/Access: managed by assisted living    Parkinsonism:  Medication Dose Timing   8am 12p 4p 8p Bedtime prn   Carbidopa/levodopa 25-100mg 2 2 1.5      Carbidopa/levodopa CR     1     clonazepam 2mg 1mg 2mg   0.5mg              Pt recently saw Dr. Perry on 8/1He stated that carbidopa/levodopa works ok, but \"interferes\" with clonazepam as a muscle relaxant, as he notices that clonazepam seems to work better when not taken with carbidopa/levodopa. He reported that if he misses a clonazepam dose, his whole right side \"freezes up.\" He feels that movements are generally well managed if he doesn't miss medication doses. He does take an extra clonazepam 0.5mg 2-3 times per week when he notices eye muscles twitching.  He is considering Botox.  Pt appears to be knowledgeable about his medications to some degree, but needed assistance in reading the printed med list, as he was frequently reporting incorrect information during the call.      Per Neuro note,   Review of surgical or medication options   Reviewed  Amantadine - hallucinations  rasagiline (azilect) = has not been on but may not be able to take with other medications  Selegiline (eldepryl) - has not been on this  rytary -  h as not been on this  comtan (entacapone) - has not been on this  stalevo (entacapone/carbidopa/levodopa) - has not been on this  Ropinirole (requip) - has not been on this  mirapex (pramipexole) = has not been on this   rotigotine (neupro) - has not been on this    Lower Urinary Tract Symptoms: Pt was recently prescribed alfuzosin 10mg daily on 8/2 by Urology.  Per their note, \"4-5 times per month he will wake up soaked. During the day he only voids about 2 times. Symptoms have been worsening for the past 3 " "months. He notes that his tamsulosin 0.4mg (Flomax) was stopped for some reason - possibly due to fall risk. Given that he had previously been doing better from a urinary standpoint with tamsulosin, however had some increased falling risk with this, we discussed the possibility for trial of alfuzosin 10 mg daily.  This is also a selective alpha-blocker with less orthostatic hypotension\"    Today, pt reported that new medication \"has been working.\"  He has noticed a \"steadier stream\" in the morning and thinks he is voiding more frequently during the day. He has noticed \"less frequent\" overnight incontinence, though could not quantify.    Mental Health: Pt has been diagnosed with depression and had hallucinations.  Pt is taking venlafaxine ER 150mg+75mg (225mg) at 8am, trazodone 50mg every morning at 8am and 100mg at bedtime. hydroxyzine 50mg as needed a few times per week, clozapine 50mg at bedtime (x 12-18months).  He reported feeling that medications \"are very effective\" and feels that his mood is stable. Sometimes feels increased anxiety in the afternoons when there are more people around his residence and \"are asking a lot of questions.\"  He reported that clozapine has been helpful and hasn't had any hallucinations since starting the med. Gets monthly blood draws at [facility].    GERD: Current medications include: Protonix (pantoprazole) 40mg once daily. Patient reports noticing some breakthrough symptoms based on what he's eaten (especially spicy foods), but otherwise feels symptoms are well managed.    Hyperlipidemia: Current therapy includes atorvastatin 40mg daily.  Patient reports no significant myalgias or other side effects.  The ASCVD Risk score (Michael TORSTEN Jr., et al., 2013) failed to calculate for the following reasons:    The patient has a prior MI or stroke diagnosis   Recent Labs   Lab Test 07/12/20  0938 01/06/20  0735   CHOL 121 214*   HDL 58 44   LDL 44 120   TRIG 94 252*     Supplements: Pt " takes multivitamin daily,Vitamin D 5000 units daily, Vitamin C 500mg once daily, thiamine 100mg daily  Vitamin D Deficiency Screening Results:  Lab Results   Component Value Date    VITDT 35 07/12/2020     Skin: Pt has hydrocortisone and triamcinolone cream for flaky skin on face, which he finds helpful when alternates.  No irritation.  ----------------    I spent 60 minutes with this patient today. A copy of the visit note was provided to the patient's provider(s).    The patient declined a summary of these recommendations.     Cary Sol, PharmD  Medication Therapy Management Pharmacist  [facility]    Telemedicine Visit Details  Type of service:  Telephone visit  Start Time: 1:00pm  End Time: 1:57 PM  Originating Location (patient location): Home  Distant Location (provider location):  [facility]     Medication Therapy Recommendations  No medication therapy recommendations to display

## 2022-08-11 ENCOUNTER — VIRTUAL VISIT (OUTPATIENT)
Dept: PHARMACY | Facility: CLINIC | Age: 57
End: 2022-08-11
Payer: COMMERCIAL

## 2022-08-11 DIAGNOSIS — F39 MOOD DISORDER (H): ICD-10-CM

## 2022-08-11 DIAGNOSIS — R39.9 LOWER URINARY TRACT SYMPTOMS (LUTS): ICD-10-CM

## 2022-08-11 DIAGNOSIS — Z78.9 TAKES DIETARY SUPPLEMENTS: ICD-10-CM

## 2022-08-11 DIAGNOSIS — E78.49 OTHER HYPERLIPIDEMIA: ICD-10-CM

## 2022-08-11 DIAGNOSIS — L85.3 DRY SKIN: ICD-10-CM

## 2022-08-11 DIAGNOSIS — G20.C PARKINSONISM, UNSPECIFIED PARKINSONISM TYPE (H): Primary | ICD-10-CM

## 2022-08-11 DIAGNOSIS — K21.00 GASTROESOPHAGEAL REFLUX DISEASE WITH ESOPHAGITIS WITHOUT HEMORRHAGE: ICD-10-CM

## 2022-08-11 PROCEDURE — 99605 MTMS BY PHARM NP 15 MIN: CPT | Performed by: PHARMACIST

## 2022-08-11 NOTE — PROGRESS NOTES
"Medication Therapy Management (MTM) Encounter    ASSESSMENT:                            Medication Adherence/Access: No issues identified  Parkinsonism: Pt has upcoming palliative care and comprehensive pain evals and plans to discuss further at those visits. Discussed recommendation to not increase prn clonazepam at this time, but will mention to Neurology.    Constipation: Pt may benefit from scheduled Miralax, as prescribed, rather than using only as needed. Pt agreed to the change. No new Rx needed.  H/o CVA: Stable. Continue current medication.    Pain: Medications are mildly effective. Plans to discuss further at upcoming pain eval.      PLAN:                            1. Pt will take Miralax 17g every day.    Follow-up: 3 months    SUBJECTIVE/OBJECTIVE:                          Benjamin Mathews is a 57 year old male called for a follow-up visit.  Today's visit is a follow-up MTM visit from 8/11/22.     Reason for visit: med review    Allergies/ADRs: Reviewed in chart  Past Medical History: Reviewed in chart  Tobacco: He reports that he has been smoking pipe. He has never used smokeless tobacco.Nicotine/Tobacco Cessation Plan:   Information offered: Patient not interested at this time  Alcohol: not currently using    Medication Adherence/Access: no issues reported  Parkinsonism:  Medication Dose Timing   8am 12p 4p 8p Bedtime prn   Carbidopa/levodopa 25-100mg 2 2 1.5      Carbidopa/levodopa CR     1     clonazepam 2mg 1mg 2mg   0.5mg              Today, patient reported that symptoms are generally unchanged. He feels that medications generally work well, but has \"severe spasms\" a couple times per week, when he takes clonazepam 0.5mg.  He stated that the dose is not helpful and wonders if it can be increased. Pt reported that he has been noticing toes and feet curling in and feels like \"joints are going to be ripped out.\" Feels that the sensation is \"going up my leg further\" and the pain is getting worse " "when it does happen.   He has upcoming Palliative Care consult today and Parkinson's pain eval in a few weeks.  Previous notes indicate that he was considering Botox, but not discussed today.    Per Neuro note,   Review of surgical or medication options   Reviewed  Amantadine - hallucinations  rasagiline (azilect) = has not been on but may not be able to take with other medications  Selegiline (eldepryl) - has not been on this  rytary -  has not been on this  comtan (entacapone) - has not been on this  stalevo (entacapone/carbidopa/levodopa) - has not been on this  Ropinirole (requip) - has not been on this  mirapex (pramipexole) = has not been on this   rotigotine (neupro) - has not been on this      Constipation: Pt has Amitiza 24mg BID, Fiber-lax 625mg daily, senna 8.6mg 2 tabs BID and 2 tab daily prn, lactulose 10g/15mL once daily, magnesium 400mg daily.  PRNs: bisacodyl 10mg suppository daily prn (about once weekly), Fleet enema daily prn, Miralax 17g daily prn (takes for a couple days until BM) .  Pt reported having bowel movement about once weekly.  He usually does need to use multiple prns, using Miralax, bisacodyl, and Fleet enema before having BM. He reported drinking \"lots and lots of water.\"    H/o CVA: Pt takes metoprolol ER 12.5mg BID, rivaroxaban 20mg with dinner. Pt has blood pressure checked by [facility] pearce daily, reported ~110/65. Denied any concerns or side effects.    Pain: Pt has baclofen 10mg QID, methocarbamol 500mg QID prn (very infrequent), gabapentin 800mg TID, acetaminophen 1000mg TID.  Pt reported that medications are mildly helpful overall, but spasms are usually severe enough that \"they don't make a big difference.\" He does not have pain at baseline, only during spasms. He requests prn medication for spasms.  He has comprehensive pain evaluation in a few weeks.    ----------------    I spent 30 minutes with this patient today. A copy of the visit note was provided to the patient's " provider(s).    The patient was verbally given a summary of these recommendations.     Cary Sol, PharmD  Medication Therapy Management Pharmacist  [facility]    Telemedicine Visit Details  Type of service:  Telephone visit  Start Time: 10:00am  End Time: 10:30am  Originating Location (patient location): Home  Distant Location (provider location):  [facility]     Medication Therapy Recommendations  Slow transit constipation    Current Medication: polyethylene glycol (MIRALAX) 17 GM/Dose powder   Rationale: Does not understand instructions - Adherence - Adherence   Recommendation: Provide Adherence Intervention - Pt will start using Miralax 17g every day   Status: Patient Agreed - Adherence/Education

## 2022-08-12 NOTE — PATIENT INSTRUCTIONS
"Recommendations from today's MTM visit:                                                    MTM (medication therapy management) is a service provided by a clinical pharmacist designed to help you get the most of out of your medicines.   Today we reviewed what your medicines are for, how to know if they are working, that your medicines are safe and how to make your medicine regimen as easy as possible.      Talk to your primary care doctor about rechecking lipid panel and Vitamin D level.    Follow-up: I will call you on 8/25/22 at 10:00am    It was great speaking with you today.  I value your experience and would be very thankful for your time in providing feedback in our clinic survey. In the next few days, you may receive an email or text message from [employer] with a link to a survey related to your  clinical pharmacist.\"     To schedule another MTM appointment, please call the clinic directly or you may call the MTM scheduling line at [phone] or toll-free at [phone].     My Clinical Pharmacist's contact information:                                                      Please feel free to contact me with any questions or concerns you have.      Cary Sol, PharmD  Medication Therapy Management Pharmacist  [facility]  "

## 2022-08-19 ENCOUNTER — OFFICE VISIT (OUTPATIENT)
Dept: PODIATRY | Facility: CLINIC | Age: 57
End: 2022-08-19
Payer: COMMERCIAL

## 2022-08-19 DIAGNOSIS — M20.41 HAMMER TOES OF BOTH FEET: ICD-10-CM

## 2022-08-19 DIAGNOSIS — M20.42 HAMMER TOES OF BOTH FEET: ICD-10-CM

## 2022-08-19 DIAGNOSIS — G60.9 IDIOPATHIC PERIPHERAL NEUROPATHY: ICD-10-CM

## 2022-08-19 DIAGNOSIS — G20.A1 PARKINSON'S DISEASE (H): ICD-10-CM

## 2022-08-19 DIAGNOSIS — L97.522 SKIN ULCER OF SECOND TOE OF LEFT FOOT WITH FAT LAYER EXPOSED (H): ICD-10-CM

## 2022-08-19 DIAGNOSIS — M79.672 LEFT FOOT PAIN: Primary | ICD-10-CM

## 2022-08-19 PROCEDURE — 99214 OFFICE O/P EST MOD 30 MIN: CPT | Performed by: PODIATRIST

## 2022-08-19 ASSESSMENT — PAIN SCALES - GENERAL: PAINLEVEL: NO PAIN (0)

## 2022-08-19 NOTE — NURSING NOTE
Benjamin Mathews's chief complaint for this visit includes:  Chief Complaint   Patient presents with     Left Foot - WOUND CARE     2nd toe and heel     PCP: Stephanie Maldonado    Referring Provider:  Error in [id number] index!    There were no vitals taken for this visit.  No Pain (0)     Do you need any medication refills at today's visit? NO    Allergies   Allergen Reactions     Amantadine Other (See Comments)     Other reaction(s): Hallucinations  Hallucinations/ lost self control/gambling.     halluicnations  Hallucinations/ lost self control/gambling.     hallucinates  halluicnations  hallucinates  Hallucinations/ lost self control/gambling.        Quetiapine GI Disturbance, Diarrhea and Other (See Comments)     Diarrhea    Diarrhea  Other reaction(s): GI Disturbance  Diarrhea       Duloxetine Other (See Comments)     suicidal  suicidal         Mairo Garcia, EMT
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
## 2022-08-25 ENCOUNTER — OFFICE VISIT (OUTPATIENT)
Dept: PALLIATIVE CARE | Facility: CLINIC | Age: 57
End: 2022-08-25
Attending: INTERNAL MEDICINE
Payer: COMMERCIAL

## 2022-08-25 ENCOUNTER — VIRTUAL VISIT (OUTPATIENT)
Dept: PHARMACY | Facility: CLINIC | Age: 57
End: 2022-08-25
Payer: COMMERCIAL

## 2022-08-25 VITALS
DIASTOLIC BLOOD PRESSURE: 55 MMHG | TEMPERATURE: 97 F | OXYGEN SATURATION: 95 % | RESPIRATION RATE: 18 BRPM | BODY MASS INDEX: 26.22 KG/M2 | WEIGHT: 215.4 LBS | HEART RATE: 85 BPM | SYSTOLIC BLOOD PRESSURE: 91 MMHG

## 2022-08-25 DIAGNOSIS — R52 PAIN: ICD-10-CM

## 2022-08-25 DIAGNOSIS — I63.9 CEREBROVASCULAR ACCIDENT (CVA), UNSPECIFIED MECHANISM (H): ICD-10-CM

## 2022-08-25 DIAGNOSIS — G20.C PARKINSONISM, UNSPECIFIED PARKINSONISM TYPE (H): Primary | ICD-10-CM

## 2022-08-25 DIAGNOSIS — K59.01 SLOW TRANSIT CONSTIPATION: ICD-10-CM

## 2022-08-25 DIAGNOSIS — M62.838 MUSCLE SPASM: ICD-10-CM

## 2022-08-25 PROCEDURE — 99606 MTMS BY PHARM EST 15 MIN: CPT | Performed by: PHARMACIST

## 2022-08-25 PROCEDURE — 99607 MTMS BY PHARM ADDL 15 MIN: CPT | Performed by: PHARMACIST

## 2022-08-25 PROCEDURE — 99204 OFFICE O/P NEW MOD 45 MIN: CPT | Mod: GC

## 2022-08-25 PROCEDURE — G0463 HOSPITAL OUTPT CLINIC VISIT: HCPCS

## 2022-08-25 RX ORDER — CLONAZEPAM 1 MG/1
1 TABLET ORAL DAILY PRN
Qty: 30 TABLET | Refills: 0 | Status: SHIPPED | OUTPATIENT
Start: 2022-08-25 | End: 2022-10-06

## 2022-08-25 RX ORDER — SENNOSIDES 8.6 MG/1
2 TABLET, COATED ORAL 2 TIMES DAILY
COMMUNITY
Start: 2022-08-05 | End: 2023-01-09

## 2022-08-25 RX ORDER — BACLOFEN 10 MG/1
TABLET ORAL
Qty: 120 TABLET | Refills: 0 | Status: SHIPPED | OUTPATIENT
Start: 2022-08-25 | End: 2022-11-17

## 2022-08-25 RX ORDER — BACLOFEN 5 MG/1
TABLET ORAL
Qty: 120 TABLET | Refills: 0 | Status: SHIPPED | OUTPATIENT
Start: 2022-08-25 | End: 2022-11-17

## 2022-08-25 RX ORDER — CLONAZEPAM 2 MG/1
TABLET ORAL
Qty: 75 TABLET | Refills: 0 | Status: SHIPPED | OUTPATIENT
Start: 2022-08-25 | End: 2022-10-20

## 2022-08-25 NOTE — NURSING NOTE
"Oncology Rooming Note    August 25, 2022 4:15 PM   Benjamin Mathews is a 57 year old male who presents for:    Chief Complaint   Patient presents with     Oncology Clinic Visit     Muscle spasm      Initial Vitals: BP 91/55 (BP Location: Left arm, Patient Position: Sitting, Cuff Size: Adult Large)   Pulse 85   Temp 97  F (36.1  C) (Tympanic)   Resp 18   Wt 97.7 kg (215 lb 6.4 oz)   SpO2 95%   BMI 26.22 kg/m   Estimated body mass index is 26.22 kg/m  as calculated from the following:    Height as of 7/19/22: 1.93 m (6' 4\").    Weight as of this encounter: 97.7 kg (215 lb 6.4 oz). Body surface area is 2.29 meters squared.  Data Unavailable Comment: Data Unavailable   No LMP for male patient.  Allergies reviewed: Yes  Medications reviewed: Yes    Medications: Medication refills not needed today.  Pharmacy name entered into [facility]: [facility]. - [place], [place] - [address]    Clinical concerns: Patient states that his medications are managed by group home. He is using a motorized wheel chair to get around, states that he had several falls about six months ago.        Ana Garcia LPN August 25, 2022 4:16 PM              "

## 2022-08-25 NOTE — PATIENT INSTRUCTIONS
Gradually decrease Baclofen dose weekly  Week 1: 10mg @ 8Am,  15mg @ 12pm, 5pm and 8pm Week 2: 10mg @ 8Am, 10mg @ 12pm, 15mg @ 5pm and 8pm Week 3: 10mg @ 8Am, 10mg @ 12pm, 10mg @ 5pm and 15mg @ 8pm Week 4: 10mg @ 8Am, 12pm, 5pm, 8pm    Change timing of Clonazepam  Take 2mg in the morning  Take 1mg at noon  Take 2mg at bed time  PRN dose additional 1mg    Discontinue Robaxin

## 2022-08-25 NOTE — PROGRESS NOTES
Palliative Care Outpatient Clinic Consultation Note    Patient Name: Benjamin Mathews is being evaluated in person.   Primary Provider:  Stephanie Maldonado  Primary Neurologist: Dr. Perry  Reason for referral: Pain management      Chief Complaint: Right sided muscle spasm    Background/Summary    Social  Grew up in [place]. 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 Around this time is when he was diagnosed with Parkinson's and work became difficulty. Has a brother and sister-in law that come to visit him in his [place] which he appreciates. His He has a sister and brother in law who are MDs. Father had Parkinson's and passed over 10 years ago. He care for his mother and father before they passed.       Medication Safety   : reviewed, 08/25/2022    History:    Benjamin Mathews is a 56 yo male with a history of Parkinson's disease with neurocognitive disorder and dyskinesia, stroke, chronic pain who presents to Palliative Care clinic for management of muscle spasms.     Patient diagnosed with Parkinson's in 2013 after a short period of tremor and gait changes. 2015 symptoms significant worsened. He is followed by  of Neurology, last visit on 08/01/2022. Patient referred for assistance with pain management and coping.  Today patient reports worsening right sided muscle spasms for the last 6 months. Starts distally, right foot curls in with contraction and it travels up to affect his whole arm and hand. Sometimes starts in the small muscles around his right eye and then affects the right arm. Pain is distressing and lasts for hours at a time. Worse in the morning from 8-10am. He is taking Clonazepam which he feels is helping throughout the day except in the morning. He denies any sedating effects or dizziness from medications. He would like to increase PRN dose. Also taking baclofen 15mg  QID but doesn't think it has helped at all. Per chart review looks like plan for potential botox of lower extremities is planned. Patient was not aware but would consider it.      Reports constipation, last bowel movement 2 days ago. He has multiple PRN medications for bowel regimen but does not take anything regularly.    Lives in [place] with nursing support throughout the day. Meds administered by nurses.       Functional Status:  Palliative Performance Scale: 50%          Impression & Recommendations & Counseling:  Benjamin Mathews is a 58 yo male with Parkinson's disease with neurocognitive disease with neurocognitive disorder, dyskinesias, stroke, chronic pain who presents with worsening right sided muscle spasms. He has a complex medication regimen and has brought in medication list from facility. Taking Baclofem 15mg QID which he does not find helpful. Most benefit from Clonazepam which he is taking 2mg 8AM, 1mg noon, 2mg 8pm. Good control in the afternoon and evening but morning spasms are uncontrolled. Possible that evening dose in adequate to last until the morning. We discussed first changing timing of clonazepam to take later before bedtime. Also take AM dose as soon as he wakes up. For now will increase PRN from 0.5mg to 1mg. If inadequate, next step will be to increase scheduled clonazepam dose, however reluctant to do at this time as he is a high falls risk with polypharmacy. Will work on tapering down baclofen by 5mg per dose per week.   - Continue Clonazepam - 2mg morning, 1mg noon, 2mg HS  - Clonazepam 1mg daily PRN for muscle spasms  - Taper down baclofen :   - Week 1: 10mg @ 8Am,  15mg @ 12pm, 5pm and 8pm   - Week 2: 10mg @ 8Am, 10mg @ 12pm, 15mg @ 5pm and 8pm    - Week 3: 10mg @ 8Am, 10mg @ 12pm, 10mg @ 5pm and 15mg @ 8pm    - Week 4: 10mg @ 8Am, 12pm, 5pm, 8pm  - Discontinue Robaxin (not taking PRN)  - Continue tylenol 1000mg TID  - There was possibly a discussion regarding medical marijuana however  "not discussed today. Can consider as next steps.  - Encourage patient to consider botox with PM&R  - Return to clinic in one month      Data / Chart Review:    Review of Systems:   ROS: 10 point ROS neg other than the symptoms noted above in the HPI and pertinents here.         Physical Exam:   Physical Exam:    Vital signs:  Temp: 97  F (36.1  C) Temp src: Tympanic BP: 91/55 Pulse: 85   Resp: 18 SpO2: 95 %       Weight: 97.7 kg (215 lb 6.4 oz)  Estimated body mass index is 26.22 kg/m  as calculated from the following:    Height as of 7/19/22: 1.93 m (6' 4\").    Weight as of this encounter: 97.7 kg (215 lb 6.4 oz).      CONSTIT: awake, sitting in wheel chair, appears comfortable  EENT: MM, EOMI, no icterus, oral dyskinesia  RESP: reg, normal effort, no cough  CV: Regular rate and rhythm  SKIN: no rash, no obvious lesions  NEURO: alert, oriented x3, upper extremity involuntary movements  PSYCH: affect, memory and thought process intact    The rest of a comprehensive physical examination is deferred  due to public [facility] emergency video visit restrictions.      Current pertinent medications:  Current Outpatient Medications   Medication     baclofen (LIORESAL) 10 MG tablet     Baclofen (LIORESAL) 5 MG tablet     clonazePAM (KLONOPIN) 1 MG tablet     clonazePAM (KLONOPIN) 2 MG tablet     acetaminophen (TYLENOL) 500 MG tablet     alfuzosin ER (UROXATRAL) 10 MG 24 hr tablet     atorvastatin (LIPITOR) 40 MG tablet     bisacodyl (DULCOLAX) 10 MG suppository     calcium polycarbophil (FIBER-LAX) 625 MG tablet     carbidopa-levodopa (SINEMET CR)  MG CR tablet     carbidopa-levodopa (SINEMET)  MG tablet     cholecalciferol (VITAMIN D3) 125 mcg (5000 units) capsule     clonazePAM (KLONOPIN) 1 MG tablet     cloZAPine (CLOZARIL) 50 MG tablet     ENULOSE 10 GM/15ML SOLUTION     gabapentin (NEURONTIN) 800 MG tablet     hydrocortisone 1 % CREA cream     hydrOXYzine (ATARAX) 25 MG tablet     lubiprostone (AMITIZA) 24 MCG " capsule     magnesium oxide (MAG-OX) 400 MG tablet     metoprolol succinate ER (TOPROL-XL) 25 MG 24 hr tablet     Multiple Vitamin (DAILY-ALLAN) TABS     pantoprazole (PROTONIX) 40 MG EC tablet     polyethylene glycol (MIRALAX) 17 GM/Dose powder     rivaroxaban ANTICOAGULANT (XARELTO) 20 MG TABS tablet     SENNA-TIME 8.6 MG tablet     sodium phosphate (FLEET ENEMA) 7-19 GM/118ML rectal enema     traZODone (DESYREL) 100 MG tablet     traZODone (DESYREL) 50 MG tablet     triamcinolone (KENALOG) 0.1 % external cream     venlafaxine (EFFEXOR XR) 150 MG 24 hr capsule     venlafaxine (EFFEXOR XR) 75 MG 24 hr capsule     vitamin C (ASCORBIC ACID) 500 MG tablet     No current facility-administered medications for this visit.     Allergies: Amantadine, Quetiapine, Duloxetine      Lab and imaging data reviewed:  Comments: CBC with diff          AJAY Langley [facility]  Palliative Medicine Fellow    Patient seen and evaluated with Dr. Sosa.    Agree with assessment and recommendations as documented in this note. Patient seen for management of muscle spasms, made adjustments to medications based on history, chart review. Billing based on complexity regarding painful muscle spasms in patient with parkinson's disease, not responsive to initial treatments.     Bernadette Arriaza MD  Palliative Medicine  Pager [phone]

## 2022-08-25 NOTE — Clinical Note
"Fyi- he requested increasing prn clonazepam dose and already takes 5mg every day. Mostly he wants a prn for when spasms happen. They sound more severe than he had initially reported to me. He has Palliative and Comp Pain eval coming up. He hasn't been on a lot, but I'm unclear on next steps for his reported \"severe spasms.\" Botox?  Thanks for your thoughts."

## 2022-09-02 ENCOUNTER — OFFICE VISIT (OUTPATIENT)
Dept: PODIATRY | Facility: CLINIC | Age: 57
End: 2022-09-02
Payer: COMMERCIAL

## 2022-09-02 DIAGNOSIS — M20.41 HAMMER TOES OF BOTH FEET: ICD-10-CM

## 2022-09-02 DIAGNOSIS — G60.9 IDIOPATHIC PERIPHERAL NEUROPATHY: ICD-10-CM

## 2022-09-02 DIAGNOSIS — G20.A1 PARKINSON'S DISEASE (H): ICD-10-CM

## 2022-09-02 DIAGNOSIS — B35.1 ONYCHOMYCOSIS: ICD-10-CM

## 2022-09-02 DIAGNOSIS — L97.522 SKIN ULCER OF SECOND TOE OF LEFT FOOT WITH FAT LAYER EXPOSED (H): Primary | ICD-10-CM

## 2022-09-02 DIAGNOSIS — M20.42 HAMMER TOES OF BOTH FEET: ICD-10-CM

## 2022-09-02 PROCEDURE — 99214 OFFICE O/P EST MOD 30 MIN: CPT | Performed by: PODIATRIST
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
## 2022-09-02 NOTE — NURSING NOTE
Benjamin Mathews's chief complaint for this visit includes:  Chief Complaint   Patient presents with     Follow Up     Bilateral foot cares     PCP: Stephanie Maldonado    Referring Provider:  Error in [id number] index!    There were no vitals taken for this visit.  Data Unavailable        Allergies   Allergen Reactions     Amantadine Other (See Comments)     Other reaction(s): Hallucinations  Hallucinations/ lost self control/gambling.     halluicnations  Hallucinations/ lost self control/gambling.     hallucinates  halluicnations  hallucinates  Hallucinations/ lost self control/gambling.        Quetiapine GI Disturbance, Diarrhea and Other (See Comments)     Diarrhea    Diarrhea  Other reaction(s): GI Disturbance  Diarrhea       Duloxetine Other (See Comments)     suicidal  suicidal           Do you need any medication refills at today's visit?

## 2022-09-09 ENCOUNTER — DOCUMENTATION ONLY (OUTPATIENT)
Dept: NEUROLOGY | Facility: CLINIC | Age: 57
End: 2022-09-09

## 2022-09-12 ENCOUNTER — TELEPHONE (OUTPATIENT)
Dept: NEUROLOGY | Facility: CLINIC | Age: 57
End: 2022-09-12

## 2022-09-15 ENCOUNTER — TELEPHONE (OUTPATIENT)
Dept: ANESTHESIOLOGY | Facility: CLINIC | Age: 57
End: 2022-09-15

## 2022-09-15 NOTE — TELEPHONE ENCOUNTER
I called patient to remind them of there appointment at 9:20 am to arrive 15-20 minutes prior allow time for parking    Also to complete there questionnaire prior to there appointment

## 2022-09-16 ENCOUNTER — OFFICE VISIT (OUTPATIENT)
Dept: ANESTHESIOLOGY | Facility: CLINIC | Age: 57
End: 2022-09-16
Attending: PSYCHIATRY & NEUROLOGY
Payer: COMMERCIAL

## 2022-09-16 VITALS — HEART RATE: 77 BPM | SYSTOLIC BLOOD PRESSURE: 108 MMHG | DIASTOLIC BLOOD PRESSURE: 64 MMHG | OXYGEN SATURATION: 96 %

## 2022-09-16 DIAGNOSIS — G24.9 DYSTONIA: Primary | ICD-10-CM

## 2022-09-16 PROCEDURE — 99203 OFFICE O/P NEW LOW 30 MIN: CPT | Mod: GC | Performed by: ANESTHESIOLOGY

## 2022-09-16 ASSESSMENT — PAIN SCALES - GENERAL: PAINLEVEL: MODERATE PAIN (4)

## 2022-09-16 NOTE — NURSING NOTE
Patient presents with:  Consult: New Consult Parkinson Disease both legs and neck pain level 4/10      Moderate Pain (4)     Pain Medications     Analgesics Other Refills Start End     acetaminophen (TYLENOL) 500 MG tablet          Si x 500mg tabs by mouth 3/day @8am, 12pm and 8pm and 2 x 500mg tab by mouth every 6 hours as needed    Class: Historical          What medications are you using for pain? Tylenol    (New patients only) Have you been seen by another pain clinic/ provider? yes    (Return Patients only) What refills are you needing today? No    Expectation Not Sure
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
## 2022-09-16 NOTE — PATIENT INSTRUCTIONS
Referrals:    PM&R clinic for dystonia evaluation for Botox- Dr. Mason      Recommended Follow up:      Follow up as needed.         To speak with a nurse, schedule/reschedule/cancel a clinic appointment, or request a medication refill call: [phone]    You can also reach us by [employer]: [email or web address]
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
## 2022-10-04 ENCOUNTER — OFFICE VISIT (OUTPATIENT)
Dept: PODIATRY | Facility: CLINIC | Age: 57
End: 2022-10-04
Payer: COMMERCIAL

## 2022-10-04 DIAGNOSIS — B35.1 ONYCHOMYCOSIS: ICD-10-CM

## 2022-10-04 DIAGNOSIS — G20.A1 PARKINSON'S DISEASE (H): ICD-10-CM

## 2022-10-04 DIAGNOSIS — G60.9 IDIOPATHIC PERIPHERAL NEUROPATHY: ICD-10-CM

## 2022-10-04 DIAGNOSIS — M20.41 HAMMER TOES OF BOTH FEET: ICD-10-CM

## 2022-10-04 DIAGNOSIS — L97.522 SKIN ULCER OF SECOND TOE OF LEFT FOOT WITH FAT LAYER EXPOSED (H): Primary | ICD-10-CM

## 2022-10-04 DIAGNOSIS — M20.42 HAMMER TOES OF BOTH FEET: ICD-10-CM

## 2022-10-04 PROBLEM — Z72.89 OTHER PROBLEMS RELATED TO LIFESTYLE: Status: ACTIVE | Noted: 2021-01-21

## 2022-10-04 PROCEDURE — 99213 OFFICE O/P EST LOW 20 MIN: CPT | Performed by: PODIATRIST

## 2022-10-04 ASSESSMENT — PAIN SCALES - GENERAL: PAINLEVEL: SEVERE PAIN (7)
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
## 2022-10-04 NOTE — NURSING NOTE
Benjamin Mathews's chief complaint for this visit includes:  Chief Complaint   Patient presents with     Left Foot - WOUND CARE     Right Foot - Follow Up     PCP: Stephanie Maldonado    Referring Provider:  Error in [id number] index!    There were no vitals taken for this visit.  Severe Pain (7)     Do you need any medication refills at today's visit? NO    Allergies   Allergen Reactions     Amantadine Other (See Comments)     Other reaction(s): Hallucinations  Hallucinations/ lost self control/gambling.     halluicnations  Hallucinations/ lost self control/gambling.     hallucinates  halluicnations  hallucinates  Hallucinations/ lost self control/gambling.        Quetiapine GI Disturbance, Diarrhea and Other (See Comments)     Diarrhea    Diarrhea  Other reaction(s): GI Disturbance  Diarrhea       Duloxetine Other (See Comments)     suicidal  suicidal         Mario Garcia, EMT

## 2022-10-06 ENCOUNTER — OFFICE VISIT (OUTPATIENT)
Dept: PHYSICAL MEDICINE AND REHAB | Facility: CLINIC | Age: 57
End: 2022-10-06
Payer: COMMERCIAL

## 2022-10-06 VITALS — DIASTOLIC BLOOD PRESSURE: 61 MMHG | HEART RATE: 87 BPM | OXYGEN SATURATION: 98 % | SYSTOLIC BLOOD PRESSURE: 96 MMHG

## 2022-10-06 DIAGNOSIS — G24.3 CERVICAL DYSTONIA: Primary | ICD-10-CM

## 2022-10-06 DIAGNOSIS — G20.A1 PARKINSON DISEASE (H): ICD-10-CM

## 2022-10-06 DIAGNOSIS — G24.8 SEGMENTAL DYSTONIA: ICD-10-CM

## 2022-10-06 PROCEDURE — 99205 OFFICE O/P NEW HI 60 MIN: CPT | Performed by: PHYSICAL MEDICINE & REHABILITATION

## 2022-10-06 RX ORDER — CLONAZEPAM 0.5 MG/1
TABLET ORAL
COMMUNITY
Start: 2022-09-26 | End: 2022-10-12

## 2022-10-06 RX ORDER — CLOZAPINE 25 MG/1
TABLET ORAL
COMMUNITY
Start: 2022-06-30 | End: 2022-12-02

## 2022-10-06 RX ORDER — POLYETHYLENE GLYCOL 3350 17 G/17G
POWDER, FOR SOLUTION ORAL
COMMUNITY
Start: 2022-09-02 | End: 2022-12-02

## 2022-10-06 NOTE — NURSING NOTE
Chief Complaint   Patient presents with     Consult     Possible botox injections     Lindsay Madrid CMA at 11:14 AM on 10/6/2022.

## 2022-10-06 NOTE — PROGRESS NOTES
PM&R Clinic Note     Patient Name: Benjamin Mathews : 1965 Medical Record: [id number]     Requesting Physician/clinician: Tammie Suazo MD           History of Present Illness:     Benjamin Mathews is a 57 year old male who was referred to our clinic for more evaluation of his dystonia and trial of botulinum toxin injections.    She has history of Parkinson disease and is followed by Dr. Perry; he will see Dr. Carlos for follow-up in 2023.    He was referred to palliative care team and has been followed by them since then.  He was also referred to the pain clinic and saw Dr. Freitas; she sent a referral to PM&R team for trial of botulinum toxins.       Symptoms,  Today he reported frequent spasms affecting the right side of his body.  It typically starts from the smaller muscles around his eye and face and then radiates down to his shoulder, arm and leg.  The spasms in his right arm and right leg used to happen every other day lasting for a few hours but the frequency and severity have significantly improved since he was a started on Klonopin, currently happening 1-2 times per month.    However spasms in his neck continue to be severe, occurring multiple times per day especially in the afternoon and lasting 3 to 4 hours.  These the spasms are extremely painful; his neck is fixed in right lateral tilt position and he also has spasms in his right arm and leg at the same time in flexed position.  He also has some associated symptoms with jaw shearing that can last for a few hours as well.    He has no issues with his appetite or sleep.  He is constipated which is well managed by lactulose.  No issues with his bladder function except for incontinent episodes at nighttime and some frequency during the daytime.    He has some residual weakness on the left side secondary to his history of a stroke.  He also has paresthesia in his feet due to diagnosis of neuropathy.  He has a wound on his left foot  which is managed by podiatry team.      Meds,   --Clonazepam - 2mg morning, 1mg noon, 2mg HS and 1mg daily PRN for muscle spasms -this medication has been extremely helpful for his body spasms  --Baclofen was tapered off due to no benefits  --On gabapentin for neuropathic pain in his feet  --No trial of tizanidine in the past      Therapies/HEP/equipments,  He is working with physical therapy once a week; could not work with occupational therapy due to worsening spasms.  He got a new power wheelchair about 2 3 weeks ago which is fitting well; continues his home exercise program regularly.      Functionally/social situation,   Lives in a group home with 4 other people in [place].  He is overall very satisfied about the quality of his care over there.  Is able to walk around the house using his four-wheel walker; uses his wheelchair for longer distances.  He uses [place] my ride or medical transfers for going to the appointments.  He is mod I with all ADLs except for showering and dressing after the shower.  He fell about 2 3 months ago when he took a step back and fell backward; no falls since then.           Past Medical and Surgical History:     Past Medical History:   Diagnosis Date     Clotting disorder (H)     PE 2019     Dystonia      Parkinson disease (H)      No past surgical history on file.         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              Functional history:     See HPI section           Family History:     Family History   Problem Relation Age of Onset     Other - See Comments Mother         pulmonary embolism from hip fracture     Pulmonary Embolism Mother      Parkinsonism Father      Neurologic Disorder Sister      Parkinsonism Sister         ?med related     Other - See Comments Sister          12/7/1950     Depression Sister      Neurologic Disorder Brother         dystonia     Dystonia Brother      Other - See Comments Brother              Heart Disease Nephew             Medications:     Current Outpatient Medications   Medication Sig Dispense Refill     acetaminophen (TYLENOL) 500 MG tablet 2 x 500mg tabs by mouth 3/day @8am, 12pm and 8pm and 2 x 500mg tab by mouth every 6 hours as needed       alfuzosin ER (UROXATRAL) 10 MG 24 hr tablet Take 1 tablet (10 mg) by mouth daily 90 tablet 3     atorvastatin (LIPITOR) 40 MG tablet Take 40 mg by mouth At 8pm       baclofen (LIORESAL) 10 MG tablet Week 1: 10mg @ 8Am,  15mg @ 12pm, 5pm and 8pm Week 2: 10mg @ 8Am, 10mg @ 12pm, 15mg @ 5pm and 8pm Week 3: 10mg @ 8Am, 10mg @ 12pm, 10mg @ 5pm and 15mg @ 8pm Week 4: 10mg @ 8Am, 12pm, 5pm, 8pm 120 tablet 0     Baclofen (LIORESAL) 5 MG tablet Week 1: 10mg @ 8Am,  15mg @ 12pm, 5pm and 8pm. Week 2: 10mg @ 8Am, 10mg @ 12pm, 15mg @ 5pm and 8pm. Week 3: 10mg @ 8Am, 10mg @ 12pm, 10mg @ 5pm and 15mg @ 8pm. Week 4: 10mg @ 8Am, 12pm, 5pm, 8pm 120 tablet 0     bisacodyl (DULCOLAX) 10 MG suppository Place 10 mg rectally daily as needed for constipation       calcium polycarbophil (FIBER-LAX) 625 MG tablet 1 tab by mouth daily at 8am       carbidopa-levodopa (SINEMET CR)  MG CR tablet 50/200 tab by mouth nightly at 8pm       carbidopa-levodopa (SINEMET)  MG tablet 2 tabs @ 8am, 2 @ noon, 1.5 @4pm       cholecalciferol (VITAMIN D3) 125 mcg (5000 units) capsule 125 mcg (5000 units) by mouth daily at  8am       clonazePAM (KLONOPIN) 2 MG tablet 2mg tab by mouth twice daily at bedtime and in morning. Take 1mg at 12pm 75 tablet 0     cloZAPine (CLOZARIL) 50 MG tablet 50 mg At Bedtime       ENULOSE 10 GM/15ML SOLUTION 15ml by mouth daily at 8am       gabapentin (NEURONTIN) 800 MG tablet 800mg tab by mouth 3/day at 8am, 12pm and 8pm       hydrocortisone 1 % CREA cream Apply topically to nose and other affected area(s)  every morning at 9am       hydrOXYzine (ATARAX) 25 MG tablet Take 50 mg by mouth every 6 hours as needed for anxiety (up to 3 timrd daily)       lubiprostone (AMITIZA) 24 MCG capsule 24mg tab by mouth twice daily at 8am and 8pm       magnesium oxide (MAG-OX) 400 MG tablet Take 400 mg by mouth daily       metoprolol succinate ER (TOPROL-XL) 25 MG 24 hr tablet Take 1/2 tablet (12.5mg) by mouth twice daily at 8am and 8pm       Multiple Vitamin (DAILY-ALLAN) TABS Vitamin by  Mouth daily @8am       pantoprazole (PROTONIX) 40 MG EC tablet Take 1 tablet (40mg) by mouth daily at 8am       polyethylene glycol (MIRALAX) 17 GM/Dose powder (= clearlax) Capful in liquid by mouth nightly at 8pm and capful daily as needed       rivaroxaban ANTICOAGULANT (XARELTO) 20 MG TABS tablet Take 20 mg by mouth daily (with dinner) At 5pm       SENNA-TIME 8.6 MG tablet Take 2 tablets by mouth 2 times daily And daily prn       sodium phosphate (FLEET ENEMA) 7-19 GM/118ML rectal enema Place 1 enema rectally daily as needed for constipation       traZODone (DESYREL) 100 MG tablet Take 100 mg by mouth daily at 8pm       traZODone (DESYREL) 50 MG tablet 50 mg In AM       triamcinolone (KENALOG) 0.1 % external cream        venlafaxine (EFFEXOR XR) 150 MG 24 hr capsule Take 1 capsule (150 mg) by mouth daily       venlafaxine (EFFEXOR XR) 75 MG 24 hr capsule Take 1 capsule (75 mg) by mouth daily       vitamin C (ASCORBIC ACID) 500 MG tablet Take 500 mg by mouth daily       clonazePAM (KLONOPIN) 0.5 MG tablet        cloZAPine (CLOZARIL) 25 MG tablet        polyethylene glycol (MIRALAX) 17 g packet               Allergies:     Allergies   Allergen Reactions     Amantadine Other (See Comments)     Other reaction(s): Hallucinations  Hallucinations/ lost self control/gambling.     halluicnations  Hallucinations/ lost self control/gambling.     hallucinates  halluicnations  hallucinates  Hallucinations/ lost self control/gambling.        Quetiapine GI  "Disturbance, Diarrhea and Other (See Comments)     Diarrhea    Diarrhea  Other reaction(s): GI Disturbance  Diarrhea       Duloxetine Other (See Comments)     suicidal  suicidal                ROS:     A focused ROS is negative other than the symptoms noted above in the HPI.           Physical Examiniation:     VITAL SIGNS: BP 96/61 (BP Location: Right arm, Patient Position: Sitting, Cuff Size: Adult Large)   Pulse 87   SpO2 98%   BMI: Estimated body mass index is 26.22 kg/m  as calculated from the following:    Height as of 7/19/22: 1.93 m (6' 4\").    Weight as of 8/25/22: 97.7 kg (215 lb 6.4 oz).    Gen: NAD, pleasant and cooperative   Cardio: regular pulse  Pulm: non-labored breathing in room air  Abd: benign  Ext: WWP, no edema in BLE, no tenderness in calves  Neuro/MSK:   He was sitting in his power wheelchair leaning to the right with his neck tilted to the right and rotated to the left  C-spine range of motion was moderately limited with extension and left lateral bending, he was also mildly limited in all other directions  Left shoulder range of motion was also limited up to 90 degrees of abduction and forward flexion due to weakness but no pain  Mild tenderness to palpation at posterior neck and periscapular muscles    Strength was 4+ out of 5 on the right upper and lower extremity and 4 out of 5 on left upper and lower extremity  Sensation was intact at bilateral upper extremities but diminished to light touch in his feet bilaterally  Reflexes were not tested today  Tone was increased up to 2 out of 4 per modified Quique score at left upper extremity and 1 out of 4 in left lower extremity  He also had increased tone in his right lower extremity            Laboratory/Imaging:     Brain MRI May 2022  Findings: These images reveal no intracranial mass lesion, mass  effect, midline shift or abnormal extraaxial fluid collection. The  ventricles and sulci are normal for age. Diffusion-weighted " images  demonstrate no abnormality of reduced diffusion. Punctate areas of T2  hyperintensity within the right deep subcortical white matter in the  frontal lobe, within normal limits for patient's age. normal  intravascular flow voids are identified.                                                                Impression: No evidence for stroke.     CTA head and neck May 2022  IMPRESSION:      HEAD CTA:   1.  No significant stenosis, aneurysm, or high flow vascular malformation identified.  2.  Variant [place] anatomy as above.     NECK CTA:  1.  No flow-limiting stenosis or evidence of dissection.             Assessment/Plan:       Parkinson's disease    History of CVA with residual left hemiparesis    Cervical dystonia     Dystonic movement of right upper and lower extremities, worse in the right lower extremity    Discussed treatment options for dystonia including therapies, medications and interventions.  He has responded very well to Klonopin but his symptoms are not well controlled especially spasmodic torticollis and dystonia in the distal part of his right lower extremity.  Adding another oral agent does not seem appropriate because 1-he did not respond to baclofen and 2- risk of polypharmacy and side effects.  He would benefit from physical therapy and will after the first or second round of injections.  Seems to be an excellent candidate for trial of botulinum toxin injections given his localized symptoms affecting his function, quality of life and comfort.      1. Patient education: In depth discussion and education was provided about the assessment and implications of each of the below recommendations for management. Patient indicated readiness to learn, all questions were answered and understanding of material presented was confirmed.    2. Work-up: None     3. Therapy/equipment/braces: consider after the second round of injections    4. Medications: No change today    5. Interventions: We  will get authorization for Botox and is started at 100 to 150 units targeting neck muscles (right SCM, splenius capitis, splenius services, levator and trap) and right lower extremity muscles (gastroc, FDL and posterior tibialis)    6. Referral / follow up with other providers: None today; consider referral to Dr. Rodriguez for evaluation of bone health.  He will continue to follow-up with palliative care team and movement disorder clinic.    7. Follow up:in 2 to 3 weeks  the first round of injections      Ember Arita MD  Physical Medicine & Rehabilitation    60 minutes spent on the date of the encounter doing chart review, history and exam, documentation and further activities as noted above

## 2022-10-06 NOTE — LETTER
10/6/2022       RE: Benjamin Mathews  [address]  [facility] [address]     Dear Colleague,    Thank you for referring your patient, Benjamin Mathews, to the [facility]IC[facility]AB[facility] [place] [facility]. Please see a copy of my visit note below.           PM&R Clinic Note     Patient Name: Benjamin Mathews : 1965 Medical Record: [id number]     Requesting Physician/clinician: Tammie Suazo MD           History of Present Illness:     Benjamin Mathews is a 57 year old male who was referred to our clinic for more evaluation of his dystonia and trial of botulinum toxin injections.    She has history of Parkinson disease and is followed by Dr. Perry; he will see Dr. Carlos for follow-up in 2023.    He was referred to palliative care team and has been followed by them since then.  He was also referred to the pain clinic and saw Dr. Freitas; she sent a referral to PM&R team for trial of botulinum toxins.       Symptoms,  Today he reported frequent spasms affecting the right side of his body.  It typically starts from the smaller muscles around his eye and face and then radiates down to his shoulder, arm and leg.  The spasms in his right arm and right leg used to happen every other day lasting for a few hours but the frequency and severity have significantly improved since he was a started on Klonopin, currently happening 1-2 times per month.    However spasms in his neck continue to be severe, occurring multiple times per day especially in the afternoon and lasting 3 to 4 hours.  These the spasms are extremely painful; his neck is fixed in right lateral tilt position and he also has spasms in his right arm and leg at the same time in flexed position.  He also has some associated symptoms with jaw shearing that can last for a few hours as well.    He has no issues with his appetite or sleep.  He is constipated which is  well managed by lactulose.  No issues with his bladder function except for incontinent episodes at nighttime and some frequency during the daytime.    He has some residual weakness on the left side secondary to his history of a stroke.  He also has paresthesia in his feet due to diagnosis of neuropathy.  He has a wound on his left foot which is managed by podiatry team.      Meds,   --Clonazepam - 2mg morning, 1mg noon, 2mg HS and 1mg daily PRN for muscle spasms -this medication has been extremely helpful for his body spasms  --Baclofen was tapered off due to no benefits  --On gabapentin for neuropathic pain in his feet  --No trial of tizanidine in the past      Therapies/HEP/equipments,  He is working with physical therapy once a week; could not work with occupational therapy due to worsening spasms.  He got a new power wheelchair about 2 3 weeks ago which is fitting well; continues his home exercise program regularly.      Functionally/social situation,   Lives in a group home with 4 other people in [place].  He is overall very satisfied about the quality of his care over there.  Is able to walk around the house using his four-wheel walker; uses his wheelchair for longer distances.  He uses [place] my ride or medical transfers for going to the appointments.  He is mod I with all ADLs except for showering and dressing after the shower.  He fell about 2 3 months ago when he took a step back and fell backward; no falls since then.           Past Medical and Surgical History:     Past Medical History:   Diagnosis Date     Clotting disorder (H)     PE 2019     Dystonia      Parkinson disease (H)      No past surgical history on file.         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              Functional history:     See  HPI section           Family History:     Family History   Problem Relation Age of Onset     Other - See Comments Mother         pulmonary embolism from hip fracture     Pulmonary Embolism Mother      Parkinsonism Father      Neurologic Disorder Sister      Parkinsonism Sister         ?med related     Other - See Comments Sister         12/7/1950     Depression Sister      Neurologic Disorder Brother         dystonia     Dystonia Brother      Other - See Comments Brother              Heart Disease Nephew             Medications:     Current Outpatient Medications   Medication Sig Dispense Refill     acetaminophen (TYLENOL) 500 MG tablet 2 x 500mg tabs by mouth 3/day @8am, 12pm and 8pm and 2 x 500mg tab by mouth every 6 hours as needed       alfuzosin ER (UROXATRAL) 10 MG 24 hr tablet Take 1 tablet (10 mg) by mouth daily 90 tablet 3     atorvastatin (LIPITOR) 40 MG tablet Take 40 mg by mouth At 8pm       baclofen (LIORESAL) 10 MG tablet Week 1: 10mg @ 8Am,  15mg @ 12pm, 5pm and 8pm Week 2: 10mg @ 8Am, 10mg @ 12pm, 15mg @ 5pm and 8pm Week 3: 10mg @ 8Am, 10mg @ 12pm, 10mg @ 5pm and 15mg @ 8pm Week 4: 10mg @ 8Am, 12pm, 5pm, 8pm 120 tablet 0     Baclofen (LIORESAL) 5 MG tablet Week 1: 10mg @ 8Am,  15mg @ 12pm, 5pm and 8pm. Week 2: 10mg @ 8Am, 10mg @ 12pm, 15mg @ 5pm and 8pm. Week 3: 10mg @ 8Am, 10mg @ 12pm, 10mg @ 5pm and 15mg @ 8pm. Week 4: 10mg @ 8Am, 12pm, 5pm, 8pm 120 tablet 0     bisacodyl (DULCOLAX) 10 MG suppository Place 10 mg rectally daily as needed for constipation       calcium polycarbophil (FIBER-LAX) 625 MG tablet 1 tab by mouth daily at 8am       carbidopa-levodopa (SINEMET CR)  MG CR tablet 50/200 tab by mouth nightly at 8pm       carbidopa-levodopa (SINEMET)  MG tablet 2 tabs @ 8am, 2 @ noon, 1.5 @4pm       cholecalciferol (VITAMIN D3) 125 mcg (5000 units) capsule 125 mcg (5000 units) by mouth daily at  8am       clonazePAM (KLONOPIN) 2 MG tablet 2mg tab by mouth twice daily at  bedtime and in morning. Take 1mg at 12pm 75 tablet 0     cloZAPine (CLOZARIL) 50 MG tablet 50 mg At Bedtime       ENULOSE 10 GM/15ML SOLUTION 15ml by mouth daily at 8am       gabapentin (NEURONTIN) 800 MG tablet 800mg tab by mouth 3/day at 8am, 12pm and 8pm       hydrocortisone 1 % CREA cream Apply topically to nose and other affected area(s) every morning at 9am       hydrOXYzine (ATARAX) 25 MG tablet Take 50 mg by mouth every 6 hours as needed for anxiety (up to 3 timrd daily)       lubiprostone (AMITIZA) 24 MCG capsule 24mg tab by mouth twice daily at 8am and 8pm       magnesium oxide (MAG-OX) 400 MG tablet Take 400 mg by mouth daily       metoprolol succinate ER (TOPROL-XL) 25 MG 24 hr tablet Take 1/2 tablet (12.5mg) by mouth twice daily at 8am and 8pm       Multiple Vitamin (DAILY-ALLAN) TABS Vitamin by  Mouth daily @8am       pantoprazole (PROTONIX) 40 MG EC tablet Take 1 tablet (40mg) by mouth daily at 8am       polyethylene glycol (MIRALAX) 17 GM/Dose powder (= clearlax) Capful in liquid by mouth nightly at 8pm and capful daily as needed       rivaroxaban ANTICOAGULANT (XARELTO) 20 MG TABS tablet Take 20 mg by mouth daily (with dinner) At 5pm       SENNA-TIME 8.6 MG tablet Take 2 tablets by mouth 2 times daily And daily prn       sodium phosphate (FLEET ENEMA) 7-19 GM/118ML rectal enema Place 1 enema rectally daily as needed for constipation       traZODone (DESYREL) 100 MG tablet Take 100 mg by mouth daily at 8pm       traZODone (DESYREL) 50 MG tablet 50 mg In AM       triamcinolone (KENALOG) 0.1 % external cream        venlafaxine (EFFEXOR XR) 150 MG 24 hr capsule Take 1 capsule (150 mg) by mouth daily       venlafaxine (EFFEXOR XR) 75 MG 24 hr capsule Take 1 capsule (75 mg) by mouth daily       vitamin C (ASCORBIC ACID) 500 MG tablet Take 500 mg by mouth daily       clonazePAM (KLONOPIN) 0.5 MG tablet        cloZAPine (CLOZARIL) 25 MG tablet        polyethylene glycol (MIRALAX) 17 g packet                "Allergies:     Allergies   Allergen Reactions     Amantadine Other (See Comments)     Other reaction(s): Hallucinations  Hallucinations/ lost self control/gambling.     halluicnations  Hallucinations/ lost self control/gambling.     hallucinates  halluicnations  hallucinates  Hallucinations/ lost self control/gambling.        Quetiapine GI Disturbance, Diarrhea and Other (See Comments)     Diarrhea    Diarrhea  Other reaction(s): GI Disturbance  Diarrhea       Duloxetine Other (See Comments)     suicidal  suicidal                ROS:     A focused ROS is negative other than the symptoms noted above in the HPI.           Physical Examiniation:     VITAL SIGNS: BP 96/61 (BP Location: Right arm, Patient Position: Sitting, Cuff Size: Adult Large)   Pulse 87   SpO2 98%   BMI: Estimated body mass index is 26.22 kg/m  as calculated from the following:    Height as of 7/19/22: 1.93 m (6' 4\").    Weight as of 8/25/22: 97.7 kg (215 lb 6.4 oz).    Gen: NAD, pleasant and cooperative   Cardio: regular pulse  Pulm: non-labored breathing in room air  Abd: benign  Ext: WWP, no edema in BLE, no tenderness in calves  Neuro/MSK:   He was sitting in his power wheelchair leaning to the right with his neck tilted to the right and rotated to the left  C-spine range of motion was moderately limited with extension and left lateral bending, he was also mildly limited in all other directions  Left shoulder range of motion was also limited up to 90 degrees of abduction and forward flexion due to weakness but no pain  Mild tenderness to palpation at posterior neck and periscapular muscles    Strength was 4+ out of 5 on the right upper and lower extremity and 4 out of 5 on left upper and lower extremity  Sensation was intact at bilateral upper extremities but diminished to light touch in his feet bilaterally  Reflexes were not tested today  Tone was increased up to 2 out of 4 per modified Quique score at left upper extremity and 1 out of 4 " in left lower extremity  He also had increased tone in his right lower extremity            Laboratory/Imaging:     Brain MRI May 2022  Findings: These images reveal no intracranial mass lesion, mass  effect, midline shift or abnormal extraaxial fluid collection. The  ventricles and sulci are normal for age. Diffusion-weighted images  demonstrate no abnormality of reduced diffusion. Punctate areas of T2  hyperintensity within the right deep subcortical white matter in the  frontal lobe, within normal limits for patient's age. normal  intravascular flow voids are identified.                                                                Impression: No evidence for stroke.     CTA head and neck May 2022  IMPRESSION:      HEAD CTA:   1.  No significant stenosis, aneurysm, or high flow vascular malformation identified.  2.  Variant [place] anatomy as above.     NECK CTA:  1.  No flow-limiting stenosis or evidence of dissection.             Assessment/Plan:       Parkinson's disease    History of CVA with residual left hemiparesis    Cervical dystonia     Dystonic movement of right upper and lower extremities, worse in the right lower extremity    Discussed treatment options for dystonia including therapies, medications and interventions.  He has responded very well to Klonopin but his symptoms are not well controlled especially spasmodic torticollis and dystonia in the distal part of his right lower extremity.  Adding another oral agent does not seem appropriate because 1-he did not respond to baclofen and 2- risk of polypharmacy and side effects.  He would benefit from physical therapy and will after the first or second round of injections.  Seems to be an excellent candidate for trial of botulinum toxin injections given his localized symptoms affecting his function, quality of life and comfort.      1. Patient education: In depth discussion and education was provided about the assessment and implications of  each of the below recommendations for management. Patient indicated readiness to learn, all questions were answered and understanding of material presented was confirmed.    2. Work-up: None     3. Therapy/equipment/braces: consider after the second round of injections    4. Medications: No change today    5. Interventions: We will get authorization for Botox and is started at 100 to 150 units targeting neck muscles (right SCM, splenius capitis, splenius services, levator and trap) and right lower extremity muscles (gastroc, FDL and posterior tibialis)    6. Referral / follow up with other providers: None today; consider referral to Dr. Rodriguez for evaluation of bone health.  He will continue to follow-up with palliative care team and movement disorder clinic.    7. Follow up:in 2 to 3 weeks  the first round of injections      60 minutes spent on the date of the encounter doing chart review, history and exam, documentation and further activities as noted above        Again, thank you for allowing me to participate in the care of your patient.      Sincerely,    Ember Arita MD

## 2022-10-10 ENCOUNTER — MYC MEDICAL ADVICE (OUTPATIENT)
Dept: PHYSICAL MEDICINE AND REHAB | Facility: CLINIC | Age: 57
End: 2022-10-10

## 2022-10-10 NOTE — TELEPHONE ENCOUNTER
NEW consult visit done 10/6/22 with DR Arita  Progress notes:  Meds,   - Continue Clonazepam - 2mg morning, 1mg noon, 2mg HS  - Clonazepam 1mg daily PRN for muscle spasms  klonipin - helps - bedtime and in the morning - helped with one in the morning   Baclofen was tapered off - no benefits   Gabapentin for nerve pain in feet   Not tizanidine in the past     clonazePAM 1 mg tab every day prn was discontinued by rooming staff, however writer does not see indication from the provider to do so.    Original ordering provider was Anthony Sosa MD on 8/25/22      Answered prepared in response to pt's question:    Dear Benjamin and Mikhail,  I reviewed the consultation visit you had with Dr Arita on 10/6/22, especially the After Visit Summary.  My review shows the 1.0 mg Clonazepam was discontinued, but Dr Arita does not have notes that she gave the order to do that.  My interpretation is that the rooming staff removed the med.    I apologize the medication was removed, as it was not specifically ordered to be removed.    The medication was listed as ordered by Anthony Sosa MD - the order for the medication from Dr Sosa on 8/25/22 is still valid and your group home record would be correct.  The changes on the AVS of 10/6/22 are the error and I will find out how to restore the entry to Benjamin's medication list with [place].    Please confirm with Dr Sosa by phone or at the upcoming appointment 10/20/22 that the correct instructions for taking clonazePAM are: 2 mg in the morning, 1 mg at noon, 2 mg at bedtime, and in addition, one 1 mg tablet may be used once a day as needed (prn) for treament of muscle spasms.    Please contact me with any other questions.    Nereida Delvalle, Certified Rehabilitation R.N.  Phone & Confidential Voice mail # [phone]  I am not in the clinic on Fridays

## 2022-10-10 NOTE — TELEPHONE ENCOUNTER
Writer verified with DR Arita that she did not ask for the clonazePAM to be removed from this pt's list.    Answered pt & RN via [email or web address].    Writer will consult with supervisor on how to replace the original order as active on this record.    Nereida Delvalle RN on 10/10/2022 at 3:25 PM

## 2022-10-11 RX ORDER — CLONAZEPAM 1 MG/1
1 TABLET ORAL 2 TIMES DAILY PRN
COMMUNITY
End: 2022-10-12

## 2022-10-12 ENCOUNTER — PATIENT OUTREACH (OUTPATIENT)
Dept: PALLIATIVE CARE | Facility: CLINIC | Age: 57
End: 2022-10-12

## 2022-10-12 ENCOUNTER — TRANSFERRED RECORDS (OUTPATIENT)
Dept: HEALTH INFORMATION MANAGEMENT | Facility: CLINIC | Age: 57
End: 2022-10-12

## 2022-10-12 ENCOUNTER — DOCUMENTATION ONLY (OUTPATIENT)
Dept: NEUROLOGY | Facility: CLINIC | Age: 57
End: 2022-10-12

## 2022-10-12 DIAGNOSIS — M62.838 MUSCLE SPASM: ICD-10-CM

## 2022-10-12 DIAGNOSIS — M62.838 MUSCLE SPASM: Primary | ICD-10-CM

## 2022-10-12 RX ORDER — CLONAZEPAM 1 MG/1
1 TABLET ORAL DAILY PRN
Qty: 30 TABLET | Refills: 0 | Status: SHIPPED | OUTPATIENT
Start: 2022-10-12 | End: 2022-11-26

## 2022-10-12 RX ORDER — CLONAZEPAM 1 MG/1
1 TABLET ORAL DAILY PRN
Qty: 30 TABLET | Refills: 0 | Status: CANCELLED | OUTPATIENT
Start: 2022-10-12

## 2022-10-12 NOTE — PROGRESS NOTES
Received OT and SLP, order was signed and fax to [phone], and sent to be scanned into patient chart

## 2022-10-12 NOTE — PROGRESS NOTES
"[facility]: Palliative Care                                                                                        Received VM from nurse with [facility] caring for patient. She is looking for clarification on clonazepam order. She reports, per their pharmacy, clonazepam was discontinued?     She is hoping this can be reordered as he does not have anything PRN right now for spasms. Her call back # is [phone].    Per last note:   \"- Continue Clonazepam - 2mg morning, 1mg noon, 2mg HS  - Clonazepam 1mg daily PRN for muscle spasms\"    Called back - nurse states that pharmacy told her the order for clonazepam 1mg once daily PRN was discontinued 10/6? Reviewed Epic, I can see a CMA did discontinue order that day, no discontinuation reason provided. It does seem a patient reports order was put in that day for same med (suggesting initial order discontinued accidentally?).     Advised I show per the notes patient should be continuing on clonazepam orders above, this matches what facility has as well.    Advised I would ask MDs to send new PRN clonazepam order for patient.     Note - follow up scheduled for 10/20 with Dr. Sosa already.     Signature:  Danielle Ardon, AMISHAN, RN, OCN  "

## 2022-10-20 ENCOUNTER — OFFICE VISIT (OUTPATIENT)
Dept: PALLIATIVE CARE | Facility: CLINIC | Age: 57
End: 2022-10-20
Attending: INTERNAL MEDICINE
Payer: COMMERCIAL

## 2022-10-20 VITALS
DIASTOLIC BLOOD PRESSURE: 60 MMHG | TEMPERATURE: 98.2 F | RESPIRATION RATE: 16 BRPM | OXYGEN SATURATION: 97 % | SYSTOLIC BLOOD PRESSURE: 94 MMHG | HEART RATE: 81 BPM

## 2022-10-20 DIAGNOSIS — M62.838 MUSCLE SPASM: ICD-10-CM

## 2022-10-20 DIAGNOSIS — G20.A1 PARKINSON'S DISEASE (TREMOR, STIFFNESS, SLOW MOTION, UNSTABLE POSTURE) (H): Primary | ICD-10-CM

## 2022-10-20 PROCEDURE — G0463 HOSPITAL OUTPT CLINIC VISIT: HCPCS

## 2022-10-20 PROCEDURE — 99213 OFFICE O/P EST LOW 20 MIN: CPT | Mod: GC

## 2022-10-20 RX ORDER — CLONAZEPAM 2 MG/1
TABLET ORAL
Qty: 75 TABLET | Refills: 0 | Status: SHIPPED | OUTPATIENT
Start: 2022-10-24 | End: 2022-11-25

## 2022-10-20 ASSESSMENT — PAIN SCALES - GENERAL: PAINLEVEL: MILD PAIN (3)

## 2022-10-20 NOTE — LETTER
10/20/2022       RE: Benjamin Mathews  [address]  [facility] [address]     Dear Colleague,    Thank you for referring your patient, Benjamin Mathews, to the [facility] at [facility]. Please see a copy of my visit note below.    Palliative Care Outpatient Clinic Progress Note    Patient Name: Benjamin Mathews is being evaluated via a billable video visit.    Primary Provider:  Stephanie Maldonado  Last seen by palliative care: 08/25/22       Chief Complaint: Dystonia, muscle spasm    Background/Summary  Medical:  Patient diagnosed with Parkinson's in 2013 after a short period of tremor and gait changes. 2015 symptoms significant worsened. He is followed by  of Neurology.    Social  Grew up in [place]. 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 Around this time is when he was diagnosed with Parkinson's and work became difficulty. Has a brother and sister-in law that come to visit him in his [place] which he appreciates. He has a sister and brother in law who are MDs, work for [employer] (In [place]). Father had Parkinson's and passed over 10 years ago. He care for his mother and father before they passed.     Lives in a group home with 4 other people in [place].  Is able to walk around the house using his four-wheel walker; uses his wheelchair for longer distances.      Medication Safety     : reviewed, 10/20/22.   No opioids.   Clonazepam refill appropriate. - Last PRN refill on 10/12/22 - will not refill today        - Last scheduled clonazepam on 09/26/22 - will sent refill.     Interim History:    History gathered today from: patient     At the last visit we discussed muscle spasms. Clonazepam was the most helpful. Spasms were worse in the morning. PRN dose of clonazepam was increased from 0.5mg to 1mg. He  was also taking scheduled clonazepam 2mg AM, 1mg noon, 2mg HS - this dose was not changed. We were also tapering off baclofen as patient did not find it helpful for symptoms. Carried out slow taper to monitor symptoms. In addition encouraged him to see PM&R for botox evaluation. He did see Dr. Arita with PM&R on 10/06/22. Considered an excellent candidate for trial of botulinum toxin injections given his localized symptoms.     Today patient reports improvement in muscles spasms. Previously worse in the morning, would have episodes of dystonia in right arm and leg which could last hours if no clonazepam taken. This would occur a couple times per week. Since increasing PRN dose and taking nightly schedule dose of clonazepam later, his morning symptoms have improved. Now having spasms in the evening around 5pm, however only once every few weeks. Right neck spasm most bothersome right now.  Baclofen was tapered off and he has had no rebound symptoms.  He is hopeful that the botox will be helpful. He would like to stop magnesium.    Has extensive bowel regimen. Most are PRNs - bisacodyl, senna, miralax. Scheduled enulose and lubiprostone.    Sleeping well at night. Uses a walker at home to get around. No recent falls. Appetite is good. No pain apart from spasms. No nausea or vomiting.     Enjoys playing pool once a week.  Will go shopping with assistance from [place]. Will take scooter out if the weather is nice.       Functional Status:  Palliative Performance Scale: 50%  Previous: 50%          Impression & Recommendations & Counseling:  Benjamin Mathews is a 58 yo male with Parkinson's disease with neurocognitive disease with neurocognitive disorder, dyskinesias, stroke, chronic pain who presents with right sided dystonia involving arm and leg with torticollis of right neck. Now off of baclofen. Symptoms have improved with scheduled and PRN clonazepam, less frequent episodes. We will continue clonazepam regimen. Seen by  PM&R and planning for botox for pain management.   - Continue Clonazepam   - 2mg AM, 1mg noon, 2mg PM   - 1mg PRN  - Appears he is scheduled for botox with PM&R on oct 24th. He will call and confirm. Will let us know if any questions.   - Discontinue magnesium - states it is not helpful and he notified Dr. Perry as clinic visit on 08/01. Will remove from med list.  - Bisacodyl most helpful for constipation - available PRN  - Return to clinic in 2 months    Data / Chart Review:    Review of Systems:   ROS: 10 point ROS neg other than the symptoms noted above in the HPI  and pertinents here.         Physical Exam:   Physical Exam:    Vital signs:    BP 94/60   Pulse 81   Temp 98.2  F (36.8  C) (Oral)   Resp 16   SpO2 97%       CONSTIT: Alert, no acute distress, sitting in motorized wheel chair  EENT: EOMI, no icterus  RESP: reg, normal effort, no cough  SKIN: no rash, no obvious lesions  NEURO: alert, oriented x3  PSYCH: normal affect, memory and thought process intact      Current pertinent medications:    Current Outpatient Medications   Medication     acetaminophen (TYLENOL) 500 MG tablet     alfuzosin ER (UROXATRAL) 10 MG 24 hr tablet     atorvastatin (LIPITOR) 40 MG tablet     baclofen (LIORESAL) 10 MG tablet     Baclofen (LIORESAL) 5 MG tablet     bisacodyl (DULCOLAX) 10 MG suppository     calcium polycarbophil (FIBER-LAX) 625 MG tablet     carbidopa-levodopa (SINEMET CR)  MG CR tablet     carbidopa-levodopa (SINEMET)  MG tablet     cholecalciferol (VITAMIN D3) 125 mcg (5000 units) capsule     clonazePAM (KLONOPIN) 1 MG tablet     clonazePAM (KLONOPIN) 2 MG tablet     cloZAPine (CLOZARIL) 25 MG tablet     cloZAPine (CLOZARIL) 50 MG tablet     ENULOSE 10 GM/15ML SOLUTION     gabapentin (NEURONTIN) 800 MG tablet     hydrocortisone 1 % CREA cream     hydrOXYzine (ATARAX) 25 MG tablet     lubiprostone (AMITIZA) 24 MCG capsule     magnesium oxide (MAG-OX) 400 MG tablet     metoprolol succinate ER  (TOPROL-XL) 25 MG 24 hr tablet     Multiple Vitamin (DAILY-ALLAN) TABS     pantoprazole (PROTONIX) 40 MG EC tablet     polyethylene glycol (MIRALAX) 17 g packet     polyethylene glycol (MIRALAX) 17 GM/Dose powder     rivaroxaban ANTICOAGULANT (XARELTO) 20 MG TABS tablet     SENNA-TIME 8.6 MG tablet     sodium phosphate (FLEET ENEMA) 7-19 GM/118ML rectal enema     traZODone (DESYREL) 100 MG tablet     traZODone (DESYREL) 50 MG tablet     triamcinolone (KENALOG) 0.1 % external cream     venlafaxine (EFFEXOR XR) 150 MG 24 hr capsule     venlafaxine (EFFEXOR XR) 75 MG 24 hr capsule     vitamin C (ASCORBIC ACID) 500 MG tablet     Current Facility-Administered Medications   Medication     [START ON 10/27/2022] botulinum toxin type A (BOTOX) 100 units injection 400 Units       Allergies: Amantadine, Quetiapine, Duloxetine         Lab and imaging data reviewed:  No new labs      AJAY [facility]  Palliative Medicine Fellow              Attestation signed by Jose Alegre MD at 10/21/2022  9:21 AM:  Patient seen and evaluated with Dr. Sosa.      Agree with assessment and recommendations as documented in this note. Patient seen for support, decision making, symptom management in context of  Parkinson's Disease, made adjustments to pain and symptom medications based on history, chart review, visual exam. Offered support. I saw Benjamin on October 20, 2022.  He loves listening to the [place] blues.    Jose Alegre MD MS FAAFP  [facility] Palliative Care [facility]  Office [phone]  Fax [phone]       Sincerely,    Anthony Sosa MD

## 2022-10-20 NOTE — PATIENT INSTRUCTIONS
Patient Instructions      Expand All Collapse All    Thank you for attending the Palliative Care Clinic appointment today.     1. Please continue current klonopin dose. 2mg AM, 1mg noon, 2mg HS. 1mg as needed.    2. The physical medicine and rehab group will call you to schedule. If you don't hear back please call them. You saw Dr. Ember Arita MD. Office Phone [phone].      Call if your symptoms change and the medications we have prescribed are not working.   Do not take your medications at any other dose or frequently than how they are prescribed. Call if you think we should make any changes  I have prescribed naloxone as a rescue medication for the opioids (pain pills), which I do for all of my patients who have these medications at home.    Call us at least 3 workdays in advance if you need a medication refill.    Please have all your pill bottles ready for your next appointment.     Return to clinic in 2 months for a follow up.     You can reach the Palliative Care Team during business hours at the following number:    - [phone]  - or send me a Session[employer] message    To reach the Palliative Care Provider on-call After-hours or on holidays and weekends, call: [phone].  Please note that we are not able to provide pain medication refills on evenings or weekends.

## 2022-10-20 NOTE — PROGRESS NOTES
Palliative Care Outpatient Clinic Progress Note    Patient Name: Benjamin Matehws is being evaluated via a billable video visit.    Primary Provider:  Stephanie Maldonado  Last seen by palliative care: 08/25/22       Chief Complaint: Dystonia, muscle spasm    Background/Summary  Medical:  Patient diagnosed with Parkinson's in 2013 after a short period of tremor and gait changes. 2015 symptoms significant worsened. He is followed by  of Neurology.    Social  Grew up in [place]. 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 Around this time is when he was diagnosed with Parkinson's and work became difficulty. Has a brother and sister-in law that come to visit him in his [place] which he appreciates. He has a sister and brother in law who are MDs, work for [employer] (In [place]). Father had Parkinson's and passed over 10 years ago. He care for his mother and father before they passed.     Lives in a group home with 4 other people in [place].  Is able to walk around the house using his four-wheel walker; uses his wheelchair for longer distances.      Medication Safety     : reviewed, 10/20/22.   No opioids.   Clonazepam refill appropriate. - Last PRN refill on 10/12/22 - will not refill today        - Last scheduled clonazepam on 09/26/22 - will sent refill.     Interim History:    History gathered today from: patient     At the last visit we discussed muscle spasms. Clonazepam was the most helpful. Spasms were worse in the morning. PRN dose of clonazepam was increased from 0.5mg to 1mg. He was also taking scheduled clonazepam 2mg AM, 1mg noon, 2mg HS - this dose was not changed. We were also tapering off baclofen as patient did not find it helpful for symptoms. Carried out slow taper to monitor symptoms. In addition encouraged him to see PM&R for botox evaluation. He did see Dr. Arita with  PM&R on 10/06/22. Considered an excellent candidate for trial of botulinum toxin injections given his localized symptoms.     Today patient reports improvement in muscles spasms. Previously worse in the morning, would have episodes of dystonia in right arm and leg which could last hours if no clonazepam taken. This would occur a couple times per week. Since increasing PRN dose and taking nightly schedule dose of clonazepam later, his morning symptoms have improved. Now having spasms in the evening around 5pm, however only once every few weeks. Right neck spasm most bothersome right now.  Baclofen was tapered off and he has had no rebound symptoms.  He is hopeful that the botox will be helpful. He would like to stop magnesium.    Has extensive bowel regimen. Most are PRNs - bisacodyl, senna, miralax. Scheduled enulose and lubiprostone.    Sleeping well at night. Uses a walker at home to get around. No recent falls. Appetite is good. No pain apart from spasms. No nausea or vomiting.     Enjoys playing pool once a week.  Will go shopping with assistance from Bruno. Will take scooter out if the weather is nice.       Functional Status:  Palliative Performance Scale: 50%  Previous: 50%          Impression & Recommendations & Counseling:  Benjamin Mathews is a 56 yo male with Parkinson's disease with neurocognitive disease with neurocognitive disorder, dyskinesias, stroke, chronic pain who presents with right sided dystonia involving arm and leg with torticollis of right neck. Now off of baclofen. Symptoms have improved with scheduled and PRN clonazepam, less frequent episodes. We will continue clonazepam regimen. Seen by PM&R and planning for botox for pain management.   - Continue Clonazepam   - 2mg AM, 1mg noon, 2mg PM   - 1mg PRN  - Appears he is scheduled for botox with PM&R on oct 24th. He will call and confirm. Will let us know if any questions.   - Discontinue magnesium - states it is not helpful and he notified   Vicky as clinic visit on 08/01. Will remove from med list.  - Bisacodyl most helpful for constipation - available PRN  - Return to clinic in 2 months    Data / Chart Review:    Review of Systems:   ROS: 10 point ROS neg other than the symptoms noted above in the HPI  and pertinents here.         Physical Exam:   Physical Exam:    Vital signs:    BP 94/60   Pulse 81   Temp 98.2  F (36.8  C) (Oral)   Resp 16   SpO2 97%       CONSTIT: Alert, no acute distress, sitting in motorized wheel chair  EENT: EOMI, no icterus  RESP: reg, normal effort, no cough  SKIN: no rash, no obvious lesions  NEURO: alert, oriented x3  PSYCH: normal affect, memory and thought process intact      Current pertinent medications:    Current Outpatient Medications   Medication     acetaminophen (TYLENOL) 500 MG tablet     alfuzosin ER (UROXATRAL) 10 MG 24 hr tablet     atorvastatin (LIPITOR) 40 MG tablet     baclofen (LIORESAL) 10 MG tablet     Baclofen (LIORESAL) 5 MG tablet     bisacodyl (DULCOLAX) 10 MG suppository     calcium polycarbophil (FIBER-LAX) 625 MG tablet     carbidopa-levodopa (SINEMET CR)  MG CR tablet     carbidopa-levodopa (SINEMET)  MG tablet     cholecalciferol (VITAMIN D3) 125 mcg (5000 units) capsule     clonazePAM (KLONOPIN) 1 MG tablet     clonazePAM (KLONOPIN) 2 MG tablet     cloZAPine (CLOZARIL) 25 MG tablet     cloZAPine (CLOZARIL) 50 MG tablet     ENULOSE 10 GM/15ML SOLUTION     gabapentin (NEURONTIN) 800 MG tablet     hydrocortisone 1 % CREA cream     hydrOXYzine (ATARAX) 25 MG tablet     lubiprostone (AMITIZA) 24 MCG capsule     magnesium oxide (MAG-OX) 400 MG tablet     metoprolol succinate ER (TOPROL-XL) 25 MG 24 hr tablet     Multiple Vitamin (DAILY-ALLAN) TABS     pantoprazole (PROTONIX) 40 MG EC tablet     polyethylene glycol (MIRALAX) 17 g packet     polyethylene glycol (MIRALAX) 17 GM/Dose powder     rivaroxaban ANTICOAGULANT (XARELTO) 20 MG TABS tablet     SENNA-TIME 8.6 MG tablet     sodium  phosphate (FLEET ENEMA) 7-19 GM/118ML rectal enema     traZODone (DESYREL) 100 MG tablet     traZODone (DESYREL) 50 MG tablet     triamcinolone (KENALOG) 0.1 % external cream     venlafaxine (EFFEXOR XR) 150 MG 24 hr capsule     venlafaxine (EFFEXOR XR) 75 MG 24 hr capsule     vitamin C (ASCORBIC ACID) 500 MG tablet     Current Facility-Administered Medications   Medication     [START ON 10/27/2022] botulinum toxin type A (BOTOX) 100 units injection 400 Units       Allergies: Amantadine, Quetiapine, Duloxetine         Lab and imaging data reviewed:  No new labs      AJAY Langley [facility] LUIGI  Palliative Medicine Fellow

## 2022-10-20 NOTE — NURSING NOTE
"Oncology Rooming Note    October 20, 2022 2:39 PM   Benjamin Mathews is a 57 year old male who presents for:    Chief Complaint   Patient presents with     Oncology Clinic Visit     Palliative care     Initial Vitals: BP 94/60   Pulse 81   Temp 98.2  F (36.8  C) (Oral)   Resp 16   SpO2 97%  Estimated body mass index is 26.22 kg/m  as calculated from the following:    Height as of 7/19/22: 1.93 m (6' 4\").    Weight as of 8/25/22: 97.7 kg (215 lb 6.4 oz). There is no height or weight on file to calculate BSA.  Mild Pain (3) Comment: Data Unavailable   No LMP for male patient.  Allergies reviewed: Yes  Medications reviewed: Yes    Medications: Medication refills not needed today.  Pharmacy name entered into [employer]: [facility] - [place], [place] - [address]    Clinical concerns: none       Jasmin Morse CMA            "

## 2022-10-21 ENCOUNTER — TELEPHONE (OUTPATIENT)
Dept: PALLIATIVE CARE | Facility: CLINIC | Age: 57
End: 2022-10-21

## 2022-10-21 ENCOUNTER — TELEPHONE (OUTPATIENT)
Dept: PHYSICAL MEDICINE AND REHAB | Facility: CLINIC | Age: 57
End: 2022-10-21

## 2022-10-21 NOTE — TELEPHONE ENCOUNTER
Called and spoke with patient's nurse Mikhail and she requested a message sent to Dr. Arita to make sure no hold is needed for the Xarelto or any of his other medications.     Advised a message will be sent as requested to the provider.     Natalie Herron, AMISHAN RN Care Coordinator  [facility]   [facility] # [phone]

## 2022-10-21 NOTE — TELEPHONE ENCOUNTER
Called patient's nurse back and relayed provider message regarding patient's medications:    ----- Message -----  From: Ember Arita MD  Sent: 10/21/2022   3:57 PM CDT      No need to hold xarelto or any other meds on Monday.    Ember Navarro    No further questions or concerns at this time.    AMISHA MariscalN RN Care Coordinator  [facility]   [facility] # [phone]

## 2022-10-21 NOTE — TELEPHONE ENCOUNTER
Pharmacist calling re: clonazepam - needs okay to split prescription.  They can be reached at [phone].    They wonder if they can fill as 2 mg tablet morning and night, then if they can fill 2 x 0.5 mg tabs at noon for the 1 mg dose rather than using 1 mg tablet as he has another 1 mg tablet prescription on file.     Routed to care team for clarification.

## 2022-10-21 NOTE — TELEPHONE ENCOUNTER
[facility]: Palliative Care                                                                                        Received return call from [facility] - insurance will not allow more then 2 tabs of clonazepam per day. They are unable to fill 2mg script as written. Since April 2021 they have been filling 3 different clonazepam strengths/scripts for patient:     1. Script for 2mg clonzepam AM and PM  2. Script for 0.5mg clonazepam, take 1mg scheduled midday  3. Script for 1mg clonazepam PRN daily    They do not send PRN script automatically to facility, staff has to call to request it. If we change scripts to combing the 1mg scheduled and 1mg PRN they will not automatically send that to the facility, their system will not allow it. He has always had 3 separate orders since April 2021. They recommend we keep it this way so that there are not any delays for patient getting his med.     Gave verbal okay to split order to 2mg BID and 0.5mg - take 1mg midday scheduled. They already have the order for 1mg PRN.     Signature:  Danielle Ardon, AMISHAN, RN, OCN

## 2022-10-21 NOTE — TELEPHONE ENCOUNTER
[facility] Call Center    Phone Message    May a detailed message be left on voicemail: yes     Reason for Call: Other: Patient's nurse Annaliza requesting a call back to discuss what medications he should or shol ud not be taking.  Especially, questions Xareltox    Please call Annalievangelista back      Botox appointment is 10/24/22    Action Taken: Message routed to:  [facility] ([facility]): PM&R    Travel Screening: Not Applicable

## 2022-10-21 NOTE — TELEPHONE ENCOUNTER
[facility]: Palliative Care                                                                                        Spoke with pharmacist - advised I thought it sounded more confusing to case staff to have 3 different strengths of clonazepam versus the 2 different strengths. He advised there is a movement in the facility to get away for cutting tabs if they can.     We agreed that at next refills we can change to two different scripts to 2mg BID + 1mg scheduled midday plus 1mg PRN to avoid having to cut a 2mg tab.     They will fill script as sent over yesterday.     Signature:  Danielle Ardon, AMISHAN, RN, OCN

## 2022-10-24 ENCOUNTER — OFFICE VISIT (OUTPATIENT)
Dept: PHYSICAL MEDICINE AND REHAB | Facility: CLINIC | Age: 57
End: 2022-10-24
Payer: COMMERCIAL

## 2022-10-24 VITALS — DIASTOLIC BLOOD PRESSURE: 56 MMHG | OXYGEN SATURATION: 95 % | SYSTOLIC BLOOD PRESSURE: 86 MMHG | HEART RATE: 85 BPM

## 2022-10-24 DIAGNOSIS — G24.8 SEGMENTAL DYSTONIA: ICD-10-CM

## 2022-10-24 DIAGNOSIS — G20.A1 PARKINSON DISEASE (H): ICD-10-CM

## 2022-10-24 DIAGNOSIS — G24.3 CERVICAL DYSTONIA: Primary | ICD-10-CM

## 2022-10-24 PROCEDURE — 64616 CHEMODENERV MUSC NECK DYSTON: CPT | Mod: 59 | Performed by: PHYSICAL MEDICINE & REHABILITATION

## 2022-10-24 PROCEDURE — 96372 THER/PROPH/DIAG INJ SC/IM: CPT | Performed by: PHYSICAL MEDICINE & REHABILITATION

## 2022-10-24 PROCEDURE — 95874 GUIDE NERV DESTR NEEDLE EMG: CPT | Performed by: PHYSICAL MEDICINE & REHABILITATION

## 2022-10-24 PROCEDURE — 64642 CHEMODENERV 1 EXTREMITY 1-4: CPT | Mod: 59 | Performed by: PHYSICAL MEDICINE & REHABILITATION

## 2022-10-24 NOTE — NURSING NOTE
Chief Complaint   Patient presents with     RECHECK     Follow up on botox treatment.     Lindsay Madrid CMA at 1:49 PM on 10/24/2022.

## 2022-10-24 NOTE — PROGRESS NOTES
[facility]    PM&R CLINIC NOTE  BOTULINUM TOXIN PROCEDURE      HPI  Chief Complaint   Patient presents with     RECHECK     Follow up on botox treatment.     Benjamin Mathews is a 57 year old male Benjamin Mathews is a 57 year old male who was referred to our clinic for more evaluation of his dystonia and trial of botulinum toxin injections (referral from Dr. Suazo). He was seen on 10/6/22 as initial consult; please see the note for details. She has history of Parkinson disease and is followed by Dr. Perry; he will see Dr. Carlos for follow-up in January 2023. He was referred to palliative care team and has been followed by them since then.  He was also referred to the pain clinic and saw Dr. Freitas; she sent a referral to PM&R team for trial of botulinum toxins.       SINCE LAST VISIT  Benjamin Mathews was last seen here in clinic on 10/6/22.    Patient denies new medical diagnoses, illnesses, hospitalizations, emergency room visits, and injuries since the previous injection with botulinum neurotoxin.    RESPONSE TO PREVIOUS TREATMENT    Side effects: No problems reported  N/A    N/A - Initial treatment.     Pain Improvement: Not applicable    Dystonia Improvement: Not applicable      PHYSICAL EXAM  VS: BP (!) 86/56 (BP Location: Left arm, Patient Position: Sitting, Cuff Size: Adult Large)   Pulse 85   SpO2 95%    GEN: Pleasant and cooperative, in no acute distress  HEENT: No facial asymmetry    He was sitting in his power wheelchair leaning to the right with his neck tilted to the right and rotated to the left  C-spine range of motion was moderately limited with extension and left lateral bending, he was also mildly limited in all other directions  Left shoulder range of motion was also limited up to 90 degrees of abduction and forward flexion due to weakness but no pain  Mild tenderness to palpation at posterior neck and periscapular  muscles      ALLERGIES  Allergies   Allergen Reactions     Amantadine Other (See Comments)     Other reaction(s): Hallucinations  Hallucinations/ lost self control/gambling.     halluicnations  Hallucinations/ lost self control/gambling.     hallucinates  halluicnations  hallucinates  Hallucinations/ lost self control/gambling.        Quetiapine GI Disturbance, Diarrhea and Other (See Comments)     Diarrhea    Diarrhea  Other reaction(s): GI Disturbance  Diarrhea       Duloxetine Other (See Comments)     suicidal  suicidal         CURRENT MEDICATIONS    Current Outpatient Medications:      acetaminophen (TYLENOL) 500 MG tablet, 2 x 500mg tabs by mouth 3/day @8am, 12pm and 8pm and 2 x 500mg tab by mouth every 6 hours as needed, Disp: , Rfl:      alfuzosin ER (UROXATRAL) 10 MG 24 hr tablet, Take 1 tablet (10 mg) by mouth daily, Disp: 90 tablet, Rfl: 3     atorvastatin (LIPITOR) 40 MG tablet, Take 40 mg by mouth At 8pm, Disp: , Rfl:      baclofen (LIORESAL) 10 MG tablet, Week 1: 10mg @ 8Am,  15mg @ 12pm, 5pm and 8pm Week 2: 10mg @ 8Am, 10mg @ 12pm, 15mg @ 5pm and 8pm Week 3: 10mg @ 8Am, 10mg @ 12pm, 10mg @ 5pm and 15mg @ 8pm Week 4: 10mg @ 8Am, 12pm, 5pm, 8pm, Disp: 120 tablet, Rfl: 0     Baclofen (LIORESAL) 5 MG tablet, Week 1: 10mg @ 8Am,  15mg @ 12pm, 5pm and 8pm. Week 2: 10mg @ 8Am, 10mg @ 12pm, 15mg @ 5pm and 8pm. Week 3: 10mg @ 8Am, 10mg @ 12pm, 10mg @ 5pm and 15mg @ 8pm. Week 4: 10mg @ 8Am, 12pm, 5pm, 8pm, Disp: 120 tablet, Rfl: 0     bisacodyl (DULCOLAX) 10 MG suppository, Place 10 mg rectally daily as needed for constipation, Disp: , Rfl:      calcium polycarbophil (FIBER-LAX) 625 MG tablet, 1 tab by mouth daily at 8am, Disp: , Rfl:      carbidopa-levodopa (SINEMET CR)  MG CR tablet, 50/200 tab by mouth nightly at 8pm, Disp: , Rfl:      carbidopa-levodopa (SINEMET)  MG tablet, 2 tabs @ 8am, 2 @ noon, 1.5 @4pm, Disp: , Rfl:      cholecalciferol (VITAMIN D3) 125 mcg (5000 units) capsule, 125 mcg  (5000 units) by mouth daily at  8am, Disp: , Rfl:      clonazePAM (KLONOPIN) 1 MG tablet, Take 1 tablet (1 mg) by mouth daily as needed for muscle spasms, Disp: 30 tablet, Rfl: 0     clonazePAM (KLONOPIN) 2 MG tablet, 2mg tab by mouth twice daily at bedtime and in morning. Take 1mg at 12pm, Disp: 75 tablet, Rfl: 0     cloZAPine (CLOZARIL) 25 MG tablet, , Disp: , Rfl:      cloZAPine (CLOZARIL) 50 MG tablet, 50 mg At Bedtime, Disp: , Rfl:      ENULOSE 10 GM/15ML SOLUTION, 15ml by mouth daily at 8am, Disp: , Rfl:      gabapentin (NEURONTIN) 800 MG tablet, 800mg tab by mouth 3/day at 8am, 12pm and 8pm, Disp: , Rfl:      hydrocortisone 1 % CREA cream, Apply topically to nose and other affected area(s) every morning at 9am, Disp: , Rfl:      hydrOXYzine (ATARAX) 25 MG tablet, Take 50 mg by mouth every 6 hours as needed for anxiety (up to 3 timrd daily), Disp: , Rfl:      lubiprostone (AMITIZA) 24 MCG capsule, 24mg tab by mouth twice daily at 8am and 8pm, Disp: , Rfl:      metoprolol succinate ER (TOPROL-XL) 25 MG 24 hr tablet, Take 1/2 tablet (12.5mg) by mouth twice daily at 8am and 8pm, Disp: , Rfl:      Multiple Vitamin (DAILY-ALLAN) TABS, Vitamin by  Mouth daily @8am, Disp: , Rfl:      pantoprazole (PROTONIX) 40 MG EC tablet, Take 1 tablet (40mg) by mouth daily at 8am, Disp: , Rfl:      polyethylene glycol (MIRALAX) 17 g packet, , Disp: , Rfl:      polyethylene glycol (MIRALAX) 17 GM/Dose powder, (= clearlax) Capful in liquid by mouth nightly at 8pm and capful daily as needed, Disp: , Rfl:      rivaroxaban ANTICOAGULANT (XARELTO) 20 MG TABS tablet, Take 20 mg by mouth daily (with dinner) At 5pm, Disp: , Rfl:      SENNA-TIME 8.6 MG tablet, Take 2 tablets by mouth 2 times daily And daily prn, Disp: , Rfl:      sodium phosphate (FLEET ENEMA) 7-19 GM/118ML rectal enema, Place 1 enema rectally daily as needed for constipation, Disp: , Rfl:      traZODone (DESYREL) 100 MG tablet, Take 100 mg by mouth daily at 8pm, Disp: ,  Rfl:      traZODone (DESYREL) 50 MG tablet, 50 mg In AM, Disp: , Rfl:      triamcinolone (KENALOG) 0.1 % external cream, , Disp: , Rfl:      venlafaxine (EFFEXOR XR) 150 MG 24 hr capsule, Take 1 capsule (150 mg) by mouth daily, Disp: , Rfl:      venlafaxine (EFFEXOR XR) 75 MG 24 hr capsule, Take 1 capsule (75 mg) by mouth daily, Disp: , Rfl:      vitamin C (ASCORBIC ACID) 500 MG tablet, Take 500 mg by mouth daily, Disp: , Rfl:     Current Facility-Administered Medications:      botulinum toxin type A (BOTOX) 100 units injection 400 Units, 400 Units, Intramuscular, Q90 Days, Ember Arita MD, 130 Units at 10/24/22 1434       BOTULINUM NEUROTOXIN INJECTION PROCEDURES    VERIFICATION OF PATIENT IDENTIFICATION AND PROCEDURE     Initials   Patient Name PS   Patient  PS   Procedure Verified by: PS     Prior to the start of the procedure and with procedural staff participation, I verbally confirmed the patient s identity using two indicators, relevant allergies, that the procedure was appropriate and matched the consent or emergent situation, and that the correct equipment/implants were available. Immediately prior to starting the procedure I conducted the Time Out with the procedural staff and re-confirmed the patient s name, procedure, and site/side. (The Joint Commission universal protocol was followed.)  Yes    Sedation (Moderate or Deep): None    ABOVE ASSESSMENTS PERFORMED BY  Ember Arita MD      INDICATIONS FOR PROCEDURES  Benjamin Mathews is a 57 year old patient with cervical and segmental dystonia secondary to the diagnosis of Parkinson's disease. His baseline symptoms have been recalcitrant to oral medications and conservative therapy.  He is here today for reinjection with Botox.    GOAL OF PROCEDURE  The goal of this procedure is to increase active range of motion, improve volitional motor control, decrease pain  and enhance functional independence.      TOTAL DOSE ADMINISTERED  Dose Administered:  130  units  Botox (Botulinum Toxin Type A)       2:1 Dilution   Unavoidable Drug Waste: Yes  Amount of drug waste (mL): 70 units.  Reason for waste:  Single use vial  Diluent Used:  Preservative Free Normal Saline  Total Volume of Diluent Used:  4 ml  Lot # [id number] with Expiration Date:  11/2024  NDC #: Botox 100u ([address]-02)      CONSENT  The risks, benefits, and treatment options were discussed with Benjamin Mathews and he agreed to proceed.    Written consent was obtained by PS.     EQUIPMENT USED  Needle-35mm stimulating/recording  EMG/NCS Machine    SKIN PREPARATION  Skin preparation was performed using an alcohol wipe.    GUIDANCE DESCRIPTION  Electro-myographic guidance was necessary throughout the procedure to accurately identify all areas of dystonic muscles while avoiding injection of non-dystonic muscles and underlying muscles , neighboring nerves and nearby vascular structures.     AREA/MUSCLE INJECTED  Right   SCM 5 units at 1 site  Splenius capitis 5 units at 1 site  Splenius cervicis 5 units at 1 site  Levator a scap insertion 10 units at 1 site  Lateral trap 15 units at 3 sites       RLE   Gastrocnemius 50 units at 2 sites  FDL 20 units at 1 site   Posterior tib 20 units at 1 site       RESPONSE TO PROCEDURE  Benjamin Mathews tolerated the procedure well and there were no immediate complications. He was allowed to recover for an appropriate period of time and was discharged home in stable condition.    ASSESSMENT AND PLAN     Parkinson's disease    History of CVA with residual left hemiparesis    Cervical dystonia     Dystonic movement of right upper and lower extremities, worse in the right lower extremity     We have discussed treatment options for dystonia including therapies, medications and interventions.  He has responded very well to Klonopin but his symptoms are not well controlled especially spasmodic torticollis and dystonia in the distal part of his right lower extremity.  Adding another oral  agent does not seem appropriate because 1-he did not respond to baclofen and 2- risk of polypharmacy and side effects.  He would benefit from physical therapy and will after the first or second round of injections.  Seems to be an excellent candidate for trial of botulinum toxin injections given his localized symptoms affecting his function, quality of life and comfort.        2. Work-up: None      3. Therapy/equipment/braces: consider after the second round of injections     4. Medications: No change today     5. Interventions: started the first round at 130 units and will titrate as needed.      6. Referral / follow up with other providers: None today; consider referral to Dr. Rodriguez for evaluation of bone health.  He will continue to follow-up with palliative care team and movement disorder clinic.     7. Follow up: in 12 weeks     Ember Arita MD  Physical Medicine & Rehabilitation

## 2022-10-24 NOTE — LETTER
10/24/2022       RE: Benjamin Mathews  [address]  [facility] [address]     Dear Colleague,    Thank you for referring your patient, Benjamin Mathews, to the [facility] PHYSICAL MEDICINE AND REHABILITATION CLINIC [place] [facility]. Please see a copy of my visit note below.      [facility] CLINIC    PM&R CLINIC NOTE  BOTULINUM TOXIN PROCEDURE      HPI  Chief Complaint   Patient presents with     RECHECK     Follow up on botox treatment.     Benjamin Mathews is a 57 year old male Benjamin Mathews is a 57 year old male who was referred to our clinic for more evaluation of his dystonia and trial of botulinum toxin injections (referral from Dr. Suazo). He was seen on 10/6/22 as initial consult; please see the note for details. She has history of Parkinson disease and is followed by Dr. Perry; he will see Dr. Carlos for follow-up in January 2023. He was referred to palliative care team and has been followed by them since then.  He was also referred to the pain clinic and saw Dr. Freitas; she sent a referral to PM&R team for trial of botulinum toxins.       SINCE LAST VISIT  Benjamin Mathews was last seen here in clinic on 10/6/22.    Patient denies new medical diagnoses, illnesses, hospitalizations, emergency room visits, and injuries since the previous injection with botulinum neurotoxin.    RESPONSE TO PREVIOUS TREATMENT    Side effects: No problems reported  N/A    N/A - Initial treatment.     Pain Improvement: Not applicable    Dystonia Improvement: Not applicable      PHYSICAL EXAM  VS: BP (!) 86/56 (BP Location: Left arm, Patient Position: Sitting, Cuff Size: Adult Large)   Pulse 85   SpO2 95%    GEN: Pleasant and cooperative, in no acute distress  HEENT: No facial asymmetry    He was sitting in his power wheelchair leaning to the right with his neck tilted to the right and rotated to the  left  C-spine range of motion was moderately limited with extension and left lateral bending, he was also mildly limited in all other directions  Left shoulder range of motion was also limited up to 90 degrees of abduction and forward flexion due to weakness but no pain  Mild tenderness to palpation at posterior neck and periscapular muscles      ALLERGIES  Allergies   Allergen Reactions     Amantadine Other (See Comments)     Other reaction(s): Hallucinations  Hallucinations/ lost self control/gambling.     halluicnations  Hallucinations/ lost self control/gambling.     hallucinates  halluicnations  hallucinates  Hallucinations/ lost self control/gambling.        Quetiapine GI Disturbance, Diarrhea and Other (See Comments)     Diarrhea    Diarrhea  Other reaction(s): GI Disturbance  Diarrhea       Duloxetine Other (See Comments)     suicidal  suicidal         CURRENT MEDICATIONS    Current Outpatient Medications:      acetaminophen (TYLENOL) 500 MG tablet, 2 x 500mg tabs by mouth 3/day @8am, 12pm and 8pm and 2 x 500mg tab by mouth every 6 hours as needed, Disp: , Rfl:      alfuzosin ER (UROXATRAL) 10 MG 24 hr tablet, Take 1 tablet (10 mg) by mouth daily, Disp: 90 tablet, Rfl: 3     atorvastatin (LIPITOR) 40 MG tablet, Take 40 mg by mouth At 8pm, Disp: , Rfl:      baclofen (LIORESAL) 10 MG tablet, Week 1: 10mg @ 8Am,  15mg @ 12pm, 5pm and 8pm Week 2: 10mg @ 8Am, 10mg @ 12pm, 15mg @ 5pm and 8pm Week 3: 10mg @ 8Am, 10mg @ 12pm, 10mg @ 5pm and 15mg @ 8pm Week 4: 10mg @ 8Am, 12pm, 5pm, 8pm, Disp: 120 tablet, Rfl: 0     Baclofen (LIORESAL) 5 MG tablet, Week 1: 10mg @ 8Am,  15mg @ 12pm, 5pm and 8pm. Week 2: 10mg @ 8Am, 10mg @ 12pm, 15mg @ 5pm and 8pm. Week 3: 10mg @ 8Am, 10mg @ 12pm, 10mg @ 5pm and 15mg @ 8pm. Week 4: 10mg @ 8Am, 12pm, 5pm, 8pm, Disp: 120 tablet, Rfl: 0     bisacodyl (DULCOLAX) 10 MG suppository, Place 10 mg rectally daily as needed for constipation, Disp: , Rfl:      calcium polycarbophil (FIBER-LAX) 625  MG tablet, 1 tab by mouth daily at 8am, Disp: , Rfl:      carbidopa-levodopa (SINEMET CR)  MG CR tablet, 50/200 tab by mouth nightly at 8pm, Disp: , Rfl:      carbidopa-levodopa (SINEMET)  MG tablet, 2 tabs @ 8am, 2 @ noon, 1.5 @4pm, Disp: , Rfl:      cholecalciferol (VITAMIN D3) 125 mcg (5000 units) capsule, 125 mcg (5000 units) by mouth daily at  8am, Disp: , Rfl:      clonazePAM (KLONOPIN) 1 MG tablet, Take 1 tablet (1 mg) by mouth daily as needed for muscle spasms, Disp: 30 tablet, Rfl: 0     clonazePAM (KLONOPIN) 2 MG tablet, 2mg tab by mouth twice daily at bedtime and in morning. Take 1mg at 12pm, Disp: 75 tablet, Rfl: 0     cloZAPine (CLOZARIL) 25 MG tablet, , Disp: , Rfl:      cloZAPine (CLOZARIL) 50 MG tablet, 50 mg At Bedtime, Disp: , Rfl:      ENULOSE 10 GM/15ML SOLUTION, 15ml by mouth daily at 8am, Disp: , Rfl:      gabapentin (NEURONTIN) 800 MG tablet, 800mg tab by mouth 3/day at 8am, 12pm and 8pm, Disp: , Rfl:      hydrocortisone 1 % CREA cream, Apply topically to nose and other affected area(s) every morning at 9am, Disp: , Rfl:      hydrOXYzine (ATARAX) 25 MG tablet, Take 50 mg by mouth every 6 hours as needed for anxiety (up to 3 timrd daily), Disp: , Rfl:      lubiprostone (AMITIZA) 24 MCG capsule, 24mg tab by mouth twice daily at 8am and 8pm, Disp: , Rfl:      metoprolol succinate ER (TOPROL-XL) 25 MG 24 hr tablet, Take 1/2 tablet (12.5mg) by mouth twice daily at 8am and 8pm, Disp: , Rfl:      Multiple Vitamin (DAILY-ALLAN) TABS, Vitamin by  Mouth daily @8am, Disp: , Rfl:      pantoprazole (PROTONIX) 40 MG EC tablet, Take 1 tablet (40mg) by mouth daily at 8am, Disp: , Rfl:      polyethylene glycol (MIRALAX) 17 g packet, , Disp: , Rfl:      polyethylene glycol (MIRALAX) 17 GM/Dose powder, (= clearlax) Capful in liquid by mouth nightly at 8pm and capful daily as needed, Disp: , Rfl:      rivaroxaban ANTICOAGULANT (XARELTO) 20 MG TABS tablet, Take 20 mg by mouth daily (with dinner) At 5pm,  Disp: , Rfl:      SENNA-TIME 8.6 MG tablet, Take 2 tablets by mouth 2 times daily And daily prn, Disp: , Rfl:      sodium phosphate (FLEET ENEMA) 7-19 GM/118ML rectal enema, Place 1 enema rectally daily as needed for constipation, Disp: , Rfl:      traZODone (DESYREL) 100 MG tablet, Take 100 mg by mouth daily at 8pm, Disp: , Rfl:      traZODone (DESYREL) 50 MG tablet, 50 mg In AM, Disp: , Rfl:      triamcinolone (KENALOG) 0.1 % external cream, , Disp: , Rfl:      venlafaxine (EFFEXOR XR) 150 MG 24 hr capsule, Take 1 capsule (150 mg) by mouth daily, Disp: , Rfl:      venlafaxine (EFFEXOR XR) 75 MG 24 hr capsule, Take 1 capsule (75 mg) by mouth daily, Disp: , Rfl:      vitamin C (ASCORBIC ACID) 500 MG tablet, Take 500 mg by mouth daily, Disp: , Rfl:     Current Facility-Administered Medications:      botulinum toxin type A (BOTOX) 100 units injection 400 Units, 400 Units, Intramuscular, Q90 Days, Ember Arita MD, 130 Units at 10/24/22 1434       BOTULINUM NEUROTOXIN INJECTION PROCEDURES    VERIFICATION OF PATIENT IDENTIFICATION AND PROCEDURE     Initials   Patient Name PS   Patient  PS   Procedure Verified by: PS     Prior to the start of the procedure and with procedural staff participation, I verbally confirmed the patient s identity using two indicators, relevant allergies, that the procedure was appropriate and matched the consent or emergent situation, and that the correct equipment/implants were available. Immediately prior to starting the procedure I conducted the Time Out with the procedural staff and re-confirmed the patient s name, procedure, and site/side. (The Joint Commission universal protocol was followed.)  Yes    Sedation (Moderate or Deep): None    ABOVE ASSESSMENTS PERFORMED BY  Ember Arita MD      INDICATIONS FOR PROCEDURES  Benjamin Mathews is a 57 year old patient with cervical and segmental dystonia secondary to the diagnosis of Parkinson's disease. His baseline symptoms have been  recalcitrant to oral medications and conservative therapy.  He is here today for reinjection with Botox.    GOAL OF PROCEDURE  The goal of this procedure is to increase active range of motion, improve volitional motor control, decrease pain  and enhance functional independence.      TOTAL DOSE ADMINISTERED  Dose Administered:  130 units  Botox (Botulinum Toxin Type A)       2:1 Dilution   Unavoidable Drug Waste: Yes  Amount of drug waste (mL): 70 units.  Reason for waste:  Single use vial  Diluent Used:  Preservative Free Normal Saline  Total Volume of Diluent Used:  4 ml  Lot # [id number] with Expiration Date:  11/2024  NDC #: Botox 100u ([address]-86)      CONSENT  The risks, benefits, and treatment options were discussed with Benjamin Mathews and he agreed to proceed.    Written consent was obtained by PS.     EQUIPMENT USED  Needle-35mm stimulating/recording  EMG/NCS Machine    SKIN PREPARATION  Skin preparation was performed using an alcohol wipe.    GUIDANCE DESCRIPTION  Electro-myographic guidance was necessary throughout the procedure to accurately identify all areas of dystonic muscles while avoiding injection of non-dystonic muscles and underlying muscles , neighboring nerves and nearby vascular structures.     AREA/MUSCLE INJECTED  Right   SCM 5 units at 1 site  Splenius capitis 5 units at 1 site  Splenius cervicis 5 units at 1 site  Levator a scap insertion 10 units at 1 site  Lateral trap 15 units at 3 sites       RLE   Gastrocnemius 50 units at 2 sites  FDL 20 units at 1 site   Posterior tib 20 units at 1 site       RESPONSE TO PROCEDURE  Benjamin Mathews tolerated the procedure well and there were no immediate complications. He was allowed to recover for an appropriate period of time and was discharged home in stable condition.    ASSESSMENT AND PLAN     Parkinson's disease    History of CVA with residual left hemiparesis    Cervical dystonia     Dystonic movement of right upper and lower extremities, worse  in the right lower extremity     We have discussed treatment options for dystonia including therapies, medications and interventions.  He has responded very well to Klonopin but his symptoms are not well controlled especially spasmodic torticollis and dystonia in the distal part of his right lower extremity.  Adding another oral agent does not seem appropriate because 1-he did not respond to baclofen and 2- risk of polypharmacy and side effects.  He would benefit from physical therapy and will after the first or second round of injections.  Seems to be an excellent candidate for trial of botulinum toxin injections given his localized symptoms affecting his function, quality of life and comfort.        2. Work-up: None      3. Therapy/equipment/braces: consider after the second round of injections     4. Medications: No change today     5. Interventions: started the first round at 130 units and will titrate as needed.      6. Referral / follow up with other providers: None today; consider referral to Dr. Rodriguez for evaluation of bone health.  He will continue to follow-up with palliative care team and movement disorder clinic.     7. Follow up: in 12 weeks       Ember Arita MD  Physical Medicine & Rehabilitation

## 2022-11-03 ENCOUNTER — OFFICE VISIT (OUTPATIENT)
Dept: UROLOGY | Facility: CLINIC | Age: 57
End: 2022-11-03
Payer: COMMERCIAL

## 2022-11-03 DIAGNOSIS — R39.9 LOWER URINARY TRACT SYMPTOMS (LUTS): Primary | ICD-10-CM

## 2022-11-03 PROCEDURE — 51798 US URINE CAPACITY MEASURE: CPT | Performed by: UROLOGY

## 2022-11-03 PROCEDURE — 99213 OFFICE O/P EST LOW 20 MIN: CPT | Mod: 25 | Performed by: UROLOGY

## 2022-11-03 NOTE — PROGRESS NOTES
[facility]   CHIEF COMPLAINT   It was my pleasure to see Benjamin Mathews who is a 57 year old male for follow-up of LUTS.      HPI   Benjamin Mathews is a very pleasant 57 year old male     Initially seen 8/2/2022:  Benjamin Mathews is a 57 year old male who is being seen for evaluation of LUTS     4-5 times per month he will wake up soaked  During the day he only voids about 2 times  Symptoms have been worsening for the past 3 months  He notes that his tamsulosin 0.4mg (Flomax) was stopped for some reason - possibly due to fall risk  He had been on this for at least 2 years  He notes a weak and intermittent stream  Some urgency and urge incontinence at times     He previously saw Urology in [place]  He has since moved      AUASS: 4-3-3-0-5-2-5 = 22  QOL = 3  PVR = 20cc    TODAY 11/3/2022:  Started Alfuzosin at last visit  He continues to urinate very little during the day  He urinates a few times overnight a good volume  He does feel like the alfuzosin has been beneficial  He has not had any issues with urinary tract infections    AUASS: 5-3-1-0-5-5-2 = 26  QOL = 3  PVR = 47cc    PHYSICAL EXAM  Patient is a 57 year old  male   Vitals: There were no vitals taken for this visit.  There is no height or weight on file to calculate BMI.  General Appearance Adult:   Alert, no acute distress, oriented  HENT: throat/mouth:normal, good dentition  Lungs: no respiratory distress, or pursed lip breathing  Heart: No obvious jugular venous distension present  Abdomen: soft, nontender, no organomegaly or masses  Skin: no suspicious lesions or rashes  Neuro: Alert, oriented, speech and mentation normal  Psych: affect and mood normal    Creatinine   Date Value Ref Range Status   05/31/2022 0.66 0.66 - 1.25 mg/dL Final   07/09/2021 0.77 0.66 - 1.25 mg/dL Final      UA RESULTS:  Recent Labs   Lab Test 05/28/22  0215   COLOR Straw   APPEARANCE Clear   URINEGLC Negative   URINEBILI Negative   URINEKETONE Negative   SG 1.007   UBLD  Negative   URINEPH 5.5   PROTEIN Negative   NITRITE Negative   LEUKEST Negative   RBCU <1   WBCU <1        IMAGING:  All pertinent imaging reviewed:     All imaging studies reviewed by me.  I personally reviewed these imaging films.  A formal report from radiology will follow.     CT ABD/PEL 5/28/22:  FINDINGS:   LUNGS AND PLEURA: Basilar atelectasis. No infiltrate, pleural effusion or visible pneumothorax. 4 mm right lower lobe nodule series 6 image 191.     MEDIASTINUM/AXILLAE: No adenopathy or pericardial effusion.     CORONARY ARTERY CALCIFICATION: No significant visualized.     HEPATOBILIARY: Normal.     PANCREAS: Normal.     SPLEEN: Normal.     ADRENAL GLANDS: Normal.     KIDNEYS/BLADDER: Normal.     BOWEL: Normal caliber. Normal appendix.     LYMPH NODES: Normal.     VASCULATURE: Unremarkable.     PELVIC ORGANS: Normal.     MUSCULOSKELETAL: Minimal degenerative change osseous structures. Remote rib fractures.                                                                      IMPRESSION:  1.  No evidence of injury within the chest, abdomen and pelvis.  2.  4 mm right lower lobe nodule again seen.          ASSESSMENT and PLAN  57-year-old man with history of Parkinson's disease as well as prior CVA with LUTS    LUTS  - We again reviewed the pathophysiology of the bladder and the prostate and the normal changes associated with the development of BPH and LUTS  - His PVR remains reassuring today  - We will plan for continued alpha-blocker management with alfuzosin and follow-up in 1 year with my PA Juju Bernardo      Time spent: 10 minutes spent on the date of the encounter doing chart review, history and exam, documentation and further activities as noted above.    Chris Serna MD   Urology  [facility]  [facility] Phone: [phone]  [facility] Phone: [phone]

## 2022-11-03 NOTE — NURSING NOTE
Benjamin Mathews's goals for this visit include:   Chief Complaint   Patient presents with     RECHECK     3 month follow up      He requests these members of his care team be copied on today's visit information:     PCP: Stephanie Maldonado    Referring Provider:  No referring provider defined for this encounter.    post void residual: 47 mL    Do you need any medication refills at today's visit? No     Hope Brown CMA ......... 11/3/2022  3:00 PM

## 2022-11-04 ENCOUNTER — OFFICE VISIT (OUTPATIENT)
Dept: PODIATRY | Facility: CLINIC | Age: 57
End: 2022-11-04
Payer: COMMERCIAL

## 2022-11-04 DIAGNOSIS — G20.A1 PARKINSON'S DISEASE (H): ICD-10-CM

## 2022-11-04 DIAGNOSIS — L97.522 SKIN ULCER OF SECOND TOE OF LEFT FOOT WITH FAT LAYER EXPOSED (H): Primary | ICD-10-CM

## 2022-11-04 DIAGNOSIS — M20.41 HAMMER TOES OF BOTH FEET: ICD-10-CM

## 2022-11-04 DIAGNOSIS — G60.9 IDIOPATHIC PERIPHERAL NEUROPATHY: ICD-10-CM

## 2022-11-04 DIAGNOSIS — M20.42 HAMMER TOES OF BOTH FEET: ICD-10-CM

## 2022-11-04 PROCEDURE — 99213 OFFICE O/P EST LOW 20 MIN: CPT | Performed by: PODIATRIST

## 2022-11-04 NOTE — NURSING NOTE
Benjamin Mathews's chief complaint for this visit includes:  Chief Complaint   Patient presents with     Follow Up     Wound on 2nd toe     PCP: Stephanie Maldonado    Referring Provider:  Error in [id number] index!    There were no vitals taken for this visit.  Data Unavailable        Allergies   Allergen Reactions     Amantadine Other (See Comments)     Other reaction(s): Hallucinations  Hallucinations/ lost self control/gambling.     halluicnations  Hallucinations/ lost self control/gambling.     hallucinates  halluicnations  hallucinates  Hallucinations/ lost self control/gambling.        Quetiapine GI Disturbance, Diarrhea and Other (See Comments)     Diarrhea    Diarrhea  Other reaction(s): GI Disturbance  Diarrhea       Duloxetine Other (See Comments)     suicidal  suicidal           Do you need any medication refills at today's visit?
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
## 2022-11-04 NOTE — LETTER
November 4, 2022      RE: Benjamin Mathews  [address]  [facility] [address]        To whom it may concern:    Benjamin Mathews is under my professional care. Left second toe ok to discontinue the Biatain Silver. Daily apply betadine and Band-Aid to left second toe. Ok to discontinue surgical shoe and go to big toe box shoe.    Sincerely,      Dequan York DPM
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
## 2022-11-06 ENCOUNTER — APPOINTMENT (OUTPATIENT)
Dept: GENERAL RADIOLOGY | Facility: CLINIC | Age: 57
End: 2022-11-06
Attending: EMERGENCY MEDICINE
Payer: COMMERCIAL

## 2022-11-06 ENCOUNTER — HOSPITAL ENCOUNTER (EMERGENCY)
Facility: CLINIC | Age: 57
Discharge: NURSING, REHAB OR LONG-TERM CARE | End: 2022-11-07
Attending: EMERGENCY MEDICINE | Admitting: EMERGENCY MEDICINE
Payer: COMMERCIAL

## 2022-11-06 ENCOUNTER — APPOINTMENT (OUTPATIENT)
Dept: CT IMAGING | Facility: CLINIC | Age: 57
End: 2022-11-06
Attending: EMERGENCY MEDICINE
Payer: COMMERCIAL
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
## 2022-11-06 DIAGNOSIS — U07.1 INFECTION DUE TO 2019 NOVEL CORONAVIRUS: ICD-10-CM

## 2022-11-06 DIAGNOSIS — S19.9XXA INJURY OF NECK: ICD-10-CM

## 2022-11-06 LAB
ALBUMIN SERPL BCG-MCNC: 3.9 G/DL (ref 3.5–5.2)
ALBUMIN UR-MCNC: NEGATIVE MG/DL
ALP SERPL-CCNC: 87 U/L (ref 40–129)
ALT SERPL W P-5'-P-CCNC: 9 U/L (ref 10–50)
ANION GAP SERPL CALCULATED.3IONS-SCNC: 11 MMOL/L (ref 7–15)
APPEARANCE UR: CLEAR
AST SERPL W P-5'-P-CCNC: 30 U/L (ref 10–50)
BASOPHILS # BLD AUTO: 0 10E3/UL (ref 0–0.2)
BASOPHILS NFR BLD AUTO: 0 %
BILIRUB SERPL-MCNC: 0.2 MG/DL
BILIRUB UR QL STRIP: NEGATIVE
BUN SERPL-MCNC: 19.3 MG/DL (ref 6–20)
CALCIUM SERPL-MCNC: 8.7 MG/DL (ref 8.6–10)
CHLORIDE SERPL-SCNC: 104 MMOL/L (ref 98–107)
CK SERPL-CCNC: 160 U/L (ref 39–308)
COLOR UR AUTO: ABNORMAL
CREAT SERPL-MCNC: 0.83 MG/DL (ref 0.67–1.17)
DEPRECATED HCO3 PLAS-SCNC: 25 MMOL/L (ref 22–29)
EOSINOPHIL # BLD AUTO: 0.1 10E3/UL (ref 0–0.7)
EOSINOPHIL NFR BLD AUTO: 1 %
ERYTHROCYTE [DISTWIDTH] IN BLOOD BY AUTOMATED COUNT: 13.3 % (ref 10–15)
FLUAV RNA SPEC QL NAA+PROBE: NEGATIVE
FLUBV RNA RESP QL NAA+PROBE: NEGATIVE
GFR SERPL CREATININE-BSD FRML MDRD: >90 ML/MIN/1.73M2
GLUCOSE SERPL-MCNC: 102 MG/DL (ref 70–99)
GLUCOSE UR STRIP-MCNC: NEGATIVE MG/DL
HCT VFR BLD AUTO: 36.2 % (ref 40–53)
HGB BLD-MCNC: 11.5 G/DL (ref 13.3–17.7)
HGB UR QL STRIP: NEGATIVE
HOLD SPECIMEN: NORMAL
HOLD SPECIMEN: NORMAL
HYALINE CASTS: 1 /LPF
IMM GRANULOCYTES # BLD: 0 10E3/UL
IMM GRANULOCYTES NFR BLD: 1 %
KETONES UR STRIP-MCNC: NEGATIVE MG/DL
LEUKOCYTE ESTERASE UR QL STRIP: NEGATIVE
LYMPHOCYTES # BLD AUTO: 1.4 10E3/UL (ref 0.8–5.3)
LYMPHOCYTES NFR BLD AUTO: 25 %
MAGNESIUM SERPL-MCNC: 2.1 MG/DL (ref 1.7–2.3)
MCH RBC QN AUTO: 31.1 PG (ref 26.5–33)
MCHC RBC AUTO-ENTMCNC: 31.8 G/DL (ref 31.5–36.5)
MCV RBC AUTO: 98 FL (ref 78–100)
MONOCYTES # BLD AUTO: 0.4 10E3/UL (ref 0–1.3)
MONOCYTES NFR BLD AUTO: 7 %
MUCOUS THREADS #/AREA URNS LPF: PRESENT /LPF
NEUTROPHILS # BLD AUTO: 3.8 10E3/UL (ref 1.6–8.3)
NEUTROPHILS NFR BLD AUTO: 66 %
NITRATE UR QL: NEGATIVE
NRBC # BLD AUTO: 0 10E3/UL
NRBC BLD AUTO-RTO: 0 /100
PH UR STRIP: 6.5 [PH] (ref 5–7)
PLATELET # BLD AUTO: 223 10E3/UL (ref 150–450)
POTASSIUM SERPL-SCNC: 4.7 MMOL/L (ref 3.4–5.3)
PROT SERPL-MCNC: 6.7 G/DL (ref 6.4–8.3)
RBC # BLD AUTO: 3.7 10E6/UL (ref 4.4–5.9)
RBC URINE: 3 /HPF
RSV RNA SPEC NAA+PROBE: NEGATIVE
SARS-COV-2 RNA RESP QL NAA+PROBE: POSITIVE
SODIUM SERPL-SCNC: 140 MMOL/L (ref 136–145)
SP GR UR STRIP: 1.02 (ref 1–1.03)
UROBILINOGEN UR STRIP-MCNC: NORMAL MG/DL
WBC # BLD AUTO: 5.7 10E3/UL (ref 4–11)
WBC URINE: 1 /HPF

## 2022-11-06 PROCEDURE — 72170 X-RAY EXAM OF PELVIS: CPT

## 2022-11-06 PROCEDURE — 80053 COMPREHEN METABOLIC PANEL: CPT | Performed by: EMERGENCY MEDICINE

## 2022-11-06 PROCEDURE — 73552 X-RAY EXAM OF FEMUR 2/>: CPT | Mod: 26 | Performed by: RADIOLOGY

## 2022-11-06 PROCEDURE — 73030 X-RAY EXAM OF SHOULDER: CPT | Mod: RT

## 2022-11-06 PROCEDURE — 82550 ASSAY OF CK (CPK): CPT | Performed by: EMERGENCY MEDICINE

## 2022-11-06 PROCEDURE — 250N000011 HC RX IP 250 OP 636: Performed by: EMERGENCY MEDICINE

## 2022-11-06 PROCEDURE — 85025 COMPLETE CBC W/AUTO DIFF WBC: CPT | Performed by: EMERGENCY MEDICINE

## 2022-11-06 PROCEDURE — 72125 CT NECK SPINE W/O DYE: CPT

## 2022-11-06 PROCEDURE — M0222 HC INJECTION BEBTELOVIMAB: HCPCS

## 2022-11-06 PROCEDURE — 70450 CT HEAD/BRAIN W/O DYE: CPT

## 2022-11-06 PROCEDURE — M0222 HC INJECTION BEBTELOVIMAB: HCPCS | Performed by: EMERGENCY MEDICINE

## 2022-11-06 PROCEDURE — 999N000127 HC STATISTIC PERIPHERAL IV START W US GUIDANCE

## 2022-11-06 PROCEDURE — 99284 EMERGENCY DEPT VISIT MOD MDM: CPT | Mod: CS | Performed by: EMERGENCY MEDICINE

## 2022-11-06 PROCEDURE — 999N000285 HC STATISTIC VASC ACCESS LAB DRAW WITH PIV START

## 2022-11-06 PROCEDURE — 87637 SARSCOV2&INF A&B&RSV AMP PRB: CPT | Performed by: EMERGENCY MEDICINE

## 2022-11-06 PROCEDURE — 73552 X-RAY EXAM OF FEMUR 2/>: CPT | Mod: RT

## 2022-11-06 PROCEDURE — 36415 COLL VENOUS BLD VENIPUNCTURE: CPT | Performed by: EMERGENCY MEDICINE

## 2022-11-06 PROCEDURE — 72125 CT NECK SPINE W/O DYE: CPT | Mod: 26 | Performed by: RADIOLOGY

## 2022-11-06 PROCEDURE — C9803 HOPD COVID-19 SPEC COLLECT: HCPCS

## 2022-11-06 PROCEDURE — 73030 X-RAY EXAM OF SHOULDER: CPT | Mod: 26 | Performed by: RADIOLOGY

## 2022-11-06 PROCEDURE — 71045 X-RAY EXAM CHEST 1 VIEW: CPT | Mod: 26 | Performed by: RADIOLOGY

## 2022-11-06 PROCEDURE — 72170 X-RAY EXAM OF PELVIS: CPT | Mod: 26 | Performed by: RADIOLOGY

## 2022-11-06 PROCEDURE — 71045 X-RAY EXAM CHEST 1 VIEW: CPT

## 2022-11-06 PROCEDURE — 81001 URINALYSIS AUTO W/SCOPE: CPT | Performed by: EMERGENCY MEDICINE

## 2022-11-06 PROCEDURE — 83735 ASSAY OF MAGNESIUM: CPT | Performed by: EMERGENCY MEDICINE

## 2022-11-06 PROCEDURE — 99285 EMERGENCY DEPT VISIT HI MDM: CPT | Mod: CS,25

## 2022-11-06 PROCEDURE — 70450 CT HEAD/BRAIN W/O DYE: CPT | Mod: 26 | Performed by: RADIOLOGY

## 2022-11-06 RX ORDER — BEBTELOVIMAB 87.5 MG/ML
175 INJECTION, SOLUTION INTRAVENOUS ONCE
Status: COMPLETED | OUTPATIENT
Start: 2022-11-06 | End: 2022-11-06

## 2022-11-06 RX ADMIN — BEBTELOVIMAB 175 MG: 87.5 INJECTION, SOLUTION INTRAVENOUS at 23:06

## 2022-11-06 ASSESSMENT — ACTIVITIES OF DAILY LIVING (ADL)
ADLS_ACUITY_SCORE: 35

## 2022-11-06 ASSESSMENT — ENCOUNTER SYMPTOMS
NECK STIFFNESS: 1
HEADACHES: 0
SHORTNESS OF BREATH: 0
FEVER: 0
ABDOMINAL PAIN: 0
COUGH: 1
NECK PAIN: 1
MYALGIAS: 1

## 2022-11-06 NOTE — ED TRIAGE NOTES
Fell from chair landing on right shoulder, neck and head. No LOC. Is taking a blood thinner.     Triage Assessment     Row Name 11/06/22 1700       Triage Assessment (Adult)    Airway WDL WDL       Respiratory WDL    Respiratory WDL WDL       Cardiac WDL    Cardiac WDL WDL

## 2022-11-06 NOTE — ED PROVIDER NOTES
[place] EMERGENCY DEPARTMENT ([facility])  11/06/22  ED Provider Note  [facility]      History     Chief Complaint   Patient presents with     Spasms     Shoulder Pain     Right side     The history is provided by the patient and medical records.     Benjamin Mathews is a 57 year old male with a past medical history of Parkinson's disease on carbidopa and levodopa, dystonia on clonazepam, dyskinesias, CVA with residual left hemiparesis on atorvastatin, DVT/PE, peripheral neuropathy, BPH and chronic pain who presents to the emergency department for evaluation of fall. Patient reports having a muscle spasm on his right side causing difficulty in movement. When he tried standing he fell back and partially hit his head around 3 PM. Patient denies any loss of consciousness.  He reports being on the ground for half hour before he was found.  He reports stiffness in his neck and pain in his right shoulder and right hip from the fall. He reports some nausea since the fall.  In the past couple weeks, he has been experiencing some generalized weakness and numbness in his hands.  He reports a productive cough recently.  He denies any chest pain or shortness of breath, abdominal pain, headache or changes in vision, fevers, or any urinary symptoms.  He takes Xarelto for past PE and DVT.    Past Medical History  Past Medical History:   Diagnosis Date     Clotting disorder (H)     PE 2019     Dystonia      Parkinson disease (H)      History reviewed. No pertinent surgical history.  acetaminophen (TYLENOL) 500 MG tablet  alfuzosin ER (UROXATRAL) 10 MG 24 hr tablet  atorvastatin (LIPITOR) 40 MG tablet  baclofen (LIORESAL) 10 MG tablet  Baclofen (LIORESAL) 5 MG tablet  bisacodyl (DULCOLAX) 10 MG suppository  calcium polycarbophil (FIBER-LAX) 625 MG tablet  carbidopa-levodopa (SINEMET CR)  MG CR tablet  carbidopa-levodopa (SINEMET)  MG tablet  cholecalciferol (VITAMIN D3) 125 mcg  (5000 units) capsule  clonazePAM (KLONOPIN) 1 MG tablet  clonazePAM (KLONOPIN) 2 MG tablet  cloZAPine (CLOZARIL) 25 MG tablet  cloZAPine (CLOZARIL) 50 MG tablet  ENULOSE 10 GM/15ML SOLUTION  gabapentin (NEURONTIN) 800 MG tablet  hydrocortisone 1 % CREA cream  hydrOXYzine (ATARAX) 25 MG tablet  lubiprostone (AMITIZA) 24 MCG capsule  metoprolol succinate ER (TOPROL-XL) 25 MG 24 hr tablet  Multiple Vitamin (DAILY-ALLAN) TABS  pantoprazole (PROTONIX) 40 MG EC tablet  polyethylene glycol (MIRALAX) 17 g packet  polyethylene glycol (MIRALAX) 17 GM/Dose powder  rivaroxaban ANTICOAGULANT (XARELTO) 20 MG TABS tablet  SENNA-TIME 8.6 MG tablet  sodium phosphate (FLEET ENEMA) 7-19 GM/118ML rectal enema  traZODone (DESYREL) 100 MG tablet  traZODone (DESYREL) 50 MG tablet  triamcinolone (KENALOG) 0.1 % external cream  venlafaxine (EFFEXOR XR) 150 MG 24 hr capsule  venlafaxine (EFFEXOR XR) 75 MG 24 hr capsule  vitamin C (ASCORBIC ACID) 500 MG tablet      Allergies   Allergen Reactions     Amantadine Other (See Comments)     Other reaction(s): Hallucinations  Hallucinations/ lost self control/gambling.     halluicnations  Hallucinations/ lost self control/gambling.     hallucinates  halluicnations  hallucinates  Hallucinations/ lost self control/gambling.        Quetiapine GI Disturbance, Diarrhea and Other (See Comments)     Diarrhea    Diarrhea  Other reaction(s): GI Disturbance  Diarrhea       Duloxetine Other (See Comments)     suicidal  suicidal       Family History  Family History   Problem Relation Age of Onset     Other - See Comments Mother         pulmonary embolism from hip fracture     Pulmonary Embolism Mother      Parkinsonism Father      Neurologic Disorder Sister      Parkinsonism Sister         ?med related     Other - See Comments Sister         12/7/1950     Depression Sister      Neurologic Disorder Brother         dystonia     Dystonia Brother      Other - See Comments Brother              Heart Disease  "Nephew      Social History   Social History     Tobacco Use     Smoking status: Every Day     Types: Pipe     Smokeless tobacco: Never   Substance Use Topics     Alcohol use: Not Currently     Comment: sober x 18 months     Drug use: Not Currently      Past medical history, past surgical history, medications, allergies, family history, and social history were reviewed with the patient. No additional pertinent items.       Review of Systems   Constitutional: Negative for fever.   Eyes: Negative for visual disturbance.   Respiratory: Positive for cough (productive). Negative for shortness of breath.    Cardiovascular: Negative for chest pain.   Gastrointestinal: Negative for abdominal pain.   Genitourinary: Negative.    Musculoskeletal: Positive for myalgias (right shoulder and hip), neck pain and neck stiffness.   Neurological: Negative for headaches.     A complete review of systems was performed with pertinent positives and negatives noted in the HPI, and all other systems negative.    Physical Exam   Temp: 98.3  F (36.8  C)  Height: 193 cm (6' 4\")  Weight: 99.8 kg (220 lb)  Physical Exam  General: patient is alert and oriented and in no acute distress   Head: atraumatic and normocephalic   EENT: moist mucus membranes without tonsillar erythema or exudates, pupils round and reactive   Neck: in cervical colllar, right sided TTP  Cardiovascular: regular rate and rhythm, extremities warm and well perfused, no lower extremity edema  Pulmonary: lungs clear to auscultation bilaterally   Abdomen: soft, non-tender   Musculoskeletal: no gross deformity, TTP of the right shoulder and hip   Neurological: alert and oriented, baseline weakness and slowed movements  Skin: warm, dry     ED Course     5:23 PM  The patient was seen and examined by Lucille Wyman MD in Room ED19.     Procedures     Mental Health Risk Assessment      PSS-3    Date and Time Over the past 2 weeks have you felt down, depressed, or hopeless? Over " the past 2 weeks have you had thoughts of killing yourself? Have you ever attempted to kill yourself? When did this last happen? User   11/06/22 1704 yes no yes more than 6 months ago JANUARY                Item Assessment   Suicidal Ideation None   Plan None   Intent None   Suicidal or self-harm behaviors None   Risk Factors Previous psychiatric diagnosis and treatments, Hopeless or dissatisfied with treatment and Major depressive episode   Protective Factors Identified reasons for living              No results found for any visits on 11/06/22.  Medications - No data to display     Assessments & Plan (with Medical Decision Making)   Mr. Mathews is a 57 year old male with a past medical history of Parkinson's disease on carbidopa and levodopa, dystonia on clonazepam, dyskinesias, CVA with residual left hemiparesis on atorvastatin, DVT/PE, peripheral neuropathy, BPH and chronic pain who presents to the emergency department for evaluation of fall. He is hemodynamically stable, afebrile and in no respiratory distress.  He is neurologically at baseline.  Patient pending imaging studies and baseline labs at sign out.      I have reviewed the nursing notes. I have reviewed the findings, diagnosis, plan and need for follow up with the patient.    New Prescriptions    No medications on file       Final diagnoses:   Infection due to 2019 novel coronavirus   Fall, initial encounter     I, Mago Castle am serving as a trained medical scribe to document services personally performed by Lucille Engel MD, based on the provider's statements to me.      I, Lucille Engel MD, was physically present and have reviewed and verified the accuracy of this note documented by Mago Castle.     --  Lucille Engel MD  [facility] EMERGENCY DEPARTMENT  11/6/2022     Lucille Engel MD  11/12/22 3413

## 2022-11-07 ENCOUNTER — APPOINTMENT (OUTPATIENT)
Dept: CT IMAGING | Facility: CLINIC | Age: 57
End: 2022-11-07
Attending: EMERGENCY MEDICINE
Payer: COMMERCIAL

## 2022-11-07 ENCOUNTER — TELEPHONE (OUTPATIENT)
Dept: UROLOGY | Facility: CLINIC | Age: 57
End: 2022-11-07

## 2022-11-07 VITALS
TEMPERATURE: 98.3 F | WEIGHT: 220 LBS | BODY MASS INDEX: 26.79 KG/M2 | OXYGEN SATURATION: 99 % | SYSTOLIC BLOOD PRESSURE: 104 MMHG | HEIGHT: 76 IN | DIASTOLIC BLOOD PRESSURE: 74 MMHG | HEART RATE: 81 BPM

## 2022-11-07 PROCEDURE — 70450 CT HEAD/BRAIN W/O DYE: CPT | Mod: 26 | Performed by: RADIOLOGY

## 2022-11-07 PROCEDURE — 70450 CT HEAD/BRAIN W/O DYE: CPT

## 2022-11-07 ASSESSMENT — ACTIVITIES OF DAILY LIVING (ADL): ADLS_ACUITY_SCORE: 35

## 2022-11-07 NOTE — ED PROVIDER NOTES
Patient was signed out to me by Dr. Copeland.  Please see her note for the initial history and emergency department course.  At the time of signout, UA was pending as well as observation.  For possible discharge.  Patient's COVID test returned positive.  He states he has had a cough for couple of days which is productive of some mucus, but he denies any chest pain or shortness of breath.  She has not been requiring oxygen at all in the ED, has no difficulty breathing.  Chest x-ray is negative.  He does report some mild headaches and since he had a fall and is anticoagulated, we will do a repeat head CT 6 hours after the initial CT.  Patient was given monoclonal antibodies in the ED for COVID infection due to his high risk status and the fact that he lives in assisted living.  Nursing ambulated the patient and he did well and did not require assistance.  Patient feels well enough to go home/back to his assisted living home.  Repeat head CT is negative.    Results for orders placed or performed during the hospital encounter of 11/06/22   XR Shoulder Right G/E 3 Views     Status: None    Narrative    EXAM: XR SHOULDER RIGHT G/E 3 VIEWS  LOCATION: [facility]  DATE/TIME: 11/6/2022 6:50 PM    INDICATION: Right shoulder pain after a fall.  COMPARISON: None.      Impression    IMPRESSION: No acute bone or joint abnormality. Normal joint spaces and alignment. No fracture.   Cervical spine CT w/o contrast     Status: None    Narrative    CT CERVICAL SPINE W/O CONTRAST 11/6/2022 5:59 PM    Provided History: fall, r/o traumatic injury, fall, r/o traumatic  injury; Neck pain; Trauma; Significant trauma    Comparison: Cervical spine CT 5/27/2022    Technique: Using multidetector thin collimation helical acquisition  technique, axial, coronal and sagittal CT images through the cervical  spine were obtained without intravenous contrast.     Findings:  The cervical vertebrae are normally  aligned. Normal cervical lordosis.  No acute fracture or subluxation. No prevertebral edema. There is no  disc height narrowing at any level. Mild degenerative changes with  osteophytic spurring. No significant spinal canal or neural foraminal  narrowing.     No abnormality of the paraspinous soft tissues.      Impression    Impression:   No acute fracture or traumatic subluxation.    I have personally reviewed the examination and initial interpretation  and I agree with the findings.    AUGUSTINA MONTOYA MD         SYSTEM ID:  [id number]   Head CT w/o contrast     Status: None    Narrative    CT HEAD W/O CONTRAST 11/6/2022 5:59 PM    History: fall, r/o traumatic injury     Comparison: Brain MRI 5/28/2022    Technique: Using multidetector thin collimation helical acquisition  technique, axial, coronal and sagittal CT images from the skull base  to the vertex were obtained without intravenous contrast.   (topogram) image(s) also obtained and reviewed.    Findings: There is no intracranial hemorrhage, mass effect, or midline  shift. Gray/white matter differentiation in both cerebral hemispheres  is preserved. Ventricles are proportionate to the cerebral sulci. The  basal cisterns are clear.    The bony calvaria and the bones of the skull base are normal. Mucosal  thickening of the paranasal sinuses. The orbits are unremarkable.      Impression    Impression: No acute intracranial pathology.    I have personally reviewed the examination and initial interpretation  and I agree with the findings.    AUGUSTINA MONTOYA MD         SYSTEM ID:  [id number]   XR Femur Right 2 Views     Status: None    Narrative    EXAM: XR PELVIS 1/2 VIEWS, XR FEMUR RIGHT 2 VIEWS  LOCATION: [facility]  DATE/TIME: 11/6/2022 6:49 PM    INDICATION: Right hip pain after a fall.  COMPARISON: None.      Impression    IMPRESSION: Pelvis, right hip, and right femur negative for fracture or bone lesion. Normal  right hip joint alignment. Minimal degenerative arthritic changes in the right hip, including tiny marginal osteophytes, but maintained joint spacing. Minimal   degenerative arthritic changes also in the left hip. Mild degenerative changes at the lumbosacral junction.   XR Pelvis 1/2 Views     Status: None    Narrative    EXAM: XR PELVIS 1/2 VIEWS, XR FEMUR RIGHT 2 VIEWS  LOCATION: [facility]  DATE/TIME: 11/6/2022 6:49 PM    INDICATION: Right hip pain after a fall.  COMPARISON: None.      Impression    IMPRESSION: Pelvis, right hip, and right femur negative for fracture or bone lesion. Normal right hip joint alignment. Minimal degenerative arthritic changes in the right hip, including tiny marginal osteophytes, but maintained joint spacing. Minimal   degenerative arthritic changes also in the left hip. Mild degenerative changes at the lumbosacral junction.   XR Chest 1 View     Status: None    Narrative    EXAM: XR CHEST 1 VIEW  11/6/2022 6:48 PM      HISTORY: cough, fall on right side    COMPARISON: CT 5/28/2022    FINDINGS: Supine AP radiograph of the chest. The trachea is midline.  The cardiac silhouette is not enlarged. No pleural effusion or  pneumothorax. No focal airspace opacity.       Impression    IMPRESSION: Clear lungs.    I have personally reviewed the examination and initial interpretation  and I agree with the findings.    DAI LEE MD         SYSTEM ID:  [id number]   CT Head w/o Contrast     Status: None    Narrative    EXAM: CT HEAD W/O CONTRAST  LOCATION: [facility]  DATE/TIME: 11/7/2022 1:10 AM    INDICATION: Headache; Trauma, acute subacute, without suspected cervical artery trauma  COMPARISON: Head CT earlier today  TECHNIQUE: Routine CT Head without IV contrast. Multiplanar reformats. Dose reduction techniques were used.    FINDINGS:  INTRACRANIAL CONTENTS: No intracranial hemorrhage, extraaxial  collection, or mass effect.  No CT evidence of acute infarct. Normal parenchymal attenuation. Normal ventricles and sulci.     VISUALIZED ORBITS/SINUSES/MASTOIDS: No intraorbital abnormality. Trace right maxillary sinus mucosal thickening. No middle ear or mastoid effusion.    BONES/SOFT TISSUES: No acute abnormality.      Impression    IMPRESSION:  1.  Stable exam. No acute intracranial abnormality.   Comprehensive metabolic panel     Status: Abnormal   Result Value Ref Range    Sodium 140 136 - 145 mmol/L    Potassium 4.7 3.4 - 5.3 mmol/L    Chloride 104 98 - 107 mmol/L    Carbon Dioxide (CO2) 25 22 - 29 mmol/L    Anion Gap 11 7 - 15 mmol/L    Urea Nitrogen 19.3 6.0 - 20.0 mg/dL    Creatinine 0.83 0.67 - 1.17 mg/dL    Calcium 8.7 8.6 - 10.0 mg/dL    Glucose 102 (H) 70 - 99 mg/dL    Alkaline Phosphatase 87 40 - 129 U/L    AST 30 10 - 50 U/L    ALT 9 (L) 10 - 50 U/L    Protein Total 6.7 6.4 - 8.3 g/dL    Albumin 3.9 3.5 - 5.2 g/dL    Bilirubin Total 0.2 <=1.2 mg/dL    GFR Estimate >90 >60 mL/min/1.73m2   Magnesium     Status: Normal   Result Value Ref Range    Magnesium 2.1 1.7 - 2.3 mg/dL   CK total     Status: Normal   Result Value Ref Range     39 - 308 U/L   UA with Microscopic reflex to Culture     Status: Abnormal    Specimen: Urine, Clean Catch   Result Value Ref Range    Color Urine Light Yellow Colorless, Straw, Light Yellow, Yellow    Appearance Urine Clear Clear    Glucose Urine Negative Negative mg/dL    Bilirubin Urine Negative Negative    Ketones Urine Negative Negative mg/dL    Specific Gravity Urine 1.023 1.003 - 1.035    Blood Urine Negative Negative    pH Urine 6.5 5.0 - 7.0    Protein Albumin Urine Negative Negative mg/dL    Urobilinogen Urine Normal Normal, 2.0 mg/dL    Nitrite Urine Negative Negative    Leukocyte Esterase Urine Negative Negative    Mucus Urine Present (A) None Seen /LPF    RBC Urine 3 (H) <=2 /HPF    WBC Urine 1 <=5 /HPF    Hyaline Casts Urine 1 <=2 /LPF    Narrative     Urine Culture not indicated   [place] Draw     Status: None    Narrative    The following orders were created for panel order [place] Draw.  Procedure                               Abnormality         Status                     ---------                               -----------         ------                     Extra Blue Top Tube[[id number]]                              Final result               Extra Red Top Tube[[id number]]                               Final result                 Please view results for these tests on the individual orders.   CBC with platelets and differential     Status: Abnormal   Result Value Ref Range    WBC Count 5.7 4.0 - 11.0 10e3/uL    RBC Count 3.70 (L) 4.40 - 5.90 10e6/uL    Hemoglobin 11.5 (L) 13.3 - 17.7 g/dL    Hematocrit 36.2 (L) 40.0 - 53.0 %    MCV 98 78 - 100 fL    MCH 31.1 26.5 - 33.0 pg    MCHC 31.8 31.5 - 36.5 g/dL    RDW 13.3 10.0 - 15.0 %    Platelet Count 223 150 - 450 10e3/uL    % Neutrophils 66 %    % Lymphocytes 25 %    % Monocytes 7 %    % Eosinophils 1 %    % Basophils 0 %    % Immature Granulocytes 1 %    NRBCs per 100 WBC 0 <1 /100    Absolute Neutrophils 3.8 1.6 - 8.3 10e3/uL    Absolute Lymphocytes 1.4 0.8 - 5.3 10e3/uL    Absolute Monocytes 0.4 0.0 - 1.3 10e3/uL    Absolute Eosinophils 0.1 0.0 - 0.7 10e3/uL    Absolute Basophils 0.0 0.0 - 0.2 10e3/uL    Absolute Immature Granulocytes 0.0 <=0.4 10e3/uL    Absolute NRBCs 0.0 10e3/uL   Extra Blue Top Tube     Status: None   Result Value Ref Range    Hold Specimen JIC    Extra Red Top Tube     Status: None   Result Value Ref Range    Hold Specimen JIC    Symptomatic; Unknown Influenza A/B & SARS-CoV2 (COVID-19) Virus PCR Multiplex Nasopharyngeal     Status: Abnormal    Specimen: Nasopharyngeal; Swab   Result Value Ref Range    Influenza A PCR Negative Negative    Influenza B PCR Negative Negative    RSV PCR Negative Negative    SARS CoV2 PCR Positive (A) Negative    Narrative    Testing was performed using the  Xpert Xpress CoV2/Flu/RSV Assay on the [employer] GeneXpert Instrument. This test should be ordered for the detection of SARS-CoV-2 and influenza viruses in individuals who meet clinical and/or epidemiological criteria. Test performance is unknown in asymptomatic patients. This test is for in vitro diagnostic use under the FDA EUA for laboratories certified under CLIA to perform high or moderate complexity testing. This test has not been FDA cleared or approved. A negative result does not rule out the presence of PCR inhibitors in the specimen or target RNA in concentration below the limit of detection for the assay. If only one viral target is positive but coinfection with multiple targets is suspected, the sample should be re-tested with another FDA cleared, approved, or authorized test, if coinfection would change clinical management. This test was validated by the [facility] [email or web address]. These laboratories are certified under the Clinical Laboratory Improvement Amendments of 1988 (CLIA-88) as qualified to perform high complexity laboratory testing.   CBC with platelets differential     Status: Abnormal    Narrative    The following orders were created for panel order CBC with platelets differential.  Procedure                               Abnormality         Status                     ---------                               -----------         ------                     CBC with platelets and d...[[id number]]  Abnormal            Final result                 Please view results for these tests on the individual orders.          Veena Galdamez MD  11/07/22 0133

## 2022-11-07 NOTE — TELEPHONE ENCOUNTER
Left Voicemail (1st Attempt) for the patient to call back and schedule the following:    Appointment type: Return with Juju Bernardo  Provider: Dr. Serna  Return date: 11/2/2023  Specialty phone number: [phone]  Additional appointment(s) needed: none  Additonal Notes: Patient needs to schedule a one year follow up with Juju Bernardo per Dr. Serna. Also sent a [employer] message.    Joan RUIZ/Procedure    [facility]   Neurology, NeuroSurgery, NeuroPsychology and Pain Management Specialties  Medical/Surgical Specialties

## 2022-11-07 NOTE — DISCHARGE INSTRUCTIONS
Please make an appointment to follow up with Your Primary Care Provider in 3-4 days if questions or concerns.  Please return the emergency department if you have any further falls or injuries, any shortness of breath or chest pain, difficulty breathing, vomiting, any other concerns.

## 2022-11-09 ENCOUNTER — DOCUMENTATION ONLY (OUTPATIENT)
Dept: OTHER | Facility: CLINIC | Age: 57
End: 2022-11-09

## 2022-11-14 ENCOUNTER — VIRTUAL VISIT (OUTPATIENT)
Dept: ONCOLOGY | Facility: CLINIC | Age: 57
End: 2022-11-14
Attending: SOCIAL WORKER
Payer: COMMERCIAL

## 2022-11-14 DIAGNOSIS — F32.A DEPRESSION: Primary | ICD-10-CM

## 2022-11-14 DIAGNOSIS — F41.9 ANXIETY: ICD-10-CM

## 2022-11-14 PROCEDURE — 90791 PSYCH DIAGNOSTIC EVALUATION: CPT | Performed by: SOCIAL WORKER

## 2022-11-14 NOTE — LETTER
11/14/2022         RE: Benjamin Mathews  [address]  [facility] [address]        Dear Colleague,    Thank you for referring your patient, Benjamin Mathews, to the [facility]. Please see a copy of my visit note below.    Telemedicine Visit: The patient's condition can be safely assessed and treated via synchronous audio and visual telemedicine encounter.      Reason for Telemedicine Visit: covid19 precautions     Originating Site (Patient Location): Patient's other group home         Distant Location (provider location):  Off-site    Consent:  The patient/guardian has verbally consented to: the potential risks and benefits of telemedicine (video visit) versus in person care; bill my insurance or make self-payment for services provided; and responsibility for payment of non-covered services.     Mode of Communication:  Video Conference via [employer]    As the provider I attest to compliance with applicable laws and regulations related to telemedicine.      Palliative Care Counseling Services - Initial Assessment    PLEASE NOTE:  THIS IS A MENTAL HEALTH NOTE.  OTHER PROVIDERS VIEWING THIS NOTE SHOULD USE THIS ONLY FOR UNDERSTANDING THE CONTEXT OF THE PATIENT S EXPERIENCE.  TOPICS DESCRIBED IN THIS NOTE SHOULD NOT BE REFERENCED TO THE PATIENT BY MEDICAL PROVIDERS      Benjamin Mathews is a 57 year old man with diagnosis of atypical parkinsons, seen today for initial palliative care counseling assessment via [employer] video.    Referred by: Palliative care MD    Presenting Issues: was not sure why he was refere dto see me.     Preferred Name: Benjamin    Mental Status Exam: (List all that apply)      Appearance: Appropriate      Eye Contact: Good       Orientation: Yes, x4      Mood: Depressed -- unclear how much of this was mood and how much is  Parkinsons related.       Affect: Flat      Thought Content: Clear         Thought Form: Logical      Psychomotor Behavior: Normal    Family:        Marital status: Single    Years : na    Years together: na     Name of spouse/partner: na      Children: none      Parents:        Siblings: Brother in [place] who visits as able.  He lives with a disability and cannot travel in winter so Benjamin anticipates it will be several months before they can see each other again.  Has a sister in [place] who he has not seen for some time as she is afraid to fly      Other:     Support system: limited support system per today's visit.     Living situation: Group Home   Difficulty accessing and/or getting around living space: No   Other concerns: Yes Parkinsons related tremors that have increased in frequency and intensity which impact his level of comfort going out in the community as they impact his ability to operate his wheelchair.  He is involved with [employer] and plans to ask about ways that his chair might be adapted including possibly     Employment history:      Current employment status:  Disability         Kind of work:  Per chart review, worked as an RN         Spouses/SO current employment status: na      Kind of work:     Education highest level: not asked     Financial:       Descriptor: not asked       Health insurance: not asked     Legal concerns: Not asked       Area(s) of concern: not asked     Health Care Directive: Has one:  No       If yes, copy in EMR: No       Basic information regarding health care directive provided: No        Health Care Agent(s): No health care directive:  Surrogate  health care decision maker is per legal succession  POLST? yes    Medical History/Issues (patient account): Did not get full medical history review from Benjamin today. He did share that he has atypical parkinsons and now lives in a group home.  He has muscle spasms that have increased in frequency and a few times they have happened when he was out in the community which affected his ability to operate his wheelchair and he was not able to call for help from  "his cell phone.  He worries about quality of life if these spasms continue.  Is working closely with his neurologist and works with palliative care MD to address these symptoms.  He is hoping that he can regain strength and worries for his quality of life if he cannot.      Pain/Discomfort Issues: muscle spasms      Coping: \"stocism\" and prayer. He has been working with  Mariel [facility] through [employer] and sees her every other week.  It looks like his are recently transferred to an [facility] due to Mariel taking a new position.     He reports that his work with Mariel has been helpful.    Sleep: Not asked    Sexual Health/Intimacy: not asked     Mental Health History and Current Review of Symptoms (patient account):      See in chart review that he follows with Mariel Li for diagnosis of Anxiety and Recurrent major depressive disorder in partial remission.  I also see that he was treated for suicidal ideations in 2020.  Per chart review it looks like this was triggered by concerns about the facility where he was receiving care at the time. Also note history of JP.  He is in a group home now.     Depression in past?: Yes therapy, medications   Treatment in past?:  Yes medication, therapy  Treatment currently?:  Yes    Depression symptoms currently?:  - Anhedonia:  No -- describes interest in activities such as going out for coffee, and shooting pool.  Changes in health have affected his ability to participate in these activities.    - Hopeless or down mood:  Yes -- does not describe feeling hopeless but does describe feeling disconnected from others.   Attributes this to the fact that he is the only male identified resident at his group home and all of the staff are female.  At the time of our meeting he was also recovering from COVID19  - Sleep problems (too much or too little):  Not asked   - Fatigue:  Not asked   - Appetite (too much or too little):  Not asked   - Excessively negative self " perception:  Not asked directly, not apparent in today's session  - Trouble concentrating:  Not asked   - Motor slowness:  Yes -- illness related.   - Current and/or recent thoughts of suicide:  Not asked. See that this is routinely assessed by Mariel Li and no recent evidence of SI.     Suicide risk screen:  - Past thoughts of suicide?  Yes -- per chart review.  Hosptialized in 2020  - Past attempts to end own life?  No  - If yes, how?   - Protective factors currently?   - Safety plan needed currently?     Anxiety in past?: Yes see anxiety diagnosis in chart  Treatment in past?  Yes   Treatment currently?  Yes meets with health psychologist every other week.     Anxiety symptoms currently?  - Nervous, on edge:  Not asked.    - Sleep problems:  Not asked   - Worrying a lot/hard to manage worries:  Yes  Specific recent areas of worry/fear/concern: with increased muscle spasms he is more worried about going out into the community and finding himself in a situation where he cannot operate his wheelchair.  This has impacted   - Tense, hard to relax:  Not asked  - Feeling restless, hard to sit still:    - Irritable:  Yes  - Feeling of dread/ afraid that something awful may happen:  No  - How long?   - Impacts on daily life?             Grief vs Depression:  Endorses symptoms for:     Psychological Trauma and/or Major Losses:     Not described as traumas, but the recent increase in muscle spasms has been distressing.     Nightmares?    Not asked   Thought about when not wanting to think about? Not asked   Tried hard not to think about it?   Avoided situations that reminded you of it? Not asked   Molino constantly on guard, watchful, or easily startled? Not asked   Felt numb or detached from others, activities, or your surroundings? Not asked     Safety Screen:  History of being harmed or controlled by someone close to you?  Not asked -- not indicated by chart review  Being hurt or controlled by someone close to you?  Not  asked not indicated by chart review  Worried will be harmed in future?  Not asked  Worried will harm someone else in future?  Not asked    CD History:   Using alcohol actively? No    Amount per week:   Current concerns (self or other) about alcohol or drug use? No  Past concerns and/or CD treatment? Yes  Hx of self medicating depressive symptoms with alcohol       Tiffani/Spirituality:       Belong to a tiffani community:     Specify:        Identify with a particular Jainism: Jehovah's witness      Identify as spiritual: Yes      How find expression: Prayer    Hope:      What do you hope for:     Not explored today      What gives you hope:    Not explored today     Internal Resources (positive memories, sources of miladys): not explored today    Perceived Needs: At this time Benjamin is well connected and supported with the health psychology team at [facility].  We agreed that adding services with me would likely not contribute much at this point in time.     Resource needs/Referrals: continue care with team at [facility]     Benjamin Mathews is a 57 year old man. They were referred by  (palliative) for initial pallaitive care clinical social work assessment and attend the interview today via [employer] video. They are diagnosed with atypical parkinsons disease and in addition to their medical diagnosis also meets criteria for major depressive disorder, anxiety disorder. Their symptoms include flat affect today, describes sense of disconnection from others, worries about going out into the community as his symptoms have been more intense (see above for description) which also . Per chart review, Benjamin has lived with depression for several years.  This has included self medication with alcohol. He was also hospitalized in 2020 with sucidal ideation related to concerns about the facility he was in at the time.  He now resides in a group home.  h. It appears that contributing factors for their sense of disconnection from others,  low mood, flat affect include recent covid outbreak in his facility which has resulted in him quarantining for a week, onset of winter which makes getting out in the community more difficult,  his family cannot visit him at this time as his sister does not fly (she lives in [place]) and his brother does not drive in the winter .     Suhail  presents as a 57 year old man with atypical parkinsons. Their relationships include his siblings, he describes these relationships as supportive but not close due in part to the fact that they do not live locally. Other complicating situations in their life include recent covid diagnosis. They identify as a part of Hinduism, white culture. Their medical condition has the potential to influence their mental health in the following ways: ongoing emotional distress, questions about quality of life.   .   Other mental health diagnoses that were considered include: adjustment disorder, caryn, delirium, JP. The symptoms related to these possible diagnoses can currently be explained by history of depression and anxiety.  Reports he is not drinking at this time. No evidence of delirium or caryn noted.    At this time Suhail is well connected with the health psychology team at [facility].  I recommended that he continue with this already established relationship and continue to work with the staff at [facility] and the palliative care medical providers.      I will be available as needed.           Intervention: Initial palliative care counseling / clinical social work evaluation was conducted.  Palliative Care Counseling interventions available were discussed, including counseling related to serious illness, behavioral interventions for symptom management, consultation regarding goals of care/health care directive/POLST, and other interventions specific to the patient's situation or concerns.     Plan: I will send Suhail information on medical alert buttons.  Otherwise no plans for follow  up with me at this time.  He is aware of how to reach me if needs change.     DSM5 Diagnoses:   296.35 Major Depressive Disorder, Recurrent Episode, In partial remission With melancholic features  300.02 (F41.1) Generalized Anxiety Disorder    Time Spent with Patient/Family: 32  (Start 1:03, end 1:33)    RADHA Chacon, [facility]   Palliative Care    Pgr:[phone]  Ph: [phone]      DO NOT SEND ANY LETTERS          Again, thank you for allowing me to participate in the care of your patient.        Sincerely,        KIRA Chacon

## 2022-11-14 NOTE — LETTER
11/14/2022         RE: Benjamin Mathews  [address]  [facility] [address]        Dear Colleague,    Thank you for referring your patient, Benjamin Mathews, to the [facility]. Please see a copy of my visit note below.    Telemedicine Visit: The patient's condition can be safely assessed and treated via synchronous audio and visual telemedicine encounter.      Reason for Telemedicine Visit: covid19 precautions     Originating Site (Patient Location): Patient's other group home         Distant Location (provider location):  Off-site    Consent:  The patient/guardian has verbally consented to: the potential risks and benefits of telemedicine (video visit) versus in person care; bill my insurance or make self-payment for services provided; and responsibility for payment of non-covered services.     Mode of Communication:  Video Conference via [employer]    As the provider I attest to compliance with applicable laws and regulations related to telemedicine.      Palliative Care Counseling Services - Initial Assessment    PLEASE NOTE:  THIS IS A MENTAL HEALTH NOTE.  OTHER PROVIDERS VIEWING THIS NOTE SHOULD USE THIS ONLY FOR UNDERSTANDING THE CONTEXT OF THE PATIENT S EXPERIENCE.  TOPICS DESCRIBED IN THIS NOTE SHOULD NOT BE REFERENCED TO THE PATIENT BY MEDICAL PROVIDERS      Benjamin Mathews is a 57 year old man with diagnosis of atypical parkinsons, seen today for initial palliative care counseling assessment via [employer] video.    Referred by: Palliative care MD    Presenting Issues: was not sure why he was refere dto see me.     Preferred Name: Benjamin    Mental Status Exam: (List all that apply)      Appearance: Appropriate      Eye Contact: Good       Orientation: Yes, x4      Mood: Depressed -- unclear how much of this was mood and how much is  Parkinsons related.       Affect: Flat      Thought Content: Clear         Thought Form: Logical      Psychomotor Behavior: Normal    Family:        Marital status: Single    Years : na    Years together: na     Name of spouse/partner: na      Children: none      Parents:        Siblings: Brother in [facility] who visits as able.  He lives with a disability and cannot travel in winter so Benjamin anticipates it will be several months before they can see each other again.  Has a sister in [place] who he has not seen for some time as she is afraid to fly      Other:     Support system: limited support system per today's visit.     Living situation: Group Home   Difficulty accessing and/or getting around living space: No   Other concerns: Yes Parkinsons related tremors that have increased in frequency and intensity which impact his level of comfort going out in the community as they impact his ability to operate his wheelchair.  He is involved with [employer] and plans to ask about ways that his chair might be adapted including possibly     Employment history:      Current employment status:  Disability         Kind of work:  Per chart review, worked as an RN         Spouses/SO current employment status: na      Kind of work:     Education highest level: not asked     Financial:       Descriptor: not asked       Health insurance: not asked     Legal concerns: Not asked       Area(s) of concern: not asked     Health Care Directive: Has one:  No       If yes, copy in EMR: No       Basic information regarding health care directive provided: No        Health Care Agent(s): No health care directive:  Surrogate  health care decision maker is per legal succession  POLST? yes    Medical History/Issues (patient account): Did not get full medical history review from Benjamin today. He did share that he has atypical parkinsons and now lives in a group home.  He has muscle spasms that have increased in frequency and a few times they have happened when he was out in the community which affected his ability to operate his wheelchair and he was not able to call for help from  "his cell phone.  He worries about quality of life if these spasms continue.  Is working closely with his neurologist and works with palliative care MD to address these symptoms.  He is hoping that he can regain strength and worries for his quality of life if he cannot.      Pain/Discomfort Issues: muscle spasms      Coping: \"stocism\" and prayer. He has been working with  Mariel [facility] through [employer] and sees her every other week.  It looks like his are recently transferred to an [facility] due to Mariel taking a new position.     He reports that his work with Mariel has been helpful.    Sleep: Not asked    Sexual Health/Intimacy: not asked     Mental Health History and Current Review of Symptoms (patient account):      See in chart review that he follows with Mariel Li for diagnosis of Anxiety and Recurrent major depressive disorder in partial remission.  I also see that he was treated for suicidal ideations in 2020.  Per chart review it looks like this was triggered by concerns about the facility where he was receiving care at the time. Also note history of JP.  He is in a group home now.     Depression in past?: Yes therapy, medications   Treatment in past?:  Yes medication, therapy  Treatment currently?:  Yes    Depression symptoms currently?:  - Anhedonia:  No -- describes interest in activities such as going out for coffee, and shooting pool.  Changes in health have affected his ability to participate in these activities.    - Hopeless or down mood:  Yes -- does not describe feeling hopeless but does describe feeling disconnected from others.   Attributes this to the fact that he is the only male identified resident at his group home and all of the staff are female.  At the time of our meeting he was also recovering from COVID19  - Sleep problems (too much or too little):  Not asked   - Fatigue:  Not asked   - Appetite (too much or too little):  Not asked   - Excessively negative self " perception:  Not asked directly, not apparent in today's session  - Trouble concentrating:  Not asked   - Motor slowness:  Yes -- illness related.   - Current and/or recent thoughts of suicide:  Not asked. See that this is routinely assessed by Mariel Li and no recent evidence of SI.     Suicide risk screen:  - Past thoughts of suicide?  Yes -- per chart review.  Hosptialized in 2020  - Past attempts to end own life?  No  - If yes, how?   - Protective factors currently?   - Safety plan needed currently?     Anxiety in past?: Yes see anxiety diagnosis in chart  Treatment in past?  Yes   Treatment currently?  Yes meets with health psychologist every other week.     Anxiety symptoms currently?  - Nervous, on edge:  Not asked.    - Sleep problems:  Not asked   - Worrying a lot/hard to manage worries:  Yes  Specific recent areas of worry/fear/concern: with increased muscle spasms he is more worried about going out into the community and finding himself in a situation where he cannot operate his wheelchair.  This has impacted   - Tense, hard to relax:  Not asked  - Feeling restless, hard to sit still:    - Irritable:  Yes  - Feeling of dread/ afraid that something awful may happen:  No  - How long?   - Impacts on daily life?             Grief vs Depression:  Endorses symptoms for:     Psychological Trauma and/or Major Losses:     Not described as traumas, but the recent increase in muscle spasms has been distressing.     Nightmares?    Not asked   Thought about when not wanting to think about? Not asked   Tried hard not to think about it?   Avoided situations that reminded you of it? Not asked   May constantly on guard, watchful, or easily startled? Not asked   Felt numb or detached from others, activities, or your surroundings? Not asked     Safety Screen:  History of being harmed or controlled by someone close to you?  Not asked -- not indicated by chart review  Being hurt or controlled by someone close to you?  Not  asked not indicated by chart review  Worried will be harmed in future?  Not asked  Worried will harm someone else in future?  Not asked    CD History:   Using alcohol actively? No    Amount per week:   Current concerns (self or other) about alcohol or drug use? No  Past concerns and/or CD treatment? Yes  Hx of self medicating depressive symptoms with alcohol       Tiffani/Spirituality:       Belong to a tiffani community:     Specify:        Identify with a particular Confucianism: Nondenominational      Identify as spiritual: Yes      How find expression: Prayer    Hope:      What do you hope for:     Not explored today      What gives you hope:    Not explored today     Internal Resources (positive memories, sources of miladys): not explored today    Perceived Needs: At this time Benjamin is well connected and supported with the health psychology team at [facility].  We agreed that adding services with me would likely not contribute much at this point in time.     Resource needs/Referrals: continue care with team at [facility]     Benjamin Mathews is a 57 year old man. They were referred by  (palliative) for initial pallaitive care clinical social work assessment and attend the interview today via [employer] video. They are diagnosed with atypical parkinsons disease and in addition to their medical diagnosis also meets criteria for major depressive disorder, anxiety disorder. Their symptoms include flat affect today, describes sense of disconnection from others, worries about going out into the community as his symptoms have been more intense (see above for description) which also . Per chart review, Benjamin has lived with depression for several years.  This has included self medication with alcohol. He was also hospitalized in 2020 with sucidal ideation related to concerns about the facility he was in at the time.  He now resides in a group home.  h. It appears that contributing factors for their sense of disconnection from others,  low mood, flat affect include recent covid outbreak in his facility which has resulted in him quarantining for a week, onset of winter which makes getting out in the community more difficult,  his family cannot visit him at this time as his sister does not fly (she lives in [place]) and his brother does not drive in the winter .     Suhail  presents as a 57 year old man with atypical parkinsons. Their relationships include his siblings, he describes these relationships as supportive but not close due in part to the fact that they do not live locally. Other complicating situations in their life include recent covid diagnosis. They identify as a part of Pentecostal, white culture. Their medical condition has the potential to influence their mental health in the following ways: ongoing emotional distress, questions about quality of life.   .   Other mental health diagnoses that were considered include: adjustment disorder, caryn, delirium, JP. The symptoms related to these possible diagnoses can currently be explained by history of depression and anxiety.  Reports he is not drinking at this time. No evidence of delirium or caryn noted.    At this time Suhail is well connected with the health psychology team at [facility].  I recommended that he continue with this already established relationship and continue to work with the staff at [facility] and the palliative care medical providers.      I will be available as needed.           Intervention: Initial palliative care counseling / clinical social work evaluation was conducted.  Palliative Care Counseling interventions available were discussed, including counseling related to serious illness, behavioral interventions for symptom management, consultation regarding goals of care/health care directive/POLST, and other interventions specific to the patient's situation or concerns.     Plan: I will send Suhail information on medical alert buttons.  Otherwise no plans for follow  up with me at this time.  He is aware of how to reach me if needs change.     DSM5 Diagnoses:   296.35 Major Depressive Disorder, Recurrent Episode, In partial remission With melancholic features  300.02 (F41.1) Generalized Anxiety Disorder    Time Spent with Patient/Family: 32  (Start 1:03, end 1:33)    RADHA Chacon, [facility]   Palliative Care    Pgr:[phone]  Ph: [phone]      DO NOT SEND ANY LETTERS          Again, thank you for allowing me to participate in the care of your patient.        Sincerely,        KIRA Chacon

## 2022-11-14 NOTE — PROGRESS NOTES
Telemedicine Visit: The patient's condition can be safely assessed and treated via synchronous audio and visual telemedicine encounter.      Reason for Telemedicine Visit: covid19 precautions     Originating Site (Patient Location): Patient's other group home         Distant Location (provider location):  Off-site    Consent:  The patient/guardian has verbally consented to: the potential risks and benefits of telemedicine (video visit) versus in person care; bill my insurance or make self-payment for services provided; and responsibility for payment of non-covered services.     Mode of Communication:  Video Conference via [employer]    As the provider I attest to compliance with applicable laws and regulations related to telemedicine.      Palliative Care Counseling Services - Initial Assessment    PLEASE NOTE:  THIS IS A MENTAL HEALTH NOTE.  OTHER PROVIDERS VIEWING THIS NOTE SHOULD USE THIS ONLY FOR UNDERSTANDING THE CONTEXT OF THE PATIENT S EXPERIENCE.  TOPICS DESCRIBED IN THIS NOTE SHOULD NOT BE REFERENCED TO THE PATIENT BY MEDICAL PROVIDERS      Benjamin Mathews is a 57 year old man with diagnosis of atypical parkinsons, seen today for initial palliative care counseling assessment via [facility] On[facility] video.    Referred by: Palliative care MD    Presenting Issues: was not sure why he was refere dto see me.     Preferred Name: Benjamin    Mental Status Exam: (List all that apply)      Appearance: Appropriate      Eye Contact: Good       Orientation: Yes, x4      Mood: Depressed -- unclear how much of this was mood and how much is  Parkinsons related.       Affect: Flat      Thought Content: Clear         Thought Form: Logical      Psychomotor Behavior: Normal    Family:       Marital status: Single    Years : na    Years together: na     Name of spouse/partner: na      Children: none      Parents:        Siblings: Brother in area who visits as able.  He lives with a disability and cannot travel in winter so Benjamin  anticipates it will be several months before they can see each other again.  Has a sister in [place] who he has not seen for some time as she is afraid to fly      Other:     Support system: limited support system per today's visit.     Living situation: Group Home   Difficulty accessing and/or getting around living space: No   Other concerns: Yes Parkinsons related tremors that have increased in frequency and intensity which impact his level of comfort going out in the community as they impact his ability to operate his wheelchair.  He is involved with [facility] and plans to ask about ways that his chair might be adapted including possibly     Employment history:      Current employment status:  Disability         Kind of work:  Per chart review, worked as an RN         Spouses/SO current employment status: na      Kind of work:     Education highest level: not asked     Financial:       Descriptor: not asked       Health insurance: not asked     Legal concerns: Not asked       Area(s) of concern: not asked     Health Care Directive: Has one:  No       If yes, copy in EMR: No       Basic information regarding health care directive provided: No        Health Care Agent(s): No health care directive:  Surrogate  health care decision maker is per legal succession  POLST? yes    Medical History/Issues (patient account): Did not get full medical history review from Benjamin today. He did share that he has atypical parkinsons and now lives in a group home.  He has muscle spasms that have increased in frequency and a few times they have happened when he was out in the community which affected his ability to operate his wheelchair and he was not able to call for help from his cell phone.  He worries about quality of life if these spasms continue.  Is working closely with his neurologist and works with palliative care MD to address these symptoms.  He is hoping that he can regain strength and worries for his quality of life  "if he cannot.      Pain/Discomfort Issues: muscle spasms      Coping: \"stocism\" and prayer. He has been working with  Mariel Li LP through [facility] and sees her every other week.  [facility] due to Mariel taking a new position.     He reports that his work with Mariel has been helpful.    Sleep: Not asked    Sexual Health/Intimacy: not asked     Mental Health History and Current Review of Symptoms (patient account):      See in chart review that he follows with Mariel Guillermo for diagnosis of Anxiety and Recurrent major depressive disorder in partial remission.  I also see that he was treated for suicidal ideations in 2020.  Per chart review it looks like this was triggered by concerns about the facility where he was receiving care at the time. Also note history of JP.  He is in a group home now.     Depression in past?: Yes therapy, medications   Treatment in past?:  Yes medication, therapy  Treatment currently?:  Yes    Depression symptoms currently?:  - Anhedonia:  No -- describes interest in activities such as going out for coffee, and shooting pool.  Changes in health have affected his ability to participate in these activities.    - Hopeless or down mood:  Yes -- does not describe feeling hopeless but does describe feeling disconnected from others.   Attributes this to the fact that he is the only male identified resident at his group home and all of the staff are female.  At the time of our meeting he was also recovering from COVID19  - Sleep problems (too much or too little):  Not asked   - Fatigue:  Not asked   - Appetite (too much or too little):  Not asked   - Excessively negative self perception:  Not asked directly, not apparent in today's session  - Trouble concentrating:  Not asked   - Motor slowness:  Yes -- illness related.   - Current and/or recent thoughts of suicide:  Not asked. See that this is routinely assessed by Mariel Li and " no recent evidence of SI.     Suicide risk screen:  - Past thoughts of suicide?  Yes -- per chart review.  Hosptialized in 2020  - Past attempts to end own life?  No  - If yes, how?   - Protective factors currently?   - Safety plan needed currently?     Anxiety in past?: Yes see anxiety diagnosis in chart  Treatment in past?  Yes   Treatment currently?  Yes meets with health psychologist every other week.     Anxiety symptoms currently?  - Nervous, on edge:  Not asked.    - Sleep problems:  Not asked   - Worrying a lot/hard to manage worries:  Yes  Specific recent areas of worry/fear/concern: with increased muscle spasms he is more worried about going out into the community and finding himself in a situation where he cannot operate his wheelchair.  This has impacted   - Tense, hard to relax:  Not asked  - Feeling restless, hard to sit still:    - Irritable:  Yes  - Feeling of dread/ afraid that something awful may happen:  No  - How long?   - Impacts on daily life?             Grief vs Depression:  Endorses symptoms for:     Psychological Trauma and/or Major Losses:     Not described as traumas, but the recent increase in muscle spasms has been distressing.     Nightmares?    Not asked   Thought about when not wanting to think about? Not asked   Tried hard not to think about it?   Avoided situations that reminded you of it? Not asked   [place] constantly on guard, watchful, or easily startled? Not asked   Felt numb or detached from others, activities, or your surroundings? Not asked     Safety Screen:  History of being harmed or controlled by someone close to you?  Not asked -- not indicated by chart review  Being hurt or controlled by someone close to you?  Not asked not indicated by chart review  Worried will be harmed in future?  Not asked  Worried will harm someone else in future?  Not asked    CD History:   Using alcohol actively? No    Amount per week:   Current concerns (self or other) about alcohol or drug use?  No  Past concerns and/or CD treatment? Yes  Hx of self medicating depressive symptoms with alcohol       Tiffani/Spirituality:       Belong to a tiffani community:     Specify:        Identify with a particular Judaism: Zoroastrian      Identify as spiritual: Yes      How find expression: Prayer    Hope:      What do you hope for:     Not explored today      What gives you hope:    Not explored today     Internal Resources (positive memories, sources of miladys): not explored today    Perceived Needs: At this time Benjamin is well connected and supported with the health psychology team at [facility].  We agreed that adding services with me would likely not contribute much at this point in time.     Resource needs/Referrals: continue care with team at [facility]     Benjamin Mathews is a 57 year old man. They were referred by  (palliative) for initial palltive care clinical social work assessment and attend the interview today via [employer]. They are diagnosed with atypical parkinsons disease and in addition to their medical diagnosis also meets criteria for major depressive disorder, anxiety disorder. Their symptoms include flat affect today, describes sense of disconnection from others, worries about going out into the community as his symptoms have been more intense (see above for description) which also . Per chart review, Benjamin has lived with depression for several years.  This has included self medication with alcohol. He was also hospitalized in 2020 with sucidal ideation related to concerns about the facility he was in at the time.  He now resides in a group home.  h. It appears that contributing factors for their sense of disconnection from others, low mood, flat affect include recent covid outbreak in his facility which has resulted in him quarantining for a week, onset of winter which makes getting out in the community more difficult,  his family cannot visit him at this time as his sister does not fly  (she lives in [place]) and his brother does not drive in the winter .     Suhail  presents as a 57 year old man with atypical parkinsons. Their relationships include his siblings, he describes these relationships as supportive but not close due in part to the fact that they do not live locally. Other complicating situations in their life include recent covid diagnosis. They identify as a part of Yarsanism, white culture. Their medical condition has the potential to influence their mental health in the following ways: ongoing emotional distress, questions about quality of life.   .   Other mental health diagnoses that were considered include: adjustment disorder, caryn, delirium, JP. The symptoms related to these possible diagnoses can currently be explained by history of depression and anxiety.  Reports he is not drinking at this time. No evidence of delirium or caryn noted.    At this time Suhail is well connected with the health psychology team at [facility].  I recommended that he continue with this already established relationship and continue to work with the staff at [facility] and the palliative care medical providers.      I will be available as needed.           Intervention: Initial palliative care counseling / clinical social work evaluation was conducted.  Palliative Care Counseling interventions available were discussed, including counseling related to serious illness, behavioral interventions for symptom management, consultation regarding goals of care/health care directive/POLST, and other interventions specific to the patient's situation or concerns.     Plan: I will send Suhail information on medical alert buttons.  Otherwise no plans for follow up with me at this time.  He is aware of how to reach me if needs change.     DSM5 Diagnoses:   296.35 Major Depressive Disorder, Recurrent Episode, In partial remission With melancholic features  300.02 (F41.1) Generalized Anxiety Disorder    Time Spent with  Patient/Family: 32  (Start 1:03, end 1:33)    RADHA Chacon, [facility]   Palliative Care    Pgr:[phone]  Ph: [phone]      DO NOT SEND ANY LETTERS

## 2022-11-17 ENCOUNTER — VIRTUAL VISIT (OUTPATIENT)
Dept: PHARMACY | Facility: CLINIC | Age: 57
End: 2022-11-17
Payer: COMMERCIAL

## 2022-11-17 ENCOUNTER — ANCILLARY PROCEDURE (OUTPATIENT)
Dept: ULTRASOUND IMAGING | Facility: CLINIC | Age: 57
End: 2022-11-17
Attending: UROLOGY
Payer: COMMERCIAL

## 2022-11-17 DIAGNOSIS — R39.9 LOWER URINARY TRACT SYMPTOMS (LUTS): ICD-10-CM

## 2022-11-17 DIAGNOSIS — M62.838 MUSCLE SPASM: ICD-10-CM

## 2022-11-17 DIAGNOSIS — G20.C PARKINSONISM, UNSPECIFIED PARKINSONISM TYPE (H): Primary | ICD-10-CM

## 2022-11-17 PROCEDURE — 76857 US EXAM PELVIC LIMITED: CPT

## 2022-11-17 PROCEDURE — 99606 MTMS BY PHARM EST 15 MIN: CPT | Mod: 93 | Performed by: PHARMACIST

## 2022-11-17 PROCEDURE — 99607 MTMS BY PHARM ADDL 15 MIN: CPT | Mod: 93 | Performed by: PHARMACIST

## 2022-11-17 NOTE — PROGRESS NOTES
"Medication Therapy Management (MTM) Encounter    ASSESSMENT:                            Medication Adherence/Access: No issues identified    Parkinson's Disease: As he doesn't feel that morning dose is as effective, it could be related to dosing with breakfast. Reviewed recommendation to separate high protein meals from carbidopa/levodopa doses and suggested eating about an hour after doses. Additionally, there is evidence that benzodiazepines may diminish the effect of levodopa and he takes clonazepam 2mg with each morning dose. Alternately, the timeline of spasms about 1.5h after 8am dose could be related to peak dose dytonia.    Afternoon timeline may be moreso related to \"off period\" dystonia, as it occurs soon before his next dose. Afternoon spasms have been much less frequent, so unclear if it could be related to medications. As patient only started Botox in the last few weeks, may continue to see improvement over the next few months.  As Botox hopefully becomes more effective, will continue to look at reducing clonazepam doses.    Muscle spasm pain: Medications seem to be beneficial. Less pain with improved toe spasms/dystonia.    PLAN:                            1. Try eating an hour after your 8am and 12pm carbidopa/levodopa doses.  2. Ok to take the 4pm dose a little early to if you're noticing tremor.    Will consider benefit of reducing clonazepam in the future    Follow-up: I will call you on 12/15 at 1pm    SUBJECTIVE/OBJECTIVE:                          Benjamin Mathews is a 57 year old male called for a follow-up visit.  Today's visit is a follow-up MTM visit from 8/25/22.     Reason for visit: med follow up    Allergies/ADRs: Reviewed in chart  Past Medical History: Reviewed in chart  Tobacco: He reports that he has been smoking pipe. He has never used smokeless tobacco.Nicotine/Tobacco Cessation Plan:   Information offered: Patient not interested at this time  Alcohol: not currently using    Medication " "Adherence/Access: no issues reported    Parkinsonism:  Medication Dose Timing   8am 12p 4p 8p Bedtime prn   Carbidopa/levodopa 25-100mg 2 2 1.5      Carbidopa/levodopa CR     1     clonazepam 2mg 1mg 2mg   1mg              Since last visit, patient initiated Botox treatments in right sided neck and foot, having first one on 10/24 and will get them every 3 months.  Today, he reports that he was diagnosed with COVID-19 about 10 days ago and is still feeling a bit weak as he recovers. He last reported that \"severe spasms\" were happening a couple times per week and now states there have been 2-3 in the last month, though are happening in the afternoons which is new. He reported having a \"pretty bad spasm\" when trying to leave [place] last week and couldn't move his right side and took about 20 minutes for it reduce intensity, though minor spasms continued for a while. Generally starts in the right shoulder and moves downward. These severe spasms where he \"locks up\" happen 2-4pm, never after 4pm. He does notice some twitching and tremor in his left leg about 30m prior to 4pm dose. Pt reported having \"more moderate\" spasms in the morning about once per week. He thinks that these spasms occur about 1.5 hours after morning dose.  He reported that Botox has already been helpful for toe curling.  He uses prn clonazepam when very severe, which does help somewhat. Scheduled clonazepam was initially very helpful in improving dystonic muscle spasms.    Per Neuro note,   Review of surgical or medication options   Reviewed  Amantadine - hallucinations  rasagiline (azilect) = has not been on but may not be able to take with other medications  Selegiline (eldepryl) - has not been on this  rytary -  has not been on this  comtan (entacapone) - has not been on this  stalevo (entacapone/carbidopa/levodopa) - has not been on this  Ropinirole (requip) - has not been on this  mirapex (pramipexole) = has not been on this   rotigotine " (neupro) - has not been on this      Muscle spasm pain: Pt takes gabapentin 800mg TID, acetaminophen 1000mg TID. Very infrequently takes methocarbamol 500mg. Since last visit, he stopped taking baclofen 10mg QID and has not noticed any change. Much less toe curling/spasms since Botox was started and he is looking forward to benefit in his shoulder.   ----------------    I spent 30 minutes with this patient today. All changes were made via collaborative practice agreement with Kennedy Perry. A copy of the visit note was provided to the patient's provider(s).    The patient was verbally given a summary of these recommendations.     Cary Sol PharmD  Medication Therapy Management Pharmacist  [facility]    Telemedicine Visit Details  Type of service:  Telephone visit  Start Time: 1:00pm  End Time: 1:30pm  Originating Location (pt. Location): Home      Distant Location (provider location):  Off-site  Provider has received verbal consent for a visit from the patient? Yes     Medication Therapy Recommendations  Parkinsonism, unspecified Parkinsonism type (H)    Current Medication: carbidopa-levodopa (SINEMET)  MG tablet   Rationale: Undesirable effect - Adverse medication event - Safety   Recommendation: Provide Education - eat about an hour after carbidopa/levodopa doses   Status: Patient Agreed - Adherence/Education

## 2022-11-17 NOTE — Clinical Note
Sony Flower, You see this patient in about 6 weeks. He got first Botox about 3 weeks ago. I'm trying to put together the timeline of his symptoms and wonder what could be peak dose dystonia vs off period dystonia later in the day vs not related to meds. I know you haven't seen him yet, but would love to hear any thoughts you may have.  How often do you see a benzo interfere with levodopa efficacy? And how long do you give Botox to be beneficial before adjusting oral meds? He's going to make sure he's  doses from food and hopefully gain more benefit from Botox with future treatments. Thanks! Cary

## 2022-11-17 NOTE — PATIENT INSTRUCTIONS
"Recommendations from today's MTM visit:                                                    MTM (medication therapy management) is a service provided by a clinical pharmacist designed to help you get the most of out of your medicines.   Today we reviewed what your medicines are for, how to know if they are working, that your medicines are safe and how to make your medicine regimen as easy as possible.      1. Try eating an hour after your 8am and 12pm carbidopa/levodopa doses.  2. Ok to take the 4pm dose a little early to if you're noticing tremor.    Will consider benefit of reducing clonazepam in the future    Follow-up: I will call you on 12/15 at 1pm    It was great speaking with you today.  I value your experience and would be very thankful for your time in providing feedback in our clinic survey. In the next few days, you may receive an email or text message from [employer] with a link to a survey related to your  clinical pharmacist.\"     To schedule another MTM appointment, please call the clinic directly or you may call the MTM scheduling line at [phone] or toll-free at [phone].     My Clinical Pharmacist's contact information:                                                      Please feel free to contact me with any questions or concerns you have.      Cary Sol, PharmD  Medication Therapy Management Pharmacist  [facility]  "

## 2022-11-19 DIAGNOSIS — M62.838 MUSCLE SPASM: ICD-10-CM

## 2022-11-20 ENCOUNTER — HOSPITAL ENCOUNTER (EMERGENCY)
Facility: CLINIC | Age: 57
Discharge: HOME | End: 2022-11-21
Attending: EMERGENCY MEDICINE | Admitting: EMERGENCY MEDICINE
Payer: COMMERCIAL

## 2022-11-20 DIAGNOSIS — M62.838 MUSCLE SPASM: ICD-10-CM

## 2022-11-20 LAB
ALBUMIN SERPL BCG-MCNC: 4 G/DL (ref 3.5–5.2)
ALP SERPL-CCNC: 76 U/L (ref 40–129)
ALT SERPL W P-5'-P-CCNC: 8 U/L (ref 10–50)
ANION GAP SERPL CALCULATED.3IONS-SCNC: 12 MMOL/L (ref 7–15)
AST SERPL W P-5'-P-CCNC: 23 U/L (ref 10–50)
BASOPHILS # BLD AUTO: 0 10E3/UL (ref 0–0.2)
BASOPHILS NFR BLD AUTO: 0 %
BILIRUB SERPL-MCNC: 0.2 MG/DL
BUN SERPL-MCNC: 22.7 MG/DL (ref 6–20)
CALCIUM SERPL-MCNC: 8.9 MG/DL (ref 8.6–10)
CHLORIDE SERPL-SCNC: 105 MMOL/L (ref 98–107)
CREAT SERPL-MCNC: 0.81 MG/DL (ref 0.67–1.17)
DEPRECATED HCO3 PLAS-SCNC: 23 MMOL/L (ref 22–29)
EOSINOPHIL # BLD AUTO: 0.1 10E3/UL (ref 0–0.7)
EOSINOPHIL NFR BLD AUTO: 1 %
ERYTHROCYTE [DISTWIDTH] IN BLOOD BY AUTOMATED COUNT: 13.1 % (ref 10–15)
GFR SERPL CREATININE-BSD FRML MDRD: >90 ML/MIN/1.73M2
GLUCOSE SERPL-MCNC: 129 MG/DL (ref 70–99)
HCT VFR BLD AUTO: 35.2 % (ref 40–53)
HGB BLD-MCNC: 11.2 G/DL (ref 13.3–17.7)
IMM GRANULOCYTES # BLD: 0 10E3/UL
IMM GRANULOCYTES NFR BLD: 1 %
LYMPHOCYTES # BLD AUTO: 1.7 10E3/UL (ref 0.8–5.3)
LYMPHOCYTES NFR BLD AUTO: 24 %
MAGNESIUM SERPL-MCNC: 2 MG/DL (ref 1.7–2.3)
MCH RBC QN AUTO: 31.1 PG (ref 26.5–33)
MCHC RBC AUTO-ENTMCNC: 31.8 G/DL (ref 31.5–36.5)
MCV RBC AUTO: 98 FL (ref 78–100)
MONOCYTES # BLD AUTO: 0.5 10E3/UL (ref 0–1.3)
MONOCYTES NFR BLD AUTO: 7 %
NEUTROPHILS # BLD AUTO: 4.9 10E3/UL (ref 1.6–8.3)
NEUTROPHILS NFR BLD AUTO: 67 %
NRBC # BLD AUTO: 0 10E3/UL
NRBC BLD AUTO-RTO: 0 /100
PLATELET # BLD AUTO: 224 10E3/UL (ref 150–450)
POTASSIUM SERPL-SCNC: 4 MMOL/L (ref 3.4–5.3)
PROT SERPL-MCNC: 6.7 G/DL (ref 6.4–8.3)
RBC # BLD AUTO: 3.6 10E6/UL (ref 4.4–5.9)
SODIUM SERPL-SCNC: 140 MMOL/L (ref 136–145)
WBC # BLD AUTO: 7.3 10E3/UL (ref 4–11)

## 2022-11-20 PROCEDURE — 99284 EMERGENCY DEPT VISIT MOD MDM: CPT | Performed by: EMERGENCY MEDICINE

## 2022-11-20 PROCEDURE — 80053 COMPREHEN METABOLIC PANEL: CPT | Performed by: EMERGENCY MEDICINE

## 2022-11-20 PROCEDURE — 85004 AUTOMATED DIFF WBC COUNT: CPT | Performed by: EMERGENCY MEDICINE

## 2022-11-20 PROCEDURE — 83735 ASSAY OF MAGNESIUM: CPT | Performed by: EMERGENCY MEDICINE

## 2022-11-20 PROCEDURE — 99207 PR SERVICE NOT STAFFED W/SUPERV PROV: CPT | Performed by: STUDENT IN AN ORGANIZED HEALTH CARE EDUCATION/TRAINING PROGRAM

## 2022-11-20 PROCEDURE — 36415 COLL VENOUS BLD VENIPUNCTURE: CPT | Performed by: EMERGENCY MEDICINE

## 2022-11-20 RX ORDER — BACLOFEN 10 MG/1
10 TABLET ORAL ONCE
Status: COMPLETED | OUTPATIENT
Start: 2022-11-20 | End: 2022-11-21

## 2022-11-20 ASSESSMENT — ACTIVITIES OF DAILY LIVING (ADL)
ADLS_ACUITY_SCORE: 35
ADLS_ACUITY_SCORE: 35

## 2022-11-21 VITALS
WEIGHT: 220 LBS | OXYGEN SATURATION: 96 % | TEMPERATURE: 97.6 F | DIASTOLIC BLOOD PRESSURE: 74 MMHG | HEART RATE: 102 BPM | BODY MASS INDEX: 26.79 KG/M2 | HEIGHT: 76 IN | SYSTOLIC BLOOD PRESSURE: 105 MMHG | RESPIRATION RATE: 18 BRPM

## 2022-11-21 PROCEDURE — 250N000013 HC RX MED GY IP 250 OP 250 PS 637: Performed by: EMERGENCY MEDICINE

## 2022-11-21 RX ORDER — CLONAZEPAM 0.5 MG/1
1 TABLET ORAL 2 TIMES DAILY
Status: DISCONTINUED | OUTPATIENT
Start: 2022-11-21 | End: 2022-11-21 | Stop reason: HOSPADM

## 2022-11-21 RX ADMIN — BACLOFEN 10 MG: 10 TABLET ORAL at 00:11

## 2022-11-21 RX ADMIN — CLONAZEPAM 1 MG: 0.5 TABLET ORAL at 00:54

## 2022-11-21 ASSESSMENT — ACTIVITIES OF DAILY LIVING (ADL): ADLS_ACUITY_SCORE: 35

## 2022-11-21 NOTE — ED PROVIDER NOTES
ED Provider Note  [facility]      History     Chief Complaint   Patient presents with     Spasms     HPI  Benjamin Mathews is a 57 year old male with a history of Parkinson's disease who presents with muscle spasms.  Patient has a long history of this and has been difficult to manage.  He states that these are generally on the right side of his body.  He states that in the past several days they have become worse he does not know why.  They have previously been more frequent in the morning but now they are in the afternoon.  Patient was seen on 11/17/2022 due to this and had his carbidopa levodopa as well as clonazepam schedule changed.  He notes continued symptoms.  No other symptoms noted.    Past Medical History  Past Medical History:   Diagnosis Date     Clotting disorder (H)     PE 2019     Dystonia      Parkinson disease (H)      No past surgical history on file.  acetaminophen (TYLENOL) 500 MG tablet  alfuzosin ER (UROXATRAL) 10 MG 24 hr tablet  atorvastatin (LIPITOR) 40 MG tablet  bisacodyl (DULCOLAX) 10 MG suppository  calcium polycarbophil (FIBER-LAX) 625 MG tablet  carbidopa-levodopa (SINEMET CR)  MG CR tablet  carbidopa-levodopa (SINEMET)  MG tablet  cholecalciferol (VITAMIN D3) 125 mcg (5000 units) capsule  clonazePAM (KLONOPIN) 1 MG tablet  clonazePAM (KLONOPIN) 2 MG tablet  cloZAPine (CLOZARIL) 25 MG tablet  cloZAPine (CLOZARIL) 50 MG tablet  ENULOSE 10 GM/15ML SOLUTION  gabapentin (NEURONTIN) 800 MG tablet  hydrocortisone 1 % CREA cream  hydrOXYzine (ATARAX) 25 MG tablet  lubiprostone (AMITIZA) 24 MCG capsule  metoprolol succinate ER (TOPROL-XL) 25 MG 24 hr tablet  Multiple Vitamin (DAILY-ALLAN) TABS  pantoprazole (PROTONIX) 40 MG EC tablet  polyethylene glycol (MIRALAX) 17 g packet  polyethylene glycol (MIRALAX) 17 GM/Dose powder  rivaroxaban ANTICOAGULANT (XARELTO) 20 MG TABS tablet  SENNA-TIME 8.6 MG tablet  sodium phosphate (FLEET ENEMA) 7-19 GM/118ML rectal  "enema  traZODone (DESYREL) 100 MG tablet  traZODone (DESYREL) 50 MG tablet  triamcinolone (KENALOG) 0.1 % external cream  venlafaxine (EFFEXOR XR) 150 MG 24 hr capsule  venlafaxine (EFFEXOR XR) 75 MG 24 hr capsule  vitamin C (ASCORBIC ACID) 500 MG tablet      Allergies   Allergen Reactions     Amantadine Other (See Comments)     Other reaction(s): Hallucinations  Hallucinations/ lost self control/gambling.     halluicnations  Hallucinations/ lost self control/gambling.     hallucinates  halluicnations  hallucinates  Hallucinations/ lost self control/gambling.        Quetiapine GI Disturbance, Diarrhea and Other (See Comments)     Diarrhea    Diarrhea  Other reaction(s): GI Disturbance  Diarrhea       Duloxetine Other (See Comments)     suicidal  suicidal       Family History  Family History   Problem Relation Age of Onset     Other - See Comments Mother         pulmonary embolism from hip fracture     Pulmonary Embolism Mother      Parkinsonism Father      Neurologic Disorder Sister      Parkinsonism Sister         ?med related     Other - See Comments Sister         12/7/1950     Depression Sister      Neurologic Disorder Brother         dystonia     Dystonia Brother      Other - See Comments Brother              Heart Disease Nephew      Social History   Social History     Tobacco Use     Smoking status: Every Day     Types: Pipe     Smokeless tobacco: Never   Substance Use Topics     Alcohol use: Not Currently     Comment: sober x 18 months     Drug use: Not Currently      Past medical history, past surgical history, medications, allergies, family history, and social history were reviewed with the patient. No additional pertinent items.       Review of Systems  A complete review of systems was performed with pertinent positives and negatives noted in the HPI, and all other systems negative.    Physical Exam   BP: 113/61  Pulse: 102  Temp: 97.6  F (36.4  C)  Resp: 18  Height: 193.1 cm (6' 4.02\")  Weight: " 99.8 kg (220 lb)  SpO2: 96 %  Physical Exam  Vitals and nursing note reviewed.   Constitutional:       General: He is not in acute distress.     Appearance: He is well-developed and well-nourished. He is not diaphoretic.   HENT:      Head: Normocephalic and atraumatic.      Mouth/Throat:      Pharynx: No oropharyngeal exudate.   Eyes:      General: No scleral icterus.        Right eye: No discharge.         Left eye: No discharge.      Pupils: Pupils are equal, round, and reactive to light.   Cardiovascular:      Rate and Rhythm: Normal rate and regular rhythm.      Pulses: Intact distal pulses.      Heart sounds: Normal heart sounds. No murmur heard.    No friction rub. No gallop.   Pulmonary:      Effort: Pulmonary effort is normal. No respiratory distress.      Breath sounds: Normal breath sounds. No wheezing.   Chest:      Chest wall: No tenderness.   Abdominal:      General: Bowel sounds are normal. There is no distension.      Palpations: Abdomen is soft.      Tenderness: There is no abdominal tenderness.   Musculoskeletal:         General: No tenderness, deformity or edema. Normal range of motion.      Cervical back: Normal range of motion and neck supple.   Skin:     General: Skin is warm and dry.      Coloration: Skin is not pale.      Findings: No erythema or rash.   Neurological:      Mental Status: He is alert and oriented to person, place, and time.      Cranial Nerves: No cranial nerve deficit.   Psychiatric:         Mood and Affect: Mood and affect normal.         ED Course      Procedures                     No results found for any visits on 11/20/22.  Medications - No data to display     Assessments & Plan (with Medical Decision Making)   This is a 57-year-old male with a history of Parkinson disease who presents with worsening muscle spasms.  This is been occurring mostly in the evening.  No known precipitating factors then worsening.  Exam demonstrates no acute abnormalities.  I discussed case  with Neurology recommend giving a dose of baclofen and starting patient on 100 mg of amantadine at 8 AM and at 4 PM.  I discussed this with patient who states that he had severe reaction to amantadine in the past, caused hallucinations.  Patient does not want to restart on this medication.  Patient had an episode of muscle spasm in the emergency Department was given a dose of clonazepam for this.  Patient has prompt follow-up with Neurology.  Will discharge with return precautions..    I have reviewed the nursing notes. I have reviewed the findings, diagnosis, plan and need for follow up with the patient.    New Prescriptions    No medications on file       Final diagnoses:   None       --  Garland Rodriguez DO  [facility] EMERGENCY DEPARTMENT  11/20/2022     Garland Rodriguez,   11/21/22 0240

## 2022-11-21 NOTE — ED NOTES
Bed: ED19  Expected date: 11/20/22  Expected time: 7:25 PM  Means of arrival: Ambulance  Comments:  59y/o male - muscle spasms
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
## 2022-11-21 NOTE — DISCHARGE INSTRUCTIONS
Continue with scheduled Neurology follow-up. Return to the emergency department if symptoms continue, get worse, there are any new symptoms or any cause for concern.

## 2022-11-25 RX ORDER — CLONAZEPAM 2 MG/1
TABLET ORAL
Qty: 60 TABLET | Refills: 5 | Status: ON HOLD | OUTPATIENT
Start: 2022-11-25 | End: 2023-05-25

## 2022-11-25 NOTE — TELEPHONE ENCOUNTER
[facility]: Palliative Care                                                                                        Received voicemail from staff at care facility requesting refill of clonzeapam. She reports pharmacy sent request last week but haven't heard back. Reviewed Epic - request was not sent to palliative pool for some reason?     Last refill: 10/24/2022  Last office visit: 10/20/2022  Scheduled for follow up 12/8/2022    Will route request to MD for review.     Reviewed [place]  Report.       Signature:  Danielle Ardon, AMISHAN, RN, OCN

## 2022-11-26 ENCOUNTER — TELEPHONE (OUTPATIENT)
Dept: NURSING | Facility: CLINIC | Age: 57
End: 2022-11-26

## 2022-11-26 DIAGNOSIS — M62.838 MUSCLE SPASM: ICD-10-CM

## 2022-11-26 RX ORDER — CLONAZEPAM 1 MG/1
1 TABLET ORAL DAILY PRN
Qty: 30 TABLET | Refills: 0 | Status: ON HOLD | OUTPATIENT
Start: 2022-11-26 | End: 2023-05-25

## 2022-11-26 NOTE — TELEPHONE ENCOUNTER
Looking for the order for the afternoon dose as this was not sent to [facility] yesterday.    Per the MTM from 11/17/22 note    Parkinsonism:  Medication Dose Timing    8am 12p 4p 8p Bedtime prn   Carbidopa/levodopa 25-100mg 2 2 1.5         Carbidopa/levodopa CR        1       clonazepam 2mg 1mg 2mg     1mg          Plan:     MD consult, Dr. Arriaza Paged by Answering Service at 8:04 AM    Reason for page: needs the order for clonazepam afternoon dose. Takes for his Dystonia, muscle spasm due to parkinsonism.      Cherry Bai RN  [place] Nurse Advisor  7:44 AM  11/26/2022

## 2022-11-26 NOTE — TELEPHONE ENCOUNTER
Provider, Dr. Arriaza, returning page to Nurse Advisors at 8:31 AM    Rx order signed.    Provider Recommendation Follow Up:   Unable to reach patient/caregiver. Left message letting her know that Rx sent to pharmacy.    Cherry Bai RN  [place] Nurse Advisor  8:33 AM  11/26/2022

## 2022-11-29 ENCOUNTER — OFFICE VISIT (OUTPATIENT)
Dept: PODIATRY | Facility: CLINIC | Age: 57
End: 2022-11-29
Payer: COMMERCIAL

## 2022-11-29 DIAGNOSIS — M20.41 HAMMER TOES OF BOTH FEET: ICD-10-CM

## 2022-11-29 DIAGNOSIS — L97.522 SKIN ULCER OF SECOND TOE OF LEFT FOOT WITH FAT LAYER EXPOSED (H): Primary | ICD-10-CM

## 2022-11-29 DIAGNOSIS — B35.1 ONYCHOMYCOSIS: ICD-10-CM

## 2022-11-29 DIAGNOSIS — M20.42 HAMMER TOES OF BOTH FEET: ICD-10-CM

## 2022-11-29 DIAGNOSIS — G60.9 IDIOPATHIC PERIPHERAL NEUROPATHY: ICD-10-CM

## 2022-11-29 DIAGNOSIS — G20.A1 PARKINSON'S DISEASE (H): ICD-10-CM

## 2022-11-29 PROCEDURE — 99214 OFFICE O/P EST MOD 30 MIN: CPT | Performed by: PODIATRIST

## 2022-11-29 ASSESSMENT — PAIN SCALES - GENERAL: PAINLEVEL: NO PAIN (0)

## 2022-11-29 NOTE — NURSING NOTE
Chief Complaint   Patient presents with     Right Foot - RECHECK     Left Foot - RECHECK       There were no vitals filed for this visit.    There is no height or weight on file to calculate BMI.      SHAYLEE Martin NREMT
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
## 2022-12-01 RX ORDER — CLONAZEPAM 0.5 MG/1
TABLET ORAL
Qty: 60 TABLET | Refills: 5 | OUTPATIENT
Start: 2022-12-01

## 2022-12-02 ENCOUNTER — VIRTUAL VISIT (OUTPATIENT)
Dept: PHARMACY | Facility: CLINIC | Age: 57
End: 2022-12-02
Payer: COMMERCIAL

## 2022-12-02 ENCOUNTER — TELEPHONE (OUTPATIENT)
Dept: NEUROLOGY | Facility: CLINIC | Age: 57
End: 2022-12-02

## 2022-12-02 DIAGNOSIS — G20.C PARKINSONISM, UNSPECIFIED PARKINSONISM TYPE (H): Primary | ICD-10-CM

## 2022-12-02 PROCEDURE — 99607 MTMS BY PHARM ADDL 15 MIN: CPT | Mod: 93 | Performed by: PHARMACIST

## 2022-12-02 PROCEDURE — 99606 MTMS BY PHARM EST 15 MIN: CPT | Mod: 93 | Performed by: PHARMACIST

## 2022-12-02 RX ORDER — AMANTADINE HYDROCHLORIDE 100 MG/1
CAPSULE, GELATIN COATED ORAL
Qty: 53 CAPSULE | Refills: 3 | Status: SHIPPED | OUTPATIENT
Start: 2022-12-02 | End: 2023-01-01

## 2022-12-02 NOTE — PROGRESS NOTES
Medication Therapy Management (MTM) Encounter    ASSESSMENT:                            Medication Adherence/Access: No issues identified    Parkinsonism: As previous trial with baclofen 60mg daily was not effective and took a month to taper, would avoid another trial at this time. Since he was not taking clozapine at the time of previous amantadine trial, could consider trying it again, as clozapine may offer some protective effect for hallucinations.    PLAN:                            Start taking amantadine 100mg once daily x one week then increase to 100mg twice daily, if tolerated    Follow-up: Dr. Carlos on 1/4. Will schedule MTM follow up after that    SUBJECTIVE/OBJECTIVE:                          Benjamin Mathews is a 57 year old male called for a follow-up visit.  Today's visit is a follow-up MTM visit from 11/17/22.     Reason for visit: follow up.    Allergies/ADRs: Reviewed in chart  Past Medical History: Reviewed in chart  Tobacco: He reports that he has been smoking pipe. He has never used smokeless tobacco.Nicotine/Tobacco Cessation Plan:   Information offered: Patient not interested at this time  Alcohol: not currently using    Medication Adherence/Access: no issues reported     Parkinsonism:  Medication Dose Timing   8am 12p 4p 8p Bedtime prn   Carbidopa/levodopa 25-100mg 2 2 1.5      Carbidopa/levodopa CR     1     clonazepam 2mg 1mg 2mg   1mg              Today, he reported that he didn't end up trying to separate meals from 8am and noon doses. Noted that he feels that carbidopa/levodopa doses are working fine, denying any concern any slowness and tremor occurs very infrequently.    He was in the ED for severe spasm on 11/20, but hasn't had any as significant since that time. Was given a dose of baclofen in ED, which he stated was not helpful. Pt stated that he had taken baclofen in the past, 15mg four times per day, and recalled that it was not at all effective. He has had smaller episodes  "of spasm and stiffening since ED visit, such as his arm or shoulder tightening. He noted that most concern for spasms is that he is \"incapacitated\" and can't do anything when they affect his whole body and it takes 2-3 hours before he can move again.   We discussed previous trial of amantadine causing hallucinations, though he couldn't recall the dose. He thought that hallucinations (saw dead parents, lights would glow different colors, \"total unrealities\") started after he had been taking it for awhile. He was not taking clozapine at that time. Last trial was ~2015. He is open to another trial with amantadine.  He does have Botox visit in about 4 weeks.    Per Neuro note,   Review of surgical or medication options   Reviewed  Amantadine - hallucinations  rasagiline (azilect) = has not been on but may not be able to take with other medications  Selegiline (eldepryl) - has not been on this  rytary -  has not been on this  comtan (entacapone) - has not been on this  stalevo (entacapone/carbidopa/levodopa) - has not been on this  Ropinirole (requip) - has not been on this  mirapex (pramipexole) = has not been on this   rotigotine (neupro) - has not been on this  ----------------      I spent 30 minutes with this patient today. All changes were made via collaborative practice agreement with Cary Merlos. A copy of the visit note was provided to the patient's provider(s).    A summary of these recommendations was given to the patient and was declined by the patient.    Cary Sol, PharmD  Medication Therapy Management Pharmacist  [facility]    Telemedicine Visit Details  Type of service:  Telephone visit  Start Time: 12:30pm  End Time: 1:00pm  Originating Location (pt. Location): Home      Distant Location (provider location):  Off-site  Provider has received verbal consent for a visit from the patient? Yes     Medication Therapy Recommendations  Parkinsonism, unspecified " Parkinsonism type (H)    Current Medication: amantadine (SYMMETREL) 100 MG capsule   Rationale: Untreated condition - Needs additional medication therapy - Indication   Recommendation: Start Medication - start amantadine per plan   Status: Accepted per CPA

## 2022-12-02 NOTE — PATIENT INSTRUCTIONS
"Recommendations from today's MTM visit:                                                    MTM (medication therapy management) is a service provided by a clinical pharmacist designed to help you get the most of out of your medicines.   Today we reviewed what your medicines are for, how to know if they are working, that your medicines are safe and how to make your medicine regimen as easy as possible.      Start taking amantadine 100mg once daily for 7 days, then if tolerated, increase to 100mg twice daily.  Please reach out to clinic if you start experiencing any hallucinations.    Follow-up: Dr. Carlos on 1/4. I will send you a message in January to schedule MTM follow up.    It was great speaking with you today.  I value your experience and would be very thankful for your time in providing feedback in our clinic survey. In the next few days, you may receive an email or text message from [email or web address] with a link to a survey related to your  clinical pharmacist.\"     To schedule another MTM appointment, please call the clinic directly or you may call the MTM scheduling line at [phone] or toll-free at [phone].     My Clinical Pharmacist's contact information:                                                      Please feel free to contact me with any questions or concerns you have.      Cary Sol, PharmD  Medication Therapy Management Pharmacist  [facility]  "

## 2022-12-02 NOTE — TELEPHONE ENCOUNTER
Health Call Center    Phone Message    May a detailed message be left on voicemail: yes     Reason for Call: Medication Question or concern regarding medication   Prescription Clarification  Name of Medication: amantadine (SYMMETREL) 100 MG capsule  Prescribing Provider: Cary Merlos   Pharmacy: [facility]. - [facility] [address]   What on the order needs clarification?     Kandi from [facility] is requesting a call back to clarify the medication listed above.  Please clarify the allergy to the medication amantadine (SYMMETREL) and if pt will proceed with this medication should it be 1 capsule twice per day. Please call back to advise and ask to speak with a pharmacist at # [phone]        Action Taken: Message routed to:  [facility] ([facility] neurology     Travel Screening: Not Applicable

## 2022-12-02 NOTE — Clinical Note
You see this patient on 1/4. His main issue is spasms, which can be severe and landed him in ER a couple weeks ago. He had previously experienced hallucinations with amantadine, but was not taking clozapine at that time, so we are doing a retrial with amantadine. Previous baclofen 15mg four times daily was not helpful. Let me know if you have questions! Cary

## 2022-12-02 NOTE — Clinical Note
I saw this patient today and he had reported no benefit from previous scheduled baclofen 15mg four times daily, so we didn't go that route. He was not taking clozapine when he experienced hallucinations from amantadine, so we are going to give it another try. He sees Ching in about a month. Let me know what questions you have. Cary

## 2022-12-08 ENCOUNTER — OFFICE VISIT (OUTPATIENT)
Dept: PALLIATIVE CARE | Facility: CLINIC | Age: 57
End: 2022-12-08
Attending: INTERNAL MEDICINE
Payer: COMMERCIAL

## 2022-12-08 VITALS
OXYGEN SATURATION: 97 % | TEMPERATURE: 97 F | BODY MASS INDEX: 27.37 KG/M2 | DIASTOLIC BLOOD PRESSURE: 65 MMHG | HEART RATE: 90 BPM | WEIGHT: 225 LBS | SYSTOLIC BLOOD PRESSURE: 110 MMHG

## 2022-12-08 DIAGNOSIS — G20.A1 PARKINSON'S DISEASE (TREMOR, STIFFNESS, SLOW MOTION, UNSTABLE POSTURE) (H): Primary | ICD-10-CM

## 2022-12-08 PROCEDURE — 99214 OFFICE O/P EST MOD 30 MIN: CPT | Mod: GC

## 2022-12-08 PROCEDURE — G0463 HOSPITAL OUTPT CLINIC VISIT: HCPCS

## 2022-12-08 ASSESSMENT — PAIN SCALES - GENERAL: PAINLEVEL: NO PAIN (0)

## 2022-12-08 NOTE — LETTER
12/8/2022       RE: Benjamin Mathews  [address]  [facility] [address]     Dear Colleague,    Thank you for referring your patient, Benjamin Mathews, to the [facility] at [facility]. Please see a copy of my visit note below.    Palliative Care Outpatient Clinic Progress Note    Patient Name: Benjamin Mathews is being evaluated in person.    Primary Provider:  Stephanie Maldonado  Last seen by palliative care: 10/20/22      Chief Complaint: Dystonic spasms    Background/Summary    Medical:  Patient diagnosed with Parkinson's in 2013 after a short period of tremor and gait changes. 2015 symptoms significant worsened. He is followed by  of Neurology.     Social  Grew up in [place]. 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 Around this time is when he was diagnosed with Parkinson's and work became difficult. Has a brother and sister-in law that come to visit him in his [place] which he appreciates. He has a sister and brother in law who are MDs, work for [employer] (In [place]). Father had Parkinson's and passed over 10 years ago. He care for his mother and father before they passed.      Lives in a group home with 4 other people in [place].  Is able to walk around the house using his four-wheel walker; uses his wheelchair for longer distances.       Care Planning   No advanced directive. We started discussion regarding who would help make medical decisions. Overall is isolated, most likely his sister Isha. Will discuss further at follow up.     Opioid Safety     : reviewed, 12/08/2022  Clonazepam 2mg tablet - 60 tablets on 11/25/22  Clonazepam 1mg tablet - 30 tablets on 11/26/22    Interim History:  History gathered today from: patient     Mr. Mathews is a 57 year old male with Parkinson's disease who is following  "up for management of dystonic spasms. Last seen in clinic on 10/20. At that time he reported improvement in frequency of right sided muscle spasms. Pushing night time clonazepam dose to a later period improved morning spasms, however then started having evening dystonia (5pm), but only once every few weeks.  Clonazepam timing was 2 mg at 8 AM, 1 mg at noon, 2 mg at 8 PM.  We had made no medication changes, and he was planning to see PM&R for botox.       Since then, he presented to [facility] ER with painful right sided muscle spasms. Seen by Neurology. Concern for \"on\" dystonia as spasms generally occurred within 1-2 hours of carbidopa/levodopa. He was given one dose of baclofen 10mg and clonazepam. Recommended he start amantadine 100mg BID. Patient declined in the ER as he reported side effects including hallucinations from amantadine in the past.     Met patient today in clinic. He recalls his ER visit. That day he had severe spasm during the afternoon which was unusual for him. This episode was much more painful this time. Usually occurs during the morning or evening. Patient tells me of incident a few weeks ago where he was at [employer] foods and developed severe muscle spasm while crossing at the [employer] walk. He was stuck there as he couldn't use his right hand to control his wheel chair. It took him three hours to get home. This is his biggest concern and now avoids going out into public. He has a bad episode about every 2 weeks.    Had first appointment with PM&R for botox. Injected the neck and right lower extremity. He has noticed benefit with his foot. He is hoping they will be able to work on his right shoulder muscles as this is the most concerning.     He did start amantadine yesterday. He is concerned about hallucinations but is willing to try. Will monitor for symptoms.     Finds he is walking up earlier, not related to muscle spasms. Mood is lower at this time. There is some disruption in the group home with new " residents. He feels disconnected from his family and socially isolated. His brother Robert lives locally and has his own health issues. His does not have a good relationship with sister in [place]. Other sister Isha is a physician and lives in [place]. He does not get to see her often. He misses the connection but does not talk to her about it. No other friends. Does go to play pool from time to time. [facility] a [facility]. Has also seen talisha WADE in the past.          Functional Status:  Palliative Performance Scale: 50%  Previous: 50%          Impression & Recommendations & Counseling:  Benjamin Mathews is a 58 yo male with Parkinson's disease with neurocognitive disorder, dyskinesias, stroke, chronic pain who presents for follow up of right sided dystonia involving arm and leg with torticollis of right neck. He has noted some improvement with his right lower extremity with botox. Recent ER visit for severe pain related to dystonia. He was recommend to restart amantadine which he started on 12/07. He is scheduled to see Neurology on 01/04/23. Will reach out to see if Artane would be beneficial to restart. Will defer given he just started amantadine.     - Continue clonazepam 2mg 8 AM, 1mg noon, 2mg 8 PM   - Continue amantadine 100mg daily then BID   - Follow up appointment with PM&R on 02/01. Encouraged him to discuss his shoulder.     Low mood, social isolation:  -Feeling down with his painful episode and avoiding social situtations as well as lack of family presence. He has been to parkinsons support groups in the past but they were all older than him and felt little in common. He is up to trying again. Provided him with contact info of different groups in the state. He will continue to see his psychologist for support.       Return to clinic in 2 months, sooner if worsening symptoms. Patient will call clinic with concerns.    Data / Chart Review:    Review of Systems:   ROS: 10 point ROS neg other  than the symptoms noted above in the HPI and pertinents here.         Physical Exam:   Physical Exam:  /65   Pulse 90   Temp 97  F (36.1  C) (Oral)   Wt 102.1 kg (225 lb)   SpO2 97%   BMI 27.37 kg/m        CONSTIT: awake, sitting up in wheel chair, appears comfortable  EENT: EOMI, no icterus  RESP: reg, normal effort, no cough  SKIN: no rash, no obvious lesions  NEURO: alert, oriented x3  PSYCH: normal affect, memory and thought process intact        Current pertinent medications:  Current Outpatient Medications   Medication     acetaminophen (TYLENOL) 500 MG tablet     alfuzosin ER (UROXATRAL) 10 MG 24 hr tablet     amantadine (SYMMETREL) 100 MG capsule     atorvastatin (LIPITOR) 40 MG tablet     bisacodyl (DULCOLAX) 10 MG suppository     calcium polycarbophil (FIBER-LAX) 625 MG tablet     carbidopa-levodopa (SINEMET CR)  MG CR tablet     carbidopa-levodopa (SINEMET)  MG tablet     cholecalciferol (VITAMIN D3) 125 mcg (5000 units) capsule     clonazePAM (KLONOPIN) 1 MG tablet     clonazePAM (KLONOPIN) 2 MG tablet     cloZAPine (CLOZARIL) 50 MG tablet     ENULOSE 10 GM/15ML SOLUTION     gabapentin (NEURONTIN) 800 MG tablet     hydrocortisone 1 % CREA cream     hydrOXYzine (ATARAX) 25 MG tablet     lubiprostone (AMITIZA) 24 MCG capsule     metoprolol succinate ER (TOPROL-XL) 25 MG 24 hr tablet     Multiple Vitamin (DAILY-ALLAN) TABS     pantoprazole (PROTONIX) 40 MG EC tablet     polyethylene glycol (MIRALAX) 17 GM/Dose powder     rivaroxaban ANTICOAGULANT (XARELTO) 20 MG TABS tablet     SENNA-TIME 8.6 MG tablet     sodium phosphate (FLEET ENEMA) 7-19 GM/118ML rectal enema     traZODone (DESYREL) 100 MG tablet     traZODone (DESYREL) 50 MG tablet     triamcinolone (KENALOG) 0.1 % external cream     venlafaxine (EFFEXOR XR) 150 MG 24 hr capsule     venlafaxine (EFFEXOR XR) 75 MG 24 hr capsule     vitamin C (ASCORBIC ACID) 500 MG tablet     Current Facility-Administered Medications   Medication      botulinum toxin type A (BOTOX) 100 units injection 400 Units            Allergies   Allergen Reactions     Amantadine Other (See Comments)     Other reaction(s): Hallucinations  Hallucinations/ lost self control/gambling.     halluicnations  Hallucinations/ lost self control/gambling.     hallucinates  halluicnations  hallucinates  Hallucinations/ lost self control/gambling.        Quetiapine GI Disturbance, Diarrhea and Other (See Comments)     Diarrhea    Diarrhea  Other reaction(s): GI Disturbance  Diarrhea       Duloxetine Other (See Comments)     suicidal  suicidal         Lab and imaging data reviewed:  H.2  CMP: unremarkable  Mag: normal      AJAY Langley [facility]  Palliative Medicine Fellow    I, Bernadette Arriaza MD personally examined and evaluated this patient on 22. I discussed the patient with Dr Sosa and care team, and agree with the assessment and plan of care as documented in the fellow s note of 22.  I personally reviewed medications, labs, imaging, vital signs as indicated.  Key findings: Patient seen for Parkinson's Disease, made recommendations for management of dystonia and to increase support in light of his low mood and sense of isolation. These are based on history, chart review, exam. Ongoing conversation around advance care planning    Bernadette Arriaza MD  Palliative Medicine  Pager [phone]      Sincerely,    Anthony Sosa MD

## 2022-12-08 NOTE — PATIENT INSTRUCTIONS
[facility] DISEASE COMMUNITY SUPPORT GROUPS  For questions or for help in starting a support group, please call the APDA Information & Referral Center  at [phone] or toll free [phone]. Before attending your first meeting, please contact the support  group facilitator. For information about the APDA Center, visit [email or web address].      Patient Instructions      Expand All Collapse All    Thank you for attending the Palliative Care Clinic appointment today.     Call if your symptoms change and the medications we have prescribed are not working.   Do not take your medications at any other dose or frequently than how they are prescribed. Call if you think we should make any changes  I have prescribed naloxone as a rescue medication for the opioids (pain pills), which I do for all of my patients who have these medications at home.    Call us at least 3 workdays in advance if you need a medication refill.    Please have all your pill bottles ready for your next appointment.     Return to clinic in 2months for a follow up.     You can reach the Palliative Care Team during business hours at the following number:    - [phone]  - or send me a [employer] message    To reach the Palliative Care Provider on-call After-hours or on holidays and weekends, call: [phone].  Please note that we are not able to provide pain medication refills on evenings or weekends.

## 2022-12-08 NOTE — PROGRESS NOTES
Palliative Care Outpatient Clinic Progress Note    Patient Name: Benjamin Mathews is being evaluated in person.    Primary Provider:  Stephanie Maldonado  Last seen by palliative care: 10/20/22      Chief Complaint: Dystonic spasms    Background/Summary    Medical:  Patient diagnosed with Parkinson's in 2013 after a short period of tremor and gait changes. 2015 symptoms significant worsened. He is followed by  of Neurology.     Social  Grew up in [place]. 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 Around this time is when he was diagnosed with Parkinson's and work became difficult. Has a brother and sister-in law that come to visit him in his [place] which he appreciates. He has a sister and brother in law who are MDs, work for [employer] (In [place]). Father had Parkinson's and passed over 10 years ago. He care for his mother and father before they passed.      Lives in a group home with 4 other people in [place].  Is able to walk around the house using his four-wheel walker; uses his wheelchair for longer distances.       Care Planning   No advanced directive. We started discussion regarding who would help make medical decisions. Overall is isolated, most likely his sister Isha. Will discuss further at follow up.     Opioid Safety     : reviewed, 12/08/2022  Clonazepam 2mg tablet - 60 tablets on 11/25/22  Clonazepam 1mg tablet - 30 tablets on 11/26/22    Interim History:  History gathered today from: patient     Mr. Mathews is a 57 year old male with Parkinson's disease who is following up for management of dystonic spasms. Last seen in clinic on 10/20. At that time he reported improvement in frequency of right sided muscle spasms. Pushing night time clonazepam dose to a later period improved morning spasms, however then started having evening dystonia (5pm), but only once every few  "weeks.  Clonazepam timing was 2 mg at 8 AM, 1 mg at noon, 2 mg at 8 PM.  We had made no medication changes, and he was planning to see PM&R for botox.       Since then, he presented to [facility] ER with painful right sided muscle spasms. Seen by Neurology. Concern for \"on\" dystonia as spasms generally occurred within 1-2 hours of carbidopa/levodopa. He was given one dose of baclofen 10mg and clonazepam. Recommended he start amantadine 100mg BID. Patient declined in the ER as he reported side effects including hallucinations from amantadine in the past.     Met patient today in clinic. He recalls his ER visit. That day he had severe spasm during the afternoon which was unusual for him. This episode was much more painful this time. Usually occurs during the morning or evening. Patient tells me of incident a few weeks ago where he was at [employer] and developed severe muscle spasm while crossing at the cross walk. He was stuck there as he couldn't use his right hand to control his wheel chair. It took him three hours to get home. This is his biggest concern and now avoids going out into public. He has a bad episode about every 2 weeks.    Had first appointment with PM&R for botox. Injected the neck and right lower extremity. He has noticed benefit with his foot. He is hoping they will be able to work on his right shoulder muscles as this is the most concerning.     He did start amantadine yesterday. He is concerned about hallucinations but is willing to try. Will monitor for symptoms.     Finds he is walking up earlier, not related to muscle spasms. Mood is lower at this time. There is some disruption in the group home with new residents. He feels disconnected from his family and socially isolated. His brother Robert lives locally and has his own health issues. His does not have a good relationship with sister in [place]. Other sister Isha is a physician and lives in [place]. He does not get to see her often. He misses the " connection but does not talk to her about it. No other friends. Does go to play pool from time to time. Sees a psychologist at [facility]. Has also seen talisha WADE in the past.          Functional Status:  Palliative Performance Scale: 50%  Previous: 50%          Impression & Recommendations & Counseling:  Benjamin Mathews is a 58 yo male with Parkinson's disease with neurocognitive disorder, dyskinesias, stroke, chronic pain who presents for follow up of right sided dystonia involving arm and leg with torticollis of right neck. He has noted some improvement with his right lower extremity with botox. Recent ER visit for severe pain related to dystonia. He was recommend to restart amantadine which he started on 12/07. He is scheduled to see Neurology on 01/04/23. Will reach out to see if Artane would be beneficial to restart. Will defer given he just started amantadine.     - Continue clonazepam 2mg 8 AM, 1mg noon, 2mg 8 PM   - Continue amantadine 100mg daily then BID   - Follow up appointment with PM&R on 02/01. Encouraged him to discuss his shoulder.     Low mood, social isolation:  -Feeling down with his painful episode and avoiding social situtations as well as lack of family presence. He has been to parkinsons support groups in the past but they were all older than him and felt little in common. He is up to trying again. Provided him with contact info of different groups in the state. He will continue to see his psychologist for support.       Return to clinic in 2 months, sooner if worsening symptoms. Patient will call clinic with concerns.    Data / Chart Review:    Review of Systems:   ROS: 10 point ROS neg other than the symptoms noted above in the HPI and pertinents here.         Physical Exam:   Physical Exam:  /65   Pulse 90   Temp 97  F (36.1  C) (Oral)   Wt 102.1 kg (225 lb)   SpO2 97%   BMI 27.37 kg/m        CONSTIT: awake, sitting up in wheel chair, appears comfortable  EENT: EOMI, no  icterus  RESP: reg, normal effort, no cough  SKIN: no rash, no obvious lesions  NEURO: alert, oriented x3  PSYCH: normal affect, memory and thought process intact        Current pertinent medications:  Current Outpatient Medications   Medication     acetaminophen (TYLENOL) 500 MG tablet     alfuzosin ER (UROXATRAL) 10 MG 24 hr tablet     amantadine (SYMMETREL) 100 MG capsule     atorvastatin (LIPITOR) 40 MG tablet     bisacodyl (DULCOLAX) 10 MG suppository     calcium polycarbophil (FIBER-LAX) 625 MG tablet     carbidopa-levodopa (SINEMET CR)  MG CR tablet     carbidopa-levodopa (SINEMET)  MG tablet     cholecalciferol (VITAMIN D3) 125 mcg (5000 units) capsule     clonazePAM (KLONOPIN) 1 MG tablet     clonazePAM (KLONOPIN) 2 MG tablet     cloZAPine (CLOZARIL) 50 MG tablet     ENULOSE 10 GM/15ML SOLUTION     gabapentin (NEURONTIN) 800 MG tablet     hydrocortisone 1 % CREA cream     hydrOXYzine (ATARAX) 25 MG tablet     lubiprostone (AMITIZA) 24 MCG capsule     metoprolol succinate ER (TOPROL-XL) 25 MG 24 hr tablet     Multiple Vitamin (DAILY-ALLAN) TABS     pantoprazole (PROTONIX) 40 MG EC tablet     polyethylene glycol (MIRALAX) 17 GM/Dose powder     rivaroxaban ANTICOAGULANT (XARELTO) 20 MG TABS tablet     SENNA-TIME 8.6 MG tablet     sodium phosphate (FLEET ENEMA) 7-19 GM/118ML rectal enema     traZODone (DESYREL) 100 MG tablet     traZODone (DESYREL) 50 MG tablet     triamcinolone (KENALOG) 0.1 % external cream     venlafaxine (EFFEXOR XR) 150 MG 24 hr capsule     venlafaxine (EFFEXOR XR) 75 MG 24 hr capsule     vitamin C (ASCORBIC ACID) 500 MG tablet     Current Facility-Administered Medications   Medication     botulinum toxin type A (BOTOX) 100 units injection 400 Units            Allergies   Allergen Reactions     Amantadine Other (See Comments)     Other reaction(s): Hallucinations  Hallucinations/ lost self control/gambling.     halluicnations  Hallucinations/ lost self control/gambling.      hallucinates  halluicnations  hallucinates  Hallucinations/ lost self control/gambling.        Quetiapine GI Disturbance, Diarrhea and Other (See Comments)     Diarrhea    Diarrhea  Other reaction(s): GI Disturbance  Diarrhea       Duloxetine Other (See Comments)     suicidal  suicidal             Lab and imaging data reviewed:  H.2  CMP: unremarkable  Mag: normal        AJAY [facility]  Palliative Medicine Fellow      I, Bernadette Arriaza MD personally examined and evaluated this patient on 22. I discussed the patient with Dr Sosa and care team, and agree with the assessment and plan of care as documented in the fellow s note of 22.  I personally reviewed medications, labs, imaging, vital signs as indicated.  Key findings: Patient seen for Parkinson's Disease, made recommendations for management of dystonia and to increase support in light of his low mood and sense of isolation. These are based on history, chart review, exam. Ongoing conversation around advance care planning    Bernadette Arriaza MD  Palliative Medicine  Pager [phone]

## 2022-12-08 NOTE — NURSING NOTE
"Oncology Rooming Note    December 8, 2022 1:58 PM   Benjamin Mathews is a 57 year old male who presents for:    No chief complaint on file.    Initial Vitals: /65   Pulse 90   Temp 97  F (36.1  C) (Oral)   Wt 102.1 kg (225 lb)   SpO2 97%   BMI 27.37 kg/m   Estimated body mass index is 27.37 kg/m  as calculated from the following:    Height as of 11/20/22: 1.931 m (6' 4.02\").    Weight as of this encounter: 102.1 kg (225 lb). Body surface area is 2.34 meters squared.  No Pain (0) Comment: Data Unavailable   No LMP for male patient.  Allergies reviewed: Yes  Medications reviewed: Yes    Medications: Medication refills not needed today.  Pharmacy name entered into [employer]: [facility] - [place], [place] - [address] [place]    Clinical concerns: none.       Zachary Clemente"

## 2022-12-15 ENCOUNTER — TELEPHONE (OUTPATIENT)
Dept: PALLIATIVE CARE | Facility: CLINIC | Age: 57
End: 2022-12-15

## 2022-12-15 DIAGNOSIS — G20.A1 PARKINSON'S DISEASE (TREMOR, STIFFNESS, SLOW MOTION, UNSTABLE POSTURE) (H): Primary | ICD-10-CM

## 2022-12-15 DIAGNOSIS — M62.838 MUSCLE SPASM: ICD-10-CM

## 2022-12-15 NOTE — TELEPHONE ENCOUNTER
Received telephone call from patient's group home requesting a refill of the noon dose for clonazepam 1 mg. Reviewed clinic note from 12/8, pt is to continue the noon dose, however it is no longer on the medlist.    Last refill: 11/25 (2 mg bid dose filled, 1 mg PRN dose filled)  Last office visit: 12/8/22  Scheduled for follow up per checkout order request     Will route request to NP for review.     Reviewed [place]  Report.

## 2022-12-15 NOTE — TELEPHONE ENCOUNTER
Called Benjamin to follow up after our visit last week. He started Amantadine 12/07. Things went well for the first week when taking 1 capsule (100mg). Felt there was improvement in spasms. After increased to two capsules as recommended, he started developing visual hallucinations. Reports seeing objects like a bicycle in his room. Not distressing. He went back down to 1 capsule. He is hopeful he'll be able to try increasing dose again in the coming weeks. We discussed starting Artane as another option. He'd like to hold off any other medications while trialing amantadine which I agree with. He will be seeing neurology on 01/04/22.

## 2022-12-16 RX ORDER — CLONAZEPAM 1 MG/1
1 TABLET ORAL DAILY
Qty: 30 TABLET | Refills: 0 | Status: SHIPPED | OUTPATIENT
Start: 2022-12-16 | End: 2023-01-20

## 2023-01-04 ENCOUNTER — VIRTUAL VISIT (OUTPATIENT)
Dept: NEUROLOGY | Facility: CLINIC | Age: 58
End: 2023-01-04
Attending: PSYCHIATRY & NEUROLOGY
Payer: COMMERCIAL

## 2023-01-04 DIAGNOSIS — G20.C PARKINSONISM, UNSPECIFIED PARKINSONISM TYPE (H): Primary | ICD-10-CM

## 2023-01-04 DIAGNOSIS — R44.3 HALLUCINATIONS: ICD-10-CM

## 2023-01-04 DIAGNOSIS — K59.01 SLOW TRANSIT CONSTIPATION: ICD-10-CM

## 2023-01-04 DIAGNOSIS — G24.1 DYSTONIA, TORSION, FRAGMENTS OF: ICD-10-CM

## 2023-01-04 PROCEDURE — 99215 OFFICE O/P EST HI 40 MIN: CPT | Performed by: PSYCHIATRY & NEUROLOGY

## 2023-01-04 PROCEDURE — 99417 PROLNG OP E/M EACH 15 MIN: CPT | Performed by: PSYCHIATRY & NEUROLOGY

## 2023-01-04 ASSESSMENT — UNIFIED PARKINSONS DISEASE RATING SCALE (UPDRS)
AMPLITUDE_RLE: NORMAL: NO TREMOR.
RIGIDITY_RUE: MILD: RIGIDITY DETECTED WITHOUT THE ACTIVATION MANEUVER.  FULL RANGE OF MOTION IS EASILY ACHIEVED.
RIGIDITY_LUE: MODERATE: RIGIDITY DETECTED WITHOUT THE ACTIVATION MANEUVER. FULL RANGE OF MOTION IS ACHIEVED WITH EFFORT.
TOETAPPING_RIGHT: SLIGHT: ANY OF THE FOLLOWING: A) THE REGULAR RHYTHM IS BROKEN WITH ONE WITH ONE OR TWO INTERRUPTIONS OR HESITATIONS OF THE MOVEMENT B) SLIGHT SLOWING C) THE AMPLITUDE DECREMENTS NEAR THE END OF THE 10 MOVEMENTS.
SPONTANEITY_OF_MOVEMENT: 1: SLIGHT: SLIGHT GLOBAL SLOWNESS AND POVERTY OF SPONTANEOUS MOVEMENTS.
FACIAL_EXPRESSION: SLIGHT: MINIMAL MASKED FACIES MANIFESTED ONLY BY DECREASED FREQUENCY OF BLINKING.
SPEECH: SLIGHT: LOSS OF MODULATION, DICTION OR VOLUME, BUT STILL ALL WORDS EASY TO UNDERSTAND.
TOTAL_SCORE: 7
TOETAPPING_LEFT: MILD: ANY OF THE FOLLOWING: A) 3 TO 5 INTERRUPTIONS DURING TAPPING B) MILD SLOWING C) THE AMPLITUDE DECREMENTS MIDWAY IN THE 10-MOVEMENT SEQUENCE
LEG_AGILITY_RIGHT: NORMAL
LEG_AGILITY_LEFT: SLIGHT: ANY OF THE FOLLOWING: A) THE REGULAR RHYTHM IS BROKEN WITH ONE WITH ONE OR TWO INTERRUPTIONS OR HESITATIONS OF THE MOVEMENT B) SLIGHT SLOWING C) THE AMPLITUDE DECREMENTS NEAR THE END OF THE 10 MOVEMENTS.
POSTURE: 2 MILD.  DEFINITE FLEXION, SCOLIOSIS OR LEANING OT ONE SIDE, BUT CAN CORRECT POSTURE TO NORMAL WHEN ASKED.
AMPLITUDE_LIP_JAW: NORMAL: NO TREMOR.
AMPLITUDE_LUE: SLIGHT: < 1 CM IN MAXIMAL AMPLITUDE.
PARKINSONS_MEDS: ON
FINGER_TAPPING_LEFT: SLIGHT: ANY OF THE FOLLOWING: A) THE REGULAR RHYTHM IS BROKEN WITH ONE WITH ONE OR TWO INTERRUPTIONS OR HESITATIONS OF THE MOVEMENT B) SLIGHT SLOWING C) THE AMPLITUDE DECREMENTS NEAR THE END OF THE 10 MOVEMENTS.
DYSKINESIAS_PRESENT: YES
FREEZING_GAIT: NORMAL
AMPLITUDE_RUE: NORMAL: NO TREMOR.
PRONATION_SUPINATION_RIGHT: NORMAL
PRONATION_SUPINATION_LEFT: SLIGHT: ANY OF THE FOLLOWING: A) THE REGULAR RHYTHM IS BROKEN WITH ONE WITH ONE OR TWO INTERRUPTIONS OR HESITATIONS OF THE MOVEMENT B) SLIGHT SLOWING C) THE AMPLITUDE DECREMENTS NEAR THE END OF THE 10 MOVEMENTS.
FINGER_TAPPING_RIGHT: MILD: ANY OF THE FOLLOWING: A) 3 TO 5 INTERRUPTIONS DURING TAPPING B) MILD SLOWING C) THE AMPLITUDE DECREMENTS MIDWAY IN THE 10-MOVEMENT SEQUENCE
HANDMOVEMENTS_LEFT: NORMAL
GAIT: MILD: INDEPENDENT WALKING BUT WITH SUBSTANTIAL GAIT IMPAIRMENT.
RIGIDITY_LLE: SLIGHT: RIGIDITY ONLY DETECTED WITH ACTIVATION MANEUVER.
HANDMOVEMENTS_RIGHT: NORMAL
ARISING_CHAIR: MILD:  PUSHES SELF UP FROM ARMS OF CHAIR WITHOUT DIFFICULTY.
RIGIDITY_RLE: MILD: RIGIDITY DETECTED WITHOUT THE ACTIVATION MANEUVER.  FULL RANGE OF MOTION IS EASILY ACHIEVED.
CONSTANCY_TREMOR_ATREST: NORMAL: NO TREMOR.
TOTAL_SCORE_LEFT: 11
AMPLITUDE_LLE: NORMAL: NO TREMOR.

## 2023-01-04 NOTE — PATIENT INSTRUCTIONS
Plan:   - Continue current dose of carbidopa/levodopa IR 25/100 mg, carbidopa/levodopa CR 50/200 mg and amantadine. Take carbidopa/levodopa an hour before a protein rich meal or an hour afterward for the best efficacy.   -Continue following with health psychology team and palliative care medical providers at [facility]  - GI referral for management of constipation    - Continue daily and safe exercises   - Continue follow with Dr. Arita for Botox injections    Patient to return in 5-6 months, for in-person visit, 30 minutes.

## 2023-01-04 NOTE — PROGRESS NOTES
Department of Neurology  Movement Disorders Division     Patient: Benjamin Mathews   MRN: [id number]   : 1965   Date of Visit: 2023    Dear Colleague,     Thank you for referring your patient, Mr. Mathews, to the [facility] NEUROLOGY at [facility]. Please see a copy of my visit note below.    Referring Provider: Tammie Suazo MD    History of Present Illness  Mr. Mathews is a 57 year old R handed male that presents to Neurology Movement clinic as a new patient for management of Parkinsonism.     Patient seen by Dr. Joseph, PharmD, on 2022 where they discussed treatments for symptoms of parkinsonism and patient was retrialed on amantadine 100 mg up to 1 tab twice daily.  Patient is currently treated with clozapine and previous trial of amantadine (around ) was done prior to starting clozapine and was associated with hallucinations.    Patient was seen in the ED for muscle spasms and was trialed on baclofen up to 60 mg without benefit.    Per notes, he previously followed with Dr. De Dios at [facility].    Patient seen by RADHA Chacon, [facility],  palliative care, in 2022 as an initial palliative care clinical social work assessment.  Depression was addressed during this visit.  At this visit patient was recommended to continue following with health psychology team and palliative care medical providers at [facility]. Goes to [facility] for Psychiatry.    Patient follows with Dr. Arita in PM&R for 1 botulinum toxin injections for treatment of cervical dystonia and was last seen on 10/2022 where 130 units of onabotulinum toxin type a was injected into muscles of the neck on the right and right lower extremity.  Dr. Finch did not recommend additional medication therapy for dystonia at this time as patient was not responsive to baclofen and for concerns of polypharmacy and side effects.  Follow-up for repeat botulinum toxin injection therapy was  "scheduled on 2/2023.    Patient seen by Dr. Perry in 8/1/2022.    History obtained from patient.   Patient reports that continuous mouth movements are bothersome that began a few yeas ago and thinks they began after starting carbidopa/levodopa. In regards to these movements, there are good days and bad days.     He has had a swallow study and is recommended to be on thickened liquids. He reports swallowing is worse.   Muscle spasm pain/dystonia: Currently treated with clonazepam up to 6 mg daily.  Patient is scheduled for a neuropsychometric evaluation with Dr. Zepeda on 1/10/2023.    His symptom started in 2013 with decreased  strength and stiffness of gait and bradykinesia with walking and left hand rest tremor.  Patient was diagnosed with Parkinson's disease in 2013 at the [facility].     Mr. Benjamin Mathews current PD regimen is:     Movement Disorder-related Medications                   8 AM 12 PM 4 pm 8pm At bedtime  prn   Amantadine 100 mg  1 1       Carbidopa/levodopa IR  mg 2 2 1.5      Carbidopa/levodopa CR  mg    1     Clonazepam 2 mg 1  1      Clonazepam 1 mg  1    1   Prn dose of clonazepam taken about once a week  Last taken amantadine and carbidopa/levodopa IR at 12-1 pm    Previous therapies include:    Parkinson Disease Motor Symptom Review:  Motor fluctuations:     Dyskinesia: yes, oral, that is more noticeable prior to his next dose of carbidopa/levodopa. This may interfere with eating and speaking due to feeling fatigued from constant facial movements. This symptom may or may not be bothersome to patient.   Wearing off:  Improved with amantadine. Was happening around 4 pm prior to started amantadine.   Freezing of gait: denies   Dystonia: He reports having spasms that go into \"tetany\" and freezes up, lasting up to 3 hours. It starts with right foot turns \"inward\" then right leg spasms then muscles shake and all of right body freezes up. If he \"catches these symptoms\" soon enough " "with prn clonazepam 1 mg, he can prevent symptoms from progressing to right body freezing. Thinks adding amantadine has helped with this symptom and is not having as much dystonic symptoms. He reports that amantadine may affect his mood in a good and bad way and he also reports feeling \"foggy headed.\" Recent Botox injections improved foot for about 30 days.  Tremor: Left hand > right hand. Improved with amantadine. Tremors don't interfere with activities.  Rigidity: \"Not really.\"  Bradykinesia: No at as bad as it used to be.  Balance/Falls: Last fell a year ago while using his walker and lost his balance while backing up.  Change in gait: He is able to walk with a walker for 200-300 feet. Use electric WC for about 5 years due to worsening stamina.  Exercise: He tries to walk around the house. Big therapy ended 2 months ago.     Parkinson's Disease Non-motor Symptom Review:  Sleep disturbances -no issues with falling asleep, takes trazodone 100 mg nightly.  He reports active dreaming and believes he snores.  Urinary symptoms - He wakes up once per night to urinate.  He has been told in the past that he has an enlarged prostate.  GI symptoms - He is on an extensive bowel movement regimen. He has a BM every once every 4-5 days and has to get digital manipulation. Drinks 2 quarts daily of water.   *Psychiatric disturbances - Mood is \"okay.\" Follows with Dr. Li, psychology.  Patient follows closely with health psychology team at [facility]. Follow with [facility].   Cognitive impairment -  \"Pretty good.\" He thinks his reading comprehension is improved and is reading again. He thinks this has improved since depression is improved.   Hallucinations - Currently treated with clozapine. He will see visions or people in the periphery of his vision. He knows these images are not real. Not scary to patient.   Speech - \"It's alright.\" Is not asked to repeat himself.  *Swallowing - Has a hard time swallowing " "potatoes and rice and will cough. No issues with liquids. He does not adhere to the recommended diet of thickened liquid diet. He tries to adhere to swallowing techniques he was taught.   Salivation - no issues  Dopamine agonist side effects - n/a   History of dopamine depleting therapies - n/a   Family history of Parkinsonism: Father with parkinsonism and \"vascular dementia\",  at 82 years old.     Additional history:   Driving: not driving   Medication compliance: yes, medications are organized for patient as his Assisted Living area.   ADL's: the patient requires help with bathing, showering, cooking, making bed. Doesn't require help with using the bathroom.   He has a prior history of a PE and a stroke with symptoms of terrible headache and left sided weakness with sequelae of left hemiparesis. Currently treated with anticoagulation.    No issues with Restless Leg Syndrome symptoms.     Review of Systems:  Other than that mentioned above, the remainder of 12 systems reviewed were negative.    Past Medical History:   Diagnosis Date     Clotting disorder (H)     PE 2019     Dystonia      Parkinson disease (H)      PSH: noncontributory   FH: Brother Dylan with dystonia beginning age 30  Father with parkinsonism and \"vascular dementia\",  at 82 years old  SH:   -Parents/Family/Living situation: Currently resides at [facility], where and RN visits daily. He has been there since .  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[place], was placed on disability in   -Tobacco: 1 ppd x 8 year  -Alcohol: denies  -Illicit substances: denies     Medications:  Current Outpatient Medications   Medication Sig Dispense Refill     acetaminophen (TYLENOL) 500 MG tablet 2 x 500mg tabs by mouth 3/day @8am, 12pm and 8pm and 2 x 500mg tab by mouth every 6 hours as needed       alfuzosin ER (UROXATRAL) 10 MG 24 hr tablet Take 1 tablet (10 mg) by mouth " daily 90 tablet 3     atorvastatin (LIPITOR) 40 MG tablet Take 40 mg by mouth At 8pm       bisacodyl (DULCOLAX) 10 MG suppository Place 10 mg rectally daily as needed for constipation       calcium polycarbophil (FIBER-LAX) 625 MG tablet 1 tab by mouth daily at 8am       carbidopa-levodopa (SINEMET CR)  MG CR tablet 50/200 tab by mouth nightly at 8pm       carbidopa-levodopa (SINEMET)  MG tablet 2 tabs @ 8am, 2 @ noon, 1.5 @4pm       cholecalciferol (VITAMIN D3) 125 mcg (5000 units) capsule 125 mcg (5000 units) by mouth daily at  8am       clonazePAM (KLONOPIN) 1 MG tablet Take 1 tablet (1 mg) by mouth daily At noon. 30 tablet 0     clonazePAM (KLONOPIN) 1 MG tablet Take 1 tablet (1 mg) by mouth daily as needed for muscle spasms 30 tablet 0     clonazePAM (KLONOPIN) 2 MG tablet TAKE 1 TABLET BY MOUTH TWICE DAILY ( MORNING & BEDTIME ) *1 TOTAL FILL* 60 tablet 5     cloZAPine (CLOZARIL) 50 MG tablet 50 mg At Bedtime       ENULOSE 10 GM/15ML SOLUTION 15ml by mouth daily at 8am       gabapentin (NEURONTIN) 800 MG tablet 800mg tab by mouth 3/day at 8am, 12pm and 8pm       hydrocortisone 1 % CREA cream Apply topically to nose and other affected area(s) every morning at 9am       hydrOXYzine (ATARAX) 25 MG tablet Take 50 mg by mouth every 6 hours as needed for anxiety (up to 3 timrd daily)       lubiprostone (AMITIZA) 24 MCG capsule 24mg tab by mouth twice daily at 8am and 8pm       metoprolol succinate ER (TOPROL-XL) 25 MG 24 hr tablet Take 1/2 tablet (12.5mg) by mouth twice daily at 8am and 8pm       Multiple Vitamin (DAILY-ALLAN) TABS Vitamin by  Mouth daily @8am       pantoprazole (PROTONIX) 40 MG EC tablet Take 1 tablet (40mg) by mouth daily at 8am       polyethylene glycol (MIRALAX) 17 GM/Dose powder (= clearlax) Capful in liquid by mouth nightly at 8pm and capful daily as needed       rivaroxaban ANTICOAGULANT (XARELTO) 20 MG TABS tablet Take 20 mg by mouth daily (with dinner) At 5pm       sodium  phosphate (FLEET ENEMA) 7-19 GM/118ML rectal enema Place 1 enema rectally daily as needed for constipation       traZODone (DESYREL) 100 MG tablet Take 100 mg by mouth daily at 8pm       traZODone (DESYREL) 50 MG tablet 50 mg In AM       triamcinolone (KENALOG) 0.1 % external cream        venlafaxine (EFFEXOR XR) 150 MG 24 hr capsule Take 1 capsule (150 mg) by mouth daily       venlafaxine (EFFEXOR XR) 75 MG 24 hr capsule Take 1 capsule (75 mg) by mouth daily       vitamin C (ASCORBIC ACID) 500 MG tablet Take 500 mg by mouth daily       SENNA-TIME 8.6 MG tablet Take 2 tablets by mouth 2 times daily And daily prn (Patient not taking: Reported on 1/4/2023)             Allergies   Allergen Reactions     Amantadine Other (See Comments)     Other reaction(s): Hallucinations  Hallucinations/ lost self control/gambling.     halluicnations  Hallucinations/ lost self control/gambling.     hallucinates  halluicnations  hallucinates  Hallucinations/ lost self control/gambling.        Quetiapine GI Disturbance, Diarrhea and Other (See Comments)     Diarrhea    Diarrhea  Other reaction(s): GI Disturbance  Diarrhea       Duloxetine Other (See Comments)     suicidal  suicidal           Physical Exam:  The patient's  vitals were not taken for this visit.       UPDRS Values 1/4/2023   Medication On   R Brain DBS: None   L Brain DBS: None   Dyskinesia (LID) Yes   Speech 1   Facial Expression 1   Rigidity RUE 2   Rigidity LUE 3   Rigidity RLE 2   Rigidity LLE 1   Finger Taps R 2   Finger Taps L 1   Hand Mvt R 0   Hand Mvt L 0   Pron-/Supinate R 0   Pron-/Supinate L 1   Toe Tap R 1   Toe Tap L 2   Leg Agility R 0   Leg Agility L 1   Arise From Chair 2   Gait 2   Gait Freezing 0   Posture 2   Global Spont Mvt 1   Postural Tremor RUE 0   Postural Tremor LUE 1   Kinetic Tremor RUE 0   Kinetic Tremor LUE 0   Rest Tremor RUE 0   Rest Tremor LUE 1   Rest Tremor RLE 0   Rest Tremor LLE 0   Rest Tremor Lip/Jaw 0   Rest Tremor Constancy 0    Total Right 7   Total Left 11       Data Reviewed: I have personally reviewed the tests/studies below.   - MRI brain 5/2022   Findings: These images reveal no intracranial mass lesion, mass  effect, midline shift or abnormal extraaxial fluid collection. The  ventricles and sulci are normal for age. Diffusion-weighted images  demonstrate no abnormality of reduced diffusion. Punctate areas of T2  hyperintensity within the right deep subcortical white matter in the  frontal lobe, within normal limits for patient's age. normal  intravascular flow voids are identified.                                                Impression: No evidence for stroke.    - 1/2017 ITA scan  Impression:  1. A presynaptic dopaminergic deficit is present in the right putamen.    Impression:  Benjamin Mathews is a 57 year old male with Parkinsonism. He is doing fairly well. He attributes stable symptoms to adding amantadine to his Parkinson Disease medication regimen and Botox.     1. Parkinsonism: Symptoms began around 2008 with tremor, left greater than right hand.  He was managed at [facility] for years but travel became too difficult.   2. Dystonic muscle spasms secondary to #1: Previously trialed with baclofen 60mg daily was not effective and took a month to taper off.  Improved on clonazepam. Improved with current Parkinson Disease medication regimen, adding amantadine, and Botox injections.  3. Hallucinations: managed on clozapine   4. Restless Leg Syndrome, controlled   5. Constipation     Plan:   - Continue current dose of carbidopa/levodopa IR 25/100 mg, carbidopa/levodopa CR 50/200 mg and amantadine. Take carbidopa/levodopa an hour before a protein rich meal or an hour afterward for the best efficacy.   -Continue following with health psychology team and palliative care medical providers at [facility]  - GI referral for management of constipation    - Continue daily and safe exercises   - Continue follow with Dr. Arita for  Botox injections    Patient to return in 5-6 months, for in-person visit, 30 minutes.     Ching Carlos DO, MA   of Neurology   [facility]     102 minutes spent on date of encounter doing chart reviews and exam and documentation and further activities as noted above.

## 2023-01-04 NOTE — LETTER
2023         RE: Benjamin Mathews  [address]  [facility] [address]        Dear Colleague,    Thank you for referring your patient, Benjamin Mathews, to the [facility]. Please see a copy of my visit note below.    Department of Neurology  Movement Disorders Division     Patient: Benjamin Mathews   MRN: [id number]   : 1965   Date of Visit: 2023    Dear Colleague,     Thank you for referring your patient, Mr. Mathews, to the [facility] NEUROLOGY at [facility]. Please see a copy of my visit note below.    Referring Provider: Tammie Suazo MD    History of Present Illness  Mr. Mathews is a 57 year old R handed male that presents to Neurology Movement clinic as a new patient for management of Parkinsonism.     Patient seen by Dr. Joseph, PharmD, on 2022 where they discussed treatments for symptoms of parkinsonism and patient was retrialed on amantadine 100 mg up to 1 tab twice daily.  Patient is currently treated with clozapine and previous trial of amantadine (around ) was done prior to starting clozapine and was associated with hallucinations.    Patient was seen in the ED for muscle spasms and was trialed on baclofen up to 60 mg without benefit.    Per notes, he previously followed with Dr. De Dios at [place].    Patient seen by RADHA Chacon, [facility],  palliative care, in 2022 as an initial palliative care clinical social work assessment.  Depression was addressed during this visit.  At this visit patient was recommended to continue following with health psychology team and palliative care medical providers at [facility]. Goes to [facility].    Patient follows with Dr. Arita in PM&R for 1 botulinum toxin injections for treatment of cervical dystonia and was last seen on 10/2022 where 130 units of onabotulinum toxin type a was injected into muscles of the neck on the right and right lower  extremity.  Dr. Finch did not recommend additional medication therapy for dystonia at this time as patient was not responsive to baclofen and for concerns of polypharmacy and side effects.  Follow-up for repeat botulinum toxin injection therapy was scheduled on 2/2023.    Patient seen by Dr. Perry in 8/1/2022.    History obtained from patient.   Patient reports that continuous mouth movements are bothersome that began a few yeas ago and thinks they began after starting carbidopa/levodopa. In regards to these movements, there are good days and bad days.     He has had a swallow study and is recommended to be on thickened liquids. He reports swallowing is worse.   Muscle spasm pain/dystonia: Currently treated with clonazepam up to 6 mg daily.  Patient is scheduled for a neuropsychometric evaluation with Dr. Zepeda on 1/10/2023.    His symptom started in 2013 with decreased  strength and stiffness of gait and bradykinesia with walking and left hand rest tremor.  Patient was diagnosed with Parkinson's disease in 2013 at the [facility].     Mr. Benjamin Mathews current PD regimen is:     Movement Disorder-related Medications                   8 AM 12 PM 4 pm 8pm At bedtime  prn   Amantadine 100 mg  1 1       Carbidopa/levodopa IR  mg 2 2 1.5      Carbidopa/levodopa CR  mg    1     Clonazepam 2 mg 1  1      Clonazepam 1 mg  1    1   Prn dose of clonazepam taken about once a week  Last taken amantadine and carbidopa/levodopa IR at 12-1 pm    Previous therapies include:    Parkinson Disease Motor Symptom Review:  Motor fluctuations:     Dyskinesia: yes, oral, that is more noticeable prior to his next dose of carbidopa/levodopa. This may interfere with eating and speaking due to feeling fatigued from constant facial movements. This symptom may or may not be bothersome to patient.   Wearing off:  Improved with amantadine. Was happening around 4 pm prior to started amantadine.   Freezing of gait: denies  "  Dystonia: He reports having spasms that go into \"tetany\" and freezes up, lasting up to 3 hours. It starts with right foot turns \"inward\" then right leg spasms then muscles shake and all of right body freezes up. If he \"catches these symptoms\" soon enough with prn clonazepam 1 mg, he can prevent symptoms from progressing to right body freezing. Thinks adding amantadine has helped with this symptom and is not having as much dystonic symptoms. He reports that amantadine may affect his mood in a good and bad way and he also reports feeling \"foggy headed.\" Recent Botox injections improved foot for about 30 days.  Tremor: Left hand > right hand. Improved with amantadine. Tremors don't interfere with activities.  Rigidity: \"Not really.\"  Bradykinesia: No at as bad as it used to be.  Balance/Falls: Last fell a year ago while using his walker and lost his balance while backing up.  Change in gait: He is able to walk with a walker for 200-300 feet. Use electric WC for about 5 years due to worsening stamina.  Exercise: He tries to walk around the house. Big therapy ended 2 months ago.     Parkinson's Disease Non-motor Symptom Review:  Sleep disturbances -no issues with falling asleep, takes trazodone 100 mg nightly.  He reports active dreaming and believes he snores.  Urinary symptoms - He wakes up once per night to urinate.  He has been told in the past that he has an enlarged prostate.  GI symptoms - He is on an extensive bowel movement regimen. He has a BM every once every 4-5 days and has to get digital manipulation. Drinks 2 quarts daily of water.   *Psychiatric disturbances - Mood is \"okay.\" Follows with Dr. Li, psychology.  Patient follows closely with health psychology team at [facility]. Follow with [facility].   Cognitive impairment -  \"Pretty good.\" He thinks his reading comprehension is improved and is reading again. He thinks this has improved since depression is improved.   Hallucinations - " "Currently treated with clozapine. He will see visions or people in the periphery of his vision. He knows these images are not real. Not scary to patient.   Speech - \"It's alright.\" Is not asked to repeat himself.  *Swallowing - Has a hard time swallowing potatoes and rice and will cough. No issues with liquids. He does not adhere to the recommended diet of thickened liquid diet. He tries to adhere to swallowing techniques he was taught.   Salivation - no issues  Dopamine agonist side effects - n/a   History of dopamine depleting therapies - n/a   Family history of Parkinsonism: Father with parkinsonism and \"vascular dementia\",  at 82 years old.     Additional history:   Driving: not driving   Medication compliance: yes, medications are organized for patient as his Assisted Living area.   ADL's: the patient requires help with bathing, showering, cooking, making bed. Doesn't require help with using the bathroom.   He has a prior history of a PE and a stroke with symptoms of terrible headache and left sided weakness with sequelae of left hemiparesis. Currently treated with anticoagulation.    No issues with Restless Leg Syndrome symptoms.     Review of Systems:  Other than that mentioned above, the remainder of 12 systems reviewed were negative.    Past Medical History:   Diagnosis Date     Clotting disorder (H)     PE 2019     Dystonia      Parkinson disease (H)      PSH: noncontributory   FH: Brother Dylan with dystonia beginning age 30  Father with parkinsonism and \"vascular dementia\",  at 82 years old  SH:   -Parents/Family/Living situation: Currently resides at [facility] and RN visits daily. He has been there since .  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[employer], was placed on disability in   -Tobacco: 1 ppd x 8 year  -Alcohol: denies  -Illicit substances: denies     Medications:  Current Outpatient Medications "   Medication Sig Dispense Refill     acetaminophen (TYLENOL) 500 MG tablet 2 x 500mg tabs by mouth 3/day @8am, 12pm and 8pm and 2 x 500mg tab by mouth every 6 hours as needed       alfuzosin ER (UROXATRAL) 10 MG 24 hr tablet Take 1 tablet (10 mg) by mouth daily 90 tablet 3     atorvastatin (LIPITOR) 40 MG tablet Take 40 mg by mouth At 8pm       bisacodyl (DULCOLAX) 10 MG suppository Place 10 mg rectally daily as needed for constipation       calcium polycarbophil (FIBER-LAX) 625 MG tablet 1 tab by mouth daily at 8am       carbidopa-levodopa (SINEMET CR)  MG CR tablet 50/200 tab by mouth nightly at 8pm       carbidopa-levodopa (SINEMET)  MG tablet 2 tabs @ 8am, 2 @ noon, 1.5 @4pm       cholecalciferol (VITAMIN D3) 125 mcg (5000 units) capsule 125 mcg (5000 units) by mouth daily at  8am       clonazePAM (KLONOPIN) 1 MG tablet Take 1 tablet (1 mg) by mouth daily At noon. 30 tablet 0     clonazePAM (KLONOPIN) 1 MG tablet Take 1 tablet (1 mg) by mouth daily as needed for muscle spasms 30 tablet 0     clonazePAM (KLONOPIN) 2 MG tablet TAKE 1 TABLET BY MOUTH TWICE DAILY ( MORNING & BEDTIME ) *1 TOTAL FILL* 60 tablet 5     cloZAPine (CLOZARIL) 50 MG tablet 50 mg At Bedtime       ENULOSE 10 GM/15ML SOLUTION 15ml by mouth daily at 8am       gabapentin (NEURONTIN) 800 MG tablet 800mg tab by mouth 3/day at 8am, 12pm and 8pm       hydrocortisone 1 % CREA cream Apply topically to nose and other affected area(s) every morning at 9am       hydrOXYzine (ATARAX) 25 MG tablet Take 50 mg by mouth every 6 hours as needed for anxiety (up to 3 timrd daily)       lubiprostone (AMITIZA) 24 MCG capsule 24mg tab by mouth twice daily at 8am and 8pm       metoprolol succinate ER (TOPROL-XL) 25 MG 24 hr tablet Take 1/2 tablet (12.5mg) by mouth twice daily at 8am and 8pm       Multiple Vitamin (DAILY-ALLAN) TABS Vitamin by  Mouth daily @8am       pantoprazole (PROTONIX) 40 MG EC tablet Take 1 tablet (40mg) by mouth daily at 8am        polyethylene glycol (MIRALAX) 17 GM/Dose powder (= clearlax) Capful in liquid by mouth nightly at 8pm and capful daily as needed       rivaroxaban ANTICOAGULANT (XARELTO) 20 MG TABS tablet Take 20 mg by mouth daily (with dinner) At 5pm       sodium phosphate (FLEET ENEMA) 7-19 GM/118ML rectal enema Place 1 enema rectally daily as needed for constipation       traZODone (DESYREL) 100 MG tablet Take 100 mg by mouth daily at 8pm       traZODone (DESYREL) 50 MG tablet 50 mg In AM       triamcinolone (KENALOG) 0.1 % external cream        venlafaxine (EFFEXOR XR) 150 MG 24 hr capsule Take 1 capsule (150 mg) by mouth daily       venlafaxine (EFFEXOR XR) 75 MG 24 hr capsule Take 1 capsule (75 mg) by mouth daily       vitamin C (ASCORBIC ACID) 500 MG tablet Take 500 mg by mouth daily       SENNA-TIME 8.6 MG tablet Take 2 tablets by mouth 2 times daily And daily prn (Patient not taking: Reported on 1/4/2023)             Allergies   Allergen Reactions     Amantadine Other (See Comments)     Other reaction(s): Hallucinations  Hallucinations/ lost self control/gambling.     halluicnations  Hallucinations/ lost self control/gambling.     hallucinates  halluicnations  hallucinates  Hallucinations/ lost self control/gambling.        Quetiapine GI Disturbance, Diarrhea and Other (See Comments)     Diarrhea    Diarrhea  Other reaction(s): GI Disturbance  Diarrhea       Duloxetine Other (See Comments)     suicidal  suicidal           Physical Exam:  The patient's  vitals were not taken for this visit.       UPDRS Values 1/4/2023   Medication On   R Brain DBS: None   L Brain DBS: None   Dyskinesia (LID) Yes   Speech 1   Facial Expression 1   Rigidity RUE 2   Rigidity LUE 3   Rigidity RLE 2   Rigidity LLE 1   Finger Taps R 2   Finger Taps L 1   Hand Mvt R 0   Hand Mvt L 0   Pron-/Supinate R 0   Pron-/Supinate L 1   Toe Tap R 1   Toe Tap L 2   Leg Agility R 0   Leg Agility L 1   Arise From Chair 2   Gait 2   Gait Freezing 0   Posture 2    Global Spont Mvt 1   Postural Tremor RUE 0   Postural Tremor LUE 1   Kinetic Tremor RUE 0   Kinetic Tremor LUE 0   Rest Tremor RUE 0   Rest Tremor LUE 1   Rest Tremor RLE 0   Rest Tremor LLE 0   Rest Tremor Lip/Jaw 0   Rest Tremor Constancy 0   Total Right 7   Total Left 11       Data Reviewed: I have personally reviewed the tests/studies below.   - MRI brain 5/2022   Findings: These images reveal no intracranial mass lesion, mass  effect, midline shift or abnormal extraaxial fluid collection. The  ventricles and sulci are normal for age. Diffusion-weighted images  demonstrate no abnormality of reduced diffusion. Punctate areas of T2  hyperintensity within the right deep subcortical white matter in the  frontal lobe, within normal limits for patient's age. normal  intravascular flow voids are identified.                                                Impression: No evidence for stroke.    - 1/2017 ITA scan  Impression:  1. A presynaptic dopaminergic deficit is present in the right putamen.    Impression:  Benjamin Mathews is a 57 year old male with Parkinsonism. He is doing fairly well. He attributes stable symptoms to adding amantadine to his Parkinson Disease medication regimen and Botox.     1. Parkinsonism: Symptoms began around 2008 with tremor, left greater than right hand.  He was managed at [facility] for years but travel became too difficult.   2. Dystonic muscle spasms secondary to #1: Previously trialed with baclofen 60mg daily was not effective and took a month to taper off.  Improved on clonazepam. Improved with current Parkinson Disease medication regimen, adding amantadine, and Botox injections.  3. Hallucinations: managed on clozapine   4. Restless Leg Syndrome, controlled   5. Constipation     Plan:   - Continue current dose of carbidopa/levodopa IR 25/100 mg, carbidopa/levodopa CR 50/200 mg and amantadine. Take carbidopa/levodopa an hour before a protein rich meal or an hour afterward for the best  efficacy.   -Continue following with health psychology team and palliative care medical providers at [facility]  - GI referral for management of constipation    - Continue daily and safe exercises   - Continue follow with Dr. Arita for Botox injections    Patient to return in 5-6 months, for in-person visit, 30 minutes.     Ching Carlos DO, MA   of Neurology   [facility]     102 minutes spent on date of encounter doing chart reviews and exam and documentation and further activities as noted above.

## 2023-01-09 ENCOUNTER — MEDICAL CORRESPONDENCE (OUTPATIENT)
Dept: HEALTH INFORMATION MANAGEMENT | Facility: CLINIC | Age: 58
End: 2023-01-09

## 2023-01-09 ENCOUNTER — TELEPHONE (OUTPATIENT)
Dept: GASTROENTEROLOGY | Facility: CLINIC | Age: 58
End: 2023-01-09

## 2023-01-09 ENCOUNTER — OFFICE VISIT (OUTPATIENT)
Dept: GASTROENTEROLOGY | Facility: CLINIC | Age: 58
End: 2023-01-09
Attending: PSYCHIATRY & NEUROLOGY
Payer: COMMERCIAL

## 2023-01-09 VITALS — RESPIRATION RATE: 17 BRPM | SYSTOLIC BLOOD PRESSURE: 118 MMHG | HEART RATE: 92 BPM | DIASTOLIC BLOOD PRESSURE: 70 MMHG

## 2023-01-09 DIAGNOSIS — G20.C PARKINSONISM, UNSPECIFIED PARKINSONISM TYPE (H): ICD-10-CM

## 2023-01-09 DIAGNOSIS — K59.01 SLOW TRANSIT CONSTIPATION: ICD-10-CM

## 2023-01-09 PROCEDURE — 99204 OFFICE O/P NEW MOD 45 MIN: CPT | Performed by: NURSE PRACTITIONER

## 2023-01-09 RX ORDER — BISACODYL 5 MG/1
5 TABLET, DELAYED RELEASE ORAL
Qty: 15 TABLET | Refills: 3 | Status: ON HOLD | OUTPATIENT
Start: 2023-01-10 | End: 2023-05-25

## 2023-01-09 RX ORDER — POLYETHYLENE GLYCOL 3350 17 G/17G
17 POWDER, FOR SOLUTION ORAL 2 TIMES DAILY
Qty: 850 G | Refills: 3 | Status: ON HOLD | OUTPATIENT
Start: 2023-01-09 | End: 2023-05-25

## 2023-01-09 ASSESSMENT — PAIN SCALES - GENERAL: PAINLEVEL: NO PAIN (0)

## 2023-01-09 NOTE — PATIENT INSTRUCTIONS
It was a pleasure taking care of you today.  I've included a brief summary of our discussion and care plan from today's visit below.  Please review this information with your primary care provider.  ______________________________________________________________________    My recommendations are summarized as follows:    Will start bisacodyl 5 mg tablet three times a week.    2. Increase Miralax dose to 17 gram twice a day with breakfast and supper.    3. Continue Amitiza (lubiprostone) twice a day. Fleets enema and lactulose as needed.    4. Some study suggest benefits of probiotic use for constipation management in people with parkinsonism. There are several supplements available to obtain over the counter.    Return to GI Clinic in 6 months to review your progress.    __________________________________________________    Goal: Improvement in stool frequency to reduce occurrence of straining, bloating, and abdominal discomfort.  - Try to stay hydrated, goal 48-64 oz of non-caffeinated fluid daily    Constipation back-up plan: If you have not had a bowel movement for 2-3 days, or if you feel that you straining, incompletely evacuating, plan to augment your daily plan by:  - Miralax: Increasing Miralax to up to 3 capfuls daily, returning to twice daily maintenance dose, once stools have resumed  - Magnesium:  Add in magnesium oxide 400 mg once to twice daily    Emergency back-up plan: If you continue to feel constipated and need more help:  -- If it has been 5 days without a BM, consider taking 1 bottle of magnesium citrate and/or 1 glycerin suppository (insert rounded end, not pointed end, first). OK to take Bisacodyl suppository instead ( 10-15 mg dose).  ______________________________________________________________________    Who do I call with any questions after my visit?  Please be in touch if there are any further questions that arise following today's visit.  There are multiple ways to contact your  gastroenterology care team.      During business hours, you may reach a Gastroenterology nurse at [phone], option 3.     To schedule or reschedule an appointment, please call [phone].   To schedule your lab work at [facility], please call [phone]    You can always send a secure message through APX.  APX messages are answered by your nurse or doctor typically within 24 hours.  Please allow extra time on weekends and holidays.      For urgent/emergent questions after business hours, you may reach the on-call GI Fellow by contacting the [facility]  at [phone].    In order for your refill to be processed in a timely fashion, it is your responsibility to ensure you follow the recommendations from your provider regarding your laboratory studies and follow up appointments.       How will I get the results of any tests ordered?    You will receive all of your results.  If you have signed up for [facility], any tests ordered at your visit will be available to you after your physician reviews them.  Typically this takes 1-2 weeks.  If there are urgent results that require a change in your care plan, your physician or nurse will call you to discuss the next steps.   What is APX?  APX is a secure way for you to access all of your healthcare records from the [facility].  It is a web based computer program, so you can sign on to it from any location.  It also allows you to send secure messages to your care team.  I recommend signing up for APX access if you have not already done so and are comfortable with using a computer.    How to I schedule a follow-up visit?  If you did not schedule a follow-up visit today, please call [phone] to schedule a follow-up office visit.      Sincerely,  VALENTE Perera,  [facility],  Division of Gastroenterology   ([facility])

## 2023-01-09 NOTE — TELEPHONE ENCOUNTER
Per pharmacy, they report that he is still taking Senna.  Per visit today, patient is not taking the Senna and he should start the Bisacodyl.     Grecia Ramos Rn

## 2023-01-09 NOTE — TELEPHONE ENCOUNTER
Medication list received, Per Miryam he can continue to take daily.  However she didn't prescribe. If they would like to change to PRN, they should reach out to prescribing provider.     Grecia RUIZ

## 2023-01-09 NOTE — TELEPHONE ENCOUNTER
Health Call Center    Phone Message    May a detailed message be left on voicemail: yes     Reason for Call: Medication Question or concern regarding medication   Prescription Clarification  Name of Medication: bisacodyl (DULCOLAX) 5 MG EC tablet  Prescribing Provider: Miryam Montemayor APRN CNP   Pharmacy: [facility]. [place] [id number]   What on the order needs clarification? Pharmacy is requesting a call back from the team because there is an issue with the prescription. They explained the patient is currently taking other medication that keeps them from being able to take the prescribed medication. Please call them back at [phone] to discuss, thank you!          Action Taken: Message routed to:  Other: [facility]    Travel Screening: Not Applicable

## 2023-01-09 NOTE — TELEPHONE ENCOUNTER
[facility] Call Center    Phone Message    May a detailed message be left on voicemail: yes     Reason for Call: Medication Question or concern regarding medication   Prescription Clarification  Name of Medication: Lactulose  Prescribing Provider: Miryam Montemayor   What on the order needs clarification? Rm (nurse at [facility]) need to clarify is the lactulose is PRN or a daily med.  Please follow up with Rm at [phone].    Action Taken: Message routed to:  [facility] ([facility]):  Gastro Care Pool    Travel Screening: Not Applicable
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
## 2023-01-10 ENCOUNTER — TELEPHONE (OUTPATIENT)
Dept: NEUROPSYCHOLOGY | Facility: CLINIC | Age: 58
End: 2023-01-10

## 2023-01-10 NOTE — TELEPHONE ENCOUNTER
[facility] Call Center    Phone Message    May a detailed message be left on voicemail: yes     Reason for Call: Other: Mikhail from patient's assisted living called stating patient's ride never showed up to pick him up for his appointment. They are requesting to reschedule his appointment.    Action Taken: Message routed to:  [facility] Neuropsychology    Travel Screening: Not Applicable

## 2023-01-10 NOTE — TELEPHONE ENCOUNTER
[facility] Call Center    Phone Message    May a detailed message be left on voicemail: yes     Reason for Call: Other: Mikhail called to reschedule the Pt appt that was suppose to be today 1/10/23.      Please call Mikhail to reschedule the Pt appt  Action Taken: Message routed to:  [facility] Neuropsych    Travel Screening: Not Applicable

## 2023-01-10 NOTE — TELEPHONE ENCOUNTER
A message was sent to our scheduling center to offer the next available option with any provider.   Maury Ervin, PhD, LP, ABPP-CN  Board Certified in Clinical Neuropsychology ([id number])

## 2023-01-13 ENCOUNTER — OFFICE VISIT (OUTPATIENT)
Dept: NEUROPSYCHOLOGY | Facility: CLINIC | Age: 58
End: 2023-01-13
Attending: PSYCHIATRY & NEUROLOGY
Payer: COMMERCIAL

## 2023-01-13 DIAGNOSIS — R41.844 FRONTAL LOBE AND EXECUTIVE FUNCTION DEFICIT: ICD-10-CM

## 2023-01-13 DIAGNOSIS — G20.A1 PARKINSON DISEASE (H): Primary | ICD-10-CM

## 2023-01-13 DIAGNOSIS — F06.8 OTHER SPECIFIED MENTAL DISORDERS DUE TO KNOWN PHYSIOLOGICAL CONDITION: ICD-10-CM

## 2023-01-13 DIAGNOSIS — F41.9 ANXIETY: ICD-10-CM

## 2023-01-13 DIAGNOSIS — F33.1 MAJOR DEPRESSIVE DISORDER, RECURRENT EPISODE, MODERATE (H): ICD-10-CM

## 2023-01-13 PROCEDURE — 96132 NRPSYC TST EVAL PHYS/QHP 1ST: CPT | Performed by: CLINICAL NEUROPSYCHOLOGIST

## 2023-01-13 PROCEDURE — 96133 NRPSYC TST EVAL PHYS/QHP EA: CPT | Performed by: CLINICAL NEUROPSYCHOLOGIST

## 2023-01-13 PROCEDURE — 96139 PSYCL/NRPSYC TST TECH EA: CPT | Performed by: CLINICAL NEUROPSYCHOLOGIST

## 2023-01-13 PROCEDURE — 90791 PSYCH DIAGNOSTIC EVALUATION: CPT | Performed by: CLINICAL NEUROPSYCHOLOGIST

## 2023-01-13 PROCEDURE — 96138 PSYCL/NRPSYC TECH 1ST: CPT | Performed by: CLINICAL NEUROPSYCHOLOGIST

## 2023-01-13 NOTE — LETTER
1/13/2023       RE: Benjamin Mathews  [address]  [facility] [address]     Dear Colleague,    Thank you for referring your patient, Benjamin Mathews, to the [facility] NEUROPSYCHOLOGY [place] at [facility]. Please see a copy of my visit note below.    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 He was unaccompanied to the interview.    The patient was in-clinic for the entirety of this evaluation. The interview for this evaluation was completed via a Zoom meeting. Testing was completed face-to-face.     BACKGROUND INFORMATION / INTERVIEW FINDINGS    Records indicate that Mr. Mathews developed a tremor in 2018.  In 2013, he developed decreased  strength, stiffness of gait, bradykinesia, and worsened left hand tremor.  He was diagnosed with Parkinson's disease in 2013.  A DaTscan in January, 2017 reportedly documented a dopaminergic deficit in the right putamen.  An MRI of his brain on May 28, 2022 documented no evidence for a stroke.  A CT scan of his head on November 7, 2022 was read as stable and documented no acute findings.  An EEG study from May 28 through May 29, 2022 was read as normal.  His other medical history includes benzodiazepine withdrawal with delirium, adjustment disorder with mixed anxiety and depressed mood, delirium due to multiple etiologies, anxiety, depression, alcohol dependence in controlled environment, mood disorder due to general medical condition, episodic alcohol abuse, major depression, breakdown, multiple falls, contusion of scalp, convergence insufficiency, pleural effusion, muscle spasm, suicide attempt, hypokalemia, metabolic acidosis with respiratory acidosis, hyperglycemia, history of pulmonary embolism,  intermittent dysphagia, cellulitis of great toe of left foot, hypertension, hyperlipidemia, acute left hemiparesis, pressure ulcer on right heel, chronic deep vein thrombosis of proximal vein of lower extremity, chronic pain disorder, generalized weakness, leg cramps, edematous polyp of colon, fall, history of stroke without residual deficits, dyskinesia due to Parkinson's disease, arthritis, cerebrovascular accident, 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  Concerns have been expressed about his cognition.  The current evaluation was requested by Dr. Kennedy Perry, in this context.    Of note, Mr. Mathews reportedly completed a neuropsychology exam in the past.  Records indicate that there is evidence of insufficient engagement in this evaluation. Thus firm conclusions were unable to be reached.    On interview, Mr. Mathews confirmed the above history.  He stated that he was diagnosed with atypical Parkinson's disease in 2013.  He confirmed that he initially had a tremor, and then had issues with  strength, gait, and bradykinesia.  He indicated that he has had several additional motor symptoms since then.  He noted that he has dealt with severe right-sided motor spasms that can last hours.  He noted that the spasms are now better controlled.  He stated that he has clenching of his arms and arm weakness as well.  He noted worsened eyesight.    Regarding cognition, Mr. Mathews reported that he began noticing changes in his thinking in 2015.  He denied a particular trigger for onset of his  cognitive symptoms at that time.  He stated that his cognition seemed to slowly worsen starting in 2015, and recently has gotten somewhat better since his depression has been better controlled.  Specifically, he noted difficulties with concentration and focus.  He reported that it is now hard for him to concentrate for more than 10 minutes.  He also noted that he has experienced some changes in his spatial processing.  He stated that his eyesight is not what it used to be.  He indicated that he has difficulties remembering small details.  He also noted that he had some difficulties with reading and comprehension in the past, but again stated that these issues are somewhat better now that his mood is in a better place.  He denied that changes in his personality have been pointed out to him.    With respect to mental health, Mr. Mathews reported that his mood is okay.  He noted that he has had some down moods.  He reported that he has struggled with mental health to his teenage years.  He stated that his sister was in a serious automobile accident in 1980, which was stressful for him.  He stated that he had to provide care for his sister.  He indicated that his brother was in a motor vehicle accident in 1983, and he also cared for his brother.  More recently, he also cared for his parents when they were in their later stages of life.  He was first treated for mental health symptoms at age 22.  He was prescribed medications.  He is currently under the care of a psychiatrist and sees this provider every 3 months.  He also sees a therapist on an every other week basis.  He stated that he has had many psychiatric hospitalizations.  He reported that his last psychiatric hospitalization was 1.5 years ago.  He noted that his mood was considerably worse when he was in his prior living arrangement.  He stated that he is much happier in his current living arrangement.  He noted that the change of environment has been helpful  for his mood.  He reported that he had mental health crises in his prior living arrangement.  He attempted suicide in the past. He acknowledged having hallucinations.  He stated that these hallucinations were occurring early in his Parkinson's disease history.  He noted that he has a few hallucinations now that he is on amantadine.  He denied current suicidal ideation.    With regard to other medical background, Mr. Mathews reported that he blacked out, fell, and struck his head in the past.  He stated he lost consciousness for about 10 seconds.  He stated that he had a small stroke in the past which impacted his left side.  He also noted that he had a pulmonary embolism.  He denied known past seizure.  He reported that his sleep is pretty good, that he averages 8 hours of sleep per night.  He slept normally the night before this exam.  He stated that he occasionally will talk in his sleep.  He denied pain.  He reported that he uses a walker when he is at home and can ambulate short distances.  He has a tremor.  He stated that he had quite a few falls.  Per records, his current medications include acetaminophen, alfuzosin, atorvastatin, bisacodyl, calcium polycarbophil, carbidopa-levodopa, cholecalciferol, clonazepam, clozapine, Enulose, gabapentin, hydrocortisone cream, hydroxyzine, lubiprostone, metoprolol, multivitamin, pantoprazole, polyethylene glycol, rivaroxaban, sodium phosphate, trazodone, triamcinolone, venlafaxine, and vitamin C.  He reported that he smokes a pipe, cigars, and cigarettes.  He denied current alcohol or illicit drug use.  He noted that he dealt with alcoholism in the past.  He drank heavily up until age 22, and then was sober until 2010.  He relapsed in 2010.  He stated that he began drinking more heavily in 2013 and again quit drinking 5 or 6 years ago.  He would consume a case of beer and half a pint of liquor per day.  He did undergo treatment for alcohol abuse at age 21 or  22.    Regarding family neurologic history, he stated that his brother has torticollis, and his father had Parkinson's disease.  He indicated that he wonders about whether his sister has Parkinson's disease as well.    Mr. Mathews has been living in an assisted living facility for 1.5 years.  He reported that the assisted living facility helps him with bathing and dressing.  The assisted living facility also manages his medications.  His sister-in-law oversees his finances and is his power of .  He receives help with meal preparation.  He no longer drives.    By way of background, Mr. Mathews has never been  and does not have children.  He has a brother and sister-in-law who live in the area.  Regarding educational background, he graduated from high school with good grades.  He earned a bachelor's degree in history and geography from [employer].  He then earned a second bachelor's degree in nursing.  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  Professionally, he worked as a  and nurse.  He last worked in approximately 2010 or 2011. He began receiving disability benefits in 2013.  He stated that he could not get so security disability because he did not have enough work credits.  He stated that he receives residential group credits.      BEHAVIORAL OBSERVATIONS  Mr. Mathews was pleasant and generally cooperative with the exam.  He was in a motorized wheelchair.  He was able to stand to walk to the restroom.  He did not require assistance with this task.  He had a visible tremor in his left hand, arm, and on his face.  He wore glasses.  His speech was mildly slowed, but otherwise unremarkable. His comprehension was normal. His thought processes were notable for mild to moderate distractibility, mild forgetting, mild carelessness/impulsivity, and mild slowing.  He  had mildly reduced frustration tolerance.  His mood was mildly depressed with flat affect. His effort was rated as fair, as he was hesitant to guess on memory tests and reluctant to push himself on timed tasks. The current results are felt to be a broadly accurate depiction of his cognitive functioning.      RESULTS OF EXAM  His performances on standardized measures of neuropsychological functioning were as follows.     He was oriented to time and place.  He misstated his age but was otherwise oriented to various aspects of personal information.  He was able to state the name of the current president and 3 of the 5 most recent past presidents.  Performance on a measure of single word reading was average.  He obtained passing scores on stand-alone measures of cognitive performance validity.  His scores on embedded metrics of performance validity were mixed, with some passing and some non-passing scores.  Auditory attention for digits was low average.  Mental calculations were low average.  Learning of words in a list format was impaired.  Delayed recall of list words was impaired.  Percent retention of list words was impaired.  He did not recollect any of the words after the delay.  Delayed recognition of list words, however, was low average.  Learning and delayed recall of story information were both impaired.  Delayed recognition of story information was borderline impaired.  Learning of simple geometric shapes and their spatial locations was impaired.  Delayed recall of the shapes and their locations was impaired.  Percent retention of the shapes was impaired.  Delayed recognition of the shapes, however, was normal, and performed without error.  Visuospatial judgments for variably oriented lines were average.  Nonverbal reasoning for incomplete matrices was average.  His drawing of a complicated geometric figure was impaired, and notable for inattention to some of the figure s spatial relations and mild  inattention to details as well.  Comprehension of phrases and short stories was borderline impaired.  Verbal associative fluency was borderline impaired.  Animal fluency was borderline impaired.  Naming to confrontation was performed in the average to high average range.  Verbal abstract reasoning was average.  Speeded visual sequencing under focused attention was average.  A similar measure with a divided attention component was borderline impaired.  Speeded word reading was borderline impaired.  Speeded color naming was low average.  Speeded inhibition of an overlearned response was borderline impaired.  Speeded visual motor coding was low average.  Speeded fine motor dexterity was impaired, bilaterally.  He had 0 peg drops with the right hand, and 3 peg drops with the left hand.    He endorsed items consistent with moderate symptoms of depression and moderate symptoms of anxiety on self-report measures.    IMPRESSIONS  Is important to point out when looking at this data set that there was subjective and objective evidence to suggest that Mr. Mathews had some degree of variable focus or engagement with the testing process.  That said, if taken at face value, the results from this exam are compatible with frontal and subcortical systems dysfunction, as is commonly seen in individuals with Parkinson's disease.  It is challenging for me to say whether or not he meets criteria for dementia at this time, given the suggestion of variable engagement with the testing process.  I do not doubt that he has some degree of acquired cognitive compromise.  In this exam, deficits were noted in executive functioning, with milder weaknesses in attention, cognitive speed, learning, recall, and bilateral fine motor dexterity.  Recognition memory is relatively preserved (in keeping with his average range cognitive baseline), as is naming, orientation, nonverbal reasoning, and basic visuospatial judgments.  He is reporting moderate  symptoms of both depression and anxiety.  It may be the case that these depression and anxiety symptoms contributed to some of the variability noted on testing, as well as to his variability of engagement with the testing process.      RECOMMENDATIONS  Preliminary results and recommendations were provided to the patient over the telephone on the date of the exam, and all questions were answered.     1.  In spite of treatment, he remains depressed and anxious.  Continued psychiatric and psychotherapeutic care are recommended.    2.  He will continue to benefit from support and supervision of his daily activities.  His current living arrangement and level of support appear to be appropriate at this time.    3.  He should continue to refrain from driving.    4.  If not already done, he could benefit from being placed in contact with the [employer] Parkinson's [employer]. www.apdaparkinson.org    5. Follow-up neuropsychological evaluation is recommended in 1 to 2 years in order to assess and update recommendations as appropriate.  The current results can be used as a baseline at that time.    Noel Vincent, Ph.D., L.P., ABPP  Board Certified in Clinical Neuropsychology   / Licensed Psychologist [id number]    Time spent:  One unit (40 minutes) psychiatric diagnostic interview including interview and clinical assessment by licensed and board-certified neuropsychologist (CPT 91779). One unit (60 minutes) neuropsychological testing evaluation by licensed and board-certified neuropsychologist, including integration of patient data, interpretation of standardized test results and clinical data, clinical decision-making, treatment planning, and report, first hour (CPT 78618). One unit(s) (38 minutes) of neuropsychological testing evaluation by licensed and board-certified neuropsychologist, including integration of patient data, interpretation of standardized test results and clinical data, clinical  decision-making, treatment planning, and report, subsequent hours (CPT 29266). One unit (30 minutes) of psychological and neuropsychological test administration and scoring by technician, first 30 minutes (CPT 35173). Six units (172 minutes) psychological or neuropsychological test administration and scoring by technician, subsequent 30 minutes (CPT 54489). Diagnoses: G20, R41.844, F06.8, F33.1, F41.9.         Again, thank you for allowing me to participate in the care of your patient.      Sincerely,    Noel Vincent, PhD LP

## 2023-01-13 NOTE — NURSING NOTE
Pt was seen for neuropsychological evaluation at the request of Dr. Kennedy Perry for the purposes of diagnostic clarification and treatment planning. 202 minutes of test administration and scoring were provided by this writer. Please see Dr. Noel Vincent's report for a full interpretation of the findings.    Néstor Guerra MA, CSP

## 2023-01-16 NOTE — PROGRESS NOTES
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 He was unaccompanied to the interview.    The patient was in-clinic for the entirety of this evaluation. The interview for this evaluation was completed via a Zoom meeting. Testing was completed face-to-face.     BACKGROUND INFORMATION / INTERVIEW FINDINGS    Records indicate that Mr. Mathews developed a tremor in 2018.  In 2013, he developed decreased  strength, stiffness of gait, bradykinesia, and worsened left hand tremor.  He was diagnosed with Parkinson's disease in 2013.  A DaTscan in January, 2017 reportedly documented a dopaminergic deficit in the right putamen.  An MRI of his brain on May 28, 2022 documented no evidence for a stroke.  A CT scan of his head on November 7, 2022 was read as stable and documented no acute findings.  An EEG study from May 28 through May 29, 2022 was read as normal.  His other medical history includes benzodiazepine withdrawal with delirium, adjustment disorder with mixed anxiety and depressed mood, delirium due to multiple etiologies, anxiety, depression, alcohol dependence in controlled environment, mood disorder due to general medical condition, episodic alcohol abuse, major depression, breakdown, multiple falls, contusion of scalp, convergence insufficiency, pleural effusion, muscle spasm, suicide attempt, hypokalemia, metabolic acidosis with respiratory acidosis, hyperglycemia, history of pulmonary embolism, intermittent dysphagia, cellulitis of great toe of left foot, hypertension, hyperlipidemia, acute left hemiparesis, pressure ulcer on right heel, chronic deep vein thrombosis of proximal vein of lower extremity, chronic pain disorder, generalized weakness, leg cramps, edematous polyp of colon, fall, history of  stroke without residual deficits, dyskinesia due to Parkinson's disease, arthritis, cerebrovascular accident, 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  Concerns have been expressed about his cognition.  The current evaluation was requested by Dr. Kennedy Perry, in this context.    Of note, Mr. Mathews reportedly completed a neuropsychology exam in the past.  Records indicate that there is evidence of insufficient engagement in this evaluation. Thus firm conclusions were unable to be reached.    On interview, Mr. Mathews confirmed the above history.  He stated that he was diagnosed with atypical Parkinson's disease in 2013.  He confirmed that he initially had a tremor, and then had issues with  strength, gait, and bradykinesia.  He indicated that he has had several additional motor symptoms since then.  He noted that he has dealt with severe right-sided motor spasms that can last hours.  He noted that the spasms are now better controlled.  He stated that he has clenching of his arms and arm weakness as well.  He noted worsened eyesight.    Regarding cognition, Mr. Mathews reported that he began noticing changes in his thinking in 2015.  He denied a particular trigger for onset of his cognitive symptoms at that time.  He stated that his cognition seemed to slowly worsen starting in 2015, and recently has gotten somewhat better since his depression has been better controlled.  Specifically, he noted difficulties with concentration and focus.  He reported that it is now hard for him to concentrate  for more than 10 minutes.  He also noted that he has experienced some changes in his spatial processing.  He stated that his eyesight is not what it used to be.  He indicated that he has difficulties remembering small details.  He also noted that he had some difficulties with reading and comprehension in the past, but again stated that these issues are somewhat better now that his mood is in a better place.  He denied that changes in his personality have been pointed out to him.    With respect to mental health, Mr. Mathews reported that his mood is okay.  He noted that he has had some down moods.  He reported that he has struggled with mental health to his teenage years.  He stated that his sister was in a serious automobile accident in 1980, which was stressful for him.  He stated that he had to provide care for his sister.  He indicated that his brother was in a motor vehicle accident in 1983, and he also cared for his brother.  More recently, he also cared for his parents when they were in their later stages of life.  He was first treated for mental health symptoms at age 22.  He was prescribed medications.  He is currently under the care of a psychiatrist and sees this provider every 3 months.  He also sees a therapist on an every other week basis.  He stated that he has had many psychiatric hospitalizations.  He reported that his last psychiatric hospitalization was 1.5 years ago.  He noted that his mood was considerably worse when he was in his prior living arrangement.  He stated that he is much happier in his current living arrangement.  He noted that the change of environment has been helpful for his mood.  He reported that he had mental health crises in his prior living arrangement.  He attempted suicide in the past. He acknowledged having hallucinations.  He stated that these hallucinations were occurring early in his Parkinson's disease history.  He noted that he has a few hallucinations now that he is  on amantadine.  He denied current suicidal ideation.    With regard to other medical background, Mr. Mathews reported that he blacked out, fell, and struck his head in the past.  He stated he lost consciousness for about 10 seconds.  He stated that he had a small stroke in the past which impacted his left side.  He also noted that he had a pulmonary embolism.  He denied known past seizure.  He reported that his sleep is pretty good, that he averages 8 hours of sleep per night.  He slept normally the night before this exam.  He stated that he occasionally will talk in his sleep.  He denied pain.  He reported that he uses a walker when he is at home and can ambulate short distances.  He has a tremor.  He stated that he had quite a few falls.  Per records, his current medications include acetaminophen, alfuzosin, atorvastatin, bisacodyl, calcium polycarbophil, carbidopa-levodopa, cholecalciferol, clonazepam, clozapine, Enulose, gabapentin, hydrocortisone cream, hydroxyzine, lubiprostone, metoprolol, multivitamin, pantoprazole, polyethylene glycol, rivaroxaban, sodium phosphate, trazodone, triamcinolone, venlafaxine, and vitamin C.  He reported that he smokes a pipe, cigars, and cigarettes.  He denied current alcohol or illicit drug use.  He noted that he dealt with alcoholism in the past.  He drank heavily up until age 22, and then was sober until 2010.  He relapsed in 2010.  He stated that he began drinking more heavily in 2013 and again quit drinking 5 or 6 years ago.  He would consume a case of beer and half a pint of liquor per day.  He did undergo treatment for alcohol abuse at age 21 or 22.    Regarding family neurologic history, he stated that his brother has torticollis, and his father had Parkinson's disease.  He indicated that he wonders about whether his sister has Parkinson's disease as well.    Mr. Mathews has been living in an assisted living facility for 1.5 years.  He reported that the assisted living  facility helps him with bathing and dressing.  The assisted living facility also manages his medications.  His sister-in-law oversees his finances and is his power of .  He receives help with meal preparation.  He no longer drives.    By way of background, Mr. Mathews has never been  and does not have children.  He has a brother and sister-in-law who live in the area.  Regarding educational background, he graduated from high school with good grades.  He earned a bachelor's degree in history and geography from [employer].  He then earned a second bachelor's degree in nursing.  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  Professionally, he worked as a  and nurse.  He last worked in approximately 2010 or 2011. He began receiving disability benefits in 2013.  He stated that he could not get so security disability because he did not have enough work credits.  He stated that he receives residential group credits.      BEHAVIORAL OBSERVATIONS  Mr. Mathews was pleasant and generally cooperative with the exam.  He was in a motorized wheelchair.  He was able to stand to walk to the restroom.  He did not require assistance with this task.  He had a visible tremor in his left hand, arm, and on his face.  He wore glasses.  His speech was mildly slowed, but otherwise unremarkable. His comprehension was normal. His thought processes were notable for mild to moderate distractibility, mild forgetting, mild carelessness/impulsivity, and mild slowing.  He had mildly reduced frustration tolerance.  His mood was mildly depressed with flat affect. His effort was rated as fair, as he was hesitant to guess on memory tests and reluctant to push himself on timed tasks. The current results are felt to be a broadly accurate depiction of his cognitive functioning.      RESULTS OF EXAM  His  performances on standardized measures of neuropsychological functioning were as follows.     He was oriented to time and place.  He misstated his age but was otherwise oriented to various aspects of personal information.  He was able to state the name of the current president and 3 of the 5 most recent past presidents.  Performance on a measure of single word reading was average.  He obtained passing scores on stand-alone measures of cognitive performance validity.  His scores on embedded metrics of performance validity were mixed, with some passing and some non-passing scores.  Auditory attention for digits was low average.  Mental calculations were low average.  Learning of words in a list format was impaired.  Delayed recall of list words was impaired.  Percent retention of list words was impaired.  He did not recollect any of the words after the delay.  Delayed recognition of list words, however, was low average.  Learning and delayed recall of story information were both impaired.  Delayed recognition of story information was borderline impaired.  Learning of simple geometric shapes and their spatial locations was impaired.  Delayed recall of the shapes and their locations was impaired.  Percent retention of the shapes was impaired.  Delayed recognition of the shapes, however, was normal, and performed without error.  Visuospatial judgments for variably oriented lines were average.  Nonverbal reasoning for incomplete matrices was average.  His drawing of a complicated geometric figure was impaired, and notable for inattention to some of the figure s spatial relations and mild inattention to details as well.  Comprehension of phrases and short stories was borderline impaired.  Verbal associative fluency was borderline impaired.  Animal fluency was borderline impaired.  Naming to confrontation was performed in the average to high average range.  Verbal abstract reasoning was average.  Speeded visual sequencing  under focused attention was average.  A similar measure with a divided attention component was borderline impaired.  Speeded word reading was borderline impaired.  Speeded color naming was low average.  Speeded inhibition of an overlearned response was borderline impaired.  Speeded visual motor coding was low average.  Speeded fine motor dexterity was impaired, bilaterally.  He had 0 peg drops with the right hand, and 3 peg drops with the left hand.    He endorsed items consistent with moderate symptoms of depression and moderate symptoms of anxiety on self-report measures.    IMPRESSIONS  Is important to point out when looking at this data set that there was subjective and objective evidence to suggest that Mr. Mathews had some degree of variable focus or engagement with the testing process.  That said, if taken at face value, the results from this exam are compatible with frontal and subcortical systems dysfunction, as is commonly seen in individuals with Parkinson's disease.  It is challenging for me to say whether or not he meets criteria for dementia at this time, given the suggestion of variable engagement with the testing process.  I do not doubt that he has some degree of acquired cognitive compromise.  In this exam, deficits were noted in executive functioning, with milder weaknesses in attention, cognitive speed, learning, recall, and bilateral fine motor dexterity.  Recognition memory is relatively preserved (in keeping with his average range cognitive baseline), as is naming, orientation, nonverbal reasoning, and basic visuospatial judgments.  He is reporting moderate symptoms of both depression and anxiety.  It may be the case that these depression and anxiety symptoms contributed to some of the variability noted on testing, as well as to his variability of engagement with the testing process.      RECOMMENDATIONS  Preliminary results and recommendations were provided to the patient over the telephone  on the date of the exam, and all questions were answered.     1.  In spite of treatment, he remains depressed and anxious.  Continued psychiatric and psychotherapeutic care are recommended.    2.  He will continue to benefit from support and supervision of his daily activities.  His current living arrangement and level of support appear to be appropriate at this time.    3.  He should continue to refrain from driving.    4.  If not already done, he could benefit from being placed in contact with the [employer]'s [employer]. www.apdaparkinson.org    5. Follow-up neuropsychological evaluation is recommended in 1 to 2 years in order to assess and update recommendations as appropriate.  The current results can be used as a baseline at that time.    Noel Vincent, Ph.D., L.P., ABPP  Board Certified in Clinical Neuropsychology   / Licensed Psychologist [id number]    Time spent:  One unit (40 minutes) psychiatric diagnostic interview including interview and clinical assessment by licensed and board-certified neuropsychologist (CPT 87193). One unit (60 minutes) neuropsychological testing evaluation by licensed and board-certified neuropsychologist, including integration of patient data, interpretation of standardized test results and clinical data, clinical decision-making, treatment planning, and report, first hour (CPT 34228). One unit(s) (38 minutes) of neuropsychological testing evaluation by licensed and board-certified neuropsychologist, including integration of patient data, interpretation of standardized test results and clinical data, clinical decision-making, treatment planning, and report, subsequent hours (CPT 79725). One unit (30 minutes) of psychological and neuropsychological test administration and scoring by technician, first 30 minutes (CPT 89311). Six units (172 minutes) psychological or neuropsychological test administration and scoring by technician, subsequent 30  minutes (CPT 98619). Diagnoses: G20, R41.844, F06.8, F33.1, F41.9.

## 2023-01-16 NOTE — PROGRESS NOTES
"NAME  Benjamin Mathews    MRN  [id number]      [birth year]     AGE  57     SEX  Male     HANDEDNESS Right     EDUCATION 16     ROMERO  23     PROVIDER       [employer]  SH     STATION  OP            ORIENTATION      Time  -1     Personal Info. 3 /4    Place      Presidents             WAIS-IV         WMI: 80      RDS: 6             Raw SS    Similarities  26 10    Matrix Reasoning 13 9    Digit Span  18 6    Arithmetic  10 7    Coding  38 6           GLENYS-O COMPLEX FIGURE       Raw T %ile   Copy  26  <1   Time to Copy 231\"  >16                       WRAT    5     SS %ile GE   Reading  103 58 >12.9          COWAT FAS       Raw 25      SS 6      T 32             ANIMAL FLUENCY      Raw 13      SS 6      T 30              [place]STON NAMING TEST     Raw 57 /60     SS 12      %ile 60-82             COMPLEX IDEATIONAL MATERIAL     Raw 10      SS 7      T 32             TRAIL MAKING TEST       Time Errors SSa %ile   A 37 0 8 24-36   B 183 0 4 4          STROOP        Raw %ile     Word 72 4-6     Color 61 16-22     C/W 24 2-6             TEODORA   H   Raw 22      %ile 32-59             GROOVED PEGBOARD       Raw Drops SS T    0 1 17    3 0 14          BDI-II       Raw 20      Interp. MODERATE            PATY       Raw 21      Interp. MODERATE             WMS-IV LOGICAL MEMORY YA    Raw SSa/%ile     LM I 11 3     LM II 5 3     Recog. 20 3-9             HVLT   1     Raw T    Trial 1  3     Trial 2  4     Trial 3  5     Learning  2     Total Recall 12 <20    Delayed Recall 0 <20    Percent Retention 0% <20    True Positives 10     False Positives 1     Disc. Index  9 37            BVMT   1     Raw T/%ile    Trial 1  2     Trial 2  2     Trial 3  5     Learning  3     Total Recall 9 24    Delayed Recall 2 <20    Percent Retention 40% <1    Recognition Hits 6     Recognition F.P 0     Disc. Index  6 >16           TOMM       Trial 1 48      Trial 2 50             DCT       E-Score 8          "

## 2023-01-20 ENCOUNTER — PATIENT OUTREACH (OUTPATIENT)
Dept: ONCOLOGY | Facility: CLINIC | Age: 58
End: 2023-01-20
Payer: COMMERCIAL

## 2023-01-20 DIAGNOSIS — G20.A1 PARKINSON'S DISEASE (TREMOR, STIFFNESS, SLOW MOTION, UNSTABLE POSTURE) (H): ICD-10-CM

## 2023-01-20 DIAGNOSIS — M62.838 MUSCLE SPASM: ICD-10-CM

## 2023-01-20 RX ORDER — CLONAZEPAM 1 MG/1
1 TABLET ORAL DAILY
Qty: 30 TABLET | Refills: 4 | Status: ON HOLD | OUTPATIENT
Start: 2023-01-20 | End: 2023-05-25

## 2023-01-31 ENCOUNTER — OFFICE VISIT (OUTPATIENT)
Dept: PODIATRY | Facility: CLINIC | Age: 58
End: 2023-01-31
Payer: COMMERCIAL

## 2023-01-31 DIAGNOSIS — B35.1 ONYCHOMYCOSIS: ICD-10-CM

## 2023-01-31 DIAGNOSIS — M20.41 HAMMER TOES OF BOTH FEET: ICD-10-CM

## 2023-01-31 DIAGNOSIS — G60.9 IDIOPATHIC PERIPHERAL NEUROPATHY: ICD-10-CM

## 2023-01-31 DIAGNOSIS — G20.A1 PARKINSON'S DISEASE (H): ICD-10-CM

## 2023-01-31 DIAGNOSIS — M20.42 HAMMER TOES OF BOTH FEET: ICD-10-CM

## 2023-01-31 DIAGNOSIS — L97.521 SKIN ULCER OF SECOND TOE OF LEFT FOOT, LIMITED TO BREAKDOWN OF SKIN (H): Primary | ICD-10-CM

## 2023-01-31 PROCEDURE — 99214 OFFICE O/P EST MOD 30 MIN: CPT | Performed by: PODIATRIST
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
## 2023-01-31 NOTE — NURSING NOTE
Benjamin Mathews's chief complaint for this visit includes:  Chief Complaint   Patient presents with     Follow Up     Nail trim     PCP: Stephanie Maldonado    Referring Provider:  Error in [id number] index!    There were no vitals taken for this visit.  Data Unavailable        Allergies   Allergen Reactions     Amantadine Other (See Comments)     Other reaction(s): Hallucinations  Hallucinations/ lost self control/gambling.     halluicnations  Hallucinations/ lost self control/gambling.     hallucinates  halluicnations  hallucinates  Hallucinations/ lost self control/gambling.        Quetiapine GI Disturbance, Diarrhea and Other (See Comments)     Diarrhea    Diarrhea  Other reaction(s): GI Disturbance  Diarrhea       Duloxetine Other (See Comments)     suicidal  suicidal           Do you need any medication refills at today's visit?

## 2023-02-01 ENCOUNTER — OFFICE VISIT (OUTPATIENT)
Dept: PHYSICAL MEDICINE AND REHAB | Facility: CLINIC | Age: 58
End: 2023-02-01
Payer: COMMERCIAL

## 2023-02-01 VITALS — SYSTOLIC BLOOD PRESSURE: 95 MMHG | DIASTOLIC BLOOD PRESSURE: 63 MMHG | OXYGEN SATURATION: 96 % | HEART RATE: 87 BPM

## 2023-02-01 DIAGNOSIS — Z74.09 IMPAIRED FUNCTIONAL MOBILITY, BALANCE, GAIT, AND ENDURANCE: ICD-10-CM

## 2023-02-01 DIAGNOSIS — G24.3 CERVICAL DYSTONIA: Primary | ICD-10-CM

## 2023-02-01 DIAGNOSIS — G20.A1 PARKINSON DISEASE (H): ICD-10-CM

## 2023-02-01 DIAGNOSIS — G24.8 SEGMENTAL DYSTONIA: ICD-10-CM

## 2023-02-01 PROCEDURE — 95874 GUIDE NERV DESTR NEEDLE EMG: CPT | Performed by: PHYSICAL MEDICINE & REHABILITATION

## 2023-02-01 PROCEDURE — 64642 CHEMODENERV 1 EXTREMITY 1-4: CPT | Performed by: PHYSICAL MEDICINE & REHABILITATION

## 2023-02-01 PROCEDURE — 64616 CHEMODENERV MUSC NECK DYSTON: CPT | Mod: RT | Performed by: PHYSICAL MEDICINE & REHABILITATION

## 2023-02-01 ASSESSMENT — PAIN SCALES - GENERAL: PAINLEVEL: NO PAIN (0)

## 2023-02-01 NOTE — NURSING NOTE
Chief Complaint   Patient presents with     RECHECK     Botox injections confirmed with patient     Lindsay Madrid CMA at 1:13 PM on 2/1/2023.

## 2023-02-01 NOTE — PROGRESS NOTES
[facility]    PM&R CLINIC NOTE  BOTULINUM TOXIN PROCEDURE      HPI  Chief Complaint   Patient presents with     RECHECK     Botox injections confirmed with patient     Benjamin Mathews is a 57 year old male who was referred to our clinic for more evaluation of his dystonia and trial of botulinum toxin injections (referral from Dr. Suazo). He was seen on 10/6/22 as initial consult; please see the consult note for details. She has history of Parkinsonism and is followed by Dr. Perry. He was referred to palliative care team and has been followed by them since then.  He was also referred to the pain clinic and saw Dr. Freitas; she sent a referral to PM&R team for trial of botulinum toxins.       SINCE LAST VISIT  Benjamin Mathews was last seen here in clinic on 10/24/22.    Patient reports the following new medical problems since last visit: **   --was in ED in Nov with muscle spasms which improved with baclofen and clonazepam  --was in ED again in Nov with COVID infection   --saw urology team in Nov for LUTS likely due to BPH and was started on alfuzosin  --saw Dr. Carlos in Jan; it was recommended to continue carbidopa/levodopa and amantadine. He is also on clonazepam prn. Ok to continue botox injections.   --had neuropsych testing 1/13; reviewed the results.  --saw podiatry yesterday for   Ulcer, dorsal left second toe.       RESPONSE TO PREVIOUS TREATMENT  Side effects: none     Noticed 20-30% improvement of his right sided toe curling and positioning of his R ankle. Benefits lasted for about 1.5 months with gradual decline after that  Didn't notice any benefits for his neck       PHYSICAL EXAM  VS: BP 95/63 (BP Location: Right arm, Patient Position: Sitting, Cuff Size: Adult Large)   Pulse 87   SpO2 96%    GEN: Pleasant and cooperative, in no acute distress  HEENT: No facial asymmetry    He was sitting in his power wheelchair leaning to the right  with his neck tilted to the right and rotated to the left  C-spine range of motion was moderately limited with extension and left lateral bending, he was also mildly limited in all other directions  Left shoulder range of motion was also limited up to 90 degrees of abduction and forward flexion due to weakness but no pain  Mild tenderness to palpation at posterior neck and periscapular muscles      ALLERGIES  Allergies   Allergen Reactions     Amantadine Other (See Comments)     Other reaction(s): Hallucinations  Hallucinations/ lost self control/gambling.     halluicnations  Hallucinations/ lost self control/gambling.     hallucinates  halluicnations  hallucinates  Hallucinations/ lost self control/gambling.        Quetiapine GI Disturbance, Diarrhea and Other (See Comments)     Diarrhea    Diarrhea  Other reaction(s): GI Disturbance  Diarrhea       Duloxetine Other (See Comments)     suicidal  suicidal         CURRENT MEDICATIONS    Current Outpatient Medications:      acetaminophen (TYLENOL) 500 MG tablet, 2 x 500mg tabs by mouth 3/day @8am, 12pm and 8pm and 2 x 500mg tab by mouth every 6 hours as needed, Disp: , Rfl:      alfuzosin ER (UROXATRAL) 10 MG 24 hr tablet, Take 1 tablet (10 mg) by mouth daily, Disp: 90 tablet, Rfl: 3     atorvastatin (LIPITOR) 40 MG tablet, Take 40 mg by mouth At 8pm, Disp: , Rfl:      bisacodyl (DULCOLAX) 10 MG suppository, Place 10 mg rectally daily as needed for constipation, Disp: , Rfl:      bisacodyl (DULCOLAX) 5 MG EC tablet, Take 1 tablet (5 mg) by mouth three times a week Take one 5 mg tablet three times a week, Disp: 15 tablet, Rfl: 3     calcium polycarbophil (FIBER-LAX) 625 MG tablet, 1 tab by mouth daily at 8am, Disp: , Rfl:      carbidopa-levodopa (SINEMET CR)  MG CR tablet, 50/200 tab by mouth nightly at 8pm, Disp: , Rfl:      carbidopa-levodopa (SINEMET)  MG tablet, 2 tabs @ 8am, 2 @ noon, 1.5 @4pm, Disp: , Rfl:      cholecalciferol (VITAMIN D3) 125 mcg  (5000 units) capsule, 125 mcg (5000 units) by mouth daily at  8am, Disp: , Rfl:      clonazePAM (KLONOPIN) 1 MG tablet, Take 1 tablet (1 mg) by mouth daily At noon., Disp: 30 tablet, Rfl: 4     clonazePAM (KLONOPIN) 1 MG tablet, Take 1 tablet (1 mg) by mouth daily as needed for muscle spasms, Disp: 30 tablet, Rfl: 0     clonazePAM (KLONOPIN) 2 MG tablet, TAKE 1 TABLET BY MOUTH TWICE DAILY ( MORNING & BEDTIME ) *1 TOTAL FILL*, Disp: 60 tablet, Rfl: 5     cloZAPine (CLOZARIL) 50 MG tablet, 50 mg At Bedtime, Disp: , Rfl:      ENULOSE 10 GM/15ML SOLUTION, 15ml by mouth daily at 8am, Disp: , Rfl:      gabapentin (NEURONTIN) 800 MG tablet, 800mg tab by mouth 3/day at 8am, 12pm and 8pm, Disp: , Rfl:      hydrocortisone 1 % CREA cream, Apply topically to nose and other affected area(s) every morning at 9am, Disp: , Rfl:      hydrOXYzine (ATARAX) 25 MG tablet, Take 50 mg by mouth every 6 hours as needed for anxiety (up to 3 timrd daily), Disp: , Rfl:      lubiprostone (AMITIZA) 24 MCG capsule, 24mg tab by mouth twice daily at 8am and 8pm, Disp: , Rfl:      metoprolol succinate ER (TOPROL-XL) 25 MG 24 hr tablet, Take 1/2 tablet (12.5mg) by mouth twice daily at 8am and 8pm, Disp: , Rfl:      Multiple Vitamin (DAILY-ALLAN) TABS, Vitamin by  Mouth daily @8am, Disp: , Rfl:      pantoprazole (PROTONIX) 40 MG EC tablet, Take 1 tablet (40mg) by mouth daily at 8am, Disp: , Rfl:      polyethylene glycol (MIRALAX) 17 GM/Dose powder, Take 17 g by mouth 2 times daily One capful in liquid by mouth twice a day, 8 AM and one dose at supper, Disp: 850 g, Rfl: 3     rivaroxaban ANTICOAGULANT (XARELTO) 20 MG TABS tablet, Take 20 mg by mouth daily (with dinner) At 5pm, Disp: , Rfl:      sodium phosphate (FLEET ENEMA) 7-19 GM/118ML rectal enema, Place 1 enema rectally daily as needed for constipation, Disp: , Rfl:      traZODone (DESYREL) 100 MG tablet, Take 100 mg by mouth daily at 8pm, Disp: , Rfl:      traZODone (DESYREL) 50 MG tablet, 50 mg  In AM, Disp: , Rfl:      triamcinolone (KENALOG) 0.1 % external cream, , Disp: , Rfl:      venlafaxine (EFFEXOR XR) 150 MG 24 hr capsule, Take 1 capsule (150 mg) by mouth daily, Disp: , Rfl:      venlafaxine (EFFEXOR XR) 75 MG 24 hr capsule, Take 1 capsule (75 mg) by mouth daily, Disp: , Rfl:      vitamin C (ASCORBIC ACID) 500 MG tablet, Take 500 mg by mouth daily, Disp: , Rfl:     Current Facility-Administered Medications:      botulinum toxin type A (BOTOX) 100 units injection 400 Units, 400 Units, Intramuscular, Q90 Days, Ember Arita MD, 130 Units at 10/24/22 1434       BOTULINUM NEUROTOXIN INJECTION PROCEDURES    VERIFICATION OF PATIENT IDENTIFICATION AND PROCEDURE     Initials   Patient Name PS   Patient  PS   Procedure Verified by: PS     Prior to the start of the procedure and with procedural staff participation, I verbally confirmed the patient s identity using two indicators, relevant allergies, that the procedure was appropriate and matched the consent or emergent situation, and that the correct equipment/implants were available. Immediately prior to starting the procedure I conducted the Time Out with the procedural staff and re-confirmed the patient s name, procedure, and site/side. (The Joint Commission universal protocol was followed.)  Yes    Sedation (Moderate or Deep): None    ABOVE ASSESSMENTS PERFORMED BY  Ember Arita MD      INDICATIONS FOR PROCEDURES  Benjamin Mathews is a 57 year old patient with cervical and segmental dystonia secondary to the diagnosis of Parkinson's disease. His baseline symptoms have been recalcitrant to oral medications and conservative therapy.  He is here today for reinjection with Botox.    GOAL OF PROCEDURE  The goal of this procedure is to increase active range of motion, improve volitional motor control, decrease pain  and enhance functional independence.      TOTAL DOSE ADMINISTERED  Dose Administered: 270 units  Botox (Botulinum Toxin Type A)       2:1  Dilution   Unavoidable Drug Waste: Yes  Amount of drug waste (mL): 30 units.  Reason for waste:  Single use vial  Diluent Used:  Preservative Free Normal Saline  Total Volume of Diluent Used: 6 ml  See MAR  NDC #: Botox 100u ([address]-53)      CONSENT  The risks, benefits, and treatment options were discussed with Benjamin Mathews and he agreed to proceed.    Written consent was obtained by PS.     EQUIPMENT USED  Needle-35mm stimulating/recording  EMG/NCS Machine    SKIN PREPARATION  Skin preparation was performed using an alcohol wipe.    GUIDANCE DESCRIPTION  Electro-myographic guidance was necessary throughout the procedure to accurately identify all areas of dystonic muscles while avoiding injection of non-dystonic muscles and underlying muscles , neighboring nerves and nearby vascular structures.     AREA/MUSCLE INJECTED  Right neck  SCM 10 units at 1 site  Splenius capitis 10 units at 1 site  Splenius cervicis 10 units at 1 site  Levator a scap insertion 10 units at 1 site  Levator at neck 10 units at 1 site   Lateral trap 30 units at 3 sites       RLE   Hamstrings 40 units at 2 sites   Gastrocnemius 70 units at 2 sites  FDL 40 units at 1 site   Posterior tib 40 units at 1 site       RESPONSE TO PROCEDURE  Benjamin Mathews tolerated the procedure well and there were no immediate complications. He was allowed to recover for an appropriate period of time and was discharged home in stable condition.    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     We have discussed treatment options for dystonia including therapies, medications and interventions.  He has responded very well to Klonopin but his symptoms are not well controlled especially spasmodic torticollis and dystonia in the distal part of his right lower extremity.  Adding  another oral agent does not seem appropriate because 1-he did not respond to baclofen and 2- risk of polypharmacy and side effects.  He would benefit from physical therapy and will after the first or second round of injections.  Seems to be an excellent candidate for trial of botulinum toxin injections given his localized symptoms affecting his function, quality of life and comfort.      1. Work-up: None     2. Therapy/equipment/braces: consider after the second round of injections    3. Medications: No change today    4. Interventions: started the first round at 130 units and increased the dose to 270 units today with the goal of increasing effectiveness and prolonging duration of benefit of Botox injections.    5. Referral / follow up with other providers: discussed and sent a referral to Dr. Rodriguez for evaluation of bone health.  He will continue to follow-up with palliative care team and movement disorder clinic.    6. Follow up: in 12 weeks     Ember Arita MD  Physical Medicine & Rehabilitation

## 2023-02-01 NOTE — LETTER
2/1/2023       RE: Benjamin Mathews  [address]  [facility] [address]     Dear Colleague,    Thank you for referring your patient, Benjamin Mathews, to the [facility] [place] [facility]. Please see a copy of my visit note below.      [facility]    PM&R CLINIC NOTE  BOTULINUM TOXIN PROCEDURE      HPI  Chief Complaint   Patient presents with     RECHECK     Botox injections confirmed with patient     Benjamin Mathews is a 57 year old male who was referred to our clinic for more evaluation of his dystonia and trial of botulinum toxin injections (referral from Dr. Suazo). He was seen on 10/6/22 as initial consult; please see the consult note for details. She has history of Parkinsonism and is followed by Dr. Perry. He was referred to palliative care team and has been followed by them since then.  He was also referred to the pain clinic and saw Dr. Freitas; she sent a referral to PM&R team for trial of botulinum toxins.       SINCE LAST VISIT  eBnjamin Mathews was last seen here in clinic on 10/24/22.    Patient reports the following new medical problems since last visit: **   --was in ED in Nov with muscle spasms which improved with baclofen and clonazepam  --was in ED again in Nov with COVID infection   --saw urology team in Nov for LUTS likely due to BPH and was started on alfuzosin  --saw Dr. Carlos in Jan; it was recommended to continue carbidopa/levodopa and amantadine. He is also on clonazepam prn. Ok to continue botox injections.   --had neuropsych testing 1/13; reviewed the results.  --saw podiatry yesterday for   Ulcer, dorsal left second toe.       RESPONSE TO PREVIOUS TREATMENT  Side effects: none     Noticed 20-30% improvement of his right sided toe curling and positioning of his R ankle. Benefits lasted for about 1.5 months with gradual  decline after that  Didn't notice any benefits for his neck       PHYSICAL EXAM  VS: BP 95/63 (BP Location: Right arm, Patient Position: Sitting, Cuff Size: Adult Large)   Pulse 87   SpO2 96%    GEN: Pleasant and cooperative, in no acute distress  HEENT: No facial asymmetry    He was sitting in his power wheelchair leaning to the right with his neck tilted to the right and rotated to the left  C-spine range of motion was moderately limited with extension and left lateral bending, he was also mildly limited in all other directions  Left shoulder range of motion was also limited up to 90 degrees of abduction and forward flexion due to weakness but no pain  Mild tenderness to palpation at posterior neck and periscapular muscles      ALLERGIES  Allergies   Allergen Reactions     Amantadine Other (See Comments)     Other reaction(s): Hallucinations  Hallucinations/ lost self control/gambling.     halluicnations  Hallucinations/ lost self control/gambling.     hallucinates  halluicnations  hallucinates  Hallucinations/ lost self control/gambling.        Quetiapine GI Disturbance, Diarrhea and Other (See Comments)     Diarrhea    Diarrhea  Other reaction(s): GI Disturbance  Diarrhea       Duloxetine Other (See Comments)     suicidal  suicidal         CURRENT MEDICATIONS    Current Outpatient Medications:      acetaminophen (TYLENOL) 500 MG tablet, 2 x 500mg tabs by mouth 3/day @8am, 12pm and 8pm and 2 x 500mg tab by mouth every 6 hours as needed, Disp: , Rfl:      alfuzosin ER (UROXATRAL) 10 MG 24 hr tablet, Take 1 tablet (10 mg) by mouth daily, Disp: 90 tablet, Rfl: 3     atorvastatin (LIPITOR) 40 MG tablet, Take 40 mg by mouth At 8pm, Disp: , Rfl:      bisacodyl (DULCOLAX) 10 MG suppository, Place 10 mg rectally daily as needed for constipation, Disp: , Rfl:      bisacodyl (DULCOLAX) 5 MG EC tablet, Take 1 tablet (5 mg) by mouth three times a week Take one 5 mg tablet three times a week, Disp: 15 tablet, Rfl: 3      calcium polycarbophil (FIBER-LAX) 625 MG tablet, 1 tab by mouth daily at 8am, Disp: , Rfl:      carbidopa-levodopa (SINEMET CR)  MG CR tablet, 50/200 tab by mouth nightly at 8pm, Disp: , Rfl:      carbidopa-levodopa (SINEMET)  MG tablet, 2 tabs @ 8am, 2 @ noon, 1.5 @4pm, Disp: , Rfl:      cholecalciferol (VITAMIN D3) 125 mcg (5000 units) capsule, 125 mcg (5000 units) by mouth daily at  8am, Disp: , Rfl:      clonazePAM (KLONOPIN) 1 MG tablet, Take 1 tablet (1 mg) by mouth daily At noon., Disp: 30 tablet, Rfl: 4     clonazePAM (KLONOPIN) 1 MG tablet, Take 1 tablet (1 mg) by mouth daily as needed for muscle spasms, Disp: 30 tablet, Rfl: 0     clonazePAM (KLONOPIN) 2 MG tablet, TAKE 1 TABLET BY MOUTH TWICE DAILY ( MORNING & BEDTIME ) *1 TOTAL FILL*, Disp: 60 tablet, Rfl: 5     cloZAPine (CLOZARIL) 50 MG tablet, 50 mg At Bedtime, Disp: , Rfl:      ENULOSE 10 GM/15ML SOLUTION, 15ml by mouth daily at 8am, Disp: , Rfl:      gabapentin (NEURONTIN) 800 MG tablet, 800mg tab by mouth 3/day at 8am, 12pm and 8pm, Disp: , Rfl:      hydrocortisone 1 % CREA cream, Apply topically to nose and other affected area(s) every morning at 9am, Disp: , Rfl:      hydrOXYzine (ATARAX) 25 MG tablet, Take 50 mg by mouth every 6 hours as needed for anxiety (up to 3 timrd daily), Disp: , Rfl:      lubiprostone (AMITIZA) 24 MCG capsule, 24mg tab by mouth twice daily at 8am and 8pm, Disp: , Rfl:      metoprolol succinate ER (TOPROL-XL) 25 MG 24 hr tablet, Take 1/2 tablet (12.5mg) by mouth twice daily at 8am and 8pm, Disp: , Rfl:      Multiple Vitamin (DAILY-ALLAN) TABS, Vitamin by  Mouth daily @8am, Disp: , Rfl:      pantoprazole (PROTONIX) 40 MG EC tablet, Take 1 tablet (40mg) by mouth daily at 8am, Disp: , Rfl:      polyethylene glycol (MIRALAX) 17 GM/Dose powder, Take 17 g by mouth 2 times daily One capful in liquid by mouth twice a day, 8 AM and one dose at supper, Disp: 850 g, Rfl: 3     rivaroxaban ANTICOAGULANT (XARELTO) 20 MG  TABS tablet, Take 20 mg by mouth daily (with dinner) At 5pm, Disp: , Rfl:      sodium phosphate (FLEET ENEMA) 7-19 GM/118ML rectal enema, Place 1 enema rectally daily as needed for constipation, Disp: , Rfl:      traZODone (DESYREL) 100 MG tablet, Take 100 mg by mouth daily at 8pm, Disp: , Rfl:      traZODone (DESYREL) 50 MG tablet, 50 mg In AM, Disp: , Rfl:      triamcinolone (KENALOG) 0.1 % external cream, , Disp: , Rfl:      venlafaxine (EFFEXOR XR) 150 MG 24 hr capsule, Take 1 capsule (150 mg) by mouth daily, Disp: , Rfl:      venlafaxine (EFFEXOR XR) 75 MG 24 hr capsule, Take 1 capsule (75 mg) by mouth daily, Disp: , Rfl:      vitamin C (ASCORBIC ACID) 500 MG tablet, Take 500 mg by mouth daily, Disp: , Rfl:     Current Facility-Administered Medications:      botulinum toxin type A (BOTOX) 100 units injection 400 Units, 400 Units, Intramuscular, Q90 Days, Ember Arita MD, 130 Units at 10/24/22 1434       BOTULINUM NEUROTOXIN INJECTION PROCEDURES    VERIFICATION OF PATIENT IDENTIFICATION AND PROCEDURE     Initials   Patient Name PS   Patient  PS   Procedure Verified by: PS     Prior to the start of the procedure and with procedural staff participation, I verbally confirmed the patient s identity using two indicators, relevant allergies, that the procedure was appropriate and matched the consent or emergent situation, and that the correct equipment/implants were available. Immediately prior to starting the procedure I conducted the Time Out with the procedural staff and re-confirmed the patient s name, procedure, and site/side. (The Joint Commission universal protocol was followed.)  Yes    Sedation (Moderate or Deep): None    ABOVE ASSESSMENTS PERFORMED BY  Ember Arita MD      INDICATIONS FOR PROCEDURES  Benjamin Mathews is a 57 year old patient with cervical and segmental dystonia secondary to the diagnosis of Parkinson's disease. His baseline symptoms have been recalcitrant to oral medications and  conservative therapy.  He is here today for reinjection with Botox.    GOAL OF PROCEDURE  The goal of this procedure is to increase active range of motion, improve volitional motor control, decrease pain  and enhance functional independence.      TOTAL DOSE ADMINISTERED  Dose Administered: 270 units  Botox (Botulinum Toxin Type A)       2:1 Dilution   Unavoidable Drug Waste: Yes  Amount of drug waste (mL): 30 units.  Reason for waste:  Single use vial  Diluent Used:  Preservative Free Normal Saline  Total Volume of Diluent Used: 6 ml  See MAR  NDC #: Botox 100u ([address]-46)      CONSENT  The risks, benefits, and treatment options were discussed with Benjamin Mathews and he agreed to proceed.    Written consent was obtained by PS.     EQUIPMENT USED  Needle-35mm stimulating/recording  EMG/NCS Machine    SKIN PREPARATION  Skin preparation was performed using an alcohol wipe.    GUIDANCE DESCRIPTION  Electro-myographic guidance was necessary throughout the procedure to accurately identify all areas of dystonic muscles while avoiding injection of non-dystonic muscles and underlying muscles , neighboring nerves and nearby vascular structures.     AREA/MUSCLE INJECTED  Right neck  SCM 10 units at 1 site  Splenius capitis 10 units at 1 site  Splenius cervicis 10 units at 1 site  Levator a scap insertion 10 units at 1 site  Levator at neck 10 units at 1 site   Lateral trap 30 units at 3 sites       RLE   Hamstrings 40 units at 2 sites   Gastrocnemius 70 units at 2 sites  FDL 40 units at 1 site   Posterior tib 40 units at 1 site       RESPONSE TO PROCEDURE  Benjamin Mathews tolerated the procedure well and there were no immediate complications. He was allowed to recover for an appropriate period of time and was discharged home in stable condition.    ASSESSMENT AND PLAN     Parkinsonism    History of CVA with residual left hemiparesis    Cervical dystonia     Dystonic movement of right upper and lower extremities, worse in  the right lower extremity    Peripheral neuropathy?     We have discussed treatment options for dystonia including therapies, medications and interventions.  He has responded very well to Klonopin but his symptoms are not well controlled especially spasmodic torticollis and dystonia in the distal part of his right lower extremity.  Adding another oral agent does not seem appropriate because 1-he did not respond to baclofen and 2- risk of polypharmacy and side effects.  He would benefit from physical therapy and will after the first or second round of injections.  Seems to be an excellent candidate for trial of botulinum toxin injections given his localized symptoms affecting his function, quality of life and comfort.      1. Work-up: None     2. Therapy/equipment/braces: consider after the second round of injections    3. Medications: No change today    4. Interventions: started the first round at 130 units and increased the dose to 270 units today with the goal of increasing effectiveness and prolonging duration of benefit of Botox injections.    5. Referral / follow up with other providers: discussed and sent a referral to Dr. Rodriguez for evaluation of bone health.  He will continue to follow-up with palliative care team and movement disorder clinic.    6. Follow up: in 12 weeks     Ember Arita MD  Physical Medicine & Rehabilitation

## 2023-02-08 ENCOUNTER — OFFICE VISIT (OUTPATIENT)
Dept: PALLIATIVE CARE | Facility: CLINIC | Age: 58
End: 2023-02-08
Attending: INTERNAL MEDICINE
Payer: COMMERCIAL

## 2023-02-08 VITALS
SYSTOLIC BLOOD PRESSURE: 95 MMHG | RESPIRATION RATE: 16 BRPM | TEMPERATURE: 98.5 F | OXYGEN SATURATION: 97 % | HEART RATE: 100 BPM | DIASTOLIC BLOOD PRESSURE: 62 MMHG

## 2023-02-08 DIAGNOSIS — G20.A1 PARKINSON'S DISEASE (TREMOR, STIFFNESS, SLOW MOTION, UNSTABLE POSTURE) (H): ICD-10-CM

## 2023-02-08 DIAGNOSIS — K59.00 CONSTIPATION, UNSPECIFIED CONSTIPATION TYPE: Primary | ICD-10-CM

## 2023-02-08 DIAGNOSIS — M62.838 MUSCLE SPASM: ICD-10-CM

## 2023-02-08 DIAGNOSIS — R45.86 MOOD AND AFFECT DISTURBANCE: ICD-10-CM

## 2023-02-08 PROCEDURE — G0463 HOSPITAL OUTPT CLINIC VISIT: HCPCS

## 2023-02-08 PROCEDURE — 99214 OFFICE O/P EST MOD 30 MIN: CPT | Mod: GC

## 2023-02-08 RX ORDER — SENNOSIDES 8.6 MG
1 TABLET ORAL 2 TIMES DAILY
Qty: 60 TABLET | Refills: 1 | Status: SHIPPED | OUTPATIENT
Start: 2023-02-08 | End: 2023-02-08

## 2023-02-08 RX ORDER — LACTULOSE 10 G/15ML
SOLUTION ORAL; RECTAL
Status: ON HOLD | COMMUNITY
Start: 2023-02-08 | End: 2023-05-25

## 2023-02-08 RX ORDER — BISACODYL 10 MG
10 SUPPOSITORY, RECTAL RECTAL EVERY OTHER DAY
Qty: 30 SUPPOSITORY | Refills: 1 | Status: SHIPPED | OUTPATIENT
Start: 2023-02-08 | End: 2023-04-06

## 2023-02-08 ASSESSMENT — PAIN SCALES - GENERAL: PAINLEVEL: NO PAIN (0)

## 2023-02-08 NOTE — PROGRESS NOTES
Palliative Care Outpatient Clinic Progress Note    Patient Name: Benjamin Mathews is being evaluated in person.    Primary Provider:  Stephanie Maldonado  Last seen by palliative care: 12/08/2022      Chief Complaint: Dystonic spasms    Background/Summary    Medical:  Patient diagnosed with Parkinson's in 2013 after a short period of tremor and gait changes. 2015 symptoms significant worsened. He is followed by  of Neurology and most recently Dr. Carlos with the movement disorders clinic.      Social  Grew up in [place]. 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 Around this time is when he was diagnosed with Parkinson's and work became difficult. Has a brother and sister-in law that come to visit him in his [facility] which he appreciates. He has a sister and brother in law who are MDs, work for [employer] (In [place]). Father had Parkinson's and passed over 10 years ago. He was primary caretaker for his mother and father before they passed.      Lives in a group home with 4 other people in [place].  Is able to walk around the house using his four-wheel walker; uses his wheelchair for longer distances.       Care Planning   No advanced directive. We started discussion regarding who would help make medical decisions. Overall is isolated, most likely his sister Isha. Will discuss further at follow up.     Opioid Safety   : reviewed 02/08/2023   Clonazepam 2mg tablet x 60 tablets on 01/25/23  Clonazepam 1mg tablet - 30 tablets on 01/20/23    Interim History:  History gathered today from: patient     Mr. Mathews is a 57 year old male with Parkinson's disease who is following up for management of dystonic spasms.     Last palliative visit on 1208/23. No medication changes were made. At that time he was recently restarted on amantadine after an ER visit for spasms and pain.     Since then he  was seen in movement disorders clinic by Dr. Carlos. Continued on carbidopa/levodopa and amantadine. Referred to GI for constipation.    PM&R follow up for botox on 02/01. Right neck and right lower extremity injected. Plan to follow up in 12 weeks.     Today Benjamin reports that his spasms having become less frequent with overall improvement. Botox has been very helpful for his low extremity. He went from severe spasms (every few days) a few months ago to once every 3 weeks. He continues to use clonazepam 2mg AM, 1mg afternoon, 2mg HS. Uses PRN dose once every few weeks as needed. Used to participate in PT at [facility] but not recently.     Severe constipation. Last bowel movement over 10 days ago. He has needed manual disimpaction few times a month. Only medication he is aware of taking currently is Enulose 10 gram daily AM. He has to ask for bowel meds which is difficult to do where he lives.     Mood is low stable. Followed by psychiatrist at [facility]. He did not feel parkinson's support groups would be helpful therefore didn't go. Not much interaction from family. He does have a brother locally who visits from time to time, but also has health concerns.     Facial lesion noticed recently. Not painful or itchy.     Functional Status:  Palliative Performance Scale: 50%  Previous: 50%            Data / Chart Review:    Review of Systems:   ROS: 10 point ROS neg other than the symptoms noted above in the HPI and pertinents here.         Physical Exam:   Physical Exam:  BP 95/62   Pulse 100   Temp 98.5  F (36.9  C) (Oral)   Resp 16   SpO2 97%       CONSTIT: awake, sitting up in wheel chair, appears comfortable  EENT: EOMI, no icterus  RESP: reg, normal effort, no cough  SKIN: Left cheek lesion, small <1cm pedunculated  NEURO: alert, oriented x3  PSYCH: normal affect, memory and thought process intact        Current pertinent medications:  Current Outpatient Medications   Medication     acetaminophen  (TYLENOL) 500 MG tablet     alfuzosin ER (UROXATRAL) 10 MG 24 hr tablet     atorvastatin (LIPITOR) 40 MG tablet     bisacodyl (DULCOLAX) 10 MG suppository     bisacodyl (DULCOLAX) 5 MG EC tablet     carbidopa-levodopa (SINEMET CR)  MG CR tablet     carbidopa-levodopa (SINEMET)  MG tablet     cholecalciferol (VITAMIN D3) 125 mcg (5000 units) capsule     clonazePAM (KLONOPIN) 2 MG tablet     ENULOSE 10 GM/15ML SOLUTION     gabapentin (NEURONTIN) 800 MG tablet     hydrocortisone 1 % CREA cream     hydrOXYzine (ATARAX) 25 MG tablet     lubiprostone (AMITIZA) 24 MCG capsule     metoprolol succinate ER (TOPROL-XL) 25 MG 24 hr tablet     Multiple Vitamin (DAILY-ALLAN) TABS     polyethylene glycol (MIRALAX) 17 GM/Dose powder     rivaroxaban ANTICOAGULANT (XARELTO) 20 MG TABS tablet     sodium phosphate (FLEET ENEMA) 7-19 GM/118ML rectal enema     traZODone (DESYREL) 50 MG tablet     triamcinolone (KENALOG) 0.1 % external cream     venlafaxine (EFFEXOR XR) 150 MG 24 hr capsule     venlafaxine (EFFEXOR XR) 75 MG 24 hr capsule     vitamin C (ASCORBIC ACID) 500 MG tablet     calcium polycarbophil (FIBER-LAX) 625 MG tablet     clonazePAM (KLONOPIN) 1 MG tablet     clonazePAM (KLONOPIN) 1 MG tablet     cloZAPine (CLOZARIL) 50 MG tablet     pantoprazole (PROTONIX) 40 MG EC tablet     traZODone (DESYREL) 100 MG tablet     Current Facility-Administered Medications   Medication     botulinum toxin type A (BOTOX) 100 units injection 400 Units            Allergies   Allergen Reactions     Amantadine Other (See Comments)     Other reaction(s): Hallucinations  Hallucinations/ lost self control/gambling.     halluicnations  Hallucinations/ lost self control/gambling.     hallucinates  halluicnations  hallucinates  Hallucinations/ lost self control/gambling.        Quetiapine GI Disturbance, Diarrhea and Other (See Comments)     Diarrhea    Diarrhea  Other reaction(s): GI Disturbance  Diarrhea       Duloxetine Other (See  Comments)     suicidal  suicidal             Lab and imaging data reviewed:  None new      Impression & Recommendations & Counseling:  Benjamin Mathews is a 56 yo male with Parkinson's disease with neurocognitive disorder, dyskinesias, stroke, chronic pain who presents for follow up of right sided dystonia involving arm and leg with torticollis of right neck.       Dystonia:  Significant improvement since starting botox, mostly with his right leg. No concern since starting amantadine. Seen by neurology, no additional medication changes. Will continue clonazepam current dose.     - Continue clonazepam 2mg 8 AM, 1mg noon, 2mg 8 PM   - Continue amantadine 100 BID   - Followed by PM&R, next follow up in 12 weeks.    - Recommend starting PT as able (He prefers to continue at [facility])      Constipation:  Long history of constipation. He was see by GI with recommendations however medications were not started. Challenges with getting meds administered. He has multiple bowel meds on his list, only taking enulose that he is aware of.   - Will reach out to GI team to help coordinate prescription with group home. They had recommended below:   - Miralax 17g twice a day with breakfast and supper   - Bisacodyl 5mg three times a week   - Lubiprostone twice a day  - Bisacodyl suppository       Low mood, social isolation:  - We discussed his lack of family support which is very challenging for him. He does not wish to attend support groups. He is hoping to be able to be more active/leave group home as the weather improves. Patient will continue to follow with psychologist and psychiatrist.    Skin lesion  - On left cheek. Possible seborrheic keratosis or skin tag. Recommend scheduling with PCP for biopsy.     Return to clinic in 3 months, sooner if worsening symptoms. Patient will call clinic with concerns.      AJAY Langley [facility] LUIGI  Palliative Medicine Fellow      Attending Note:  Patient seen and evaluated with Dr Sosa  and I agree with/confirm their findings/recs in this note.      Thank you for involving us in the patient's care.   Trav Medel MD / Palliative Medicine / Text me via [facility].

## 2023-02-08 NOTE — LETTER
2/8/2023       RE: Benjamin Mathews  [address]  [facility] [address]     Dear Colleague,    Thank you for referring your patient, Benjamin Mathews, to the [facility] at [facility]. Please see a copy of my visit note below.    Palliative Care Outpatient Clinic Progress Note    Patient Name: Benjamin Mathews is being evaluated in person.    Primary Provider:  Stephanie Maldonado  Last seen by palliative care: 12/08/2022      Chief Complaint: Dystonic spasms    Background/Summary    Medical:  Patient diagnosed with Parkinson's in 2013 after a short period of tremor and gait changes. 2015 symptoms significant worsened. He is followed by  of Neurology and most recently Dr. Carlos with the movement disorders clinic.      Social  Grew up in [place]. 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 Around this time is when he was diagnosed with Parkinson's and work became difficult. Has a brother and sister-in law that come to visit him in his [place] which he appreciates. He has a sister and brother in law who are MDs, work for [employer] (In [place]). Father had Parkinson's and passed over 10 years ago. He was primary caretaker for his mother and father before they passed.      Lives in a group home with 4 other people in [place].  Is able to walk around the house using his four-wheel walker; uses his wheelchair for longer distances.       Care Planning   No advanced directive. We started discussion regarding who would help make medical decisions. Overall is isolated, most likely his sister Isha. Will discuss further at follow up.     Opioid Safety   : reviewed 02/08/2023   Clonazepam 2mg tablet x 60 tablets on 01/25/23  Clonazepam 1mg tablet - 30 tablets on 01/20/23    Interim History:  History gathered today from:  patient     Mr. Mathews is a 57 year old male with Parkinson's disease who is following up for management of dystonic spasms.     Last palliative visit on 1208/23. No medication changes were made. At that time he was recently restarted on amantadine after an ER visit for spasms and pain.     Since then he was seen in movement disorders clinic by Dr. Carlos. Continued on carbidopa/levodopa and amantadine. Referred to GI for constipation.    PM&R follow up for botox on 02/01. Right neck and right lower extremity injected. Plan to follow up in 12 weeks.     Today Benjamin reports that his spasms having become less frequent with overall improvement. Botox has been very helpful for his low extremity. He went from severe spasms (every few days) a few months ago to once every 3 weeks. He continues to use clonazepam 2mg AM, 1mg afternoon, 2mg HS. Uses PRN dose once every few weeks as needed. Used to participate in PT at [facility] but not recently.     Severe constipation. Last bowel movement over 10 days ago. He has needed manual disimpaction few times a month. Only medication he is aware of taking currently is Enulose 10 gram daily AM. He has to ask for bowel meds which is difficult to do where he lives.     Mood is low stable. Followed by psychiatrist at [facility]. He did not feel parkinson's support groups would be helpful therefore didn't go. Not much interaction from family. He does have a brother locally who visits from time to time, but also has health concerns.     Facial lesion noticed recently. Not painful or itchy.     Functional Status:  Palliative Performance Scale: 50%  Previous: 50%            Data / Chart Review:    Review of Systems:   ROS: 10 point ROS neg other than the symptoms noted above in the HPI and pertinents here.         Physical Exam:   Physical Exam:  BP 95/62   Pulse 100   Temp 98.5  F (36.9  C) (Oral)   Resp 16   SpO2 97%       CONSTIT: awake, sitting up in wheel chair, appears  comfortable  EENT: EOMI, no icterus  RESP: reg, normal effort, no cough  SKIN: Left cheek lesion, small <1cm pedunculated  NEURO: alert, oriented x3  PSYCH: normal affect, memory and thought process intact        Current pertinent medications:  Current Outpatient Medications   Medication     acetaminophen (TYLENOL) 500 MG tablet     alfuzosin ER (UROXATRAL) 10 MG 24 hr tablet     atorvastatin (LIPITOR) 40 MG tablet     bisacodyl (DULCOLAX) 10 MG suppository     bisacodyl (DULCOLAX) 5 MG EC tablet     carbidopa-levodopa (SINEMET CR)  MG CR tablet     carbidopa-levodopa (SINEMET)  MG tablet     cholecalciferol (VITAMIN D3) 125 mcg (5000 units) capsule     clonazePAM (KLONOPIN) 2 MG tablet     ENULOSE 10 GM/15ML SOLUTION     gabapentin (NEURONTIN) 800 MG tablet     hydrocortisone 1 % CREA cream     hydrOXYzine (ATARAX) 25 MG tablet     lubiprostone (AMITIZA) 24 MCG capsule     metoprolol succinate ER (TOPROL-XL) 25 MG 24 hr tablet     Multiple Vitamin (DAILY-ALLAN) TABS     polyethylene glycol (MIRALAX) 17 GM/Dose powder     rivaroxaban ANTICOAGULANT (XARELTO) 20 MG TABS tablet     sodium phosphate (FLEET ENEMA) 7-19 GM/118ML rectal enema     traZODone (DESYREL) 50 MG tablet     triamcinolone (KENALOG) 0.1 % external cream     venlafaxine (EFFEXOR XR) 150 MG 24 hr capsule     venlafaxine (EFFEXOR XR) 75 MG 24 hr capsule     vitamin C (ASCORBIC ACID) 500 MG tablet     calcium polycarbophil (FIBER-LAX) 625 MG tablet     clonazePAM (KLONOPIN) 1 MG tablet     clonazePAM (KLONOPIN) 1 MG tablet     cloZAPine (CLOZARIL) 50 MG tablet     pantoprazole (PROTONIX) 40 MG EC tablet     traZODone (DESYREL) 100 MG tablet     Current Facility-Administered Medications   Medication     botulinum toxin type A (BOTOX) 100 units injection 400 Units            Allergies   Allergen Reactions     Amantadine Other (See Comments)     Other reaction(s): Hallucinations  Hallucinations/ lost self control/gambling.      halluicnations  Hallucinations/ lost self control/gambling.     hallucinates  halluicnations  hallucinates  Hallucinations/ lost self control/gambling.        Quetiapine GI Disturbance, Diarrhea and Other (See Comments)     Diarrhea    Diarrhea  Other reaction(s): GI Disturbance  Diarrhea       Duloxetine Other (See Comments)     suicidal  suicidal             Lab and imaging data reviewed:  None new      Impression & Recommendations & Counseling:  Benjamin Mathews is a 58 yo male with Parkinson's disease with neurocognitive disorder, dyskinesias, stroke, chronic pain who presents for follow up of right sided dystonia involving arm and leg with torticollis of right neck.       Dystonia:  Significant improvement since starting botox, mostly with his right leg. No concern since starting amantadine. Seen by neurology, no additional medication changes. Will continue clonazepam current dose.     - Continue clonazepam 2mg 8 AM, 1mg noon, 2mg 8 PM   - Continue amantadine 100 BID   - Followed by PM&R, next follow up in 12 weeks.    - Recommend starting PT as able (He [facility] to [facility] at [facility])      Constipation:  Long history of constipation. He was see by GI with recommendations however medications were not started. Challenges with getting meds administered. He has multiple bowel meds on his list, only taking enulose that he is aware of.   - Will reach out to GI team to help coordinate prescription with group home. They had recommended below:   - Miralax 17g twice a day with breakfast and supper   - Bisacodyl 5mg three times a week   - Lubiprostone twice a day  - Bisacodyl suppository       Low mood, social isolation:  - We discussed his lack of family support which is very challenging for him. He does not wish to attend support groups. He is hoping to be able to be more active/leave group home as the weather improves. Patient will continue to follow with psychologist and psychiatrist.    Skin lesion  - On left  cheek. Possible seborrheic keratosis or skin tag. Recommend scheduling with PCP for biopsy.     Return to clinic in 3 months, sooner if worsening symptoms. Patient will call clinic with concerns.      AJAY Langley [facility] LUIGI  Palliative Medicine Fellow      Attending Note:  Patient seen and evaluated with Dr Sosa and I agree with/confirm their findings/recs in this note.      Thank you for involving us in the patient's care.     Trav Medel MD / Palliative Medicine / Text me via [facility].      Sincerely,    Anthony Sosa MD

## 2023-02-08 NOTE — PATIENT INSTRUCTIONS
Patient Instructions      Expand All Collapse All    Thank you for attending the Palliative Care Clinic appointment today.     1. We will reach out to your GI team to arrange medications at your facility  --Miralax   --Magnesium    2. Continue clonazepam as you are taking it.     Call if your symptoms change and the medications we have prescribed are not working.   Do not take your medications at any other dose or frequently than how they are prescribed. Call if you think we should make any changes  I have prescribed naloxone as a rescue medication for the opioids (pain pills), which I do for all of my patients who have these medications at home.    Call us at least 3 workdays in advance if you need a medication refill.    Please have all your pill bottles ready for your next appointment.     Return to clinic in 3 months for a follow up.     You can reach the Palliative Care Team during business hours at the following number:    - [phone]  - or send me a [employer] message    To reach the Palliative Care Provider on-call After-hours or on holidays and weekends, call: [phone].  Please note that we are not able to provide pain medication refills on evenings or weekends.

## 2023-02-08 NOTE — NURSING NOTE
"Oncology Rooming Note    February 8, 2023 4:24 PM   Benjamin Mathews is a 57 year old male who presents for:    Chief Complaint   Patient presents with     Oncology Clinic Visit     PALLIATIVE CARE      Initial Vitals: BP 95/62   Pulse 100   Temp 98.5  F (36.9  C) (Oral)   Resp 16   SpO2 97%  Estimated body mass index is 27.37 kg/m  as calculated from the following:    Height as of 11/20/22: 1.931 m (6' 4.02\").    Weight as of 12/8/22: 102.1 kg (225 lb). There is no height or weight on file to calculate BSA.  No Pain (0) Comment: Data Unavailable   No LMP for male patient.  Allergies reviewed: Yes  Medications reviewed: Yes    Medications: Medication refills not needed today.  Pharmacy name entered into [employer]: [facility] - [place], [place] - [address] [facility]            "

## 2023-02-14 ENCOUNTER — TRANSCRIBE ORDERS (OUTPATIENT)
Dept: OTHER | Age: 58
End: 2023-02-14

## 2023-02-14 DIAGNOSIS — D48.5 NEOPLASM OF UNCERTAIN BEHAVIOR OF SKIN: Primary | ICD-10-CM

## 2023-02-23 DIAGNOSIS — G20.A1 PARKINSON'S DISEASE (TREMOR, STIFFNESS, SLOW MOTION, UNSTABLE POSTURE) (H): Primary | ICD-10-CM

## 2023-02-26 RX ORDER — AMANTADINE HYDROCHLORIDE 100 MG/1
CAPSULE, GELATIN COATED ORAL
Qty: 60 CAPSULE | Refills: 11 | Status: ON HOLD | OUTPATIENT
Start: 2023-02-26 | End: 2023-05-25

## 2023-03-13 DIAGNOSIS — G24.8 SEGMENTAL DYSTONIA: ICD-10-CM

## 2023-03-13 DIAGNOSIS — M81.0 OSTEOPOROSIS WITHOUT CURRENT PATHOLOGICAL FRACTURE, UNSPECIFIED OSTEOPOROSIS TYPE: ICD-10-CM

## 2023-03-13 DIAGNOSIS — Z74.09 IMPAIRED FUNCTIONAL MOBILITY, BALANCE, GAIT, AND ENDURANCE: ICD-10-CM

## 2023-03-13 DIAGNOSIS — G24.3 CERVICAL DYSTONIA: Primary | ICD-10-CM

## 2023-03-13 DIAGNOSIS — G20.A1 PARKINSON DISEASE (H): ICD-10-CM

## 2023-03-17 ENCOUNTER — ANCILLARY PROCEDURE (OUTPATIENT)
Dept: BONE DENSITY | Facility: CLINIC | Age: 58
End: 2023-03-17
Attending: PHYSICAL MEDICINE & REHABILITATION
Payer: COMMERCIAL

## 2023-03-17 DIAGNOSIS — M81.0 OSTEOPOROSIS WITHOUT CURRENT PATHOLOGICAL FRACTURE, UNSPECIFIED OSTEOPOROSIS TYPE: ICD-10-CM

## 2023-03-17 DIAGNOSIS — G20.A1 PARKINSON DISEASE (H): ICD-10-CM

## 2023-03-17 DIAGNOSIS — Z74.09 IMPAIRED FUNCTIONAL MOBILITY, BALANCE, GAIT, AND ENDURANCE: ICD-10-CM

## 2023-03-17 PROCEDURE — 77080 DXA BONE DENSITY AXIAL: CPT | Performed by: RADIOLOGY

## 2023-04-03 DIAGNOSIS — K59.00 CONSTIPATION, UNSPECIFIED CONSTIPATION TYPE: ICD-10-CM

## 2023-04-06 RX ORDER — BISACODYL 10 MG
SUPPOSITORY, RECTAL RECTAL
Qty: 15 SUPPOSITORY | Refills: 11 | Status: ON HOLD | OUTPATIENT
Start: 2023-04-06 | End: 2023-05-25

## 2023-04-11 ENCOUNTER — OFFICE VISIT (OUTPATIENT)
Dept: PODIATRY | Facility: CLINIC | Age: 58
End: 2023-04-11
Payer: COMMERCIAL

## 2023-04-11 DIAGNOSIS — G60.9 IDIOPATHIC PERIPHERAL NEUROPATHY: ICD-10-CM

## 2023-04-11 DIAGNOSIS — G20.A1 PARKINSON'S DISEASE (H): ICD-10-CM

## 2023-04-11 DIAGNOSIS — B35.1 ONYCHOMYCOSIS: ICD-10-CM

## 2023-04-11 DIAGNOSIS — L97.521 SKIN ULCER OF SECOND TOE OF LEFT FOOT, LIMITED TO BREAKDOWN OF SKIN (H): Primary | ICD-10-CM

## 2023-04-11 PROCEDURE — 99213 OFFICE O/P EST LOW 20 MIN: CPT | Performed by: PODIATRIST
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
## 2023-04-23 ENCOUNTER — HEALTH MAINTENANCE LETTER (OUTPATIENT)
Age: 58
End: 2023-04-23

## 2023-04-27 ENCOUNTER — OFFICE VISIT (OUTPATIENT)
Dept: PHYSICAL MEDICINE AND REHAB | Facility: CLINIC | Age: 58
End: 2023-04-27
Payer: COMMERCIAL

## 2023-04-27 VITALS — SYSTOLIC BLOOD PRESSURE: 89 MMHG | OXYGEN SATURATION: 95 % | DIASTOLIC BLOOD PRESSURE: 41 MMHG | HEART RATE: 74 BPM

## 2023-04-27 DIAGNOSIS — G20.A1 PARKINSON DISEASE (H): ICD-10-CM

## 2023-04-27 DIAGNOSIS — G24.3 CERVICAL DYSTONIA: Primary | ICD-10-CM

## 2023-04-27 DIAGNOSIS — G24.8 SEGMENTAL DYSTONIA: ICD-10-CM

## 2023-04-27 PROCEDURE — 64642 CHEMODENERV 1 EXTREMITY 1-4: CPT | Mod: GC | Performed by: PHYSICAL MEDICINE & REHABILITATION

## 2023-04-27 PROCEDURE — 95874 GUIDE NERV DESTR NEEDLE EMG: CPT | Mod: GC | Performed by: PHYSICAL MEDICINE & REHABILITATION

## 2023-04-27 PROCEDURE — 64616 CHEMODENERV MUSC NECK DYSTON: CPT | Mod: RT | Performed by: PHYSICAL MEDICINE & REHABILITATION

## 2023-04-27 NOTE — LETTER
4/27/2023       RE: Benjamin Mathews  [address]  [facility] [address]       Dear Colleague,    Thank you for referring your patient, Benjamin Mathews, to the [facility] [place] at [facility]. Please see a copy of my visit note below.      [facility]    PM&R CLINIC NOTE  BOTULINUM TOXIN PROCEDURE      HPI  Chief Complaint   Patient presents with    RECHECK     Botox injections confirmed with patient     Benjamin Mathews is a 58 year old male who was referred to our clinic for more evaluation of his dystonia and trial of botulinum toxin injections (referral from Dr. Suazo). He was seen on 10/6/22 as initial consult; please see the consult note for details. She has history of Parkinsonism and is followed by Dr. Perry. He was referred to palliative care team and has been followed by them since then.     --saw urology team in Nov 2022 for LUTS likely due to BPH and was started on alfuzosin  --saw Dr. Carlos in Jan; it was recommended to continue carbidopa/levodopa and amantadine. He is also on clonazepam prn. Ok to continue botox injections.   --had neuropsych testing 1/13; reviewed the results.      SINCE LAST VISIT  Benjamin Mathews was last seen here in clinic on 2/1/2023.    Patient reports the following new medical problems since last visit: **  Saw podiatry for Onychomycosis bilaterally and ulcer at the dorsal left 2nd toe that is nearly healed.  We will see Dr. Cueto on May 10.  He has been working with mental health team closely.    Today he noted that last botox cycle helped him a lot with right sided cervical dystonia. Right foot curls in at least 3 times a month but is still improved on Botox injections.    No side effects.      PHYSICAL EXAM  VS: BP (!) 89/41 (BP Location: Left arm, Patient Position: Sitting, Cuff Size: Adult Large)   Pulse 74    SpO2 95%    GEN: Pleasant and cooperative, in no acute distress  HEENT: Moist mucous membranes     General: Sitting in his power wheelchair leaning to the right with his neck tilted to the right and rotated to the left  C-spine range of motion was moderately limited with extension and left lateral bending, he was also mildly limited in all other directionsReduced left sided rotation as compared to the right side. Restricted extension of the neck. Tight right scm   Continues to have left sided weakness    Right leg tone:  Hasmstrings 2/4  Dorsiflexors 2/4    ALLERGIES  Allergies   Allergen Reactions    Amantadine Other (See Comments)     Other reaction(s): Hallucinations  Hallucinations/ lost self control/gambling.     halluicnations  Hallucinations/ lost self control/gambling.     hallucinates  halluicnations  hallucinates  Hallucinations/ lost self control/gambling.       Quetiapine GI Disturbance, Diarrhea and Other (See Comments)     Diarrhea    Diarrhea  Other reaction(s): GI Disturbance  Diarrhea      Duloxetine Other (See Comments)     suicidal  suicidal         CURRENT MEDICATIONS    Current Outpatient Medications:     acetaminophen (TYLENOL) 500 MG tablet, 2 x 500mg tabs by mouth 3/day @8am, 12pm and 8pm and 2 x 500mg tab by mouth every 6 hours as needed, Disp: , Rfl:     alfuzosin ER (UROXATRAL) 10 MG 24 hr tablet, Take 1 tablet (10 mg) by mouth daily, Disp: 90 tablet, Rfl: 3    amantadine (SYMMETREL) 100 MG capsule, TAKE 1 CAPSULE BY MOUTH TWICE DAILY, Disp: 60 capsule, Rfl: 11    atorvastatin (LIPITOR) 40 MG tablet, Take 40 mg by mouth At 8pm, Disp: , Rfl:     bisacodyl (DULCOLAX) 10 MG suppository, INSERT 1 SUPPOSITORY RECTALLY EVERY OTHER DAY. *B* HOLD FOR LOOSE STOOLS, Disp: 15 suppository, Rfl: 11    bisacodyl (DULCOLAX) 5 MG EC tablet, Take 1 tablet (5 mg) by mouth three times a week Take one 5 mg tablet three times a week, Disp: 15 tablet, Rfl: 3    calcium polycarbophil (FIBER-LAX) 625 MG tablet,  1 tab by mouth daily at 8am, Disp: , Rfl:     carbidopa-levodopa (SINEMET CR)  MG CR tablet, 50/200 tab by mouth nightly at 8pm, Disp: , Rfl:     carbidopa-levodopa (SINEMET)  MG tablet, 2 tabs @ 8am, 2 @ noon, 1.5 @4pm, Disp: , Rfl:     cholecalciferol (VITAMIN D3) 125 mcg (5000 units) capsule, 125 mcg (5000 units) by mouth daily at  8am, Disp: , Rfl:     clonazePAM (KLONOPIN) 1 MG tablet, Take 1 tablet (1 mg) by mouth daily At noon., Disp: 30 tablet, Rfl: 4    clonazePAM (KLONOPIN) 1 MG tablet, Take 1 tablet (1 mg) by mouth daily as needed for muscle spasms, Disp: 30 tablet, Rfl: 0    clonazePAM (KLONOPIN) 2 MG tablet, TAKE 1 TABLET BY MOUTH TWICE DAILY ( MORNING & BEDTIME ) *1 TOTAL FILL*, Disp: 60 tablet, Rfl: 5    cloZAPine (CLOZARIL) 50 MG tablet, 50 mg At Bedtime, Disp: , Rfl:     ENULOSE 10 GM/15ML SOLUTION, 15ml by mouth twice a day at 8AM and 6PM, Disp: , Rfl:     gabapentin (NEURONTIN) 800 MG tablet, 800mg tab by mouth 3/day at 8am, 12pm and 8pm, Disp: , Rfl:     hydrocortisone 1 % CREA cream, Apply topically to nose and other affected area(s) every morning at 9am, Disp: , Rfl:     hydrOXYzine (ATARAX) 25 MG tablet, Take 50 mg by mouth every 6 hours as needed for anxiety (up to 3 timrd daily), Disp: , Rfl:     lubiprostone (AMITIZA) 24 MCG capsule, 24mg tab by mouth twice daily at 8am and 8pm, Disp: , Rfl:     metoprolol succinate ER (TOPROL-XL) 25 MG 24 hr tablet, Take 1/2 tablet (12.5mg) by mouth twice daily at 8am and 8pm, Disp: , Rfl:     Multiple Vitamin (DAILY-ALLAN) TABS, Vitamin by  Mouth daily @8am, Disp: , Rfl:     polyethylene glycol (MIRALAX) 17 GM/Dose powder, Take 17 g by mouth 2 times daily One capful in liquid by mouth twice a day, 8 AM and one dose at supper, Disp: 850 g, Rfl: 3    rivaroxaban ANTICOAGULANT (XARELTO) 20 MG TABS tablet, Take 20 mg by mouth daily (with dinner) At 5pm, Disp: , Rfl:     sodium phosphate (FLEET ENEMA) 7-19 GM/118ML rectal enema, Place 1 enema  rectally daily as needed for constipation, Disp: , Rfl:     traZODone (DESYREL) 100 MG tablet, Take 100 mg by mouth daily at 8pm, Disp: , Rfl:     traZODone (DESYREL) 50 MG tablet, 50 mg In AM, Disp: , Rfl:     triamcinolone (KENALOG) 0.1 % external cream, , Disp: , Rfl:     venlafaxine (EFFEXOR XR) 150 MG 24 hr capsule, Take 1 capsule (150 mg) by mouth daily, Disp: , Rfl:     venlafaxine (EFFEXOR XR) 75 MG 24 hr capsule, Take 1 capsule (75 mg) by mouth daily, Disp: , Rfl:     vitamin C (ASCORBIC ACID) 500 MG tablet, Take 500 mg by mouth daily, Disp: , Rfl:     pantoprazole (PROTONIX) 40 MG EC tablet, Take 1 tablet (40mg) by mouth daily at 8am (Patient not taking: Reported on 2023), Disp: , Rfl:     Current Facility-Administered Medications:     botulinum toxin type A (BOTOX) 100 units injection 400 Units, 400 Units, Intramuscular, Q90 Days, Ember Arita MD, 270 Units at 23 1316       BOTULINUM NEUROTOXIN INJECTION PROCEDURES    VERIFICATION OF PATIENT IDENTIFICATION AND PROCEDURE     Initials   Patient Name PS   Patient  PS   Procedure Verified by: PS     Prior to the start of the procedure and with procedural staff participation, I verbally confirmed the patient s identity using two indicators, relevant allergies, that the procedure was appropriate and matched the consent or emergent situation, and that the correct equipment/implants were available. Immediately prior to starting the procedure I conducted the Time Out with the procedural staff and re-confirmed the patient s name, procedure, and site/side. (The Joint Commission universal protocol was followed.)  Yes    Sedation (Moderate or Deep): None    ABOVE ASSESSMENTS PERFORMED BY  Ember Arita MD      INDICATIONS FOR PROCEDURES  Benjamin Mathews is a 58 year old patient with cervical and segmental dystonia secondary to the diagnosis of Parkinson's disease. His baseline symptoms have been recalcitrant to oral medications and conservative therapy.   He is here today for reinjection with Botox.    GOAL OF PROCEDURE  The goal of this procedure is to increase active range of motion, improve volitional motor control, decrease pain  and enhance functional independence.      TOTAL DOSE ADMINISTERED  Dose Administered: 270 units  Botox (Botulinum Toxin Type A)       2:1 Dilution   Unavoidable Drug Waste: Yes  Amount of drug waste (mL): 30 units.  Reason for waste:  Single use vial  Diluent Used:  Preservative Free Normal Saline  Total Volume of Diluent Used: 6 ml  See MAR  NDC #: Botox 100u ([address]-61)      CONSENT  The risks, benefits, and treatment options were discussed with Benjamin Mathews and he agreed to proceed.    Written consent was obtained by PS.     EQUIPMENT USED  Needle-35mm stimulating/recording  EMG/NCS Machine    SKIN PREPARATION  Skin preparation was performed using an alcohol wipe.    GUIDANCE DESCRIPTION  Electro-myographic guidance was necessary throughout the procedure to accurately identify all areas of dystonic muscles while avoiding injection of non-dystonic muscles and underlying muscles , neighboring nerves and nearby vascular structures.     AREA/MUSCLE INJECTED  Right neck  SCM 10 units at 1 site  Splenius capitis 10 units at 1 site  Splenius cervicis 10 units at 1 site  Levator a scap insertion 10 units at 1 site  Levator at neck 10 units at 1 site   Lateral trap 30 units at 3 sites       RLE   Hamstrings 40 units at 2 sites   Gastrocnemius 70 units at 2 sites  FDL 40 units at 1 site   Posterior tib 40 units at 1 site       RESPONSE TO PROCEDURE  Benjamin Mathews tolerated the procedure well and there were no immediate complications. He was allowed to recover for an appropriate period of time and was discharged home in stable condition.    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     We have discussed treatment options for dystonia including therapies, medications and interventions.  He has responded very well to Klonopin but his symptoms are not well controlled especially spasmodic torticollis and dystonia in the distal part of his right lower extremity.  Adding another oral agent does not seem appropriate because 1-he did not respond to baclofen and 2- risk of polypharmacy and side effects.  He would benefit from physical therapy as discussed before.    On 4/27/2023 His pain in the neck improved a lot and had good response to last Botox cycle.       Work-up: None     Therapy/equipment/braces:  He will be starting PT/OT soon, as his pain in the has been better.    Medications: No change today    Interventions: started the first round at 130 units and increased the dose to 270 units on 2/1/2023; kept on the same dose today. Consider using 27 gauge 50 mm needle specially for hamstrings.     Referral / follow up with other providers: discussed and appointment scheduled with Dr. Rodriguez 5/10/2023 for evaluation of bone health.  He will continue to follow-up with palliative care team and movement disorder clinic.     Follow up: in 12 weeks     Patient was seen and discussed with my staff physician Dr. Arita, who agrees with my assessment and plan.    Robert Reno   PGY 3  Physical Medicine and Rehabilitation  Pager: [phone]      Physician Attestation   I, Ember Arita MD, saw this patient and agree with the findings and plan of care as documented in the note.      Items personally reviewed/procedural attestation: I was present for and supervised the entire procedure.      Again, thank you for allowing me to participate in the care of your patient.      Sincerely,    Ember Arita MD

## 2023-04-27 NOTE — NURSING NOTE
Chief Complaint   Patient presents with     RECHECK     Botox injections confirmed with patient     Lindsay Madrid CMA at 8:35 AM on 4/27/2023.

## 2023-04-27 NOTE — PROGRESS NOTES
[facility]    PM&R CLINIC NOTE  BOTULINUM TOXIN PROCEDURE      HPI  Chief Complaint   Patient presents with     RECHECK     Botox injections confirmed with patient     Benjamin Mathews is a 58 year old male who was referred to our clinic for more evaluation of his dystonia and trial of botulinum toxin injections (referral from Dr. Suazo). He was seen on 10/6/22 as initial consult; please see the consult note for details. She has history of Parkinsonism and is followed by Dr. Perry. He was referred to palliative care team and has been followed by them since then.     --saw urology team in Nov 2022 for LUTS likely due to BPH and was started on alfuzosin  --saw Dr. Carlos in Jan; it was recommended to continue carbidopa/levodopa and amantadine. He is also on clonazepam prn. Ok to continue botox injections.   --had neuropsych testing 1/13; reviewed the results.      SINCE LAST VISIT  Benjamin Mathews was last seen here in clinic on 2/1/2023.    Patient reports the following new medical problems since last visit: **  Saw podiatry for Onychomycosis bilaterally and ulcer at the dorsal left 2nd toe that is nearly healed.  We will see Dr. Cueto on May 10.  He has been working with mental health team closely.    Today he noted that last botox cycle helped him a lot with right sided cervical dystonia. Right foot curls in at least 3 times a month but is still improved on Botox injections.    No side effects.      PHYSICAL EXAM  VS: BP (!) 89/41 (BP Location: Left arm, Patient Position: Sitting, Cuff Size: Adult Large)   Pulse 74   SpO2 95%    GEN: Pleasant and cooperative, in no acute distress  HEENT: Moist mucous membranes     General: Sitting in his power wheelchair leaning to the right with his neck tilted to the right and rotated to the left  C-spine range of motion was moderately limited with extension and left lateral bending, he was also mildly limited  in all other directionsReduced left sided rotation as compared to the right side. Restricted extension of the neck. Tight right scm   Continues to have left sided weakness    Right leg tone:  Hasmstrings 2/4  Dorsiflexors 2/4    ALLERGIES  Allergies   Allergen Reactions     Amantadine Other (See Comments)     Other reaction(s): Hallucinations  Hallucinations/ lost self control/gambling.     halluicnations  Hallucinations/ lost self control/gambling.     hallucinates  halluicnations  hallucinates  Hallucinations/ lost self control/gambling.        Quetiapine GI Disturbance, Diarrhea and Other (See Comments)     Diarrhea    Diarrhea  Other reaction(s): GI Disturbance  Diarrhea       Duloxetine Other (See Comments)     suicidal  suicidal         CURRENT MEDICATIONS    Current Outpatient Medications:      acetaminophen (TYLENOL) 500 MG tablet, 2 x 500mg tabs by mouth 3/day @8am, 12pm and 8pm and 2 x 500mg tab by mouth every 6 hours as needed, Disp: , Rfl:      alfuzosin ER (UROXATRAL) 10 MG 24 hr tablet, Take 1 tablet (10 mg) by mouth daily, Disp: 90 tablet, Rfl: 3     amantadine (SYMMETREL) 100 MG capsule, TAKE 1 CAPSULE BY MOUTH TWICE DAILY, Disp: 60 capsule, Rfl: 11     atorvastatin (LIPITOR) 40 MG tablet, Take 40 mg by mouth At 8pm, Disp: , Rfl:      bisacodyl (DULCOLAX) 10 MG suppository, INSERT 1 SUPPOSITORY RECTALLY EVERY OTHER DAY. *B* HOLD FOR LOOSE STOOLS, Disp: 15 suppository, Rfl: 11     bisacodyl (DULCOLAX) 5 MG EC tablet, Take 1 tablet (5 mg) by mouth three times a week Take one 5 mg tablet three times a week, Disp: 15 tablet, Rfl: 3     calcium polycarbophil (FIBER-LAX) 625 MG tablet, 1 tab by mouth daily at 8am, Disp: , Rfl:      carbidopa-levodopa (SINEMET CR)  MG CR tablet, 50/200 tab by mouth nightly at 8pm, Disp: , Rfl:      carbidopa-levodopa (SINEMET)  MG tablet, 2 tabs @ 8am, 2 @ noon, 1.5 @4pm, Disp: , Rfl:      cholecalciferol (VITAMIN D3) 125 mcg (5000 units) capsule, 125 mcg (5000  units) by mouth daily at  8am, Disp: , Rfl:      clonazePAM (KLONOPIN) 1 MG tablet, Take 1 tablet (1 mg) by mouth daily At noon., Disp: 30 tablet, Rfl: 4     clonazePAM (KLONOPIN) 1 MG tablet, Take 1 tablet (1 mg) by mouth daily as needed for muscle spasms, Disp: 30 tablet, Rfl: 0     clonazePAM (KLONOPIN) 2 MG tablet, TAKE 1 TABLET BY MOUTH TWICE DAILY ( MORNING & BEDTIME ) *1 TOTAL FILL*, Disp: 60 tablet, Rfl: 5     cloZAPine (CLOZARIL) 50 MG tablet, 50 mg At Bedtime, Disp: , Rfl:      ENULOSE 10 GM/15ML SOLUTION, 15ml by mouth twice a day at 8AM and 6PM, Disp: , Rfl:      gabapentin (NEURONTIN) 800 MG tablet, 800mg tab by mouth 3/day at 8am, 12pm and 8pm, Disp: , Rfl:      hydrocortisone 1 % CREA cream, Apply topically to nose and other affected area(s) every morning at 9am, Disp: , Rfl:      hydrOXYzine (ATARAX) 25 MG tablet, Take 50 mg by mouth every 6 hours as needed for anxiety (up to 3 timrd daily), Disp: , Rfl:      lubiprostone (AMITIZA) 24 MCG capsule, 24mg tab by mouth twice daily at 8am and 8pm, Disp: , Rfl:      metoprolol succinate ER (TOPROL-XL) 25 MG 24 hr tablet, Take 1/2 tablet (12.5mg) by mouth twice daily at 8am and 8pm, Disp: , Rfl:      Multiple Vitamin (DAILY-ALLAN) TABS, Vitamin by  Mouth daily @8am, Disp: , Rfl:      polyethylene glycol (MIRALAX) 17 GM/Dose powder, Take 17 g by mouth 2 times daily One capful in liquid by mouth twice a day, 8 AM and one dose at supper, Disp: 850 g, Rfl: 3     rivaroxaban ANTICOAGULANT (XARELTO) 20 MG TABS tablet, Take 20 mg by mouth daily (with dinner) At 5pm, Disp: , Rfl:      sodium phosphate (FLEET ENEMA) 7-19 GM/118ML rectal enema, Place 1 enema rectally daily as needed for constipation, Disp: , Rfl:      traZODone (DESYREL) 100 MG tablet, Take 100 mg by mouth daily at 8pm, Disp: , Rfl:      traZODone (DESYREL) 50 MG tablet, 50 mg In AM, Disp: , Rfl:      triamcinolone (KENALOG) 0.1 % external cream, , Disp: , Rfl:      venlafaxine (EFFEXOR XR) 150 MG 24  hr capsule, Take 1 capsule (150 mg) by mouth daily, Disp: , Rfl:      venlafaxine (EFFEXOR XR) 75 MG 24 hr capsule, Take 1 capsule (75 mg) by mouth daily, Disp: , Rfl:      vitamin C (ASCORBIC ACID) 500 MG tablet, Take 500 mg by mouth daily, Disp: , Rfl:      pantoprazole (PROTONIX) 40 MG EC tablet, Take 1 tablet (40mg) by mouth daily at 8am (Patient not taking: Reported on 2023), Disp: , Rfl:     Current Facility-Administered Medications:      botulinum toxin type A (BOTOX) 100 units injection 400 Units, 400 Units, Intramuscular, Q90 Days, Ember Arita MD, 270 Units at 23 1316       BOTULINUM NEUROTOXIN INJECTION PROCEDURES    VERIFICATION OF PATIENT IDENTIFICATION AND PROCEDURE     Initials   Patient Name PS   Patient  PS   Procedure Verified by: PS     Prior to the start of the procedure and with procedural staff participation, I verbally confirmed the patient s identity using two indicators, relevant allergies, that the procedure was appropriate and matched the consent or emergent situation, and that the correct equipment/implants were available. Immediately prior to starting the procedure I conducted the Time Out with the procedural staff and re-confirmed the patient s name, procedure, and site/side. (The Joint Commission universal protocol was followed.)  Yes    Sedation (Moderate or Deep): None    ABOVE ASSESSMENTS PERFORMED BY  Ember Arita MD      INDICATIONS FOR PROCEDURES  Benjamin Mathews is a 58 year old patient with cervical and segmental dystonia secondary to the diagnosis of Parkinson's disease. His baseline symptoms have been recalcitrant to oral medications and conservative therapy.  He is here today for reinjection with Botox.    GOAL OF PROCEDURE  The goal of this procedure is to increase active range of motion, improve volitional motor control, decrease pain  and enhance functional independence.      TOTAL DOSE ADMINISTERED  Dose Administered: 270 units  Botox (Botulinum Toxin  Type A)       2:1 Dilution   Unavoidable Drug Waste: Yes  Amount of drug waste (mL): 30 units.  Reason for waste:  Single use vial  Diluent Used:  Preservative Free Normal Saline  Total Volume of Diluent Used: 6 ml  See MAR  NDC #: Botox 100u ([address]-84)      CONSENT  The risks, benefits, and treatment options were discussed with Benjamin Mathews and he agreed to proceed.    Written consent was obtained by PS.     EQUIPMENT USED  Needle-35mm stimulating/recording  EMG/NCS Machine    SKIN PREPARATION  Skin preparation was performed using an alcohol wipe.    GUIDANCE DESCRIPTION  Electro-myographic guidance was necessary throughout the procedure to accurately identify all areas of dystonic muscles while avoiding injection of non-dystonic muscles and underlying muscles , neighboring nerves and nearby vascular structures.     AREA/MUSCLE INJECTED  Right neck  SCM 10 units at 1 site  Splenius capitis 10 units at 1 site  Splenius cervicis 10 units at 1 site  Levator a scap insertion 10 units at 1 site  Levator at neck 10 units at 1 site   Lateral trap 30 units at 3 sites       RLE   Hamstrings 40 units at 2 sites   Gastrocnemius 70 units at 2 sites  FDL 40 units at 1 site   Posterior tib 40 units at 1 site       RESPONSE TO PROCEDURE  Benjamin Mathews tolerated the procedure well and there were no immediate complications. He was allowed to recover for an appropriate period of time and was discharged home in stable condition.    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     We have discussed treatment options for dystonia including therapies, medications and interventions.  He has responded very well to Klonopin but his symptoms are not well controlled especially spasmodic torticollis and dystonia in the distal part of his right lower  extremity.  Adding another oral agent does not seem appropriate because 1-he did not respond to baclofen and 2- risk of polypharmacy and side effects.  He would benefit from physical therapy as discussed before.    On 4/27/2023 His pain in the neck improved a lot and had good response to last Botox cycle.       1. Work-up: None     2. Therapy/equipment/braces:  He will be starting PT/OT soon, as his pain in the has been better.    3. Medications: No change today    4. Interventions: started the first round at 130 units and increased the dose to 270 units on 2/1/2023; kept on the same dose today. Consider using 27 gauge 50 mm needle specially for hamstrings.     5. Referral / follow up with other providers: discussed and appointment scheduled with Dr. Rodriguez 5/10/2023 for evaluation of bone health.  He will continue to follow-up with palliative care team and movement disorder clinic.     6. Follow up: in 12 weeks     Patient was seen and discussed with my staff physician Dr. Arita, who agrees with my assessment and plan.    Robert Reno   PGY 3  Physical Medicine and Rehabilitation  Pager: [phone]      Physician Attestation   I, Ember Arita MD, saw this patient and agree with the findings and plan of care as documented in the note.      Items personally reviewed/procedural attestation: I was present for and supervised the entire procedure.    Ember Airta MD

## 2023-05-09 ENCOUNTER — LAB (OUTPATIENT)
Dept: LAB | Facility: CLINIC | Age: 58
End: 2023-05-09
Payer: COMMERCIAL

## 2023-05-09 DIAGNOSIS — M81.0 OSTEOPOROSIS WITHOUT CURRENT PATHOLOGICAL FRACTURE, UNSPECIFIED OSTEOPOROSIS TYPE: ICD-10-CM

## 2023-05-09 DIAGNOSIS — Z74.09 IMPAIRED FUNCTIONAL MOBILITY, BALANCE, GAIT, AND ENDURANCE: ICD-10-CM

## 2023-05-09 DIAGNOSIS — G20.A1 PARKINSON DISEASE (H): ICD-10-CM

## 2023-05-09 LAB
ALBUMIN SERPL-MCNC: 3.7 G/DL (ref 3.4–5)
ALP SERPL-CCNC: 92 U/L (ref 40–150)
ALT SERPL W P-5'-P-CCNC: 9 U/L (ref 0–70)
ANION GAP SERPL CALCULATED.3IONS-SCNC: 4 MMOL/L (ref 3–14)
AST SERPL W P-5'-P-CCNC: 13 U/L (ref 0–45)
BILIRUB SERPL-MCNC: 0.2 MG/DL (ref 0.2–1.3)
BUN SERPL-MCNC: 24 MG/DL (ref 7–30)
CALCIUM SERPL-MCNC: 8.6 MG/DL (ref 8.5–10.1)
CHLORIDE BLD-SCNC: 113 MMOL/L (ref 94–109)
CO2 SERPL-SCNC: 25 MMOL/L (ref 20–32)
CREAT SERPL-MCNC: 0.99 MG/DL (ref 0.66–1.25)
GFR SERPL CREATININE-BSD FRML MDRD: 88 ML/MIN/1.73M2
GLUCOSE BLD-MCNC: 98 MG/DL (ref 70–99)
POTASSIUM BLD-SCNC: 4.1 MMOL/L (ref 3.4–5.3)
PROT SERPL-MCNC: 7.1 G/DL (ref 6.8–8.8)
SODIUM SERPL-SCNC: 142 MMOL/L (ref 133–144)

## 2023-05-09 PROCEDURE — 80053 COMPREHEN METABOLIC PANEL: CPT

## 2023-05-09 PROCEDURE — 36415 COLL VENOUS BLD VENIPUNCTURE: CPT

## 2023-05-09 PROCEDURE — 83970 ASSAY OF PARATHORMONE: CPT

## 2023-05-09 PROCEDURE — 82306 VITAMIN D 25 HYDROXY: CPT

## 2023-05-09 NOTE — PROGRESS NOTES
PM&R Clinic Note     Patient Name: Benjamin Mathews : 1965 Medical Record: [id number]     Requesting Physician/clinician: Ember Arita MD           History of Present Illness:     Benjamin Mathews is a 58 year old male who was referred by Dr. Arita for assessment of bone health in the setting of impaired functional mobility with multiple falls secondary to Parkinsonism, CVA with residual left hemiparesis, and dystonia of the right upper and lower extremities. His last DXA on 3/17/2023 was consistent with osteopenia (Lowest T-score-1.8).      Age: 58  Age over 50 (female) or 75 (male): No  Fracture History: 3 left ribs and a clavicle about 10 years ago due to fall   Personal or family hx of previous low trauma fx of the hip or spine: Mother broke her hip due to a fall at age of 80   Diabetes Mellitus greater than 10 years or poor control: No  Inflammatory arthritis: No  Chronic corticosteroid use (5mg/day or for more than 90 days): No  Chronic kidney disease stage 3 and up: No  Limited mobility: Yes - due to Parkinsons and CVA with residual left hemiparesis; uses motorized wheelchair for community distances, uses walker in home with functional decline stepwise beginning in  with diagnosis of Parkinson's. In the last year starting to go down hill, reports he has been working with PT.   Smoking: Yes - 10 cigarettes and a couple pipes x 4-5 years   Alcohol use 3 or more per day: Not currently, history of use in the past   Personal or family hx of metabolic bone disease: No  Liver disease: No   Cancer tx: No   Vitamin D Deficiency: No  Hx weight loss surgery: No  Hx frequent falls: No - last was 8 months when bending over  Regular Physical Activity: Active in the community but limited physically by diagnoses, does walk around the home    Osteoporosis Medication Use:  No   Noted loss of height:  No   Dietary Intake:  Believes it's balanced - lots of vegetables and fruit   Contra-indications to  osteoanabolics or antiresorptives: No radiation exposure, no known cardiac disease, no upcoming dental procedures but had a tooth pulled several months ago          Past Medical and Surgical History:     Past Medical History:   Diagnosis Date     Clotting disorder (H)     PE 2019     Dystonia      Parkinson disease (H)      No past surgical history on file.         Social History:     Social History     Tobacco Use     Smoking status: Every Day     Types: Pipe     Smokeless tobacco: Never   Vaping Use     Vaping status: Not on file   Substance Use Topics     Alcohol use: Not Currently     Comment: sober x 18 months       Marital Status: Single   Living situation: Group home - [facility] x2 years   Family support: None - comes from group home and friends   Vocational History: None         Functional history:       ADLs: Modified independent - group home provides assistance  Assistive devices: Scooter in community, WC in home   iADLs (medication management and finances): Modified independent - grouphome provides assistance  Driving: No           Family History:     Family History   Problem Relation Age of Onset     Other - See Comments Mother         pulmonary embolism from hip fracture     Pulmonary Embolism Mother      Parkinsonism Father      Neurologic Disorder Sister      Parkinsonism Sister         ?med related     Other - See Comments Sister         12/7/1950     Depression Sister      Neurologic Disorder Brother         dystonia     Dystonia Brother      Other - See Comments Brother              Heart Disease Nephew             Medications:     Current Outpatient Medications   Medication Sig Dispense Refill     acetaminophen (TYLENOL) 500 MG tablet 2 x 500mg tabs by mouth 3/day @8am, 12pm and 8pm and 2 x 500mg tab by mouth every 6 hours as needed       alfuzosin ER (UROXATRAL) 10 MG 24 hr tablet Take 1 tablet (10 mg) by mouth daily 90 tablet 3     amantadine (SYMMETREL) 100 MG capsule TAKE 1 CAPSULE BY MOUTH  TWICE DAILY 60 capsule 11     atorvastatin (LIPITOR) 40 MG tablet Take 40 mg by mouth At 8pm       bisacodyl (DULCOLAX) 10 MG suppository INSERT 1 SUPPOSITORY RECTALLY EVERY OTHER DAY. *B* HOLD FOR LOOSE STOOLS 15 suppository 11     bisacodyl (DULCOLAX) 5 MG EC tablet Take 1 tablet (5 mg) by mouth three times a week Take one 5 mg tablet three times a week 15 tablet 3     calcium polycarbophil (FIBER-LAX) 625 MG tablet 1 tab by mouth daily at 8am       carbidopa-levodopa (SINEMET CR)  MG CR tablet 50/200 tab by mouth nightly at 8pm       carbidopa-levodopa (SINEMET)  MG tablet 2 tabs @ 8am, 2 @ noon, 1.5 @4pm       cholecalciferol (VITAMIN D3) 125 mcg (5000 units) capsule 125 mcg (5000 units) by mouth daily at  8am       clonazePAM (KLONOPIN) 1 MG tablet Take 1 tablet (1 mg) by mouth daily At noon. 30 tablet 4     clonazePAM (KLONOPIN) 1 MG tablet Take 1 tablet (1 mg) by mouth daily as needed for muscle spasms 30 tablet 0     clonazePAM (KLONOPIN) 2 MG tablet TAKE 1 TABLET BY MOUTH TWICE DAILY ( MORNING & BEDTIME ) *1 TOTAL FILL* 60 tablet 5     cloZAPine (CLOZARIL) 50 MG tablet 50 mg At Bedtime       ENULOSE 10 GM/15ML SOLUTION 15ml by mouth twice a day at 8AM and 6PM       gabapentin (NEURONTIN) 800 MG tablet 800mg tab by mouth 3/day at 8am, 12pm and 8pm       hydrocortisone 1 % CREA cream Apply topically to nose and other affected area(s) every morning at 9am       hydrOXYzine (ATARAX) 25 MG tablet Take 50 mg by mouth every 6 hours as needed for anxiety (up to 3 timrd daily)       lubiprostone (AMITIZA) 24 MCG capsule 24mg tab by mouth twice daily at 8am and 8pm       metoprolol succinate ER (TOPROL-XL) 25 MG 24 hr tablet Take 1/2 tablet (12.5mg) by mouth twice daily at 8am and 8pm       Multiple Vitamin (DAILY-ALLAN) TABS Vitamin by  Mouth daily @8am       pantoprazole (PROTONIX) 40 MG EC tablet Take 1 tablet (40mg) by mouth daily at 8am (Patient not taking: Reported on 2/8/2023)       polyethylene  "glycol (MIRALAX) 17 GM/Dose powder Take 17 g by mouth 2 times daily One capful in liquid by mouth twice a day, 8 AM and one dose at supper 850 g 3     rivaroxaban ANTICOAGULANT (XARELTO) 20 MG TABS tablet Take 20 mg by mouth daily (with dinner) At 5pm       sodium phosphate (FLEET ENEMA) 7-19 GM/118ML rectal enema Place 1 enema rectally daily as needed for constipation       traZODone (DESYREL) 100 MG tablet Take 100 mg by mouth daily at 8pm       traZODone (DESYREL) 50 MG tablet 50 mg In AM       triamcinolone (KENALOG) 0.1 % external cream        venlafaxine (EFFEXOR XR) 150 MG 24 hr capsule Take 1 capsule (150 mg) by mouth daily       venlafaxine (EFFEXOR XR) 75 MG 24 hr capsule Take 1 capsule (75 mg) by mouth daily       vitamin C (ASCORBIC ACID) 500 MG tablet Take 500 mg by mouth daily              Allergies:     Allergies   Allergen Reactions     Amantadine Other (See Comments)     Other reaction(s): Hallucinations  Hallucinations/ lost self control/gambling.     halluicnations  Hallucinations/ lost self control/gambling.     hallucinates  halluicnations  hallucinates  Hallucinations/ lost self control/gambling.        Quetiapine GI Disturbance, Diarrhea and Other (See Comments)     Diarrhea    Diarrhea  Other reaction(s): GI Disturbance  Diarrhea       Duloxetine Other (See Comments)     suicidal  suicidal                ROS:     A focused ROS is negative other than the symptoms noted above in the HPI.         Physical Examiniation:     ** Physical Examination limited due to virtual visit.  There were no vitals taken for this visit.  Estimated body mass index is 27.37 kg/m  as calculated from the following:    Height as of 11/20/22: 1.931 m (6' 4.02\").    Weight as of 12/8/22: 102.1 kg (225 lb).  Gen: NAD, pleasant and cooperative            Laboratory/Imaging:     Lab Results   Component Value Date    WBC 7.3 11/20/2022    HGB 11.2 (L) 11/20/2022    HCT 35.2 (L) 11/20/2022    MCV 98 11/20/2022     " 11/20/2022     Lab Results   Component Value Date     11/20/2022    POTASSIUM 4.0 11/20/2022    MARTINA 8.9 11/20/2022    MAG 2.0 11/20/2022    CHLORIDE 105 11/20/2022    CO2 23 11/20/2022     (H) 11/20/2022     Lab Results   Component Value Date    GFRESTIMATED >90 11/20/2022    GFRESTBLACK >90 07/09/2021     Lab Results   Component Value Date    AST 23 11/20/2022    ALT 8 (L) 11/20/2022    ALKPHOS 76 11/20/2022    BILITOTAL 0.2 11/20/2022     Lab Results   Component Value Date    INR 1.91 (H) 05/27/2022     Lab Results   Component Value Date    BUN 22.7 (H) 11/20/2022    CR 0.81 11/20/2022     No results found for: PTHI   Lab Results   Component Value Date    VITDT 35 07/12/2020      Lab Results   Component Value Date    TSH 2.99 05/27/2022     No results found for: TEST     DEXA Scan 3/17/2023  HISTORY: Parkinson disease (H); Impaired functional mobility, balance,  gait, and endurance; Osteoporosis without current pathological  fracture, unspecified osteoporosis type     COMPARISON: None     [age] years[age]  Height: 76.0 inches  Weight: 240.0 pounds  Sex: Male  Ethnicity: White     Image quality: Adequate     Lumbar spine T-score in region of L1-L4 = -0.8      HIPS:  Mean total hip T-score: -0.9  Left femoral neck T-score = -1.3  Right femoral neck T-score= -1.8      FRAX:  10 year probability of major osteoporotic fracture: 11.6%  10 year probability of hip fracture: 1.4%  The 10 year probability of fracture may be lower than reported if the  patient has received treatment. FRAX data should be disregarded in  patient's taking bisphosphonates.     World Health Organization definition of osteoporosis and osteopenia  for  women:   Normal: T-score at or above -1.0  Low Bone Mass (Osteopenia): T-score between -1.0 and -2.5.   Osteoporosis: T-score at or below -2.5   T-scores are reported for postmenopausal women and men over 50 years  of age.                                                                    IMPRESSION:    Osteopenia.           Assessment/Plan:     Benjamin Mathews  is a 58 year old male with osteopenia (DXA Lowest T-Score -1.8) likely due to disuse c/b limited functional mobility leading to increased fracture risk. He presents 5/10/2023 to discuss optimization of bone health.     1. Patient education: In depth discussion and education was provided about the assessment and implications of each of the below recommendations for management. Patient indicated readiness to learn, all questions were answered and understanding of material presented was confirmed.  2. Work-up: Vitamin D, Repeat DXA in 2 years  3. Therapy/bracing: No changes today, continue remaining active as possible.   4. Medications: No new medications for today. Can review in two years after follow up DXA and bone health labs.   5. Referrals: Continue following with PCP and Dr. Arita.   6. Follow up: 2 years or sooner if needed such as if new fall or fractures     Cam Abrams DO  PGY-3 Resident  Physical Medicine & Rehabilitation    Staffed with Dr Michael THOMPSON, Wanda Rodriguez, have personally evaluated patient with Dr. Abrams and agree with findings and plan outlined in the note, which I also edited.  I spent a total of 35 minutes with Benjamin Mathews during today's virtual visit. 14 minutes spent on documentation and chart review.     Answers for HPI/ROS submitted by the patient on 5/10/2023  If you checked off any problems, how difficult have these problems made it for you to do your work, take care of things at home, or get along with other people?: Not difficult at all  PHQ9 TOTAL SCORE: 10  DEE DEE 7 TOTAL SCORE: 6

## 2023-05-10 ENCOUNTER — TRANSFERRED RECORDS (OUTPATIENT)
Dept: HEALTH INFORMATION MANAGEMENT | Facility: CLINIC | Age: 58
End: 2023-05-10

## 2023-05-10 ENCOUNTER — VIRTUAL VISIT (OUTPATIENT)
Dept: PHYSICAL MEDICINE AND REHAB | Facility: CLINIC | Age: 58
End: 2023-05-10
Attending: PHYSICAL MEDICINE & REHABILITATION
Payer: COMMERCIAL

## 2023-05-10 DIAGNOSIS — G20.A1 PARKINSON DISEASE (H): ICD-10-CM

## 2023-05-10 DIAGNOSIS — Z74.09 IMPAIRED FUNCTIONAL MOBILITY, BALANCE, GAIT, AND ENDURANCE: ICD-10-CM

## 2023-05-10 LAB
DEPRECATED CALCIDIOL+CALCIFEROL SERPL-MC: 43 UG/L (ref 20–75)
PTH-INTACT SERPL-MCNC: 36 PG/ML (ref 15–65)

## 2023-05-10 PROCEDURE — 99215 OFFICE O/P EST HI 40 MIN: CPT | Mod: VID | Performed by: PHYSICAL MEDICINE & REHABILITATION

## 2023-05-10 ASSESSMENT — ANXIETY QUESTIONNAIRES
5. BEING SO RESTLESS THAT IT IS HARD TO SIT STILL: NEARLY EVERY DAY
GAD7 TOTAL SCORE: 6
IF YOU CHECKED OFF ANY PROBLEMS ON THIS QUESTIONNAIRE, HOW DIFFICULT HAVE THESE PROBLEMS MADE IT FOR YOU TO DO YOUR WORK, TAKE CARE OF THINGS AT HOME, OR GET ALONG WITH OTHER PEOPLE: SOMEWHAT DIFFICULT
8. IF YOU CHECKED OFF ANY PROBLEMS, HOW DIFFICULT HAVE THESE MADE IT FOR YOU TO DO YOUR WORK, TAKE CARE OF THINGS AT HOME, OR GET ALONG WITH OTHER PEOPLE?: SOMEWHAT DIFFICULT
6. BECOMING EASILY ANNOYED OR IRRITABLE: NOT AT ALL
7. FEELING AFRAID AS IF SOMETHING AWFUL MIGHT HAPPEN: NOT AT ALL
1. FEELING NERVOUS, ANXIOUS, OR ON EDGE: NEARLY EVERY DAY
GAD7 TOTAL SCORE: 6
7. FEELING AFRAID AS IF SOMETHING AWFUL MIGHT HAPPEN: NOT AT ALL
2. NOT BEING ABLE TO STOP OR CONTROL WORRYING: NOT AT ALL
3. WORRYING TOO MUCH ABOUT DIFFERENT THINGS: NOT AT ALL
GAD7 TOTAL SCORE: 6
4. TROUBLE RELAXING: NOT AT ALL

## 2023-05-10 ASSESSMENT — PATIENT HEALTH QUESTIONNAIRE - PHQ9
SUM OF ALL RESPONSES TO PHQ QUESTIONS 1-9: 10
SUM OF ALL RESPONSES TO PHQ QUESTIONS 1-9: 10
10. IF YOU CHECKED OFF ANY PROBLEMS, HOW DIFFICULT HAVE THESE PROBLEMS MADE IT FOR YOU TO DO YOUR WORK, TAKE CARE OF THINGS AT HOME, OR GET ALONG WITH OTHER PEOPLE: NOT DIFFICULT AT ALL

## 2023-05-10 NOTE — LETTER
5/10/2023       RE: Benjamin Mathews  [address]  [facility] [address]       Dear Colleague,    Thank you for referring your patient, Benjamin Mathews, to the [facility] [place] [facility]. Please see a copy of my visit note below.             PM&R Clinic Note     Patient Name: Benjamin Mathews : 1965 Medical Record: [id number]     Requesting Physician/clinician: Ember Arita MD           History of Present Illness:     Benjamin Mathews is a 58 year old male who was referred by Dr. Arita for assessment of bone health in the setting of impaired functional mobility with multiple falls secondary to Parkinsonism, CVA with residual left hemiparesis, and dystonia of the right upper and lower extremities. His last DXA on 3/17/2023 was consistent with osteopenia (Lowest T-score-1.8).      Age: 58  Age over 50 (female) or 75 (male): No  Fracture History: 3 left ribs and a clavicle about 10 years ago due to fall   Personal or family hx of previous low trauma fx of the hip or spine: Mother broke her hip due to a fall at age of 80   Diabetes Mellitus greater than 10 years or poor control: No  Inflammatory arthritis: No  Chronic corticosteroid use (5mg/day or for more than 90 days): No  Chronic kidney disease stage 3 and up: No  Limited mobility: Yes - due to Parkinsons and CVA with residual left hemiparesis; uses motorized wheelchair for community distances, uses walker in home with functional decline stepwise beginning in  with diagnosis of Parkinson's. In the last year starting to go down hill, reports he has been working with PT.   Smoking: Yes - 10 cigarettes and a couple pipes x 4-5 years   Alcohol use 3 or more per day: Not currently, history of use in the past   Personal or family hx of metabolic bone disease: No  Liver disease: No   Cancer tx: No   Vitamin D Deficiency: No  Hx weight loss surgery: No  Hx  frequent falls: No - last was 8 months when bending over  Regular Physical Activity: Active in the community but limited physically by diagnoses, does walk around the home    Osteoporosis Medication Use:  No   Noted loss of height:  No   Dietary Intake:  Believes it's balanced - lots of vegetables and fruit   Contra-indications to osteoanabolics or antiresorptives: No radiation exposure, no known cardiac disease, no upcoming dental procedures but had a tooth pulled several months ago          Past Medical and Surgical History:     Past Medical History:   Diagnosis Date    Clotting disorder (H)     PE 2019    Dystonia     Parkinson disease (H)      No past surgical history on file.         Social History:     Social History     Tobacco Use    Smoking status: Every Day     Types: Pipe    Smokeless tobacco: Never   Vaping Use    Vaping status: Not on file   Substance Use Topics    Alcohol use: Not Currently     Comment: sober x 18 months       Marital Status: Single   Living situation: Group home - [facility] x2 years   Family support: None - comes from group home and friends   Vocational History: None         Functional history:       ADLs: Modified independent - group home provides assistance  Assistive devices: Scooter in community, WC in home   iADLs (medication management and finances): Modified independent - grouphome provides assistance  Driving: No           Family History:     Family History   Problem Relation Age of Onset    Other - See Comments Mother         pulmonary embolism from hip fracture    Pulmonary Embolism Mother     Parkinsonism Father     Neurologic Disorder Sister     Parkinsonism Sister         ?med related    Other - See Comments Sister         12/7/1950    Depression Sister     Neurologic Disorder Brother         dystonia    Dystonia Brother     Other - See Comments Brother             Heart Disease Nephew             Medications:     Current Outpatient Medications   Medication Sig Dispense  Refill    acetaminophen (TYLENOL) 500 MG tablet 2 x 500mg tabs by mouth 3/day @8am, 12pm and 8pm and 2 x 500mg tab by mouth every 6 hours as needed      alfuzosin ER (UROXATRAL) 10 MG 24 hr tablet Take 1 tablet (10 mg) by mouth daily 90 tablet 3    amantadine (SYMMETREL) 100 MG capsule TAKE 1 CAPSULE BY MOUTH TWICE DAILY 60 capsule 11    atorvastatin (LIPITOR) 40 MG tablet Take 40 mg by mouth At 8pm      bisacodyl (DULCOLAX) 10 MG suppository INSERT 1 SUPPOSITORY RECTALLY EVERY OTHER DAY. *B* HOLD FOR LOOSE STOOLS 15 suppository 11    bisacodyl (DULCOLAX) 5 MG EC tablet Take 1 tablet (5 mg) by mouth three times a week Take one 5 mg tablet three times a week 15 tablet 3    calcium polycarbophil (FIBER-LAX) 625 MG tablet 1 tab by mouth daily at 8am      carbidopa-levodopa (SINEMET CR)  MG CR tablet 50/200 tab by mouth nightly at 8pm      carbidopa-levodopa (SINEMET)  MG tablet 2 tabs @ 8am, 2 @ noon, 1.5 @4pm      cholecalciferol (VITAMIN D3) 125 mcg (5000 units) capsule 125 mcg (5000 units) by mouth daily at  8am      clonazePAM (KLONOPIN) 1 MG tablet Take 1 tablet (1 mg) by mouth daily At noon. 30 tablet 4    clonazePAM (KLONOPIN) 1 MG tablet Take 1 tablet (1 mg) by mouth daily as needed for muscle spasms 30 tablet 0    clonazePAM (KLONOPIN) 2 MG tablet TAKE 1 TABLET BY MOUTH TWICE DAILY ( MORNING & BEDTIME ) *1 TOTAL FILL* 60 tablet 5    cloZAPine (CLOZARIL) 50 MG tablet 50 mg At Bedtime      ENULOSE 10 GM/15ML SOLUTION 15ml by mouth twice a day at 8AM and 6PM      gabapentin (NEURONTIN) 800 MG tablet 800mg tab by mouth 3/day at 8am, 12pm and 8pm      hydrocortisone 1 % CREA cream Apply topically to nose and other affected area(s) every morning at 9am      hydrOXYzine (ATARAX) 25 MG tablet Take 50 mg by mouth every 6 hours as needed for anxiety (up to 3 timrd daily)      lubiprostone (AMITIZA) 24 MCG capsule 24mg tab by mouth twice daily at 8am and 8pm      metoprolol succinate ER (TOPROL-XL) 25 MG 24  hr tablet Take 1/2 tablet (12.5mg) by mouth twice daily at 8am and 8pm      Multiple Vitamin (DAILY-ALLAN) TABS Vitamin by  Mouth daily @8am      pantoprazole (PROTONIX) 40 MG EC tablet Take 1 tablet (40mg) by mouth daily at 8am (Patient not taking: Reported on 2/8/2023)      polyethylene glycol (MIRALAX) 17 GM/Dose powder Take 17 g by mouth 2 times daily One capful in liquid by mouth twice a day, 8 AM and one dose at supper 850 g 3    rivaroxaban ANTICOAGULANT (XARELTO) 20 MG TABS tablet Take 20 mg by mouth daily (with dinner) At 5pm      sodium phosphate (FLEET ENEMA) 7-19 GM/118ML rectal enema Place 1 enema rectally daily as needed for constipation      traZODone (DESYREL) 100 MG tablet Take 100 mg by mouth daily at 8pm      traZODone (DESYREL) 50 MG tablet 50 mg In AM      triamcinolone (KENALOG) 0.1 % external cream       venlafaxine (EFFEXOR XR) 150 MG 24 hr capsule Take 1 capsule (150 mg) by mouth daily      venlafaxine (EFFEXOR XR) 75 MG 24 hr capsule Take 1 capsule (75 mg) by mouth daily      vitamin C (ASCORBIC ACID) 500 MG tablet Take 500 mg by mouth daily              Allergies:     Allergies   Allergen Reactions    Amantadine Other (See Comments)     Other reaction(s): Hallucinations  Hallucinations/ lost self control/gambling.     halluicnations  Hallucinations/ lost self control/gambling.     hallucinates  halluicnations  hallucinates  Hallucinations/ lost self control/gambling.       Quetiapine GI Disturbance, Diarrhea and Other (See Comments)     Diarrhea    Diarrhea  Other reaction(s): GI Disturbance  Diarrhea      Duloxetine Other (See Comments)     suicidal  suicidal                ROS:     A focused ROS is negative other than the symptoms noted above in the HPI.         Physical Examiniation:     ** Physical Examination limited due to virtual visit.  There were no vitals taken for this visit.  Estimated body mass index is 27.37 kg/m  as calculated from the following:    Height as of 11/20/22:  "1.931 m (6' 4.02\").    Weight as of 12/8/22: 102.1 kg (225 lb).  Gen: NAD, pleasant and cooperative            Laboratory/Imaging:     Lab Results   Component Value Date    WBC 7.3 11/20/2022    HGB 11.2 (L) 11/20/2022    HCT 35.2 (L) 11/20/2022    MCV 98 11/20/2022     11/20/2022     Lab Results   Component Value Date     11/20/2022    POTASSIUM 4.0 11/20/2022    MARTINA 8.9 11/20/2022    MAG 2.0 11/20/2022    CHLORIDE 105 11/20/2022    CO2 23 11/20/2022     (H) 11/20/2022     Lab Results   Component Value Date    GFRESTIMATED >90 11/20/2022    GFRESTBLACK >90 07/09/2021     Lab Results   Component Value Date    AST 23 11/20/2022    ALT 8 (L) 11/20/2022    ALKPHOS 76 11/20/2022    BILITOTAL 0.2 11/20/2022     Lab Results   Component Value Date    INR 1.91 (H) 05/27/2022     Lab Results   Component Value Date    BUN 22.7 (H) 11/20/2022    CR 0.81 11/20/2022     No results found for: PTHI   Lab Results   Component Value Date    VITDT 35 07/12/2020      Lab Results   Component Value Date    TSH 2.99 05/27/2022     No results found for: TEST     DEXA Scan 3/17/2023  HISTORY: Parkinson disease (H); Impaired functional mobility, balance,  gait, and endurance; Osteoporosis without current pathological  fracture, unspecified osteoporosis type     COMPARISON: None     [age] years[age]  Height: 76.0 inches  Weight: 240.0 pounds  Sex: Male  Ethnicity: White     Image quality: Adequate     Lumbar spine T-score in region of L1-L4 = -0.8      HIPS:  Mean total hip T-score: -0.9  Left femoral neck T-score = -1.3  Right femoral neck T-score= -1.8      FRAX:  10 year probability of major osteoporotic fracture: 11.6%  10 year probability of hip fracture: 1.4%  The 10 year probability of fracture may be lower than reported if the  patient has received treatment. FRAX data should be disregarded in  patient's taking bisphosphonates.     World Health Organization definition of osteoporosis and osteopenia  for  " women:   Normal: T-score at or above -1.0  Low Bone Mass (Osteopenia): T-score between -1.0 and -2.5.   Osteoporosis: T-score at or below -2.5   T-scores are reported for postmenopausal women and men over 50 years  of age.                                                                   IMPRESSION:    Osteopenia.           Assessment/Plan:     Benjamin Mathews  is a 58 year old male with osteopenia (DXA Lowest T-Score -1.8) likely due to disuse c/b limited functional mobility leading to increased fracture risk. He presents 5/10/2023 to discuss optimization of bone health.     Patient education: In depth discussion and education was provided about the assessment and implications of each of the below recommendations for management. Patient indicated readiness to learn, all questions were answered and understanding of material presented was confirmed.  Work-up: Vitamin D, Repeat DXA in 2 years  Therapy/bracing: No changes today, continue remaining active as possible.   Medications: No new medications for today. Can review in two years after follow up DXA and bone health labs.   Referrals: Continue following with PCP and Dr. Arita.   Follow up: 2 years or sooner if needed such as if new fall or fractures     Cam Abrams DO  PGY-3 Resident  Physical Medicine & Rehabilitation    Staffed with Dr Michael THOMPSON, Wanda Rodriguez, have personally evaluated patient with Dr. Abrams and agree with findings and plan outlined in the note, which I also edited.  I spent a total of 35 minutes with Benjamin Mathews during today's virtual visit. 14 minutes spent on documentation and chart review.     Answers for HPI/ROS submitted by the patient on 5/10/2023  If you checked off any problems, how difficult have these problems made it for you to do your work, take care of things at home, or get along with other people?: Not difficult at all  PHQ9 TOTAL SCORE: 10  DEE DEE 7 TOTAL SCORE: 6            Again, thank you for allowing me to  participate in the care of your patient.      Sincerely,    Wanda Rodriguez, DO

## 2023-05-10 NOTE — PROGRESS NOTES
Virtual Visit Details    Type of service:  Video Visit     Originating Location (pt. Location): Assisted Living   Distant Location (provider location):  On-site  Platform used for Video Visit: Derek

## 2023-05-10 NOTE — PATIENT INSTRUCTIONS
Nice to meet you today, Benjamin. Our recommendations from today's visit are as follows.     Work-up: Vitamin D, Repeat DXA in 2 years  Therapy/bracing: No changes today, continue remaining active as possible.   Medications: No new medications for today. Can review in two years after follow up DXA and bone health labs.   Referrals: Continue following with PCP and Dr. Arita.   Follow up: 2 years or sooner if needed such as if new fall or fractures

## 2023-05-11 ENCOUNTER — ONCOLOGY VISIT (OUTPATIENT)
Dept: PALLIATIVE CARE | Facility: CLINIC | Age: 58
End: 2023-05-11
Attending: HOSPITALIST
Payer: COMMERCIAL

## 2023-05-11 VITALS
SYSTOLIC BLOOD PRESSURE: 96 MMHG | BODY MASS INDEX: 28.1 KG/M2 | WEIGHT: 231 LBS | HEART RATE: 82 BPM | DIASTOLIC BLOOD PRESSURE: 65 MMHG | OXYGEN SATURATION: 98 % | RESPIRATION RATE: 16 BRPM | TEMPERATURE: 98.1 F

## 2023-05-11 DIAGNOSIS — K59.00 CONSTIPATION, UNSPECIFIED CONSTIPATION TYPE: Primary | ICD-10-CM

## 2023-05-11 PROCEDURE — 99214 OFFICE O/P EST MOD 30 MIN: CPT | Mod: GC

## 2023-05-11 PROCEDURE — G0463 HOSPITAL OUTPT CLINIC VISIT: HCPCS

## 2023-05-11 ASSESSMENT — PAIN SCALES - GENERAL: PAINLEVEL: NO PAIN (0)

## 2023-05-11 NOTE — PATIENT INSTRUCTIONS
Patient Instructions      Expand All Collapse All    Thank you for attending the Palliative Care Clinic appointment today.     For constipation:  Miralax 17g twice a day with breakfast and supper  Enulose daily continue  Bisacodyl 5mg three times a week  Lubiprostone twice a day  Bisacodyl suppository as needed      Call if your symptoms change and the medications we have prescribed are not working.   Do not take your medications at any other dose or frequently than how they are prescribed. Call if you think we should make any changes  I have prescribed naloxone as a rescue medication for the opioids (pain pills), which I do for all of my patients who have these medications at home.    Call us at least 3 workdays in advance if you need a medication refill.    Please have all your pill bottles ready for your next appointment.     Return to clinic in 3-4 months for a follow up.     You can reach the Palliative Care Team during business hours at the following number:    - [phone]  - or send me a [email or web address] message    To reach the Palliative Care Provider on-call After-hours or on holidays and weekends, call: [phone].  Please note that we are not able to provide pain medication refills on evenings or weekends.

## 2023-05-11 NOTE — NURSING NOTE
"Oncology Rooming Note    May 11, 2023 2:00 PM   Benjamin Mathews is a 58 year old male who presents for:    Chief Complaint   Patient presents with     Oncology Clinic Visit     RTN for Palliative Care     Initial Vitals: Blood Pressure 96/65   Pulse 82   Temperature 98.1  F (36.7  C) (Oral)   Respiration 16   Weight 104.8 kg (231 lb)   Oxygen Saturation 98%   Body Mass Index 28.10 kg/m   Estimated body mass index is 28.1 kg/m  as calculated from the following:    Height as of 11/20/22: 1.931 m (6' 4.02\").    Weight as of this encounter: 104.8 kg (231 lb). Body surface area is 2.37 meters squared.  No Pain (0) Comment: Data Unavailable   No LMP for male patient.  Allergies reviewed: Yes  Medications reviewed: Yes    Medications: Medication refills not needed today.  Pharmacy name entered into [employer]: [facility] - [place], [place] - [address]    Clinical concerns: none       Ritu Del Rio MA            "

## 2023-05-11 NOTE — LETTER
5/11/2023       RE: Benjamin Mathews  [address]  [facility] [address]       Dear Colleague,    Thank you for referring your patient, Benjamin Mathews[facility]. Please see a copy of my visit note below.    Palliative Care Outpatient Clinic Progress Note    Patient Name: Benjamin Mathews is being evaluated in person.    Primary Provider:  Stephanie Maldonado  Last seen by palliative care: 02/08/2023      Chief Complaint: Dystonia, constipation, depression    Background/Summary    Medical:  Patient diagnosed with Parkinson's in 2013 after a short period of tremor and gait changes. 2015 symptoms significant worsened. He is followed by  of Neurology and most recently Dr. Carlos with the movement disorders clinic.      Social  Grew up in [place]. 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 Around this time is when he was diagnosed with Parkinson's and work became difficult. Brother Robert and sister-in law that come to visit him in his jail which he appreciates. He has a sister and brother in law who are MDs, work for [employer] (In [place]). Another sister lives locally but they are not in contact. Father had Parkinson's and passed over 10 years ago. He was primary caretaker for his mother and father before they passed.      Lives in a group home with 4 other people in [place].  Is able to walk around the house using his four-wheel walker; uses his wheelchair for longer distances.       Care Planning   No advanced directive. Ongoing conversation regarding who would help make medical decisions. Overall is isolated, most likely his sister Isha.     Opioid Safety   : reviewed 05/11/2023   Clonazepam 2mg tablet x 60 tablets on 04/19/23  Clonazepam 1mg tablet - 30 tablets on 04/19/23    Interim  History:  History gathered today from: patient     Mr. Mathews is a 57 year old male with Parkinson's disease who is following up for management of dystonia, constipation, and mood.     Last palliative visit on 02/08/23. No medication changes were made with regard to pain. We discussed constipation, he had also seen GI who made recommendations including miralax, lubiprostone, bisacodyl.     Since last visit he has been seen in PM&R clinic on 04/27 for botox. Right neck and right lower extremity injected.   Plan to follow up in 12 weeks.     Today Benjamin reports that his spasms have remained stable. Feels botox has been helpful. Frequency of spasm episodes occurs less, about 3 times per month now. They do last a few hours each time, uses PRN rescue clonazepam. Continues to use scheduled clonazepam 2mg AM, 1mg afternoon, 2mg HS. Uses PRN dose once every few weeks as needed.     Long history of severe constipation. Patient is on multiple medications, administered by group home staff. Currently he knows he is taking miralax once daily and enulose once daily. Believes he is taking lubiprostone and oral bisacodyl, but unsure as meds are administered. He has needed fleet enema and suppository weekly.     Mood is low but stable. This past winter was hard for him with limited outdoor trips due to weather. He follows with psychiatrist at [facility] and seeing psychologist [facility] with [facility]. Finds this very helpful. An [facility] worker will be making a social visit. Reports his brother does visit from time to time and really appreciates these visits. Hoping to start playing pool again. Enjoys going to [employer] shops, getting a donut and cigar. This gives him hope.     Appetite is stable. No nausea or vomiting. Sleeping quite well.     Functional Status:  Palliative Performance Scale: 50%  Previous: 50%            Data / Chart Review:    Review of Systems:   ROS: 10 point ROS neg other than the  symptoms noted above in the HPI and pertinents here.         Physical Exam:   Physical Exam:  BP 96/65   Pulse 82   Temp 98.1  F (36.7  C) (Oral)   Resp 16   Wt 104.8 kg (231 lb)   SpO2 98%   BMI 28.10 kg/m        CONSTIT: awake, sitting up in wheel chair, appears comfortable  EENT: EOMI, no icterus  CV: RRR  RESP: reg, normal effort, no cough, clear to auscultation  NEURO: alert, oriented x3  PSYCH: normal affect, memory and thought process intact        Current pertinent medications:  Current Outpatient Medications   Medication    acetaminophen (TYLENOL) 500 MG tablet    alfuzosin ER (UROXATRAL) 10 MG 24 hr tablet    amantadine (SYMMETREL) 100 MG capsule    atorvastatin (LIPITOR) 40 MG tablet    bisacodyl (DULCOLAX) 10 MG suppository    bisacodyl (DULCOLAX) 5 MG EC tablet    calcium polycarbophil (FIBER-LAX) 625 MG tablet    carbidopa-levodopa (SINEMET CR)  MG CR tablet    carbidopa-levodopa (SINEMET)  MG tablet    cholecalciferol (VITAMIN D3) 125 mcg (5000 units) capsule    clonazePAM (KLONOPIN) 1 MG tablet    clonazePAM (KLONOPIN) 1 MG tablet    clonazePAM (KLONOPIN) 2 MG tablet    cloZAPine (CLOZARIL) 50 MG tablet    ENULOSE 10 GM/15ML SOLUTION    gabapentin (NEURONTIN) 800 MG tablet    hydrocortisone 1 % CREA cream    hydrOXYzine (ATARAX) 25 MG tablet    lubiprostone (AMITIZA) 24 MCG capsule    metoprolol succinate ER (TOPROL-XL) 25 MG 24 hr tablet    Multiple Vitamin (DAILY-ALLAN) TABS    pantoprazole (PROTONIX) 40 MG EC tablet    polyethylene glycol (MIRALAX) 17 GM/Dose powder    rivaroxaban ANTICOAGULANT (XARELTO) 20 MG TABS tablet    sodium phosphate (FLEET ENEMA) 7-19 GM/118ML rectal enema    traZODone (DESYREL) 100 MG tablet    traZODone (DESYREL) 50 MG tablet    triamcinolone (KENALOG) 0.1 % external cream    venlafaxine (EFFEXOR XR) 150 MG 24 hr capsule    venlafaxine (EFFEXOR XR) 75 MG 24 hr capsule    vitamin C (ASCORBIC ACID) 500 MG tablet     Current Facility-Administered  Medications   Medication    botulinum toxin type A (BOTOX) 100 units injection 400 Units            Allergies   Allergen Reactions    Amantadine Other (See Comments)     Other reaction(s): Hallucinations  Hallucinations/ lost self control/gambling.     halluicnations  Hallucinations/ lost self control/gambling.     hallucinates  halluicnations  hallucinates  Hallucinations/ lost self control/gambling.       Quetiapine GI Disturbance, Diarrhea and Other (See Comments)     Diarrhea    Diarrhea  Other reaction(s): GI Disturbance  Diarrhea      Duloxetine Other (See Comments)     suicidal  suicidal             Lab and imaging data reviewed:  DEXA scan - Osteopenia      Impression & Recommendations & Counseling:  Benjamin Mathews is a 56 yo male with Parkinson's disease with neurocognitive disorder, dyskinesias, stroke, chronic pain who presents for follow up of right sided dystonia, constipation, and depression.       Dystonia:  Significant improvement since starting botox, mostly with his right leg. Following with PM&R every 12 weeks. Will continue clonazepam current dose, has been very helpful.     - Continue clonazepam 2mg 8 AM, 1mg noon, 2mg 8 PM   - Continue amantadine 100 BID   - Followed by PM&R, next follow up 07/25/2023   - Will be starting PT at [facility] next month      Constipation:  Long history of constipation. He was see by GI in the past. Feels miralax and enulose was somewhat helpful but not taking it as frequently as prescribed. We discussed simplifying his regimen, however he would like to continue as is and increase miralax for now.   - Miralax 17g twice a day with breakfast and supper  - Bisacodyl 5mg three times a week  - Lubiprostone twice a day  - Bisacodyl suppository PRN  - Fleet enema PRN  - Pelvic floor PT - for constipation, placed external referral. He schedule at [facility] if available, if not will call us.      Low mood, social isolation:  Some improvement coming out of winter. He  shared how challenging it is to know he has a progressive illness. Trying to stay motivated to get out of the home.   - Followed by psychiatry  - Continue seeing psychologist  -[facility] hospital volunteer/worker visit      Return to clinic in 3-4 months.  Patient will call clinic with concerns.        AJAY Langley [facility] LUIGI  Palliative Medicine Fellow    I, Bernadette Arriaza MD personally examined and evaluated this patient on 5/11/23. I discussed the patient with Dr Sosa and care team, and agree with the assessment and plan of care as documented in the fellow s note of 5/11/23.  I personally reviewed medications, labs, imaging, vital signs as indicated.  Key findings: Patient seen for parkinson's disease in context of social isolation, made adjustments to constipation management based on history, chart review, exam.     Bernadette Arriaza MD  Palliative Medicine  Securely message with the [employer]oc[employer] Web Console (learn more here)          Again, thank you for allowing me to participate in the care of your patient.      Sincerely,    Anthony Sosa MD

## 2023-05-20 ENCOUNTER — APPOINTMENT (OUTPATIENT)
Dept: GENERAL RADIOLOGY | Facility: CLINIC | Age: 58
DRG: 493 | End: 2023-05-20
Attending: EMERGENCY MEDICINE
Payer: COMMERCIAL

## 2023-05-20 ENCOUNTER — APPOINTMENT (OUTPATIENT)
Dept: CT IMAGING | Facility: CLINIC | Age: 58
DRG: 493 | End: 2023-05-20
Attending: EMERGENCY MEDICINE
Payer: COMMERCIAL

## 2023-05-20 ENCOUNTER — HOSPITAL ENCOUNTER (INPATIENT)
Facility: CLINIC | Age: 58
LOS: 5 days | Discharge: NURSING, REHAB OR LONG-TERM CARE | DRG: 493 | End: 2023-05-25
Attending: EMERGENCY MEDICINE | Admitting: PEDIATRICS
Payer: COMMERCIAL

## 2023-05-20 DIAGNOSIS — R55 SYNCOPE, UNSPECIFIED SYNCOPE TYPE: ICD-10-CM

## 2023-05-20 DIAGNOSIS — K59.01 SLOW TRANSIT CONSTIPATION: ICD-10-CM

## 2023-05-20 DIAGNOSIS — G20.A1 PARKINSON'S DISEASE (TREMOR, STIFFNESS, SLOW MOTION, UNSTABLE POSTURE) (H): ICD-10-CM

## 2023-05-20 DIAGNOSIS — S82.891A CLOSED FRACTURE OF RIGHT ANKLE, INITIAL ENCOUNTER: Primary | ICD-10-CM

## 2023-05-20 LAB
ABO/RH(D): NORMAL
ALBUMIN SERPL BCG-MCNC: 3.9 G/DL (ref 3.5–5.2)
ALP SERPL-CCNC: 90 U/L (ref 40–129)
ALT SERPL W P-5'-P-CCNC: 11 U/L (ref 10–50)
ANION GAP SERPL CALCULATED.3IONS-SCNC: 12 MMOL/L (ref 7–15)
ANTIBODY SCREEN: NEGATIVE
AST SERPL W P-5'-P-CCNC: 25 U/L (ref 10–50)
BASOPHILS # BLD AUTO: 0 10E3/UL (ref 0–0.2)
BASOPHILS NFR BLD AUTO: 0 %
BILIRUB SERPL-MCNC: 0.2 MG/DL
BUN SERPL-MCNC: 21 MG/DL (ref 6–20)
CALCIUM SERPL-MCNC: 8.5 MG/DL (ref 8.6–10)
CHLORIDE SERPL-SCNC: 106 MMOL/L (ref 98–107)
CREAT SERPL-MCNC: 0.99 MG/DL (ref 0.67–1.17)
D DIMER PPP FEU-MCNC: 3.42 UG/ML FEU (ref 0–0.5)
DEPRECATED HCO3 PLAS-SCNC: 22 MMOL/L (ref 22–29)
EOSINOPHIL # BLD AUTO: 0.1 10E3/UL (ref 0–0.7)
EOSINOPHIL NFR BLD AUTO: 1 %
ERYTHROCYTE [DISTWIDTH] IN BLOOD BY AUTOMATED COUNT: 13.2 % (ref 10–15)
GFR SERPL CREATININE-BSD FRML MDRD: 88 ML/MIN/1.73M2
GLUCOSE SERPL-MCNC: 93 MG/DL (ref 70–99)
HCT VFR BLD AUTO: 36.4 % (ref 40–53)
HGB BLD-MCNC: 11.2 G/DL (ref 13.3–17.7)
IMM GRANULOCYTES # BLD: 0 10E3/UL
IMM GRANULOCYTES NFR BLD: 1 %
INR PPP: 1.15 (ref 0.85–1.15)
LIPASE SERPL-CCNC: 31 U/L (ref 13–60)
LYMPHOCYTES # BLD AUTO: 1.8 10E3/UL (ref 0.8–5.3)
LYMPHOCYTES NFR BLD AUTO: 21 %
MAGNESIUM SERPL-MCNC: 2.1 MG/DL (ref 1.7–2.3)
MCH RBC QN AUTO: 31.2 PG (ref 26.5–33)
MCHC RBC AUTO-ENTMCNC: 30.8 G/DL (ref 31.5–36.5)
MCV RBC AUTO: 101 FL (ref 78–100)
MONOCYTES # BLD AUTO: 0.5 10E3/UL (ref 0–1.3)
MONOCYTES NFR BLD AUTO: 6 %
NEUTROPHILS # BLD AUTO: 6.1 10E3/UL (ref 1.6–8.3)
NEUTROPHILS NFR BLD AUTO: 71 %
NRBC # BLD AUTO: 0 10E3/UL
NRBC BLD AUTO-RTO: 0 /100
PLATELET # BLD AUTO: 222 10E3/UL (ref 150–450)
POTASSIUM SERPL-SCNC: 4.9 MMOL/L (ref 3.4–5.3)
PROT SERPL-MCNC: 6.6 G/DL (ref 6.4–8.3)
RBC # BLD AUTO: 3.59 10E6/UL (ref 4.4–5.9)
SODIUM SERPL-SCNC: 140 MMOL/L (ref 136–145)
SPECIMEN EXPIRATION DATE: NORMAL
TROPONIN T SERPL HS-MCNC: 19 NG/L
WBC # BLD AUTO: 8.5 10E3/UL (ref 4–11)

## 2023-05-20 PROCEDURE — 36415 COLL VENOUS BLD VENIPUNCTURE: CPT | Performed by: EMERGENCY MEDICINE

## 2023-05-20 PROCEDURE — 73610 X-RAY EXAM OF ANKLE: CPT | Mod: 26 | Performed by: RADIOLOGY

## 2023-05-20 PROCEDURE — 83690 ASSAY OF LIPASE: CPT | Performed by: EMERGENCY MEDICINE

## 2023-05-20 PROCEDURE — 73610 X-RAY EXAM OF ANKLE: CPT | Mod: RT

## 2023-05-20 PROCEDURE — 73590 X-RAY EXAM OF LOWER LEG: CPT | Mod: 26 | Performed by: RADIOLOGY

## 2023-05-20 PROCEDURE — 71275 CT ANGIOGRAPHY CHEST: CPT | Mod: 26 | Performed by: RADIOLOGY

## 2023-05-20 PROCEDURE — 71275 CT ANGIOGRAPHY CHEST: CPT

## 2023-05-20 PROCEDURE — 73590 X-RAY EXAM OF LOWER LEG: CPT | Mod: RT

## 2023-05-20 PROCEDURE — 84484 ASSAY OF TROPONIN QUANT: CPT | Performed by: EMERGENCY MEDICINE

## 2023-05-20 PROCEDURE — 120N000002 HC R&B MED SURG/OB UMMC

## 2023-05-20 PROCEDURE — 86901 BLOOD TYPING SEROLOGIC RH(D): CPT | Performed by: EMERGENCY MEDICINE

## 2023-05-20 PROCEDURE — 86850 RBC ANTIBODY SCREEN: CPT | Performed by: EMERGENCY MEDICINE

## 2023-05-20 PROCEDURE — 83735 ASSAY OF MAGNESIUM: CPT | Performed by: EMERGENCY MEDICINE

## 2023-05-20 PROCEDURE — 85379 FIBRIN DEGRADATION QUANT: CPT | Performed by: EMERGENCY MEDICINE

## 2023-05-20 PROCEDURE — 93010 ELECTROCARDIOGRAM REPORT: CPT | Performed by: EMERGENCY MEDICINE

## 2023-05-20 PROCEDURE — 85610 PROTHROMBIN TIME: CPT | Performed by: EMERGENCY MEDICINE

## 2023-05-20 PROCEDURE — 85025 COMPLETE CBC W/AUTO DIFF WBC: CPT | Performed by: EMERGENCY MEDICINE

## 2023-05-20 PROCEDURE — 96376 TX/PRO/DX INJ SAME DRUG ADON: CPT | Performed by: EMERGENCY MEDICINE

## 2023-05-20 PROCEDURE — 96374 THER/PROPH/DIAG INJ IV PUSH: CPT | Performed by: EMERGENCY MEDICINE

## 2023-05-20 PROCEDURE — 99285 EMERGENCY DEPT VISIT HI MDM: CPT | Mod: 25 | Performed by: EMERGENCY MEDICINE

## 2023-05-20 PROCEDURE — 80053 COMPREHEN METABOLIC PANEL: CPT | Performed by: EMERGENCY MEDICINE

## 2023-05-20 PROCEDURE — 93005 ELECTROCARDIOGRAM TRACING: CPT | Performed by: EMERGENCY MEDICINE

## 2023-05-20 PROCEDURE — 250N000011 HC RX IP 250 OP 636: Performed by: EMERGENCY MEDICINE

## 2023-05-20 RX ORDER — CARBOXYMETHYLCELLULOSE SODIUM 5 MG/ML
1 SOLUTION/ DROPS OPHTHALMIC
Status: DISCONTINUED | OUTPATIENT
Start: 2023-05-20 | End: 2023-05-25 | Stop reason: HOSPADM

## 2023-05-20 RX ORDER — HYDROMORPHONE HYDROCHLORIDE 1 MG/ML
0.5 INJECTION, SOLUTION INTRAMUSCULAR; INTRAVENOUS; SUBCUTANEOUS
Status: COMPLETED | OUTPATIENT
Start: 2023-05-20 | End: 2023-05-21

## 2023-05-20 RX ORDER — CARBIDOPA AND LEVODOPA 50; 200 MG/1; MG/1
1 TABLET, EXTENDED RELEASE ORAL ONCE
Status: COMPLETED | OUTPATIENT
Start: 2023-05-21 | End: 2023-05-21

## 2023-05-20 RX ORDER — TRAZODONE HYDROCHLORIDE 100 MG/1
100 TABLET ORAL ONCE
Status: COMPLETED | OUTPATIENT
Start: 2023-05-21 | End: 2023-05-21

## 2023-05-20 RX ORDER — CLONAZEPAM 0.5 MG/1
2 TABLET ORAL ONCE
Status: COMPLETED | OUTPATIENT
Start: 2023-05-20 | End: 2023-05-21

## 2023-05-20 RX ORDER — HYDROMORPHONE HYDROCHLORIDE 1 MG/ML
0.5 INJECTION, SOLUTION INTRAMUSCULAR; INTRAVENOUS; SUBCUTANEOUS EVERY 30 MIN PRN
Status: COMPLETED | OUTPATIENT
Start: 2023-05-20 | End: 2023-05-20

## 2023-05-20 RX ADMIN — HYDROMORPHONE HYDROCHLORIDE 0.5 MG: 1 INJECTION, SOLUTION INTRAMUSCULAR; INTRAVENOUS; SUBCUTANEOUS at 19:31

## 2023-05-20 RX ADMIN — HYDROMORPHONE HYDROCHLORIDE 0.5 MG: 1 INJECTION, SOLUTION INTRAMUSCULAR; INTRAVENOUS; SUBCUTANEOUS at 23:28

## 2023-05-20 RX ADMIN — HYDROMORPHONE HYDROCHLORIDE 0.5 MG: 1 INJECTION, SOLUTION INTRAMUSCULAR; INTRAVENOUS; SUBCUTANEOUS at 21:48

## 2023-05-20 ASSESSMENT — ACTIVITIES OF DAILY LIVING (ADL)
ADLS_ACUITY_SCORE: 35

## 2023-05-20 NOTE — ED PROVIDER NOTES
"      [place] EMERGENCY DEPARTMENT ([facility])  May 20, 2023      History     Chief Complaint   Patient presents with     Ankle Pain     HPI  Benjamin Mathews is a 58 year old male with a past medical history of Parkinson's with psychosis, CVA, chronic pain, chronic DVT of proximal vein of lower extremity, PE, alcohol abuse, and chronic anticoagulation (xarelto) who presents to the emergency department from his group home seeking evaluation of R ankle pain following a fall. Patient was ambulating with a walker when he had a syncopal episode, states he was \"seeing stars\" and then fell. He twisted his ankle when falling. He did not hit his head. He denies any shortness of breath, chest pain, or abdominal pain. He reports experiencing several episodes of diarrhea over the last few days, and reports that he has not been rehydrating properly. He denies any dark or tarry stools.       Past Medical History  Past Medical History:   Diagnosis Date     Cerebral infarction (H)      Clotting disorder (H)     PE 2019     Dystonia      Parkinson disease (H)      Past Surgical History:   Procedure Laterality Date     APPLY EXTERNAL FIXATOR LOWER EXTREMITY Right 5/21/2023    Procedure: Right  Ankle Closed Reduction and  External Fixator Placement;  Surgeon: Nicole Apodaca MD;  Location: [facility] OR     alfuzosin ER (UROXATRAL) 10 MG 24 hr tablet  atorvastatin (LIPITOR) 40 MG tablet  bisacodyl (DULCOLAX) 10 MG suppository  carbidopa-levodopa (SINEMET CR)  MG CR tablet  carbidopa-levodopa (SINEMET)  MG tablet  cholecalciferol (VITAMIN D3) 125 mcg (5000 units) capsule  clonazePAM (KLONOPIN) 2 MG tablet  cloZAPine (CLOZARIL) 50 MG tablet  gabapentin (NEURONTIN) 800 MG tablet  hydrOXYzine (ATARAX) 25 MG tablet  lactulose (CHRONULAC) 10 GM/15ML solution  linaclotide (LINZESS) 290 MCG capsule  metoprolol succinate ER (TOPROL XL) 25 MG 24 hr tablet  multivitamin, therapeutic (THERA-VIT) TABS tablet  oxyCODONE " (ROXICODONE) 5 MG tablet  pantoprazole (PROTONIX) 40 MG EC tablet  polyethylene glycol (MIRALAX) 17 GM/Dose powder  rivaroxaban ANTICOAGULANT (XARELTO) 20 MG TABS tablet  sodium phosphate (FLEET ENEMA) 7-19 GM/118ML rectal enema  traZODone (DESYREL) 100 MG tablet  traZODone (DESYREL) 50 MG tablet  venlafaxine (EFFEXOR XR) 150 MG 24 hr capsule  vitamin C (ASCORBIC ACID) 500 MG tablet      Allergies   Allergen Reactions     Amantadine Other (See Comments)     Other reaction(s): Hallucinations  Hallucinations/ lost self control/gambling.     halluicnations  Hallucinations/ lost self control/gambling.     hallucinates  halluicnations  hallucinates  Hallucinations/ lost self control/gambling.        Quetiapine GI Disturbance, Diarrhea and Other (See Comments)     Diarrhea    Diarrhea  Other reaction(s): GI Disturbance  Diarrhea       Duloxetine Other (See Comments)     suicidal  suicidal       Family History  Family History   Problem Relation Age of Onset     Other - See Comments Mother         pulmonary embolism from hip fracture     Pulmonary Embolism Mother      Parkinsonism Father      Neurologic Disorder Sister      Parkinsonism Sister         ?med related     Other - See Comments Sister         12/7/1950     Depression Sister      Neurologic Disorder Brother         dystonia     Dystonia Brother      Other - See Comments Brother              Heart Disease Nephew      Social History   Social History     Tobacco Use     Smoking status: Every Day     Types: Pipe, Cigars, Cigarettes     Smokeless tobacco: Never   Substance Use Topics     Alcohol use: Not Currently     Comment: quit 2014     Drug use: Not Currently      Past medical history, past surgical history, medications, allergies, family history, and social history were reviewed with the patient. No additional pertinent items.      A medically appropriate review of systems was performed with pertinent positives and negatives noted in the HPI, and all other  "systems negative.    Physical Exam   BP: 113/71  Pulse: 56  Temp: 98.3  F (36.8  C)  Resp: 16  Height: 193 cm (6' 4\")  Weight: 106.6 kg (235 lb)  SpO2: 97 %  Physical Exam  /69 (BP Location: Left arm)   Pulse 87   Temp 97.5  F (36.4  C) (Oral)   Resp 18   Ht 1.93 m (6' 4\")   Wt 106.6 kg (235 lb)   SpO2 97%   BMI 28.61 kg/m    General: Alert sitting on the bed in NAC   Head:  atraumatic   Neck:  no c-spine/neck tenderness   CV: RRR, strong pulse in the affected limb with < 2 seconds cap refill to digits distally   Resp:  breathing comfortably   Abd:  soft/nt/nd   Back:  no T&L spine/back tenderness   Skin: Swelling over the ankle, no crepitance The integrity of the skin at this site has not been compromised.   Extremities:  deformity over the ankle,  tenderness over medial and lateral melleosis; limited ROM of theankle joint secondary to pain otherwise  AROM of all major joints without pain   Neuro: Awake alert; face  symmetrical; moving extremities x 4,   Lower Extremity: Strength 5/5 dorsiflexion,plantarflexion/EHL, 5/5 knee flexion/extension, 5 5/5 hip flexion/extension Sensation intact to light touch in for saphenous/sural/deep peroneal/superficial peroneal/and tibial nerves.   Unable to ambulate..           ED Course, Procedures, & Data      Differential diagnosis of patient's ankle pain includes fracture, dislocation, neurologic injury, vascular injury, musculoskeletal injury, and soft tissue injury.  Soon after arrival patient was taken over to imaging.  X-ray showed comminuted oblique fracture of the distal fibula that extended into the lateral corner of the ankle mortise.  Comminuted fracture of the distal tibia extending into the tibial plafond and the significant step-off along the joint margin.  The fracture extends to the base of the medial malleolus.  Soft tissue swelling evident on x-ray.  The tibia and fibula proximally are negative.  Orthopedics was consulted and we discussed management " and treatment..  He wanted to come in and try to reduce the ankle bedside, were unable to do so.  Plan is for patient to be admitted and brought to the OR tomorrow.    In regards to patient's presyncope which caused the fall and ankle fracture.  EKG showed no evidence of ST or T wave changes to suggest acute ischemia or infarction.  QTc was normal.  Patient had no chest pain, shortness of breath or other acute issues at this time.  And troponin was negative.  Patient was at low risk for PE so D-dimer was ordered and was significantly elevated.  Discussed risks and benefits of CTA to evaluate for pulmonary embolism.  CTA showed no evidence of PE.  Is unclear was causing patient syncope at this time.  However patient is unsafe to be discharged home at this time.    Given history, exam, and workup in the emergency department, patient is unsafe to be discharged to home at this time and will be admitted to the hospital for further evaluation and treatment.            Results for orders placed or performed during the hospital encounter of 05/20/23   XR Tibia and Fibula Right 2 Views     Status: None    Narrative    EXAM: XR ANKLE RIGHT G/E 3 VIEWS, XR TIBIA AND FIBULA RIGHT 2 VIEWS  LOCATION: [facility]  DATE/TIME: 5/20/2023 7:30 PM CDT    INDICATION: Fall, twisted ankle, swelling.  COMPARISON: None.      Impression    IMPRESSION: Comminuted oblique fracture of the distal fibula extending into the lateral corner of the ankle mortise. Additional comminuted fracture of the distal tibia extending into the tibial plafond with significant step-off along the joint margin.   The medial malleolus is primarily intact although the fracture extends to the base of the medial malleolus. Soft tissue swelling about the ankle. More proximally, the tibia and fibula are negative.   Ankle XR, G/E 3 views, right     Status: None    Narrative    EXAM: XR ANKLE RIGHT G/E 3 VIEWS, XR TIBIA AND  FIBULA RIGHT 2 VIEWS  LOCATION: [facility]  DATE/TIME: 5/20/2023 7:30 PM CDT    INDICATION: Fall, twisted ankle, swelling.  COMPARISON: None.      Impression    IMPRESSION: Comminuted oblique fracture of the distal fibula extending into the lateral corner of the ankle mortise. Additional comminuted fracture of the distal tibia extending into the tibial plafond with significant step-off along the joint margin.   The medial malleolus is primarily intact although the fracture extends to the base of the medial malleolus. Soft tissue swelling about the ankle. More proximally, the tibia and fibula are negative.   CT Chest Pulmonary Embolism w Contrast     Status: None    Narrative    EXAM: CT CHEST PULMONARY EMBOLISM W CONTRAST  LOCATION: [facility]  DATE/TIME: 5/21/2023 12:46 AM CDT    INDICATION: evaluation for PE, SOB, pre syncope, elevated d dimer, recent hospitalization  COMPARISON: 5/28/2022  TECHNIQUE: CT chest pulmonary angiogram during arterial phase injection of IV contrast. Multiplanar reformats and MIP reconstructions were performed. Dose reduction techniques were used.   CONTRAST: iopamidol (ISOVUE 370) solution 73    FINDINGS:  ANGIOGRAM CHEST: Pulmonary arteries are normal caliber and negative for pulmonary emboli. Thoracic aorta is negative for dissection. No CT evidence of right heart strain.    LUNGS AND PLEURA: Mild bibasilar atelectatic changes are noted, the lungs are otherwise clear.    MEDIASTINUM/AXILLAE: No lymphadenopathy. Normal esophagus. No significant pericardial effusion.    CORONARY ARTERY CALCIFICATION: Mild.    UPPER ABDOMEN: Normal.    MUSCULOSKELETAL: No concerning bone lesions.      Impression    IMPRESSION:  1.  No evidence of pulmonary embolus to the segmental level.   XR Ankle Right G/E 3 Views     Status: None    Narrative    EXAM: XR ANKLE RIGHT G/E 3 VIEWS  LOCATION: [facility]  DATE/TIME: 5/21/2023 1:23 AM CDT    INDICATION: Post splint.  COMPARISON: 05/20/2023.      Impression    IMPRESSION: 3 views right ankle. Casting material obscures some details. Comminuted oblique fracture of the distal fibula extending into the lateral corner of the ankle mortise. Comminuted displaced fracture of the distal tibia extending into the tibial   plafond. Clinical correlation for alignment. Soft tissue swelling about the ankle.    XR Surgery HAYDEN L/T 5 Min Fluoro     Status: None    Narrative    This exam was marked as non-reportable because it will not be read by a   radiologist or a [place] non-radiologist provider.         XR Ankle Right G/E 3 Views     Status: None    Narrative    EXAM: XR ANKLE RIGHT G/E 3 VIEWS  LOCATION: [facility]  DATE/TIME: 5/21/2023 11:35 AM CDT    INDICATION: Postoperative follow-up.  COMPARISON: 05/21/2023 CT and radiographs.      Impression    IMPRESSION: Interval placement of external fixation hardware, with pins in the distal tibial diaphysis and across the posterior body of the calcaneus. Hardware is well seated without evidence of new fracture or other immediate complication. Trimalleolar   fractures in the distal tibia and fibula again visualized. Fracture components are in near anatomic position and alignment, similar to the postreduction CT scan. Moderate soft tissue swelling in the ankle.   CT Ankle Right w/o Contrast     Status: None    Narrative    EXAM: CT ANKLE RIGHT WITHOUT CONTRAST  LOCATION: [facility]  DATE/TIME: 05/21/2023, 11:58 AM CDT    INDICATION: Pilon right ankle fracture. Status post external fixator placement.  COMPARISON: Ankle radiographic exam 05/21/2023.  TECHNIQUE: Noncontrast. Axial, sagittal and coronal thin-section reconstruction. Dose reduction techniques were used.     FINDINGS:     BONES:  -External fixator  is in place with pins traversing the posterior calcaneus. Comminuted intra-articular pilon-type distal tibia fracture with dominant coronally oriented fracture line extending from the distal tibial metaphysis to the tibial plafond.   Comminuted fracture across the medial malleolus. Comminuted mildly displaced distal fibula fracture. Fracture gapping at the tibial plafond articular surface up to 1.2 cm. Slight articular step-off at the distal tibial articular surface. No talus or   calcaneus fracture. Tarsal bones and metatarsal bases intact. No significant hindfoot or midfoot joint space narrowing.    SOFT TISSUES:  -No evidence for tendon entrapment. Severe ankle soft tissue swelling. No drainable fluid collection. Global intrinsic foot muscle atrophy mild severity. More severe atrophy of the abductor digiti minimi. Ankle joint lipohemarthrosis.      Impression    IMPRESSION:  1.  External fixator in place; Comminuted intra-articular mildly displaced pilon-type distal tibia fracture.  2.  Comminuted mildly displaced distal fibula fracture.  3.  No evidence for tendon entrapment.  4.  Ankle joint lipohemarthrosis.     Comprehensive metabolic panel     Status: Abnormal   Result Value Ref Range    Sodium 140 136 - 145 mmol/L    Potassium 4.9 3.4 - 5.3 mmol/L    Chloride 106 98 - 107 mmol/L    Carbon Dioxide (CO2) 22 22 - 29 mmol/L    Anion Gap 12 7 - 15 mmol/L    Urea Nitrogen 21.0 (H) 6.0 - 20.0 mg/dL    Creatinine 0.99 0.67 - 1.17 mg/dL    Calcium 8.5 (L) 8.6 - 10.0 mg/dL    Glucose 93 70 - 99 mg/dL    Alkaline Phosphatase 90 40 - 129 U/L    AST 25 10 - 50 U/L    ALT 11 10 - 50 U/L    Protein Total 6.6 6.4 - 8.3 g/dL    Albumin 3.9 3.5 - 5.2 g/dL    Bilirubin Total 0.2 <=1.2 mg/dL    GFR Estimate 88 >60 mL/min/1.73m2   Magnesium     Status: Normal   Result Value Ref Range    Magnesium 2.1 1.7 - 2.3 mg/dL   Lipase     Status: Normal   Result Value Ref Range    Lipase 31 13 - 60 U/L   Troponin T, High Sensitivity      Status: Normal   Result Value Ref Range    Troponin T, High Sensitivity 19 <=22 ng/L   D-dimer, Quantitative     Status: Abnormal   Result Value Ref Range    D-Dimer Quantitative 3.42 (H) 0.00 - 0.50 ug/mL FEU    Narrative    This D-dimer assay is intended for use in conjunction with a clinical pretest probability assessment model to exclude pulmonary embolism (PE) and deep venous thrombosis (DVT) in outpatients suspected of PE or DVT. The cut-off value is 0.50 ug/mL FEU.   CBC with platelets and differential     Status: Abnormal   Result Value Ref Range    WBC Count 8.5 4.0 - 11.0 10e3/uL    RBC Count 3.59 (L) 4.40 - 5.90 10e6/uL    Hemoglobin 11.2 (L) 13.3 - 17.7 g/dL    Hematocrit 36.4 (L) 40.0 - 53.0 %     (H) 78 - 100 fL    MCH 31.2 26.5 - 33.0 pg    MCHC 30.8 (L) 31.5 - 36.5 g/dL    RDW 13.2 10.0 - 15.0 %    Platelet Count 222 150 - 450 10e3/uL    % Neutrophils 71 %    % Lymphocytes 21 %    % Monocytes 6 %    % Eosinophils 1 %    % Basophils 0 %    % Immature Granulocytes 1 %    NRBCs per 100 WBC 0 <1 /100    Absolute Neutrophils 6.1 1.6 - 8.3 10e3/uL    Absolute Lymphocytes 1.8 0.8 - 5.3 10e3/uL    Absolute Monocytes 0.5 0.0 - 1.3 10e3/uL    Absolute Eosinophils 0.1 0.0 - 0.7 10e3/uL    Absolute Basophils 0.0 0.0 - 0.2 10e3/uL    Absolute Immature Granulocytes 0.0 <=0.4 10e3/uL    Absolute NRBCs 0.0 10e3/uL   INR     Status: Normal   Result Value Ref Range    INR 1.15 0.85 - 1.15   CBC with platelets     Status: Abnormal   Result Value Ref Range    WBC Count 6.9 4.0 - 11.0 10e3/uL    RBC Count 3.44 (L) 4.40 - 5.90 10e6/uL    Hemoglobin 10.5 (L) 13.3 - 17.7 g/dL    Hematocrit 34.8 (L) 40.0 - 53.0 %     (H) 78 - 100 fL    MCH 30.5 26.5 - 33.0 pg    MCHC 30.2 (L) 31.5 - 36.5 g/dL    RDW 12.9 10.0 - 15.0 %    Platelet Count 217 150 - 450 10e3/uL   Basic metabolic panel     Status: Abnormal   Result Value Ref Range    Sodium 140 136 - 145 mmol/L    Potassium 4.1 3.4 - 5.3 mmol/L    Chloride 105 98 -  107 mmol/L    Carbon Dioxide (CO2) 24 22 - 29 mmol/L    Anion Gap 11 7 - 15 mmol/L    Urea Nitrogen 17.0 6.0 - 20.0 mg/dL    Creatinine 0.66 (L) 0.67 - 1.17 mg/dL    Calcium 9.1 8.6 - 10.0 mg/dL    Glucose 98 70 - 99 mg/dL    GFR Estimate >90 >60 mL/min/1.73m2   EKG 12-lead, tracing only     Status: None   Result Value Ref Range    Systolic Blood Pressure  mmHg    Diastolic Blood Pressure  mmHg    Ventricular Rate 63 BPM    Atrial Rate 63 BPM    VA Interval 164 ms    QRS Duration 94 ms     ms    QTc 421 ms    P Axis 57 degrees    R AXIS -57 degrees    T Axis 68 degrees    Interpretation ECG       Sinus rhythm  Left anterior fascicular block  Abnormal ECG  Unconfirmed report - interpretation of this ECG is computer generated - see medical record for final interpretation  Confirmed by - EMERGENCY ROOM, PHYSICIAN ([id number]),  SHELLEY MCKINNEY ([id number]) on 5/22/2023 6:44:10 AM     Adult Type and Screen     Status: None   Result Value Ref Range    ABO/RH(D) A POS     Antibody Screen Negative Negative    SPECIMEN EXPIRATION DATE [id number]    CBC with Platelets & Differential     Status: Abnormal    Narrative    The following orders were created for panel order CBC with Platelets & Differential.  Procedure                               Abnormality         Status                     ---------                               -----------         ------                     CBC with platelets and d...[[id number]]  Abnormal            Final result                 Please view results for these tests on the individual orders.   ABO/Rh type and screen     Status: None    Narrative    The following orders were created for panel order ABO/Rh type and screen.  Procedure                               Abnormality         Status                     ---------                               -----------         ------                     Adult Type and Screen[[id number]]                            Final result                 Please  view results for these tests on the individual orders.     Medications   HYDROmorphone (PF) (DILAUDID) injection 0.5 mg (0.5 mg Intravenous $Given 5/20/23 2328)   HYDROmorphone (PF) (DILAUDID) injection 0.5 mg (0.5 mg Intravenous Not Given 5/21/23 0426)   traZODone (DESYREL) tablet 100 mg (100 mg Oral $Given 5/21/23 0008)   gabapentin (NEURONTIN) capsule 800 mg (800 mg Oral $Given 5/21/23 0006)   clonazePAM (klonoPIN) tablet 2 mg (2 mg Oral $Given 5/21/23 0003)   carbidopa-levodopa (SINEMET CR)  MG per CR tablet 1 tablet (1 tablet Oral $Given 5/21/23 0008)   iopamidol (ISOVUE-370) solution 73 mL (73 mLs Intravenous $Given 5/21/23 0043)   sodium chloride (PF) 0.9% PF flush 101 mL (101 mLs Intravenous $Given 5/21/23 0044)   ceFAZolin (ANCEF) 1 g vial to attach to  ml bag for ADULT or 50 ml bag for PEDS (1 g Intravenous $New Bag 5/21/23 1807)     Labs Ordered and Resulted from Time of ED Arrival to Time of ED Departure   COMPREHENSIVE METABOLIC PANEL - Abnormal       Result Value    Sodium 140      Potassium 4.9      Chloride 106      Carbon Dioxide (CO2) 22      Anion Gap 12      Urea Nitrogen 21.0 (*)     Creatinine 0.99      Calcium 8.5 (*)     Glucose 93      Alkaline Phosphatase 90      AST 25      ALT 11      Protein Total 6.6      Albumin 3.9      Bilirubin Total 0.2      GFR Estimate 88     D DIMER QUANTITATIVE - Abnormal    D-Dimer Quantitative 3.42 (*)    CBC WITH PLATELETS AND DIFFERENTIAL - Abnormal    WBC Count 8.5      RBC Count 3.59 (*)     Hemoglobin 11.2 (*)     Hematocrit 36.4 (*)      (*)     MCH 31.2      MCHC 30.8 (*)     RDW 13.2      Platelet Count 222      % Neutrophils 71      % Lymphocytes 21      % Monocytes 6      % Eosinophils 1      % Basophils 0      % Immature Granulocytes 1      NRBCs per 100 WBC 0      Absolute Neutrophils 6.1      Absolute Lymphocytes 1.8      Absolute Monocytes 0.5      Absolute Eosinophils 0.1      Absolute Basophils 0.0      Absolute Immature  Granulocytes 0.0      Absolute NRBCs 0.0     MAGNESIUM - Normal    Magnesium 2.1     LIPASE - Normal    Lipase 31     TROPONIN T, HIGH SENSITIVITY - Normal    Troponin T, High Sensitivity 19     INR - Normal    INR 1.15     TYPE AND SCREEN, ADULT    ABO/RH(D) A POS      Antibody Screen Negative      SPECIMEN EXPIRATION DATE [id number]       CT Ankle Right w/o Contrast   Final Result   IMPRESSION:   1.  External fixator in place; Comminuted intra-articular mildly displaced pilon-type distal tibia fracture.   2.  Comminuted mildly displaced distal fibula fracture.   3.  No evidence for tendon entrapment.   4.  Ankle joint lipohemarthrosis.         XR Ankle Right G/E 3 Views   Final Result   IMPRESSION: Interval placement of external fixation hardware, with pins in the distal tibial diaphysis and across the posterior body of the calcaneus. Hardware is well seated without evidence of new fracture or other immediate complication. Trimalleolar    fractures in the distal tibia and fibula again visualized. Fracture components are in near anatomic position and alignment, similar to the postreduction CT scan. Moderate soft tissue swelling in the ankle.      XR Surgery HAYDEN L/T 5 Min Fluoro   Final Result      XR Ankle Right G/E 3 Views   Final Result   IMPRESSION: 3 views right ankle. Casting material obscures some details. Comminuted oblique fracture of the distal fibula extending into the lateral corner of the ankle mortise. Comminuted displaced fracture of the distal tibia extending into the tibial    plafond. Clinical correlation for alignment. Soft tissue swelling about the ankle.       CT Chest Pulmonary Embolism w Contrast   Final Result   IMPRESSION:   1.  No evidence of pulmonary embolus to the segmental level.      XR Tibia and Fibula Right 2 Views   Final Result   IMPRESSION: Comminuted oblique fracture of the distal fibula extending into the lateral corner of the ankle mortise. Additional comminuted fracture  of the distal tibia extending into the tibial plafond with significant step-off along the joint margin.    The medial malleolus is primarily intact although the fracture extends to the base of the medial malleolus. Soft tissue swelling about the ankle. More proximally, the tibia and fibula are negative.      Ankle XR, G/E 3 views, right   Final Result   IMPRESSION: Comminuted oblique fracture of the distal fibula extending into the lateral corner of the ankle mortise. Additional comminuted fracture of the distal tibia extending into the tibial plafond with significant step-off along the joint margin.    The medial malleolus is primarily intact although the fracture extends to the base of the medial malleolus. Soft tissue swelling about the ankle. More proximally, the tibia and fibula are negative.                 Assessment & Plan    Assessment:   -Presyncope  -Distal tibia and fibula fracture with comminution and displacement.    Plan:  Admit to Medicine forfurther evaluation and treatment      I have reviewed the nursing notes. I have reviewed the findings, diagnosis, plan and need for follow up with the patient.    Discharge Medication List as of 5/25/2023  5:54 PM      START taking these medications    Details   lactulose (CHRONULAC) 10 GM/15ML solution Take 15 mLs by mouth 2 times daily, Disp-473 mL, R-0, E-Prescribe      oxyCODONE (ROXICODONE) 5 MG tablet Take 0.5-1 tablets (2.5-5 mg) by mouth every 4 hours as needed for breakthrough pain, Disp-20 tablet, R-0, Local Print             Final diagnoses:   Syncope, unspecified syncope type   I, Jayesh Pitts, am serving as a trained medical scribe to document services personally performed by Rebeca Khan MD, based on the provider's statements to me.     I, Rebeca Khan MD, was physically present and have reviewed and verified the accuracy of this note documented by Jayesh Pitts.      Rebeca Khan MD  [facility] EMERGENCY  DEPARTMENT  5/20/2023     Rebeca Khan MD  05/31/23 [id number]

## 2023-05-20 NOTE — LETTER
Transition Communication Hand-off for Care Transitions to Next Level of Care Provider    Name: Benjamin Mathews  : 1965  MRN #: [id number]  Primary Care Provider: Stephanie Maldonado     Primary Clinic: [address] [place] [id number]     Reason for Hospitalization:  Syncope [R55]  Syncope, unspecified syncope type [R55]  Admit Date/Time: 2023  5:37 PM  Discharge Date: 2023  Payor Source: Payor: [employer] / Plan: [employer] / Product Type: HMO /            Reason for Communication Hand-off Referral: Other Continuity of Care    Discharge Plan: Patient is discharging to TCU at [facility] in [place].    Concern for non-adherence with plan of care:   Y/N No  Discharge Needs Assessment:  Needs      Flowsheet Row Most Recent Value   Equipment Currently Used at Home walker, rolling, wheelchair, power            Follow-up plan:    Future Appointments   Date Time Provider Department Center   2023  7:00 PM Davida Billings, OVIDIO [facility] [place]   2023  7:00 PM Juju Whittington, PT [facility] [place]   2023  1:30 PM Ching Carlos DO [facility]   2023 11:00 AM Dequan York DPM [facility]   7/10/2023  2:15 PM Miryam Montemayor APRN CNP [facility]   2023  8:20 AM Ember Arita MD [facility]   11/3/2023 11:30 AM Juju Bernardo PA-C [facility] Abbie Wenzel, BSW    AVS/Discharge Summary is the source of truth; this is a helpful guide for improved communication of patient story

## 2023-05-20 NOTE — ED TRIAGE NOTES
Pt BIBA from group home w/ c/o right ankle pain after fall from standing. Pt was ambulating with walker when he had syncopal episode. Denies head strike. On xarelto. Right ankle in splint with ace wrap. He was given toradol 15mg en route.

## 2023-05-20 NOTE — LETTER
Recipient:          Sender:          Date: May 25, 2023  Patient Name:  Benjamin Mathews  Patient YOB: 1965  Routing Message:          The documents accompanying this notice contain confidential information belonging to the sender.  This information is intended only for the use of the individual or entity named above.  The authorized recipient of this information is prohibited from disclosing this information to any other party and is required to destroy the information after its stated need has been fulfilled, unless otherwise required by state law.    If you are not the intended recipient, you are hereby notified that any disclosure, copy, distribution or action taken in reliance on the contents of these documents is strictly prohibited.  If you have received this document in error, please return it by fax to [phone] with a note on the cover sheet explaining why you are returning it (e.g. not your patient, not your provider, etc.).  If you need further assistance, please call .  Documents may also be returned by mail to [employer], , [address], [place], [place] [address].

## 2023-05-20 NOTE — LETTER
Recipient: chilango Hilario          Sender: Ami NICOLE          Date: May 25, 2023  Patient Name:  Benjamin Mathews  Patient YOB: 1965  Routing Message:  Please see attached Physician Orders.        The documents accompanying this notice contain confidential information belonging to the sender.  This information is intended only for the use of the individual or entity named above.  The authorized recipient of this information is prohibited from disclosing this information to any other party and is required to destroy the information after its stated need has been fulfilled, unless otherwise required by state law.    If you are not the intended recipient, you are hereby notified that any disclosure, copy, distribution or action taken in reliance on the contents of these documents is strictly prohibited.  If you have received this document in error, please return it by fax to [phone] with a note on the cover sheet explaining why you are returning it (e.g. not your patient, not your provider, etc.).  If you need further assistance, please call .  Documents may also be returned by mail to [employer], , [address], [place], [place] [address].

## 2023-05-21 ENCOUNTER — APPOINTMENT (OUTPATIENT)
Dept: CT IMAGING | Facility: CLINIC | Age: 58
DRG: 493 | End: 2023-05-21
Payer: COMMERCIAL

## 2023-05-21 ENCOUNTER — APPOINTMENT (OUTPATIENT)
Dept: GENERAL RADIOLOGY | Facility: CLINIC | Age: 58
DRG: 493 | End: 2023-05-21
Attending: ORTHOPAEDIC SURGERY
Payer: COMMERCIAL

## 2023-05-21 ENCOUNTER — APPOINTMENT (OUTPATIENT)
Dept: GENERAL RADIOLOGY | Facility: CLINIC | Age: 58
DRG: 493 | End: 2023-05-21
Payer: COMMERCIAL

## 2023-05-21 ENCOUNTER — ANESTHESIA (OUTPATIENT)
Dept: SURGERY | Facility: CLINIC | Age: 58
DRG: 493 | End: 2023-05-21
Payer: COMMERCIAL

## 2023-05-21 ENCOUNTER — ANESTHESIA EVENT (OUTPATIENT)
Dept: SURGERY | Facility: CLINIC | Age: 58
DRG: 493 | End: 2023-05-21
Payer: COMMERCIAL

## 2023-05-21 PROCEDURE — 73610 X-RAY EXAM OF ANKLE: CPT | Mod: 26 | Performed by: RADIOLOGY

## 2023-05-21 PROCEDURE — 20692 APPL MLTPLN UNI EXT FIXJ SYS: CPT | Mod: RT | Performed by: ORTHOPAEDIC SURGERY

## 2023-05-21 PROCEDURE — 272N000001 HC OR GENERAL SUPPLY STERILE: Performed by: ORTHOPAEDIC SURGERY

## 2023-05-21 PROCEDURE — 250N000011 HC RX IP 250 OP 636: Performed by: ANESTHESIOLOGY

## 2023-05-21 PROCEDURE — C1713 ANCHOR/SCREW BN/BN,TIS/BN: HCPCS | Performed by: ORTHOPAEDIC SURGERY

## 2023-05-21 PROCEDURE — 250N000011 HC RX IP 250 OP 636: Performed by: STUDENT IN AN ORGANIZED HEALTH CARE EDUCATION/TRAINING PROGRAM

## 2023-05-21 PROCEDURE — 250N000013 HC RX MED GY IP 250 OP 250 PS 637: Performed by: PEDIATRICS

## 2023-05-21 PROCEDURE — 0QSG35Z REPOSITION RIGHT TIBIA WITH EXTERNAL FIXATION DEVICE, PERCUTANEOUS APPROACH: ICD-10-PCS | Performed by: ORTHOPAEDIC SURGERY

## 2023-05-21 PROCEDURE — 250N000011 HC RX IP 250 OP 636: Performed by: NURSE ANESTHETIST, CERTIFIED REGISTERED

## 2023-05-21 PROCEDURE — 999N000065 XR ANKLE RIGHT G/E 3 VIEWS: Mod: RT

## 2023-05-21 PROCEDURE — 99223 1ST HOSP IP/OBS HIGH 75: CPT | Performed by: PEDIATRICS

## 2023-05-21 PROCEDURE — 250N000013 HC RX MED GY IP 250 OP 250 PS 637: Performed by: INTERNAL MEDICINE

## 2023-05-21 PROCEDURE — 120N000002 HC R&B MED SURG/OB UMMC

## 2023-05-21 PROCEDURE — 999N000180 XR SURGERY CARM FLUORO LESS THAN 5 MIN: Mod: TC

## 2023-05-21 PROCEDURE — 250N000009 HC RX 250: Performed by: NURSE ANESTHETIST, CERTIFIED REGISTERED

## 2023-05-21 PROCEDURE — 360N000075 HC SURGERY LEVEL 2, PER MIN: Performed by: ORTHOPAEDIC SURGERY

## 2023-05-21 PROCEDURE — 999N000141 HC STATISTIC PRE-PROCEDURE NURSING ASSESSMENT: Performed by: ORTHOPAEDIC SURGERY

## 2023-05-21 PROCEDURE — 73610 X-RAY EXAM OF ANKLE: CPT | Mod: RT

## 2023-05-21 PROCEDURE — 370N000017 HC ANESTHESIA TECHNICAL FEE, PER MIN: Performed by: ORTHOPAEDIC SURGERY

## 2023-05-21 PROCEDURE — 250N000013 HC RX MED GY IP 250 OP 250 PS 637: Performed by: EMERGENCY MEDICINE

## 2023-05-21 PROCEDURE — 250N000011 HC RX IP 250 OP 636: Performed by: EMERGENCY MEDICINE

## 2023-05-21 PROCEDURE — 258N000003 HC RX IP 258 OP 636: Performed by: NURSE ANESTHETIST, CERTIFIED REGISTERED

## 2023-05-21 PROCEDURE — 250N000011 HC RX IP 250 OP 636: Performed by: PEDIATRICS

## 2023-05-21 PROCEDURE — 710N000010 HC RECOVERY PHASE 1, LEVEL 2, PER MIN: Performed by: ORTHOPAEDIC SURGERY

## 2023-05-21 PROCEDURE — 73700 CT LOWER EXTREMITY W/O DYE: CPT | Mod: RT

## 2023-05-21 PROCEDURE — 250N000025 HC SEVOFLURANE, PER MIN: Performed by: ORTHOPAEDIC SURGERY

## 2023-05-21 DEVICE — IMPLANTABLE DEVICE
Type: IMPLANTABLE DEVICE | Site: LEG | Status: NON-FUNCTIONAL
Removed: 2023-06-15

## 2023-05-21 RX ORDER — NALOXONE HYDROCHLORIDE 0.4 MG/ML
0.2 INJECTION, SOLUTION INTRAMUSCULAR; INTRAVENOUS; SUBCUTANEOUS
Status: DISCONTINUED | OUTPATIENT
Start: 2023-05-21 | End: 2023-05-25 | Stop reason: HOSPADM

## 2023-05-21 RX ORDER — IOPAMIDOL 755 MG/ML
73 INJECTION, SOLUTION INTRAVASCULAR ONCE
Status: COMPLETED | OUTPATIENT
Start: 2023-05-21 | End: 2023-05-21

## 2023-05-21 RX ORDER — HYDROXYZINE HYDROCHLORIDE 50 MG/1
50 TABLET, FILM COATED ORAL EVERY 6 HOURS PRN
Status: DISCONTINUED | OUTPATIENT
Start: 2023-05-21 | End: 2023-05-25 | Stop reason: HOSPADM

## 2023-05-21 RX ORDER — CLONAZEPAM 1 MG/1
1 TABLET ORAL DAILY PRN
Status: DISCONTINUED | OUTPATIENT
Start: 2023-05-21 | End: 2023-05-22

## 2023-05-21 RX ORDER — DEXAMETHASONE SODIUM PHOSPHATE 4 MG/ML
4 INJECTION, SOLUTION INTRA-ARTICULAR; INTRALESIONAL; INTRAMUSCULAR; INTRAVENOUS; SOFT TISSUE
Status: DISCONTINUED | OUTPATIENT
Start: 2023-05-21 | End: 2023-05-21 | Stop reason: HOSPADM

## 2023-05-21 RX ORDER — NAPROXEN 250 MG/1
500 TABLET ORAL EVERY 12 HOURS PRN
Status: DISCONTINUED | OUTPATIENT
Start: 2023-05-21 | End: 2023-05-22

## 2023-05-21 RX ORDER — LABETALOL HYDROCHLORIDE 5 MG/ML
10 INJECTION, SOLUTION INTRAVENOUS
Status: DISCONTINUED | OUTPATIENT
Start: 2023-05-21 | End: 2023-05-21 | Stop reason: HOSPADM

## 2023-05-21 RX ORDER — CLOZAPINE 50 MG/1
50 TABLET ORAL AT BEDTIME
Status: DISCONTINUED | OUTPATIENT
Start: 2023-05-21 | End: 2023-05-25 | Stop reason: HOSPADM

## 2023-05-21 RX ORDER — DIMENHYDRINATE 50 MG/ML
25 INJECTION, SOLUTION INTRAMUSCULAR; INTRAVENOUS
Status: DISCONTINUED | OUTPATIENT
Start: 2023-05-21 | End: 2023-05-21 | Stop reason: HOSPADM

## 2023-05-21 RX ORDER — VENLAFAXINE HYDROCHLORIDE 150 MG/1
300 CAPSULE, EXTENDED RELEASE ORAL DAILY
Status: DISCONTINUED | OUTPATIENT
Start: 2023-05-21 | End: 2023-05-25 | Stop reason: HOSPADM

## 2023-05-21 RX ORDER — SODIUM CHLORIDE, SODIUM LACTATE, POTASSIUM CHLORIDE, CALCIUM CHLORIDE 600; 310; 30; 20 MG/100ML; MG/100ML; MG/100ML; MG/100ML
INJECTION, SOLUTION INTRAVENOUS CONTINUOUS PRN
Status: DISCONTINUED | OUTPATIENT
Start: 2023-05-21 | End: 2023-05-21

## 2023-05-21 RX ORDER — DIPHENHYDRAMINE HYDROCHLORIDE 50 MG/ML
25 INJECTION INTRAMUSCULAR; INTRAVENOUS EVERY 6 HOURS PRN
Status: DISCONTINUED | OUTPATIENT
Start: 2023-05-21 | End: 2023-05-21 | Stop reason: HOSPADM

## 2023-05-21 RX ORDER — FENTANYL CITRATE 50 UG/ML
50 INJECTION, SOLUTION INTRAMUSCULAR; INTRAVENOUS EVERY 5 MIN PRN
Status: DISCONTINUED | OUTPATIENT
Start: 2023-05-21 | End: 2023-05-21 | Stop reason: HOSPADM

## 2023-05-21 RX ORDER — ATORVASTATIN CALCIUM 40 MG/1
40 TABLET, FILM COATED ORAL EVERY EVENING
Status: DISCONTINUED | OUTPATIENT
Start: 2023-05-21 | End: 2023-05-25 | Stop reason: HOSPADM

## 2023-05-21 RX ORDER — SODIUM CHLORIDE, SODIUM LACTATE, POTASSIUM CHLORIDE, CALCIUM CHLORIDE 600; 310; 30; 20 MG/100ML; MG/100ML; MG/100ML; MG/100ML
INJECTION, SOLUTION INTRAVENOUS CONTINUOUS
Status: DISCONTINUED | OUTPATIENT
Start: 2023-05-21 | End: 2023-05-21 | Stop reason: HOSPADM

## 2023-05-21 RX ORDER — HYDROMORPHONE HYDROCHLORIDE 1 MG/ML
0.4 INJECTION, SOLUTION INTRAMUSCULAR; INTRAVENOUS; SUBCUTANEOUS EVERY 5 MIN PRN
Status: DISCONTINUED | OUTPATIENT
Start: 2023-05-21 | End: 2023-05-21 | Stop reason: HOSPADM

## 2023-05-21 RX ORDER — CARBIDOPA AND LEVODOPA 50; 200 MG/1; MG/1
1 TABLET, EXTENDED RELEASE ORAL AT BEDTIME
Status: DISCONTINUED | OUTPATIENT
Start: 2023-05-21 | End: 2023-05-25 | Stop reason: HOSPADM

## 2023-05-21 RX ORDER — ACETAMINOPHEN 325 MG/1
975 TABLET ORAL EVERY 8 HOURS
Status: DISCONTINUED | OUTPATIENT
Start: 2023-05-21 | End: 2023-05-25 | Stop reason: HOSPADM

## 2023-05-21 RX ORDER — TRAZODONE HYDROCHLORIDE 100 MG/1
100 TABLET ORAL AT BEDTIME
Status: DISCONTINUED | OUTPATIENT
Start: 2023-05-21 | End: 2023-05-25 | Stop reason: HOSPADM

## 2023-05-21 RX ORDER — FENTANYL CITRATE 50 UG/ML
INJECTION, SOLUTION INTRAMUSCULAR; INTRAVENOUS PRN
Status: DISCONTINUED | OUTPATIENT
Start: 2023-05-21 | End: 2023-05-21

## 2023-05-21 RX ORDER — CEFAZOLIN SODIUM 1 G/3ML
INJECTION, POWDER, FOR SOLUTION INTRAMUSCULAR; INTRAVENOUS PRN
Status: DISCONTINUED | OUTPATIENT
Start: 2023-05-21 | End: 2023-05-21

## 2023-05-21 RX ORDER — ONDANSETRON 4 MG/1
4 TABLET, ORALLY DISINTEGRATING ORAL EVERY 6 HOURS PRN
Status: DISCONTINUED | OUTPATIENT
Start: 2023-05-21 | End: 2023-05-25 | Stop reason: HOSPADM

## 2023-05-21 RX ORDER — LACTULOSE 10 G/15ML
15 SOLUTION ORAL 2 TIMES DAILY
Status: DISCONTINUED | OUTPATIENT
Start: 2023-05-21 | End: 2023-05-25 | Stop reason: HOSPADM

## 2023-05-21 RX ORDER — ONDANSETRON 2 MG/ML
4 INJECTION INTRAMUSCULAR; INTRAVENOUS EVERY 30 MIN PRN
Status: DISCONTINUED | OUTPATIENT
Start: 2023-05-21 | End: 2023-05-21 | Stop reason: HOSPADM

## 2023-05-21 RX ORDER — HYDROMORPHONE HCL IN WATER/PF 6 MG/30 ML
.2-.4 PATIENT CONTROLLED ANALGESIA SYRINGE INTRAVENOUS
Status: DISCONTINUED | OUTPATIENT
Start: 2023-05-21 | End: 2023-05-25 | Stop reason: HOSPADM

## 2023-05-21 RX ORDER — AMANTADINE HYDROCHLORIDE 100 MG/1
100 CAPSULE, GELATIN COATED ORAL 2 TIMES DAILY
Status: DISCONTINUED | OUTPATIENT
Start: 2023-05-21 | End: 2023-05-25 | Stop reason: HOSPADM

## 2023-05-21 RX ORDER — CLONAZEPAM 2 MG/1
2 TABLET ORAL 2 TIMES DAILY
Status: DISCONTINUED | OUTPATIENT
Start: 2023-05-21 | End: 2023-05-25 | Stop reason: HOSPADM

## 2023-05-21 RX ORDER — CEFAZOLIN SODIUM 1 G/3ML
1 INJECTION, POWDER, FOR SOLUTION INTRAMUSCULAR; INTRAVENOUS EVERY 8 HOURS
Status: COMPLETED | OUTPATIENT
Start: 2023-05-21 | End: 2023-05-21

## 2023-05-21 RX ORDER — HYDROMORPHONE HYDROCHLORIDE 1 MG/ML
0.2 INJECTION, SOLUTION INTRAMUSCULAR; INTRAVENOUS; SUBCUTANEOUS EVERY 5 MIN PRN
Status: DISCONTINUED | OUTPATIENT
Start: 2023-05-21 | End: 2023-05-21 | Stop reason: HOSPADM

## 2023-05-21 RX ORDER — MULTIVITAMIN,THERAPEUTIC
1 TABLET ORAL DAILY
Status: DISCONTINUED | OUTPATIENT
Start: 2023-05-21 | End: 2023-05-25 | Stop reason: HOSPADM

## 2023-05-21 RX ORDER — LIDOCAINE HYDROCHLORIDE 20 MG/ML
INJECTION, SOLUTION INFILTRATION; PERINEURAL PRN
Status: DISCONTINUED | OUTPATIENT
Start: 2023-05-21 | End: 2023-05-21

## 2023-05-21 RX ORDER — PANTOPRAZOLE SODIUM 40 MG/1
40 TABLET, DELAYED RELEASE ORAL
Status: DISCONTINUED | OUTPATIENT
Start: 2023-05-21 | End: 2023-05-25 | Stop reason: HOSPADM

## 2023-05-21 RX ORDER — ALFUZOSIN HYDROCHLORIDE 10 MG/1
10 TABLET, EXTENDED RELEASE ORAL DAILY
Status: DISCONTINUED | OUTPATIENT
Start: 2023-05-21 | End: 2023-05-25 | Stop reason: HOSPADM

## 2023-05-21 RX ORDER — VENLAFAXINE HYDROCHLORIDE 150 MG/1
150 CAPSULE, EXTENDED RELEASE ORAL DAILY
Status: DISCONTINUED | OUTPATIENT
Start: 2023-05-21 | End: 2023-05-21

## 2023-05-21 RX ORDER — NALOXONE HYDROCHLORIDE 0.4 MG/ML
0.4 INJECTION, SOLUTION INTRAMUSCULAR; INTRAVENOUS; SUBCUTANEOUS
Status: DISCONTINUED | OUTPATIENT
Start: 2023-05-21 | End: 2023-05-25 | Stop reason: HOSPADM

## 2023-05-21 RX ORDER — GABAPENTIN 800 MG/1
800 TABLET ORAL 3 TIMES DAILY
Status: DISCONTINUED | OUTPATIENT
Start: 2023-05-21 | End: 2023-05-25 | Stop reason: HOSPADM

## 2023-05-21 RX ORDER — FENTANYL CITRATE 50 UG/ML
25 INJECTION, SOLUTION INTRAMUSCULAR; INTRAVENOUS EVERY 5 MIN PRN
Status: DISCONTINUED | OUTPATIENT
Start: 2023-05-21 | End: 2023-05-21 | Stop reason: HOSPADM

## 2023-05-21 RX ORDER — ONDANSETRON 4 MG/1
4 TABLET, ORALLY DISINTEGRATING ORAL EVERY 30 MIN PRN
Status: DISCONTINUED | OUTPATIENT
Start: 2023-05-21 | End: 2023-05-21 | Stop reason: HOSPADM

## 2023-05-21 RX ORDER — POLYETHYLENE GLYCOL 3350 17 G/17G
17 POWDER, FOR SOLUTION ORAL DAILY
Status: DISCONTINUED | OUTPATIENT
Start: 2023-05-21 | End: 2023-05-22

## 2023-05-21 RX ORDER — DIPHENHYDRAMINE HCL 25 MG
25 CAPSULE ORAL EVERY 6 HOURS PRN
Status: DISCONTINUED | OUTPATIENT
Start: 2023-05-21 | End: 2023-05-21 | Stop reason: HOSPADM

## 2023-05-21 RX ORDER — ONDANSETRON 2 MG/ML
INJECTION INTRAMUSCULAR; INTRAVENOUS PRN
Status: DISCONTINUED | OUTPATIENT
Start: 2023-05-21 | End: 2023-05-21

## 2023-05-21 RX ORDER — PROPOFOL 10 MG/ML
INJECTION, EMULSION INTRAVENOUS PRN
Status: DISCONTINUED | OUTPATIENT
Start: 2023-05-21 | End: 2023-05-21

## 2023-05-21 RX ORDER — LIDOCAINE 40 MG/G
CREAM TOPICAL
Status: DISCONTINUED | OUTPATIENT
Start: 2023-05-21 | End: 2023-05-25 | Stop reason: HOSPADM

## 2023-05-21 RX ORDER — HYDROMORPHONE HYDROCHLORIDE 1 MG/ML
0.2 INJECTION, SOLUTION INTRAMUSCULAR; INTRAVENOUS; SUBCUTANEOUS EVERY 10 MIN PRN
Status: DISCONTINUED | OUTPATIENT
Start: 2023-05-21 | End: 2023-05-21 | Stop reason: HOSPADM

## 2023-05-21 RX ORDER — ONDANSETRON 2 MG/ML
4 INJECTION INTRAMUSCULAR; INTRAVENOUS EVERY 6 HOURS PRN
Status: DISCONTINUED | OUTPATIENT
Start: 2023-05-21 | End: 2023-05-25 | Stop reason: HOSPADM

## 2023-05-21 RX ADMIN — CLONAZEPAM 2 MG: 0.5 TABLET ORAL at 00:03

## 2023-05-21 RX ADMIN — PROPOFOL 100 MG: 10 INJECTION, EMULSION INTRAVENOUS at 09:32

## 2023-05-21 RX ADMIN — HYDROMORPHONE HYDROCHLORIDE 0.2 MG: 1 INJECTION, SOLUTION INTRAMUSCULAR; INTRAVENOUS; SUBCUTANEOUS at 08:40

## 2023-05-21 RX ADMIN — VENLAFAXINE HYDROCHLORIDE 300 MG: 150 CAPSULE, EXTENDED RELEASE ORAL at 16:01

## 2023-05-21 RX ADMIN — ONDANSETRON 4 MG: 2 INJECTION INTRAMUSCULAR; INTRAVENOUS at 10:23

## 2023-05-21 RX ADMIN — SUGAMMADEX 200 MG: 100 INJECTION, SOLUTION INTRAVENOUS at 10:22

## 2023-05-21 RX ADMIN — AMANTADINE HYDROCHLORIDE 100 MG: 100 CAPSULE ORAL at 20:30

## 2023-05-21 RX ADMIN — CARBOXYMETHYLCELLULOSE SODIUM 1 DROP: 5 SOLUTION/ DROPS OPHTHALMIC at 00:08

## 2023-05-21 RX ADMIN — GABAPENTIN 800 MG: 800 TABLET, FILM COATED ORAL at 20:37

## 2023-05-21 RX ADMIN — PROPOFOL 50 MG: 10 INJECTION, EMULSION INTRAVENOUS at 09:36

## 2023-05-21 RX ADMIN — CARBIDOPA AND LEVODOPA 1 TABLET: 50; 200 TABLET, EXTENDED RELEASE ORAL at 08:19

## 2023-05-21 RX ADMIN — Medication 12.5 MG: at 08:20

## 2023-05-21 RX ADMIN — AMANTADINE HYDROCHLORIDE 100 MG: 100 CAPSULE ORAL at 08:17

## 2023-05-21 RX ADMIN — FENTANYL CITRATE 50 MCG: 50 INJECTION, SOLUTION INTRAMUSCULAR; INTRAVENOUS at 09:54

## 2023-05-21 RX ADMIN — PHENYLEPHRINE HYDROCHLORIDE 100 MCG: 10 INJECTION INTRAVENOUS at 09:48

## 2023-05-21 RX ADMIN — LIDOCAINE HYDROCHLORIDE 100 MG: 20 INJECTION, SOLUTION INFILTRATION; PERINEURAL at 09:32

## 2023-05-21 RX ADMIN — Medication 5 MG: at 13:07

## 2023-05-21 RX ADMIN — CEFAZOLIN 2 G: 1 INJECTION, POWDER, FOR SOLUTION INTRAMUSCULAR; INTRAVENOUS at 09:37

## 2023-05-21 RX ADMIN — ALFUZOSIN HYDROCHLORIDE 10 MG: 10 TABLET, EXTENDED RELEASE ORAL at 08:18

## 2023-05-21 RX ADMIN — HYDROMORPHONE HYDROCHLORIDE 0.5 MG: 1 INJECTION, SOLUTION INTRAMUSCULAR; INTRAVENOUS; SUBCUTANEOUS at 00:03

## 2023-05-21 RX ADMIN — HYDROXYZINE HYDROCHLORIDE 50 MG: 25 TABLET, FILM COATED ORAL at 08:42

## 2023-05-21 RX ADMIN — HYDROMORPHONE HYDROCHLORIDE 0.5 MG: 1 INJECTION, SOLUTION INTRAMUSCULAR; INTRAVENOUS; SUBCUTANEOUS at 00:34

## 2023-05-21 RX ADMIN — GABAPENTIN 800 MG: 800 TABLET, FILM COATED ORAL at 08:20

## 2023-05-21 RX ADMIN — ACETAMINOPHEN 325MG 975 MG: 325 TABLET ORAL at 08:17

## 2023-05-21 RX ADMIN — PHENYLEPHRINE HYDROCHLORIDE 100 MCG: 10 INJECTION INTRAVENOUS at 10:00

## 2023-05-21 RX ADMIN — LACTULOSE 15 ML: 10 SOLUTION ORAL at 18:07

## 2023-05-21 RX ADMIN — TRAZODONE HYDROCHLORIDE 100 MG: 100 TABLET ORAL at 21:37

## 2023-05-21 RX ADMIN — Medication 12.5 MG: at 20:30

## 2023-05-21 RX ADMIN — TRAZODONE HYDROCHLORIDE 100 MG: 100 TABLET ORAL at 00:08

## 2023-05-21 RX ADMIN — CEFAZOLIN 1 G: 1 INJECTION, POWDER, FOR SOLUTION INTRAMUSCULAR; INTRAVENOUS at 18:07

## 2023-05-21 RX ADMIN — PHENYLEPHRINE HYDROCHLORIDE 100 MCG: 10 INJECTION INTRAVENOUS at 09:51

## 2023-05-21 RX ADMIN — CARBIDOPA AND LEVODOPA 1 TABLET: 50; 200 TABLET, EXTENDED RELEASE ORAL at 20:37

## 2023-05-21 RX ADMIN — HYDROMORPHONE HYDROCHLORIDE 0.2 MG: 1 INJECTION, SOLUTION INTRAMUSCULAR; INTRAVENOUS; SUBCUTANEOUS at 10:55

## 2023-05-21 RX ADMIN — CLONAZEPAM 2 MG: 2 TABLET ORAL at 20:37

## 2023-05-21 RX ADMIN — GABAPENTIN 800 MG: 300 CAPSULE ORAL at 00:06

## 2023-05-21 RX ADMIN — ACETAMINOPHEN 325MG 975 MG: 325 TABLET ORAL at 20:30

## 2023-05-21 RX ADMIN — PANTOPRAZOLE SODIUM 40 MG: 40 TABLET, DELAYED RELEASE ORAL at 08:21

## 2023-05-21 RX ADMIN — GABAPENTIN 800 MG: 800 TABLET, FILM COATED ORAL at 13:07

## 2023-05-21 RX ADMIN — Medication 50 MG: at 09:32

## 2023-05-21 RX ADMIN — SODIUM CHLORIDE, POTASSIUM CHLORIDE, SODIUM LACTATE AND CALCIUM CHLORIDE: 600; 310; 30; 20 INJECTION, SOLUTION INTRAVENOUS at 09:00

## 2023-05-21 RX ADMIN — IOPAMIDOL 73 ML: 755 INJECTION, SOLUTION INTRAVENOUS at 00:43

## 2023-05-21 RX ADMIN — HYDROMORPHONE HYDROCHLORIDE 0.4 MG: 0.2 INJECTION, SOLUTION INTRAMUSCULAR; INTRAVENOUS; SUBCUTANEOUS at 14:36

## 2023-05-21 RX ADMIN — ATORVASTATIN CALCIUM 40 MG: 40 TABLET, FILM COATED ORAL at 20:37

## 2023-05-21 RX ADMIN — FENTANYL CITRATE 50 MCG: 50 INJECTION, SOLUTION INTRAMUSCULAR; INTRAVENOUS at 09:32

## 2023-05-21 RX ADMIN — CARBIDOPA AND LEVODOPA 1 TABLET: 50; 200 TABLET, EXTENDED RELEASE ORAL at 00:08

## 2023-05-21 RX ADMIN — CLOZAPINE 50 MG: 50 TABLET ORAL at 21:31

## 2023-05-21 ASSESSMENT — ACTIVITIES OF DAILY LIVING (ADL)
WALKING_OR_CLIMBING_STAIRS_DIFFICULTY: YES
ADLS_ACUITY_SCORE: 38
DRESSING/BATHING: BATHING DIFFICULTY, ASSISTANCE 1 PERSON
ADLS_ACUITY_SCORE: 44
EATING: 0-->INDEPENDENT
NUMBER_OF_TIMES_PATIENT_HAS_FALLEN_WITHIN_LAST_SIX_MONTHS: 2
TOILETING_ISSUES: NO
SWALLOWING: 2-->DIFFICULTY SWALLOWING LIQUIDS
HEARING_DIFFICULTY_OR_DEAF: NO
ADLS_ACUITY_SCORE: 35
TOILETING_ISSUES: NO
ADLS_ACUITY_SCORE: 44
WEAR_GLASSES_OR_BLIND: NO
DIFFICULTY_COMMUNICATING: NO
TRANSFERRING: 1-->ASSISTANCE (EQUIPMENT/PERSON) NEEDED (NOT DEVELOPMENTALLY APPROPRIATE)
ADLS_ACUITY_SCORE: 44
CHANGE_IN_FUNCTIONAL_STATUS_SINCE_ONSET_OF_CURRENT_ILLNESS/INJURY: YES
DRESSING/BATHING: BATHING DIFFICULTY, ASSISTANCE 1 PERSON
ADLS_ACUITY_SCORE: 44
SWALLOWING: 2-->DIFFICULTY SWALLOWING LIQUIDS
DRESS: 1-->ASSISTANCE (EQUIPMENT/PERSON) NEEDED
EATING/SWALLOWING: SWALLOWING LIQUIDS
TRANSFERRING: 1-->ASSISTANCE (EQUIPMENT/PERSON) NEEDED
DOING_ERRANDS_INDEPENDENTLY_DIFFICULTY: YES
EATING: 0-->INDEPENDENT
ADLS_ACUITY_SCORE: 38
ADLS_ACUITY_SCORE: 38
SWALLOWING: 2-->DIFFICULTY SWALLOWING LIQUIDS
WEAR_GLASSES_OR_BLIND: NO
WALKING_OR_CLIMBING_STAIRS_DIFFICULTY: YES
CONCENTRATING,_REMEMBERING_OR_MAKING_DECISIONS_DIFFICULTY: NO
DRESSING/BATHING_DIFFICULTY: YES
BATHING: 1-->ASSISTANCE NEEDED
BATHING: 1-->ASSISTANCE NEEDED
ADLS_ACUITY_SCORE: 38
EATING: 0-->INDEPENDENT
TRANSFERRING: 1-->ASSISTANCE (EQUIPMENT/PERSON) NEEDED
DIFFICULTY_EATING/SWALLOWING: YES
ADLS_ACUITY_SCORE: 38
CHANGE_IN_FUNCTIONAL_STATUS_SINCE_ONSET_OF_CURRENT_ILLNESS/INJURY: YES
FALL_HISTORY_WITHIN_LAST_SIX_MONTHS: YES
DOING_ERRANDS_INDEPENDENTLY_DIFFICULTY: YES
DRESS: 0-->ASSISTANCE NEEDED (DEVELOPMENTALLY APPROPRIATE)
SWALLOWING: 2-->DIFFICULTY SWALLOWING LIQUIDS
DRESS: 0-->ASSISTANCE NEEDED (DEVELOPMENTALLY APPROPRIATE)
TRANSFERRING: 1-->ASSISTANCE (EQUIPMENT/PERSON) NEEDED (NOT DEVELOPMENTALLY APPROPRIATE)
ADLS_ACUITY_SCORE: 38
EQUIPMENT_CURRENTLY_USED_AT_HOME: OTHER (SEE COMMENTS)
WALKING_OR_CLIMBING_STAIRS: AMBULATION DIFFICULTY, REQUIRES EQUIPMENT
CONCENTRATING,_REMEMBERING_OR_MAKING_DECISIONS_DIFFICULTY: NO
WALKING_OR_CLIMBING_STAIRS: AMBULATION DIFFICULTY, REQUIRES EQUIPMENT
DRESS: 1-->ASSISTANCE (EQUIPMENT/PERSON) NEEDED
NUMBER_OF_TIMES_PATIENT_HAS_FALLEN_WITHIN_LAST_SIX_MONTHS: 2
FALL_HISTORY_WITHIN_LAST_SIX_MONTHS: YES
DRESSING/BATHING_DIFFICULTY: YES
EATING/SWALLOWING: SWALLOWING LIQUIDS
ADLS_ACUITY_SCORE: 35
DIFFICULTY_EATING/SWALLOWING: YES
EATING: 0-->INDEPENDENT

## 2023-05-21 NOTE — PROGRESS NOTES
Pt A&Ox4. Able to make needs known. 2 L O2 via NC. L PIV SL. Bedrest with no weight bearing on RLE. Continent bowel and bladder -- last BM per pt 5/19. Uses urinal at bedside. Denies SOB, chest pain, nausea/vomiting, and numbness/tingling. Pt c/o pain in R ankle rated pain 5/10; administered prn Oxycodone and Dilaudid. No acute events. Call light in reach.

## 2023-05-21 NOTE — ANESTHESIA PROCEDURE NOTES
Airway       Patient location during procedure: OR       Procedure Start/Stop Times: 5/21/2023 9:36 AM  Staff -        Anesthesiologist:  Rakel Santos MD       CRNA: Kei Santiago APRN CRNA       Performed By: CRNA  Consent for Airway        Urgency: elective  Indications and Patient Condition       Indications for airway management: mikey-procedural       Induction type:intravenous       Mask difficulty assessment: 2 - vent by mask + OA or adjuvant +/- NMBA    Final Airway Details       Final airway type: endotracheal airway       Successful airway: ETT - single  Endotracheal Airway Details        ETT size (mm): 8.0       Cuffed: yes       Successful intubation technique: video laryngoscopy       VL Blade Size: MAC 4       Grade View of Cords: 1       Adjucts: stylet       Position: Right       Measured from: gums/teeth       Secured at (cm): 25       Bite block used: None    Post intubation assessment        Placement verified by: capnometry, equal breath sounds and chest rise        Number of attempts at approach: 1       Number of other approaches attempted: 0       Secured with: ties       Ease of procedure: easy       Dentition: Intact and Unchanged    Medication(s) Administered   Medication Administration Time: 5/21/2023 9:36 AM

## 2023-05-21 NOTE — ANESTHESIA PREPROCEDURE EVALUATION
Anesthesia Pre-Procedure Evaluation    Patient: Benjamin Mathews   MRN: [id number] : 1965        Procedure : Procedure(s):  Right Ankle External Fixator Placement          Past Medical History:   Diagnosis Date     Cerebral infarction (H)      Clotting disorder (H)     PE 2019     Dystonia      Parkinson disease (H)       History reviewed. No pertinent surgical history.   Allergies   Allergen Reactions     Amantadine Other (See Comments)     Other reaction(s): Hallucinations  Hallucinations/ lost self control/gambling.     halluicnations  Hallucinations/ lost self control/gambling.     hallucinates  halluicnations  hallucinates  Hallucinations/ lost self control/gambling.        Quetiapine GI Disturbance, Diarrhea and Other (See Comments)     Diarrhea    Diarrhea  Other reaction(s): GI Disturbance  Diarrhea       Duloxetine Other (See Comments)     suicidal  suicidal        Social History     Tobacco Use     Smoking status: Every Day     Types: Pipe, Cigars, Cigarettes     Smokeless tobacco: Never   Vaping Use     Vaping status: Not on file   Substance Use Topics     Alcohol use: Not Currently     Comment: quit       Wt Readings from Last 1 Encounters:   23 106.6 kg (235 lb)        Anesthesia Evaluation            ROS/MED HX  ENT/Pulmonary:     (+) asthma     Neurologic:     (+) CVA, date: , TIA (2017), date: 2017, Parkinson's disease, features: psychosis, dyskinesias, muscle spasms/contracture, depression, sleep disturbance,  (-) no Multiple Sclerosis   Cardiovascular:     (+) hypertension-----fainting (syncope) (vagal otrthostatic patient endorses seeing spots, diaphoresis).     METS/Exercise Tolerance:     Hematologic:  - neg hematologic  ROS   (+) History of blood clots (DVT/ PE 2019 anticoagulation held for surgery), pt is anticoagulated,     Musculoskeletal:  - neg musculoskeletal ROS     GI/Hepatic:       Renal/Genitourinary:       Endo:       Psychiatric/Substance Use:     (+) psychiatric  history anxiety and depression     Infectious Disease:       Malignancy:       Other:            Physical Exam    Airway        Mallampati: IV   TM distance: > 3 FB   Neck ROM: full   Mouth opening: < 3 cm    Respiratory Devices and Support         Dental           Cardiovascular   cardiovascular exam normal       Rhythm and rate: regular and normal     Pulmonary   pulmonary exam normal        breath sounds clear to auscultation           OUTSIDE LABS:  CBC:   Lab Results   Component Value Date    WBC 8.5 05/20/2023    WBC 7.3 11/20/2022    HGB 11.2 (L) 05/20/2023    HGB 11.2 (L) 11/20/2022    HCT 36.4 (L) 05/20/2023    HCT 35.2 (L) 11/20/2022     05/20/2023     11/20/2022     BMP:   Lab Results   Component Value Date     05/20/2023     05/09/2023    POTASSIUM 4.9 05/20/2023    POTASSIUM 4.1 05/09/2023    CHLORIDE 106 05/20/2023    CHLORIDE 113 (H) 05/09/2023    CO2 22 05/20/2023    CO2 25 05/09/2023    BUN 21.0 (H) 05/20/2023    BUN 24 05/09/2023    CR 0.99 05/20/2023    CR 0.99 05/09/2023    GLC 93 05/20/2023    GLC 98 05/09/2023     COAGS:   Lab Results   Component Value Date    INR 1.15 05/20/2023     POC:   Lab Results   Component Value Date    BGM 84 07/08/2021     HEPATIC:   Lab Results   Component Value Date    ALBUMIN 3.9 05/20/2023    PROTTOTAL 6.6 05/20/2023    ALT 11 05/20/2023    AST 25 05/20/2023    ALKPHOS 90 05/20/2023    BILITOTAL 0.2 05/20/2023     OTHER:   Lab Results   Component Value Date    LACT 0.9 05/28/2022    A1C 6.0 (H) 07/08/2021    MARTINA 8.5 (L) 05/20/2023    MAG 2.1 05/20/2023    LIPASE 31 05/20/2023    TSH 2.99 05/27/2022       Anesthesia Plan    ASA Status:  4, emergent    NPO Status:  NPO Appropriate    Anesthesia Type: General.     - Airway: ETT   Induction: Intravenous, Propofol.           Consents    Anesthesia Plan(s) and associated risks, benefits, and realistic alternatives discussed. Questions answered and patient/representative(s) expressed  understanding.    - Discussed:     - Discussed with:  Patient      - Extended Intubation/Ventilatory Support Discussed: No.      - Patient is DNR/DNI Status: No         Postoperative Care    Pain management: IV analgesics, Oral pain medications, Multi-modal analgesia.   PONV prophylaxis: Ondansetron (or other 5HT-3)     Comments:           CBC RESULTS: Recent Labs   Lab Test 05/20/23  1832 11/20/22  2143 11/06/22  1813 05/31/22  0544   WBC 8.5 7.3 5.7 6.0   HGB 11.2* 11.2* 11.5* 11.3*   HCT 36.4* 35.2* 36.2* 35.6*    224 223 183     Last Comprehensive Metabolic Panel:  Recent Labs   Lab Test 05/20/23  1832 05/09/23  1748 11/20/22 2143 11/06/22  1813    142 140 140   POTASSIUM 4.9 4.1 4.0 4.7   CHLORIDE 106 113* 105 104   CO2 22 25 23 25   ANIONGAP 12 4 12 11   BUN 21.0* 24 22.7* 19.3   CR 0.99 0.99 0.81 0.83   GFRESTIMATED 88 88 >90 >90   GLC 93 98 129* 102*   MARTINA 8.5* 8.6 8.9 8.7             Rakel Santos MD
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
## 2023-05-21 NOTE — PROGRESS NOTES
See H&P by Erik Munoz DO   EMR reviewed.  Patient with ankle fracture, right.  Patient in OR, undergoing Right Ankle Closed Reductionand  External Fixator Placement by orthopedic.  Not seen by me today.  No changes made.    Postop management per orthopedic team.    Vitals:    05/21/23 1215 05/21/23 1230 05/21/23 1315 05/21/23 1416   BP: 122/69 116/62 120/68    BP Location: Right arm Right arm Right arm    Patient Position:       Cuff Size:       Pulse: 81 79 72    Resp: 15 15 14    Temp: 97.8  F (36.6  C) 97.9  F (36.6  C) 98.5  F (36.9  C)    TempSrc: Oral Oral Oral    SpO2: 95% 95% 96% 97%   Weight:       Height:           Armen Lundberg MD  [facility]  Contact information available via [facility] Paging/Directory

## 2023-05-21 NOTE — PHARMACY-ADMISSION MEDICATION HISTORY
Pharmacist Admission Medication History    Admission medication history is complete. The information provided in this note is only as accurate as the sources available at the time of the update.    Medication reconciliation/reorder completed by provider prior to medication history? Yes    Information Source(s): Patient, Patient's pharmacy and CareEverywhere/SureScripts. Pts preferred pharmacy is [facility]  [phone]    Pertinent Information:   -Patient resides in a group home and has a nurse who helps patient with his medications. Rarely misses doses because of this    -Last dose of clozapine was the night before he was brought to the ED      Changes made to PTA medication list:    Added: Linzess PRN    Deleted: None    Changed: Venlafaxine  mg daily > 300 mg daily     Medication History Completed By: Maria Irving [facility] 5/21/2023 3:24 PM    Prior to Admission medications    Medication Sig Last Dose Taking? Auth Provider Long Term End Date   alfuzosin ER (UROXATRAL) 10 MG 24 hr tablet Take 1 tablet (10 mg) by mouth daily 5/20/2023 Yes Chris Serna MD  8/2/23   amantadine (SYMMETREL) 100 MG capsule TAKE 1 CAPSULE BY MOUTH TWICE DAILY 5/20/2023 Yes Cary Merlos MD     carbidopa-levodopa (SINEMET)  MG tablet 2 tabs @ 8am, 2 @ noon, 1.5 @4pm 5/20/2023 Yes Reported, Patient No    cholecalciferol (VITAMIN D3) 125 mcg (5000 units) capsule 125 mcg (5000 units) by mouth daily at  8am 5/20/2023 Yes Reported, Patient     clonazePAM (KLONOPIN) 1 MG tablet Take 1 tablet (1 mg) by mouth daily At noon. 5/20/2023 Yes Bernadette Arriaza MD Yes    clonazePAM (KLONOPIN) 2 MG tablet TAKE 1 TABLET BY MOUTH TWICE DAILY ( MORNING & BEDTIME ) *1 TOTAL FILL* 5/20/2023 Yes Bernadette Arriaza MD Yes    ENULOSE 10 GM/15ML SOLUTION 15ml by mouth twice a day at 8AM and 6PM 5/20/2023 Yes Anthony Sosa MD     gabapentin (NEURONTIN) 800 MG tablet 800mg tab by mouth 3/day at 8am, 12pm and 8pm 5/20/2023 Yes Reported,  Patient Yes    hydrocortisone 1 % CREA cream Apply topically to nose and other affected area(s) every morning at 9am 5/20/2023 Yes Unknown, Entered By History     hydrOXYzine (ATARAX) 25 MG tablet Take 50 mg by mouth every 6 hours as needed for anxiety (up to 3 timrd daily) Past Week Yes Kennedy Perry MD     linaclotide (LINZESS) 290 MCG capsule Take 290 mcg by mouth as needed (IBSc) 5/17/2023 Yes Unknown, Entered By History     lubiprostone (AMITIZA) 24 MCG capsule 24mg tab by mouth twice daily at 8am and 8pm 5/20/2023 Yes Reported, Patient     metoprolol succinate ER (TOPROL-XL) 25 MG 24 hr tablet Take 1/2 tablet (12.5mg) by mouth twice daily at 8am and 8pm 5/20/2023 Yes Reported, Patient Yes    Multiple Vitamin (DAILY-ALLAN) TABS Vitamin by  Mouth daily @8am 5/20/2023 Yes Reported, Patient     pantoprazole (PROTONIX) 40 MG EC tablet Take 1 tablet (40mg) by mouth daily at 8am 5/20/2023 Yes Reported, Patient     polyethylene glycol (MIRALAX) 17 GM/Dose powder Take 17 g by mouth 2 times daily One capful in liquid by mouth twice a day, 8 AM and one dose at supper Past Week Yes Miryam Montemayor APRN CNP     sodium phosphate (FLEET ENEMA) 7-19 GM/118ML rectal enema Place 1 enema rectally daily as needed for constipation Past Month Yes Reported, Patient     traZODone (DESYREL) 50 MG tablet 50 mg In AM 5/20/2023 Yes Reported, Patient Yes    triamcinolone (KENALOG) 0.1 % external cream Apply topically 2 times daily as needed for irritation Past Month Yes Reported, Patient     venlafaxine (EFFEXOR XR) 150 MG 24 hr capsule Take 300 mg by mouth daily 5/20/2023 Yes Reported, Patient Yes    vitamin C (ASCORBIC ACID) 500 MG tablet Take 500 mg by mouth daily 5/20/2023 Yes Reported, Patient     acetaminophen (TYLENOL) 500 MG tablet 2 x 500mg tabs by mouth 3/day @8am, 12pm and 8pm and 2 x 500mg tab by mouth every 6 hours as needed prn at prn  Reported, Patient     atorvastatin (LIPITOR) 40 MG tablet Take 40 mg by mouth At 8pm  5/19/2023 at 2000  Reported, Patient Yes    bisacodyl (DULCOLAX) 10 MG suppository INSERT 1 SUPPOSITORY RECTALLY EVERY OTHER DAY. *B* HOLD FOR LOOSE STOOLS 5/19/2023  Anthony Sosa MD     bisacodyl (DULCOLAX) 5 MG EC tablet Take 1 tablet (5 mg) by mouth three times a week Take one 5 mg tablet three times a week   Miryam Montemayor APRN CNP     calcium polycarbophil (FIBER-LAX) 625 MG tablet 1 tab by mouth daily at 8am   Reported, Patient     carbidopa-levodopa (SINEMET CR)  MG CR tablet 50/200 tab by mouth nightly at 8pm 5/19/2023  Reported, Patient No    clonazePAM (KLONOPIN) 1 MG tablet Take 1 tablet (1 mg) by mouth daily as needed for muscle spasms   Bernadette Arriaza MD Yes    cloZAPine (CLOZARIL) 50 MG tablet 50 mg At Bedtime 5/19/2023 at 2000  Reported, Patient Yes    rivaroxaban ANTICOAGULANT (XARELTO) 20 MG TABS tablet Take 20 mg by mouth daily (with dinner) At 5pm 5/19/2023  Reported, Patient Yes    traZODone (DESYREL) 100 MG tablet Take 100 mg by mouth daily at 8pm 5/19/2023  Reported, Patient Yes

## 2023-05-21 NOTE — ANESTHESIA POSTPROCEDURE EVALUATION
Patient: Benjamin Mathews    Procedure: Procedure(s):  Right Ankle Closed Reductionand  External Fixator Placement       Anesthesia Type:  General    Note:  Disposition: Inpatient   Postop Pain Control: Uneventful            Sign Out: Well controlled pain   PONV: No   Neuro/Psych: Uneventful            Sign Out: Acceptable/Baseline neuro status   Airway/Respiratory: Uneventful            Sign Out: Acceptable/Baseline resp. status   CV/Hemodynamics: Uneventful            Sign Out: Acceptable CV status; No obvious hypovolemia; No obvious fluid overload   Other NRE: NONE   DID A NON-ROUTINE EVENT OCCUR? No           Last vitals:  Vitals Value Taken Time   /63 05/21/23 1100   Temp 36.4  C (97.5  F) 05/21/23 1100   Pulse 83 05/21/23 1105   Resp 13 05/21/23 1105   SpO2 98 % 05/21/23 1108   Vitals shown include unvalidated device data.    Electronically Signed By: Rakel Santos MD  May 21, 2023  11:31 AM

## 2023-05-21 NOTE — PROGRESS NOTES
Temp: 98.2  F (36.8  C) Temp src: Oral BP: 120/68 Pulse: 87   Resp: 16 SpO2: 95 % O2 Device: Nasal cannula Oxygen Delivery: 2 LPM    Patient is alert and oriented X4, pleasant, on supplemental O2, continuous pulse O2 monitoring, denies pain, tremors, dressing is CDI, tolerating meals, able to verbalize needs, No complaints, call light within reach. Continue with POC

## 2023-05-21 NOTE — H&P
[facility]    History and Physical - Hospitalist Service       Date of Admission:  5/20/2023    Assessment & Plan      Benjamin Mathews is a 58 year old male admitted on 5/20/2023 for right ankle fracture.    Right ankle fracture after ground level fall  - ordered tylenol, nsaid, opiates for pain control, continue to elevate, other recs per ortho and plans for surgery in the morning  - hold rivaroxaban until hemostasis after surgery, then resume (secondary prevention of DVT/PE)    preop assessment: ECG unchanged, no need to repeat echo; had TIA in 2017 it seems which gives him 1 point on Revised Cardiac Risk Index --> 6% risk of 30 day death/MI/arrest post non cardiac surgery; no underlying lung disease though he is an active smoker and it sounds like he smokes quite a bit (cigars, pipe, cigarettes) and doesn't seem to have had PFT's in the past; performs all ADLs independently and functional status seems to be good    Syncopal Episode  - by history this sounds vagal/orthostatic; patient endorses seeing spots, diaphoresis, and onset shortly after rising from seated position with return to baseline upon waking  - explanation could be intravascular depletion due to ~10 days of diarrhea  - it would make sense to repeat his echo before discharge (last done May 2022, essentially normal) but I don't think this needs to happen prior to surgery    Diarrheal Illness vs Iatrogenic Diarrhea  - denies other signs/symptoms of infection; his laxative regimen is incredible and I would lean toward blaming that for his diarrhea, though Parkinson's patients can have notoriously difficult to treat constipation  - have selectively resumed some of his laxatives and recommend obsessive attention to stool output as getting behind on his bm's could result in prolonging his hospitalization    Parkinson's and Associated complications (psychosis, dyskinesias, muscle spasms/contracture, depression,  "sleep disturbance)  - amantadine, sinemet, clonazepam, clozapine, gabapentin, venlafaxine, trazodone all ordered  --> his venlafaxine dose was difficult for me to ascertain tonight; it's either 150 mg ER daily or 225 mg ER daily; I guessed 150 and that the residual 75 mg ER rx was his former dose though it looks like his venlafaxine was discontinued for a time back in 2019    History of DVT/PE  - held rivaroxaban, resume after surgery pending ok from ortho  - D dimer elevated in ED but CTA chest negative for new PE    Cerebrovascular Disease  - atorvastatin ordered       Diet:  NPO for surgery, resume regular diet when able to eat  DVT Prophylaxis: resume rivaroxaban as able after surgery  Dudley Catheter: Not present  Lines: None     Cardiac Monitoring: None  Code Status:  DNR/DNI    Clinically Significant Risk Factors Present on Admission               # Drug Induced Coagulation Defect: home medication list includes an anticoagulant medication    # Hypertension: Noted on problem list      # Overweight: Estimated body mass index is 28.61 kg/m  as calculated from the following:    Height as of this encounter: 1.93 m (6' 4\").    Weight as of this encounter: 106.6 kg (235 lb).            Disposition Plan      Expected Discharge Date: 05/22/2023                  Erik Bray DO  Hospitalist Service  [facility]  Securely message with [employer] (more info)  Text page via [employer] Paging/Directory     ______________________________________________________________________    Chief Complaint   Right ankle pain    History is obtained from the patient    History of Present Illness   Benjamin Mathews is a 58 year old male who fell yesterday after rising from a seated position, lost consciousness, and woke up in severe right ankle pain. XR's in the ED revealed a fracture and he was transferred to the [facility] in preparation for surgery with ortho.   Benjamin says he has had a diarrheal " illness for the last 10 days or so (better now) and that he was having many liquid bm's daily. He denies fever, chills, other infectious symptoms.   When Benjamin passed out he had been sitting in a recliner. He felt ok at first but after 1-2 steps he said he started to 'see stars'. He endorses some diaphoresis at this time and estimates he was unconscious about 1-1.5 minutes. He said he had a similar episode where he almost passed out a few days earlier. He admits to a lot of fluid loss due to diarrhea.  Benjamin has never had a heart attack, completes his ADLs independently, and denies shortness of breath or chest pain with his normal daily activities. The most strenuous thing he has to do is walk up a ramp and says this doesn't bother him typically. Diagnosed with a posterior circulation TIA in 2017.  History of DVT/PE, takes rivaroxaban.      Past Medical History    Past Medical History:   Diagnosis Date     Cerebral infarction (H)      Clotting disorder (H)     PE      Dystonia      Parkinson disease (H)        Past Surgical History   History reviewed. No pertinent surgical history.    Prior to Admission Medications   Prior to Admission Medications   Prescriptions Last Dose Informant Patient Reported? Taking?   ENULOSE 10 GM/15ML SOLUTION   Yes No   Sig: 15ml by mouth twice a day at 8AM and 6PM   Multiple Vitamin (DAILY-ALLAN) TABS   Yes No   Sig: Vitamin by  Mouth daily @8am   acetaminophen (TYLENOL) 500 MG tablet   Yes No   Si x 500mg tabs by mouth 3/day @8am, 12pm and 8pm and 2 x 500mg tab by mouth every 6 hours as needed   alfuzosin ER (UROXATRAL) 10 MG 24 hr tablet   No No   Sig: Take 1 tablet (10 mg) by mouth daily   amantadine (SYMMETREL) 100 MG capsule   No No   Sig: TAKE 1 CAPSULE BY MOUTH TWICE DAILY   atorvastatin (LIPITOR) 40 MG tablet   Yes No   Sig: Take 40 mg by mouth At 8pm   bisacodyl (DULCOLAX) 10 MG suppository   No No   Sig: INSERT 1 SUPPOSITORY RECTALLY EVERY OTHER DAY. *B* HOLD FOR LOOSE  STOOLS   bisacodyl (DULCOLAX) 5 MG EC tablet   No No   Sig: Take 1 tablet (5 mg) by mouth three times a week Take one 5 mg tablet three times a week   calcium polycarbophil (FIBER-LAX) 625 MG tablet   Yes No   Si tab by mouth daily at 8am   carbidopa-levodopa (SINEMET CR)  MG CR tablet   Yes No   Si/200 tab by mouth nightly at 8pm   carbidopa-levodopa (SINEMET)  MG tablet   Yes No   Si tabs @ 8am, 2 @ noon, 1.5 @4pm   cholecalciferol (VITAMIN D3) 125 mcg (5000 units) capsule   Yes No   Si mcg (5000 units) by mouth daily at  8am   cloZAPine (CLOZARIL) 50 MG tablet   Yes No   Si mg At Bedtime   clonazePAM (KLONOPIN) 1 MG tablet   No No   Sig: Take 1 tablet (1 mg) by mouth daily as needed for muscle spasms   clonazePAM (KLONOPIN) 1 MG tablet   No No   Sig: Take 1 tablet (1 mg) by mouth daily At noon.   clonazePAM (KLONOPIN) 2 MG tablet   No No   Sig: TAKE 1 TABLET BY MOUTH TWICE DAILY ( MORNING & BEDTIME ) *1 TOTAL FILL*   gabapentin (NEURONTIN) 800 MG tablet   Yes No   Simg tab by mouth 3/day at 8am, 12pm and 8pm   hydrOXYzine (ATARAX) 25 MG tablet   Yes No   Sig: Take 50 mg by mouth every 6 hours as needed for anxiety (up to 3 timrd daily)   hydrocortisone 1 % CREA cream   Yes No   Sig: Apply topically to nose and other affected area(s) every morning at 9am   lubiprostone (AMITIZA) 24 MCG capsule   Yes No   Simg tab by mouth twice daily at 8am and 8pm   metoprolol succinate ER (TOPROL-XL) 25 MG 24 hr tablet   Yes No   Sig: Take 1/2 tablet (12.5mg) by mouth twice daily at 8am and 8pm   pantoprazole (PROTONIX) 40 MG EC tablet   Yes No   Sig: Take 1 tablet (40mg) by mouth daily at 8am   Patient not taking: Reported on 2023   polyethylene glycol (MIRALAX) 17 GM/Dose powder   No No   Sig: Take 17 g by mouth 2 times daily One capful in liquid by mouth twice a day, 8 AM and one dose at supper   rivaroxaban ANTICOAGULANT (XARELTO) 20 MG TABS tablet   Yes No   Sig: Take 20 mg  by mouth daily (with dinner) At 5pm   sodium phosphate (FLEET ENEMA) 7-19 GM/118ML rectal enema   Yes No   Sig: Place 1 enema rectally daily as needed for constipation   traZODone (DESYREL) 100 MG tablet   Yes No   Sig: Take 100 mg by mouth daily at 8pm   traZODone (DESYREL) 50 MG tablet   Yes No   Si mg In AM   triamcinolone (KENALOG) 0.1 % external cream   Yes No   venlafaxine (EFFEXOR XR) 150 MG 24 hr capsule   Yes No   Sig: Take 1 capsule (150 mg) by mouth daily   venlafaxine (EFFEXOR XR) 75 MG 24 hr capsule   Yes No   Sig: Take 1 capsule (75 mg) by mouth daily   vitamin C (ASCORBIC ACID) 500 MG tablet   Yes No   Sig: Take 500 mg by mouth daily      Facility-Administered Medications Last Administration Doses Remaining   botulinum toxin type A (BOTOX) 100 units injection 400 Units 2023  1:16 PM 1           Sober for multiple decades  Smokes cigars, cigarettes, pipe - daily smoker  No illicit drugs    Physical Exam   Vital Signs: Temp: 98.3  F (36.8  C)   BP: 100/62 Pulse: 76   Resp: 16 SpO2: 96 %      Weight: 235 lbs 0 oz    lying in bed in no distress  Awake, alert, answers questions and follows commands  Respiratory rate and work of breathing are normal  HRRR without murmur, extremities warm and well perfused  Abdomen is soft, non tender, non distended  Extremities are without edema or cyanosis - right lower leg bandaged with wrap      Medical Decision Making       75 MINUTES SPENT BY ME on the date of service doing chart review, history, exam, documentation & further activities per the note.      Data     I have personally reviewed the following data over the past 24 hrs:    8.5  \   11.2 (L)   / 222     140 106 21.0 (H) /  93   4.9 22 0.99 \       ALT: 11 AST: 25 AP: 90 TBILI: 0.2   ALB: 3.9 TOT PROTEIN: 6.6 LIPASE: 31       Trop: 19 BNP: N/A       INR:  1.15 PTT:  N/A   D-dimer:  3.42 (H) Fibrinogen:  N/A       Imaging results reviewed over the past 24 hrs:   Recent Results (from the past 24  hour(s))   Ankle XR, G/E 3 views, right    Narrative    EXAM: XR ANKLE RIGHT G/E 3 VIEWS, XR TIBIA AND FIBULA RIGHT 2 VIEWS  LOCATION: [facility]  DATE/TIME: 5/20/2023 7:30 PM CDT    INDICATION: Fall, twisted ankle, swelling.  COMPARISON: None.      Impression    IMPRESSION: Comminuted oblique fracture of the distal fibula extending into the lateral corner of the ankle mortise. Additional comminuted fracture of the distal tibia extending into the tibial plafond with significant step-off along the joint margin.   The medial malleolus is primarily intact although the fracture extends to the base of the medial malleolus. Soft tissue swelling about the ankle. More proximally, the tibia and fibula are negative.   XR Tibia and Fibula Right 2 Views    Narrative    EXAM: XR ANKLE RIGHT G/E 3 VIEWS, XR TIBIA AND FIBULA RIGHT 2 VIEWS  LOCATION: [facility]  DATE/TIME: 5/20/2023 7:30 PM CDT    INDICATION: Fall, twisted ankle, swelling.  COMPARISON: None.      Impression    IMPRESSION: Comminuted oblique fracture of the distal fibula extending into the lateral corner of the ankle mortise. Additional comminuted fracture of the distal tibia extending into the tibial plafond with significant step-off along the joint margin.   The medial malleolus is primarily intact although the fracture extends to the base of the medial malleolus. Soft tissue swelling about the ankle. More proximally, the tibia and fibula are negative.   CT Chest Pulmonary Embolism w Contrast    Narrative    EXAM: CT CHEST PULMONARY EMBOLISM W CONTRAST  LOCATION: [facility]  DATE/TIME: 5/21/2023 12:46 AM CDT    INDICATION: evaluation for PE, SOB, pre syncope, elevated d dimer, recent hospitalization  COMPARISON: 5/28/2022  TECHNIQUE: CT chest pulmonary angiogram during arterial phase injection of IV contrast. Multiplanar  reformats and MIP reconstructions were performed. Dose reduction techniques were used.   CONTRAST: iopamidol (ISOVUE 370) solution 73    FINDINGS:  ANGIOGRAM CHEST: Pulmonary arteries are normal caliber and negative for pulmonary emboli. Thoracic aorta is negative for dissection. No CT evidence of right heart strain.    LUNGS AND PLEURA: Mild bibasilar atelectatic changes are noted, the lungs are otherwise clear.    MEDIASTINUM/AXILLAE: No lymphadenopathy. Normal esophagus. No significant pericardial effusion.    CORONARY ARTERY CALCIFICATION: Mild.    UPPER ABDOMEN: Normal.    MUSCULOSKELETAL: No concerning bone lesions.      Impression    IMPRESSION:  1.  No evidence of pulmonary embolus to the segmental level.   XR Ankle Right G/E 3 Views    Narrative    EXAM: XR ANKLE RIGHT G/E 3 VIEWS  LOCATION: [facility]  DATE/TIME: 5/21/2023 1:23 AM CDT    INDICATION: Post splint.  COMPARISON: 05/20/2023.      Impression    IMPRESSION: 3 views right ankle. Casting material obscures some details. Comminuted oblique fracture of the distal fibula extending into the lateral corner of the ankle mortise. Comminuted displaced fracture of the distal tibia extending into the tibial   plafond. Clinical correlation for alignment. Soft tissue swelling about the ankle.

## 2023-05-21 NOTE — PROGRESS NOTES
Full note to follow.    R ankle fracture, pilon variant with large posterior malleolus    PLAN:  -NWB RLE  -Ortho to perform ED splint reduction    Admit Hospitalist Service [facility] surgery on 5/21 AM, External Fixator Placement Right Ankle.     Jhon Bonilla MD  PGY-4 Orthopaedic Surgery

## 2023-05-21 NOTE — PLAN OF CARE
Patient arrived to unit around 2:30 alert and oriented X4 NWB utilizing the urinal without any concern. Slept through the rest of shift.Denied any pain or discomfort. Right arm PIV patent and flushing well. Out to Surgery this morning.

## 2023-05-21 NOTE — ANESTHESIA CARE TRANSFER NOTE
Patient: Benjamin Mathews    Procedure: Procedure(s):  Right Ankle Closed Reductionand  External Fixator Placement       Diagnosis: Ankle fracture, right [S82.891A]  Diagnosis Additional Information: No value filed.    Anesthesia Type:   General     Note:    Oropharynx: oropharynx clear of all foreign objects and spontaneously breathing  Level of Consciousness: awake  Oxygen Supplementation: face mask  Level of Supplemental Oxygen (L/min / FiO2): 8  Independent Airway: airway patency satisfactory and stable  Dentition: dentition unchanged  Vital Signs Stable: post-procedure vital signs reviewed and stable  Report to RN Given: handoff report given  Patient transferred to: PACU    Handoff Report: Identifed the Patient, Identified the Reponsible Provider, Reviewed the pertinent medical history, Discussed the surgical course, Reviewed Intra-OP anesthesia mangement and issues during anesthesia, Set expectations for post-procedure period and Allowed opportunity for questions and acknowledgement of understanding      Vitals:  Vitals Value Taken Time   /56 05/21/23 1035   Temp     Pulse 87 05/21/23 1040   Resp 11 05/21/23 1040   SpO2 99 % 05/21/23 1040   Vitals shown include unvalidated device data.    Electronically Signed By: VERO Ariza CRNA  May 21, 2023  10:41 AM

## 2023-05-21 NOTE — BRIEF OP NOTE
[facility]    Brief Operative Note    Pre-operative diagnosis: Ankle fracture, right [S82.891A]  Post-operative diagnosis Same as pre-operative diagnosis    Procedure: Procedure(s):  Right Ankle Closed Reductionand  External Fixator Placement  Surgeon: Surgeon(s) and Role:     * Nicole Apodaca MD - Primary     * Dmitry Wilson MD - Assisting     * Only, MD Cornel - Resident - Assisting  Anesthesia: Choice   Estimated Blood Loss: 5 mL from 5/21/2023  9:27 AM to 5/21/2023 10:33 AM      Drains: None  Specimens: * No specimens in log *  Findings:   None.  Complications: None.  Implants:   Implant Name Type Inv. Item Serial No.  Lot No. LRB No. Used Action   NAT [id number] - [id number] Metallic Hardware/Gideon NAT [id number]  [employer]  Right 2 Used as a Supply   COUPLING NAT TO NAT - [id number] Metallic Hardware/Gideon COUPLING NAT TO NAT  [employer]  Right 4 Used as a Supply   CLAMP XTRNFX 11MM HFMN 3 30D 5 HL PIN 2 POST - [id number] Metallic Hardware/Gideon CLAMP XTRNFX 11MM HFMN 3 30D 5 HL PIN 2 POST  [employer]  Right 1 Used as a Supply   PIN APEX S/D HALF 5X180 50 THR - [id number] Wire PIN APEX S/D HALF 5X180 50 THR  [employer]  Right 2 Implanted   PIN FX 300MM 5/6MM APX TRNFX SLFDRL THRD 40MM - [id number] Metallic Hardware/Gideon PIN FX 300MM 5/6MM APX TRNFX SLFDRL THRD 40MM  [employer]  Right 1 Implanted   CAP PROTECTIVE TIP BLUE 5MM - [id number] Metallic Hardware/Gideon CAP PROTECTIVE TIP BLUE 5MM  [employer]  Right 2 Used as a Supply         Brief Operative Note    Preop Dx:   Ankle fracture, right [S82.891A]  Post op Dx:   Same  Procedure:    Procedure(s):  Right Ankle Closed Reductionand  External Fixator Placement  Surgeon:     Dr. Apodaca    Assistants:    Dmitry Wilson MD; Cornel Bains MD   Anesthesia:   Choice  EBL:    Minimal   Total IV Fluids:  See Anesthesia  Record  Specimens:   None  Findings:   See Operative Dictation      Assessment and Plan: Benjamin Mathews is a 58 year old male with PMH including CVA, parkinson's diease and clotting disorder now s/p above procedure on 5/21/2023 with Dr. Apodaca.     medicine Primary  Activity: Up with assist until independent No excessive bending or twisting. No lifting >10 lbs x 6 weeks. No Yashira lift for transfers.   Weight bearing status: NWB RLE.  Pain management:   Transition from IV to PO narcotics as tolerated   Antibiotics: additional dose of ancef post op.  Diet: Begin with clear fluids and progress diet as tolerated.   DVT prophylaxis: SCDs only. No chemical DVT ppx needed.  Imaging: Xrays right ankle pacu, POST CT RLE ankle   Labs: Hgb POD 1  Bracing/Splinting: posterior slab splint .  Dressings:    Physical Therapy/Occupational Therapy: Eval and treat  Cultures: none.    Consults: Hospitalist  Follow-up:  tbd .   Disposition:  tbd     Indications for assistant:  I assisted in positioning, exposure, retraction, instrumentation, hemostasis, and wound closure allowing for reduction in anesthesia time and blood loss.     Thank you,    Cornel Bains MD   PGY1  Department of Orthopaedic Surgery  Pager [phone]      Please page me  with any questions/concerns during regular weekday hours before 4pm. If there is no response, if it is a weekend, or if it is during evening hours then please page the orthopaedic surgery resident on call.

## 2023-05-21 NOTE — PROGRESS NOTES
6MS TRANSFER ON 1147    D: Reason for transfer to was surgery for right ankle fixation at start of shift 0800.    I: Report called to Bello Valencia RN at 1100 from Nikki House RN in PACU. Patient transferred back to [facility] at 1147 via PACU RN.

## 2023-05-22 LAB
ATRIAL RATE - MUSE: 63 BPM
DIASTOLIC BLOOD PRESSURE - MUSE: NORMAL MMHG
INTERPRETATION ECG - MUSE: NORMAL
P AXIS - MUSE: 57 DEGREES
PR INTERVAL - MUSE: 164 MS
QRS DURATION - MUSE: 94 MS
QT - MUSE: 412 MS
QTC - MUSE: 421 MS
R AXIS - MUSE: -57 DEGREES
SYSTOLIC BLOOD PRESSURE - MUSE: NORMAL MMHG
T AXIS - MUSE: 68 DEGREES
VENTRICULAR RATE- MUSE: 63 BPM

## 2023-05-22 PROCEDURE — 250N000011 HC RX IP 250 OP 636: Performed by: PEDIATRICS

## 2023-05-22 PROCEDURE — 120N000002 HC R&B MED SURG/OB UMMC

## 2023-05-22 PROCEDURE — 250N000013 HC RX MED GY IP 250 OP 250 PS 637: Performed by: PEDIATRICS

## 2023-05-22 PROCEDURE — 99232 SBSQ HOSP IP/OBS MODERATE 35: CPT | Performed by: INTERNAL MEDICINE

## 2023-05-22 PROCEDURE — 250N000013 HC RX MED GY IP 250 OP 250 PS 637: Performed by: INTERNAL MEDICINE

## 2023-05-22 RX ORDER — CLONAZEPAM 1 MG/1
1 TABLET ORAL
Status: DISCONTINUED | OUTPATIENT
Start: 2023-05-22 | End: 2023-05-25 | Stop reason: HOSPADM

## 2023-05-22 RX ORDER — CARBIDOPA AND LEVODOPA 25; 100 MG/1; MG/1
2 TABLET ORAL 3 TIMES DAILY
Status: DISCONTINUED | OUTPATIENT
Start: 2023-05-22 | End: 2023-05-25 | Stop reason: HOSPADM

## 2023-05-22 RX ORDER — POLYETHYLENE GLYCOL 3350 17 G/17G
17 POWDER, FOR SOLUTION ORAL DAILY
Status: DISCONTINUED | OUTPATIENT
Start: 2023-05-22 | End: 2023-05-25 | Stop reason: HOSPADM

## 2023-05-22 RX ORDER — BISACODYL 10 MG
10 SUPPOSITORY, RECTAL RECTAL EVERY OTHER DAY
Status: DISCONTINUED | OUTPATIENT
Start: 2023-05-22 | End: 2023-05-22

## 2023-05-22 RX ORDER — ASCORBIC ACID 500 MG
500 TABLET ORAL DAILY
Status: DISCONTINUED | OUTPATIENT
Start: 2023-05-22 | End: 2023-05-25 | Stop reason: HOSPADM

## 2023-05-22 RX ORDER — POLYETHYLENE GLYCOL 3350 17 G/17G
17 POWDER, FOR SOLUTION ORAL 2 TIMES DAILY
Status: DISCONTINUED | OUTPATIENT
Start: 2023-05-22 | End: 2023-05-22

## 2023-05-22 RX ORDER — BISACODYL 10 MG
10 SUPPOSITORY, RECTAL RECTAL DAILY PRN
Status: DISCONTINUED | OUTPATIENT
Start: 2023-05-22 | End: 2023-05-25 | Stop reason: HOSPADM

## 2023-05-22 RX ORDER — LUBIPROSTONE 24 UG/1
24 CAPSULE ORAL 2 TIMES DAILY WITH MEALS
Status: DISCONTINUED | OUTPATIENT
Start: 2023-05-22 | End: 2023-05-22

## 2023-05-22 RX ORDER — TRAZODONE HYDROCHLORIDE 50 MG/1
50 TABLET, FILM COATED ORAL EVERY MORNING
Status: DISCONTINUED | OUTPATIENT
Start: 2023-05-23 | End: 2023-05-25 | Stop reason: HOSPADM

## 2023-05-22 RX ADMIN — LACTULOSE 15 ML: 10 SOLUTION ORAL at 18:49

## 2023-05-22 RX ADMIN — CLOZAPINE 50 MG: 50 TABLET ORAL at 22:09

## 2023-05-22 RX ADMIN — BISACODYL 10 MG: 10 SUPPOSITORY RECTAL at 13:38

## 2023-05-22 RX ADMIN — CARBIDOPA AND LEVODOPA 2 TABLET: 25; 100 TABLET ORAL at 14:22

## 2023-05-22 RX ADMIN — CARBIDOPA AND LEVODOPA 2 TABLET: 25; 100 TABLET ORAL at 16:51

## 2023-05-22 RX ADMIN — VENLAFAXINE HYDROCHLORIDE 300 MG: 150 CAPSULE, EXTENDED RELEASE ORAL at 08:30

## 2023-05-22 RX ADMIN — ACETAMINOPHEN 325MG 975 MG: 325 TABLET ORAL at 11:43

## 2023-05-22 RX ADMIN — CLONAZEPAM 2 MG: 2 TABLET ORAL at 08:29

## 2023-05-22 RX ADMIN — TRAZODONE HYDROCHLORIDE 100 MG: 100 TABLET ORAL at 22:09

## 2023-05-22 RX ADMIN — GABAPENTIN 800 MG: 800 TABLET, FILM COATED ORAL at 08:29

## 2023-05-22 RX ADMIN — OXYCODONE HYDROCHLORIDE AND ACETAMINOPHEN 500 MG: 500 TABLET ORAL at 13:36

## 2023-05-22 RX ADMIN — ALFUZOSIN HYDROCHLORIDE 10 MG: 10 TABLET, EXTENDED RELEASE ORAL at 08:30

## 2023-05-22 RX ADMIN — HYDROMORPHONE HYDROCHLORIDE 0.2 MG: 0.2 INJECTION, SOLUTION INTRAMUSCULAR; INTRAVENOUS; SUBCUTANEOUS at 00:57

## 2023-05-22 RX ADMIN — LACTULOSE 15 ML: 10 SOLUTION ORAL at 08:30

## 2023-05-22 RX ADMIN — Medication 2.5 MG: at 16:51

## 2023-05-22 RX ADMIN — ATORVASTATIN CALCIUM 40 MG: 40 TABLET, FILM COATED ORAL at 19:49

## 2023-05-22 RX ADMIN — CLONAZEPAM 2 MG: 2 TABLET ORAL at 19:49

## 2023-05-22 RX ADMIN — CLONAZEPAM 1 MG: 1 TABLET ORAL at 13:35

## 2023-05-22 RX ADMIN — GABAPENTIN 800 MG: 800 TABLET, FILM COATED ORAL at 11:43

## 2023-05-22 RX ADMIN — GABAPENTIN 800 MG: 800 TABLET, FILM COATED ORAL at 19:50

## 2023-05-22 RX ADMIN — ACETAMINOPHEN 325MG 975 MG: 325 TABLET ORAL at 03:42

## 2023-05-22 RX ADMIN — THERA TABS 1 TABLET: TAB at 08:30

## 2023-05-22 RX ADMIN — PANTOPRAZOLE SODIUM 40 MG: 40 TABLET, DELAYED RELEASE ORAL at 08:30

## 2023-05-22 RX ADMIN — CARBIDOPA AND LEVODOPA 1 TABLET: 50; 200 TABLET, EXTENDED RELEASE ORAL at 19:50

## 2023-05-22 RX ADMIN — AMANTADINE HYDROCHLORIDE 100 MG: 100 CAPSULE ORAL at 19:48

## 2023-05-22 RX ADMIN — POLYETHYLENE GLYCOL 3350 17 G: 17 POWDER, FOR SOLUTION ORAL at 08:29

## 2023-05-22 RX ADMIN — ACETAMINOPHEN 325MG 975 MG: 325 TABLET ORAL at 18:49

## 2023-05-22 RX ADMIN — Medication 12.5 MG: at 08:30

## 2023-05-22 RX ADMIN — Medication 2.5 MG: at 08:44

## 2023-05-22 RX ADMIN — Medication 125 MCG: at 08:30

## 2023-05-22 RX ADMIN — AMANTADINE HYDROCHLORIDE 100 MG: 100 CAPSULE ORAL at 08:30

## 2023-05-22 ASSESSMENT — ACTIVITIES OF DAILY LIVING (ADL)
ADLS_ACUITY_SCORE: 44
DEPENDENT_IADLS:: CLEANING;COOKING;LAUNDRY;SHOPPING;MEAL PREPARATION;MEDICATION MANAGEMENT;TRANSPORTATION
ADLS_ACUITY_SCORE: 44

## 2023-05-22 NOTE — PLAN OF CARE
"BP 97/54 (BP Location: Right arm)   Pulse 79   Temp 98.8  F (37.1  C) (Oral)   Resp 20   Ht 1.93 m (6' 4\")   Wt 106.6 kg (235 lb)   SpO2 98%   BMI 28.61 kg/m        Pt A/O x  4 Reg diet. Denies SOB, Dizziness, N/T,  and Chest pain.  Pain in ankle 4/10 pain meds given and effective. Rt ankle external fixation- Ace Wrapped . Pt on bedrest. Continent w/ urinal at bedside. Suppository given with good results.   "

## 2023-05-22 NOTE — CONSULTS
Care Management Initial Consult    General Information  Assessment completed with: Patient,    Type of CM/SW Visit: Initial Assessment    Primary Care Provider verified and updated as needed: Yes   Readmission within the last 30 days: no previous admission in last 30 days      Reason for Consult: discharge planning, other (see comments) (elevated score)  Advance Care Planning: Advance Care Planning Reviewed: present on chart  POLST       Communication Assessment  Patient's communication style: spoken language (English or Bilingual)    Hearing Difficulty or Deaf: no   Wear Glasses or Blind: no    Cognitive  Cognitive/Neuro/Behavioral: WDL                      Living Environment:   People in home: facility resident     Current living Arrangements: assisted living      Able to return to prior arrangements: yes       Family/Social Support:  Care provided by: self, other (see comments) ([facility] staff)  Provides care for: no one, unable/limited ability to care for self  Marital Status: Single  Other (specify), Facility resident(s)/Staff (family)          Description of Support System: Supportive, Involved    Support Assessment: Adequate family and caregiver support, Adequate social supports    Current Resources:   Patient receiving home care services: No     Community Resources: Other (see comment) ([facility] for meals, care and ADL's and IADL help)  Equipment currently used at home: other (see comments) (Scooter)  Supplies currently used at home:      Employment/Financial:  Employment Status: retired        Financial Concerns: No concerns identified           Does the patient's insurance plan have a 3 day qualifying hospital stay waiver?  NA    Lifestyle & Psychosocial Needs:  Social Determinants of Health     Tobacco Use: High Risk (5/22/2023)    Patient History      Smoking Tobacco Use: Every Day      Smokeless Tobacco Use: Never      Passive Exposure: Not on file   Alcohol Use: Not on file   Financial Resource Strain: Not on  file   Food Insecurity: Not on file   Transportation Needs: Not on file   Physical Activity: Not on file   Stress: Not on file   Social Connections: Not on file   Intimate Partner Violence: Not on file   Depression: Not at risk (5/10/2023)    PHQ-2      PHQ-2 Score: 2   Housing Stability: Not on file       Functional Status:  Prior to admission patient needed assistance:   Dependent ADLs:: Ambulation-cane, bathing  Dependent IADLs:: Cleaning, Cooking, Laundry, Shopping, Meal Preparation, Medication Management, Transportation       Mental Health Status:  Mental Health Status: Past concerns      Chemical Dependency Status:  Chemical Dependency Status: Past concerns             Values/Beliefs:  Spiritual, Cultural Beliefs, Yarsanism Practices, Values that affect care: no               Additional Information:    Met with Benjamin in his room.  He confirms that he still lives in [facility].    Call to [facility].  She confirms that they provide assist with medications, meal, laundry, bathing and other ADL's and IADL's.  Benjamin will need to be independent with transfers and ambulation to return. Discussed that discharge disposition is yet to be determined per Ortho.  Benjamin will need transportation back to group home or to whatever facility he discharges to.    Care Management to continue to follow for discharge needs.    [facility]  [address] [address] [place]  [address]  [phone]  Fax: [phone]  [facility] [phone]    Kimi Benson RN, BA  Care Coordinator  6 Med Surg  [phone], pager [phone]      For weekend social work needs, contact information below and available on [facility]:      Weekend [place] Pager: [phone]   Weekend 6MS, 8A, 10ICU- Pager: [phone]     For weekend RN care coordinator needs (home discharge with needs including home care, assisted living facility returns, durable medical equip, IV antibiotics) - page [phone]

## 2023-05-22 NOTE — PROGRESS NOTES
"  VS: Vital signs:  Temp: 99  F (37.2  C) Temp src: Oral BP: 105/60 Pulse: 83   Resp: 16 SpO2: 96 % O2 Device: Nasal cannula Oxygen Delivery: 2 LPM Height: 193 cm (6' 4\") Weight: 106.6 kg (235 lb)  Estimated body mass index is 28.61 kg/m  as calculated from the following:    Height as of this encounter: 1.93 m (6' 4\").    Weight as of this encounter: 106.6 kg (235 lb).         O2: 96% on 2LPM via nasal cannula    Output: Uses the urinal to void   Last BM: unknown   Activity: bedfast   Skin: Intact, ROBERTA R leg due to dressing covering    Pain: 6   CMS: A & O X4, Denies N/T   Dressing: R foot wrap with ACE    Diet: regular   LDA: L PIV   Equipment: PV pole, personal belongings    Plan: POC   Additional Info: Pt was encourage to increase fluid intake.      "

## 2023-05-22 NOTE — PHARMACY
Pharmacy Clozapine Continuation Note    Patient's Name: Benjamin Mathews  Patient's : 1965    Current cloZAPine regimen: clozapine 50 mg at bedtime  Has there been a known interruption in therapy for greater than/equal to 48 hours which would warrant retitration No    Recent ANC Value(s) for last 30 days:  2023: Absolute Neutrophils 6.1 10e3/uL (Ref range: 1.6 - 8.3 10e3/uL)      A REMS Dispense Authorization was obtained from the clozapine REMS prgram? Yes      Is the ANC (if available) within recommended limits? Yes  Does the patient have any signs or symptoms of infection, including fever? No    Plan:  1. Continue clozapine therapy at 50 mg PO at bedtime.  2. A WBC with differential will be ordered at least weekly, NEXT DUE 2023. ANC values will be entered into the REMS program.    Arthur Banks RPH  Phone / Pager: [phone]

## 2023-05-22 NOTE — PROGRESS NOTES
[facility]    Medicine Progress Note - Hospitalist Service, GOLD TEAM 17    Date of Admission:  5/20/2023    Assessment & Plan     Benjamin Mathews is a 58 year old male admitted on 5/20/2023 for right ankle fracture after a syncopal episode.   No status post Right Ankle Closed Reduction and  External Fixator Placement by Dr. Apodaca on 5/21.  No complication.  EBL: 5 mL.  Currently doing well.      Right ankle fracture after ground level fall  Suspect syncopal episode.  Status post above procedure.  Doing well.    -Postop care per orthopedic surgery.  -Pain currently controlled.  Continue current regimen with Tylenol, narcotics-judicious use, minimize as able.  - Resume PTA rivaroxaban once cleared by Ortho.   - PT, OT  -Activity, wound care per Ortho.    -Follow hemoglobin for anemia of acute blood loss.  Hemoglobin today 11.2.       Syncopal Episode  - by history this sounds vagal/orthostatic; patient endorses seeing spots, diaphoresis, and onset shortly after rising from seated position with return to baseline upon waking  - explanation could be intravascular depletion due to ~10 days of diarrhea  - Consider echo as needed.  - Currently stable.  - PT, OT.  Hydration       Diarrheal Illness vs Iatrogenic Diarrhea  ? Laxatives related.   - currently none.   - resume pta bowel regimen as needed.   - holding pta amitiza      Parkinson's and Associated complications (psychosis, dyskinesias, muscle spasms/contracture, depression, sleep disturbance)  -- Resume PTA amantadine, sinemet, clonazepam, clozapine, gabapentin, venlafaxine, trazodone  -- Dose verified by Pharm D     History of DVT/PE  - held rivaroxaban, resume after surgery pending ok from ortho  - D dimer elevated in ED but CTA chest negative for new PE     Cerebrovascular Disease  - atorvastatin ordered         Diet: Regular Diet Adult    DVT Prophylaxis: Pneumatic Compression Devices  Dudlye Catheter: Not  "present  Lines: None     Cardiac Monitoring: None  Code Status: No CPR- Do NOT Intubate      Clinically Significant Risk Factors                  # Hypertension: Noted on problem list        # Overweight: Estimated body mass index is 28.61 kg/m  as calculated from the following:    Height as of this encounter: 1.93 m (6' 4\").    Weight as of this encounter: 106.6 kg (235 lb)., PRESENT ON ADMISSION          Disposition Plan      Expected Discharge Date: 05/24/2023      Destination: assisted living            Armen Lundberg MD  Hospitalist Service, [address]  Securely message with [employer] (more info)  Text page via [facility] Paging/Directory   See signed in provider for up to date coverage information  ______________________________________________________________________    Interval History     Interval events reviewed.   States doing well  Pain controlled.   Denies fever or chills.   No cough or cp or sob.   No LH or dizziness.   No NV or pain abdomen.   No new sensory or motor complaint.       No other new or acute medical concern      Physical Exam   Vital Signs: Temp: 98.8  F (37.1  C) Temp src: Oral BP: 97/54 Pulse: 79   Resp: 20 SpO2: 98 % O2 Device: None (Room air) Oxygen Delivery: 2 LPM  Weight: 235 lbs 0 oz      General Appearance: Awake, interactive, NAD  Respiratory: Normal work of breathing. Clear bl   Cardiovascular: RRR s1s2  GI: Soft. NT. ND.  Extremities: Distally wwp. BLE pedal edema  Neuro: alert. At moving all extremities.   Others: Stable mood. Flat affect.       Medical Decision Making       45 MINUTES SPENT BY ME on the date of service doing chart review, history, exam, documentation & further activities per the note.      Data       Imaging results reviewed over the past 24 hrs:   No results found for this or any previous visit (from the past 24 hour(s)).  "

## 2023-05-22 NOTE — PROGRESS NOTES
"Orthopedic Surgery Progress Note: 05/22/2023    Subjective:   No acute events overnight. Pain well-controlled. Tolerating PO without N/V. Voiding. Denies CP/SOB. No fevers/chills. Denies new numbness or tingling.  No Other concerns or complaints. Leg re-elevated on pillows above the level of the heart.     Objective:   /60 (BP Location: Right arm)   Pulse 83   Temp 99  F (37.2  C)   Resp 16   Ht 1.93 m (6' 4\")   Wt 106.6 kg (235 lb)   SpO2 96%   BMI 28.61 kg/m    No intake/output data recorded.  General: NAD. Resting comfortably in bed.  Respiratory: Breathing comfortably on RA.    Musculoskeletal:     RLE  Dressings clean and dry. Pin sites are c/d, in posterior slab splint. Dorsum of the foot  Windowed out. NO skin wrinkles noted.   SILT in sural, saphenous, superficial peroneal nerve, deep peroneal nerve, and tibial nerve distributions  Able to wiggle all toes. EHL/FHL intact    2+ DP Pulse. Foot is WWP      Laboratory Data:  Lab Results   Component Value Date    WBC 8.5 05/20/2023    HGB 11.2 (L) 05/20/2023     05/20/2023    INR 1.15 05/20/2023       Images:  Xrays and post op CT right  ankle obtained. Pt  has improved alignment s/p ex-fix placement.     Assessment & Plan:   Benjamin Mathews is a 58 year old male with PMH including CVA, parkinson's diease and clotting disorder now s/p above procedure on 5/21/2023 with Dr. Apodaca.     Today's pain   Pain control   RLE ankle elevation, icing   PTOT        medicine Primary  Activity: Up with assist until independent No excessive bending or twisting. No lifting >10 lbs x 6 weeks. No Yashira lift for transfers.   Weight bearing status: NWB RLE.  Pain management:   Transition from IV to PO narcotics as tolerated   Antibiotics: additional dose of ancef post op.  Diet: Begin with clear fluids and progress diet as tolerated.   DVT prophylaxis: SCDs only. Chemical DVT per primary team .  Imaging: n/a. CT and xrays obtained and will be assessed for " operative planning    Labs: Hgb POD 1  Bracing/Splinting: posterior slab splint .  Dressings:    Physical Therapy/Occupational Therapy: Eval and treat  Cultures: none.    Consults: ortho  Follow-up:  tbd .   Disposition:  tbd.    Orthopedic surgery staff for this patient is Dr. Bonilla.    ------------------------------------------------------------------------------------------    Thank you,    Cornel Bains MD   PGY1  Department of Orthopaedic Surgery  Pager [phone]      Please page me directly with any questions/concerns during regular weekday hours before 5 pm. If there is no response, if it is a weekend, or if it is during evening hours then please page the orthopedic surgery resident on call.    FOLLOWUP:    Future Appointments   Date Time Provider Department Center   5/25/2023 11:45 AM Yari Escobar PA-C [facility]   6/7/2023  1:30 PM Ching Carlos DO [facility]   6/13/2023 11:00 AM Dequan York DPM [facility] [place]   7/10/2023  2:15 PM Miryam Montemayor APRN CNP [facility]   7/25/2023  8:20 AM Ember Arita MD [facility]   11/3/2023 11:30 AM Juju Bernardo PA-C [facility] [place]

## 2023-05-22 NOTE — PLAN OF CARE
"  VS: /56 (BP Location: Right arm)   Pulse 87   Temp 98.7  F (37.1  C) (Oral)   Resp 20   Ht 1.93 m (6' 4\")   Wt 106.6 kg (235 lb)   SpO2 96%   BMI 28.61 kg/m      O2: Room air   Output: Urinal at bedside   Last BM: 5/22/23   Activity: bedrest   Skin: External fixator present on right leg   Pain: 4/10 for right ankle and foot pain - received PRN oxycodone and scheduled Tylenol.    CMS: Alert and oriented x 2 - thought the date was 5/14 and thought he was at the \"[facility]". Right foot has some baseline numbness and tingling. Bilateral hands were cold. Pedal pulse present on left leg, bilateral toes were warm to the touch.    Dressing: External fixator and ace wrap on right leg   Diet: Regular   LDA: Left hand PIV is patent and saline-locked.    Equipment: IV pole, low-air loss mattress       Additional Info: Med-compliant. Patient dropped medication cup a few times. Patient is on bedrest, uses bedpan and urinal. Contact precautions maintained.          Problem: Fall Injury Risk  Goal: Absence of Fall and Fall-Related Injury  Outcome: Progressing     Problem: Pain Acute  Goal: Optimal Pain Control and Function  Outcome: Progressing     Problem: Plan of Care - These are the overarching goals to be used throughout the patient stay.    Goal: Absence of Hospital-Acquired Illness or Injury  Intervention: Prevent Skin Injury  Recent Flowsheet Documentation  Taken 5/22/2023 1651 by Mariel Medellin RN  Body Position: heels elevated   Goal Outcome Evaluation:                        "

## 2023-05-23 ENCOUNTER — APPOINTMENT (OUTPATIENT)
Dept: PHYSICAL THERAPY | Facility: CLINIC | Age: 58
DRG: 493 | End: 2023-05-23
Attending: INTERNAL MEDICINE
Payer: COMMERCIAL

## 2023-05-23 LAB
ANION GAP SERPL CALCULATED.3IONS-SCNC: 11 MMOL/L (ref 7–15)
BUN SERPL-MCNC: 17 MG/DL (ref 6–20)
CALCIUM SERPL-MCNC: 9.1 MG/DL (ref 8.6–10)
CHLORIDE SERPL-SCNC: 105 MMOL/L (ref 98–107)
CREAT SERPL-MCNC: 0.66 MG/DL (ref 0.67–1.17)
DEPRECATED HCO3 PLAS-SCNC: 24 MMOL/L (ref 22–29)
ERYTHROCYTE [DISTWIDTH] IN BLOOD BY AUTOMATED COUNT: 12.9 % (ref 10–15)
GFR SERPL CREATININE-BSD FRML MDRD: >90 ML/MIN/1.73M2
GLUCOSE SERPL-MCNC: 98 MG/DL (ref 70–99)
HCT VFR BLD AUTO: 34.8 % (ref 40–53)
HGB BLD-MCNC: 10.5 G/DL (ref 13.3–17.7)
MCH RBC QN AUTO: 30.5 PG (ref 26.5–33)
MCHC RBC AUTO-ENTMCNC: 30.2 G/DL (ref 31.5–36.5)
MCV RBC AUTO: 101 FL (ref 78–100)
PLATELET # BLD AUTO: 217 10E3/UL (ref 150–450)
POTASSIUM SERPL-SCNC: 4.1 MMOL/L (ref 3.4–5.3)
RBC # BLD AUTO: 3.44 10E6/UL (ref 4.4–5.9)
SODIUM SERPL-SCNC: 140 MMOL/L (ref 136–145)
WBC # BLD AUTO: 6.9 10E3/UL (ref 4–11)

## 2023-05-23 PROCEDURE — 99207 PR CDG-CUT & PASTE-POTENTIAL IMPACT ON LEVEL: CPT | Performed by: INTERNAL MEDICINE

## 2023-05-23 PROCEDURE — 97530 THERAPEUTIC ACTIVITIES: CPT | Mod: GP | Performed by: PHYSICAL THERAPIST

## 2023-05-23 PROCEDURE — 250N000013 HC RX MED GY IP 250 OP 250 PS 637: Performed by: PEDIATRICS

## 2023-05-23 PROCEDURE — 82310 ASSAY OF CALCIUM: CPT | Performed by: INTERNAL MEDICINE

## 2023-05-23 PROCEDURE — 250N000013 HC RX MED GY IP 250 OP 250 PS 637: Performed by: INTERNAL MEDICINE

## 2023-05-23 PROCEDURE — 99232 SBSQ HOSP IP/OBS MODERATE 35: CPT | Performed by: INTERNAL MEDICINE

## 2023-05-23 PROCEDURE — 36415 COLL VENOUS BLD VENIPUNCTURE: CPT | Performed by: INTERNAL MEDICINE

## 2023-05-23 PROCEDURE — 120N000002 HC R&B MED SURG/OB UMMC

## 2023-05-23 PROCEDURE — 85027 COMPLETE CBC AUTOMATED: CPT | Performed by: INTERNAL MEDICINE

## 2023-05-23 PROCEDURE — 97161 PT EVAL LOW COMPLEX 20 MIN: CPT | Mod: GP | Performed by: PHYSICAL THERAPIST

## 2023-05-23 RX ADMIN — TRAZODONE HYDROCHLORIDE 50 MG: 50 TABLET ORAL at 08:20

## 2023-05-23 RX ADMIN — LACTULOSE 15 ML: 10 SOLUTION ORAL at 08:21

## 2023-05-23 RX ADMIN — OXYCODONE HYDROCHLORIDE AND ACETAMINOPHEN 500 MG: 500 TABLET ORAL at 08:20

## 2023-05-23 RX ADMIN — RIVAROXABAN 20 MG: 10 TABLET, FILM COATED ORAL at 16:42

## 2023-05-23 RX ADMIN — TRAZODONE HYDROCHLORIDE 100 MG: 100 TABLET ORAL at 22:15

## 2023-05-23 RX ADMIN — AMANTADINE HYDROCHLORIDE 100 MG: 100 CAPSULE ORAL at 08:20

## 2023-05-23 RX ADMIN — Medication 5 MG: at 20:54

## 2023-05-23 RX ADMIN — CARBIDOPA AND LEVODOPA 1 TABLET: 50; 200 TABLET, EXTENDED RELEASE ORAL at 20:54

## 2023-05-23 RX ADMIN — Medication 5 MG: at 09:34

## 2023-05-23 RX ADMIN — AMANTADINE HYDROCHLORIDE 100 MG: 100 CAPSULE ORAL at 20:54

## 2023-05-23 RX ADMIN — CLOZAPINE 50 MG: 50 TABLET ORAL at 22:15

## 2023-05-23 RX ADMIN — CLONAZEPAM 2 MG: 2 TABLET ORAL at 20:54

## 2023-05-23 RX ADMIN — CLONAZEPAM 2 MG: 2 TABLET ORAL at 08:21

## 2023-05-23 RX ADMIN — CARBIDOPA AND LEVODOPA 2 TABLET: 25; 100 TABLET ORAL at 16:42

## 2023-05-23 RX ADMIN — CLONAZEPAM 1 MG: 1 TABLET ORAL at 11:48

## 2023-05-23 RX ADMIN — Medication 12.5 MG: at 20:54

## 2023-05-23 RX ADMIN — Medication 125 MCG: at 08:20

## 2023-05-23 RX ADMIN — ATORVASTATIN CALCIUM 40 MG: 40 TABLET, FILM COATED ORAL at 20:53

## 2023-05-23 RX ADMIN — GABAPENTIN 800 MG: 800 TABLET, FILM COATED ORAL at 11:48

## 2023-05-23 RX ADMIN — ACETAMINOPHEN 325MG 975 MG: 325 TABLET ORAL at 20:53

## 2023-05-23 RX ADMIN — ALFUZOSIN HYDROCHLORIDE 10 MG: 10 TABLET, EXTENDED RELEASE ORAL at 08:20

## 2023-05-23 RX ADMIN — Medication 12.5 MG: at 08:21

## 2023-05-23 RX ADMIN — GABAPENTIN 800 MG: 800 TABLET, FILM COATED ORAL at 20:53

## 2023-05-23 RX ADMIN — ACETAMINOPHEN 325MG 975 MG: 325 TABLET ORAL at 11:48

## 2023-05-23 RX ADMIN — THERA TABS 1 TABLET: TAB at 08:20

## 2023-05-23 RX ADMIN — POLYETHYLENE GLYCOL 3350 17 G: 17 POWDER, FOR SOLUTION ORAL at 08:21

## 2023-05-23 RX ADMIN — CARBIDOPA AND LEVODOPA 2 TABLET: 25; 100 TABLET ORAL at 08:20

## 2023-05-23 RX ADMIN — VENLAFAXINE HYDROCHLORIDE 300 MG: 150 CAPSULE, EXTENDED RELEASE ORAL at 08:20

## 2023-05-23 RX ADMIN — GABAPENTIN 800 MG: 800 TABLET, FILM COATED ORAL at 08:20

## 2023-05-23 RX ADMIN — CARBIDOPA AND LEVODOPA 2 TABLET: 25; 100 TABLET ORAL at 11:50

## 2023-05-23 RX ADMIN — PANTOPRAZOLE SODIUM 40 MG: 40 TABLET, DELAYED RELEASE ORAL at 07:14

## 2023-05-23 RX ADMIN — ACETAMINOPHEN 325MG 975 MG: 325 TABLET ORAL at 03:23

## 2023-05-23 ASSESSMENT — ACTIVITIES OF DAILY LIVING (ADL)
ADLS_ACUITY_SCORE: 44

## 2023-05-23 NOTE — PROGRESS NOTES
[facility]    Medicine Progress Note - Hospitalist Service, GOLD TEAM 17    Date of Admission:  5/20/2023    Assessment & Plan     Benjamin Mathews is a 58 year old male admitted on 5/20/2023 for right ankle fracture after a syncopal episode.   No status post Right Ankle Closed Reduction and  External Fixator Placement by Dr. Apodaca on 5/21.  No complication.  EBL: 5 mL.  Currently doing well.    Right ankle fracture after ground level fall  Suspect syncopal episode.  Status post above procedure.  Doing well.    -Postop care per orthopedic surgery.  -Pain currently controlled.  Continue current regimen with Tylenol, narcotics-judicious use, minimize as able.  - Ortho okay to resume PTA rivaroxaban.   - PT, OT  -Activity, wound care per Ortho.    -Follow hemoglobin for anemia of acute blood loss.  Hemoglobin today 10.5, continue monitoring.       Syncopal Episode  - by history this sounds vagal/orthostatic; patient endorses seeing spots, diaphoresis, and onset shortly after rising from seated position with return to baseline upon waking  - explanation could be intravascular depletion due to ~10 days of diarrhea  - Consider echo as needed.  - Currently stable.  - PT, OT.  Hydration       Diarrheal Illness vs Iatrogenic Diarrhea  ? Laxatives related.   - currently none.   - resume pta bowel regimen as needed.   - holding pta amitiza      Parkinson's and Associated complications (psychosis, dyskinesias, muscle spasms/contracture, depression, sleep disturbance)  -- Resume PTA amantadine, sinemet, clonazepam, clozapine, gabapentin, venlafaxine, trazodone  -- Dose verified by Pharm D     History of DVT/PE  - held rivaroxaban, resume after surgery pending ok from ortho  - D dimer elevated in ED but CTA chest negative for new PE     Cerebrovascular Disease  - atorvastatin ordered         Diet: Regular Diet Adult    DVT Prophylaxis: Pneumatic Compression Devices  Dudley Catheter:  "Not present  Lines: None     Cardiac Monitoring: None  Code Status: No CPR- Do NOT Intubate      Clinically Significant Risk Factors                  # Hypertension: Noted on problem list        # Overweight: Estimated body mass index is 28.61 kg/m  as calculated from the following:    Height as of this encounter: 1.93 m (6' 4\").    Weight as of this encounter: 106.6 kg (235 lb)., PRESENT ON ADMISSION          Disposition Plan      Expected Discharge Date: 05/30/2023      Destination: home            Paulo Escobar MD  Hospitalist Service, GOLD TEAM [address] [place] [address]  Securely message with [employer] (more info)  Text page via [employer] Paging/Directory   See signed in provider for up to date coverage information  ______________________________________________________________________    Interval History     Interval events reviewed.   States doing well, he was having lunch.   Pain controlled.   Denies fever or chills.   No cough or cp or sob.   No LH or dizziness.   No NV or pain abdomen.   No new sensory or motor complaint.       No other new or acute medical concern      Physical Exam   Vital Signs: Temp: 98  F (36.7  C) Temp src: Oral BP: 130/72 Pulse: 71   Resp: 18 SpO2: 97 % O2 Device: None (Room air)    Weight: 235 lbs 0 oz      General Appearance: Awake, interactive, NAD  Respiratory: Normal work of breathing. Clear bl   Cardiovascular: RRR s1s2  GI: Soft. NT. ND.  Extremities: Distally wwp. BLE pedal edema  Neuro: alert. At moving all extremities.   Others: Stable mood. Flat affect.       Medical Decision Making       45 MINUTES SPENT BY ME on the date of service doing chart review, history, exam, documentation & further activities per the note.      Data     6.9  \   10.5 (L)   / 217     140 105 17.0 /  98   4.1 24 0.66 (L) \       Imaging results reviewed over the past 24 hrs:   No results found for this or any previous visit (from the past 24 hour(s)).  "

## 2023-05-23 NOTE — PROGRESS NOTES
"   VS: /75 (BP Location: Left arm)   Pulse 84   Temp 98.5  F (36.9  C) (Oral)   Resp 18   Ht 1.93 m (6' 4\")   Wt 106.6 kg (235 lb)   SpO2 97%   BMI 28.61 kg/m       O2: Sating >95% on RA, denies SOB/Chest pain   Output: Voids using bedside urinal   Last BM: 5/22, bowel sounds normoactive x4   Activity: Not oob, non weight nearing RLE. Working with PT/OT   Up for meals? Yes   Skin: External fixation present on right ankle   Pain: 3-5/10 pain to right ankle, schedule tylenol and prn oxycodone administered.    CMS: AO x4, numbness and tingling at baseline   Dressing: External fixator, dressing and  ace wrap on right leg. Fresh blood noted on right heel. Dressing reinforce, Ortho updated.    Diet: Regular diet, thin liquids   LDA: L. PIV   Equipment:  IV pole, personal belongings   Plan: Call light within reach, bed in a low position. Able to make needs known. Continue to monitor with POC.   Additional Info:       "

## 2023-05-23 NOTE — CARE PLAN
"Shift: 2300 - 0730  VS: /60 (BP Location: Left arm)   Pulse 86   Temp 98.2  F (36.8  C) (Oral)   Resp 18   Ht 1.93 m (6' 4\")   Wt 106.6 kg (235 lb)   SpO2 96%   BMI 28.61 kg/m       O2: SpO2 > 90 %  and stable on RA. . Denies chest pain and SOB.    Output: Voids spontaneously without difficulty using  beside urinal.and bed pan   Last BM: 05/22, denies abdominal discomfort.   Activity: bedrest   Skin: External fixator present on right leg   Pain:  Denies Pain this shift   CMS: Intact, AOx4. Denies numbness and tingling. Confused about his location and situation.    Dressing: External fixator and ace wrap on right leg   Diet: Regular diet. Denies nausea/vomiting.    LDA: Left  PIV SL    Equipment: IV pole, personal belongings,    Plan: Contact  precautions maintained. Call light within reach, pt able to make needs known. Continue with plan of care.     Additional Info:        "

## 2023-05-23 NOTE — PROGRESS NOTES
05/23/23 1410   Appointment Info   Signing Clinician's Name / Credentials (PT) Juju Whittington PT       Present no   Language English   Living Environment   People in Home alone   Current Living Arrangements assisted living   Home Accessibility no concerns   Living Environment Comments Pt reported living on first floor at an [facility].   Self-Care   Usual Activity Tolerance moderate   Current Activity Tolerance fair   Regular Exercise Other (see comments)  (Pt reported going to [facility].)   Equipment Currently Used at Home walker, rolling;wheelchair, power   Fall history within last six months yes   Number of times patient has fallen within last six months 4   Activity/Exercise/Self-Care Comment Pt reported getting assistance for ADLs at baseline.   General Information   Onset of Illness/Injury or Date of Surgery 05/20/23   Referring Physician Armen Lundberg MD   Patient/Family Therapy Goals Statement (PT) Goal not verbalized.   Pertinent History of Current Problem (include personal factors and/or comorbidities that impact the POC) Pt is a 58 year old male admitted on 5/20/2023 for right ankle fracture after a syncopal episode.   Pt is status post Right Ankle Closed Reduction and  External Fixator Placement.  Pt has a h/o CVA and Parkinsons.   Existing Precautions/Restrictions fall   Weight-Bearing Status - RLE nonweight-bearing  (strict)   Heart Disease Risk Factors Overweight   General Observations Pt supine in bed at start of PT evaluation, agreeable to PT evaluation.   Cognition   Affect/Mental Status (Cognition) WFL   Orientation Status (Cognition) other (see comments)  (No formally tested.)   Follows Commands (Cognition) WFL   Pain Assessment   Patient Currently in Pain No   Integumentary/Edema   Integumentary/Edema other (describe)   Integumentary/Edema Comments Pt has external fixator placed on right LE,pt has dry bloody drainage on the heel of the dressing.   Posture     Posture Forward head position;Protracted shoulders   Range of Motion (ROM)   Range of Motion ROM is WFL   ROM Comment Right ankle ROM not tested secondary to ex fix in place.   Strength (Manual Muscle Testing)   Strength (Manual Muscle Testing) Deficits observed during functional mobility   Bed Mobility   Comment, (Bed Mobility) Supine to/from sitting with HOB elevated and min A x 1, second person for safety.   Transfers   Comment, (Transfers) Not initiated, pt unable to maintain strict NWB, not safe to initiate transfers.   Gait/Stairs (Locomotion)   Comment, (Gait/Stairs) Not initiated unable to maintain NWB>   Balance   Balance Comments Pt demonstrated fair sitting balance at EOB. Standing balance not assessed.   Sensory Examination   Sensory Perception Comments Not tested.   Clinical Impression   Criteria for Skilled Therapeutic Intervention Yes, treatment indicated   PT Diagnosis (PT) Decreased strength and impaired functional mobility.   Influenced by the following impairments complex medical history, s/p placement of right ankle ex fix, and deconditioned.   Functional limitations due to impairments Impaired bed mobility, transfers, and gait.   Clinical Presentation (PT Evaluation Complexity) Stable/Uncomplicated   Clinical Presentation Rationale Per clinical judgement   Clinical Decision Making (Complexity) low complexity   Planned Therapy Interventions (PT) bed mobility training;transfer training   Anticipated Equipment Needs at Discharge (PT) other (see comments)  (will continue to assess equipment needs)   Risk & Benefits of therapy have been explained evaluation/treatment results reviewed;care plan/treatment goals reviewed;patient   PT Total Evaluation Time   PT Mazin Low Complexity Minutes (49836) 10   Physical Therapy Goals   PT Frequency 5x/week   PT Predicted Duration/Target Date for Goal Attainment 05/30/23   PT Goals Bed Mobility;Transfers   PT: Bed Mobility Independent;Supine to/from sit   PT:  Transfers Minimal assist;Sit to/from stand;Bed to/from chair;Assistive device   Therapeutic Activity   Therapeutic Activities: dynamic activities to improve functional performance Minutes (44014) 23   Symptoms Noted During/After Treatment Fatigue   Treatment Detail/Skilled Intervention Pt educated on strict NWB right LE, verbalized understanding but needed constant cues not to put weight through right LE.  Pt completed bed mobility with min A x 1 and second person for safety.  Pt needed assistance with lifting right LE out of bed and supporting during bed mobility.  Pt needed min A with right LE and verbal cues not to use right LE when scooting/repositioning at EOB.  Pt reported that his right LE is his strong leg.  Attempted to have pt hold right LE off of floor without assist but pt unable to secondary to cramping at his hip.  Pt does not feel like he will be able to stand and hold his right LE off of the ground.  Educated pt on potential of slide board transfer but pt having a difficult time scooting towards HOB without using his right LE.  Pt demonstrated decreased UE strength when scooting.  Pt reported dizziness when sitting at EOB initially which did not change throughout session.   PT Discharge Planning   PT Plan Continue skilled PT for bed mobility, and transfers.  Clarify with ortho is pt can be colten lifted.   PT Discharge Recommendation (DC Rec) Transitional Care Facility   PT Rationale for DC Rec Pt currently below baseline, would benefit from further PT to increase independence with all funtional mobility.   PT Brief overview of current status Pt is assist of 1 with bed mobility.  Pt unable to maintain NWB not safe to initiate transfer at this time.   Total Session Time   Timed Code Treatment Minutes 23   Total Session Time (sum of timed and untimed services) 33      ---

## 2023-05-23 NOTE — PROGRESS NOTES
"Orthopedic Surgery Progress Note: 05/23/2023    Subjective:   No acute events overnight. Pain well-controlled. Tolerating PO without N/V. Voiding. Denies CP/SOB. No fevers/chills. Denies new numbness or tingling.  No Other concerns or complaints.    Objective:   /60 (BP Location: Left arm)   Pulse 86   Temp 98.2  F (36.8  C) (Oral)   Resp 18   Ht 1.93 m (6' 4\")   Wt 106.6 kg (235 lb)   SpO2 96%   BMI 28.61 kg/m    No intake/output data recorded.  General: NAD. Resting comfortably in bed.  Respiratory: Breathing comfortably on RA.    Musculoskeletal:      RLE  Dressings dry. There is some blood staining of the dressing over the left side of the calc pin site.Pin sites are c/d, in posterior slab splint. Dorsum of the foot  Windowed out. NO skin wrinkles noted.   SILT in sural, saphenous, superficial peroneal nerve, deep peroneal nerve, and tibial nerve distributions  Able to wiggle all toes. EHL/FHL intact    2+ DP Pulse. Foot is WWP       Laboratory Data:  Lab Results   Component Value Date    WBC 8.5 05/20/2023    HGB 11.2 (L) 05/20/2023     05/20/2023    INR 1.15 05/20/2023       Images:  No new images were obtained.     Assessment & Plan:   Benjamin Mathews is a 58 year old male with PMH including CVA, parkinson's diease and clotting disorder now s/p above procedure on 5/21/2023 with Dr. Apodaca.      Today's pain   Pain control   RLE ankle elevation, icing   PTOT   Start pin site care. Can remove kerlix gauze wraps at base of pin site along with xeroform. Clean bases of pin sites with quetips soaked in half perioxide/saline solution. Then redress pin sits with xeroform strip and then kerlix wrapping of pin base. Done daily.      medicine Primary  Activity: Up with assist until independent No excessive bending or twisting. No lifting >10 lbs x 6 weeks. No Yashira lift for transfers.   Weight bearing status: NWB RLE.  Pain management:   Transition from IV to PO narcotics as tolerated "   Antibiotics: additional dose of ancef post op.  Diet: Begin with clear fluids and progress diet as tolerated.   DVT prophylaxis: SCDs only. Chemical DVT per primary team .  Imaging: n/a. CT and xrays obtained and will be assessed for operative planning    Labs: Hgb POD 1  Bracing/Splinting: posterior slab splint .  Dressings:    Physical Therapy/Occupational Therapy: Eval and treat  Cultures: none.    Consults: ortho  Follow-up:  tbd .   Disposition:  tbd.     Orthopedic surgery staff for this patient is Dr. Bonilla.    ------------------------------------------------------------------------------------------    Thank you,    Cornel Bains MD   PGY1  Department of Orthopaedic Surgery  Pager [phone]      Please page me directly with any questions/concerns during regular weekday hours before 5 pm. If there is no response, if it is a weekend, or if it is during evening hours then please page the orthopedic surgery resident on call.    FOLLOWUP:    Future Appointments   Date Time Provider Department [place]   5/23/2023  9:45 AM Juju Whittington PT URPT [place]   5/23/2023  2:45 PM Ebony Judd, OT [facility]OT [place]   5/25/2023 11:45 [facility]   6/7/2023  1:30 PM Ching Carlos DO [facility]   6/13/2023 11:00 AM Dequan York DPM [facility] [facility]   7/10/2023  2:15 PM Miryam Montemayor APRN [facility]   7/25/2023  8:20 AM Ember Arita MD [facility]   11/3/2023 11:30 AM Juju Bernardo PA-C [facility] [place]

## 2023-05-24 ENCOUNTER — APPOINTMENT (OUTPATIENT)
Dept: PHYSICAL THERAPY | Facility: CLINIC | Age: 58
DRG: 493 | End: 2023-05-24
Payer: COMMERCIAL

## 2023-05-24 PROCEDURE — 250N000013 HC RX MED GY IP 250 OP 250 PS 637: Performed by: PEDIATRICS

## 2023-05-24 PROCEDURE — 99232 SBSQ HOSP IP/OBS MODERATE 35: CPT | Performed by: INTERNAL MEDICINE

## 2023-05-24 PROCEDURE — 97530 THERAPEUTIC ACTIVITIES: CPT | Mod: GP | Performed by: PHYSICAL THERAPIST

## 2023-05-24 PROCEDURE — 250N000013 HC RX MED GY IP 250 OP 250 PS 637: Performed by: INTERNAL MEDICINE

## 2023-05-24 PROCEDURE — 99207 PR CDG-CUT & PASTE-POTENTIAL IMPACT ON LEVEL: CPT | Performed by: INTERNAL MEDICINE

## 2023-05-24 PROCEDURE — 97110 THERAPEUTIC EXERCISES: CPT | Mod: GP | Performed by: PHYSICAL THERAPIST

## 2023-05-24 PROCEDURE — 120N000002 HC R&B MED SURG/OB UMMC

## 2023-05-24 RX ADMIN — Medication 12.5 MG: at 08:41

## 2023-05-24 RX ADMIN — AMANTADINE HYDROCHLORIDE 100 MG: 100 CAPSULE ORAL at 20:21

## 2023-05-24 RX ADMIN — Medication 125 MCG: at 08:41

## 2023-05-24 RX ADMIN — CARBIDOPA AND LEVODOPA 2 TABLET: 25; 100 TABLET ORAL at 08:41

## 2023-05-24 RX ADMIN — CARBIDOPA AND LEVODOPA 2 TABLET: 25; 100 TABLET ORAL at 16:08

## 2023-05-24 RX ADMIN — ATORVASTATIN CALCIUM 40 MG: 40 TABLET, FILM COATED ORAL at 20:22

## 2023-05-24 RX ADMIN — CLOZAPINE 50 MG: 50 TABLET ORAL at 22:20

## 2023-05-24 RX ADMIN — TRAZODONE HYDROCHLORIDE 50 MG: 50 TABLET ORAL at 08:41

## 2023-05-24 RX ADMIN — CLONAZEPAM 2 MG: 2 TABLET ORAL at 08:41

## 2023-05-24 RX ADMIN — POLYETHYLENE GLYCOL 3350 17 G: 17 POWDER, FOR SOLUTION ORAL at 08:43

## 2023-05-24 RX ADMIN — TRAZODONE HYDROCHLORIDE 100 MG: 100 TABLET ORAL at 22:20

## 2023-05-24 RX ADMIN — GABAPENTIN 800 MG: 800 TABLET, FILM COATED ORAL at 12:13

## 2023-05-24 RX ADMIN — GABAPENTIN 800 MG: 800 TABLET, FILM COATED ORAL at 08:41

## 2023-05-24 RX ADMIN — RIVAROXABAN 20 MG: 10 TABLET, FILM COATED ORAL at 16:08

## 2023-05-24 RX ADMIN — CLONAZEPAM 2 MG: 2 TABLET ORAL at 20:21

## 2023-05-24 RX ADMIN — Medication 5 MG: at 20:21

## 2023-05-24 RX ADMIN — Medication 5 MG: at 08:41

## 2023-05-24 RX ADMIN — OXYCODONE HYDROCHLORIDE AND ACETAMINOPHEN 500 MG: 500 TABLET ORAL at 08:41

## 2023-05-24 RX ADMIN — Medication 12.5 MG: at 20:21

## 2023-05-24 RX ADMIN — CARBIDOPA AND LEVODOPA 2 TABLET: 25; 100 TABLET ORAL at 12:13

## 2023-05-24 RX ADMIN — VENLAFAXINE HYDROCHLORIDE 300 MG: 150 CAPSULE, EXTENDED RELEASE ORAL at 08:41

## 2023-05-24 RX ADMIN — Medication 5 MG: at 16:14

## 2023-05-24 RX ADMIN — CARBIDOPA AND LEVODOPA 1 TABLET: 50; 200 TABLET, EXTENDED RELEASE ORAL at 20:21

## 2023-05-24 RX ADMIN — THERA TABS 1 TABLET: TAB at 08:41

## 2023-05-24 RX ADMIN — ACETAMINOPHEN 325MG 975 MG: 325 TABLET ORAL at 20:22

## 2023-05-24 RX ADMIN — AMANTADINE HYDROCHLORIDE 100 MG: 100 CAPSULE ORAL at 08:41

## 2023-05-24 RX ADMIN — CLONAZEPAM 1 MG: 1 TABLET ORAL at 12:13

## 2023-05-24 RX ADMIN — ACETAMINOPHEN 325MG 975 MG: 325 TABLET ORAL at 12:14

## 2023-05-24 RX ADMIN — GABAPENTIN 800 MG: 800 TABLET, FILM COATED ORAL at 20:21

## 2023-05-24 RX ADMIN — PANTOPRAZOLE SODIUM 40 MG: 40 TABLET, DELAYED RELEASE ORAL at 08:41

## 2023-05-24 RX ADMIN — ALFUZOSIN HYDROCHLORIDE 10 MG: 10 TABLET, EXTENDED RELEASE ORAL at 08:41

## 2023-05-24 ASSESSMENT — ACTIVITIES OF DAILY LIVING (ADL)
ADLS_ACUITY_SCORE: 44

## 2023-05-24 NOTE — PROGRESS NOTES
"Care Management Follow Up    Length of Stay (days): 4    Expected Discharge Date: 05/30/2023     Concerns to be Addressed: discharge planning     Patient plan of care discussed at interdisciplinary rounds: Yes    Anticipated Discharge Disposition: TCU     Anticipated Discharge Services: Other (see comment) (TBD)  Anticipated Discharge DME: Other (see comment) (TBD)    Patient/family educated on Medicare website which has current facility and service quality ratings:  Yes  Education Provided on the Discharge Plan: Yes  Patient/Family in Agreement with the Plan: yes    Referrals Placed by CM/SW:  None at this time  Private pay costs discussed: Not applicable    Additional Information:    Per notes from Dr. Alvarenga on 5/24: \"Benjamin Mathews is a 58 year old male admitted on 5/20/2023 for right ankle fracture after a syncopal episode.\"    Met with patient at bedside. Writer gave him a list of TCUs near [place], [place]. He stated he wants to look into placements near [place] or [place] as he has family in those areas. Writer stated she will aid in looking for placements in those areas and give him a list.     Dania Gusman, RADHA, LGSW  6 Med Surg   [facility]    "

## 2023-05-24 NOTE — PROGRESS NOTES
[facility]    Medicine Progress Note - Hospitalist Service, GOLD TEAM 17    Date of Admission:  5/20/2023    Assessment & Plan     Benjamin Mathews is a 58 year old male admitted on 5/20/2023 for right ankle fracture after a syncopal episode.   No status post Right Ankle Closed Reduction and  External Fixator Placement by Dr. Apodaca on 5/21.  No complication.  EBL: 5 mL.  Currently doing well.    Right ankle fracture after ground level fall  Suspect syncopal episode.  Status post above procedure.  Doing well.    -Postop care per orthopedic surgery.  -Pain currently controlled.  Continue current regimen with Tylenol, narcotics-judicious use, minimize as able.  - Ortho okay to resume PTA rivaroxaban.   - PT, OT  -Activity, wound care per Ortho.    -Follow hemoglobin for anemia of acute blood loss.  Hemoglobin today 10.5, continue monitoring.       Syncopal Episode  - by history this sounds vagal/orthostatic; patient endorses seeing spots, diaphoresis, and onset shortly after rising from seated position with return to baseline upon waking  - explanation could be intravascular depletion due to ~10 days of diarrhea  - Consider echo as needed.  - Currently stable.  - PT, OT.  Hydration    Diarrheal Illness vs Iatrogenic Diarrhea  ? Laxatives related.   - currently none.   - resume pta bowel regimen as needed.   - holding pta amitiza      Parkinson's and Associated complications (psychosis, dyskinesias, muscle spasms/contracture, depression, sleep disturbance)  -- Resume PTA amantadine, sinemet, clonazepam, clozapine, gabapentin, venlafaxine, trazodone  -- Dose verified by Pharm D     History of DVT/PE  - He was restarted on PTA DOAC     Cerebrovascular Disease  - atorvastatin ordered         Diet: Regular Diet Adult    DVT Prophylaxis: Pneumatic Compression Devices  Dudley Catheter: Not present  Lines: None     Cardiac Monitoring: None  Code Status: No CPR- Do NOT Intubate   "    Clinically Significant Risk Factors                  # Hypertension: Noted on problem list        # Overweight: Estimated body mass index is 28.61 kg/m  as calculated from the following:    Height as of this encounter: 1.93 m (6' 4\").    Weight as of this encounter: 106.6 kg (235 lb)., PRESENT ON ADMISSION          Disposition Plan     Expected Discharge Date: 05/30/2023      Destination: home            Paulo Escobar MD  Hospitalist Service, GOLD TEAM 17  [facility]  Securely message with Vocera (more info)  Text page via [employer] Paging/Directory   See signed in provider for up to date coverage information  ______________________________________________________________________    Interval History     Interval events reviewed.   No other new or acute medical concern  He was pleasant    Pain controlled.   Denies fever or chills.   No cough or cp or sob.   No LH or dizziness.   No NV or pain abdomen.   No new sensory or motor complaint.       Physical Exam   Vital Signs: Temp: 97.5  F (36.4  C) Temp src: Oral BP: 136/73 Pulse: 75   Resp: 18 SpO2: 95 % O2 Device: None (Room air)    Weight: 235 lbs 0 oz      General Appearance: Awake, interactive, NAD  Respiratory: Normal work of breathing. Clear bl   Cardiovascular: RRR s1s2  GI: Soft. NT. ND.  Extremities: Distally wwp. BLE pedal edema. External fixator.   Neuro: alert. At moving all extremities.   Others: Stable mood. Flat affect.       Medical Decision Making       45 MINUTES SPENT BY ME on the date of service doing chart review, history, exam, documentation & further activities per the note.      Data       Imaging results reviewed over the past 24 hrs:   No results found for this or any previous visit (from the past 24 hour(s)).  "

## 2023-05-24 NOTE — PLAN OF CARE
Goal Outcome Evaluation:                    VS: Temp: 98.1  F (36.7  C) Temp src: Oral BP: 112/78 Pulse: 86   Resp: 16 SpO2: 92 % O2 Device: None (Room air)         O2: Sating >95% on RA, denies SOB/Chest pain   Output: Voids using bedside urinal   Last BM: 5/22, bowel sounds normoactive x4   Activity: Not oob, non weight nearing RLE. Working with PT/OT   Up for meals? Yes   Skin: External fixation present on right ankle   Pain: 3-5/10 pain to right ankle, schedule tylenol and prn oxycodone administered.    CMS: AO x4, numbness and tingling at baseline   Dressing: External fixator, dressing and  ace wrap on right leg. Fresh blood noted on right heel. Dressing reinforce, Ortho updated.    Diet: Regular diet, thin liquids   LDA: L. PIV   Equipment:  IV pole, personal belongings   Plan: Call light within reach, bed in a low position. Able to make needs known. Continue to monitor with POC.   Additional Info:

## 2023-05-24 NOTE — PROGRESS NOTES
"[id number]    Plan of Care Reviewed With: Patient    Overall Patient Progress: No Change    Outcome Evaluation: Pt is A & O x3 (not time) on RA. C/O 6/10 RLE - administered PRN Roxicodone. Denies nausea, chest pain & SOB. Pt has L PIV - SL. UTV/ROBERTA RLE surgical incision - dressing CDI & external fixation in place. Pt is assist x2, on bedrest/RLE non weight bearing, voids spontaneously w/ urinal at bedside & uses call light appropriately. Contact precautions maintained throughout shift.     Vitals: /73 (BP Location: Left arm)   Pulse 75   Temp 97.5  F (36.4  C) (Oral)   Resp 18   Ht 1.93 m (6' 4\")   Wt 106.6 kg (235 lb)   SpO2 95%   BMI 28.61 kg/m      Plan: TBD, possible discharge 5/30 to home. Continue w/ plan of care.   "

## 2023-05-24 NOTE — PROGRESS NOTES
"Orthopedic Surgery Progress Note: 05/24/2023    Subjective:   No acute events overnight. Pain well-controlled. Tolerating PO without N/V. Voiding. Denies CP/SOB. No fevers/chills. Denies new numbness or tingling.  No Other concerns or complaints.    Objective:   /78   Pulse 86   Temp 98.4  F (36.9  C)   Resp 18   Ht 1.93 m (6' 4\")   Wt 106.6 kg (235 lb)   SpO2 97%   BMI 28.61 kg/m    No intake/output data recorded.  General: NAD. Resting comfortably in bed.  Respiratory: Breathing comfortably on RA.    Musculoskeletal:      RLE  Dressings clean and dry. Pin sites are c/d, in posterior slab splint. Dorsum of the foot  Windowed out. NO skin wrinkles noted.   SILT in sural, saphenous, superficial peroneal nerve, deep peroneal nerve, and tibial nerve distributions  Able to wiggle all toes. EHL/FHL intact    2+ DP Pulse. Foot is WWP       Laboratory Data:  Lab Results   Component Value Date    WBC 6.9 05/23/2023    HGB 10.5 (L) 05/23/2023     05/23/2023    INR 1.15 05/20/2023       Images:  No new images were obtained     Assessment & Plan:   Benjamin Mathews is a 58 year old male with PMH including CVA, parkinson's diease and clotting disorder now s/p above procedure on 5/21/2023 with Dr. Apodaca. From an ortho standpoint patient can be discharged w/ surgical planning for re-eval in 10-14 days to assess skin/soft tissue status and then proceed forward with operative planning for definitive fixation.     Today's pain   Pain control   RLE ankle elevation, icing   PTOT   Start pin site care. Can remove kerlix gauze wraps at base of pin site along with xeroform. Clean bases of pin sites with quetips soaked in half perioxide/saline solution. Then redress pin sits with xeroform strip and then kerlix wrapping of pin base. Done daily.      medicine Primary  Activity: Up with assist until independent No excessive bending or twisting. No lifting >10 lbs x 6 weeks. No Yashira lift for transfers.   Weight bearing " status: NWB RLE.  Pain management:   Transition from IV to PO narcotics as tolerated   Antibiotics: additional dose of ancef post op.  Diet: Begin with clear fluids and progress diet as tolerated.   DVT prophylaxis: SCDs only. Chemical DVT per primary team .  Imaging: n/a. CT and xrays obtained and will be assessed for operative planning    Labs: Hgb POD 1  Bracing/Splinting: posterior slab splint .  Dressings:    Physical Therapy/Occupational Therapy: Eval and treat  Cultures: none.    Consults: ortho  Follow-up:  tbd .   Disposition:  tbd.     Orthopedic surgery staff for this patient is Dr. Bonilla.  ------------------------------------------------------------------------------------------    Thank you,    Cornel Bains MD   PGY1  Department of Orthopaedic Surgery  Pager [phone]      Please page me directly with any questions/concerns during regular weekday hours before 5 pm. If there is no response, if it is a weekend, or if it is during evening hours then please page the orthopedic surgery resident on call.    FOLLOWUP:    Future Appointments   Date Time Provider Department [place]   5/24/2023 10:15 AM Juju Whittington PT URPT [place]   5/24/2023  7:00 PM Mariel Cruz OT UROT [place]   5/25/2023 11:45 AM Yari Escobar PA-C [facility]   6/7/2023  1:30 PM Ching Carlos DO [facility]   6/13/2023 11:00 AM Dequan York DPM [facility] [place]   7/10/2023  2:15 PM Miryam Montemayor APRN CNP [facility]   7/25/2023  8:20 AM Ember Arita MD [facility]   11/3/2023 11:30 AM Juju Bernardo PA-C [facility] [place]

## 2023-05-25 ENCOUNTER — MYC MEDICAL ADVICE (OUTPATIENT)
Dept: PHYSICAL MEDICINE AND REHAB | Facility: CLINIC | Age: 58
End: 2023-05-25

## 2023-05-25 VITALS
BODY MASS INDEX: 28.62 KG/M2 | DIASTOLIC BLOOD PRESSURE: 69 MMHG | TEMPERATURE: 97.5 F | HEIGHT: 76 IN | OXYGEN SATURATION: 97 % | WEIGHT: 235 LBS | RESPIRATION RATE: 18 BRPM | HEART RATE: 87 BPM | SYSTOLIC BLOOD PRESSURE: 118 MMHG

## 2023-05-25 PROCEDURE — 99239 HOSP IP/OBS DSCHRG MGMT >30: CPT | Performed by: INTERNAL MEDICINE

## 2023-05-25 PROCEDURE — 250N000013 HC RX MED GY IP 250 OP 250 PS 637: Performed by: PEDIATRICS

## 2023-05-25 PROCEDURE — 250N000013 HC RX MED GY IP 250 OP 250 PS 637: Performed by: INTERNAL MEDICINE

## 2023-05-25 RX ORDER — MULTIVITAMIN,THERAPEUTIC
1 TABLET ORAL DAILY
Qty: 30 TABLET | Refills: 0 | Status: SHIPPED | OUTPATIENT
Start: 2023-05-26

## 2023-05-25 RX ORDER — HYDROXYZINE HYDROCHLORIDE 25 MG/1
50 TABLET, FILM COATED ORAL EVERY 6 HOURS PRN
Qty: 20 TABLET | Refills: 0 | Status: SHIPPED | OUTPATIENT
Start: 2023-05-25

## 2023-05-25 RX ORDER — ASCORBIC ACID 500 MG
500 TABLET ORAL DAILY
Qty: 30 TABLET | Refills: 0 | Status: SHIPPED | OUTPATIENT
Start: 2023-05-25

## 2023-05-25 RX ORDER — METOPROLOL SUCCINATE 25 MG/1
12.5 TABLET, EXTENDED RELEASE ORAL 2 TIMES DAILY
Qty: 30 TABLET | Refills: 0 | Status: SHIPPED | OUTPATIENT
Start: 2023-05-25

## 2023-05-25 RX ORDER — TRAZODONE HYDROCHLORIDE 50 MG/1
50 TABLET, FILM COATED ORAL EVERY MORNING
Qty: 30 TABLET | Refills: 0 | Status: SHIPPED | OUTPATIENT
Start: 2023-05-26

## 2023-05-25 RX ORDER — TRAZODONE HYDROCHLORIDE 100 MG/1
100 TABLET ORAL AT BEDTIME
Qty: 30 TABLET | Refills: 0 | Status: SHIPPED | OUTPATIENT
Start: 2023-05-25 | End: 2023-06-14

## 2023-05-25 RX ORDER — VENLAFAXINE HYDROCHLORIDE 150 MG/1
300 CAPSULE, EXTENDED RELEASE ORAL DAILY
Qty: 60 CAPSULE | Refills: 0 | Status: SHIPPED | OUTPATIENT
Start: 2023-05-25

## 2023-05-25 RX ORDER — ATORVASTATIN CALCIUM 40 MG/1
40 TABLET, FILM COATED ORAL EVERY EVENING
Qty: 30 TABLET | Refills: 0 | Status: SHIPPED | OUTPATIENT
Start: 2023-05-25

## 2023-05-25 RX ORDER — BISACODYL 10 MG
10 SUPPOSITORY, RECTAL RECTAL DAILY PRN
Qty: 10 SUPPOSITORY | Refills: 0 | Status: SHIPPED | OUTPATIENT
Start: 2023-05-25

## 2023-05-25 RX ORDER — OXYCODONE HYDROCHLORIDE 5 MG/1
2.5-5 TABLET ORAL EVERY 4 HOURS PRN
Qty: 20 TABLET | Refills: 0 | Status: ON HOLD | OUTPATIENT
Start: 2023-05-25 | End: 2023-06-16

## 2023-05-25 RX ORDER — LACTULOSE 10 G/15ML
15 SOLUTION ORAL 2 TIMES DAILY
Qty: 473 ML | Refills: 0 | Status: SHIPPED | OUTPATIENT
Start: 2023-05-25 | End: 2023-10-03

## 2023-05-25 RX ORDER — CARBIDOPA AND LEVODOPA 25; 100 MG/1; MG/1
2 TABLET ORAL 3 TIMES DAILY
Qty: 120 TABLET | Refills: 0 | Status: SHIPPED | OUTPATIENT
Start: 2023-05-25 | End: 2023-06-07

## 2023-05-25 RX ORDER — GABAPENTIN 800 MG/1
800 TABLET ORAL 3 TIMES DAILY
Qty: 90 TABLET | Refills: 0 | Status: SHIPPED | OUTPATIENT
Start: 2023-05-25

## 2023-05-25 RX ORDER — POLYETHYLENE GLYCOL 3350 17 G/17G
17 POWDER, FOR SOLUTION ORAL 2 TIMES DAILY
Qty: 850 G | Refills: 0 | Status: SHIPPED | OUTPATIENT
Start: 2023-05-25 | End: 2023-10-03

## 2023-05-25 RX ORDER — PANTOPRAZOLE SODIUM 40 MG/1
40 TABLET, DELAYED RELEASE ORAL DAILY
Qty: 30 TABLET | Refills: 0 | Status: SHIPPED | OUTPATIENT
Start: 2023-05-25

## 2023-05-25 RX ORDER — CLONAZEPAM 2 MG/1
2 TABLET ORAL 2 TIMES DAILY
Qty: 20 TABLET | Refills: 0 | Status: SHIPPED | OUTPATIENT
Start: 2023-05-25 | End: 2023-06-07

## 2023-05-25 RX ORDER — ALFUZOSIN HYDROCHLORIDE 10 MG/1
10 TABLET, EXTENDED RELEASE ORAL DAILY
Qty: 30 TABLET | Refills: 0 | Status: SHIPPED | OUTPATIENT
Start: 2023-05-26 | End: 2023-11-03

## 2023-05-25 RX ORDER — AMANTADINE HYDROCHLORIDE 100 MG/1
100 CAPSULE, GELATIN COATED ORAL 2 TIMES DAILY
Qty: 60 CAPSULE | Refills: 0 | Status: SHIPPED | OUTPATIENT
Start: 2023-05-25 | End: 2023-05-25

## 2023-05-25 RX ORDER — CARBIDOPA AND LEVODOPA 50; 200 MG/1; MG/1
1 TABLET, EXTENDED RELEASE ORAL AT BEDTIME
Qty: 30 TABLET | Refills: 0 | Status: SHIPPED | OUTPATIENT
Start: 2023-05-25 | End: 2023-06-07

## 2023-05-25 RX ORDER — CLOZAPINE 50 MG/1
50 TABLET ORAL AT BEDTIME
Qty: 30 TABLET | Refills: 0 | Status: SHIPPED | OUTPATIENT
Start: 2023-05-25

## 2023-05-25 RX ADMIN — LACTULOSE 15 ML: 10 SOLUTION ORAL at 07:53

## 2023-05-25 RX ADMIN — Medication 125 MCG: at 07:54

## 2023-05-25 RX ADMIN — VENLAFAXINE HYDROCHLORIDE 300 MG: 150 CAPSULE, EXTENDED RELEASE ORAL at 07:54

## 2023-05-25 RX ADMIN — RIVAROXABAN 20 MG: 10 TABLET, FILM COATED ORAL at 17:02

## 2023-05-25 RX ADMIN — Medication 12.5 MG: at 07:54

## 2023-05-25 RX ADMIN — CARBIDOPA AND LEVODOPA 2 TABLET: 25; 100 TABLET ORAL at 07:54

## 2023-05-25 RX ADMIN — CARBIDOPA AND LEVODOPA 2 TABLET: 25; 100 TABLET ORAL at 17:02

## 2023-05-25 RX ADMIN — GABAPENTIN 800 MG: 800 TABLET, FILM COATED ORAL at 07:54

## 2023-05-25 RX ADMIN — ALFUZOSIN HYDROCHLORIDE 10 MG: 10 TABLET, EXTENDED RELEASE ORAL at 07:53

## 2023-05-25 RX ADMIN — POLYETHYLENE GLYCOL 3350 17 G: 17 POWDER, FOR SOLUTION ORAL at 07:53

## 2023-05-25 RX ADMIN — GABAPENTIN 800 MG: 800 TABLET, FILM COATED ORAL at 11:57

## 2023-05-25 RX ADMIN — OXYCODONE HYDROCHLORIDE AND ACETAMINOPHEN 500 MG: 500 TABLET ORAL at 07:54

## 2023-05-25 RX ADMIN — CLONAZEPAM 2 MG: 2 TABLET ORAL at 07:53

## 2023-05-25 RX ADMIN — PANTOPRAZOLE SODIUM 40 MG: 40 TABLET, DELAYED RELEASE ORAL at 07:53

## 2023-05-25 RX ADMIN — CLONAZEPAM 1 MG: 1 TABLET ORAL at 11:57

## 2023-05-25 RX ADMIN — ACETAMINOPHEN 325MG 975 MG: 325 TABLET ORAL at 11:57

## 2023-05-25 RX ADMIN — TRAZODONE HYDROCHLORIDE 50 MG: 50 TABLET ORAL at 07:54

## 2023-05-25 RX ADMIN — Medication 5 MG: at 11:57

## 2023-05-25 RX ADMIN — AMANTADINE HYDROCHLORIDE 100 MG: 100 CAPSULE ORAL at 07:53

## 2023-05-25 RX ADMIN — THERA TABS 1 TABLET: TAB at 07:54

## 2023-05-25 RX ADMIN — CARBIDOPA AND LEVODOPA 2 TABLET: 25; 100 TABLET ORAL at 11:57

## 2023-05-25 ASSESSMENT — ACTIVITIES OF DAILY LIVING (ADL)
ADLS_ACUITY_SCORE: 44
ADLS_ACUITY_SCORE: 44
ADLS_ACUITY_SCORE: 48
ADLS_ACUITY_SCORE: 44
ADLS_ACUITY_SCORE: 44
ADLS_ACUITY_SCORE: 48
ADLS_ACUITY_SCORE: 44

## 2023-05-25 NOTE — TELEPHONE ENCOUNTER
Bone health consult completed on May 10    F/u with usual PM &R provider Dr Arita, f/u bone health in 2 yrs after next DEXA, no meds ordered.    5/20/23  NEW EVENT, ADM WITH FX - TO O.R. 5/21/23  Benjamin Mathews is a 58 year old male who fell yesterday after rising from a seated position, lost consciousness, and woke up in severe right ankle pain. XR's in the ED revealed a fracture and he was transferred to the [facility] in preparation for surgery with ortho.     Will route update to Dr Rodriguez after discharge, post op rehab and then set up f/u mid summer after healed from surgery for reconsideration of bone health medication.    Nereida Delvalle RN on 5/25/2023 at 6:03 PM

## 2023-05-25 NOTE — PROGRESS NOTES
"Orthopedic Surgery Progress Note: 05/25/2023    Subjective:   No acute events overnight. Pain well-controlled. Tolerating PO without N/V. Voiding. Denies CP/SOB. No fevers/chills. Denies new numbness or tingling.  No Other concerns or complaints. Continued to stress importance of RLE elevation.     Objective:   /62 (BP Location: Right arm)   Pulse 79   Temp 98.9  F (37.2  C) (Oral)   Resp 17   Ht 1.93 m (6' 4\")   Wt 106.6 kg (235 lb)   SpO2 97%   BMI 28.61 kg/m    No intake/output data recorded.  General: NAD. Resting comfortably in bed.  Respiratory: Breathing comfortably on RA.    Musculoskeletal:      RLE  Dressings clean and dry. Pin sites are c/d, in posterior slab splint. Dorsum of the foot  Windowed out. NO skin wrinkles noted.   SILT in sural, saphenous, superficial peroneal nerve, deep peroneal nerve, and tibial nerve distributions  Able to wiggle all toes. EHL/FHL intact    2+ DP Pulse. Foot is WWP      Laboratory Data:  Lab Results   Component Value Date    WBC 6.9 05/23/2023    HGB 10.5 (L) 05/23/2023     05/23/2023    INR 1.15 05/20/2023       Images:  No new images were obtained.      Assessment & Plan:   Benjamin Mathews is a 58 year old male with PMH including CVA, parkinson's diease and clotting disorder now s/p above procedure on 5/21/2023 with Dr. Apodaca. From an ortho standpoint patient can be discharged w/ surgical planning for re-eval in 10-14 days to assess skin/soft tissue status and then proceed forward with operative planning for definitive fixation.     Today's pain   Pain control   RLE ankle elevation, icing   PTOT   Start pin site care. Can remove kerlix gauze wraps at base of pin site along with xeroform. Clean bases of pin sites with quetips soaked in half perioxide/saline solution. Then redress pin sits with xeroform strip and then kerlix wrapping of pin base. Done daily.   Plan for skin/soft tissue check on 5/31 w/ plan for definitive fixation to proceed 6/1 if " swelling is resolved w/ Dr. Bonilla. Patient does not need to remain house until surgery date if has placement. If patient is going to be discharged, Ortho needs to be notified and can facilitate scheduling skin check appt on 5/31. Pt has been consented.      medicine Primary  Activity: Up with assist until independent No excessive bending or twisting. No lifting >10 lbs x 6 weeks. No Yashira lift for transfers.   Weight bearing status: NWB RLE.  Pain management:   Transition from IV to PO narcotics as tolerated   Antibiotics: additional dose of ancef post op.  Diet: Begin with clear fluids and progress diet as tolerated, NPO at MN 5/31.   DVT prophylaxis: SCDs only. Chemical DVT per primary team .  Imaging: n/a. CT and xrays obtained and will be assessed for operative planning    Labs: Hgb POD 1  Bracing/Splinting: posterior slab splint .  Dressings:    Physical Therapy/Occupational Therapy: Eval and treat  Cultures: none.    Consults: ortho  Follow-up:  patient will need skin check on 5/31. If inhouse can come see. If has been discharged to TCU will need to have scheduled appt. Please notify Ortho if patient is discharged. .   Disposition:  tbd.       Orthopaedics will follow this patient peripherally at this time. Please call or page me or the on-call resident with any concerns or questions.      Orthopedic surgery staff for this patient is Dr. Bonilla.    ------------------------------------------------------------------------------------------    Thank you,    Cornel Bains MD   PGY1  Department of Orthopaedic Surgery  Pager [phone]      Please page me directly with any questions/concerns during regular weekday hours before 5 pm. If there is no response, if it is a weekend, or if it is during evening hours then please page the orthopedic surgery resident on call.    FOLLOWUP:    Future Appointments   Date Time Provider Department [place]   5/25/2023 10:30 AM Juju Whittington, PT [facility] [place]   5/25/2023 11:45 AM  Yari Escobar PA-C [facility]   5/25/2023  7:00 PM Mariel Cruz, OT [facility] [place]   6/7/2023  1:30 PM Ching Carlos DO [facility]   6/13/2023 11:00 AM Dequan York DPM [facility] [place]   7/10/2023  2:15 PM Miryam Montemayor APRN [facility]   7/25/2023  8:20 AM Ember Arita MD [facility]   11/3/2023 11:30 AM Juju Bernardo PA-C [facility] [place]

## 2023-05-25 NOTE — OP NOTE
DATE OF ENCOUNTER:  5/21/23       PREOPERATIVE DIAGNOSIS:    1. Right ankle-fracture dislocation, Pilon variant      POSTOPERATIVE DIAGNOSIS:    1. Right ankle-fracture dislocation, Pilon variant      PROCEDURE:   1. Closed reduction and application of Ex-Fix Right Lower Extremity      STAFF SURGEON:  Nicole Apodaca MD        ANESTHESIA:  General endotracheal anesthesia   ESTIMATED BLOOD LOSS:  5 mL.        COMPLICATIONS:  None.        IMPLANTS:  Monserrat Ex-Fix      BRIEF PATIENT HISTORY: Mr. Mathews is a 58 year old man who sustained a severe right ankle fracture dislocation in a fall.  His distal tibia fracture involve the significant surface of the tibia such that adequate reduction of the joint could not be obtained with simple closed reduction and splinting.  The orthopedic team therefore discussed with the patient the recommendation to achieve acceptable reduction and stability with an external fixator.  We discussed that we would allow for time and elevation such that eventual internal fixation could be achieved safely.  Right lower extremity We did discuss the risks and benefits of both nonoperative and operative treatment.  We discussed the expected postoperative restrictions and rehabilitation course. The patient had the opportunity for questions to be answered and wished to proceed to surgery.        DESCRIPTION OF PROCEDURE:  The patient was identified in the preoperative area and the correct right lower extremity was marked for surgery.  The patient was taken to the operating room where he was surrendered to general endotracheal anesthesia and was moved to the operating room table in the supine position.  The right lower extremity was prepped and draped in the usual sterile fashion.  A timeout was held in accordance with hospital policy, confirming correct patient, side, site, procedure and administration of IV antibiotics prior to incision.  Fluoroscopy was brought in to allow for safe and  appropriate placement of 2 tibial Schanz pins and a centrally threaded Schanz pin in the appropriate location of the calcaneus.  A delta frame was then built off of these pins.  Fluoroscopy confirmed appropriate pin position and adequate reduction of the ankle joint.  Soft dressings were then applied and a posterior slab splint was placed to keep the ankle in dorsiflexion.   The patient was extubated and transported to recovery in stable condition.  There were no apparent intraoperative complications.        POSTOPERATIVE PLAN:  T  Patient will be monitored on the inpatient unit and skin will be observed to allow for surgical timing.  The patient's case has been reviewed with Dr. Jose Bonilla and he will assume definitive care of the fracture.          DANIEL BROWN MD

## 2023-05-25 NOTE — DISCHARGE SUMMARY
"[facility]  Hospitalist Discharge Summary      Date of Admission:  5/20/2023  Date of Discharge:  5/27/2023  Discharging Provider: Paulo Escobar MD  Discharge Service: Hospitalist Service, GOLD TEAM 17    Discharge Diagnoses     Right ankle fracture   Syncope     Clinically Significant Risk Factors     # Overweight: Estimated body mass index is 28.61 kg/m  as calculated from the following:    Height as of this encounter: 1.93 m (6' 4\").    Weight as of this encounter: 106.6 kg (235 lb).       Follow-ups Needed After Discharge   Follow-up Appointments     Adult [facility] Follow-up and recommended labs and tests      Follow up with primary care provider, Stephanie Maldonado, within 7   days for hospital follow- up.  The following labs/tests are recommended:   BMP and CBC.      Appointments on [place] and/or [facility] (with [facility] or [facility]   provider or service). Call [phone] if you haven't heard regarding   these appointments within 7 days of discharge.             Unresulted Labs Ordered in the Past 30 Days of this Admission     No orders found from 4/20/2023 to 5/21/2023.          Discharge Disposition   Discharged to TCU  Condition at discharge: Stable    Hospital Course   Benjamin Mathews is a 58 year old male admitted on 5/20/2023 for right ankle fracture after a syncopal episode.  Now status post Right Ankle Closed Reduction and External Fixator Placement by Dr. Apodaca on 5/21. No complication. EBL: 5 mL.  Ortho followed the patient during this hospitalization. No more episodes of syncope. Neurologic examination remained stable. PT and OT recommended TCU. He was hemodynamically stable on discharge.      Right ankle fracture after ground level fall  Activity: Up with assist until independent No excessive bending or twisting. No lifting >10 lbs x 6 weeks. No Yashira lift for transfers.   Weight bearing status: NWB RLE.  Pain management:   Transition " from IV to PO narcotics as tolerated   Antibiotics: additional dose of ancef post op.  Diet: Begin with clear fluids and progress diet as tolerated, NPO at [place] 5/31.   DVT prophylaxis: SCDs only. Chemical DVT per primary team .  Imaging: n/a. CT and xrays obtained and will be assessed for operative planning    Labs: Hgb POD 1  Bracing/Splinting: posterior slab splint .  Dressings:    Physical Therapy/Occupational Therapy: Eval and treat  Cultures: none.    Consults: ortho  Follow-up:  patient will need skin check on 5/31. If inhouse can come see. If has been discharged to TCU will need to have scheduled appt. Please notify Ortho if patient is discharged.         Syncopal Episode  - by history this sounds vagal/orthostatic; patient endorses seeing spots, diaphoresis, and onset shortly after rising from seated position with return to baseline upon waking  - explanation could be intravascular depletion due to ~10 days of diarrhea  - Consider echo as needed.  - Currently stable.  - PT, OT.  Hydration     Parkinson's and Associated complications (psychosis, dyskinesias, muscle spasms/contracture, depression, sleep disturbance)  -- Resume PTA amantadine, sinemet, clonazepam, clozapine, gabapentin, venlafaxine, trazodone  -- Dose verified by Pharm D     History of DVT/PE  - He was restarted on PTA DOAC     Cerebrovascular Disease  - atorvastatin ordered       Consultations This Hospital Stay   PHYSICAL THERAPY ADULT IP CONSULT  OCCUPATIONAL THERAPY ADULT IP CONSULT  CARE MANAGEMENT / SOCIAL WORK IP CONSULT    Code Status   No CPR- Do NOT Intubate    Time Spent on this Encounter   I, Paulo Escobar MD, personally saw the patient today and spent greater than 30 minutes discharging this patient.       Paulo Escobar MD  [facility] MED SURG  [facility] [address]  Phone: [phone]  Fax:  [phone]  ______________________________________________________________________    Physical Exam   Vital Signs: Temp: 97.2  F (36.2  C) Temp src: Oral BP: (!) 153/91 Pulse: 79   Resp: 16 SpO2: 96 % O2 Device: None (Room air)    Weight: 235 lbs 0 oz  General Appearance:  Awake, interactive, NAD  Respiratory: Normal work of breathing. Clear bl   Cardiovascular: RRR s1s2  GI: Soft. NT. ND.  Extremities: Distally wwp. BLE pedal edema. External fixator.   Neuro: alert. At moving all extremities.   Others: Stable mood. Flat affect.           Primary Care Physician   Stephanie Maldonado    Discharge Orders      Reason for your hospital stay    Benjamin Mathews is a 58 year old male admitted on 5/20/2023 for right ankle fracture after a syncopal episode.     Activity    Your activity upon discharge: activity as tolerated     Adult [facility] Follow-up and recommended labs and tests    Follow up with primary care provider, Stephanie Maldonado, within 7 days for hospital follow- up.  The following labs/tests are recommended: BMP and CBC.      Appointments on [place] and/or [facility] (with [facility] or [facility] provider or service). Call [phone] if you haven't heard regarding these appointments within 7 days of discharge.     Diet    Follow this diet upon discharge: Orders Placed This Encounter      Regular Diet Adult       Significant Results and Procedures   Results for orders placed or performed during the hospital encounter of 05/20/23   XR Tibia and Fibula Right 2 Views    Narrative    EXAM: XR ANKLE RIGHT G/E 3 VIEWS, XR TIBIA AND FIBULA RIGHT 2 VIEWS  LOCATION: [facility]  DATE/TIME: 5/20/2023 7:30 PM CDT    INDICATION: Fall, twisted ankle, swelling.  COMPARISON: None.      Impression    IMPRESSION: Comminuted oblique fracture of the distal fibula extending into the lateral corner of the ankle mortise. Additional comminuted fracture of the distal tibia  extending into the tibial plafond with significant step-off along the joint margin.   The medial malleolus is primarily intact although the fracture extends to the base of the medial malleolus. Soft tissue swelling about the ankle. More proximally, the tibia and fibula are negative.   Ankle XR, G/E 3 views, right    Narrative    EXAM: XR ANKLE RIGHT G/E 3 VIEWS, XR TIBIA AND FIBULA RIGHT 2 VIEWS  LOCATION: [facility]  DATE/TIME: 5/20/2023 7:30 PM CDT    INDICATION: Fall, twisted ankle, swelling.  COMPARISON: None.      Impression    IMPRESSION: Comminuted oblique fracture of the distal fibula extending into the lateral corner of the ankle mortise. Additional comminuted fracture of the distal tibia extending into the tibial plafond with significant step-off along the joint margin.   The medial malleolus is primarily intact although the fracture extends to the base of the medial malleolus. Soft tissue swelling about the ankle. More proximally, the tibia and fibula are negative.   CT Chest Pulmonary Embolism w Contrast    Narrative    EXAM: CT CHEST PULMONARY EMBOLISM W CONTRAST  LOCATION: [facility]  DATE/TIME: 5/21/2023 12:46 AM CDT    INDICATION: evaluation for PE, SOB, pre syncope, elevated d dimer, recent hospitalization  COMPARISON: 5/28/2022  TECHNIQUE: CT chest pulmonary angiogram during arterial phase injection of IV contrast. Multiplanar reformats and MIP reconstructions were performed. Dose reduction techniques were used.   CONTRAST: iopamidol (ISOVUE 370) solution 73    FINDINGS:  ANGIOGRAM CHEST: Pulmonary arteries are normal caliber and negative for pulmonary emboli. Thoracic aorta is negative for dissection. No CT evidence of right heart strain.    LUNGS AND PLEURA: Mild bibasilar atelectatic changes are noted, the lungs are otherwise clear.    MEDIASTINUM/AXILLAE: No lymphadenopathy. Normal esophagus. No  significant pericardial effusion.    CORONARY ARTERY CALCIFICATION: Mild.    UPPER ABDOMEN: Normal.    MUSCULOSKELETAL: No concerning bone lesions.      Impression    IMPRESSION:  1.  No evidence of pulmonary embolus to the segmental level.   XR Ankle Right G/E 3 Views    Narrative    EXAM: XR ANKLE RIGHT G/E 3 VIEWS  LOCATION: [facility]  DATE/TIME: 5/21/2023 1:23 AM CDT    INDICATION: Post splint.  COMPARISON: 05/20/2023.      Impression    IMPRESSION: 3 views right ankle. Casting material obscures some details. Comminuted oblique fracture of the distal fibula extending into the lateral corner of the ankle mortise. Comminuted displaced fracture of the distal tibia extending into the tibial   plafond. Clinical correlation for alignment. Soft tissue swelling about the ankle.    XR Surgery HAYDEN L/T 5 Min Fluoro    Narrative    This exam was marked as non-reportable because it will not be read by a   radiologist or a [place] non-radiologist provider.         XR Ankle Right G/E 3 Views    Narrative    EXAM: XR ANKLE RIGHT G/E 3 VIEWS  LOCATION: [facility]  DATE/TIME: 5/21/2023 11:35 AM CDT    INDICATION: Postoperative follow-up.  COMPARISON: 05/21/2023 CT and radiographs.      Impression    IMPRESSION: Interval placement of external fixation hardware, with pins in the distal tibial diaphysis and across the posterior body of the calcaneus. Hardware is well seated without evidence of new fracture or other immediate complication. Trimalleolar   fractures in the distal tibia and fibula again visualized. Fracture components are in near anatomic position and alignment, similar to the postreduction CT scan. Moderate soft tissue swelling in the ankle.   CT Ankle Right w/o Contrast    Narrative    EXAM: CT ANKLE RIGHT WITHOUT CONTRAST  LOCATION: [facility]  DATE/TIME: 05/21/2023, 11:58 AM  CDT    INDICATION: Pilon right ankle fracture. Status post external fixator placement.  COMPARISON: Ankle radiographic exam 05/21/2023.  TECHNIQUE: Noncontrast. Axial, sagittal and coronal thin-section reconstruction. Dose reduction techniques were used.     FINDINGS:     BONES:  -External fixator is in place with pins traversing the posterior calcaneus. Comminuted intra-articular pilon-type distal tibia fracture with dominant coronally oriented fracture line extending from the distal tibial metaphysis to the tibial plafond.   Comminuted fracture across the medial malleolus. Comminuted mildly displaced distal fibula fracture. Fracture gapping at the tibial plafond articular surface up to 1.2 cm. Slight articular step-off at the distal tibial articular surface. No talus or   calcaneus fracture. Tarsal bones and metatarsal bases intact. No significant hindfoot or midfoot joint space narrowing.    SOFT TISSUES:  -No evidence for tendon entrapment. Severe ankle soft tissue swelling. No drainable fluid collection. Global intrinsic foot muscle atrophy mild severity. More severe atrophy of the abductor digiti minimi. Ankle joint lipohemarthrosis.      Impression    IMPRESSION:  1.  External fixator in place; Comminuted intra-articular mildly displaced pilon-type distal tibia fracture.  2.  Comminuted mildly displaced distal fibula fracture.  3.  No evidence for tendon entrapment.  4.  Ankle joint lipohemarthrosis.           Discharge Medications   Current Discharge Medication List      START taking these medications    Details   lactulose (CHRONULAC) 10 GM/15ML solution Take 15 mLs by mouth 2 times daily  Qty: 473 mL, Refills: 0    Associated Diagnoses: Closed fracture of right ankle, initial encounter      oxyCODONE (ROXICODONE) 5 MG tablet Take 0.5-1 tablets (2.5-5 mg) by mouth every 4 hours as needed for breakthrough pain  Qty: 20 tablet, Refills: 0    Associated Diagnoses: Closed fracture of right ankle, initial  encounter         CONTINUE these medications which have CHANGED    Details   alfuzosin ER (UROXATRAL) 10 MG 24 hr tablet Take 1 tablet (10 mg) by mouth daily  Qty: 30 tablet, Refills: 0    Associated Diagnoses: Closed fracture of right ankle, initial encounter      amantadine (SYMMETREL) 100 MG capsule Take 1 capsule (100 mg) by mouth 2 times daily  Qty: 60 capsule, Refills: 0    Associated Diagnoses: Parkinson's disease (tremor, stiffness, slow motion, unstable posture) (H)      atorvastatin (LIPITOR) 40 MG tablet Take 1 tablet (40 mg) by mouth every evening  Qty: 30 tablet, Refills: 0    Associated Diagnoses: Closed fracture of right ankle, initial encounter      bisacodyl (DULCOLAX) 10 MG suppository Place 1 suppository (10 mg) rectally daily as needed for constipation  Qty: 10 suppository, Refills: 0    Associated Diagnoses: Closed fracture of right ankle, initial encounter      carbidopa-levodopa (SINEMET CR)  MG CR tablet Take 1 tablet by mouth At Bedtime  Qty: 30 tablet, Refills: 0    Associated Diagnoses: Closed fracture of right ankle, initial encounter      carbidopa-levodopa (SINEMET)  MG tablet Take 2 tablets by mouth 3 times daily  Qty: 120 tablet, Refills: 0    Associated Diagnoses: Closed fracture of right ankle, initial encounter      cholecalciferol (VITAMIN D3) 125 mcg (5000 units) capsule Take 1 capsule (125 mcg) by mouth daily  Qty: 30 capsule, Refills: 0    Associated Diagnoses: Closed fracture of right ankle, initial encounter      clonazePAM (KLONOPIN) 2 MG tablet Take 1 tablet (2 mg) by mouth 2 times daily  Qty: 20 tablet, Refills: 0    Associated Diagnoses: Closed fracture of right ankle, initial encounter      cloZAPine (CLOZARIL) 50 MG tablet Take 1 tablet (50 mg) by mouth At Bedtime  Qty: 30 tablet, Refills: 0    Associated Diagnoses: Closed fracture of right ankle, initial encounter      gabapentin (NEURONTIN) 800 MG tablet Take 1 tablet (800 mg) by mouth 3 times daily  Qty:  90 tablet, Refills: 0    Associated Diagnoses: Closed fracture of right ankle, initial encounter      hydrOXYzine (ATARAX) 25 MG tablet Take 2 tablets (50 mg) by mouth every 6 hours as needed for anxiety (up to 3 timrd daily)  Qty: 20 tablet, Refills: 0    Associated Diagnoses: Closed fracture of right ankle, initial encounter      linaclotide (LINZESS) 290 MCG capsule Take 1 capsule (290 mcg) by mouth daily as needed ([facility])  Qty: 30 capsule, Refills: 0    Associated Diagnoses: Closed fracture of right ankle, initial encounter      metoprolol succinate ER (TOPROL XL) 25 MG 24 hr tablet Take 0.5 tablets (12.5 mg) by mouth 2 times daily  Qty: 30 tablet, Refills: 0    Associated Diagnoses: Closed fracture of right ankle, initial encounter      multivitamin, therapeutic (THERA-VIT) TABS tablet Take 1 tablet by mouth daily  Qty: 30 tablet, Refills: 0    Associated Diagnoses: Closed fracture of right ankle, initial encounter      pantoprazole (PROTONIX) 40 MG EC tablet Take 1 tablet (40 mg) by mouth daily Take 1 tablet (40mg) by mouth daily at 8am  Qty: 30 tablet, Refills: 0    Associated Diagnoses: Closed fracture of right ankle, initial encounter      polyethylene glycol (MIRALAX) 17 GM/Dose powder Take 17 g by mouth 2 times daily One capful in liquid by mouth twice a day, 8 AM and one dose at supper  Qty: 850 g, Refills: 0    Associated Diagnoses: Slow transit constipation      rivaroxaban ANTICOAGULANT (XARELTO) 20 MG TABS tablet Take 1 tablet (20 mg) by mouth daily (with dinner) At 5pm  Qty: 30 tablet, Refills: 0    Associated Diagnoses: Closed fracture of right ankle, initial encounter      !! traZODone (DESYREL) 100 MG tablet Take 1 tablet (100 mg) by mouth At Bedtime  Qty: 30 tablet, Refills: 0    Associated Diagnoses: Closed fracture of right ankle, initial encounter      !! traZODone (DESYREL) 50 MG tablet Take 1 tablet (50 mg) by mouth every morning  Qty: 30 tablet, Refills: 0    Associated Diagnoses: Closed  fracture of right ankle, initial encounter      venlafaxine (EFFEXOR XR) 150 MG 24 hr capsule Take 2 capsules (300 mg) by mouth daily  Qty: 60 capsule, Refills: 0    Associated Diagnoses: Closed fracture of right ankle, initial encounter      vitamin C (ASCORBIC ACID) 500 MG tablet Take 1 tablet (500 mg) by mouth daily  Qty: 30 tablet, Refills: 0    Associated Diagnoses: Closed fracture of right ankle, initial encounter       !! - Potential duplicate medications found. Please discuss with provider.      CONTINUE these medications which have NOT CHANGED    Details   sodium phosphate (FLEET ENEMA) 7-19 GM/118ML rectal enema Place 1 enema rectally daily as needed for constipation         STOP taking these medications       acetaminophen (TYLENOL) 500 MG tablet Comments:   Reason for Stopping:         bisacodyl (DULCOLAX) 5 MG EC tablet Comments:   Reason for Stopping:         calcium polycarbophil (FIBER-LAX) 625 MG tablet Comments:   Reason for Stopping:         ENULOSE 10 GM/15ML SOLUTION Comments:   Reason for Stopping:         hydrocortisone 1 % CREA cream Comments:   Reason for Stopping:         lubiprostone (AMITIZA) 24 MCG capsule Comments:   Reason for Stopping:         triamcinolone (KENALOG) 0.1 % external cream Comments:   Reason for Stopping:             Allergies   Allergies   Allergen Reactions     Amantadine Other (See Comments)     Other reaction(s): Hallucinations  Hallucinations/ lost self control/gambling.     halluicnations  Hallucinations/ lost self control/gambling.     hallucinates  halluicnations  hallucinates  Hallucinations/ lost self control/gambling.        Quetiapine GI Disturbance, Diarrhea and Other (See Comments)     Diarrhea    Diarrhea  Other reaction(s): GI Disturbance  Diarrhea       Duloxetine Other (See Comments)     suicidal  suicidal

## 2023-05-25 NOTE — PLAN OF CARE
Goal Outcome Evaluation:  PRN oxycodone given for pain. Linen changed.    Orientation: Aox4  Bowel: Continent, uses bedpan  Bladder: Continent, uses urinal  Pain: R leg   Ambulation/Transfers: Bedrest  Blood sugars: n/a  Diet/ Liquids: Regular  Tubes/ Lines/ Drains: L PIV SL   Tube Feeding: n/a   Oxygen: n/a   Skin: R leg external fixation

## 2023-05-25 NOTE — PROGRESS NOTES
6MS DISCHARGE    D: Patient discharged to [facility] TCU at 18:27. Patient accompanied by EMS.    I: Discharge prescriptions given to patient. All discharge medications and instructions reviewed with TCU. Patient instructed to seek care if experiencing worsening symptoms, such as increased pain, swelling, fever. Other phone numbers to call with questions or concerns after discharge reviewed. PIV removed. Education completed.    A:Patient verbalized understanding of discharge instructions. Prescribed home medications to be provided by faucility.  Belongings returned to patient.    P: Patient to follow-up on June 7th with Ching Chaudhry RN on 5/25/2023 at 6:30 PM

## 2023-05-25 NOTE — PROGRESS NOTES
"[id number]    Plan of Care Reviewed With: Patient    Overall Patient Progress: No Change    Outcome Evaluation: Pt is A & O x3 (not time) on RA. Pt C/O 6/10 RLE pain - administered PRN Roxicodone. Denies nausea chest pain & SOB. Pt has L PIV - SL. UTV-ROBERTA RLE surgical incision site - pin site care completed during shift & dressing reinforced, dressing CDI. Pt is assist x2, on bedrest/RLE non weight bearing, voids spontaneously w/ urinal at bedside & uses call light appropriately. Contact precautions maintained throughout shift. Pt repositioned various times throughout shift.    Vitals: BP (!) 153/91 (BP Location: Left arm, Patient Position: Semi-Thompson's, Cuff Size: Adult Regular)   Pulse 79   Temp 97.2  F (36.2  C) (Oral)   Resp 16   Ht 1.93 m (6' 4\")   Wt 106.6 kg (235 lb)   SpO2 96%   BMI 28.61 kg/m      Plan: Discharge to TCU today. [facility] transportation and scheduled stretcher ride for 1642 to 1727. Continue w/ plan of care.   "

## 2023-05-25 NOTE — PROGRESS NOTES
Care Management Follow Up    Length of Stay (days): 5    Expected Discharge Date: 05/27/2023     Concerns to be Addressed: discharge planning     Patient plan of care discussed at interdisciplinary rounds: Yes    Anticipated Discharge Disposition: Other (Comments) (TBD)     Anticipated Discharge Services: Other (see comment) (TBD)  Anticipated Discharge DME: Other (see comment) (TBD)    Patient/family educated on Medicare website which has current facility and service quality ratings:  Yes  Education Provided on the Discharge Plan: Yes  Patient/Family in Agreement with the Plan: yes    Private pay costs discussed: Not applicable         Referrals Placed by CM/SW:     [facility]  [address] [place] [address]  [phone]  5/25: Writer left VM with admissions to check if they have openings. Writer sent referral to them via [employer].    [facility]  [address] [place] [address]  [phone]  5/25: Referral sent.     [facility]  [address] [place] [address]  [phone]  5/25: Referral sent.    [facility]  [address] [place] [address]  [phone]  5/25: Referral sent    [facility]  [address] [address]  [phone]  5/25: Referral sent.    [facility]  [address] [place] [address]  [phone]  5/25: Referral sent.     [facility]  [address] [place] [address]  [phone]  5/25: Referral sent.     [facility]  [address] [place] [address]  [phone]  5/25: Referral sent.     [facility]  [address] [place] [address]  [phone]  5/25: Referral sent.     Additional information:    Writer sent referrals to the above listed TCUs for patient. Gisela will continue to follow up  with referrals sent. Patient does not want to go to [facility] and was strongly against it as he had a bad previous experience there. He would prefer to go to [place] and is open to other locations. He did not want to go to the [facility].     See note from Mary WADE for discharge information.    RADHA Muñoz, LGSW  6 Med Surg   [facility]

## 2023-05-25 NOTE — PLAN OF CARE
Physical Therapy Discharge Summary    Reason for therapy discharge:    Discharged to transitional care facility.    Progress towards therapy goal(s). See goals on Care Plan in [facility] electronic health record for goal details.  Goals not met.  Barriers to achieving goals:   discharge from facility.    Therapy recommendation(s):    Continued therapy is recommended.  Rationale/Recommendations:  Pt would benefit from further PT for LE strengthening and increase independence with all functional mobility.

## 2023-05-25 NOTE — PROGRESS NOTES
Care Management Discharge Note    Discharge Date: 05/27/2023       Discharge Disposition: TCU    [facility]  [address] [place] [address]  [facility]  Phone: [phone]   Fax: [phone]       Discharge Services: Post acute therapies    Discharge DME: None    Discharge Transportation: health plan transportation    Private pay costs discussed: Not applicable    Does the patient's insurance plan have a 3 day qualifying hospital stay waiver?  No    PAS Confirmation Code:  [id number]  Patient/family educated on Medicare website which has current facility and service quality ratings:  yes    Education Provided on the Discharge Plan: Yes  Persons Notified of Discharge Plans: bedside nurse, charge nurse, patient, facility, provider  Patient/Family in Agreement with the Plan: yes    Handoff Referral Completed: Yes    Additional Information:    MAURO received VM from [facility] stating that patient has been accepted to [facility].   MAURO spoke with [facility] who stated that they can accept patient at any time and to call back with ride time.     MAURO Najera met with patient at bedside. Patient is agreeable to going to this TCU today.    MAURO called [facility] transportation and scheduled stretcher ride for 4:42-5:27pm. MAURO informed bedside nurse.    MAURO completed PCS and placed in patient's hard chart.        ALEXA Cooley, LSW  8 Med Surg   [facility]  Phone: [phone]  Pager: [phone]

## 2023-05-27 ENCOUNTER — TELEPHONE (OUTPATIENT)
Dept: OTHER | Facility: CLINIC | Age: 58
End: 2023-05-27
Payer: COMMERCIAL

## 2023-05-27 ENCOUNTER — APPOINTMENT (OUTPATIENT)
Dept: GENERAL RADIOLOGY | Facility: CLINIC | Age: 58
End: 2023-05-27
Attending: EMERGENCY MEDICINE
Payer: COMMERCIAL

## 2023-05-27 ENCOUNTER — HOSPITAL ENCOUNTER (EMERGENCY)
Facility: CLINIC | Age: 58
Discharge: HOME | End: 2023-05-27
Attending: EMERGENCY MEDICINE | Admitting: EMERGENCY MEDICINE
Payer: COMMERCIAL

## 2023-05-27 ENCOUNTER — APPOINTMENT (OUTPATIENT)
Dept: CT IMAGING | Facility: CLINIC | Age: 58
End: 2023-05-27
Attending: EMERGENCY MEDICINE
Payer: COMMERCIAL

## 2023-05-27 VITALS
SYSTOLIC BLOOD PRESSURE: 99 MMHG | WEIGHT: 235 LBS | DIASTOLIC BLOOD PRESSURE: 75 MMHG | RESPIRATION RATE: 21 BRPM | BODY MASS INDEX: 28.62 KG/M2 | OXYGEN SATURATION: 94 % | TEMPERATURE: 98.2 F | HEART RATE: 93 BPM | HEIGHT: 76 IN

## 2023-05-27 DIAGNOSIS — M25.571 ACUTE RIGHT ANKLE PAIN: ICD-10-CM
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
## 2023-05-27 DIAGNOSIS — S09.90XA CLOSED HEAD INJURY, INITIAL ENCOUNTER: ICD-10-CM

## 2023-05-27 DIAGNOSIS — M25.551 HIP PAIN, BILATERAL: ICD-10-CM

## 2023-05-27 DIAGNOSIS — M25.552 HIP PAIN, BILATERAL: ICD-10-CM

## 2023-05-27 LAB
ALBUMIN UR-MCNC: 30 MG/DL
ANION GAP SERPL CALCULATED.3IONS-SCNC: 13 MMOL/L (ref 7–15)
APPEARANCE UR: CLEAR
BASOPHILS # BLD AUTO: 0 10E3/UL (ref 0–0.2)
BASOPHILS NFR BLD AUTO: 0 %
BILIRUB UR QL STRIP: NEGATIVE
BUN SERPL-MCNC: 21.9 MG/DL (ref 6–20)
CALCIUM SERPL-MCNC: 8.9 MG/DL (ref 8.6–10)
CHLORIDE SERPL-SCNC: 101 MMOL/L (ref 98–107)
COLOR UR AUTO: YELLOW
CREAT SERPL-MCNC: 0.8 MG/DL (ref 0.67–1.17)
DEPRECATED HCO3 PLAS-SCNC: 24 MMOL/L (ref 22–29)
EOSINOPHIL # BLD AUTO: 0.2 10E3/UL (ref 0–0.7)
EOSINOPHIL NFR BLD AUTO: 2 %
ERYTHROCYTE [DISTWIDTH] IN BLOOD BY AUTOMATED COUNT: 12.8 % (ref 10–15)
GFR SERPL CREATININE-BSD FRML MDRD: >90 ML/MIN/1.73M2
GLUCOSE SERPL-MCNC: 110 MG/DL (ref 70–99)
GLUCOSE UR STRIP-MCNC: NEGATIVE MG/DL
HCT VFR BLD AUTO: 30.2 % (ref 40–53)
HGB BLD-MCNC: 9.3 G/DL (ref 13.3–17.7)
HGB UR QL STRIP: NEGATIVE
HOLD SPECIMEN: NORMAL
HOLD SPECIMEN: NORMAL
IMM GRANULOCYTES # BLD: 0 10E3/UL
IMM GRANULOCYTES NFR BLD: 0 %
KETONES UR STRIP-MCNC: ABNORMAL MG/DL
LEUKOCYTE ESTERASE UR QL STRIP: NEGATIVE
LYMPHOCYTES # BLD AUTO: 1.2 10E3/UL (ref 0.8–5.3)
LYMPHOCYTES NFR BLD AUTO: 17 %
MCH RBC QN AUTO: 30.3 PG (ref 26.5–33)
MCHC RBC AUTO-ENTMCNC: 30.8 G/DL (ref 31.5–36.5)
MCV RBC AUTO: 98 FL (ref 78–100)
MONOCYTES # BLD AUTO: 0.7 10E3/UL (ref 0–1.3)
MONOCYTES NFR BLD AUTO: 9 %
MUCOUS THREADS #/AREA URNS LPF: PRESENT /LPF
NEUTROPHILS # BLD AUTO: 5.1 10E3/UL (ref 1.6–8.3)
NEUTROPHILS NFR BLD AUTO: 72 %
NITRATE UR QL: NEGATIVE
NRBC # BLD AUTO: 0 10E3/UL
NRBC BLD AUTO-RTO: 0 /100
PH UR STRIP: 5.5 [PH] (ref 5–7)
PLATELET # BLD AUTO: 258 10E3/UL (ref 150–450)
POTASSIUM SERPL-SCNC: 3.7 MMOL/L (ref 3.4–5.3)
RBC # BLD AUTO: 3.07 10E6/UL (ref 4.4–5.9)
RBC URINE: 1 /HPF
SODIUM SERPL-SCNC: 138 MMOL/L (ref 136–145)
SP GR UR STRIP: 1.02 (ref 1–1.03)
UROBILINOGEN UR STRIP-MCNC: NORMAL MG/DL
WBC # BLD AUTO: 7.1 10E3/UL (ref 4–11)
WBC URINE: 2 /HPF

## 2023-05-27 PROCEDURE — 73522 X-RAY EXAM HIPS BI 3-4 VIEWS: CPT | Mod: 26 | Performed by: RADIOLOGY

## 2023-05-27 PROCEDURE — 80048 BASIC METABOLIC PNL TOTAL CA: CPT | Performed by: EMERGENCY MEDICINE

## 2023-05-27 PROCEDURE — 70450 CT HEAD/BRAIN W/O DYE: CPT | Mod: 26 | Performed by: RADIOLOGY

## 2023-05-27 PROCEDURE — 36415 COLL VENOUS BLD VENIPUNCTURE: CPT | Performed by: EMERGENCY MEDICINE

## 2023-05-27 PROCEDURE — 73522 X-RAY EXAM HIPS BI 3-4 VIEWS: CPT

## 2023-05-27 PROCEDURE — 85025 COMPLETE CBC W/AUTO DIFF WBC: CPT | Performed by: EMERGENCY MEDICINE

## 2023-05-27 PROCEDURE — 99285 EMERGENCY DEPT VISIT HI MDM: CPT | Performed by: EMERGENCY MEDICINE

## 2023-05-27 PROCEDURE — 999N000248 HC STATISTIC IV INSERT WITH US BY RN

## 2023-05-27 PROCEDURE — 70450 CT HEAD/BRAIN W/O DYE: CPT

## 2023-05-27 PROCEDURE — 99285 EMERGENCY DEPT VISIT HI MDM: CPT | Mod: 25 | Performed by: EMERGENCY MEDICINE

## 2023-05-27 PROCEDURE — 73610 X-RAY EXAM OF ANKLE: CPT | Mod: RT

## 2023-05-27 PROCEDURE — 250N000011 HC RX IP 250 OP 636: Performed by: EMERGENCY MEDICINE

## 2023-05-27 PROCEDURE — 73610 X-RAY EXAM OF ANKLE: CPT | Mod: 26 | Performed by: RADIOLOGY

## 2023-05-27 PROCEDURE — 96361 HYDRATE IV INFUSION ADD-ON: CPT | Performed by: EMERGENCY MEDICINE

## 2023-05-27 PROCEDURE — 96374 THER/PROPH/DIAG INJ IV PUSH: CPT | Performed by: EMERGENCY MEDICINE

## 2023-05-27 PROCEDURE — 81001 URINALYSIS AUTO W/SCOPE: CPT | Performed by: EMERGENCY MEDICINE

## 2023-05-27 PROCEDURE — 258N000003 HC RX IP 258 OP 636: Performed by: EMERGENCY MEDICINE

## 2023-05-27 RX ORDER — HYDROMORPHONE HYDROCHLORIDE 1 MG/ML
0.5 INJECTION, SOLUTION INTRAMUSCULAR; INTRAVENOUS; SUBCUTANEOUS
Status: COMPLETED | OUTPATIENT
Start: 2023-05-27 | End: 2023-05-27

## 2023-05-27 RX ADMIN — SODIUM CHLORIDE 1000 ML: 9 INJECTION, SOLUTION INTRAVENOUS at 20:05

## 2023-05-27 RX ADMIN — HYDROMORPHONE HYDROCHLORIDE 0.5 MG: 1 INJECTION, SOLUTION INTRAMUSCULAR; INTRAVENOUS; SUBCUTANEOUS at 19:50

## 2023-05-27 ASSESSMENT — ACTIVITIES OF DAILY LIVING (ADL): ADLS_ACUITY_SCORE: 35

## 2023-05-27 NOTE — TELEPHONE ENCOUNTER
Brief Ortho note.    [facility] was contacted regarding patient to inform that after discussing patient with Dr. Bonilla patient will only need to come back the day of surgery and perform a skin check.     A message was left with the nursing station at the facility to inform them.     Thank you,    Cornel aBins MD   PGY1  Department of Orthopaedic Surgery  Pager [phone]

## 2023-05-28 NOTE — DISCHARGE INSTRUCTIONS
CT of your head and x-rays of your hips, pelvis, and right ankle did not show any acute injuries.  Your surgical hardware is intact and in appropriate position.  Please follow up with Orthopedics Clinic (phone: [phone]) at your already scheduled appointment for further evaluation and recommendations.        *HEAD INJURY [no wake-up, Adult]     You have had a head injury. It does not appear serious at this time. Sometimes symptoms of a more serious problem (concussion, bruising or bleeding in the brain) may appear later. Therefore, watch for the WARNING SIGNS listed below.  HOME CARE:  During the next 24 hours someone must stay with you to check for the signs below. It is not necessary to stay awake or be awakened during the night.  If you have swelling of the face or scalp, apply an ice pack (ice cubes in a plastic bag, wrapped in a towel) for 20 minutes. Do this every 1-2 hours until the swelling starts to go down.  You may use acetaminophen (Tylenol) 650-1000 mg every 6 hours or ibuprofen (Motrin, Advil) 600 mg every 6-8 hours with food to control pain, if you are able to take these medicines. [NOTE: If you have chronic liver or kidney disease or ever had a stomach ulcer or GI bleeding, talk with your doctor before using these medicines.] Do not take aspirin after a head injury.  For the next 24 hours:  Do not take alcohol, sedatives or medicines that make you sleepy.  Do not drive or operate machinery.  Avoid strenuous activities. No lifting or straining.  If you have had any symptoms of a concussion today (nausea, vomiting, dizziness, confusion, headache, memory loss or if you were knocked out), do not return to sports or any activity that could result in another head injury until 2 full weeks after all symptoms are gone and you have been cleared by your doctor. A second head injury before fully recovering from the first one can lead to serious brain injury.  FOLLOW UP with your doctor if symptoms are not  improving after 24 hours, or as directed.  GET PROMPT MEDICAL ATTENTION if any of the following occur:  Repeated vomiting  Severe or worsening headache or dizziness  Unusual drowsiness, or unable to awaken as usual  Confusion or change in behavior or speech, memory loss, blurred vision  Convulsion (seizure)  Increasing scalp or face swelling  Redness, warmth or pus from the swollen area  Fluid drainage or bleeding from the nose or ears    8773-3175 [employer], [address] [place] [address], [place] [address]. All rights reserved. This information is not intended as a substitute for professional medical care. Always follow your healthcare professional's instructions.

## 2023-05-28 NOTE — ED PROVIDER NOTES
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 Patient has recent admission on 5/20 for right ankle fracture after a syncopal episode. He underwent closed reduction and external fixation of the ankle. Patient was discharged to TCU earlier today. He states at the TCU he was adjusting in bed when she slid out of the bed and fell. Patient reports he hit his head and is now having a headache. He also notes right greater than left hip pain and increased right ankle pain. Patient notes some bleeding around the external fixation, states this has been ongoing for several days.    Past Medical History  Past Medical History:   Diagnosis Date     Cerebral infarction (H)      Clotting disorder (H)     PE 2019     Dystonia      Parkinson disease (H)      Past Surgical History:   Procedure Laterality Date     APPLY EXTERNAL FIXATOR LOWER EXTREMITY Right 5/21/2023    Procedure: Right  Ankle Closed Reduction and  External Fixator Placement;  Surgeon: Nicole Apodaca MD;  Location: [facility] OR     alfuzosin ER (UROXATRAL) 10 MG 24 hr tablet  atorvastatin (LIPITOR) 40 MG tablet  bisacodyl (DULCOLAX) 10 MG suppository  carbidopa-levodopa (SINEMET CR)  MG CR tablet  carbidopa-levodopa (SINEMET)  MG tablet  cholecalciferol (VITAMIN D3) 125 mcg (5000 units) capsule  clonazePAM (KLONOPIN) 2 MG tablet  cloZAPine (CLOZARIL) 50 MG tablet  gabapentin (NEURONTIN) 800 MG tablet  hydrOXYzine (ATARAX) 25 MG tablet  lactulose (CHRONULAC) 10 GM/15ML solution  linaclotide (LINZESS) 290 MCG capsule  metoprolol succinate ER (TOPROL XL) 25 MG 24 hr tablet  multivitamin,  "therapeutic (THERA-VIT) TABS tablet  oxyCODONE (ROXICODONE) 5 MG tablet  pantoprazole (PROTONIX) 40 MG EC tablet  polyethylene glycol (MIRALAX) 17 GM/Dose powder  rivaroxaban ANTICOAGULANT (XARELTO) 20 MG TABS tablet  sodium phosphate (FLEET ENEMA) 7-19 GM/118ML rectal enema  traZODone (DESYREL) 100 MG tablet  traZODone (DESYREL) 50 MG tablet  venlafaxine (EFFEXOR XR) 150 MG 24 hr capsule  vitamin C (ASCORBIC ACID) 500 MG tablet      Allergies   Allergen Reactions     Amantadine Other (See Comments)     Other reaction(s): Hallucinations  Hallucinations/ lost self control/gambling.     halluicnations  Hallucinations/ lost self control/gambling.     hallucinates  halluicnations  hallucinates  Hallucinations/ lost self control/gambling.        Quetiapine GI Disturbance, Diarrhea and Other (See Comments)     Diarrhea    Diarrhea  Other reaction(s): GI Disturbance  Diarrhea       Duloxetine Other (See Comments)     suicidal  suicidal       Family History  Family History   Problem Relation Age of Onset     Other - See Comments Mother         pulmonary embolism from hip fracture     Pulmonary Embolism Mother      Parkinsonism Father      Neurologic Disorder Sister      Parkinsonism Sister         ?med related     Other - See Comments Sister         12/7/1950     Depression Sister      Neurologic Disorder Brother         dystonia     Dystonia Brother      Other - See Comments Brother              Heart Disease Nephew      Social History   Social History     Tobacco Use     Smoking status: Every Day     Types: Pipe, Cigars, Cigarettes     Smokeless tobacco: Never   Substance Use Topics     Alcohol use: Not Currently     Comment: quit 2014     Drug use: Not Currently         A complete review of systems was performed with pertinent positives and negatives noted in the HPI, and all other systems negative.    Physical Exam   BP: 99/75  Pulse: 93  Temp: 98.2  F (36.8  C)  Resp: 21  Height: 193 cm (6' 4\")  Weight: 106.6 kg " (235 lb)  SpO2: 94 %  Physical Exam  Vitals and nursing note reviewed.   Constitutional:       General: He is in acute distress ( mild, from pain).      Appearance: He is not ill-appearing, toxic-appearing or diaphoretic.   HENT:      Head: Normocephalic and atraumatic.   Eyes:      General: No scleral icterus.     Extraocular Movements: Extraocular movements intact.      Conjunctiva/sclera: Conjunctivae normal.      Pupils: Pupils are equal, round, and reactive to light.   Cardiovascular:      Rate and Rhythm: Normal rate.      Pulses:           Dorsalis pedis pulses are 2+ on the right side and 2+ on the left side.        Posterior tibial pulses are 2+ on the left side.      Heart sounds: Normal heart sounds.   Pulmonary:      Effort: Pulmonary effort is normal. No respiratory distress.      Breath sounds: Normal breath sounds.   Abdominal:      General: Abdomen is flat.      Palpations: Abdomen is soft.      Tenderness: There is no abdominal tenderness. There is no guarding or rebound.   Musculoskeletal:         General: No tenderness.      Cervical back: Full passive range of motion without pain, normal range of motion and neck supple. No tenderness. No spinous process tenderness.      Comments: External fixator in right lower extremity.  There is some dried blood at the heel of right lower extremity.  There is mild tenderness to palpation of bilateral hips.  No midline cervical, thoracic, or lumbar spinous process tenderness.  There is decreased range of motion in right ankle due to external fixator.  Slightly decreased range of motion in bilateral hips, and left knee, active and passive, due to pain.   Skin:     General: Skin is warm.      Capillary Refill: Capillary refill takes less than 2 seconds.      Findings: No rash.   Neurological:      Mental Status: He is alert and oriented to person, place, and time. Mental status is at baseline.      GCS: GCS eye subscore is 4. GCS verbal subscore is 5. GCS motor  subscore is 6.      Cranial Nerves: No cranial nerve deficit.   Psychiatric:         Mood and Affect: Mood normal.         Behavior: Behavior normal.         Thought Content: Thought content normal.         Judgment: Judgment normal.           ED Course, Procedures, & Data       7:22 PM  The patient was seen and examined by Spencer Small MD in Room ED20.  Procedures                   Results for orders placed or performed during the hospital encounter of 05/27/23   CT Head w/o Contrast     Status: None    Narrative    EXAM: Head CT without contrast 5/27/2023 9:06 PM    HISTORY: Fall, head injury.    COMPARISON: Head CT without contrast 11/7/2022. Brain MRI without  contrast 5/28/2022.    TECHNIQUE: Using multidetector thin collimation helical acquisition  technique, axial, coronal and sagittal CT images from the skull base  to the vertex were obtained without intravenous contrast.   (topogram) image(s) also obtained and reviewed.    FINDINGS:  There is no intracranial hemorrhage, mass effect, midline shift, or  extra-axial fluid collection. Gray/white matter differentiation in  both cerebral hemispheres is preserved. Ventricles are proportionate  to the cerebral sulci. The basal cisterns are clear.    The bony calvaria and the bones of the skull base are normal. The  orbits appear unremarkable. Mild maxillary sinus mucosal thickening.  Mastoid air cells are clear. Unremarkable soft tissues.       Impression    IMPRESSION: No acute intracranial pathology.     I have personally reviewed the examination and initial interpretation  and I agree with the findings.    BAILEE OCHOA MD         SYSTEM ID:  [id number]   Ankle XR, G/E 3 views, right     Status: None    Narrative    EXAM: XR ANKLE RIGHT G/E 3 VIEWS  LOCATION: [facility]  DATE/TIME: 5/27/2023 8:59 PM CDT    INDICATION: Recent ankle surgery, ex fix in place, fall, pain.  COMPARISON: 5/21/2023 radiographs.       Impression    IMPRESSION: The distal tibia and fibula fractures and the visualized portion of the external fixator appear unchanged. No new findings.   XR Pelvis w 1 View Each Hip     Status: None    Narrative    EXAM: XR PELVIS AND HIP BILATERAL 1 VIEW  LOCATION: [facility]  DATE/TIME: 5/27/2023 9:08 PM CDT    INDICATION: Fall, amy hip pain.  COMPARISON: None.      Impression    IMPRESSION: There is a cam lesion at the femoral head/neck junction bilaterally, anatomy that would increase the patient's risk for femoroacetabular impingement. Minimal bilateral hip joint space narrowing and osteophytosis. There is no evidence of   fracture or osteonecrosis.   Basic metabolic panel     Status: Abnormal   Result Value Ref Range    Sodium 138 136 - 145 mmol/L    Potassium 3.7 3.4 - 5.3 mmol/L    Chloride 101 98 - 107 mmol/L    Carbon Dioxide (CO2) 24 22 - 29 mmol/L    Anion Gap 13 7 - 15 mmol/L    Urea Nitrogen 21.9 (H) 6.0 - 20.0 mg/dL    Creatinine 0.80 0.67 - 1.17 mg/dL    Calcium 8.9 8.6 - 10.0 mg/dL    Glucose 110 (H) 70 - 99 mg/dL    GFR Estimate >90 >60 mL/min/1.73m2   UA with Microscopic reflex to Culture     Status: Abnormal    Specimen: Urine, Midstream   Result Value Ref Range    Color Urine Yellow Colorless, Straw, Light Yellow, Yellow    Appearance Urine Clear Clear    Glucose Urine Negative Negative mg/dL    Bilirubin Urine Negative Negative    Ketones Urine Trace (A) Negative mg/dL    Specific Gravity Urine 1.025 1.003 - 1.035    Blood Urine Negative Negative    pH Urine 5.5 5.0 - 7.0    Protein Albumin Urine 30 (A) Negative mg/dL    Urobilinogen Urine Normal Normal, 2.0 mg/dL    Nitrite Urine Negative Negative    Leukocyte Esterase Urine Negative Negative    Mucus Urine Present (A) None Seen /LPF    RBC Urine 1 <=2 /HPF    WBC Urine 2 <=5 /HPF    Narrative    Urine Culture not indicated   [place] Draw     Status: None    Narrative    The following orders were  created for panel order [place] Draw.  Procedure                               Abnormality         Status                     ---------                               -----------         ------                     Extra Blue Top Tube[[id number]]                              Final result               Extra Red Top Tube[[id number]]                               Final result                 Please view results for these tests on the individual orders.   CBC with platelets and differential     Status: Abnormal   Result Value Ref Range    WBC Count 7.1 4.0 - 11.0 10e3/uL    RBC Count 3.07 (L) 4.40 - 5.90 10e6/uL    Hemoglobin 9.3 (L) 13.3 - 17.7 g/dL    Hematocrit 30.2 (L) 40.0 - 53.0 %    MCV 98 78 - 100 fL    MCH 30.3 26.5 - 33.0 pg    MCHC 30.8 (L) 31.5 - 36.5 g/dL    RDW 12.8 10.0 - 15.0 %    Platelet Count 258 150 - 450 10e3/uL    % Neutrophils 72 %    % Lymphocytes 17 %    % Monocytes 9 %    % Eosinophils 2 %    % Basophils 0 %    % Immature Granulocytes 0 %    NRBCs per 100 WBC 0 <1 /100    Absolute Neutrophils 5.1 1.6 - 8.3 10e3/uL    Absolute Lymphocytes 1.2 0.8 - 5.3 10e3/uL    Absolute Monocytes 0.7 0.0 - 1.3 10e3/uL    Absolute Eosinophils 0.2 0.0 - 0.7 10e3/uL    Absolute Basophils 0.0 0.0 - 0.2 10e3/uL    Absolute Immature Granulocytes 0.0 <=0.4 10e3/uL    Absolute NRBCs 0.0 10e3/uL   Extra Blue Top Tube     Status: None   Result Value Ref Range    Hold Specimen JI    Extra Red Top Tube     Status: None   Result Value Ref Range    Hold Specimen JI    CBC with platelets differential     Status: Abnormal    Narrative    The following orders were created for panel order CBC with platelets differential.  Procedure                               Abnormality         Status                     ---------                               -----------         ------                     CBC with platelets and d...[[id number]]  Abnormal            Final result                 Please view results for these tests on the  individual orders.     Medications   HYDROmorphone (PF) (DILAUDID) injection 0.5 mg (0.5 mg Intravenous $Given 5/27/23 1950)   0.9% sodium chloride BOLUS (0 mLs Intravenous Stopped 5/27/23 2144)     Labs Ordered and Resulted from Time of ED Arrival to Time of ED Departure   BASIC METABOLIC PANEL - Abnormal       Result Value    Sodium 138      Potassium 3.7      Chloride 101      Carbon Dioxide (CO2) 24      Anion Gap 13      Urea Nitrogen 21.9 (*)     Creatinine 0.80      Calcium 8.9      Glucose 110 (*)     GFR Estimate >90     ROUTINE UA WITH MICROSCOPIC REFLEX TO CULTURE - Abnormal    Color Urine Yellow      Appearance Urine Clear      Glucose Urine Negative      Bilirubin Urine Negative      Ketones Urine Trace (*)     Specific Gravity Urine 1.025      Blood Urine Negative      pH Urine 5.5      Protein Albumin Urine 30 (*)     Urobilinogen Urine Normal      Nitrite Urine Negative      Leukocyte Esterase Urine Negative      Mucus Urine Present (*)     RBC Urine 1      WBC Urine 2     CBC WITH PLATELETS AND DIFFERENTIAL - Abnormal    WBC Count 7.1      RBC Count 3.07 (*)     Hemoglobin 9.3 (*)     Hematocrit 30.2 (*)     MCV 98      MCH 30.3      MCHC 30.8 (*)     RDW 12.8      Platelet Count 258      % Neutrophils 72      % Lymphocytes 17      % Monocytes 9      % Eosinophils 2      % Basophils 0      % Immature Granulocytes 0      NRBCs per 100 WBC 0      Absolute Neutrophils 5.1      Absolute Lymphocytes 1.2      Absolute Monocytes 0.7      Absolute Eosinophils 0.2      Absolute Basophils 0.0      Absolute Immature Granulocytes 0.0      Absolute NRBCs 0.0       XR Pelvis w 1 View Each Hip   Final Result   IMPRESSION: There is a cam lesion at the femoral head/neck junction bilaterally, anatomy that would increase the patient's risk for femoroacetabular impingement. Minimal bilateral hip joint space narrowing and osteophytosis. There is no evidence of    fracture or osteonecrosis.      CT Head w/o Contrast    Final Result   IMPRESSION: No acute intracranial pathology.       I have personally reviewed the examination and initial interpretation   and I agree with the findings.      BAILEE OCHOA MD            SYSTEM ID:  [id number]      Ankle XR, G/E 3 views, right   Final Result   IMPRESSION: The distal tibia and fibula fractures and the visualized portion of the external fixator appear unchanged. No new findings.             Critical care was not performed.     Medical Decision Making  The patient's presentation was of high complexity (an acute health issue posing potential threat to life or bodily function).    The patient's evaluation involved:  review of external note(s) from 1 sources (see separate area of note for details)  ordering and/or review of 3+ test(s) in this encounter (see separate area of note for details)  independent interpretation of testing performed by another health professional (see separate area of note for details)  discussion of management or test interpretation with another health professional (see separate area of note for details)    The patient's management necessitated high risk (a parenteral controlled substance) and high risk (a decision regarding hospitalization).      Assessment & Plan    58-year-old man brought in by ambulance from TCU after a fall from bed.  Differential diagnosis: ICH, hip fractures, contusions, surgical hardware misplacement.    After thorough history physical exam patient appears to be in mild distress due to pain.  I will treat his pain with IV Dilaudid and obtain CT of the head as well as x-rays of his hips, pelvis, and right ankle.  Case was discussed with orthopedic resident.     I reviewed patient's CT of the head and I read the radiology report; no evidence of intracranial hemorrhage.  I reviewed patient's x-ray of the pelvis and hips and I read the radiology report; no evidence of fractures.  I also reviewed his right ankle x-ray along with radiology  report; there are fractures of distal tibia and fibula as well as external fixator, however, the images are unchanged in comparison to the ones from earlier this week.  Case was discussed with orthopedics who reviewed the images and had no further recommendations other than outpatient follow-up.  Patient stable for discharge back to TCU.  He will follow-up with orthopedics at his already scheduled appointment.  He will return if his symptoms worsen.  He did receive a dose of IV Dilaudid for pain in the emergency department upon arriving.  He agrees with the plan.      This part of the medical record was transcribed by Juju John, Medical Scribe, from a dictation done by Spencer Small MD.     I have reviewed the nursing notes. I have reviewed the findings, diagnosis, plan and need for follow up with the patient.    Discharge Medication List as of 5/27/2023  9:44 PM          Final diagnoses:   Fall, initial encounter   Hip pain, bilateral   Acute right ankle pain   Closed head injury, initial encounter     I, Juju John, am serving as a trained medical scribe to document services personally performed by Spencer Small MD, based on the provider's statements to me.      ISpencer MD, was physically present and have reviewed and verified the accuracy of this note documented by Juju John.     Spencer Small MD  [facility] EMERGENCY DEPARTMENT  5/27/2023     Spencer Small MD  05/27/23 4806

## 2023-05-28 NOTE — ED TRIAGE NOTES
Patient BIBA from TCU with a fall. A&Ox4. Per patient was trying to get out of bed and landed on left hip. Had pain in L hip and R leg. Recently had surgery on R ankle 7 days ago per patient. Patient is on blood thinner.     Triage Assessment     Row Name 05/27/23 1919       Triage Assessment (Adult)    Airway WDL WDL       Respiratory WDL    Respiratory WDL WDL       Skin Circulation/Temperature WDL    Skin Circulation/Temperature WDL WDL       Cardiac WDL    Cardiac WDL WDL       Peripheral/Neurovascular WDL    Peripheral Neurovascular WDL X;neurovascular assessment lower       LLE Neurovascular Assessment    Sensation LLE numbness present;tenderness present;tingling present       Cognitive/Neuro/Behavioral WDL    Cognitive/Neuro/Behavioral WDL WDL

## 2023-05-28 NOTE — ED NOTES
Bed: ED20  Expected date:   Expected time:   Means of arrival:   Comments:  H421: 58M Fall, L hip pain

## 2023-05-31 NOTE — TELEPHONE ENCOUNTER
New ED visit 5/27/23:   Seen in ED via EMS from TCU with right greater than left hip pain and increased ankle pain after a fall. Patient has recent admission on 5/20 for right ankle fracture after a syncopal episode. He underwent closed reduction and external fixation of the ankle. Patient was discharged to TCU earlier today. He states at the TCU he was adjusting in bed when she slid out of the bed and fell. Patient reports he hit his head and is now having a headache. He also notes right greater than left hip pain and increased right ankle pain. Patient notes some bleeding around the external fixation, states this has been ongoing for several days.     Head CT was negative, no new Fx (checked hip) and no change in ankle Fx - returned to TCU 10 pm same date.  F/U with ortho is 6/15/23  PM & R f/u with Dr Arita is 7/25/23    PLAN:   Arrange f/u with Dr Rodriguez when he has returned to home due to new Fx  for review plan of care / no meds were ordered, repeat Dexa and vit D in 2 years unless new Fx        Discharge Date: 05/27/2023   Discharge Disposition: [facility]  [address] [place] [id number]  [facility] [place]  Phone: [phone]   Fax: [phone]     Usual living is [facility]      Nereida Delvalle RN on 5/31/2023 at 11:35 AM

## 2023-06-06 ENCOUNTER — TELEPHONE (OUTPATIENT)
Dept: ORTHOPEDICS | Facility: CLINIC | Age: 58
End: 2023-06-06
Payer: COMMERCIAL

## 2023-06-06 NOTE — TELEPHONE ENCOUNTER
Phoned patient to confirm surgery date with Dr. Bonilla.      Caregiver answered the phone and stated that they will have to confirm with Daniela, contact of the patient.     -------------------  Call was made to Daniela, unable to provide voicemail, will try again.

## 2023-06-06 NOTE — TELEPHONE ENCOUNTER
FUTURE VISIT INFORMATION      SURGERY INFORMATION:    Date: 6/15/23    Location: ur or    Surgeon:  Jose Bonilla MD    Anesthesia Type:  choice    Procedure: OPEN REDUCTION INTERNAL FIXATION, FRACTURE, ANKLE, possible adjustment of external fixator device versus removal of external fixator device.    RECORDS REQUESTED FROM:       Primary Care Provider: [facility]    Pertinent Medical History: Arrhythmia, DVT, hypertension, tachycardia     Most recent EKG+ Tracin23    Most recent ECHO: 22    Most recent Cardiac Stress Test: [facility]

## 2023-06-07 ENCOUNTER — OFFICE VISIT (OUTPATIENT)
Dept: NEUROLOGY | Facility: CLINIC | Age: 58
End: 2023-06-07
Payer: COMMERCIAL

## 2023-06-07 VITALS — DIASTOLIC BLOOD PRESSURE: 81 MMHG | SYSTOLIC BLOOD PRESSURE: 108 MMHG | HEART RATE: 105 BPM

## 2023-06-07 DIAGNOSIS — G24.1 DYSTONIA, TORSION, FRAGMENTS OF: ICD-10-CM

## 2023-06-07 DIAGNOSIS — R25.2 CRAMP OF LIMB: ICD-10-CM

## 2023-06-07 DIAGNOSIS — S82.891A CLOSED FRACTURE OF RIGHT ANKLE, INITIAL ENCOUNTER: ICD-10-CM

## 2023-06-07 DIAGNOSIS — G20.A1 PARKINSON'S DISEASE (H): Primary | ICD-10-CM

## 2023-06-07 PROCEDURE — 99214 OFFICE O/P EST MOD 30 MIN: CPT | Mod: 24 | Performed by: PSYCHIATRY & NEUROLOGY

## 2023-06-07 RX ORDER — CLONAZEPAM 1 MG/1
TABLET ORAL
Qty: 60 TABLET | Refills: 3 | Status: SHIPPED | OUTPATIENT
Start: 2023-06-07 | End: 2023-06-22

## 2023-06-07 RX ORDER — CARBIDOPA AND LEVODOPA 25; 100 MG/1; MG/1
2 TABLET ORAL 3 TIMES DAILY
Qty: 180 TABLET | Refills: 11 | Status: SHIPPED | OUTPATIENT
Start: 2023-06-07 | End: 2023-12-20

## 2023-06-07 RX ORDER — AMANTADINE HYDROCHLORIDE 100 MG/1
100 CAPSULE, GELATIN COATED ORAL 2 TIMES DAILY
Qty: 60 CAPSULE | Refills: 11 | Status: SHIPPED | OUTPATIENT
Start: 2023-06-07 | End: 2023-06-14

## 2023-06-07 RX ORDER — CLONAZEPAM 2 MG/1
2 TABLET ORAL 2 TIMES DAILY
Qty: 60 TABLET | Refills: 3 | Status: SHIPPED | OUTPATIENT
Start: 2023-06-07 | End: 2023-06-14

## 2023-06-07 RX ORDER — UREA 10 %
500 LOTION (ML) TOPICAL DAILY
Qty: 30 TABLET | Refills: 11 | Status: SHIPPED | OUTPATIENT
Start: 2023-06-07 | End: 2023-06-14

## 2023-06-07 RX ORDER — CARBIDOPA AND LEVODOPA 50; 200 MG/1; MG/1
1 TABLET, EXTENDED RELEASE ORAL AT BEDTIME
Qty: 30 TABLET | Refills: 11 | Status: SHIPPED | OUTPATIENT
Start: 2023-06-07 | End: 2023-12-20

## 2023-06-07 ASSESSMENT — UNIFIED PARKINSONS DISEASE RATING SCALE (UPDRS)
HANDMOVEMENTS_LEFT: (0) NORMAL: NO PROBLEMS.
CONSTANCY_TREMOR_ATREST: (1) SLIGHT: TREMOR AT REST IS PRESENT 25% OF THE ENTIRE EXAMINATION PERIOD.
HANDMOVEMENTS_RIGHT: (0) NORMAL: NO PROBLEMS.
FINGER_TAPPING_LEFT: (1) SLIGHT: ANY OF THE FOLLOWING: A) THE REGULAR RHYTHM IS BROKEN WITH ONE WITH ONE OR TWO INTERRUPTIONS OR HESITATIONS OF THE MOVEMENT  B) SLIGHT SLOWING  C) THE AMPLITUDE DECREMENTS NEAR THE END OF THE 10 MOVEMENTS.
AMPLITUDE_RLE: (0) NORMAL: NO TREMOR.
AMPLITUDE_LUE: (1) SLIGHT: < 1 CM IN MAXIMAL AMPLITUDE.
AMPLITUDE_LLE: (0) NORMAL: NO TREMOR.
AMPLITUDE_RUE: (0) NORMAL: NO TREMOR.
FINGER_TAPPING_RIGHT: (1) SLIGHT: ANY OF THE FOLLOWING: A) THE REGULAR RHYTHM IS BROKEN WITH ONE WITH ONE OR TWO INTERRUPTIONS OR HESITATIONS OF THE MOVEMENT  B) SLIGHT SLOWING  C) THE AMPLITUDE DECREMENTS NEAR THE END OF THE 10 MOVEMENTS.
PRONATION_SUPINATION_LEFT: (1) SLIGHT: ANY OF THE FOLLOWING: A) THE REGULAR RHYTHM IS BROKEN WITH ONE WITH ONE OR TWO INTERRUPTIONS OR HESITATIONS OF THE MOVEMENT  B) SLIGHT SLOWING  C) THE AMPLITUDE DECREMENTS NEAR THE END OF THE 10 MOVEMENTS.
PRONATION_SUPINATION_RIGHT: (0) NORMAL: NO PROBLEMS.
AMPLITUDE_LIP_JAW: (0) NORMAL: NO TREMOR.

## 2023-06-07 NOTE — TELEPHONE ENCOUNTER
Phoned patient to confirm surgery with Dr. Bonilla. Phoned all contact number and even alternative contact, but still no answer. Unable to leave voicemail in Daniela's number with reason of call and call back number of [phone].    Will try again.     Phoned the work phone number listed in chart--it was the TCU/Caregiver who stated that Benjamin has not been in their facility since the last week of May. Stated that they do not have any contact info of Benjamin.     Will route to team.

## 2023-06-07 NOTE — PROGRESS NOTES
"Department of Neurology  Movement Disorders Division     Patient: Benjamin Mathews   MRN: [id number]   : 1965   Date of Visit:  2023    History of Present Illness  Mr. Mathews is a pleasant 58 year old male that presents to Neurology Movement clinic for follow up regarding Parkinsonism management.   Patient was last seen on 2023 virtually as a new patient for management of Parkinsonism. He was continued on current dose of carbidopa/levodopa IR, carbidopa/levodopa CR and amantadine    History obtained from patient.   Patient reports he fell and broke his right foot and is status post surgical repair. He is currently in a \"horrible nursing home.\" Parkinson Disease symptoms are well controlled except for cramping and dystonia.   Continuous mouth movements: They continue but are not bothersome as it does not occur daily.   Swallowing: No issues.   Muscle spasm pain/dystonia: Currently treated with clonazepam up to 6 mg daily. He has spasms in right foot and right body, mostly occur around 630 am and 11 am and 5 pm. Currently not taking clonazepam 1 mg prn twice daily, which \"works like a charm\" to improve the cramping in leg; this prn medication was taken away.  GI: Following with GI to treat constipation.   Movement Disorder-related Medications                   8 AM 12 PM 4 pm 8pm At bedtime  prn   Amantadine 100 mg  1 1           Carbidopa/levodopa IR  mg 2 2 1.5         Carbidopa/levodopa CR  mg       1       Clonazepam 2 mg 1  1         Clonazepam 1 mg   1       1   Prn dose of clonazepam taken about once a week    Review of Systems:  Other than that mentioned above, the remainder of 12 systems reviewed were negative.    PMH: unchanged  PSH: unchanged  FH: unchanged  SH: unchanged    Medications:  Current Outpatient Medications   Medication Sig Dispense Refill     alfuzosin ER (UROXATRAL) 10 MG 24 hr tablet Take 1 tablet (10 mg) by mouth daily 30 tablet 0     amantadine (SYMMETREL) 100 " MG capsule Take 1 capsule (100 mg) by mouth 2 times daily 60 capsule 11     atorvastatin (LIPITOR) 40 MG tablet Take 1 tablet (40 mg) by mouth every evening 30 tablet 0     bisacodyl (DULCOLAX) 10 MG suppository Place 1 suppository (10 mg) rectally daily as needed for constipation 10 suppository 0     carbidopa-levodopa (SINEMET CR)  MG CR tablet Take 1 tablet by mouth At Bedtime 30 tablet 11     carbidopa-levodopa (SINEMET)  MG tablet Take 2 tablets by mouth 3 times daily 180 tablet 11     cholecalciferol (VITAMIN D3) 125 mcg (5000 units) capsule Take 1 capsule (125 mcg) by mouth daily 30 capsule 0     clonazePAM (KLONOPIN) 1 MG tablet Take 1 tab daily at 12 pm and okay to take 1 tab daily prn. 60 tablet 3     clonazePAM (KLONOPIN) 2 MG tablet Take 1 tablet (2 mg) by mouth 2 times daily 60 tablet 3     cloZAPine (CLOZARIL) 50 MG tablet Take 1 tablet (50 mg) by mouth At Bedtime 30 tablet 0     gabapentin (NEURONTIN) 800 MG tablet Take 1 tablet (800 mg) by mouth 3 times daily 90 tablet 0     hydrOXYzine (ATARAX) 25 MG tablet Take 2 tablets (50 mg) by mouth every 6 hours as needed for anxiety (up to 3 timrd daily) 20 tablet 0     lactulose (CHRONULAC) 10 GM/15ML solution Take 15 mLs by mouth 2 times daily 473 mL 0     linaclotide (LINZESS) 290 MCG capsule Take 1 capsule (290 mcg) by mouth daily as needed (IBSc) 30 capsule 0     magnesium gluconate (MAGONATE) 500 (27 Mg) MG tablet Take 1 tablet (500 mg) by mouth daily 30 tablet 11     metoprolol succinate ER (TOPROL XL) 25 MG 24 hr tablet Take 0.5 tablets (12.5 mg) by mouth 2 times daily 30 tablet 0     multivitamin, therapeutic (THERA-VIT) TABS tablet Take 1 tablet by mouth daily 30 tablet 0     oxyCODONE (ROXICODONE) 5 MG tablet Take 0.5-1 tablets (2.5-5 mg) by mouth every 4 hours as needed for breakthrough pain 20 tablet 0     pantoprazole (PROTONIX) 40 MG EC tablet Take 1 tablet (40 mg) by mouth daily Take 1 tablet (40mg) by mouth daily at 8am 30 tablet  0     polyethylene glycol (MIRALAX) 17 GM/Dose powder Take 17 g by mouth 2 times daily One capful in liquid by mouth twice a day, 8 AM and one dose at supper 850 g 0     rivaroxaban ANTICOAGULANT (XARELTO) 20 MG TABS tablet Take 1 tablet (20 mg) by mouth daily (with dinner) At 5pm 30 tablet 0     sodium phosphate (FLEET ENEMA) 7-19 GM/118ML rectal enema Place 1 enema rectally daily as needed for constipation       traZODone (DESYREL) 100 MG tablet Take 1 tablet (100 mg) by mouth At Bedtime 30 tablet 0     traZODone (DESYREL) 50 MG tablet Take 1 tablet (50 mg) by mouth every morning 30 tablet 0     venlafaxine (EFFEXOR XR) 150 MG 24 hr capsule Take 2 capsules (300 mg) by mouth daily 60 capsule 0     vitamin C (ASCORBIC ACID) 500 MG tablet Take 1 tablet (500 mg) by mouth daily 30 tablet 0           Allergies   Allergen Reactions     Amantadine Other (See Comments)     Other reaction(s): Hallucinations  Hallucinations/ lost self control/gambling.     halluicnations  Hallucinations/ lost self control/gambling.     hallucinates  halluicnations  hallucinates  Hallucinations/ lost self control/gambling.        Quetiapine GI Disturbance, Diarrhea and Other (See Comments)     Diarrhea    Diarrhea  Other reaction(s): GI Disturbance  Diarrhea       Duloxetine Other (See Comments)     suicidal  suicidal            Physical Exam:  The patient's  blood pressure is 108/81 and his pulse is 105.    hemifacial spasms on left   Wheelchair bound  Right foot in wrap and surgical restriction bars    6/7/2023  2:00 PM   UPDRS Motor Scale    Medication    R Brain DBS:    L Brain DBS:    Dyskinesia (LID)    Speech    Facial Expression    Rigidity RUE    Rigidity LUE    Rigidity RLE    Rigidity LLE    Finger Taps R 1    Finger Taps L 1    Hand Mvt R 0    Hand Mvt L 0    Pron-/Supinate R 0    Pron-/Supinate L 1    Toe Tap R    Toe Tap L    Leg Agility R    Leg Agility L    Arise From Chair    Gait    Gait Freezing    Posture    Global Spont  "Mvt    Postural Tremor RUE 1    Postural Tremor LUE 1    Kinetic Tremor RUE 1    Kinetic Tremor LUE 1    Rest Tremor RUE 0    Rest Tremor LUE 1    Rest Tremor RLE 0    Rest Tremor LLE 0    Rest Tremor Lip/Jaw 0    Rest Tremor Constancy 1    Total Right    Total Left         Data Reviewed: I have personally reviewed the tests/studies below.   - Neuropsych eval 1/2023 by Dr. Vincent: \"the results from this exam are compatible with frontal and subcortical systems dysfunction, as is commonly seen in individuals with Parkinson's disease.  It is challenging for me to say whether or not he meets criteria for dementia at this time, given the suggestion of variable engagement with the testing process... In this exam, deficits were noted in executive functioning, with milder weaknesses in attention, cognitive speed, learning, recall, and bilateral fine motor dexterity. He is reporting moderate symptoms of both depression and anxiety.  It may be the case that these depression and anxiety symptoms contributed to some of the variability noted on testing, as well as to his variability of engagement with the testing process.\"    Impression:  Benjamin Mathews is a 58 year old male with Parkinsonism. The patient's main concern today is cramping and dystonia after falling breaking his right foot.       1. Parkinsonism: Symptoms began around 2008 with tremor, left greater than right hand.  He was managed at [facility] for years but travel became too difficult.   2. Dystonic muscle spasms secondary to #1: Previously trialed with baclofen 60mg daily was not effective and took a month to taper off.  Improved on clonazepam. Improved with current Parkinson Disease medication regimen, adding amantadine, and Botox injections.  3. Hallucinations: managed on clozapine   4. Restless Leg Syndrome, controlled   5. Constipation     Plan:   - Increase carbidopa/levodopa IR 25/100 mg to 2 tabs three times daily.  - Continue clonazepam as scheduled. " Okay to take an extra clonazepam 1 mg prn daily for breakthrough dystonia.    - No changes to remaining Parkinson Disease medications. Follow the table below:  Movement Disorder-related Medications                   8 AM 12 PM 4 pm 8pm At bedtime  prn   Amantadine 100 mg  1 1           Carbidopa/levodopa IR  mg 2 2 2         Carbidopa/levodopa CR  mg       1       Clonazepam 2 mg 1  1         Clonazepam 1 mg   1       1   - Continue psychiatric and psychotherapeutic care are recommended to manage anxiety and depression. Follow symptoms closely while on clozapine and clonazepam, though there have been no issues in the past with this combination of medication.   - Follow-up neuropsychological evaluation is recommended in 1 to 2 years, 1/[id number] in order to assess and update recommendations as appropriate  - Continue following [facility] care medical providers at [facility]  - Continue to refrain from driving  - Continue daily and safe exercises   - Continue follow with Dr. Arita for Botox injections    Patient to return in 6 months, for in-person visit, 30 minutes.     Ching Carlos DO, MA   of Neurology   [facility]    30 minutes spent on date of encounter doing chart reviews and exam and documentation and further activities as noted above.

## 2023-06-07 NOTE — LETTER
"    2023         RE: Benjamin Mathews  [address]  [facility] [address]        Dear Colleague,    Thank you for referring your patient, Benjamin Mathews, to the [facility]. Please see a copy of my visit note below.    Department of Neurology  Movement Disorders Division     Patient: Benjamin Mathews   MRN: [id number]   : 1965   Date of Visit:  2023    History of Present Illness  Mr. Mathews is a pleasant 58 year old male that presents to Neurology Movement clinic for follow up regarding Parkinsonism management.   Patient was last seen on 2023 virtually as a new patient for management of Parkinsonism. He was continued on current dose of carbidopa/levodopa IR, carbidopa/levodopa CR and amantadine    History obtained from patient.   Patient reports he fell and broke his right foot and is status post surgical repair. He is currently in a \"horrible nursing home.\" Parkinson Disease symptoms are well controlled except for cramping and dystonia.   Continuous mouth movements: They continue but are not bothersome as it does not occur daily.   Swallowing: No issues.   Muscle spasm pain/dystonia: Currently treated with clonazepam up to 6 mg daily. He has spasms in right foot and right body, mostly occur around 630 am and 11 am and 5 pm. Currently not taking clonazepam 1 mg prn twice daily, which \"works like a charm\" to improve the cramping in leg; this prn medication was taken away.  GI: Following with GI to treat constipation.   Movement Disorder-related Medications                   8 AM 12 PM 4 pm 8pm At bedtime  prn   Amantadine 100 mg  1 1           Carbidopa/levodopa IR  mg 2 2 1.5         Carbidopa/levodopa CR  mg       1       Clonazepam 2 mg 1  1         Clonazepam 1 mg   1       1   Prn dose of clonazepam taken about once a week    Review of Systems:  Other than that mentioned above, the remainder of 12 systems reviewed were negative.    PMH: " unchanged  PSH: unchanged  FH: unchanged  SH: unchanged    Medications:  Current Outpatient Medications   Medication Sig Dispense Refill     alfuzosin ER (UROXATRAL) 10 MG 24 hr tablet Take 1 tablet (10 mg) by mouth daily 30 tablet 0     amantadine (SYMMETREL) 100 MG capsule Take 1 capsule (100 mg) by mouth 2 times daily 60 capsule 11     atorvastatin (LIPITOR) 40 MG tablet Take 1 tablet (40 mg) by mouth every evening 30 tablet 0     bisacodyl (DULCOLAX) 10 MG suppository Place 1 suppository (10 mg) rectally daily as needed for constipation 10 suppository 0     carbidopa-levodopa (SINEMET CR)  MG CR tablet Take 1 tablet by mouth At Bedtime 30 tablet 11     carbidopa-levodopa (SINEMET)  MG tablet Take 2 tablets by mouth 3 times daily 180 tablet 11     cholecalciferol (VITAMIN D3) 125 mcg (5000 units) capsule Take 1 capsule (125 mcg) by mouth daily 30 capsule 0     clonazePAM (KLONOPIN) 1 MG tablet Take 1 tab daily at 12 pm and okay to take 1 tab daily prn. 60 tablet 3     clonazePAM (KLONOPIN) 2 MG tablet Take 1 tablet (2 mg) by mouth 2 times daily 60 tablet 3     cloZAPine (CLOZARIL) 50 MG tablet Take 1 tablet (50 mg) by mouth At Bedtime 30 tablet 0     gabapentin (NEURONTIN) 800 MG tablet Take 1 tablet (800 mg) by mouth 3 times daily 90 tablet 0     hydrOXYzine (ATARAX) 25 MG tablet Take 2 tablets (50 mg) by mouth every 6 hours as needed for anxiety (up to 3 timrd daily) 20 tablet 0     lactulose (CHRONULAC) 10 GM/15ML solution Take 15 mLs by mouth 2 times daily 473 mL 0     linaclotide (LINZESS) 290 MCG capsule Take 1 capsule (290 mcg) by mouth daily as needed (IBSc) 30 capsule 0     magnesium gluconate (MAGONATE) 500 (27 Mg) MG tablet Take 1 tablet (500 mg) by mouth daily 30 tablet 11     metoprolol succinate ER (TOPROL XL) 25 MG 24 hr tablet Take 0.5 tablets (12.5 mg) by mouth 2 times daily 30 tablet 0     multivitamin, therapeutic (THERA-VIT) TABS tablet Take 1 tablet by mouth daily 30 tablet 0      oxyCODONE (ROXICODONE) 5 MG tablet Take 0.5-1 tablets (2.5-5 mg) by mouth every 4 hours as needed for breakthrough pain 20 tablet 0     pantoprazole (PROTONIX) 40 MG EC tablet Take 1 tablet (40 mg) by mouth daily Take 1 tablet (40mg) by mouth daily at 8am 30 tablet 0     polyethylene glycol (MIRALAX) 17 GM/Dose powder Take 17 g by mouth 2 times daily One capful in liquid by mouth twice a day, 8 AM and one dose at supper 850 g 0     rivaroxaban ANTICOAGULANT (XARELTO) 20 MG TABS tablet Take 1 tablet (20 mg) by mouth daily (with dinner) At 5pm 30 tablet 0     sodium phosphate (FLEET ENEMA) 7-19 GM/118ML rectal enema Place 1 enema rectally daily as needed for constipation       traZODone (DESYREL) 100 MG tablet Take 1 tablet (100 mg) by mouth At Bedtime 30 tablet 0     traZODone (DESYREL) 50 MG tablet Take 1 tablet (50 mg) by mouth every morning 30 tablet 0     venlafaxine (EFFEXOR XR) 150 MG 24 hr capsule Take 2 capsules (300 mg) by mouth daily 60 capsule 0     vitamin C (ASCORBIC ACID) 500 MG tablet Take 1 tablet (500 mg) by mouth daily 30 tablet 0           Allergies   Allergen Reactions     Amantadine Other (See Comments)     Other reaction(s): Hallucinations  Hallucinations/ lost self control/gambling.     halluicnations  Hallucinations/ lost self control/gambling.     hallucinates  halluicnations  hallucinates  Hallucinations/ lost self control/gambling.        Quetiapine GI Disturbance, Diarrhea and Other (See Comments)     Diarrhea    Diarrhea  Other reaction(s): GI Disturbance  Diarrhea       Duloxetine Other (See Comments)     suicidal  suicidal            Physical Exam:  The patient's  blood pressure is 108/81 and his pulse is 105.    hemifacial spasms on left   Wheelchair bound  Right foot in wrap and surgical restriction bars    6/7/2023  2:00 PM   UPDRS Motor Scale    Medication    R Brain DBS:    L Brain DBS:    Dyskinesia (LID)    Speech    Facial Expression    Rigidity RUE    Rigidity LUE    Rigidity  "RLE    Rigidity LLE    Finger Taps R 1    Finger Taps L 1    Hand Mvt R 0    Hand Mvt L 0    Pron-/Supinate R 0    Pron-/Supinate L 1    Toe Tap R    Toe Tap L    Leg Agility R    Leg Agility L    Arise From Chair    Gait    Gait Freezing    Posture    Global Spont Mvt    Postural Tremor RUE 1    Postural Tremor LUE 1    Kinetic Tremor RUE 1    Kinetic Tremor LUE 1    Rest Tremor RUE 0    Rest Tremor LUE 1    Rest Tremor RLE 0    Rest Tremor LLE 0    Rest Tremor Lip/Jaw 0    Rest Tremor Constancy 1    Total Right    Total Left         Data Reviewed: I have personally reviewed the tests/studies below.   - Neuropsych eval 1/2023 by Dr. Vincent: \"the results from this exam are compatible with frontal and subcortical systems dysfunction, as is commonly seen in individuals with Parkinson's disease.  It is challenging for me to say whether or not he meets criteria for dementia at this time, given the suggestion of variable engagement with the testing process... In this exam, deficits were noted in executive functioning, with milder weaknesses in attention, cognitive speed, learning, recall, and bilateral fine motor dexterity. He is reporting moderate symptoms of both depression and anxiety.  It may be the case that these depression and anxiety symptoms contributed to some of the variability noted on testing, as well as to his variability of engagement with the testing process.\"    Impression:  Benjamin Mathews is a 58 year old male with Parkinsonism. The patient's main concern today is cramping and dystonia after falling breaking his right foot.       1. Parkinsonism: Symptoms began around 2008 with tremor, left greater than right hand.  He was managed at [facility] for years but travel became too difficult.   2. Dystonic muscle spasms secondary to #1: Previously trialed with baclofen 60mg daily was not effective and took a month to taper off.  Improved on clonazepam. Improved with current Parkinson Disease medication " regimen, adding amantadine, and Botox injections.  3. Hallucinations: managed on clozapine   4. Restless Leg Syndrome, controlled   5. Constipation     Plan:   - Increase carbidopa/levodopa IR 25/100 mg to 2 tabs three times daily.  - Continue clonazepam as scheduled. Okay to take an extra clonazepam 1 mg prn daily for breakthrough dystonia.    - No changes to remaining Parkinson Disease medications. Follow the table below:  Movement Disorder-related Medications                   8 AM 12 PM 4 pm 8pm At bedtime  prn   Amantadine 100 mg  1 1           Carbidopa/levodopa IR  mg 2 2 2         Carbidopa/levodopa CR  mg       1       Clonazepam 2 mg 1  1         Clonazepam 1 mg   1       1   - Continue psychiatric and psychotherapeutic care are recommended to manage anxiety and depression. Follow symptoms closely while on clozapine and clonazepam, though there have been no issues in the past with this combination of medication.   - Follow-up neuropsychological evaluation is recommended in 1 to 2 years, 1/[id number] in order to assess and update recommendations as appropriate  - Continue following palliative care medical providers at [facility]  - Continue to refrain from driving  - Continue daily and safe exercises   - Continue follow with Dr. Arita for Botox injections    Patient to return in 6 months, for in-person visit, 30 minutes.     Ching Carlos DO, MA   of Neurology   [facility]    30 minutes spent on date of encounter doing chart reviews and exam and documentation and further activities as noted above.                      Again, thank you for allowing me to participate in the care of your patient.        Sincerely,        Ching Carlos DO

## 2023-06-07 NOTE — PATIENT INSTRUCTIONS
Plan:   - Increase carbidopa/levodopa IR 25/100 mg to 2 tabs three times daily.  - Continue clonazepam as scheduled. Okay to take an extra clonazepam 1 mg prn daily for breakthrough dystonia.    - No changes to remaining Parkinson Disease medications. Follow the table below  Movement Disorder-related Medications                   8 AM 12 PM 4 pm 8pm At bedtime  prn   Amantadine 100 mg  1 1           Carbidopa/levodopa IR  mg 2 2 2         Carbidopa/levodopa CR  mg       1       Clonazepam 2 mg 1  1         Clonazepam 1 mg   1       1   - Continue psychiatric and psychotherapeutic care are recommended to manage anxiety and depression.   Follow symptoms closely while on clozapine and clonazepam, though there have been no issues in the past with this combination of medication.   - Follow-up neuropsychological evaluation is recommended in 1 to 2 years, [id number] in order to assess and update recommendations as appropriate  - Continue following palliative care medical providers at [facility]  - Continue to refrain from driving  - Continue daily and safe exercises   - Continue follow with Dr. Arita for Botox injections    Patient to return in 6 months, for in-person visit, 30 minutes.

## 2023-06-08 ENCOUNTER — PRE VISIT (OUTPATIENT)
Dept: SURGERY | Facility: CLINIC | Age: 58
End: 2023-06-08

## 2023-06-08 NOTE — TELEPHONE ENCOUNTER
Patient is scheduled for surgery with Dr. Bonilla    Spoke with: Benjamin Davila LPN at [facility]    Date of Surgery: 6/15/23    Location: [facility] OR    Informed patient they will need an adult  Yes    H&P: Scheduled with Will be completed by PCP at Rehab/TCU    Additional imaging/appointments: POP Made    Additional comments: N/A        Pati Sepulveda on 6/8/2023 at 12:19 PM

## 2023-06-08 NOTE — TELEPHONE ENCOUNTER
"Third + attempt to reach out to Jermain to confirm surgery with Dr. Bonilla on 6/15/23.    Unable to provide voicemail due to not being able to provided....rings endlessly without any option to provide a voicemail. Call disconnects with \"we're sorry please try again, goodbye,\" and call drops.     Will try again.    "

## 2023-06-13 ENCOUNTER — TRANSFERRED RECORDS (OUTPATIENT)
Dept: HEALTH INFORMATION MANAGEMENT | Facility: CLINIC | Age: 58
End: 2023-06-13

## 2023-06-13 ENCOUNTER — OFFICE VISIT (OUTPATIENT)
Dept: PODIATRY | Facility: CLINIC | Age: 58
End: 2023-06-13
Payer: COMMERCIAL

## 2023-06-13 DIAGNOSIS — G20.A1 PARKINSON'S DISEASE (H): ICD-10-CM

## 2023-06-13 DIAGNOSIS — L97.521 SKIN ULCER OF SECOND TOE OF LEFT FOOT, LIMITED TO BREAKDOWN OF SKIN (H): Primary | ICD-10-CM

## 2023-06-13 DIAGNOSIS — G60.9 IDIOPATHIC PERIPHERAL NEUROPATHY: ICD-10-CM

## 2023-06-13 DIAGNOSIS — B35.1 ONYCHOMYCOSIS: ICD-10-CM

## 2023-06-13 PROCEDURE — 99213 OFFICE O/P EST LOW 20 MIN: CPT | Performed by: PODIATRIST
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
## 2023-06-14 ENCOUNTER — TELEPHONE (OUTPATIENT)
Dept: ORTHOPEDICS | Facility: CLINIC | Age: 58
End: 2023-06-14
Payer: COMMERCIAL

## 2023-06-14 ENCOUNTER — ANESTHESIA EVENT (OUTPATIENT)
Dept: SURGERY | Facility: CLINIC | Age: 58
DRG: 493 | End: 2023-06-14
Payer: COMMERCIAL

## 2023-06-14 NOTE — TELEPHONE ENCOUNTER
[facility] Call Center    Phone Message    May a detailed message be left on voicemail: yes     Reason for Call: Other: Swati calling in today as she has recv'd 2 different messages stating 2 different addresses for tomorrow's surgery and the call back number of [phone] is not in service. She was given [address] and also [address] check in time of 8:00. Please call Swati back to confirm which information is correct     Action Taken: Other: [phone]    Travel Screening: Not Applicable

## 2023-06-14 NOTE — OR NURSING
Spoke w/RN from outside agency who did not know pt and had to ask others about his care.  Not a lot of information given.

## 2023-06-14 NOTE — OR NURSING
FYI -  Pt staying at TCU at [facility].  They do not have antibacterial soap.  WC bound and transfers with two assist.

## 2023-06-14 NOTE — TELEPHONE ENCOUNTER
Swati was called back and she was givne the [address] [place] [address] address.  She was also told the arrival time of 7:30am that is what was in the pre op call documentation.  She said that she will work with Transportation to be able to get him to the surgery on time.

## 2023-06-15 ENCOUNTER — APPOINTMENT (OUTPATIENT)
Dept: GENERAL RADIOLOGY | Facility: CLINIC | Age: 58
DRG: 493 | End: 2023-06-15
Attending: ORTHOPAEDIC SURGERY
Payer: COMMERCIAL

## 2023-06-15 ENCOUNTER — ANESTHESIA (OUTPATIENT)
Dept: SURGERY | Facility: CLINIC | Age: 58
DRG: 493 | End: 2023-06-15
Payer: COMMERCIAL

## 2023-06-15 ENCOUNTER — HOSPITAL ENCOUNTER (INPATIENT)
Facility: CLINIC | Age: 58
LOS: 3 days | Discharge: NURSING, REHAB OR LONG-TERM CARE | DRG: 493 | End: 2023-06-18
Attending: ORTHOPAEDIC SURGERY | Admitting: ORTHOPAEDIC SURGERY
Payer: COMMERCIAL

## 2023-06-15 DIAGNOSIS — S82.891D CLOSED FRACTURE OF RIGHT ANKLE WITH ROUTINE HEALING, SUBSEQUENT ENCOUNTER: Primary | ICD-10-CM

## 2023-06-15 DIAGNOSIS — S82.891A CLOSED FRACTURE OF RIGHT ANKLE, INITIAL ENCOUNTER: ICD-10-CM

## 2023-06-15 DIAGNOSIS — G89.18 ACUTE POST-OPERATIVE PAIN: ICD-10-CM

## 2023-06-15 PROBLEM — S82.899A ANKLE FRACTURE: Status: ACTIVE | Noted: 2023-06-15

## 2023-06-15 LAB
ANION GAP SERPL CALCULATED.3IONS-SCNC: 13 MMOL/L (ref 7–15)
BUN SERPL-MCNC: 18.4 MG/DL (ref 6–20)
CALCIUM SERPL-MCNC: 9 MG/DL (ref 8.6–10)
CHLORIDE SERPL-SCNC: 105 MMOL/L (ref 98–107)
CREAT SERPL-MCNC: 0.71 MG/DL (ref 0.67–1.17)
DEPRECATED HCO3 PLAS-SCNC: 23 MMOL/L (ref 22–29)
ERYTHROCYTE [DISTWIDTH] IN BLOOD BY AUTOMATED COUNT: 12.7 % (ref 10–15)
GFR SERPL CREATININE-BSD FRML MDRD: >90 ML/MIN/1.73M2
GLUCOSE BLDC GLUCOMTR-MCNC: 95 MG/DL (ref 70–99)
GLUCOSE SERPL-MCNC: 195 MG/DL (ref 70–99)
HCT VFR BLD AUTO: 35.9 % (ref 40–53)
HGB BLD-MCNC: 11.6 G/DL (ref 13.3–17.7)
MCH RBC QN AUTO: 31.1 PG (ref 26.5–33)
MCHC RBC AUTO-ENTMCNC: 32.3 G/DL (ref 31.5–36.5)
MCV RBC AUTO: 96 FL (ref 78–100)
PLATELET # BLD AUTO: 208 10E3/UL (ref 150–450)
POTASSIUM SERPL-SCNC: 4.5 MMOL/L (ref 3.4–5.3)
RBC # BLD AUTO: 3.73 10E6/UL (ref 4.4–5.9)
SODIUM SERPL-SCNC: 141 MMOL/L (ref 136–145)
WBC # BLD AUTO: 12.3 10E3/UL (ref 4–11)

## 2023-06-15 PROCEDURE — 3E033XZ INTRODUCTION OF VASOPRESSOR INTO PERIPHERAL VEIN, PERCUTANEOUS APPROACH: ICD-10-PCS | Performed by: INTERNAL MEDICINE

## 2023-06-15 PROCEDURE — 0QSJ04Z REPOSITION RIGHT FIBULA WITH INTERNAL FIXATION DEVICE, OPEN APPROACH: ICD-10-PCS | Performed by: ORTHOPAEDIC SURGERY

## 2023-06-15 PROCEDURE — 27823 TREATMENT OF ANKLE FRACTURE: CPT | Mod: 58 | Performed by: ORTHOPAEDIC SURGERY

## 2023-06-15 PROCEDURE — 20694 RMVL EXT FIXJ SYS UNDER ANES: CPT | Mod: 58 | Performed by: ORTHOPAEDIC SURGERY

## 2023-06-15 PROCEDURE — 250N000011 HC RX IP 250 OP 636: Performed by: STUDENT IN AN ORGANIZED HEALTH CARE EDUCATION/TRAINING PROGRAM

## 2023-06-15 PROCEDURE — 370N000017 HC ANESTHESIA TECHNICAL FEE, PER MIN: Performed by: ORTHOPAEDIC SURGERY

## 2023-06-15 PROCEDURE — 710N000010 HC RECOVERY PHASE 1, LEVEL 2, PER MIN: Performed by: ORTHOPAEDIC SURGERY

## 2023-06-15 PROCEDURE — 99223 1ST HOSP IP/OBS HIGH 75: CPT | Mod: AI | Performed by: PHYSICIAN ASSISTANT

## 2023-06-15 PROCEDURE — 272N000002 HC OR SUPPLY OTHER OPNP: Performed by: ORTHOPAEDIC SURGERY

## 2023-06-15 PROCEDURE — 82962 GLUCOSE BLOOD TEST: CPT

## 2023-06-15 PROCEDURE — 258N000003 HC RX IP 258 OP 636: Performed by: ANESTHESIOLOGY

## 2023-06-15 PROCEDURE — 250N000009 HC RX 250: Performed by: ANESTHESIOLOGY

## 2023-06-15 PROCEDURE — 258N000003 HC RX IP 258 OP 636: Performed by: STUDENT IN AN ORGANIZED HEALTH CARE EDUCATION/TRAINING PROGRAM

## 2023-06-15 PROCEDURE — 250N000009 HC RX 250: Performed by: NURSE ANESTHETIST, CERTIFIED REGISTERED

## 2023-06-15 PROCEDURE — 250N000011 HC RX IP 250 OP 636: Performed by: ORTHOPAEDIC SURGERY

## 2023-06-15 PROCEDURE — 999N000141 HC STATISTIC PRE-PROCEDURE NURSING ASSESSMENT: Performed by: ORTHOPAEDIC SURGERY

## 2023-06-15 PROCEDURE — 250N000011 HC RX IP 250 OP 636: Performed by: NURSE ANESTHETIST, CERTIFIED REGISTERED

## 2023-06-15 PROCEDURE — 360N000084 HC SURGERY LEVEL 4 W/ FLUORO, PER MIN: Performed by: ORTHOPAEDIC SURGERY

## 2023-06-15 PROCEDURE — 80048 BASIC METABOLIC PNL TOTAL CA: CPT | Performed by: PHYSICIAN ASSISTANT

## 2023-06-15 PROCEDURE — 250N000025 HC SEVOFLURANE, PER MIN: Performed by: ORTHOPAEDIC SURGERY

## 2023-06-15 PROCEDURE — 250N000013 HC RX MED GY IP 250 OP 250 PS 637: Performed by: PHYSICIAN ASSISTANT

## 2023-06-15 PROCEDURE — 0QPJX5Z REMOVAL OF EXTERNAL FIXATION DEVICE FROM RIGHT FIBULA, EXTERNAL APPROACH: ICD-10-PCS | Performed by: ORTHOPAEDIC SURGERY

## 2023-06-15 PROCEDURE — 250N000011 HC RX IP 250 OP 636: Performed by: ANESTHESIOLOGY

## 2023-06-15 PROCEDURE — 999N000180 XR SURGERY CARM FLUORO LESS THAN 5 MIN: Mod: TC

## 2023-06-15 PROCEDURE — C1713 ANCHOR/SCREW BN/BN,TIS/BN: HCPCS | Performed by: ORTHOPAEDIC SURGERY

## 2023-06-15 PROCEDURE — 85027 COMPLETE CBC AUTOMATED: CPT | Performed by: PHYSICIAN ASSISTANT

## 2023-06-15 PROCEDURE — 250N000009 HC RX 250: Performed by: ORTHOPAEDIC SURGERY

## 2023-06-15 PROCEDURE — 272N000001 HC OR GENERAL SUPPLY STERILE: Performed by: ORTHOPAEDIC SURGERY

## 2023-06-15 PROCEDURE — 120N000002 HC R&B MED SURG/OB UMMC

## 2023-06-15 PROCEDURE — 250N000013 HC RX MED GY IP 250 OP 250 PS 637: Performed by: STUDENT IN AN ORGANIZED HEALTH CARE EDUCATION/TRAINING PROGRAM

## 2023-06-15 PROCEDURE — 36416 COLLJ CAPILLARY BLOOD SPEC: CPT | Performed by: PHYSICIAN ASSISTANT

## 2023-06-15 PROCEDURE — 0QSG04Z REPOSITION RIGHT TIBIA WITH INTERNAL FIXATION DEVICE, OPEN APPROACH: ICD-10-PCS | Performed by: ORTHOPAEDIC SURGERY

## 2023-06-15 DEVICE — IMPLANTABLE DEVICE: Type: IMPLANTABLE DEVICE | Site: ANKLE | Status: FUNCTIONAL

## 2023-06-15 DEVICE — SCREW LOCKING 3.5X14MM: Type: IMPLANTABLE DEVICE | Site: ANKLE | Status: FUNCTIONAL

## 2023-06-15 RX ORDER — ACETAMINOPHEN 325 MG/1
975 TABLET ORAL EVERY 8 HOURS
Status: COMPLETED | OUTPATIENT
Start: 2023-06-15 | End: 2023-06-18

## 2023-06-15 RX ORDER — HYDROXYZINE HYDROCHLORIDE 50 MG/1
50 TABLET, FILM COATED ORAL EVERY 6 HOURS PRN
Status: DISCONTINUED | OUTPATIENT
Start: 2023-06-15 | End: 2023-06-18 | Stop reason: HOSPADM

## 2023-06-15 RX ORDER — ONDANSETRON 2 MG/ML
INJECTION INTRAMUSCULAR; INTRAVENOUS PRN
Status: DISCONTINUED | OUTPATIENT
Start: 2023-06-15 | End: 2023-06-15

## 2023-06-15 RX ORDER — NALOXONE HYDROCHLORIDE 0.4 MG/ML
0.4 INJECTION, SOLUTION INTRAMUSCULAR; INTRAVENOUS; SUBCUTANEOUS
Status: DISCONTINUED | OUTPATIENT
Start: 2023-06-15 | End: 2023-06-15

## 2023-06-15 RX ORDER — METOPROLOL TARTRATE 1 MG/ML
1-2 INJECTION, SOLUTION INTRAVENOUS EVERY 5 MIN PRN
Status: DISCONTINUED | OUTPATIENT
Start: 2023-06-15 | End: 2023-06-15 | Stop reason: HOSPADM

## 2023-06-15 RX ORDER — VANCOMYCIN HYDROCHLORIDE 1 G/20ML
INJECTION, POWDER, LYOPHILIZED, FOR SOLUTION INTRAVENOUS PRN
Status: DISCONTINUED | OUTPATIENT
Start: 2023-06-15 | End: 2023-06-15 | Stop reason: HOSPADM

## 2023-06-15 RX ORDER — POLYETHYLENE GLYCOL 3350 17 G/17G
17 POWDER, FOR SOLUTION ORAL DAILY
Status: DISCONTINUED | OUTPATIENT
Start: 2023-06-16 | End: 2023-06-16

## 2023-06-15 RX ORDER — BUPIVACAINE HYDROCHLORIDE AND EPINEPHRINE 2.5; 5 MG/ML; UG/ML
INJECTION, SOLUTION INFILTRATION; PERINEURAL
Status: COMPLETED | OUTPATIENT
Start: 2023-06-15 | End: 2023-06-15

## 2023-06-15 RX ORDER — DEXMEDETOMIDINE HYDROCHLORIDE 4 UG/ML
INJECTION, SOLUTION INTRAVENOUS
Status: COMPLETED | OUTPATIENT
Start: 2023-06-15 | End: 2023-06-15

## 2023-06-15 RX ORDER — DEXAMETHASONE SODIUM PHOSPHATE 10 MG/ML
INJECTION, SOLUTION INTRAMUSCULAR; INTRAVENOUS
Status: COMPLETED | OUTPATIENT
Start: 2023-06-15 | End: 2023-06-15

## 2023-06-15 RX ORDER — NALOXONE HYDROCHLORIDE 0.4 MG/ML
0.4 INJECTION, SOLUTION INTRAMUSCULAR; INTRAVENOUS; SUBCUTANEOUS
Status: DISCONTINUED | OUTPATIENT
Start: 2023-06-15 | End: 2023-06-15 | Stop reason: HOSPADM

## 2023-06-15 RX ORDER — ACETAMINOPHEN 325 MG/1
975 TABLET ORAL ONCE
Status: DISCONTINUED | OUTPATIENT
Start: 2023-06-15 | End: 2023-06-15 | Stop reason: HOSPADM

## 2023-06-15 RX ORDER — NALOXONE HYDROCHLORIDE 0.4 MG/ML
0.2 INJECTION, SOLUTION INTRAMUSCULAR; INTRAVENOUS; SUBCUTANEOUS
Status: DISCONTINUED | OUTPATIENT
Start: 2023-06-15 | End: 2023-06-15 | Stop reason: HOSPADM

## 2023-06-15 RX ORDER — CARBIDOPA AND LEVODOPA 25; 100 MG/1; MG/1
2 TABLET ORAL 3 TIMES DAILY
Status: DISCONTINUED | OUTPATIENT
Start: 2023-06-16 | End: 2023-06-18 | Stop reason: HOSPADM

## 2023-06-15 RX ORDER — LACTULOSE 10 G/15ML
15 SOLUTION ORAL 2 TIMES DAILY
Status: DISCONTINUED | OUTPATIENT
Start: 2023-06-16 | End: 2023-06-18 | Stop reason: HOSPADM

## 2023-06-15 RX ORDER — ONDANSETRON 2 MG/ML
4 INJECTION INTRAMUSCULAR; INTRAVENOUS EVERY 30 MIN PRN
Status: DISCONTINUED | OUTPATIENT
Start: 2023-06-15 | End: 2023-06-15 | Stop reason: HOSPADM

## 2023-06-15 RX ORDER — ONDANSETRON 2 MG/ML
4 INJECTION INTRAMUSCULAR; INTRAVENOUS EVERY 6 HOURS PRN
Status: DISCONTINUED | OUTPATIENT
Start: 2023-06-15 | End: 2023-06-18 | Stop reason: HOSPADM

## 2023-06-15 RX ORDER — TRAZODONE HYDROCHLORIDE 50 MG/1
50 TABLET, FILM COATED ORAL EVERY MORNING
Status: DISCONTINUED | OUTPATIENT
Start: 2023-06-16 | End: 2023-06-18 | Stop reason: HOSPADM

## 2023-06-15 RX ORDER — OXYCODONE HYDROCHLORIDE 10 MG/1
10 TABLET ORAL EVERY 4 HOURS PRN
Status: DISCONTINUED | OUTPATIENT
Start: 2023-06-15 | End: 2023-06-18 | Stop reason: HOSPADM

## 2023-06-15 RX ORDER — TRAZODONE HYDROCHLORIDE 100 MG/1
100 TABLET ORAL AT BEDTIME
Status: DISCONTINUED | OUTPATIENT
Start: 2023-06-15 | End: 2023-06-18 | Stop reason: HOSPADM

## 2023-06-15 RX ORDER — SODIUM CHLORIDE, SODIUM LACTATE, POTASSIUM CHLORIDE, CALCIUM CHLORIDE 600; 310; 30; 20 MG/100ML; MG/100ML; MG/100ML; MG/100ML
INJECTION, SOLUTION INTRAVENOUS CONTINUOUS
Status: DISCONTINUED | OUTPATIENT
Start: 2023-06-15 | End: 2023-06-15 | Stop reason: HOSPADM

## 2023-06-15 RX ORDER — PROCHLORPERAZINE MALEATE 10 MG
10 TABLET ORAL EVERY 6 HOURS PRN
Status: DISCONTINUED | OUTPATIENT
Start: 2023-06-15 | End: 2023-06-18 | Stop reason: HOSPADM

## 2023-06-15 RX ORDER — ONDANSETRON 4 MG/1
4 TABLET, ORALLY DISINTEGRATING ORAL EVERY 6 HOURS PRN
Status: DISCONTINUED | OUTPATIENT
Start: 2023-06-15 | End: 2023-06-18 | Stop reason: HOSPADM

## 2023-06-15 RX ORDER — NALOXONE HYDROCHLORIDE 0.4 MG/ML
0.4 INJECTION, SOLUTION INTRAMUSCULAR; INTRAVENOUS; SUBCUTANEOUS
Status: DISCONTINUED | OUTPATIENT
Start: 2023-06-15 | End: 2023-06-18 | Stop reason: HOSPADM

## 2023-06-15 RX ORDER — POLYETHYLENE GLYCOL 3350 17 G/17G
17 POWDER, FOR SOLUTION ORAL 2 TIMES DAILY
Status: DISCONTINUED | OUTPATIENT
Start: 2023-06-16 | End: 2023-06-18 | Stop reason: HOSPADM

## 2023-06-15 RX ORDER — HYDROMORPHONE HYDROCHLORIDE 1 MG/ML
0.2 INJECTION, SOLUTION INTRAMUSCULAR; INTRAVENOUS; SUBCUTANEOUS
Status: DISCONTINUED | OUTPATIENT
Start: 2023-06-15 | End: 2023-06-18 | Stop reason: HOSPADM

## 2023-06-15 RX ORDER — HYDRALAZINE HYDROCHLORIDE 20 MG/ML
2.5-5 INJECTION INTRAMUSCULAR; INTRAVENOUS EVERY 10 MIN PRN
Status: DISCONTINUED | OUTPATIENT
Start: 2023-06-15 | End: 2023-06-15 | Stop reason: HOSPADM

## 2023-06-15 RX ORDER — FLUMAZENIL 0.1 MG/ML
0.2 INJECTION, SOLUTION INTRAVENOUS
Status: DISCONTINUED | OUTPATIENT
Start: 2023-06-15 | End: 2023-06-15 | Stop reason: HOSPADM

## 2023-06-15 RX ORDER — CLONAZEPAM 1 MG/1
1 TABLET ORAL DAILY PRN
Status: DISCONTINUED | OUTPATIENT
Start: 2023-06-16 | End: 2023-06-15

## 2023-06-15 RX ORDER — MAGNESIUM HYDROXIDE 1200 MG/15ML
LIQUID ORAL PRN
Status: DISCONTINUED | OUTPATIENT
Start: 2023-06-15 | End: 2023-06-15 | Stop reason: HOSPADM

## 2023-06-15 RX ORDER — NALOXONE HYDROCHLORIDE 0.4 MG/ML
0.2 INJECTION, SOLUTION INTRAMUSCULAR; INTRAVENOUS; SUBCUTANEOUS
Status: DISCONTINUED | OUTPATIENT
Start: 2023-06-15 | End: 2023-06-15

## 2023-06-15 RX ORDER — FENTANYL CITRATE 50 UG/ML
25-50 INJECTION, SOLUTION INTRAMUSCULAR; INTRAVENOUS
Status: DISCONTINUED | OUTPATIENT
Start: 2023-06-15 | End: 2023-06-15 | Stop reason: HOSPADM

## 2023-06-15 RX ORDER — FENTANYL CITRATE 50 UG/ML
50 INJECTION, SOLUTION INTRAMUSCULAR; INTRAVENOUS EVERY 5 MIN PRN
Status: DISCONTINUED | OUTPATIENT
Start: 2023-06-15 | End: 2023-06-15 | Stop reason: HOSPADM

## 2023-06-15 RX ORDER — ONDANSETRON 4 MG/1
4 TABLET, ORALLY DISINTEGRATING ORAL EVERY 30 MIN PRN
Status: DISCONTINUED | OUTPATIENT
Start: 2023-06-15 | End: 2023-06-15 | Stop reason: HOSPADM

## 2023-06-15 RX ORDER — CLONAZEPAM 1 MG/1
1 TABLET ORAL DAILY PRN
COMMUNITY
End: 2023-06-22

## 2023-06-15 RX ORDER — PHENYLEPHRINE HCL IN 0.9% NACL 50MG/250ML
.5-1.25 PLASTIC BAG, INJECTION (ML) INTRAVENOUS CONTINUOUS
Status: DISCONTINUED | OUTPATIENT
Start: 2023-06-15 | End: 2023-06-15 | Stop reason: HOSPADM

## 2023-06-15 RX ORDER — CARBIDOPA AND LEVODOPA 50; 200 MG/1; MG/1
1 TABLET, EXTENDED RELEASE ORAL AT BEDTIME
Status: DISCONTINUED | OUTPATIENT
Start: 2023-06-15 | End: 2023-06-18 | Stop reason: HOSPADM

## 2023-06-15 RX ORDER — AMOXICILLIN 250 MG
1 CAPSULE ORAL 2 TIMES DAILY
Status: DISCONTINUED | OUTPATIENT
Start: 2023-06-15 | End: 2023-06-18 | Stop reason: HOSPADM

## 2023-06-15 RX ORDER — CEFAZOLIN SODIUM/WATER 2 G/20 ML
SYRINGE (ML) INTRAVENOUS PRN
Status: DISCONTINUED | OUTPATIENT
Start: 2023-06-15 | End: 2023-06-15

## 2023-06-15 RX ORDER — ASPIRIN 81 MG/1
81 TABLET ORAL 2 TIMES DAILY
Status: DISCONTINUED | OUTPATIENT
Start: 2023-06-15 | End: 2023-06-16

## 2023-06-15 RX ORDER — FENTANYL CITRATE 50 UG/ML
25 INJECTION, SOLUTION INTRAMUSCULAR; INTRAVENOUS EVERY 5 MIN PRN
Status: DISCONTINUED | OUTPATIENT
Start: 2023-06-15 | End: 2023-06-15 | Stop reason: HOSPADM

## 2023-06-15 RX ORDER — PROPOFOL 10 MG/ML
INJECTION, EMULSION INTRAVENOUS PRN
Status: DISCONTINUED | OUTPATIENT
Start: 2023-06-15 | End: 2023-06-15

## 2023-06-15 RX ORDER — HYDROMORPHONE HYDROCHLORIDE 1 MG/ML
0.2 INJECTION, SOLUTION INTRAMUSCULAR; INTRAVENOUS; SUBCUTANEOUS EVERY 5 MIN PRN
Status: DISCONTINUED | OUTPATIENT
Start: 2023-06-15 | End: 2023-06-15 | Stop reason: HOSPADM

## 2023-06-15 RX ORDER — SODIUM CHLORIDE, SODIUM LACTATE, POTASSIUM CHLORIDE, CALCIUM CHLORIDE 600; 310; 30; 20 MG/100ML; MG/100ML; MG/100ML; MG/100ML
INJECTION, SOLUTION INTRAVENOUS CONTINUOUS
Status: DISCONTINUED | OUTPATIENT
Start: 2023-06-15 | End: 2023-06-18 | Stop reason: HOSPADM

## 2023-06-15 RX ORDER — OXYCODONE HYDROCHLORIDE 5 MG/1
5 TABLET ORAL EVERY 4 HOURS PRN
Status: DISCONTINUED | OUTPATIENT
Start: 2023-06-15 | End: 2023-06-18 | Stop reason: HOSPADM

## 2023-06-15 RX ORDER — CLONAZEPAM 2 MG/1
2 TABLET ORAL EVERY 12 HOURS
Status: DISCONTINUED | OUTPATIENT
Start: 2023-06-15 | End: 2023-06-18 | Stop reason: HOSPADM

## 2023-06-15 RX ORDER — CEFAZOLIN SODIUM 2 G/100ML
2 INJECTION, SOLUTION INTRAVENOUS EVERY 8 HOURS
Status: COMPLETED | OUTPATIENT
Start: 2023-06-15 | End: 2023-06-16

## 2023-06-15 RX ORDER — OXYCODONE HYDROCHLORIDE 5 MG/1
5 TABLET ORAL
Status: DISCONTINUED | OUTPATIENT
Start: 2023-06-15 | End: 2023-06-15 | Stop reason: HOSPADM

## 2023-06-15 RX ORDER — VENLAFAXINE HYDROCHLORIDE 150 MG/1
300 CAPSULE, EXTENDED RELEASE ORAL DAILY
Status: DISCONTINUED | OUTPATIENT
Start: 2023-06-16 | End: 2023-06-18 | Stop reason: HOSPADM

## 2023-06-15 RX ORDER — DEXAMETHASONE SODIUM PHOSPHATE 4 MG/ML
INJECTION, SOLUTION INTRA-ARTICULAR; INTRALESIONAL; INTRAMUSCULAR; INTRAVENOUS; SOFT TISSUE PRN
Status: DISCONTINUED | OUTPATIENT
Start: 2023-06-15 | End: 2023-06-15

## 2023-06-15 RX ORDER — CLOZAPINE 50 MG/1
50 TABLET ORAL AT BEDTIME
Status: DISCONTINUED | OUTPATIENT
Start: 2023-06-15 | End: 2023-06-18 | Stop reason: HOSPADM

## 2023-06-15 RX ORDER — NALOXONE HYDROCHLORIDE 0.4 MG/ML
0.2 INJECTION, SOLUTION INTRAMUSCULAR; INTRAVENOUS; SUBCUTANEOUS
Status: DISCONTINUED | OUTPATIENT
Start: 2023-06-15 | End: 2023-06-18 | Stop reason: HOSPADM

## 2023-06-15 RX ORDER — HYDROMORPHONE HYDROCHLORIDE 1 MG/ML
0.4 INJECTION, SOLUTION INTRAMUSCULAR; INTRAVENOUS; SUBCUTANEOUS EVERY 5 MIN PRN
Status: DISCONTINUED | OUTPATIENT
Start: 2023-06-15 | End: 2023-06-15 | Stop reason: HOSPADM

## 2023-06-15 RX ORDER — LIDOCAINE 40 MG/G
CREAM TOPICAL
Status: DISCONTINUED | OUTPATIENT
Start: 2023-06-15 | End: 2023-06-15 | Stop reason: HOSPADM

## 2023-06-15 RX ORDER — KETOCONAZOLE 20 MG/ML
SHAMPOO TOPICAL WEEKLY
COMMUNITY

## 2023-06-15 RX ORDER — ACETAMINOPHEN 325 MG/1
650 TABLET ORAL EVERY 4 HOURS PRN
Status: DISCONTINUED | OUTPATIENT
Start: 2023-06-18 | End: 2023-06-18 | Stop reason: HOSPADM

## 2023-06-15 RX ORDER — TRAZODONE HYDROCHLORIDE 100 MG/1
100 TABLET ORAL AT BEDTIME
COMMUNITY

## 2023-06-15 RX ORDER — ATORVASTATIN CALCIUM 40 MG/1
40 TABLET, FILM COATED ORAL EVERY EVENING
Status: DISCONTINUED | OUTPATIENT
Start: 2023-06-15 | End: 2023-06-18 | Stop reason: HOSPADM

## 2023-06-15 RX ORDER — LIDOCAINE HYDROCHLORIDE 20 MG/ML
INJECTION, SOLUTION INFILTRATION; PERINEURAL PRN
Status: DISCONTINUED | OUTPATIENT
Start: 2023-06-15 | End: 2023-06-15

## 2023-06-15 RX ORDER — GABAPENTIN 800 MG/1
800 TABLET ORAL EVERY 8 HOURS
Status: DISCONTINUED | OUTPATIENT
Start: 2023-06-16 | End: 2023-06-18 | Stop reason: HOSPADM

## 2023-06-15 RX ORDER — ALFUZOSIN HYDROCHLORIDE 10 MG/1
10 TABLET, EXTENDED RELEASE ORAL DAILY
Status: DISCONTINUED | OUTPATIENT
Start: 2023-06-16 | End: 2023-06-18 | Stop reason: HOSPADM

## 2023-06-15 RX ORDER — BISACODYL 10 MG
10 SUPPOSITORY, RECTAL RECTAL DAILY PRN
Status: DISCONTINUED | OUTPATIENT
Start: 2023-06-15 | End: 2023-06-18 | Stop reason: HOSPADM

## 2023-06-15 RX ORDER — KETOCONAZOLE 20 MG/G
CREAM TOPICAL 2 TIMES DAILY
COMMUNITY

## 2023-06-15 RX ORDER — HYDROMORPHONE HYDROCHLORIDE 1 MG/ML
0.4 INJECTION, SOLUTION INTRAMUSCULAR; INTRAVENOUS; SUBCUTANEOUS
Status: DISCONTINUED | OUTPATIENT
Start: 2023-06-15 | End: 2023-06-18 | Stop reason: HOSPADM

## 2023-06-15 RX ORDER — CLONAZEPAM 1 MG/1
1 TABLET ORAL DAILY PRN
Status: DISCONTINUED | OUTPATIENT
Start: 2023-06-15 | End: 2023-06-18 | Stop reason: HOSPADM

## 2023-06-15 RX ORDER — PANTOPRAZOLE SODIUM 40 MG/1
40 TABLET, DELAYED RELEASE ORAL DAILY
Status: DISCONTINUED | OUTPATIENT
Start: 2023-06-16 | End: 2023-06-18 | Stop reason: HOSPADM

## 2023-06-15 RX ORDER — LIDOCAINE 40 MG/G
CREAM TOPICAL
Status: DISCONTINUED | OUTPATIENT
Start: 2023-06-15 | End: 2023-06-18 | Stop reason: HOSPADM

## 2023-06-15 RX ADMIN — LIDOCAINE HYDROCHLORIDE 100 MG: 20 INJECTION, SOLUTION INFILTRATION; PERINEURAL at 10:20

## 2023-06-15 RX ADMIN — DEXAMETHASONE SODIUM PHOSPHATE 1 MG: 10 INJECTION, SOLUTION INTRAMUSCULAR; INTRAVENOUS at 09:55

## 2023-06-15 RX ADMIN — GABAPENTIN 800 MG: 800 TABLET, FILM COATED ORAL at 23:04

## 2023-06-15 RX ADMIN — TRAZODONE HYDROCHLORIDE 100 MG: 100 TABLET ORAL at 22:53

## 2023-06-15 RX ADMIN — Medication 20 MG: at 13:26

## 2023-06-15 RX ADMIN — SENNOSIDES AND DOCUSATE SODIUM 1 TABLET: 50; 8.6 TABLET ORAL at 19:50

## 2023-06-15 RX ADMIN — CLOZAPINE 50 MG: 50 TABLET ORAL at 22:53

## 2023-06-15 RX ADMIN — ASPIRIN 81 MG: 81 TABLET ORAL at 19:50

## 2023-06-15 RX ADMIN — CLONAZEPAM 2 MG: 2 TABLET ORAL at 22:53

## 2023-06-15 RX ADMIN — BUPIVACAINE HYDROCHLORIDE AND EPINEPHRINE BITARTRATE 15 ML: 2.5; .005 INJECTION, SOLUTION INFILTRATION; PERINEURAL at 10:00

## 2023-06-15 RX ADMIN — PROPOFOL 150 MG: 10 INJECTION, EMULSION INTRAVENOUS at 10:20

## 2023-06-15 RX ADMIN — DEXAMETHASONE SODIUM PHOSPHATE 6 MG: 4 INJECTION, SOLUTION INTRA-ARTICULAR; INTRALESIONAL; INTRAMUSCULAR; INTRAVENOUS; SOFT TISSUE at 10:31

## 2023-06-15 RX ADMIN — SODIUM CHLORIDE, POTASSIUM CHLORIDE, SODIUM LACTATE AND CALCIUM CHLORIDE: 600; 310; 30; 20 INJECTION, SOLUTION INTRAVENOUS at 19:41

## 2023-06-15 RX ADMIN — ATORVASTATIN CALCIUM 40 MG: 40 TABLET, FILM COATED ORAL at 21:02

## 2023-06-15 RX ADMIN — Medication 20 MG: at 12:42

## 2023-06-15 RX ADMIN — BUPIVACAINE HYDROCHLORIDE AND EPINEPHRINE 25 ML: 2.5; 5 INJECTION, SOLUTION INFILTRATION; PERINEURAL at 09:55

## 2023-06-15 RX ADMIN — SODIUM CHLORIDE, POTASSIUM CHLORIDE, SODIUM LACTATE AND CALCIUM CHLORIDE: 600; 310; 30; 20 INJECTION, SOLUTION INTRAVENOUS at 10:09

## 2023-06-15 RX ADMIN — SODIUM CHLORIDE, POTASSIUM CHLORIDE, SODIUM LACTATE AND CALCIUM CHLORIDE: 600; 310; 30; 20 INJECTION, SOLUTION INTRAVENOUS at 14:35

## 2023-06-15 RX ADMIN — ACETAMINOPHEN 975 MG: 325 TABLET, FILM COATED ORAL at 18:31

## 2023-06-15 RX ADMIN — DEXMEDETOMIDINE HYDROCHLORIDE 20 MCG: 4 INJECTION, SOLUTION INTRAVENOUS at 09:55

## 2023-06-15 RX ADMIN — FENTANYL CITRATE 50 MCG: 50 INJECTION, SOLUTION INTRAMUSCULAR; INTRAVENOUS at 10:20

## 2023-06-15 RX ADMIN — CARBIDOPA AND LEVODOPA 1 TABLET: 50; 200 TABLET, EXTENDED RELEASE ORAL at 21:02

## 2023-06-15 RX ADMIN — Medication 20 MG: at 11:22

## 2023-06-15 RX ADMIN — DEXAMETHASONE SODIUM PHOSPHATE 1 MG: 10 INJECTION, SOLUTION INTRAMUSCULAR; INTRAVENOUS at 10:00

## 2023-06-15 RX ADMIN — Medication 50 MG: at 10:20

## 2023-06-15 RX ADMIN — Medication 0.2 MCG/KG/MIN: at 11:00

## 2023-06-15 RX ADMIN — DEXMEDETOMIDINE 20 MCG: 100 INJECTION, SOLUTION, CONCENTRATE INTRAVENOUS at 10:00

## 2023-06-15 RX ADMIN — OXYCODONE HYDROCHLORIDE 10 MG: 10 TABLET ORAL at 18:31

## 2023-06-15 RX ADMIN — Medication 30 MG: at 10:47

## 2023-06-15 RX ADMIN — CEFAZOLIN SODIUM 2 G: 2 INJECTION, SOLUTION INTRAVENOUS at 23:01

## 2023-06-15 RX ADMIN — ONDANSETRON 4 MG: 2 INJECTION INTRAMUSCULAR; INTRAVENOUS at 14:36

## 2023-06-15 RX ADMIN — Medication 12.5 MG: at 21:02

## 2023-06-15 RX ADMIN — Medication 2 G: at 14:29

## 2023-06-15 RX ADMIN — Medication 2 G: at 10:24

## 2023-06-15 RX ADMIN — FENTANYL CITRATE 50 MCG: 50 INJECTION, SOLUTION INTRAMUSCULAR; INTRAVENOUS at 09:54

## 2023-06-15 ASSESSMENT — ACTIVITIES OF DAILY LIVING (ADL)
ADLS_ACUITY_SCORE: 45
DOING_ERRANDS_INDEPENDENTLY_DIFFICULTY: YES
EQUIPMENT_CURRENTLY_USED_AT_HOME: WHEELCHAIR, MANUAL
WEAR_GLASSES_OR_BLIND: YES
NUMBER_OF_TIMES_PATIENT_HAS_FALLEN_WITHIN_LAST_SIX_MONTHS: 3
DIFFICULTY_EATING/SWALLOWING: NO
ADLS_ACUITY_SCORE: 36
ADLS_ACUITY_SCORE: 36
WALKING_OR_CLIMBING_STAIRS: AMBULATION DIFFICULTY, REQUIRES EQUIPMENT
TRANSFERRING: 0-->ASSISTANCE NEEDED (DEVELOPMENTALLY APPROPRIATE)
CHANGE_IN_FUNCTIONAL_STATUS_SINCE_ONSET_OF_CURRENT_ILLNESS/INJURY: YES
TOILETING_ISSUES: NO
DRESSING/BATHING_DIFFICULTY: YES
ADLS_ACUITY_SCORE: 36
CONCENTRATING,_REMEMBERING_OR_MAKING_DECISIONS_DIFFICULTY: YES
ADLS_ACUITY_SCORE: 36
BATHING: 1-->ASSISTANCE NEEDED
DRESS: 0-->INDEPENDENT
TRANSFERRING: 1-->ASSISTANCE (EQUIPMENT/PERSON) NEEDED
DRESSING/BATHING_MANAGEMENT: NEEDS ASSISTANCE
ADLS_ACUITY_SCORE: 45
FALL_HISTORY_WITHIN_LAST_SIX_MONTHS: YES
ADLS_ACUITY_SCORE: 45
DRESSING/BATHING: BATHING DIFFICULTY, ASSISTANCE 1 PERSON
WALKING_OR_CLIMBING_STAIRS_DIFFICULTY: YES
ADLS_ACUITY_SCORE: 36
DRESS: 0-->ASSISTANCE NEEDED (DEVELOPMENTALLY APPROPRIATE)

## 2023-06-15 NOTE — ANESTHESIA PROCEDURE NOTES
Adductor canal Procedure Note    Pre-Procedure   Staff -        Anesthesiologist:  Cornell Degroot MD       Performed By: anesthesiologist       Location: pre-op       Pre-Anesthestic Checklist: patient identified, IV checked, site marked, risks and benefits discussed, informed consent, monitors and equipment checked, pre-op evaluation, at physician/surgeon's request and post-op pain management  Timeout:       Correct Patient: Yes        Correct Procedure: Yes        Correct Site: Yes        Correct Position: Yes        Correct Laterality: Yes        Site Marked: Yes  Procedure Documentation  Procedure: Adductor canal       Diagnosis: POST OPERATIVE PAIN       Laterality: right       Patient Position: supine       Patient Prep/Sterile Barriers: sterile gloves, mask       Skin prep: Chloraprep       Needle Type: short bevel       Needle Gauge: 21.        Needle Length (millimeters): 110        Ultrasound guided       1. Ultrasound was used to identify targeted nerve, plexus, vascular marker, or fascial plane and place a needle adjacent to it in real-time.       2. Ultrasound was used to visualize the spread of anesthetic in close proximity to the above referenced structure.       3. A permanent image is entered into the patient's record.    Assessment/Narrative         The placement was negative for: blood aspirated, painful injection and site bleeding       Paresthesias: No.       Bolus given via needle..        Secured via.        Insertion/Infusion Method: Single Shot       Complications: none       Injection made incrementally with aspirations every 5 mL.    Medication(s) Administered   Bupivacaine 0.25% w/ 1:200K Epi (Injection) - Injection   15 mL - 6/15/2023 10:00:00 AM  Dexmedetomidine 4 mcg/mL (Perineural) - Perineural   20 mcg - 6/15/2023 10:00:00 AM  Dexamethasone 10 mg/mL PF (Perineural) - Perineural   1 mg - 6/15/2023 10:00:00 AM    FOR [facility] ([facility]) ONLY:   Pain Team Contact information:  "please page the Pain Team Via [facility]. Search \"Pain\". During daytime hours, please page the attending first. At night please page the resident first.      "

## 2023-06-15 NOTE — ANESTHESIA PROCEDURE NOTES
Sciatic Procedure Note    Pre-Procedure   Staff -        Anesthesiologist:  Cornell Degroot MD       Performed By: anesthesiologist       Location: pre-op       Pre-Anesthestic Checklist: patient identified, IV checked, site marked, risks and benefits discussed, informed consent, monitors and equipment checked, pre-op evaluation, at physician/surgeon's request and post-op pain management  Timeout:       Correct Patient: Yes        Correct Procedure: Yes        Correct Site: Yes        Correct Position: Yes        Correct Laterality: Yes        Site Marked: Yes  Procedure Documentation  Procedure: Sciatic       Diagnosis: POST OPERATIVE PAIN       Laterality: right       Patient Position: supine       Patient Prep/Sterile Barriers: sterile gloves, mask       Skin prep: Chloraprep (popliteal approach).       Needle Type: short bevel       Needle Gauge: 21.        Needle Length (millimeters): 110        Ultrasound guided       1. Ultrasound was used to identify targeted nerve, plexus, vascular marker, or fascial plane and place a needle adjacent to it in real-time.       2. Ultrasound was used to visualize the spread of anesthetic in close proximity to the above referenced structure.       3. A permanent image is entered into the patient's record.    Assessment/Narrative         The placement was negative for: blood aspirated, painful injection and site bleeding       Paresthesias: No.       Bolus given via needle..        Secured via.        Insertion/Infusion Method: Single Shot       Complications: none       Injection made incrementally with aspirations every 5 mL.    Medication(s) Administered   Bupivacaine 0.25% w/ 1:200K Epi (Injection) - Injection   25 mL - 6/15/2023 9:55:00 AM  Dexmedetomidine 4 mcg/mL (Perineural) - Perineural   20 mcg - 6/15/2023 9:55:00 AM  Dexamethasone 10 mg/mL PF (Perineural) - Perineural   1 mg - 6/15/2023 9:55:00 AM    FOR [facility] ([facility]) ONLY:   Pain Team Contact  "information: please page the Pain Team Via [facility]. Search \"Pain\". During daytime hours, please page the attending first. At night please page the resident first.      "

## 2023-06-15 NOTE — PROGRESS NOTES
PACU to Inpatient Nursing Handoff    Patient Benjamin Mathews is a 58 year old male who speaks English.   Procedure Procedure(s):  OPEN REDUCTION INTERNAL FIXATION, FRACTURE, ANKLE, removal of external fixator device.   Surgeon(s) Primary: Jose Bonilla MD  Resident - Assisting: Isaebl Kramer MD; Julia Diehl MD     Allergies   Allergen Reactions     Amantadine Other (See Comments)     Other reaction(s): Hallucinations  Hallucinations/ lost self control/gambling.     halluicnations  Hallucinations/ lost self control/gambling.     hallucinates  halluicnations  hallucinates  Hallucinations/ lost self control/gambling.        Quetiapine GI Disturbance, Diarrhea and Other (See Comments)     Diarrhea    Diarrhea  Other reaction(s): GI Disturbance  Diarrhea       Duloxetine Other (See Comments)     suicidal  suicidal         Isolation  Contact     Past Medical History   has a past medical history of Cerebral infarction (H), Clotting disorder (H), Dystonia, and Parkinson disease (H).    Anesthesia General with Block   Dermatome Level     Preop Meds Not applicable   Nerve block Adductor canal.  Location:right. Med:dexmedetomidine, bupivacaine, decadron. Time given: 0955  Popliteal sciatic .  Location:right. Med:dexmedetomidine, bipivacaine, decadron. Time given: 1000   Intraop Meds dexamethasone (Decadron)  fentanyl (Sublimaze): 50 mcg total   Local Meds No   Antibiotics cefazolin (Ancef) - last given at 1429     Pain Patient Currently in Pain: no   PACU meds  Not applicable   PCA / epidural No   Capnography     Telemetry ECG Rhythm: Normal sinus rhythm   Inpatient Telemetry Monitor Ordered? No        Labs Glucose Lab Results   Component Value Date    GLC 95 06/15/2023    GLC 98 05/09/2023     07/09/2021       Hgb Lab Results   Component Value Date    HGB 9.3 05/27/2023    HGB 12.0 07/09/2021       INR Lab Results   Component Value Date    INR 1.15 05/20/2023      PACU Imaging Not applicable     Wound/Incision  Wound 07/22/20 Buttocks Other (comment) skin on bilat buttocks and gluteal cleft are reddened and macerated (Active)   Number of days: 1058       Incision/Surgical Site 05/21/23 Right Ankle (Active)   Incision Assessment UTV 06/15/23 1521   Skylar-Incision Assessment UTV 05/25/23 1700   Closure ROBERTA 05/25/23 1700   Incision Drainage Amount UTV 05/25/23 1700   Dressing Intervention Clean, dry, intact 06/15/23 1521   Number of days: 25       Incision/Surgical Site 05/21/23 Right;Lower Leg (Active)   Incision Assessment UTV 06/15/23 1521   Skylar-Incision Assessment UTV 05/25/23 1700   Closure ROBERTA 05/25/23 1700   Incision Drainage Amount UTV 05/25/23 1700   Dressing Intervention Clean, dry, intact 06/15/23 1521   Number of days: 25       Incision/Surgical Site 06/15/23 Posterior;Right Ankle (Active)   Incision Assessment UTV 06/15/23 1521   Closure Adhesive strip(s);Approximated;Sutures 06/15/23 1521   Dressing Intervention Clean, dry, intact 06/15/23 1521   Number of days: 0      CMS        Equipment Not applicable   Other LDA       IV Access Peripheral IV 06/15/23 Anterior;Distal;Right Lower forearm (Active)   Site Assessment WDL 06/15/23 1521   Line Status Infusing 06/15/23 1521   Dressing Transparent 06/15/23 1521   Dressing Status clean;dry;intact 06/15/23 1521   Dressing Intervention New dressing  06/15/23 0955   Phlebitis Scale 0-->no symptoms 06/15/23 1521   Infiltration? no 06/15/23 1521   Number of days: 0      Blood Products Not applicable  mL   Intake/Output Date 06/15/23 0700 - 06/16/23 0659   Shift 4752-3678 8375-7372 5178-2155 24 Hour Total   INTAKE   I.V. 1000 100  1100   Shift Total(mL/kg) 1000(9.19) 100(0.92)  1100(10.11)   OUTPUT   Blood 100   100   Shift Total(mL/kg) 100(0.92)   100(0.92)   Weight (kg) 108.82 108.82 108.82 108.82      Drains / [place]     Time of void PreOp Time of Void Prior to Procedure: 0900 (06/15/23 0920)    PostOp      Diapered? No   Bladder Scan Bladder Scan Volume (mL): 401  ml (06/15/23 1513)   PO    water     Vitals    B/P: 113/70  T: 97.7  F (36.5  C)    Temp src: Axillary  P:  Pulse: 68 (06/15/23 1521)          R: (!) 8  O2:  SpO2: 100 %    O2 Device: Simple face mask (06/15/23 1521)    Oxygen Delivery: 6 LPM (06/15/23 1521)         Family/support present n/a   Patient belongings     Patient transported on cart   DC meds/scripts (obs/outpt) Not applicable   Inpatient Pain Meds Released? Yes       Special needs/considerations None   Tasks needing completion None       PADMINI ORTA, RN  [facility]COM [id number]

## 2023-06-15 NOTE — ANESTHESIA PREPROCEDURE EVALUATION
Anesthesia Pre-Procedure Evaluation    Patient: Benjamin Mathews   MRN: [id number] : 1965        Procedure : Procedure(s):  OPEN REDUCTION INTERNAL FIXATION, FRACTURE, ANKLE, possible adjustment of external fixator device versus removal of external fixator device.          Past Medical History:   Diagnosis Date     Cerebral infarction (H)      Clotting disorder (H)     PE 2019     Dystonia      Parkinson disease (H)       Past Surgical History:   Procedure Laterality Date     APPLY EXTERNAL FIXATOR LOWER EXTREMITY Right 2023    Procedure: Right  Ankle Closed Reduction and  External Fixator Placement;  Surgeon: Nicole Apodaca MD;  Location: [facility]      Allergies   Allergen Reactions     Amantadine Other (See Comments)     Other reaction(s): Hallucinations  Hallucinations/ lost self control/gambling.     halluicnations  Hallucinations/ lost self control/gambling.     hallucinates  halluicnations  hallucinates  Hallucinations/ lost self control/gambling.        Quetiapine GI Disturbance, Diarrhea and Other (See Comments)     Diarrhea    Diarrhea  Other reaction(s): GI Disturbance  Diarrhea       Duloxetine Other (See Comments)     suicidal  suicidal        Social History     Tobacco Use     Smoking status: Some Days     Types: Pipe, Cigars, Cigarettes     Smokeless tobacco: Never   Vaping Use     Vaping status: Not on file   Substance Use Topics     Alcohol use: Not Currently     Comment: quit       Wt Readings from Last 1 Encounters:   23 106.6 kg (235 lb)        Anesthesia Evaluation   Pt has had prior anesthetic. Type: General.        ROS/MED HX  ENT/Pulmonary:     (+) asthma     Neurologic:     (+) CVA, date: , TIA (2017), date: 2017, Parkinson's disease, features: psychosis, dyskinesias, muscle spasms/contracture, depression, sleep disturbance,  (-) no Multiple Sclerosis   Cardiovascular: Comment: TTE 2022  Interpretation Summary  Global and regional left ventricular function is  hyperkinetic with an EF of  65-70%.  Global right ventricular function is normal.  No significant valvular abnormalities present.  The inferior vena cava was normal in size with preserved respiratory  variability    (+) hypertension-----fainting (syncope) (vagal otrthostatic patient endorses seeing spots, diaphoresis).     METS/Exercise Tolerance: >4 METS    Hematologic:     (+) History of blood clots (DVT/ PE 2019 anticoagulation held for surgery), pt is anticoagulated, anemia,     Musculoskeletal:  - neg musculoskeletal ROS     GI/Hepatic:  - neg GI/hepatic ROS     Renal/Genitourinary:  - neg Renal ROS     Endo:  - neg endo ROS     Psychiatric/Substance Use:     (+) psychiatric history anxiety and depression     Infectious Disease:  - neg infectious disease ROS     Malignancy:  - neg malignancy ROS     Other:  - neg other ROS          Physical Exam    Airway        Mallampati: IV   TM distance: > 3 FB    Mouth opening: < 3 cm    Respiratory Devices and Support         Dental       (+) Minor Abnormalities - some fillings, tiny chips      Cardiovascular   cardiovascular exam normal          Pulmonary   pulmonary exam normal                OUTSIDE LABS:  CBC:   Lab Results   Component Value Date    WBC 7.1 05/27/2023    WBC 6.9 05/23/2023    HGB 9.3 (L) 05/27/2023    HGB 10.5 (L) 05/23/2023    HCT 30.2 (L) 05/27/2023    HCT 34.8 (L) 05/23/2023     05/27/2023     05/23/2023     BMP:   Lab Results   Component Value Date     05/27/2023     05/23/2023    POTASSIUM 3.7 05/27/2023    POTASSIUM 4.1 05/23/2023    CHLORIDE 101 05/27/2023    CHLORIDE 105 05/23/2023    CO2 24 05/27/2023    CO2 24 05/23/2023    BUN 21.9 (H) 05/27/2023    BUN 17.0 05/23/2023    CR 0.80 05/27/2023    CR 0.66 (L) 05/23/2023    GLC 95 06/15/2023     (H) 05/27/2023     COAGS:   Lab Results   Component Value Date    INR 1.15 05/20/2023     POC:   Lab Results   Component Value Date    BGM 84 07/08/2021     HEPATIC:    Lab Results   Component Value Date    ALBUMIN 3.9 05/20/2023    PROTTOTAL 6.6 05/20/2023    ALT 11 05/20/2023    AST 25 05/20/2023    ALKPHOS 90 05/20/2023    BILITOTAL 0.2 05/20/2023     OTHER:   Lab Results   Component Value Date    LACT 0.9 05/28/2022    A1C 6.0 (H) 07/08/2021    MARTINA 8.9 05/27/2023    MAG 2.1 05/20/2023    LIPASE 31 05/20/2023    TSH 2.99 05/27/2022       Anesthesia Plan    ASA Status:  3   NPO Status:  NPO Appropriate    Anesthesia Type: General.     - Airway: ETT   Induction: Intravenous.      Techniques and Equipment:     - Airway: Video-Laryngoscope         Consents    Anesthesia Plan(s) and associated risks, benefits, and realistic alternatives discussed. Questions answered and patient/representative(s) expressed understanding.    - Discussed:     - Discussed with:  Patient         Postoperative Care    Pain management: IV analgesics, Oral pain medications, Peripheral nerve block (Single Shot).   PONV prophylaxis: Ondansetron (or other 5HT-3), Dexamethasone or Solumedrol     Comments:           H&P reviewed: Unable to attach H&P to encounter due to EHR limitations. H&P Update: appropriate H&P reviewed, patient examined. No interval changes since H&P (within 30 days).         Veena Junior MD

## 2023-06-15 NOTE — BRIEF OP NOTE
[facility]    Brief Operative Note    Pre-operative diagnosis: Closed fracture of right ankle, initial encounter [L07.429Y]  Post-operative diagnosis Same as pre-operative diagnosis    Procedure: Procedure(s):  OPEN REDUCTION INTERNAL FIXATION, FRACTURE, ANKLE, removal of external fixator device.  Surgeon: Surgeon(s) and Role:     * Jose Bonilla MD - Primary     * Isabel Kramer MD - Resident - Assisting     * Julia Diehl MD - Resident - Assisting  Anesthesia: General with Block   Estimated Blood Loss: 100 cc    Drains: None  Specimens: * No specimens in log *  Findings:   See full operative report   Complications: None.  Implants:   Implant Name Type Inv. Item Serial No.  Lot No. LRB No. Used Action   SCREW BN 14MM 3.5MM SS ST LOPRFL HD FT ORTHOLOC MIDFOOT FABI - [id number] Metallic Hardware/[place] SCREW BN 14MM 3.5MM SS ST LOPRFL HD FT ORTHOLOC MIDFOOT FABI  [employer]  Right 1 Implanted   LOW-PRO SCREW 3.5X16MM    SAÚL  Right 2 Implanted   SCREW BN 32MM 3.5MM SS ST LOPRFL HD FT ORTHOLOC MIDFOOT FABI - [id number] Metallic Hardware/[place] SCREW BN 32MM 3.5MM SS ST LOPRFL HD FT ORTHOLOC MIDFOOT FABI  [employer]  Right 1 Implanted   SCREW BN 34MM 3.5MM SS ST LOPRFL HD FT ORTHOLOC MIDFOOT FABI - [id number] Metallic Hardware/[place] SCREW BN 34MM 3.5MM SS ST LOPRFL HD FT ORTHOLOC MIDFOOT FABI  [employer]  Right 1 Implanted   SCREW BN 36MM 3.5MM SS ST LOPRFL HD FT ORTHOLOC MIDFOOT FABI - [id number] Metallic Hardware/[place] SCREW BN 36MM 3.5MM SS ST LOPRFL HD FT ORTHOLOC MIDFOOT FABI  [employer]  Right 1 [place]   SCREW BN 38MM 3.5MM SS ST LOPRFL HD FT ORTHOLOC MIDFOOT FABI - [id number] Metallic Hardware/[place] SCREW BN 38MM 3.5MM SS ST LOPRFL HD FT ORTHOLOC MIDFOOT FABI  [employer]  Right 2 Implanted   SCREW LOCKING 3.5X14MM - [id number] Metallic Hardware/[place] SCREW LOCKING 3.5X14MM  [facility]  TECHNOLOG  Right 1 Implanted   SCREW BN 20MM 3.5MM SS ST LCK PA ON AXIS FT ORTHOLOC PUR FT - [id number] Metallic Hardware/[place] SCREW BN 20MM 3.5MM SS ST LCK PA ON AXIS FT ORTHOLOC PUR FT  [employer]  Right 1 Implanted   SCREW LOCKING 3.5X22MM - [id number] Metallic Hardware/[place] SCREW LOCKING 3.5X22MM  [employer]  Right 1 Implanted   SCREW LOCKING 3.5X24MM - [id number] Metallic Hardware/[place] SCREW LOCKING 3.5X24MM  [employer]  Right 1 Implanted   WIRE K 1.4MM - [id number]  WIRE K 1.4MM  [employer]  Right 4 Used as a Supply   4.0x52mm Cannulated screw    SAÚL  Right 1 Implanted   3.5/4.0 WASHER    SAÚL  Right 2 Implanted   4.0X48MM CANNULATED SCREW    SAÚL  Right 1 Implanted   RIGHT LATERAL FIBULA  PLATE   [id number] SAÚL  Right 1 Implanted   POSTERIOR TIBIA PLATE LG    SAÚL  Right 1 Implanted   IMP WIRE ANTHONY 1.2A707SE [id number] - [id number]  IMP WIRE ANTHONY [id number]  [employer]  Right 3 Used as a Supply   WIRE K 2.5 X 150MM - [id number]  WIRE K 2.5 X 150MM  [employer]  Right 2 Used as a Supply         Assessment and Plan: Benjamin Mathews is a 58 year old male now s/p ORIF of R Pilon fracture.     Ortho Primary  Activity: Up with assist until independent.  Weight bearing status: NWB RLE x 6 weeks.  Pain management: Transition from IV to PO as tolerated.    Antibiotics: Ancef x 24 hours.  Diet: Begin with clear fluids and progress diet as tolerated.   DVT prophylaxis: ASA 162mg and mechanical while in the hospital, discharge on ASA 162mg x 56 weeks.  Imaging: Complete.  Labs: PRN.  Bracing/Splinting: short leg splint to be kept clean, dry, and intact until follow-up.   Dressings: Keep clean, dry and intact until follow-up.   Elevation: Elevate RLE on ERLE pillow, leg elevator to keep above the level of the heart as much as possible.   Physical Therapy/Occupational Therapy: Eval and treat.  Consults: Medicine, PT,  OT.  Follow-up: Clinic with Dr. Bonilla team in 2 weeks   no x-rays needed.   Disposition: Pending progress with therapies, pain control on orals, and medical stability, anticipate discharge to [facility] on POD #1-2.    Isabel Kramer MD  Orthopaedic Surgery Resident, PGY-4  Pager: [phone]

## 2023-06-15 NOTE — OP NOTE
DATE OF SURGERY: 06/15/2023.    PREOPERATIVE DIAGNOSIS: Closed right trimalleolar ankle / pilon fracture-dislocation.    POSTOPERATIVE DIAGNOSIS: Closed right trimalleolar ankle / pilon fracture-dislocation.    PROCEDURE: Open reduction, internal fixation of right ankle / pilon fracture-dislocation. Removal of spanning external fixator.    PRIMARY SURGEON: Jose Bonilla MD.    ASSISTANT SURGEON: Isabel Kramer MD.    ANESTHESIA: General endotracheal with regional block (right sciatic and adductor canal).    COMPLICATIONS: None apparent intraoperatively or immediately postoperatively.    IMPLANTS: One Ortholoc posterior malleolar plate with 3.5 mm cortical screws. One Ortholoc posterior distal fibular plate with 3.5 mm locking screws and 3.5 mm cortical screws. Two Ortholoc 4.0 mm partially threaded cannulated screws and washers. All screws used a T15 hex screwdriver. All implants were titanium.    SIGNIFICANT FINDINGS: Previous posterior subluxation of talar dome reduced concentrically. Significant comminution of distal fibula. Difficult reduction for both the posterior malleolus and distal fibula. Given the significant comminution of the medial malleolar fracture, likely suboptimal additional stability to be afforded by adding hardware to the medial malleolus, and overall excellent alignment of the ankle mortise after fixation of the posterior malleolus and distal fibula, no additional fixation of the medial malleolus was performed.    SPECIMENS: None.    DRAINS: None.    ESTIMATED BLOOD LOSS: Approximately 100 mL.    TOURNIQUET TIME: 120 minutes at 250 mm Hg.      INDICATIONS:  Benjamin Mathews is a 58 year old male patient with PMH of tobacco use, alcohol abuse, anemia, benzodiazepine withdrawl, asthma, arrythmia, chronic anticoagulation, cerebrovascular accident, chronic DVT,pulmonary embolism, chronic pain disorder, Parkinson's disease, left hemiparesis, and other medical conditions, who uses a walker at  baseline, lives in a group home, and endorses a history of baseline bilateral foot neuropathy. He apparently sustained a ground level fall on or around 05/20/2023 due to a syncopal event and collapse while he was walking with a walker, sustaining a closed right trimalleolar ankle / pilon fracture-dislocation. He was closed reduced and placed in a spanning external fixator about the right ankle. The recommendation was given to the patient for operative treatment of this condition, consisting of either open reduction internal fixation or primary ankle arthrodesis given the poorer prognosis for this injury and the high anticipated likelihood of needing a future ankle arthrodesis. After discussing the pros and cons of both options, the patient elected to proceed with open reduction, internal fixation of his right ankle / pilon fracture-dislocation without arthrodesis. The injury diagnosis and expected prognosis, nature of the recommended procedure, the risks, benefits, and alternatives, the postoperative plan, and realistic expectations for short and long term outcome were all discussed with the patient in layman's terms. No guarantees were expressed or implied as to outcome.  The patient had the opportunity to have all the questions he had at the time asked and answered appropriately, verbalized his understanding of this discussion, and verbalized his wish to proceed with the planned procedure. Written informed consent was obtained.     DESCRIPTION OF PROCEDURE:             The patient, Benjamin Mathews, was met in the preoperative area where the correct surgical site was identified with patient participation and marked by the primary surgeon. All questions were answered, and the patient verbalized his wish to proceed with surgery. The anesthesia block team performed a right adductor canal and sciatic nerve block. The patient was then brought to the operating room, where the anesthesia team successfully induced general  anesthesia and placed an endotracheal tube. The pin sites on the right lower extremity external fixator were sterilely prepped with Betadine and the external fixator was removed. The pin sites were irrigated out. The calcaneal transfixion pin was very loose and appeared to have a minor pin tract infection. The patient was then safely transferred to the operating table in the prone position with all bony prominences well padded, bolsters on either side of the torso to facilitate breathing and keep his head and neck in neutral position, and a safety strap across the waist. Ancef was administered IV per protocol.  Venous thromboembolic prophylaxis was performed with asequential device on the nonoperative lower extremity. A tourniquet was placed on the thigh of the operative lower extremity. The operative lower extremity was then prepped and draped free in the usual sterile fashion. All prior pin sites as well as the entire forefoot including all toes were covered with Ioban for the entire case. Prior to the start of surgery, a multidisciplinary time out was performed per hospital protocol confirming among the other items, the correct patient identity, procedure, body part, and laterality. All in the room verbalized agreement.     The right lower extremity with exsanguinated with a Go bandage, and the tourniquet was inflated to 250 mm Hg. A longitudinal incision was made over the posterolateral right ankle between the Achilles and peroneals. Meticulous dissection was performed through the subcutaneous tissues, taking care to gently retract the sural nerve and the short saphenous vein anteriorly/laterally to prevent injury.  The deep fascia of the right posterior ankle was incised longitudinally, and the subjacent flexor hallucis longus muscle was bluntly mobilized medially.  Angled Hohmann retractors were placed directly on bone along the medial tibia to retract the FHL muscle medially, and to protect the posterior  neurovascular bundle.  The posterior malleolar fracture was visualized and mobilized with meticulous careful dissection.  Multiple C-arm images were taken during this process to assess progress.  The fracture appeared to be shortened and posteriorly displaced along with the talus. The fracture was encased in scar tissue, soft, and quite difficult to mobilize.  Multiple modalities were used in this process.  These included a distractor with pins in the posterior talus and distal tibial diaphysis proximal to the fracture, use of a Davis elevator to lever the posterior malleolus distally, multiple fracture reduction clamps, direct pressure with a ball spike pusher and Davis elevator, as well as bringing the fibular fracture to length first.  The fibular fracture was approached through the same intermuscular interval, between the peroneals and the FHL.  The peroneal muscles were carefully and meticulously dissected off of the posterior fibula, a process that also proved to be quite difficult due to the encasement of the fracture site and scar tissue.  The fracture was mobilized by carefully debriding callus from the fracture site, interposing an elevator at the fracture site, using a pin distractor, and manipulation using multiple fracture reduction forceps.  Using combination of all the above methods, the posterior malleolus fracture was ultimately able to be mobilized distally and anteriorly enough to affect a reduction of the talar dome concentrically under the tibia.  The posterior  malleolar (corrected 06/28/2023) fracture was then stabilized with two 4.0 mm partially-threaded screws placed from posterior to anterior, as well as a buttress plate with nonlocking screws in the distal tibial diaphysis proximal to the fracture site.  The talar reduction and posterior malleolar fracture reduction visualized on C-arm images in multiple views and appeared to be both appropriate as well as the realistically best achievable  achievable reduction.  The fibular fracture was then stabilized in the appropriate length, rotation, and alignment with a posteriorly applied plate with 3.5 mm locking screws distally and both 3.5 mm locking and cortical screws proximally.  Visualization of the ankle and AP, mortise, and lateral views by C arm showed a congruent ankle mortise with no evidence for medial clear space widening or syndesmotic widening, appropriate placement of all hardware, appropriate reduction of the posterior malleolus and distal fibula, and a concentrically reduced talar dome underneath the distal tibia on the lateral view.  Given the known significant comminution of the medial malleolus based on preoperative imaging during both radiographs and CT, the anticipated suboptimal fixation of the comminuted medial malleolus even with implanted hardware, the appropriate alignment of the medial malleolus as it was at this time, and the congruent ankle mortise, was deemed most prudent to not attempt unnecessary internal fixation of the medial malleolus fracture.     There was no evidence for any significant vascular injury during this case.  There was an easily palpable posterior tib and dorsalis pedis pulse.  The tourniquet had been deflated at 120 minutes.  Meticulous hemostasis was achieved. The wound was thoroughly irrigated.  One gram of vancomycin powder was sprinkled into the wound, which was then closed in layers with 0 Vicryl for the deep fascia, 2-0 Vicryl for the subcutaneous tissues, and 3-0 nylon for the skin. The incision was carefully cleansed with sterile saline and dried. Sterile dressings were then applied to the incision.  The Ioban covering the previous external fixator pin sites and forefoot was removed.  Sterile dressings were applied to the pin sites.  A well-padded splint was applied.  All surgical counts were reported as correct at the end of the case. The patient was taken to the recovery room in stable condition. I  was present and scrubbed in for the entire case.        POSTOPERATIVE PLAN:     1. Admit to [facility]opaedics primary with medicine consultation.  2. Diet: Clear liquids and advance as tolerated.  3. Antibiotics: Complete 24 hours of perioperative cefazolin.  4. Pain management: Multimodal. Wean from IV to oral medications.  5. Weightbearing status: Strict nonweightbearing on the operative lower extremity for 6 weeks.  Use appropriate assistive gait devices and assistance (nursing/PT) for ambulation.  6. Activity: May be up for ADLs and PT/OT but encourage flat bedrest with the foot and ankle of the operative lower extremity elevated above the level of the heart and iced as much of the time as possible for the first 2 weeks postop.  Elevate heel off of the bed.  7. PT/OT: Gait training, transfers, ADLs.  8. Labs: As needed.  9. Xrays: Completed intraoperatively.  10. Immobilization: Keep right lower extremity splint clean, dry, and intact.  11. Dressings: Dressings will be removed at the first clinic follow-up visit at 2 weeks postop.  12. DVT prophylaxis:  mg po daily until 6 weeks postoperative.  Sequential compressive devices while in the hospital.  13. Consultations: PT, OT, Medicine.  14. Disposition: May discharge home pending pain control, medical stability, appropriate clearance from PT for safe discharge, and an appropriate antibiotic therapy plan.  15. Follow up: 2 weeks postop in ortho clinic for splint and dressing removal, wound check, and possible suture removal if appropriate to do so at that time.  Anticipate placement of a medical walking boot at that time.      Jose Bonilla MD  Attending surgeon  Orthopaedic Surgery

## 2023-06-15 NOTE — PLAN OF CARE
"VS:     /53 (BP Location: Left arm, Patient Position: Supine, Cuff Size: Adult Regular)   Pulse 77   Temp 97.5  F (36.4  C) (Axillary)   Resp 18   Ht 1.93 m (6' 3.98\")   Wt 108.8 kg (239 lb 14.4 oz)   SpO2 96%   BMI 29.21 kg/m      Pt A/O X 4. Afebrile. VSS. Lungs- clear and equal bilaterally with both anterior and posterior. IS encouraged. Denies nausea, shortness of breath, and chest pain.   Output:     Pt is wearing a brief, urinal at the bedside   Activity:     Mobility limited due to LRE surgery.     Skin: Skin is dry and flaky, peeling and crusty on the face. Scab on LLE toe, incision on RLE but UTV cast in place   Pain:     Pt denies pain     CMS:     A/O x 4, pt reports some numbness and tingling in the lower extremities, expected due to neuropathy   Dressing:     RLE cast is dry and intact     Diet:     Pt is on a regular diet and appetite was adequate this shift.   LDA:     L PIV x 2       Equipment:     IV pole, pt belongins     Plan:     Pt is able to make needs known and the call light is within the pt's reach. Continue with POC.    Additional Info:            "

## 2023-06-15 NOTE — LETTER
[facility] MED SURG ORTHOPEDIC  [address] [address]  [phone]    FACSIMILE TRANSMITTAL SHEET    TO: [facility]  FAX NUMBER: [phone]     FROM: [facility]  PHONE: [phone]  DATE: 06/18/23      _____URGENT _____REVIEW ONLY _____PLEASE COMMENT____PLEASE REPLY    NOTES/COMMENTS: Discharge orders for AMEYA Umeshluis.                                      IF YOU DID NOT RECEIVE THE CORRECT NUMBER OF PAGES OR THE FAX DID NOT COME THROUGH CLEARLY, PLEASE CALL THE SENDER     CONFIDENTIALITY STATEMENT: Confidential information that may accompany this transmission contains protected health information under state and federal law and is legally privileged. This information is intended only for the use of the individual or entity named above and may be used only for carrying out treatment, payment or other healthcare operations. The recipient or person responsible for delivering this information is prohibited by law from disclosing this information without proper authorization to any other party, unless required to do so by law or regulation. If you are not the intended recipient, you are hereby notified that any review, dissemination, distribution, or copying of this message is strictly prohibited. If you have received this communication in error, please destroy the materials and contact us immediately by calling the number listed above. No response indicates that the information was received by the appropriate authorized party

## 2023-06-15 NOTE — ANESTHESIA CARE TRANSFER NOTE
Patient: Benjamin Mathews    Procedure: Procedure(s):  OPEN REDUCTION INTERNAL FIXATION, FRACTURE, ANKLE, removal of external fixator device.       Diagnosis: Closed fracture of right ankle, initial encounter [S82.470V]  Diagnosis Additional Information: No value filed.    Anesthesia Type:   General     Note:    Oropharynx: oropharynx clear of all foreign objects and spontaneously breathing  Level of Consciousness: drowsy  Oxygen Supplementation: face mask  Level of Supplemental Oxygen (L/min / FiO2): 6  Independent Airway: airway patency satisfactory and stable  Dentition: dentition unchanged  Vital Signs Stable: post-procedure vital signs reviewed and stable  Report to RN Given: handoff report given  Patient transferred to: PACU    Handoff Report: Identifed the Patient, Identified the Reponsible Provider, Reviewed the pertinent medical history, Discussed the surgical course, Reviewed Intra-OP anesthesia mangement and issues during anesthesia, Set expectations for post-procedure period and Allowed opportunity for questions and acknowledgement of understanding      Vitals:  Vitals Value Taken Time   /70 06/15/23 1521   Temp     Pulse 63 06/15/23 1527   Resp 9 06/15/23 1528   SpO2 100 % 06/15/23 1528   Vitals shown include unvalidated device data.    Electronically Signed By: VERO Mckinney CRNA  Tanisha 15, 2023  3:30 PM

## 2023-06-15 NOTE — ANESTHESIA PROCEDURE NOTES
Airway       Patient location during procedure: OR       Procedure Start/Stop Times: 6/15/2023 10:22 AM  Staff -        CRNA: Taryn Goldman APRN CRNA       Performed By: CRNA  Consent for Airway        Urgency: elective  Indications and Patient Condition       Indications for airway management: mikey-procedural       Induction type:intravenous       Mask difficulty assessment: 2 - vent by mask + OA or adjuvant +/- NMBA    Final Airway Details       Final airway type: endotracheal airway       Successful airway: ETT - single  Endotracheal Airway Details        ETT size (mm): 8.0       Cuffed: yes       Successful intubation technique: video laryngoscopy       VL Blade Size: MAC 4       Grade View of Cords: 1       Adjucts: stylet       Position: Right       Measured from: lips       Secured at (cm): 23       Bite block used: None    Post intubation assessment        Placement verified by: capnometry, equal breath sounds and chest rise        Number of attempts at approach: 1       Number of other approaches attempted: 0       Secured with: pink tape       Ease of procedure: easy       Dentition: Intact    Medication(s) Administered   Medication Administration Time: 6/15/2023 10:22 AM

## 2023-06-15 NOTE — PROGRESS NOTES
Orthopaedic Surgery Attending    I personally saw and examined this pleasant 58 year old patient this morning preoperatively. Relevant portions of the chart, imaging, and labs were reviewed. The plan for removal of right lower extremity external fixator, and open reduction internal fixation of his right ankle/pilon fracture, including the nature of the surgery, the risks, benefits, and alternatives, and postoperative plan, as well as expectations for short and long term outcome, were all discussed in layman's terms. The generally poorer prognosis for this type of injury, the long time to recovery, and the high risk of chronic pain and post-traumatic arthritis, was discussed; I offered the option of adding a primary ankle arthrodesis onto this procedure given the likely high risk of needing a future ankle fusion, and the option of a fusion was politely declined by the patient at this time. The discussed risks included but were not necessarily limited to infection, neurovascular injury, muscle/tendon injury, weakness, chronic pain, scar pain, nerve pain, symptomatic hardware, hardware failure, malunion/delayed union/nonunion, post-traumatic arthritis, DVT/PE, other organ injury including heart/lung/kidney/brain, revision surgery or surgeries for such example reasons as fusion for post-traumatic arthritis or hardware removal for symptomatic hardware / hardware failure, joint stiffness, swelling, limping, other difficulty with ambulation, and even death.  All questions were answered appropriately. The patient verbalized understanding of our discussion and agreement with the plan. I marked the surgical site with patient participation.

## 2023-06-15 NOTE — ANESTHESIA POSTPROCEDURE EVALUATION
Patient: Benjamin Mathews    Procedure: Procedure(s):  OPEN REDUCTION INTERNAL FIXATION, FRACTURE, ANKLE, removal of external fixator device.       Anesthesia Type:  General    Note:  Disposition: Inpatient   Postop Pain Control: Uneventful            Sign Out: Well controlled pain   PONV: No   Neuro/Psych: Uneventful            Sign Out: Acceptable/Baseline neuro status   Airway/Respiratory: Uneventful            Sign Out: Acceptable/Baseline resp. status   CV/Hemodynamics: Uneventful            Sign Out: Acceptable CV status; No obvious hypovolemia; No obvious fluid overload   Other NRE: NONE   DID A NON-ROUTINE EVENT OCCUR? No           Last vitals:  Vitals Value Taken Time   /91 06/15/23 1600   Temp 36.5  C (97.7  F) 06/15/23 1521   Pulse 76 06/15/23 1604   Resp 10 06/15/23 1604   SpO2 100 % 06/15/23 1604   Vitals shown include unvalidated device data.    Electronically Signed By: Breanne Adrian MD  Tanisha 15, 2023  4:05 PM

## 2023-06-16 ENCOUNTER — APPOINTMENT (OUTPATIENT)
Dept: PHYSICAL THERAPY | Facility: CLINIC | Age: 58
DRG: 493 | End: 2023-06-16
Attending: STUDENT IN AN ORGANIZED HEALTH CARE EDUCATION/TRAINING PROGRAM
Payer: COMMERCIAL

## 2023-06-16 LAB
GLUCOSE BLDC GLUCOMTR-MCNC: 131 MG/DL (ref 70–99)
HGB BLD-MCNC: 10.5 G/DL (ref 13.3–17.7)

## 2023-06-16 PROCEDURE — 250N000013 HC RX MED GY IP 250 OP 250 PS 637: Performed by: INTERNAL MEDICINE

## 2023-06-16 PROCEDURE — 120N000002 HC R&B MED SURG/OB UMMC

## 2023-06-16 PROCEDURE — 250N000011 HC RX IP 250 OP 636: Performed by: STUDENT IN AN ORGANIZED HEALTH CARE EDUCATION/TRAINING PROGRAM

## 2023-06-16 PROCEDURE — 85018 HEMOGLOBIN: CPT | Performed by: STUDENT IN AN ORGANIZED HEALTH CARE EDUCATION/TRAINING PROGRAM

## 2023-06-16 PROCEDURE — 99232 SBSQ HOSP IP/OBS MODERATE 35: CPT | Performed by: INTERNAL MEDICINE

## 2023-06-16 PROCEDURE — 250N000013 HC RX MED GY IP 250 OP 250 PS 637: Performed by: STUDENT IN AN ORGANIZED HEALTH CARE EDUCATION/TRAINING PROGRAM

## 2023-06-16 PROCEDURE — 250N000013 HC RX MED GY IP 250 OP 250 PS 637: Performed by: PHYSICIAN ASSISTANT

## 2023-06-16 PROCEDURE — 258N000003 HC RX IP 258 OP 636: Performed by: STUDENT IN AN ORGANIZED HEALTH CARE EDUCATION/TRAINING PROGRAM

## 2023-06-16 PROCEDURE — 36415 COLL VENOUS BLD VENIPUNCTURE: CPT | Performed by: STUDENT IN AN ORGANIZED HEALTH CARE EDUCATION/TRAINING PROGRAM

## 2023-06-16 PROCEDURE — 97161 PT EVAL LOW COMPLEX 20 MIN: CPT | Mod: GP

## 2023-06-16 PROCEDURE — G0463 HOSPITAL OUTPT CLINIC VISIT: HCPCS

## 2023-06-16 PROCEDURE — 97110 THERAPEUTIC EXERCISES: CPT | Mod: GP

## 2023-06-16 RX ORDER — OXYCODONE HYDROCHLORIDE 5 MG/1
5 TABLET ORAL EVERY 4 HOURS PRN
Qty: 20 TABLET | Refills: 0 | Status: SHIPPED | DISCHARGE
Start: 2023-06-16 | End: 2023-10-03

## 2023-06-16 RX ADMIN — LACTULOSE 15 ML: 20 SOLUTION ORAL at 19:58

## 2023-06-16 RX ADMIN — APIXABAN 5 MG: 5 TABLET, FILM COATED ORAL at 10:50

## 2023-06-16 RX ADMIN — ATORVASTATIN CALCIUM 40 MG: 40 TABLET, FILM COATED ORAL at 19:58

## 2023-06-16 RX ADMIN — ALFUZOSIN HYDROCHLORIDE 10 MG: 10 TABLET, EXTENDED RELEASE ORAL at 08:34

## 2023-06-16 RX ADMIN — CARBIDOPA AND LEVODOPA 2 TABLET: 25; 100 TABLET ORAL at 08:35

## 2023-06-16 RX ADMIN — POLYETHYLENE GLYCOL 3350 17 G: 17 POWDER, FOR SOLUTION ORAL at 08:34

## 2023-06-16 RX ADMIN — CARBIDOPA AND LEVODOPA 2 TABLET: 25; 100 TABLET ORAL at 12:13

## 2023-06-16 RX ADMIN — GABAPENTIN 800 MG: 800 TABLET, FILM COATED ORAL at 16:14

## 2023-06-16 RX ADMIN — CLOZAPINE 50 MG: 50 TABLET ORAL at 21:36

## 2023-06-16 RX ADMIN — POLYETHYLENE GLYCOL 3350 17 G: 17 POWDER, FOR SOLUTION ORAL at 19:57

## 2023-06-16 RX ADMIN — CARBIDOPA AND LEVODOPA 2 TABLET: 25; 100 TABLET ORAL at 16:14

## 2023-06-16 RX ADMIN — APIXABAN 5 MG: 5 TABLET, FILM COATED ORAL at 19:58

## 2023-06-16 RX ADMIN — CEFAZOLIN SODIUM 2 G: 2 INJECTION, SOLUTION INTRAVENOUS at 06:55

## 2023-06-16 RX ADMIN — TRAZODONE HYDROCHLORIDE 100 MG: 100 TABLET ORAL at 21:36

## 2023-06-16 RX ADMIN — ASPIRIN 81 MG: 81 TABLET ORAL at 08:36

## 2023-06-16 RX ADMIN — CARBIDOPA AND LEVODOPA 1 TABLET: 50; 200 TABLET, EXTENDED RELEASE ORAL at 19:58

## 2023-06-16 RX ADMIN — TRAZODONE HYDROCHLORIDE 50 MG: 50 TABLET ORAL at 08:36

## 2023-06-16 RX ADMIN — CLONAZEPAM 2 MG: 2 TABLET ORAL at 21:36

## 2023-06-16 RX ADMIN — VENLAFAXINE HYDROCHLORIDE 300 MG: 150 CAPSULE, EXTENDED RELEASE ORAL at 08:34

## 2023-06-16 RX ADMIN — CLONAZEPAM 2 MG: 2 TABLET ORAL at 08:36

## 2023-06-16 RX ADMIN — PANTOPRAZOLE SODIUM 40 MG: 40 TABLET, DELAYED RELEASE ORAL at 08:36

## 2023-06-16 RX ADMIN — Medication 12.5 MG: at 08:35

## 2023-06-16 RX ADMIN — SODIUM CHLORIDE, POTASSIUM CHLORIDE, SODIUM LACTATE AND CALCIUM CHLORIDE: 600; 310; 30; 20 INJECTION, SOLUTION INTRAVENOUS at 03:39

## 2023-06-16 RX ADMIN — SENNOSIDES AND DOCUSATE SODIUM 1 TABLET: 50; 8.6 TABLET ORAL at 19:58

## 2023-06-16 RX ADMIN — SENNOSIDES AND DOCUSATE SODIUM 1 TABLET: 50; 8.6 TABLET ORAL at 08:36

## 2023-06-16 RX ADMIN — LACTULOSE 15 ML: 20 SOLUTION ORAL at 08:37

## 2023-06-16 RX ADMIN — GABAPENTIN 800 MG: 800 TABLET, FILM COATED ORAL at 08:35

## 2023-06-16 RX ADMIN — ACETAMINOPHEN 975 MG: 325 TABLET, FILM COATED ORAL at 05:52

## 2023-06-16 RX ADMIN — ACETAMINOPHEN 975 MG: 325 TABLET, FILM COATED ORAL at 18:04

## 2023-06-16 RX ADMIN — ACETAMINOPHEN 975 MG: 325 TABLET, FILM COATED ORAL at 09:57

## 2023-06-16 ASSESSMENT — ACTIVITIES OF DAILY LIVING (ADL)
ADLS_ACUITY_SCORE: 47
ADLS_ACUITY_SCORE: 45
ADLS_ACUITY_SCORE: 47
ADLS_ACUITY_SCORE: 45
ADLS_ACUITY_SCORE: 47
ADLS_ACUITY_SCORE: 45
ADLS_ACUITY_SCORE: 47

## 2023-06-16 NOTE — PROGRESS NOTES
06/16/23 1129   Appointment Info   Signing Clinician's Name / Credentials (PT) Juju Madrid DPT       Present no   Living Environment   People in Home alone   Current Living Arrangements assisted living   Home Accessibility no concerns   Transportation Anticipated health plan transportation   Living Environment Comments Pt lives in an [facility]. Has recently been at a TCU for therapies after initial RLE surgery.   Self-Care   Usual Activity Tolerance moderate   Current Activity Tolerance fair   Regular Exercise Yes   Activity/Exercise Type other (see comments)  (PT/OT at TCU)   Exercise Amount/Frequency daily   Equipment Currently Used at Home wheelchair, power;walker, standard   Fall history within last six months yes   Number of times patient has fallen within last six months 3   Activity/Exercise/Self-Care Comment Pt uses a power w/c at baseline. Normally stand pivots w/ FWW and will occasionally demo short distance gait w/ FWW. Gets assistance w/ ADLs.   General Information   Onset of Illness/Injury or Date of Surgery 06/15/23   Referring Physician Isabel Kramer MD   Patient/Family Therapy Goals Statement (PT) to be able to transfer IND   Pertinent History of Current Problem (include personal factors and/or comorbidities that impact the POC) Benjamin Mathews is a 58 year old male now s/p R ankle ORIF on 6/16 with Dr. Bonilla.   Existing Precautions/Restrictions fall   Weight-Bearing Status - RLE nonweight-bearing   Heart Disease Risk Factors Age;Gender;Medical history;Lack of physical activity;Overweight   General Observations experiencing effects of surgical block still in ankle, able to feel and contract quad   Cognition   Affect/Mental Status (Cognition) WFL   Orientation Status (Cognition) oriented x 4   Follows Commands (Cognition) WFL   Cognitive Status Comments baseline neurocognitive disorder per chart but WFL and follows all commands   Pain Assessment   Patient Currently in Pain No    Integumentary/Edema   Integumentary/Edema Comments hard cast below R knee   Posture    Posture Forward head position;Protracted shoulders   Range of Motion (ROM)   ROM Comment reduced trunk ROM, reduced R ankle ROM 2/2 surgery. otherwise grossly WFL   Strength (Manual Muscle Testing)   Strength (Manual Muscle Testing) Deficits observed during functional mobility   Strength Comments unable to demo SLR RLE, partial heel slides only. LLE SLR w/ difficulty. Functionally deconditioned   Bed Mobility   Comment, (Bed Mobility) modA supine>sit   Transfers   Comment, (Transfers) NT; unable to maintain NWB 2/2 weakness at this time   Gait/Stairs (Locomotion)   Comment, (Gait/Stairs) NT, not safe w/ weakness and NWB restrictions   Balance   Balance Comments SBA static EOB sitting balance with intermittent UE support of rail   Sensory Examination   Sensory Perception Comments absent in R toes 2/2 nerve block   Coordination   Coordination no deficits were identified   Muscle Tone   Muscle Tone no deficits were identified   Clinical Impression   Criteria for Skilled Therapeutic Intervention Yes, treatment indicated   PT Diagnosis (PT) decreased functional IND   Influenced by the following impairments medical status and comorbidities, WBing status, decreased functional strength, balance, activity tolerance   Functional limitations due to impairments decreased (I) w/ bed mobility, transfers   Clinical Presentation (PT Evaluation Complexity) Stable/Uncomplicated   Clinical Presentation Rationale Per clinical judgement, PLOF, current status   Clinical Decision Making (Complexity) low complexity   Planned Therapy Interventions (PT) bed mobility training;transfer training;home exercise program;patient/family education;strengthening;stretching;progressive activity/exercise;risk factor education;home program guidelines   Anticipated Equipment Needs at Discharge (PT)   (TBD)   Risk & Benefits of therapy have been explained  evaluation/treatment results reviewed;care plan/treatment goals reviewed;risks/benefits reviewed;current/potential barriers reviewed;participants voiced agreement with care plan;participants included;patient   Clinical Impression Comments Pt presents below functional baseline w/ WBing restrictions, decreased activity tolerance, functional strength and balance. Will benefit from skilled IP PT to progress functional IND.   PT Total Evaluation Time   PT Eval, Low Complexity Minutes (46563) 10   Plan of Care Review   Plan of Care Reviewed With patient   Physical Therapy Goals   PT Frequency 6x/week   PT Predicted Duration/Target Date for Goal Attainment 06/23/23   PT: Bed Mobility Independent;Supine to/from sit   PT: Transfers Minimal assist;Bed to/from chair;Assistive device  (FWW or slideboard)   PT Discharge Planning   PT Discharge Recommendation (DC Rec) Transitional Care Facility   PT Rationale for DC Rec Pt is below baseline, unable transfer at this time, needing A of 2 and lift. Will require rehab to progress functional IND.   PT Brief overview of current status modA bed mobility, OH lift for OOB   Total Session Time   Total Session Time (sum of timed and untimed services) 10

## 2023-06-16 NOTE — PROGRESS NOTES
"/50 (BP Location: Left arm)   Pulse 74   Temp (!) 96.5  F (35.8  C) (Oral)   Resp 11   Ht 1.93 m (6' 3.98\")   Wt 108.8 kg (239 lb 14.4 oz)   SpO2 98%   BMI 29.21 kg/m    AOx4, intermittent confusion/forgetfulness. On 2LPM and CAPNO, desats to mid lower 80s. Could be removed soon. Denies SOB, CP, N/V, SI. B/L neuropathy to BLE, L sided weakness from CVA.      Endorsing mild pain to surgical area. Pain well controlled with sched meds, PRN Oxy avail. RLE elevated with pillows, splint/cast CDI. Can't feel right LE as he says \"block is still on\". Repositioned for comfort. Scab to L toe, betadine painted today generalized dry flaky skin.       NWB RLE. Uses motorized WC at baseline per pt. This is in the hallway outside his room.  PT saw today for up to bedside attempt.      Reg diet, tolerating. Needs assistance ordering meals, tray set up.      LBM 6/14, BS+ -FL. Voiding using bedside urinal.      R PIV infusing LR 75mL/hr btwn ABX.      Pt able to make needs known. Call light within reach. Devinq rounding and BA on for safety. Contact prec maintained.        "

## 2023-06-16 NOTE — PHARMACY-ADMISSION MEDICATION HISTORY
Pharmacist Admission Medication History    Admission medication history is complete. The information provided in this note is only as accurate as the sources available at the time of the update.    Medication reconciliation/reorder completed by provider prior to medication history? No    Information Source(s): Facility ([facility] medication list/MAR via N/A. Facility - [facility]    Pertinent Information:     Carbidopa-Levodopa 25/100 - Take 2 tablets at 0800, 1200, 1600    Carbidopa-Levodopa  CR - Take one tablet at 2000    Clonazepam 2mg - Take one tablet at 0900, 2100    Gabapentin 800mg - Take one tablet at 0000, 0800, 1800    Unable to clarify if patient takes amantadine. It is not listed in MAR from facility or medication list from [facility] recent visit 6/13. However; filled 6/2 and appears to be discharged on it 5/25, and note from neurology on 6/7 (however unsigned) indicated use.     Changes made to PTA medication list:    Added:   o Apixaban 5mg BID  o Trazodone 100mg at bedtime  o Ketoconazole 2% shampoo every Wednesday  o Ketoconazole 2% cream BID    Deleted: Rivaroxaban    Changed:   o Clonazepam 2mg Q12H and 1mg Q24H PRN  o Gabapentin TID -> Q8H  o Linaclotide -> not taking    Medication Affordability:       Allergies reviewed with patient and updates made in EHR: yes    Medication History Completed By: CHANEL PROCTOR RPH 6/15/2023 7:33 PM  PTA Med List   Medication Sig Note Last Dose     alfuzosin ER (UROXATRAL) 10 MG 24 hr tablet Take 1 tablet (10 mg) by mouth daily  6/15/2023 at 0715     apixaban ANTICOAGULANT (ELIQUIS) 5 MG tablet Take 5 mg by mouth 2 times daily  6/13/2023 at 1800     atorvastatin (LIPITOR) 40 MG tablet Take 1 tablet (40 mg) by mouth every evening  6/14/2023 at 1700     bisacodyl (DULCOLAX) 10 MG suppository Place 1 suppository (10 mg) rectally daily as needed for constipation 6/14/2023: Hold DOS More than a month     carbidopa-levodopa (SINEMET CR)  MG CR  tablet Take 1 tablet by mouth At Bedtime (Patient taking differently: Take 1 tablet by mouth At Bedtime At 8pm)  6/14/2023 at 1700     carbidopa-levodopa (SINEMET)  MG tablet Take 2 tablets by mouth 3 times daily (Patient taking differently: Take 2 tablets by mouth 3 times daily 8000, 1200, 1600)  6/15/2023 at 0715     cholecalciferol (VITAMIN D3) 125 mcg (5000 units) capsule Take 1 capsule (125 mcg) by mouth daily  6/14/2023 at 0800     clonazePAM (KLONOPIN) 1 MG tablet Take 1 mg by mouth daily as needed for anxiety       clonazePAM (KLONOPIN) 1 MG tablet Take 1 tab daily at 12 pm and okay to take 1 tab daily prn. (Patient taking differently: Take 2 mg by mouth 2 times daily At 0900, 2100)  6/15/2023 at 0715     cloZAPine (CLOZARIL) 50 MG tablet Take 1 tablet (50 mg) by mouth At Bedtime  6/14/2023 at 1700     gabapentin (NEURONTIN) 800 MG tablet Take 1 tablet (800 mg) by mouth 3 times daily (Patient taking differently: Take 800 mg by mouth every 8 hours 0000, 0800, 1600)  6/15/2023 at 0715     hydrOXYzine (ATARAX) 25 MG tablet Take 2 tablets (50 mg) by mouth every 6 hours as needed for anxiety (up to 3 timrd daily)  Past Month     ketoconazole (NIZORAL) 2 % external cream Apply topically 2 times daily       ketoconazole (NIZORAL) 2 % external shampoo Apply topically once a week On Wednesdays       lactulose (CHRONULAC) 10 GM/15ML solution Take 15 mLs by mouth 2 times daily  Past Week     metoprolol succinate ER (TOPROL XL) 25 MG 24 hr tablet Take 0.5 tablets (12.5 mg) by mouth 2 times daily  6/14/2023 at 0800     multivitamin, therapeutic (THERA-VIT) TABS tablet Take 1 tablet by mouth daily 6/14/2023: Hold DOS 6/14/2023 at 0800     oxyCODONE (ROXICODONE) 5 MG tablet Take 0.5-1 tablets (2.5-5 mg) by mouth every 4 hours as needed for breakthrough pain  Past Month     pantoprazole (PROTONIX) 40 MG EC tablet Take 1 tablet (40 mg) by mouth daily Take 1 tablet (40mg) by mouth daily at 8am  6/15/2023 at 0715      polyethylene glycol (MIRALAX) 17 GM/Dose powder Take 17 g by mouth 2 times daily One capful in liquid by mouth twice a day, 8 AM and one dose at supper 6/14/2023: Hold DOS Past Week     sodium phosphate (FLEET ENEMA) 7-19 GM/118ML rectal enema Place 1 enema rectally daily as needed for constipation 6/14/2023: Hold DOS More than a month     traZODone (DESYREL) 100 MG tablet Take 100 mg by mouth At Bedtime       traZODone (DESYREL) 50 MG tablet Take 1 tablet (50 mg) by mouth every morning  6/15/2023 at 0715     venlafaxine (EFFEXOR XR) 150 MG 24 hr capsule Take 2 capsules (300 mg) by mouth daily  6/15/2023 at 0715     vitamin C (ASCORBIC ACID) 500 MG tablet Take 1 tablet (500 mg) by mouth daily 6/14/2023: Hold DOS 6/14/2023 at 0800

## 2023-06-16 NOTE — CONSULTS
[facility]  Consult Note - Hospitalist Service  Date of Admission:  6/15/2023  Consult Requested by: Isabel Kramer  Reason for Consult: Medical comanagement    Assessment & Plan   Benjamin Mathews is a 58 year old male patient with a past medical history significant for Parkinson's disease c/b neurocognitive disorder, previous etoh dependence (residing in nursing home currently), CVA, DVT/PE on Xarelto, anxiety, depression, asthma, HTN, HLD, RLS, migraines, and recent admission 5/20/23 for R ankle fracture and underwent closed reduction and external fixation (5/21). He was discharged to TCU and presented to ED 5/27/23 after a fall out of bed. Now admitted to [facility] after undergoing ORIF of ankle fracture 6/15/23.     S/p ORIF right ankle/pilon fracture-dislocation & removal of spanning external fixator  ANESTHESIA: General endotracheal with regional block (right sciatic and adductor canal).  COMPLICATIONS: None apparent intraoperatively or immediately postoperatively.  IMPLANTS: One Ortholoc posterior malleolar plate with 3.5 mm cortical screws. One Ortholoc posterior distal fibular plate with 3.5 mm locking screws and 3.5 mm cortical screws. Two Ortholoc 4.0 mm partially threaded cannulated screws and washers. All screws used a T15 hex screwdriver. All implants were titanium.  ESTIMATED BLOOD LOSS: Approximately 100 mL.   - Management per primary orthopedics team including pain control, activity, antibiotics, DVT prophylaxis, wound cares. Please refer to their documentation for details.   - Complete 24 hours of perioperative cefazolin.   - Strict nonweightbearing on the operative lower extremity for 6 weeks.  Use appropriate assistive gait devices and assistance (nursing/PT) for ambulation.   - May be up for ADLs and PT/OT but encourage flat bedrest with the foot and ankle of the operative lower extremity elevated above the level of the heart and iced as much of  the time as possible for the first 2 weeks postop.  Elevate heel off of the bed.   - DVT prophylaxis:  mg po daily until 6 weeks postoperative per ortho brief op note, though would recommend resume PTA DOAC instead if safe from surgical perspective. Sequential compressive devices while in the hospital.    Parkinson's Disease (2008) c/b psychosis, dyskinesias, muscle spasms/contracture, depression, sleep disturbance  Seen by Neurology 6/7/23 with the following medication regimen which we will continue:  Movement Disorder-related Medications                   8 AM 12 PM 4 pm 8pm At bedtime  prn   Amantadine 100 mg  1 1           Carbidopa/levodopa IR  mg 2 2 2         Carbidopa/levodopa CR  mg       1       Clonazepam 2 mg 1   1         Clonazepam 1 mg   1       1   Prn dose of clonazepam taken about once a week   - Continue follow with Dr. Arita for Botox injections   - Continue PTA Clozapine for hallucinations   - Continue PTA gabapentin, venlafaxine for mood   - Continue PTA trazodone for sleep   - I greatly appreciate pharmacy assistance in verifying doses and timing based on neurology notes and group home mar.   - Will recommend continuous oximetry and capnography while admitted as he will also be receiving narcotics for acute pain control. Will check BMP and CBC since is on Clozapine.   - Unclear if he is still taking the amantadine but it appears this medication fell off after his most recent hospitalization.     History of DVT/PE  - Restart PTA DOAC when cleared by primary surgical team     Cerebrovascular Disease   - Continue PTA statin and resume DOAC when able    Ulcer dorsal left second toe   Follows with podiatry.   - WOC consult    SVT  Diagnosed 2007. Last TTE 5/29/22 with EF 65-70%.   - Continue PTA Metoprolol Tartrate 12.5mg BID with hold parameters    GERD   - Continue PTA PPI    BPH   - Continue Alfuzosin     The patient's care was discussed with the attending medicine physician  "Dr. Perez.    Clinically Significant Risk Factors Present on Admission               # Drug Induced Coagulation Defect: home medication list includes an anticoagulant medication    # Hypertension: Noted on problem list        # Overweight: Estimated body mass index is 29.21 kg/m  as calculated from the following:    Height as of this encounter: 1.93 m (6' 3.98\").    Weight as of this encounter: 108.8 kg (239 lb 14.4 oz).            Jalen Davis PA-C  Hospitalist Service  Securely message with [employer] (more info)  Text page via [facility] Paging/Directory   ______________________________________________________________________    Chief Complaint   S/p ORIF R ankle    History is obtained from the patient and EMR.    History of Present Illness   Benjamin Mathews is a 58 year old male patient with a past medical history significant for Parkinson's disease c/b neurocognitive disorder, previous etoh dependence (residing in nursing home currently), CVA, DVT/PE on Xarelto, anxiety, depression, asthma, HTN, HLD, RLS, migraines, and recent admission 5/20/23 for R ankle fracture and underwent closed reduction and external fixation (5/21). He was discharged to TCU and presented to ED 5/27/23 after a fall out of bed. Now admitted to [facility] after undergoing ORIF of ankle fracture 6/15/23.     Patient reports pain is well controlled. He reported no somnolence with current medication regimen, even with addition of oxycodone at the nursing home. Currently with no fevers, chills, chest pain/pressure, shortness of breath, nausea, vomiting, abdominal pain, urinary complaints. Soft voice.    Past Medical History    Past Medical History:   Diagnosis Date     Cerebral infarction (H)      Clotting disorder (H)     PE 2019     Dystonia      Parkinson disease (H)        Past Surgical History   Past Surgical History:   Procedure Laterality Date     APPLY EXTERNAL FIXATOR LOWER EXTREMITY Right 5/21/2023    Procedure: Right  Ankle Closed " Reduction and  External Fixator Placement;  Surgeon: Nicole Apodaca MD;  Location: [facility] OR       Medications   Medications Prior to Admission   Medication Sig Dispense Refill Last Dose     alfuzosin ER (UROXATRAL) 10 MG 24 hr tablet Take 1 tablet (10 mg) by mouth daily 30 tablet 0 6/15/2023 at 0715     apixaban ANTICOAGULANT (ELIQUIS) 5 MG tablet Take 5 mg by mouth 2 times daily   6/13/2023 at 1800     atorvastatin (LIPITOR) 40 MG tablet Take 1 tablet (40 mg) by mouth every evening 30 tablet 0 6/14/2023 at 1700     bisacodyl (DULCOLAX) 10 MG suppository Place 1 suppository (10 mg) rectally daily as needed for constipation 10 suppository 0 More than a month     carbidopa-levodopa (SINEMET CR)  MG CR tablet Take 1 tablet by mouth At Bedtime (Patient taking differently: Take 1 tablet by mouth At Bedtime At 8pm) 30 tablet 11 6/14/2023 at 1700     carbidopa-levodopa (SINEMET)  MG tablet Take 2 tablets by mouth 3 times daily (Patient taking differently: Take 2 tablets by mouth 3 times daily 8000, 1200, 1600) 180 tablet 11 6/15/2023 at 0715     cholecalciferol (VITAMIN D3) 125 mcg (5000 units) capsule Take 1 capsule (125 mcg) by mouth daily 30 capsule 0 6/14/2023 at 0800     clonazePAM (KLONOPIN) 1 MG tablet Take 1 mg by mouth daily as needed for anxiety        clonazePAM (KLONOPIN) 1 MG tablet Take 1 tab daily at 12 pm and okay to take 1 tab daily prn. (Patient taking differently: Take 2 mg by mouth 2 times daily At 0900, 2100) 60 tablet 3 6/15/2023 at 0715     cloZAPine (CLOZARIL) 50 MG tablet Take 1 tablet (50 mg) by mouth At Bedtime 30 tablet 0 6/14/2023 at 1700     gabapentin (NEURONTIN) 800 MG tablet Take 1 tablet (800 mg) by mouth 3 times daily (Patient taking differently: Take 800 mg by mouth every 8 hours 0000, 0800, 1600) 90 tablet 0 6/15/2023 at 0715     hydrOXYzine (ATARAX) 25 MG tablet Take 2 tablets (50 mg) by mouth every 6 hours as needed for anxiety (up to 3 timrd daily) 20 tablet 0 Past  Month     ketoconazole (NIZORAL) 2 % external cream Apply topically 2 times daily        ketoconazole (NIZORAL) 2 % external shampoo Apply topically once a week On Wednesdays        lactulose (CHRONULAC) 10 GM/15ML solution Take 15 mLs by mouth 2 times daily 473 mL 0 Past Week     metoprolol succinate ER (TOPROL XL) 25 MG 24 hr tablet Take 0.5 tablets (12.5 mg) by mouth 2 times daily 30 tablet 0 6/14/2023 at 0800     multivitamin, therapeutic (THERA-VIT) TABS tablet Take 1 tablet by mouth daily 30 tablet 0 6/14/2023 at 0800     oxyCODONE (ROXICODONE) 5 MG tablet Take 0.5-1 tablets (2.5-5 mg) by mouth every 4 hours as needed for breakthrough pain 20 tablet 0 Past Month     pantoprazole (PROTONIX) 40 MG EC tablet Take 1 tablet (40 mg) by mouth daily Take 1 tablet (40mg) by mouth daily at 8am 30 tablet 0 6/15/2023 at 0715     polyethylene glycol (MIRALAX) 17 GM/Dose powder Take 17 g by mouth 2 times daily One capful in liquid by mouth twice a day, 8 AM and one dose at supper 850 g 0 Past Week     sodium phosphate (FLEET ENEMA) 7-19 GM/118ML rectal enema Place 1 enema rectally daily as needed for constipation   More than a month     traZODone (DESYREL) 100 MG tablet Take 100 mg by mouth At Bedtime        traZODone (DESYREL) 50 MG tablet Take 1 tablet (50 mg) by mouth every morning 30 tablet 0 6/15/2023 at 0715     venlafaxine (EFFEXOR XR) 150 MG 24 hr capsule Take 2 capsules (300 mg) by mouth daily 60 capsule 0 6/15/2023 at 0715     vitamin C (ASCORBIC ACID) 500 MG tablet Take 1 tablet (500 mg) by mouth daily 30 tablet 0 6/14/2023 at 0800     linaclotide (LINZESS) 290 MCG capsule Take 1 capsule (290 mcg) by mouth daily as needed (IBSc) (Patient not taking: Reported on 6/15/2023) 30 capsule 0 Not Taking          Review of Systems    The 10 point Review of Systems is negative other than noted in the HPI or here.    Social History   I have reviewed this patient's social history and updated it with pertinent information if  needed.  Social History     Tobacco Use     Smoking status: Some Days     Types: Pipe, Cigars, Cigarettes     Smokeless tobacco: Never   Substance Use Topics     Alcohol use: Not Currently     Comment: quit 2014     Drug use: Not Currently       Family History   I have reviewed this patient's family history and updated it with pertinent information if needed.  Family History   Problem Relation Age of Onset     Other - See Comments Mother         pulmonary embolism from hip fracture     Pulmonary Embolism Mother      Parkinsonism Father      Neurologic Disorder Sister      Parkinsonism Sister         ?med related     Other - See Comments Sister         12/7/1950     Depression Sister      Neurologic Disorder Brother         dystonia     Dystonia Brother      Other - See Comments Brother              Heart Disease Nephew        Allergies   Allergies   Allergen Reactions     Amantadine Other (See Comments)     Other reaction(s): Hallucinations  Hallucinations/ lost self control/gambling.     halluicnations  Hallucinations/ lost self control/gambling.     hallucinates  halluicnations  hallucinates  Hallucinations/ lost self control/gambling.        Quetiapine GI Disturbance, Diarrhea and Other (See Comments)     Diarrhea    Diarrhea  Other reaction(s): GI Disturbance  Diarrhea       Duloxetine Other (See Comments)     suicidal  suicidal          Physical Exam   Vital Signs: Temp: 97.7  F (36.5  C) Temp src: Oral BP: 108/53 Pulse: 77   Resp: 11 SpO2: 96 % O2 Device: None (Room air) Oxygen Delivery: 2 LPM  Weight: 239 lbs 14.4 oz    GENERAL: Alert and oriented x 3. Well nourished, well developed.  No acute distress.    HEENT: Normocephalic, atraumatic. Anicteric sclera. Mucous membranes moist.   CV: RRR. S1, S2. No murmurs appreciated.   RESPIRATORY: Effort normal on room air. Lungs CTAB with no wheezing.   GI: Abdomen soft and non distended, bowel sounds present x all 4 quadrants. No tenderness.  MUSCULOSKELETAL:  No joint swelling or tenderness. Moves all extremities. R ankle in cast, toes pink and warm, sensation grossly intact.  EXTREMITIES: No gross deformities. No peripheral edema.   SKIN: Grossly warm, dry, and intact. No jaundice.     Medical Decision Making       90 MINUTES SPENT BY ME on the date of service doing chart review, history, exam, documentation & further activities per the note.      Data   Imaging results reviewed over the past 24 hrs:   Recent Results (from the past 24 hour(s))   POC US Guidance Needle Placement    Impression    Right popliteal sciatic and adductor canal blocks   XR Surgery HAYDEN L/T 5 Min Fluoro    Narrative    This exam was marked as non-reportable because it will not be read by a   radiologist or a [facility] non-radiologist provider.           Recent Labs   Lab 06/15/23  0754   GLC 95

## 2023-06-16 NOTE — PROGRESS NOTES
"Orthopaedic Surgery Progress Note   June 16, 2023    Subjective: No acute events overnight. Pain well controlled. Tolerating diet. Voiding spontaneously. +Flatus, no BM. Denies fever or chills, CP, SOB, numbness or tingling, motor dysfunction or weakness.     Objective: /50 (BP Location: Left arm)   Pulse 74   Temp (!) 96.5  F (35.8  C) (Oral)   Resp 11   Ht 1.93 m (6' 3.98\")   Wt 108.8 kg (239 lb 14.4 oz)   SpO2 98%   BMI 29.21 kg/m      General: NAD, alert and oriented, cooperative with exam.   Cardio: RRR, extremities wwp.   Respiratory: Non-labored breathing.  MSK:   RLE: Toes wwp. Block remains in place so unable to wiggle toes, no sensation in toes. Splint well fitting.  Labs:  Hemoglobin   Date Value Ref Range Status   06/15/2023 11.6 (L) 13.3 - 17.7 g/dL Final   07/09/2021 12.0 (L) 13.3 - 17.7 g/dL Final   ]  All cultures:  No results for input(s): CULT in the last 168 hours.    Assessment and Plan: Benjamin Mathews is a 58 year old male now s/p R ankle ORIF on 6/16 with Dr. Bonilla. Doing well post-operatively.      Ortho Primary  Activity: Up with assist until independent.  Weight bearing status: NWB RLE x 6 weeks.  Pain management: Transition from IV to PO as tolerated.    Antibiotics: Ancef x 24 hours.  Diet: Begin with clear fluids and progress diet as tolerated.   DVT prophylaxis: home AC and mechanical while in the hospital, discharge on PTA AC  Imaging: Complete.  Labs: PRN.  Bracing/Splinting: short leg splint to be kept clean, dry, and intact until follow-up.   Dressings: Keep clean, dry and intact until follow-up.   Elevation: Elevate RLE on ERLE pillow, leg elevator to keep above the level of the heart as much as possible.   Physical Therapy/Occupational Therapy: Eval and treat.  Consults: Medicine, PT, OT.  Follow-up: Clinic with Dr. Bonilla team in 2 weeks   no x-rays needed.   Disposition: Pending progress with therapies, pain control on orals, and medical stability, anticipate discharge " to snf on POD #1-2.     Isabel Kramer MD  Orthopaedic Surgery Resident

## 2023-06-16 NOTE — PROGRESS NOTES
[facility]    Medicine Progress Note - Hospitalist Service, GOLD TEAM 19    Date of Admission:  6/15/2023    Assessment & Plan   Benjamin Mathews is a 58 year old male patient with a past medical history significant for Parkinson's disease c/b neurocognitive disorder, previous etoh dependence (residing in nursing home currently), CVA, DVT/PE on Xarelto, anxiety, depression, asthma, HTN, HLD, RLS, migraines, and recent admission 5/20/23 for R ankle fracture and underwent closed reduction and external fixation (5/21). He was discharged to TCU and presented to ED 5/27/23 after a fall out of bed. Now admitted to [facility] after undergoing ORIF of ankle fracture 6/15/23.      S/p ORIF right ankle/pilon fracture-dislocation & removal of spanning external fixator  ANESTHESIA: General endotracheal with regional block (right sciatic and adductor canal).  COMPLICATIONS: None apparent intraoperatively or immediately postoperatively.  IMPLANTS: One Ortholoc posterior malleolar plate with 3.5 mm cortical screws. One Ortholoc posterior distal fibular plate with 3.5 mm locking screws and 3.5 mm cortical screws. Two Ortholoc 4.0 mm partially threaded cannulated screws and washers. All screws used a T15 hex screwdriver. All implants were titanium.  ESTIMATED BLOOD LOSS: Approximately 100 mL.   - Management per primary orthopedics team including pain control, activity, antibiotics, DVT prophylaxis, wound cares. Please refer to their documentation for details.   - Complete 24 hours of perioperative cefazolin.   - Strict nonweightbearing on the operative lower extremity for 6 weeks.  Use appropriate assistive gait devices and assistance (nursing/PT) for ambulation.   - May be up for ADLs and PT/OT but encourage flat bedrest with the foot and ankle of the operative lower extremity elevated above the level of the heart and iced as much of the time as possible for the first 2 weeks postop.   Elevate heel off of the bed.     DVT prophylaxis: PTA Eliquis 5 mg twice daily initiated.       Parkinson's Disease (2008) c/b psychosis, dyskinesias, muscle spasms/contracture, depression, sleep disturbance  Seen by Neurology 6/7/23 with the following medication regimen which we will continue:  Movement Disorder-related Medications                   8 AM 12 PM 4 pm 8pm At bedtime  prn   Amantadine 100 mg  1 1           Carbidopa/levodopa IR  mg 2 2 2         Carbidopa/levodopa CR  mg       1       Clonazepam 2 mg 1   1         Clonazepam 1 mg   1       1   Prn dose of clonazepam taken about once a week   - Continue follow with Dr. Arita for Botox injections   - Continue PTA Clozapine for hallucinations   - Continue PTA gabapentin, venlafaxine for mood   - Continue PTA trazodone for sleep   - I greatly appreciate pharmacy assistance in verifying doses and timing based on neurology notes and group home mar.   - Will recommend continuous oximetry and capnography while admitted as he will also be receiving narcotics for acute pain control. Will check BMP and CBC since is on Clozapine.   - Unclear if he is still taking the amantadine but it appears this medication fell off after his most recent hospitalization.     History of DVT/PE  - Restart PTA DOAC when cleared by primary surgical team     Cerebrovascular Disease   - Continue PTA statin and resume DOAC when able     Ulcer dorsal left second toe   Follows with podiatry.   - WOC consult     SVT  Diagnosed 2007. Last TTE 5/29/22 with EF 65-70%.   - Continue PTA Metoprolol Tartrate 12.5mg BID with hold parameters     GERD   - Continue PTA PPI     BPH   - Continue Alfuzosin         Diet: Advance Diet as Tolerated: Regular Diet Adult    DVT Prophylaxis: DOAC  Dudley Catheter: Not present  Lines: None     Cardiac Monitoring: None  Code Status: Full Code      Clinically Significant Risk Factors Present on Admission               # Drug Induced Coagulation  "Defect: home medication list includes an anticoagulant medication    # Hypertension: Noted on problem list   # Circulatory Shock: currently requiring pressors for blood pressure support  # Acute Respiratory Failure: Documented O2 saturation < 91%.  Continue supplemental oxygen as needed     # Overweight: Estimated body mass index is 29.21 kg/m  as calculated from the following:    Height as of this encounter: 1.93 m (6' 3.98\").    Weight as of this encounter: 108.8 kg (239 lb 14.4 oz).            Disposition Plan Anticipate discharge back to patient's TCU.     Expected Discharge Date: 06/17/2023                  CAMERON LANGSTON MD  Hospitalist Service, GOLD TEAM 19  [facility]  Securely message with [employer] (more info)  Text page via [employer] Paging/Directory   See signed in provider for up to date coverage information  ______________________________________________________________________    Interval History   -- afebrile and HDS  -- Patient denies any acute complaints    Physical Exam   Vital Signs: Temp: (!) 96.5  F (35.8  C) Temp src: Oral BP: 105/50 Pulse: 74   Resp: 11 SpO2: 98 % O2 Device: None (Room air) Oxygen Delivery: 1 LPM  Weight: 239 lbs 14.4 oz    General Appearance: Lying comfortably in bed, on room air, in no acute distress or discomfort.  HEENT: PERRL: EOMI; moist mucous membrane w/o lesions  Neck: No JVD  Pulmonary: Clear to auscultation bilaterally, no wheezes or crackles  CVS: Regular rhythm, no murmurs, rubs or gallops  GI: BS (+), soft nontender, no rebound or guarding   MSK: Right foot in boot  Skin: No rashes or lesions  Neurologic: A&O x3      Medical Decision Making             Data     "

## 2023-06-16 NOTE — UTILIZATION REVIEW
"  Admission Status; Secondary Review Determination         Under the authority of the Utilization Management Committee, the utilization review process indicated a secondary review on the above patient.  The review outcome is based on review of the medical records, discussions with staff, and applying clinical experience noted on the date of the review.        (X)      Inpatient Status Appropriate - This patient's medical care is consistent with medical management for inpatient care and reasonable inpatient medical practice.      () Observation Status Appropriate - This patient does not meet hospital inpatient criteria and is placed in observation status. If this patient's primary payer is Medicare and was admitted as an inpatient, Condition Code 44 should be used and patient status changed to \"observation\".   () Admission Status NOT Appropriate - This patient's medical care is not consistent with medical management for Inpatient or Observation Status.          RATIONALE FOR DETERMINATION     58 year old male patient with a past medical history significant for Parkinson's disease c/b neurocognitive disorder, previous etoh dependence (residing in nursing home currently), CVA, DVT/PE on Xarelto, anxiety, depression, asthma, HTN, HLD, RLS, migraines, and recent admission 5/20/23 for R ankle fracture and underwent closed reduction and external fixation (5/21). He was discharged to TCU and presented to ED 5/27/23 after a fall out of bed. He was admitted to [facility] after undergoing ORIF of ankle fracture 6/15/23.  Patient's pain is controlled, however he continues to have a block in place.  He is tolerating oral intake.  Patient is nonweightbearing and will require continued hospitalization to facilitate safe transfers.  He will be remaining in the hospital tonight as well.  He is appropriate for inpatient status.    The severity of illness, intensity of service provided, expected LOS and risk for adverse outcome " make the care complex, high risk and appropriate for hospital admission.        The information on this document is developed by the utilization review team in order for the business office to ensure compliance.  This only denotes the appropriateness of proper admission status and does not reflect the quality of care rendered.         The definitions of Inpatient Status and Observation Status used in making the determination above are those provided in the CMS Coverage Manual, Chapter 1 and Chapter 6, section 70.4.      Sincerely,     Max Zamarripa MD  Physician Advisor  Utilization Review/ Case Management  [facility].

## 2023-06-16 NOTE — PROGRESS NOTES
"Brief SW Note:    Per IDT rounds, pt will be medically ready to discharge in 1-2 days. CMA is needed d/t Elevated Risk Score. Pt will be added to the weekend help list, as Unit CM unable to address d/t work load.    Denny at [place], [facility]  [address],   [place] [address]  PH: [phone]  Weekend on-call RN: [phone]  Weekend Fax: [phone]    1150: MAURO called Denny directly,  stated that the person to speak to, Bruce, is not at his desk.  took down MAURO's contact info and message: please call back MAURO to confirm bed-hold; pt may discharge over the weekend - need SNF phone and fax number for weekend CM to reach out to.    1245: Bruce called back MAURO, reported that pt does not have a bed-hold; inquired if pt wanted a bed-hold. MAURO requested a bed-hold for pt. Bruce asked if pt stated he wanted a bed-hold. MAURO stated, \"yes.\" Bruce stated that, for weekend re-admission, CM can call the main line (PH: [phone]) or the on-call nurse ([facility]: [phone]); also provided weekend fax # for orders to be sent to ([phone]).    1250: MAURO met with pt at bedside to discuss the above conversation with Bruce. Pt stated he does not want a bed-hold, requested to go back to [facility]. MAURO provided education on pt needing continued therapies prior to gain strength and independence prior to returning home. Pt expressed that he was hoping to rehab in the hospital for 6 weeks before going back home. SW provided information on his insurance not paying for hospital stay, and will be discharged back to TCU when medically stable, which could occur over the weekend. Pt expressed understanding, confirmed that the WC outside of his room came with him/is from Denny at [facility].        AMISHA ZelayaW, LSW  [facility]   PHONE: [phone]  Pager: [phone]        "

## 2023-06-16 NOTE — PLAN OF CARE
"VS: BP 99/49 (BP Location: Left arm)   Pulse 77   Temp 97.1  F (36.2  C) (Oral)   Resp 15   Ht 1.93 m (6' 3.98\")   Wt 108.8 kg (239 lb 14.4 oz)   SpO2 96%   BMI 29.21 kg/m     O2: >90% on room air. Denies SOB/N/V/chest pain    Output: Incontinent at times, urinal within patient reach    Last BM: 06/14/23   Activity: NWB to RLE. Patient able to sit up at edge of bed. Wheelchair bound at at baseline.    Skin: Left second toe wound, dry crusty/flaky skin, surgical incision to R ankle   Pain: Denies    CMS: Alert and oriented x4. Forgeful at times. RLE numbness, Block remains in place so unable to wiggle toes, no sensation in toes   Dressing: Cast- CDI   Diet: Regular diet    LDA: PIV saline locked    Equipment: IV pole/pump, personal belongings, call light within patient reach    Plan: Per IDT rounds, pt will be medically ready to discharge in 1-2 days   Additional Info:                      "

## 2023-06-16 NOTE — CONSULTS
[facility]  WO Nurse Inpatient Assessment     Consulted for: left second toe     Summary: Per  (podiatry) 6/13:Onychomycosis and Onychocryptosis bilaterally, ulcer dorsal left second toe.  Diagnosis and treatment options discussed with the patient.  Continue Betadine to the left dorsal second toe eschar.  All the toenails were debrided or reduced bilaterally upon consent.  Return to clinic and see me in 2 months.  He is following with orthopedics for his leg fracture.  Previous notes reviewed.     Patient History (according to provider note(s):      Benjamin Mathews is a 58 year old male patient with a past medical history significant for Parkinson's disease c/b neurocognitive disorder, previous etoh dependence (residing in nursing home currently), CVA, DVT/PE on Xarelto, anxiety, depression, asthma, HTN, HLD, RLS, migraines, and recent admission 5/20/23 for R ankle fracture and underwent closed reduction and external fixation (5/21). He was discharged to TCU and presented to ED 5/27/23 after a fall out of bed. Now admitted to [facility] after undergoing ORIF of ankle fracture 6/15/23.     Assessment:      Areas visualized during today's visit: Focused: left foot     Wound location: Left 2nd Toe    Last photo: 6/16  Wound due to: Unknown Etiology  Wound history/plan of care: follows podiatry outpatient   Wound base: 100 % non-granular tissue     Palpation of the wound bed: normal      Drainage: none     Description of drainage: none     Measurements (length x width x depth, in cm): 0.4  x 0.9  x  0.1 cm (not open, indented)     Tunneling: N/A     Undermining: N/A  Periwound skin: Intact      Color: brawny      Temperature: normal   Odor: none  Pain: no grimacing or signs of discomfort, none  Pain interventions prior to dressing change: N/A  Treatment goal: Infection control/prevention  STATUS: initial assessment  Supplies ordered: at bedside and supplies  stored on unit     Treatment Plan:     Left 2nd toe wound(s): Daily  paint with betadine and allow to air dry and leave JAQUELIN.      Orders: Written    RECOMMEND PRIMARY TEAM ORDER: None, at this time, pt to follow up with Podiatry in ~2 months   Education provided: plan of care  Discussed plan of care with: Patient and Nurse  [facility] nurse follow-up plan: signing off  Notify WOC if wound(s) deteriorate.  Nursing to notify the Provider(s) and re-consult the WO Nurse if new skin concern.    DATA:     Current support surface: Standard  Standard gel/foam mattress (IsoFlex, Atmos air, etc)  Containment of urine/stool: Incontinent pad in bed  BMI: Body mass index is 29.21 kg/m .   Active diet order: Orders Placed This Encounter      Advance Diet as Tolerated: Regular Diet Adult     Output: I/O last 3 completed shifts:  In: 1220 [P.O.:120; I.V.:1100]  Out: 1050 [Urine:950; Blood:100]     Labs: Recent Labs   Lab 06/15/23  2112   HGB 11.6*   WBC 12.3*     Pressure injury risk assessment:   Sensory Perception: 3-->slightly limited  Moisture: 4-->rarely moist  Activity: 2-->chairfast  Mobility: 2-->very limited  Nutrition: 3-->adequate  Friction and Shear: 2-->potential problem  Guillermo Score: 16    Lety Sargent RN CWOCN   Pager no longer is use, please contact through [employer]: [facility] Nurse  Dept. Office Number: [phone]

## 2023-06-16 NOTE — PLAN OF CARE
"[id number]  /51   Pulse 71   Temp 97.9  F (36.6  C) (Oral)   Resp 11   Ht 1.93 m (6' 3.98\")   Wt 108.8 kg (239 lb 14.4 oz)   SpO2 98%   BMI 29.21 kg/m       AOx4, intermittent confusion/forgetfulness. Somnolent overnight. On 2LPM and CAPNO, desats to mid lower 80s. Denies SOB, CP, N/V, SI. B/L neuropathy to BLE, L sided weakness from CVA.     Endorsing mild pain to surgical area.. Pain well controlled with sched meds, PRN Oxy avail. RLE elevated with pillows, splint/cast CDI. Repositioned for comfort. Scab to L toe, generalized dry flaky skin.      NWB RLE. Uses motorized WC at baseline per pt. Up to edge of bed this AM A2 FWW GB. C/o dizziness, no further activity attempted.     Reg diet, tolerating. Needs assistance ordering meals, tray set up. Breakfast alreadyordered. BG POD1 131    LBM 6/14, BS+ -FL. Voiding using bedside urinal, incont episode x1. PVR 84mL.     R PIV infusing LR 75mL/hr btwn ABX.     Pt able to make needs known. Call light within reach. Se rounding and BA on for safety. Contact prec maintained. Care ongoing.     Plan: therapies  "

## 2023-06-17 ENCOUNTER — APPOINTMENT (OUTPATIENT)
Dept: PHYSICAL THERAPY | Facility: CLINIC | Age: 58
DRG: 493 | End: 2023-06-17
Attending: ORTHOPAEDIC SURGERY
Payer: COMMERCIAL

## 2023-06-17 LAB
GLUCOSE BLDC GLUCOMTR-MCNC: 124 MG/DL (ref 70–99)
HCT VFR BLD AUTO: 29 % (ref 40–53)
HGB BLD-MCNC: 9 G/DL (ref 13.3–17.7)
HGB BLD-MCNC: 9.4 G/DL (ref 13.3–17.7)

## 2023-06-17 PROCEDURE — 85018 HEMOGLOBIN: CPT | Performed by: INTERNAL MEDICINE

## 2023-06-17 PROCEDURE — 250N000013 HC RX MED GY IP 250 OP 250 PS 637: Performed by: PHYSICIAN ASSISTANT

## 2023-06-17 PROCEDURE — 250N000013 HC RX MED GY IP 250 OP 250 PS 637: Performed by: INTERNAL MEDICINE

## 2023-06-17 PROCEDURE — 99232 SBSQ HOSP IP/OBS MODERATE 35: CPT | Performed by: INTERNAL MEDICINE

## 2023-06-17 PROCEDURE — 120N000002 HC R&B MED SURG/OB UMMC

## 2023-06-17 PROCEDURE — 97530 THERAPEUTIC ACTIVITIES: CPT | Mod: GP | Performed by: PHYSICAL THERAPIST

## 2023-06-17 PROCEDURE — 36416 COLLJ CAPILLARY BLOOD SPEC: CPT | Performed by: INTERNAL MEDICINE

## 2023-06-17 PROCEDURE — 250N000013 HC RX MED GY IP 250 OP 250 PS 637: Performed by: STUDENT IN AN ORGANIZED HEALTH CARE EDUCATION/TRAINING PROGRAM

## 2023-06-17 PROCEDURE — 85018 HEMOGLOBIN: CPT | Performed by: STUDENT IN AN ORGANIZED HEALTH CARE EDUCATION/TRAINING PROGRAM

## 2023-06-17 PROCEDURE — 36415 COLL VENOUS BLD VENIPUNCTURE: CPT | Performed by: STUDENT IN AN ORGANIZED HEALTH CARE EDUCATION/TRAINING PROGRAM

## 2023-06-17 RX ORDER — AMOXICILLIN 250 MG
1-2 CAPSULE ORAL 2 TIMES DAILY
Qty: 30 TABLET | Refills: 0 | Status: SHIPPED | OUTPATIENT
Start: 2023-06-17 | End: 2023-10-03

## 2023-06-17 RX ADMIN — ATORVASTATIN CALCIUM 40 MG: 40 TABLET, FILM COATED ORAL at 20:14

## 2023-06-17 RX ADMIN — APIXABAN 5 MG: 5 TABLET, FILM COATED ORAL at 20:14

## 2023-06-17 RX ADMIN — PANTOPRAZOLE SODIUM 40 MG: 40 TABLET, DELAYED RELEASE ORAL at 07:55

## 2023-06-17 RX ADMIN — CLOZAPINE 50 MG: 50 TABLET ORAL at 22:44

## 2023-06-17 RX ADMIN — POLYETHYLENE GLYCOL 3350 17 G: 17 POWDER, FOR SOLUTION ORAL at 07:53

## 2023-06-17 RX ADMIN — TRAZODONE HYDROCHLORIDE 100 MG: 100 TABLET ORAL at 22:44

## 2023-06-17 RX ADMIN — VENLAFAXINE HYDROCHLORIDE 300 MG: 150 CAPSULE, EXTENDED RELEASE ORAL at 07:55

## 2023-06-17 RX ADMIN — GABAPENTIN 800 MG: 800 TABLET, FILM COATED ORAL at 01:32

## 2023-06-17 RX ADMIN — LACTULOSE 15 ML: 20 SOLUTION ORAL at 07:53

## 2023-06-17 RX ADMIN — ALFUZOSIN HYDROCHLORIDE 10 MG: 10 TABLET, EXTENDED RELEASE ORAL at 07:56

## 2023-06-17 RX ADMIN — CARBIDOPA AND LEVODOPA 2 TABLET: 25; 100 TABLET ORAL at 12:47

## 2023-06-17 RX ADMIN — CARBIDOPA AND LEVODOPA 2 TABLET: 25; 100 TABLET ORAL at 16:24

## 2023-06-17 RX ADMIN — GABAPENTIN 800 MG: 800 TABLET, FILM COATED ORAL at 16:24

## 2023-06-17 RX ADMIN — Medication 12.5 MG: at 20:09

## 2023-06-17 RX ADMIN — ACETAMINOPHEN 975 MG: 325 TABLET, FILM COATED ORAL at 12:46

## 2023-06-17 RX ADMIN — GABAPENTIN 800 MG: 800 TABLET, FILM COATED ORAL at 23:53

## 2023-06-17 RX ADMIN — ACETAMINOPHEN 975 MG: 325 TABLET, FILM COATED ORAL at 01:32

## 2023-06-17 RX ADMIN — TRAZODONE HYDROCHLORIDE 50 MG: 50 TABLET ORAL at 07:56

## 2023-06-17 RX ADMIN — GABAPENTIN 800 MG: 800 TABLET, FILM COATED ORAL at 07:55

## 2023-06-17 RX ADMIN — CLONAZEPAM 2 MG: 2 TABLET ORAL at 20:14

## 2023-06-17 RX ADMIN — CLONAZEPAM 2 MG: 2 TABLET ORAL at 08:49

## 2023-06-17 RX ADMIN — CARBIDOPA AND LEVODOPA 2 TABLET: 25; 100 TABLET ORAL at 07:56

## 2023-06-17 RX ADMIN — ACETAMINOPHEN 975 MG: 325 TABLET, FILM COATED ORAL at 18:12

## 2023-06-17 RX ADMIN — SENNOSIDES AND DOCUSATE SODIUM 1 TABLET: 50; 8.6 TABLET ORAL at 07:55

## 2023-06-17 RX ADMIN — APIXABAN 5 MG: 5 TABLET, FILM COATED ORAL at 07:56

## 2023-06-17 RX ADMIN — POLYETHYLENE GLYCOL 3350 17 G: 17 POWDER, FOR SOLUTION ORAL at 20:09

## 2023-06-17 RX ADMIN — SENNOSIDES AND DOCUSATE SODIUM 1 TABLET: 50; 8.6 TABLET ORAL at 20:14

## 2023-06-17 RX ADMIN — CARBIDOPA AND LEVODOPA 1 TABLET: 50; 200 TABLET, EXTENDED RELEASE ORAL at 20:14

## 2023-06-17 ASSESSMENT — ACTIVITIES OF DAILY LIVING (ADL)
ADLS_ACUITY_SCORE: 45
DEPENDENT_IADLS:: INCONTINENCE;TRANSPORTATION;MEDICATION MANAGEMENT;MEAL PREPARATION;SHOPPING;LAUNDRY;COOKING;CLEANING
ADLS_ACUITY_SCORE: 45
ADLS_ACUITY_SCORE: 47
ADLS_ACUITY_SCORE: 45
ADLS_ACUITY_SCORE: 47
ADLS_ACUITY_SCORE: 45
ADLS_ACUITY_SCORE: 45
ADLS_ACUITY_SCORE: 47
ADLS_ACUITY_SCORE: 45
ADLS_ACUITY_SCORE: 45

## 2023-06-17 NOTE — PROGRESS NOTES
"Orthopaedic Surgery Progress Note   June 16, 2023    Subjective: No acute events overnight. Pain well controlled and block resolved. Tolerating diet. Voiding spontaneously. +Flatus, no BM. Denies fever or chills, CP, SOB, numbness or tingling, motor dysfunction or weakness.     Objective: /49 (BP Location: Left arm)   Pulse 76   Temp 99  F (37.2  C) (Oral)   Resp 16   Ht 1.93 m (6' 3.98\")   Wt 108.8 kg (239 lb 14.4 oz)   SpO2 95%   BMI 29.21 kg/m      General: NAD, alert and oriented, cooperative with exam.   Cardio: RRR, extremities wwp.   Respiratory: Non-labored breathing.  MSK:   RLE: Toes wwp. Fires FPL and EPL. SILT to exposed skin. Splint well fitting.  Labs:  Hemoglobin   Date Value Ref Range Status   06/17/2023 9.0 (L) 13.3 - 17.7 g/dL Final   07/09/2021 12.0 (L) 13.3 - 17.7 g/dL Final     All cultures:  No results for input(s): CULT in the last 168 hours.    Assessment and Plan: Benjamin Mathews is a 58 year old male now s/p R ankle ORIF on 6/16 with Dr. Bonilla. Doing well post-operatively.      Ortho Primary  Activity: Up with assist until independent.  Weight bearing status: NWB RLE x 6 weeks.  Pain management: Transition from IV to PO as tolerated.    Antibiotics: Ancef x 24 hours.  Diet: Begin with clear fluids and progress diet as tolerated.   DVT prophylaxis: home AC and mechanical while in the hospital, discharge on PTA AC  Imaging: Complete.  Labs: PRN.  Bracing/Splinting: short leg splint to be kept clean, dry, and intact until follow-up.   Dressings: Keep clean, dry and intact until follow-up.   Elevation: Elevate RLE on ERLE pillow, leg elevator to keep above the level of the heart as much as possible.   Physical Therapy/Occupational Therapy: Eval and treat.  Consults: Medicine, PT, OT.  Follow-up: Clinic with Dr. Bonilla team in 2 weeks no x-rays needed.   Disposition: Pending progress with therapies, pain control on orals, and medical stability, anticipate discharge to [facility] on POD " #1-2.     Handy Norris MD  Orthopaedic Surgery Resident

## 2023-06-17 NOTE — PROGRESS NOTES
[facility]    Medicine Progress Note - Hospitalist Service, GOLD TEAM 19    Date of Admission:  6/15/2023    Assessment & Plan   Benjamin Mathews is a 58 year old male patient with a past medical history significant for Parkinson's disease c/b neurocognitive disorder, previous etoh dependence (residing in nursing home currently), CVA, DVT/PE (on Eliquis), anxiety, depression, asthma, HTN, HLD, RLS, migraines, and recent admission 5/20/23 for R ankle fracture and underwent closed reduction and external fixation (5/21). He was discharged to TCU and presented to ED 5/27/23 after a fall out of bed. Now admitted to [facility] after undergoing ORIF of ankle fracture 6/15/23.      #S/p ORIF right ankle/pilon fracture-dislocation & removal of spanning external fixator  -ANESTHESIA: General endotracheal with regional block (right sciatic and adductor canal).  -COMPLICATIONS: None apparent intraoperatively or immediately postoperatively.  -IMPLANTS: One Ortholoc posterior malleolar plate with 3.5 mm cortical screws. One Ortholoc posterior distal fibular plate with 3.5 mm locking screws and 3.5 mm cortical screws. Two Ortholoc 4.0 mm partially threaded cannulated screws and washers. All screws used a T15 hex screwdriver. All implants were titanium.  -ESTIMATED BLOOD LOSS: Approximately 100 mL.    Management per primary orthopedics team including pain control, activity, antibiotics, DVT prophylaxis, wound cares. Please refer to their documentation for details.    Complete 24 hours of perioperative cefazolin.    Strict nonweightbearing on the operative lower extremity for 6 weeks.  Use appropriate assistive gait devices and assistance (nursing/PT) for ambulation.    May be up for ADLs and PT/OT but encourage flat bedrest with the foot and ankle of the operative lower extremity elevated above the level of the heart and iced as much of the time as possible for the first 2 weeks  postop.  Elevate heel off of the bed.     DVT prophylaxis: PTA Eliquis 5 mg twice daily initiated.       #Acute on chronic anemia  #Acute blood loss anemia  -Appears baseline hemoglobin ~ 11s  -Preop Hgb 11.6, trend 10.5 --> 9.0     Recommend check another H&H later today.    #Parkinson's Disease (2008) c/b psychosis, dyskinesias, muscle spasms/contracture, depression, sleep disturbance  Seen by Neurology 6/7/23 with the following medication regimen which we will continue:  Movement Disorder-related Medications                   8 AM 12 PM 4 pm 8pm At bedtime  prn   Amantadine 100 mg  1 1           Carbidopa/levodopa IR  mg 2 2 2         Carbidopa/levodopa CR  mg       1       Clonazepam 2 mg 1   1         Clonazepam 1 mg   1       1   Prn dose of clonazepam taken about once a week   - Continue follow with Dr. Arita for Botox injections   - Continue PTA Clozapine for hallucinations   - Continue PTA gabapentin, venlafaxine for mood   - Continue PTA trazodone for sleep   - I greatly appreciate pharmacy assistance in verifying doses and timing based on neurology notes and group home mar.   - Will recommend continuous oximetry and capnography while admitted as he will also be receiving narcotics for acute pain control. Will check BMP and CBC since is on Clozapine.   - Unclear if he is still taking the amantadine but it appears this medication fell off after his most recent hospitalization.     #History of DVT/PE  - Restart PTA DOAC when cleared by primary surgical team     #Cerebrovascular Disease   - Continue PTA statin and resume DOAC when able     #Ulcer dorsal left second toe   Follows with podiatry.   - WOC consult     #SVT  Diagnosed 2007. Last TTE 5/29/22 with EF 65-70%.   - Continue PTA Metoprolol Tartrate 12.5mg BID with hold parameters     #GERD   - Continue PTA PPI     #BPH   - Continue Alfuzosin         Diet: Advance Diet as Tolerated: Regular Diet Adult    DVT Prophylaxis: DOAC  Dudley Catheter:  "Not present  Lines: None     Cardiac Monitoring: None  Code Status: Full Code      Clinically Significant Risk Factors                  # Hypertension: Noted on problem list        # Overweight: Estimated body mass index is 29.21 kg/m  as calculated from the following:    Height as of this encounter: 1.93 m (6' 3.98\").    Weight as of this encounter: 108.8 kg (239 lb 14.4 oz)., PRESENT ON ADMISSION          Disposition Plan Anticipate discharge back to patient's TCU.    Expected Discharge Date: 06/17/2023                  CAMERON LANGSTON MD  Hospitalist Service, GOLD TEAM 19  [facility]  Securely message with [employer] (more info)  Text page via [id number] Paging/Directory   See signed in provider for up to date coverage information  ______________________________________________________________________    Interval History   -- afebrile and HDS  -- Patient denies any acute complaints    Physical Exam   Vital Signs: Temp: 98.6  F (37  C) Temp src: Oral BP: 101/48 Pulse: 83   Resp: 16 SpO2: 95 % O2 Device: None (Room air)    Weight: 239 lbs 14.4 oz    General Appearance: Lying comfortably in bed, on room air, in no acute distress or discomfort.  HEENT: PERRL: EOMI; moist mucous membrane w/o lesions  Neck: No JVD  Pulmonary: Clear to auscultation bilaterally, no wheezes or crackles  CVS: Regular rhythm, no murmurs, rubs or gallops  GI: BS (+), soft nontender, no rebound or guarding   MSK: Right foot in boot  Skin: No rashes or lesions  Neurologic: A&O x3      Medical Decision Making             Data     "

## 2023-06-17 NOTE — CONSULTS
Care Management Initial Consult    General Information  Assessment completed with: Patient,    Type of CM/SW Visit: Initial Assessment    Primary Care Provider verified and updated as needed: Yes   Readmission within the last 30 days: no previous admission in last 30 days         Advance Care Planning: Advance Care Planning Reviewed: present on chart        Communication Assessment  Patient's communication style: spoken language (English or Bilingual)    Hearing Difficulty or Deaf: no   Wear Glasses or Blind: yes    Cognitive  Cognitive/Neuro/Behavioral: WDL  Level of Consciousness: alert     Orientation: oriented x 4  Mood/Behavior: calm, cooperative  Best Language: 0 - No aphasia  Speech: clear, spontaneous    Living Environment:   People in home: facility resident     Current living Arrangements: other (see comments) (TCU)      Able to return to prior arrangements: yes     Family/Social Support:  Care provided by: other (see comments) (TCU staff)  Provides care for: no one, unable/limited ability to care for self  Marital Status: Single  Sibling(s), Facility resident(s)/Staff          Description of Support System: Supportive, Involved    Support Assessment: Adequate family and caregiver support    Current Resources:   Patient receiving home care services: No     Community Resources: Group Home, , County Programs, County Worker  Equipment currently used at home: walker, standard, wheelchair, manual  Supplies currently used at home: Incontinence Supplies    Employment/Financial:  Employment Status: disabled        Financial Concerns: No concerns identified   Referral to Financial Worker: No     Does the patient's insurance plan have a 3 day qualifying hospital stay waiver?  No    Lifestyle & Psychosocial Needs:  Social Determinants of Health     Tobacco Use: High Risk (6/15/2023)    Patient History      Smoking Tobacco Use: Some Days      Smokeless Tobacco Use: Never      Passive Exposure: Not on file    Alcohol Use: Not on file   Financial Resource Strain: Not on file   Food Insecurity: Not on file   Transportation Needs: Not on file   Physical Activity: Not on file   Stress: Not on file   Social Connections: Not on file   Intimate Partner Violence: Not on file   Depression: Not at risk (5/10/2023)    PHQ-2      PHQ-2 Score: 2   Housing Stability: Not on file     Functional Status:  Prior to admission patient needed assistance:   Dependent ADLs:: Ambulation-walker, Wheelchair-independent  Dependent IADLs:: Incontinence, Transportation, Medication Management, Meal Preparation, Shopping, Laundry, Cooking, Cleaning  Assesssment of Functional Status: Not at baseline with ADL Functioning    Mental Health Status:  Mental Health Status: Current Concern  Mental Health Management: Other (see comment) (Depression)    Chemical Dependency Status:  Chemical Dependency Status: No Current Concerns           Values/Beliefs:  Spiritual, Cultural Beliefs, Jehovah's witness Practices, Values that affect care: no             Additional Information:  Per H&P, patient is a 58 year old male patient with a past medical history significant for Parkinson's disease c/b neurocognitive disorder, previous etoh dependence (residing in nursing home currently), CVA, DVT/PE on Xarelto, anxiety, depression, asthma, HTN, HLD, RLS, migraines, and recent admission 5/20/23 for R ankle fracture and underwent closed reduction and external fixation (5/21). He was discharged to TCU and presented to ED 5/27/23 after a fall out of bed. Now admitted to [facility] after undergoing ORIF of ankle fracture 6/15/23.      Writer completed initial assessment due to elevated risk score and discharge planning. Patient currently is at [facility]. Prior to TCU admission he was a resident of [facility] (4 residents in home). He is disabled and receives Group Residential Housing funding and disability. Patient  denies financial concerns or chemical dependency concerns. He reports his Assisted Living staff, brother and sister-in-law are supportive.     At baseline patient receives assistance with transportation ([facility] or [facility] staff), bathing, meals, cleaning, laundry and medications. His primary care physician and advanced healthcare directives on file were confirmed. Patient uses a walker and wheelchair that he has at home. Discharge plan is to return to [facility] (confirmed bed hold) on 6/18 prior to returning to [facility].  to continue to follow for any discharge planning or support needs that arise. Please see  discharge note 6/17.    KIRA Chow, MSW  Adult Acute Care Float   Pager [phone]

## 2023-06-17 NOTE — PROGRESS NOTES
Care Management Discharge Note    Discharge Date: 06/18/23     Discharge Disposition: TCU    [facility]  [address] [place] [address]  PH: [phone]  Weekend on-call RN: [phone]  Weekend Fax: [phone]    Discharge Services: Post Acute Therapies     Discharge DME: None     Discharge Transportation: [facility] wheelchair transportation between 9:40 am and 10:20 am    Private pay costs discussed: transportation costs    Does the patient's insurance plan have a 3 day qualifying hospital stay waiver?  No    PAS Confirmation Code: N/A (Due to bed hold)    Patient/family educated on Medicare website which has current facility and service quality ratings: N/A     Education Provided on the Discharge Plan: Yes      Persons Notified of Discharge Plans: Charge nurse, Bedside nurse, MD, Patient, TCU staff    Patient/Family in Agreement with the Plan: Yes     Handoff Referral Completed: Yes    Additional Information:    Writer will send discharge orders to facility at [phone] tomorrow on discharge day. Nurse please complete nurse to nurse by calling [phone]. IMM and PAS not needed. [facility] wheelchair ride scheduled for between 9:40 am and 10:20 am. Hand off to patient's primary care physician will be completed once discharge orders done.     KIRA Chow, MSW  Adult Acute Care Float   Pager [phone]

## 2023-06-17 NOTE — PLAN OF CARE
"VS: /56 (BP Location: Left arm)   Pulse 87   Temp 98.7  F (37.1  C) (Oral)   Resp 14   Ht 1.93 m (6' 3.98\")   Wt 108.8 kg (239 lb 14.4 oz)   SpO2 95%   BMI 29.21 kg/m     O2: >90% on room air   Output: Voiding using the urinal    Last BM: 6/14/23   Activity: NWB to RLE. Patient able to sit up at edge of bed. Wheelchair bound at at baseline.   Skin: Left second toe wound, dry crusty/flaky skin, surgical incision to R ankle   Pain: Denies    CMS: alert and oriented x4. Forgeful at times. RLE numbness and tingling, regaining sensation.   Dressing: Cast-CDI   Diet: Regular diet    LDA: PIV saline locked    Equipment: Wheelchair, IV pole/pump, personal belongings, call light within patient reach    Plan: To discharge to [facility] [place], [facility] tomorrow. [facility] wheelchair transportation between 9:40 am and 10:20 am   Additional Info:         "

## 2023-06-17 NOTE — PLAN OF CARE
"  VS: /49 (BP Location: Left arm)   Pulse 76   Temp 99  F (37.2  C) (Oral)   Resp 16   Ht 1.93 m (6' 3.98\")   Wt 108.8 kg (239 lb 14.4 oz)   SpO2 95%   BMI 29.21 kg/m    Pt denies chest pain   O2: >90% on RA. Lung sounds clear and equal bilaterally.    Output: Voids to urinal, incontinent at times.    Last BM: 6/14/23    Activity: Ax2 pt not OOB overnight. Ax1 for repositioning in bed    Up for meals? N/A   Skin: R ankle incision, L toe scab, peeling skin to face.    Pain: Pt denies pain   CMS: RLE numbness, pt able to move toes now and sensation is starting to return. L sided weakness due to previous CVA.   A&O x4   Dressing: RLE - CDI   Diet: Regular   LDA: PIV x2 - SL    Equipment: IV pole/pump, walker, gait belt, and personal belongings.    Plan: TBD   Additional Info:        "

## 2023-06-17 NOTE — PROGRESS NOTES
"/48 (BP Location: Left arm)   Pulse 83   Temp 98.6  F (37  C) (Oral)   Resp 16   Ht 1.93 m (6' 3.98\")   Wt 108.8 kg (239 lb 14.4 oz)   SpO2 95%   BMI 29.21 kg/m    O2: >90% on RA. Lung sounds clear and equal bilaterally.    Output: Voids to urinal, incontinent at times.    Last BM: 6/14/23 . Senna and miralax given.   Activity: Ax2 Edge of bed with PT. Ax1 for repositioning in bed    Up for meals? N/A   Skin: R ankle incision, L toe scab painted betadine per order, peeling skin to face.    Pain: Pt denies pain   CMS: RLE numbness, pt able to move toes now and sensation has returned. L sided weakness due to previous CVA.   A&O x4   Dressing: RLE - CDI   Diet: Regular   LDA: PIV x2 - SL    Equipment: IV pole/pump, walker, gait belt, and personal belongings.    Plan: discontinue back to his bed hold TCU Sunday 6/18 at 0920       "

## 2023-06-18 VITALS
SYSTOLIC BLOOD PRESSURE: 114 MMHG | RESPIRATION RATE: 16 BRPM | HEIGHT: 76 IN | DIASTOLIC BLOOD PRESSURE: 59 MMHG | WEIGHT: 239.9 LBS | BODY MASS INDEX: 29.21 KG/M2 | HEART RATE: 79 BPM | OXYGEN SATURATION: 96 % | TEMPERATURE: 96.1 F

## 2023-06-18 PROCEDURE — 99232 SBSQ HOSP IP/OBS MODERATE 35: CPT | Performed by: INTERNAL MEDICINE

## 2023-06-18 PROCEDURE — 250N000013 HC RX MED GY IP 250 OP 250 PS 637: Performed by: PHYSICIAN ASSISTANT

## 2023-06-18 PROCEDURE — 250N000013 HC RX MED GY IP 250 OP 250 PS 637: Performed by: STUDENT IN AN ORGANIZED HEALTH CARE EDUCATION/TRAINING PROGRAM

## 2023-06-18 PROCEDURE — 250N000013 HC RX MED GY IP 250 OP 250 PS 637: Performed by: INTERNAL MEDICINE

## 2023-06-18 RX ADMIN — VENLAFAXINE HYDROCHLORIDE 300 MG: 150 CAPSULE, EXTENDED RELEASE ORAL at 08:13

## 2023-06-18 RX ADMIN — Medication 12.5 MG: at 08:14

## 2023-06-18 RX ADMIN — CARBIDOPA AND LEVODOPA 2 TABLET: 25; 100 TABLET ORAL at 08:14

## 2023-06-18 RX ADMIN — SENNOSIDES AND DOCUSATE SODIUM 1 TABLET: 50; 8.6 TABLET ORAL at 08:13

## 2023-06-18 RX ADMIN — ALFUZOSIN HYDROCHLORIDE 10 MG: 10 TABLET, EXTENDED RELEASE ORAL at 08:14

## 2023-06-18 RX ADMIN — ACETAMINOPHEN 975 MG: 325 TABLET, FILM COATED ORAL at 02:19

## 2023-06-18 RX ADMIN — CLONAZEPAM 2 MG: 2 TABLET ORAL at 08:13

## 2023-06-18 RX ADMIN — POLYETHYLENE GLYCOL 3350 17 G: 17 POWDER, FOR SOLUTION ORAL at 08:11

## 2023-06-18 RX ADMIN — PANTOPRAZOLE SODIUM 40 MG: 40 TABLET, DELAYED RELEASE ORAL at 08:14

## 2023-06-18 RX ADMIN — LACTULOSE 15 ML: 20 SOLUTION ORAL at 08:12

## 2023-06-18 RX ADMIN — GABAPENTIN 800 MG: 800 TABLET, FILM COATED ORAL at 08:14

## 2023-06-18 RX ADMIN — TRAZODONE HYDROCHLORIDE 50 MG: 50 TABLET ORAL at 08:14

## 2023-06-18 RX ADMIN — APIXABAN 5 MG: 5 TABLET, FILM COATED ORAL at 08:13

## 2023-06-18 ASSESSMENT — ACTIVITIES OF DAILY LIVING (ADL)
ADLS_ACUITY_SCORE: 45

## 2023-06-18 NOTE — PLAN OF CARE
Goal Outcome Evaluation:  VS: wnl   O2: stable on RA. LS clear and equal bilaterally. Denies chest pain and SOB.    Output: Voids spontaneously without difficulty/ using beside urinal .   Last BM: 6/14/23  denies abdominal discomfort. BS active / passing flatus.    Activity: Up with Assist of 2 walker, gait belt. Right leg no weight bearing. Pt did not get OOB last night, refused to be turned.   Skin: WDL except, right ankle incision cast in place, swollen both legs slight.   Pain: Pain managed with Tylenol. Denies pain   CMS: Intact, AOx4. Both legs numbness and tingling.   Dressing: Cast on right leg C/D/I   Diet: Regular diet. Denies nausea/vomiting.      LDA: X2 PIV SL   Equipment: IV pole, personal belongings,    Plan: FALL precautions maintained / Continue with plan of care. Call light within reach, pt able to make needs known.    Additional Info: Plan TCU 0900           Patient vital signs are at baseline: Yes  Patient able to ambulate as they were prior to admission or with assist devices provided by therapies during their stay:  No,  Reason:  DID NOT GET UP  Patient MUST void prior to discharge:  Yes  Patient able to tolerate oral intake:  Yes  Pain has adequate pain control using Oral analgesics:  Yes  Does patient have an identified :  Yes  Has goal D/C date and time been discussed with patient:  Yes

## 2023-06-18 NOTE — PROGRESS NOTES
Care Management Discharge Note    Discharge orders faxed to patient's Transitional Care Unit. Hand off to patient's primary care physician completed. Please see discharge note 6/17 for all information regarding patient's discharge today.    KIRA Chow, MSW  Adult Acute Care Float   Pager [phone]

## 2023-06-18 NOTE — PROGRESS NOTES
DISCHARGE SUMMARY    Pt discharging to: [facility]  [address],   [place] [address]  PH: [phone]  Weekend on-call RN: [phone]  Weekend Fax: [phone]  Transportation: w/c  AVS given and discussed: yes/no, for NH  Stoplight Tool given and discussed: no  Medications given: oxy script sent [facility]  Belongings returned: yes  Comments: enema ordered 30 minutes prior to discontinue. Sent with and reported this to facility. They were appricitive of not administering this right at MD. They will give after arrival.

## 2023-06-18 NOTE — PROGRESS NOTES
[facility]    Medicine Progress Note - Hospitalist Service, GOLD TEAM 19    Date of Admission:  6/15/2023    Assessment & Plan   Benjamin Mathews is a 58 year old male patient with a past medical history significant for Parkinson's disease c/b neurocognitive disorder, previous etoh dependence (residing in nursing home currently), CVA, DVT/PE (on Eliquis), anxiety, depression, asthma, HTN, HLD, RLS, migraines, and recent admission 5/20/23 for R ankle fracture and underwent closed reduction and external fixation (5/21). He was discharged to TCU and presented to ED 5/27/23 after a fall out of bed. Now admitted to [facility] after undergoing ORIF of ankle fracture 6/15/23.      #S/p ORIF right ankle/pilon fracture-dislocation & removal of spanning external fixator  -ANESTHESIA: General endotracheal with regional block (right sciatic and adductor canal).  -COMPLICATIONS: None apparent intraoperatively or immediately postoperatively.  -IMPLANTS: One Ortholoc posterior malleolar plate with 3.5 mm cortical screws. One Ortholoc posterior distal fibular plate with 3.5 mm locking screws and 3.5 mm cortical screws. Two Ortholoc 4.0 mm partially threaded cannulated screws and washers. All screws used a T15 hex screwdriver. All implants were titanium.  -ESTIMATED BLOOD LOSS: Approximately 100 mL.    Management per primary orthopedics team including pain control, activity, antibiotics, DVT prophylaxis, wound cares. Please refer to their documentation for details.    Complete 24 hours of perioperative cefazolin.    Strict nonweightbearing on the operative lower extremity for 6 weeks.  Use appropriate assistive gait devices and assistance (nursing/PT) for ambulation.    May be up for ADLs and PT/OT but encourage flat bedrest with the foot and ankle of the operative lower extremity elevated above the level of the heart and iced as much of the time as possible for the first 2 weeks  "postop.  Elevate heel off of the bed.     DVT prophylaxis: PTA Eliquis 5 mg twice daily initiated.       #Acute on chronic anemia  #Acute blood loss anemia  -Appears baseline hemoglobin ~ 11s  -Preop Hgb 11.6, trend 10.5 --> 9.0 --> 9.4       #Parkinson's Disease (2008) c/b psychosis, dyskinesias, muscle spasms/contracture, depression, sleep disturbance  Seen by Neurology 6/7/23 with the following medication regimen which we will continue:  Movement Disorder-related Medications                   8 AM 12 PM 4 pm 8pm At bedtime  prn   Amantadine 100 mg  1 1           Carbidopa/levodopa IR  mg 2 2 2         Carbidopa/levodopa CR  mg       1       Clonazepam 2 mg 1   1         Clonazepam 1 mg   1       1   Prn dose of clonazepam taken about once a week   - Continue follow with Dr. Arita for Botox injections   - Continue PTA Clozapine for hallucinations   - Continue PTA gabapentin, venlafaxine for mood   - Continue PTA trazodone for sleep     #History of DVT/PE  -Continue PTA DOAC when cleared by primary surgical team     #Cerebrovascular Disease   - Continue PTA statin     #Ulcer dorsal left second toe   Follows with podiatry.   - WOC consult     #SVT  Diagnosed 2007. Last TTE 5/29/22 with EF 65-70%.   - Continue PTA Metoprolol Tartrate 12.5mg BID with hold parameters     #GERD   - Continue PTA PPI     #BPH   - Continue Alfuzosin    #Constipation  -Recommend ongoing aggressive bowel regimen with MiraLAX, Skylar-Colace, Linzess as needed.     Diet: Advance Diet as Tolerated: Regular Diet Adult  Diet    DVT Prophylaxis: DOAC  Dudley Catheter: Not present  Lines: None     Cardiac Monitoring: None  Code Status: Full Code      Clinically Significant Risk Factors                  # Hypertension: Noted on problem list        # Overweight: Estimated body mass index is 29.21 kg/m  as calculated from the following:    Height as of this encounter: 1.93 m (6' 3.98\").    Weight as of this encounter: 108.8 kg (239 lb 14.4 " oz)., PRESENT ON ADMISSION          Disposition Plan Anticipate discharge back to patient's TCU.       CAMERON LANGSTON MD  Hospitalist Service, GOLD TEAM 19  [facility]  Securely message with [employer] (more info)  Text page via [employer] Paging/Directory   See signed in provider for up to date coverage information  ______________________________________________________________________    Interval History   -- afebrile and HDS  -- Patient denies any acute complaints  -- P.o. intake has been normal.  Passing gas, but no BM.    Physical Exam   Vital Signs: Temp: (!) 96  F (35.6  C) Temp src: Oral BP: 103/55 Pulse: 81   Resp: 20 SpO2: 96 % O2 Device: None (Room air)    Weight: 239 lbs 14.4 oz    General Appearance: Lying comfortably in bed, on room air, in no acute distress or discomfort.  HEENT: PERRL: EOMI; moist mucous membrane w/o lesions  Neck: No JVD  Pulmonary: mild bibasilar crackles.   CVS: Regular rhythm, no murmurs, rubs or gallops  GI: BS (+), soft nontender, no rebound or guarding   MSK: Right foot in boot  Skin: No rashes or lesions  Neurologic: A&O x3      Medical Decision Making             Data

## 2023-06-18 NOTE — DISCHARGE SUMMARY
[facility]  Orthopedic Surgery Discharge Summary     Date of Admission: 6/15/2023  Date of Discharge: 6/18/2023  Disposition: Rehab  Staff Physician: Jose Bonilla MD  Primary Care Provider: Stephanie Maldonado    ADMISSION DIAGNOSIS:  Closed fracture of right ankle, initial encounter [S82.891A]    DISCHARGE DIAGNOSIS:  Closed fracture of right ankle, initial encounter [S82.211A]    PROCEDURES: Procedure(s):  OPEN REDUCTION INTERNAL FIXATION, FRACTURE, ANKLE, removal of external fixator device on 6/15/2023    BRIEF HISTORY:  Benjamin Mathews is a 58 year old male patient with PMH of tobacco use, alcohol abuse, anemia, benzodiazepine withdrawl, asthma, arrythmia, chronic anticoagulation, cerebrovascular accident, chronic DVT,pulmonary embolism, chronic pain disorder, Parkinson's disease, left hemiparesis, and other medical conditions, who uses a walker at baseline, lives in a group home, and endorses a history of baseline bilateral foot neuropathy. He apparently sustained a ground level fall on or around 05/20/2023 due to a syncopal event and collapse while he was walking with a walker, sustaining a closed right trimalleolar ankle / pilon fracture-dislocation. He was closed reduced and placed in a spanning external fixator about the right ankle. The recommendation was given to the patient for operative treatment of this condition, consisting of either open reduction internal fixation or primary ankle arthrodesis given the poorer prognosis for this injury and the high anticipated likelihood of needing a future ankle arthrodesis. After discussing the pros and cons of both options, the patient elected to proceed with open reduction, internal fixation of his right ankle / pilon fracture-dislocation without arthrodesis. The injury diagnosis and expected prognosis, nature of the recommended procedure, the risks, benefits, and alternatives, the postoperative plan, and realistic expectations  "for short and long term outcome were all discussed with the patient in layman's terms. No guarantees were expressed or implied as to outcome. The patient had the opportunity to have all the questions he had at the time asked and answered appropriately, verbalized his understanding of this discussion, and verbalized his wish to proceed with the planned procedure. Written informed consent was obtained.    HOSPITAL COURSE:  The patient was admitted following the above listed procedures for pain control and rehabilitation. Benjamin Mathews did well post-operatively. Medicine was consulted post operatively to aid in management of medical co-morbidities. The patient received routine nursing cares and at the time of discharge was medically stable. Vital signs were stable throughout admission. The patient is tolerating a regular diet and is voiding spontaneously. All PT/OT goals have been met for safe mobility. Pain is now controlled on oral medications which will be available on discharge. Stool softeners have been used while taking pain medications to help prevent constipation. Benjamin Mathews is deemed medically safe to discharge on POD3.     Antibiotics:  Ancef given periop and 24 hours postop.   DVT Prophylaxis:  Mechanical prophylaxis and PTA apixaban 5 mg BID initiated after surgery.   PT Progress:  Has met PT/OT goals for safe mobility.    Pain Meds:  Weaned off all IV pain meds by discharge.  Inpatient Events: No significant events or complications.     PHYSICAL EXAM:  /59 (BP Location: Left arm)   Pulse 79   Temp (!) 96.1  F (35.6  C) (Oral)   Resp 16   Ht 1.93 m (6' 3.98\")   Wt 108.8 kg (239 lb 14.4 oz)   SpO2 96%   BMI 29.21 kg/m    No intake/output data recorded.    General: NAD. Resting comfortably in bed.  Respiratory: Breathing comfortably on RA.  Drain Output: No drain.  Musculoskeletal: Focused exam of the right lower extremity: Splint C/D/I. Fires FHL and EHL. SILT to exposed skin. Brisk " capillary refill.     FOLLOWUP:  Follow up with Dr. Bonilla's team at 2 weeks postoperatively.    Future Appointments   Date Time Provider Department Center   6/20/2023 10:30 AM Viji Luo PT [facility]   7/3/2023 10:30 AM Nurse, Janay U [facility]   7/10/2023  2:15 PM Miryam Montemayor APRN CNP GAS [facility]   7/25/2023  8:20 AM Ember Arita MD [facility]   7/28/2023 11:20 AM Jose Bonilla MD [facility]   8/18/2023 11:20 AM Trav Medel MD [facility]   8/29/2023 10:00 AM Dequan York DPM [facility]   11/3/2023 11:30 AM Juju Bernardo PA-C [facility]   12/20/2023  1:00 PM Ching Carlos DO [facility]     Orthopedic surgery appointments are at the [facility] ([address] [place] [address]). Call [phone] to schedule a follow-up appointment at this location with your provider.     PLANNED DISCHARGE ORDERS:     DVT Prophylaxis: PTA apixaban 5 mg BID.     Activity: See below.     Wound Care: See below.      Current Discharge Medication List      START taking these medications    Details   senna-docusate (SENOKOT-S/PERICOLACE) 8.6-50 MG tablet Take 1-2 tablets by mouth 2 times daily Take while on oral narcotics to prevent or treat constipation.  Qty: 30 tablet, Refills: 0    Comments: While taking narcotics  Associated Diagnoses: Closed fracture of right ankle with routine healing, subsequent encounter         CONTINUE these medications which have CHANGED    Details   oxyCODONE (ROXICODONE) 5 MG tablet Take 1 tablet (5 mg) by mouth every 4 hours as needed for moderate pain  Qty: 20 tablet, Refills: 0    Associated Diagnoses: Closed fracture of right ankle with routine healing, subsequent encounter; Acute post-operative pain         CONTINUE these medications which have NOT CHANGED    Details   alfuzosin ER (UROXATRAL) 10 MG 24 hr tablet Take 1 tablet (10 mg) by mouth daily  Qty: 30 tablet, Refills: 0     Associated Diagnoses: Closed fracture of right ankle, initial encounter      apixaban ANTICOAGULANT (ELIQUIS) 5 MG tablet Take 5 mg by mouth 2 times daily      atorvastatin (LIPITOR) 40 MG tablet Take 1 tablet (40 mg) by mouth every evening  Qty: 30 tablet, Refills: 0    Associated Diagnoses: Closed fracture of right ankle, initial encounter      bisacodyl (DULCOLAX) 10 MG suppository Place 1 suppository (10 mg) rectally daily as needed for constipation  Qty: 10 suppository, Refills: 0    Associated Diagnoses: Closed fracture of right ankle, initial encounter      carbidopa-levodopa (SINEMET CR)  MG CR tablet Take 1 tablet by mouth At Bedtime  Qty: 30 tablet, Refills: 11    Associated Diagnoses: Parkinson's disease (H)      carbidopa-levodopa (SINEMET)  MG tablet Take 2 tablets by mouth 3 times daily  Qty: 180 tablet, Refills: 11    Associated Diagnoses: Parkinson's disease (H)      cholecalciferol (VITAMIN D3) 125 mcg (5000 units) capsule Take 1 capsule (125 mcg) by mouth daily  Qty: 30 capsule, Refills: 0    Associated Diagnoses: Closed fracture of right ankle, initial encounter      !! clonazePAM (KLONOPIN) 1 MG tablet Take 1 mg by mouth daily as needed for anxiety      !! clonazePAM (KLONOPIN) 1 MG tablet Take 1 tab daily at 12 pm and okay to take 1 tab daily prn.  Qty: 60 tablet, Refills: 3    Associated Diagnoses: Parkinson's disease (H); Dystonia, torsion, fragments of      cloZAPine (CLOZARIL) 50 MG tablet Take 1 tablet (50 mg) by mouth At Bedtime  Qty: 30 tablet, Refills: 0    Associated Diagnoses: Closed fracture of right ankle, initial encounter      gabapentin (NEURONTIN) 800 MG tablet Take 1 tablet (800 mg) by mouth 3 times daily  Qty: 90 tablet, Refills: 0    Associated Diagnoses: Closed fracture of right ankle, initial encounter      hydrOXYzine (ATARAX) 25 MG tablet Take 2 tablets (50 mg) by mouth every 6 hours as needed for anxiety (up to 3 timrd daily)  Qty: 20 tablet, Refills: 0     Associated Diagnoses: Closed fracture of right ankle, initial encounter      ketoconazole (NIZORAL) 2 % external cream Apply topically 2 times daily      ketoconazole (NIZORAL) 2 % external shampoo Apply topically once a week On Wednesdays      lactulose (CHRONULAC) 10 GM/15ML solution Take 15 mLs by mouth 2 times daily  Qty: 473 mL, Refills: 0    Associated Diagnoses: Closed fracture of right ankle, initial encounter      metoprolol succinate ER (TOPROL XL) 25 MG 24 hr tablet Take 0.5 tablets (12.5 mg) by mouth 2 times daily  Qty: 30 tablet, Refills: 0    Associated Diagnoses: Closed fracture of right ankle, initial encounter      multivitamin, therapeutic (THERA-VIT) TABS tablet Take 1 tablet by mouth daily  Qty: 30 tablet, Refills: 0    Associated Diagnoses: Closed fracture of right ankle, initial encounter      pantoprazole (PROTONIX) 40 MG EC tablet Take 1 tablet (40 mg) by mouth daily Take 1 tablet (40mg) by mouth daily at 8am  Qty: 30 tablet, Refills: 0    Associated Diagnoses: Closed fracture of right ankle, initial encounter      polyethylene glycol (MIRALAX) 17 GM/Dose powder Take 17 g by mouth 2 times daily One capful in liquid by mouth twice a day, 8 AM and one dose at supper  Qty: 850 g, Refills: 0    Associated Diagnoses: Slow transit constipation      sodium phosphate (FLEET ENEMA) 7-19 GM/118ML rectal enema Place 1 enema rectally daily as needed for constipation      !! traZODone (DESYREL) 100 MG tablet Take 100 mg by mouth At Bedtime      !! traZODone (DESYREL) 50 MG tablet Take 1 tablet (50 mg) by mouth every morning  Qty: 30 tablet, Refills: 0    Associated Diagnoses: Closed fracture of right ankle, initial encounter      venlafaxine (EFFEXOR XR) 150 MG 24 hr capsule Take 2 capsules (300 mg) by mouth daily  Qty: 60 capsule, Refills: 0    Associated Diagnoses: Closed fracture of right ankle, initial encounter      vitamin C (ASCORBIC ACID) 500 MG tablet Take 1 tablet (500 mg) by mouth daily  Qty:  "30 tablet, Refills: 0    Associated Diagnoses: Closed fracture of right ankle, initial encounter      linaclotide (LINZESS) 290 MCG capsule Take 1 capsule (290 mcg) by mouth daily as needed (IBS)  Qty: 30 capsule, Refills: 0    Associated Diagnoses: Closed fracture of right ankle, initial encounter       !! - Potential duplicate medications found. Please discuss with provider.           Discharge Procedure Orders   Primary Care - Care Coordination Referral   Standing Status: Future   Referral Priority: Routine: Next available opening Referral Type: Care Coordination   Number of Visits Requested: 1     General info for SNF   Order Comments: Length of Stay Estimate: Short Term Care: Estimated # of Days 31-90 Condition at Discharge: Improving Level of care:skilled  Rehabilitation Potential: Fair Admission H&P remains valid and up-to-date: Yes Recent Chemotherapy: N/A Use Nursing Home Standing Orders: Yes     Mantoux Instructions   Order Comments: Give two-step Mantoux (PPD) Per Facility Policy {.:[id number]     Incentive Spirometry   Order Comments: Incentive Spirometry 10 times per hour, 4 times per day.     Reason for your hospital stay   Order Comments: Ankle fracture surgery     When to call - Contact Surgeon Team   Order Comments: You may experience symptoms that require follow-up before your scheduled appointment. Refer to the \"Stoplight Tool\" for instructions on when to contact your Surgeon Team if you are concerned about pain control, blood clots, constipation, or if you are unable to urinate.     When to call - Reach out to Urgent Care   Order Comments: If you are not able to reach your Surgeon Team and you need immediate care, go to the Orthopedic Walk-in Clinic or Urgent Care at your Surgeon's office.  Do NOT go to the Emergency Room unless you have shortness of breath, chest pain, or other signs of a medical emergency.     When to call - Reasons to Call 911   Order Comments: Call 911 immediately if you " experience sudden-onset chest pain, arm weakness/numbness, slurred speech, or shortness of breath     Symptoms - Fever Management   Order Comments: A low grade fever can be expected after surgery.  Use acetaminophen (TYLENOL) as needed for fever management.  Contact your Surgeon Team if you have a fever greater than 101.5 F, chills, and/or night sweats.     Symptoms - Constipation management   Order Comments: Constipation (hard, dry bowel movements) is expected after surgery due to the combination of being less active, the anesthetic, and the opioid pain medication.  You can do the following to help reduce constipation:  ~  FLUIDS:  Drink clear liquids (water or Gatorade), or juice (apple/prune).  ~  DIET:  Eat a fiber rich diet.    ~  ACTIVITY:  Get up and move around several times a day.  Increase your activity as you are able.  MEDICATIONS:  Reduce the risk of constipation by starting medications before you are constipated.  You can take Miralax   (1 packet as directed) and/or a stool softener (Senokot 1-2 tablets 1-2 times a day).  If you already have constipation and these medications are not working, you can get magnesium citrate and use as directed.  If you continue to have constipation you can try an over the counter suppository or enema.  Call your Surgeon Team if it has been greater than 3 days since your last bowel movement.     Symptoms - Reduced Urine Output   Order Comments: Changes in the amount of fluids you drank before and after surgery may result in problems urinating.  It is important to stay well-hydrated after surgery and drink plenty of water. If it has been greater than 8 hours since you have urinated despite drinking plenty of water, call your Surgeon Team.     Activity - Exercises to prevent blood clots   Order Comments: Unless otherwise directed by your Surgeon team, perform the following exercises at least three times per day for the first four weeks after surgery to prevent blood clots  in your legs: 1) Point and flex your feet (Ankle Pumps), 2) Move your ankle around in big circles, 3) Wiggle your toes, 4) Walk, even for short distances, several times a day, will help decrease the risk of blood clots.     Order Specific Question Answer Comments   Is discharge order? Yes      Comfort and Pain Management - Pain after Surgery   Order Comments: Pain after surgery is normal and expected.  You will have some amount of pain for several weeks after surgery.  Your pain will improve with time.  There are several things you can do to help reduce your pain including: rest, ice, elevation, and using pain medications as needed. Contact your Surgeon Team if you have pain that persists or worsens after surgery despite rest, ice, elevation, and taking your medication(s) as prescribed. Contact your Surgeon Team if you have new numbness, tingling, or weakness in your operative extremity.     Comfort and Pain Management - Swelling after Surgery   Order Comments: Swelling and/or bruising of the surgical extremity is common and may persist for several months after surgery. In addition to frequent icing and elevation, gentle compressive support with an ACE wrap or tubigrip may help with swelling. Apply compression regularly, removing at least twice daily to perform skin checks. Contact your Surgeon Team if your swelling increases and is NOT associated with an increase in your activity level, or if your swelling increases and is associated with redness and pain.     Comfort and Pain Management - Cold therapy   Order Comments: Ice can be used to control swelling and discomfort after surgery. Place a thin towel over your operative site and apply the ice pack overtop. Leave ice pack in place for 20 minutes, then remove for 20 minutes. Repeat this 20 minutes on/20 minutes off routine as often as tolerated.     Medication Instructions - Acetaminophen (TYLENOL) Instructions   Order Comments: You were discharged with  acetaminophen (TYLENOL) for pain management after surgery. Acetaminophen most effectively manages pain symptoms when it is taken on a schedule without missing doses (every four, six, or eight hours). Your Provider will prescribe a safe daily dose between 3000 - 4000 mg.  Do NOT exceed this daily dose. Most patients use acetaminophen for pain control for the first four weeks after surgery.  You can wean from this medication as your pain decreases.     Medication Instructions - NSAID Instructions   Order Comments: You were discharged with an anti-inflammatory medication for pain management to use in combination with acetaminophen (TYLENOL) and the narcotic pain medication.  Take this medication exactly as directed.  You should only take one anti-inflammatory at a time.  Some common anti-inflammatories include: ibuprofen (ADVIL, MOTRIN), naproxen (ALEVE, NAPROSYN), celecoxib (CELEBREX), meloxicam (MOBIC), ketorolac (TORADOL).  Take this medication with food and water.     Medication Instructions - Opioids - Tapering Instructions   Order Comments: In the first three days following surgery, your symptoms may warrant use of the narcotic pain medication every four to six hours as prescribed. This is normal. As your pain symptoms improve, focus your efforts on decreasing (tapering) use of narcotic medications. The most successful tapering strategy is to first, decrease the number of tablets you take every 4-6 hours to the minimum prescribed. Then, increase the amount of time between doses.  For example:  First, taper to   or 1 tablet every 4-6 hours.  Then, taper to   or 1 tablet every 6-8 hours.  Then, taper to   or 1 tablet every 8-10 hours.  Then, taper to   or 1 tablet every 10-12 hours.  Then, taper to   or 1 tablet at bedtime.  The bedtime dose can help with comfort during sleep and is typically the last dose to be discontinued after surgery.     Follow Up Care   Order Comments: Follow-up with your Surgeon Team in 2  "weeks for wound check.     Shower with wound/dressing covered   Order Comments: Recommend bed-bath only.  Splint must be kept clean dry     Medication instructions - Anticoagulation - other   Order Comments: Resume prior to admission AC     Comfort and Pain Management - LOWER Extremity Elevation   Order Comments: Swelling is expected for several months after surgery. This type of swelling is usually associated with gravity and activity, and can be improved with elevation.   The best way to do this is to get your \"toes above your nose\" by laying down and placing several pillows lengthwise under your calf and heel. When elevating your leg keep your knee completely straight. Perform this elevation as often as possible especially for the first two weeks after surgery.     Medication Instructions - Opioid Instructions (Greater than or equal to 65 years)   Order Comments: You were discharged with an opioid medication (hydromorphone, oxycodone, hydrocodone, or tramadol). This medication should only be taken for breakthrough pain that is not controlled with acetaminophen (TYLENOL). If you rate your pain less than 3 you do not need this medication.  Pain rating 0-3:  You do not need this medication  Pain rating 4-6:  Take 1/2 tablet every 4-6 hours as needed  Pain rating 7-10:  Take 1 tablet every 4-6 hours as needed  Do not exceed 4 tablets per day     Physical Therapy Adult Consult   Order Comments: Evaluate and treat as clinically indicated.    Reason: Status Post Surgery     Occupational Therapy Adult Consult   Order Comments: Evaluate and treat as clinically indicated.    Reason: Status Post Surgery     Wheelchair DME   Order Comments: Wheelchair Documentation:   Describe the reason for need to support medical necessity: non-weight bearing of lower extremity .   1. The patient has mobility limitations that impairs their ability to participate in one or more mobility related activities: Toileting, Grooming, and " Bathing.  2. The patient's mobility limitations cannot be safely resolved by using a cane/walker: Yes  3. The patients home has adequate access to use a manual wheelchair: Yes  4. The use of a manual wheelchair on a regular basis will improve the patients ability to participate in mobility related ADL's at home: Yes  5. The patient is willing to use a manual wheelchair at home: Yes  6. The patient has adequate upper body strength and the mental capability to safely use a manual wheelchair and/or has a caregiver that is able to assist: Yes  7. Does the patient have a lower extremity injury or edema?: Yes    Reason for Type of Wheelchair: Standard     **Use of a manual wheelchair will significantly improve the patient's ability to participate in MRADLs and the patient will use it on a regular basis in the home. The patient has not expressed an unwillingness to use the manual wheelchair that is provided in the home.**    I, the undersigned, certify that the above prescribed supplies are medically necessary for this patient and is both reasonable and necessary in reference to accepted standards of medical and necessary in reference to accepted standards of medical practice in the treatment of this patient's condition and is not prescribed as a convenience.     Order Specific Question Answer Comments   Wheelchair Type: Standard    Length of Need: Lifetime    Leg Rests: Standard    Arm Rests: Standard    The face to face evaluation was performed on: 6/17/2023      Diet   Order Comments: Follow this diet upon discharge: Orders Placed This Encounter      Advance Diet as Tolerated: Regular Diet Adult     Order Specific Question Answer Comments   Is discharge order? Yes      Handy Norris MD  Orthopedic Surgery PGY4

## 2023-06-20 NOTE — PLAN OF CARE
Physical Therapy Discharge Summary    Reason for therapy discharge:    Discharged to transitional care facility.    Progress towards therapy goal(s). See goals on Care Plan in [facility] electronic health record for goal details.  Goals partially met.  Barriers to achieving goals:   discharge from facility.    Therapy recommendation(s):    Continued therapy is recommended.  Rationale/Recommendations:  Continue PT via TCU.

## 2023-06-22 ENCOUNTER — TELEPHONE (OUTPATIENT)
Dept: NEUROLOGY | Facility: CLINIC | Age: 58
End: 2023-06-22
Payer: COMMERCIAL

## 2023-06-22 NOTE — TELEPHONE ENCOUNTER
[facility] Call Center    Phone Message    May a detailed message be left on voicemail: yes     Reason for Call: Other:   Caller stated the Pharmacy had  A discontinued notification for the medication clonazepam and wanted to know if this was true so that things can be updated.     Please call Sindy @ [phone] to discuss further.    Action Taken: Message routed to:  Other: [facility]    Travel Screening: Not Applicable

## 2023-06-22 NOTE — TELEPHONE ENCOUNTER
Spoke with pharmacist (see thai from today), regarding change in Rx and pharmacy. Pharmacist will disregard cancellation of medication until patient reestablishes there services.    Ron Sam RN, BSN  [facility]

## 2023-07-03 ENCOUNTER — OFFICE VISIT (OUTPATIENT)
Dept: ORTHOPEDICS | Facility: CLINIC | Age: 58
End: 2023-07-03
Payer: COMMERCIAL

## 2023-07-03 DIAGNOSIS — Z98.890 STATUS POST SURGERY: Primary | ICD-10-CM

## 2023-07-03 PROCEDURE — 99207 PR NO CHARGE NURSE ONLY: CPT

## 2023-07-03 NOTE — PROGRESS NOTES
"Reason for visit:    Benjamin Mathews came in to the clinic for a two week post op check.    His surgery was done 6/15/2023 by Dr Bonilla.  He had a right ankle ORIF.     Assessment:    Benjamin came into the clinic in a motorized wheelchair Non-WB, in a post op plaster splint.    The Surgical wounds were exposed and found to be well-healed; so the sutures were removed. Skin was c/d/i. Steri strips were applied. Moderate swelling noted. Patient's ankle wrapped with an ACE bandage for compression. Benjamin states [facility] staff [facility] transferred him and banged his great toe on the wall. The pain radiated up his lower leg, but has since gotten better so he declines x-ray today. He really wants to go back home to his assisted living facility. He is afraid for his care at the [facility] and believes the staff may be stealing his medication. He says he takes medication for tremors and spacticity and when he is having spasms he asks for his medication, to which the staff respond, \"we already gave it to you\". But he denies that they have. He would like me to ask Dr. Bonilla if he may go home, which I will.     Plan:     He was placed into a CAM boot.  He was told to remain Non-WB with the exception of transfers. He may remove the boot for hygiene.      He has an appointment to see Dr. Bonilla at 6 weeks post op and at that time Dr. Bonilla will determine further restrictions.    He has our phone number and will call with questions or problems.    Taryn Wu PA-C is the overseeing provider on site today.    DME FITTING    Relevant Diagnosis: Right ankle ORIF  CAM boot brace was fit on patient's Right ankle.     Person(s) involved in teaching:   Patient    Brace was applied in standard Manner:  Yes  Brace fit well:  Yes  Patient reports brace to fit comfortably:  Yes    Education:   Patient shown self application and removal of brace: Yes  Patient shown how to adjust brace fit, if necessary: Yes  Patient educated on " Report received from off going nurse, CONSTANCE Bolton. Patient AAO. No signs of distress noted. Call light in reach. Bed low and locked. Will continue to monitor.    billing and return policy: Yes  Patient confirmed understanding when and how to contact clinic with concerns: Yes

## 2023-07-12 DIAGNOSIS — Z98.890 STATUS POST SURGERY: Primary | ICD-10-CM

## 2023-07-25 ENCOUNTER — OFFICE VISIT (OUTPATIENT)
Dept: PHYSICAL MEDICINE AND REHAB | Facility: CLINIC | Age: 58
End: 2023-07-25
Payer: COMMERCIAL

## 2023-07-25 VITALS — OXYGEN SATURATION: 97 % | HEART RATE: 79 BPM | DIASTOLIC BLOOD PRESSURE: 65 MMHG | SYSTOLIC BLOOD PRESSURE: 100 MMHG

## 2023-07-25 DIAGNOSIS — G24.8 SEGMENTAL DYSTONIA: ICD-10-CM

## 2023-07-25 DIAGNOSIS — G24.3 CERVICAL DYSTONIA: Primary | ICD-10-CM

## 2023-07-25 PROCEDURE — 95874 GUIDE NERV DESTR NEEDLE EMG: CPT | Performed by: PHYSICAL MEDICINE & REHABILITATION

## 2023-07-25 PROCEDURE — 64642 CHEMODENERV 1 EXTREMITY 1-4: CPT | Performed by: PHYSICAL MEDICINE & REHABILITATION

## 2023-07-25 PROCEDURE — 64616 CHEMODENERV MUSC NECK DYSTON: CPT | Mod: RT | Performed by: PHYSICAL MEDICINE & REHABILITATION

## 2023-07-25 ASSESSMENT — PAIN SCALES - GENERAL: PAINLEVEL: NO PAIN (0)

## 2023-07-25 NOTE — NURSING NOTE
Chief Complaint   Patient presents with    RECHECK     Botox injections confirmed with patient     Lindsay Madrid CMA at 8:02 AM on 7/25/2023.

## 2023-07-25 NOTE — LETTER
7/25/2023       RE: Benjamin Mathews  [address]  [facility] [address]       Dear Colleague,    Thank you for referring your patient, Benjamin Mathews, to the [facility] CLIN[facility] [place] at [facility]. Please see a copy of my visit note below.      [facility] CL[facility]IC    PM&R CLINIC NOTE  BOTULINUM TOXIN PROCEDURE      HPI  Chief Complaint   Patient presents with    RECHECK     Botox injections confirmed with patient     Benjamin Mathews is a 58 year old male who was referred to our clinic for more evaluation of his dystonia and trial of botulinum toxin injections (referral from Dr. Suazo). He was seen on 10/6/22 as initial consult; please see the consult note for details. She has history of Parkinsonism and is followed by Dr. Perry. He was referred to palliative care team and has been followed by them since then.     --saw urology team in Nov 2022 for LUTS likely due to BPH and was started on alfuzosin  --saw Dr. Carlos in Jan; it was recommended to continue carbidopa/levodopa and amantadine. He is also on clonazepam prn. Ok to continue botox injections.   --had neuropsych testing 1/13; reviewed the results.      SINCE LAST VISIT  Benjamin Mathews was last seen here in clinic on 4/27/2023.    Patient reports the following new medical problems since last visit: **  Saw Dr. Rodriguez 5/10 - it was recommended to repeat DEXA in 2 years and f/unit(s).     Saw Dr. Carlos 6/7 - note was reviewed. He stays on clonazepam and sinemet.     Was admitted on 5/20 after a fall resulting in right ankle fracture s/p closed reduction and external fixation by ortho team. He has been at [facility] since then. Had a f/up on 7/3 - He was placed into a CAM boot.  He was told to remain Non-WB with the exception of transfers. He may remove the boot for hygiene. He has an appointment to see Dr. Bonilla in a  Thank you for choosing [facility]! It is an honor to care for you.    Appointment hours for Dr. Ann Francis are as follows and may change based on need:   Monday 11am - 4:30pm   Tuesday 9am - 2pm   Wednesday 9am - 11am   Thursday 9am - 2pm   Friday 9am -1pm   Weekend and after hours phone triage available    Refills: We appreciate you being proactive and calling before your medications run out. Please request medication refills through your pharmacy. They will check their files for available refills and then contact our office for additional approval if necessary. Please allow 24-48 hours for the refill requests to be reviewed and processed. Please be aware medications require current evaluations and testing to ensure your medication is the best treatment option for you. Please refer to your After Visit Summary at each visit for Dr. Francis’s recommendations.    Testing: If Dr. Francis has ordered laboratory or radiology testing as part of your ongoing plan of care, please allow 5-7 business days from the day of your testing to be sent to and reviewed by Dr. Francis. Our office will contact you once all results are final. Your results will be conveyed to you either by a phone call, letter, or at your next appointment. If your results are critical and require more immediate attention, you will be contacted sooner. If you have not heard from our office within 2 weeks, please contact us at [phone].    If you are a [facility] user, please keep in mind your doctor may not always have had the opportunity to review your results before they are released to your [employer] account. Test results may be posted within a few hours or few weeks depending on the test results. Not all tests result at the same time. Once your test results are available for viewing, you will receive an email stating you have a test result available for review.     Recommendations/ Referrals: Dr. Francis may recommend  few days and at that time Dr. Bonilla will determine further restrictions.    Today, he was doing well. Noted that his RLE has been weak due to CAM boot and not walking. Therapy team has been working on transfers; he stands on his RLE but no walking.     He was not getting his clonazepam during the hospital stay so his spasms got really worse. They restarted that at TCU but seems to less than before. He thinks sinemet makes him really stiff and wants to cut down on the dose.      It's hard for him to talk about his response to the injections. He thinks neck injections were definitely helpful but he has been dealing with pain and some muscle weakness in his RLE due to surgery and medication changes as above.       PHYSICAL EXAM  VS: /65 (BP Location: Left arm, Patient Position: Sitting, Cuff Size: Adult Large)   Pulse 79   SpO2 97%    GEN: Pleasant and cooperative, in no acute distress  HEENT: Moist mucous membranes     General: Sitting in his power wheelchair leaning to the right with his neck tilted to the right and rotated to the left  C-spine range of motion was moderately limited with extension and left lateral bending, he was also mildly limited in all other directionsReduced left sided rotation as compared to the right side. Restricted extension of the neck. Tight right scm   Continues to have left sided weakness    Right leg in CAM boot and was not examined today       ALLERGIES  Allergies   Allergen Reactions    Amantadine Other (See Comments)     Other reaction(s): Hallucinations  Hallucinations/ lost self control/gambling.     halluicnations  Hallucinations/ lost self control/gambling.     hallucinates  halluicnations  hallucinates  Hallucinations/ lost self control/gambling.       Quetiapine GI Disturbance, Diarrhea and Other (See Comments)     Diarrhea    Diarrhea  Other reaction(s): GI Disturbance  Diarrhea      Duloxetine Other (See Comments)     suicidal  suicidal         CURRENT  you see a specialist or order testing that may require prior approval from your insurance. We will do our best to advise you if approval is needed. Please contact your insurance to ensure recommended providers are part of your insurance network. If that doctor orders testing, please note their office will contact you with results. HMO patients and those requiring referrals: Please allow time for our referral department to contact your insurance. Once they have completed the process, they will call you with information needed to proceed with the recommendations. Do not schedule until our office notifies you of approval.    Survey: You may receive a patient satisfaction survey in the mail. Your feedback is very important to us. Please take the time to complete the survey and return it in the envelope provided. We strive to make your experience exceptional and your comments help us with that goal. We look forward to hearing from you!     As always, please do not hesitate to contact our office at [phone] with any questions or concerns. We will be happy to assist you.      [facility] [address] [place]  [address]               [phone]  Hours of Operation:   Monday - Friday 8:00 a.m. -8:00 p.m.  Saturday and Sunday 8:00 a.m. - 2:00 p.m.   Holiday hours vary, please call the clinic for holiday hours.       MEDICATIONS    Current Outpatient Medications:     alfuzosin ER (UROXATRAL) 10 MG 24 hr tablet, Take 1 tablet (10 mg) by mouth daily, Disp: 30 tablet, Rfl: 0    apixaban ANTICOAGULANT (ELIQUIS) 5 MG tablet, Take 5 mg by mouth 2 times daily, Disp: , Rfl:     atorvastatin (LIPITOR) 40 MG tablet, Take 1 tablet (40 mg) by mouth every evening, Disp: 30 tablet, Rfl: 0    bisacodyl (DULCOLAX) 10 MG suppository, Place 1 suppository (10 mg) rectally daily as needed for constipation, Disp: 10 suppository, Rfl: 0    carbidopa-levodopa (SINEMET CR)  MG CR tablet, Take 1 tablet by mouth At Bedtime (Patient taking differently: Take 1 tablet by mouth At Bedtime At 8pm), Disp: 30 tablet, Rfl: 11    carbidopa-levodopa (SINEMET)  MG tablet, Take 2 tablets by mouth 3 times daily (Patient taking differently: Take 2 tablets by mouth 3 times daily 8000, 1200, 1600), Disp: 180 tablet, Rfl: 11    cholecalciferol (VITAMIN D3) 125 mcg (5000 units) capsule, Take 1 capsule (125 mcg) by mouth daily, Disp: 30 capsule, Rfl: 0    clonazePAM (KLONOPIN) 1 MG tablet, Take 1 tablet scheduled at noon, may take 1 additional tablet PRN, Disp: 60 tablet, Rfl: 5    clonazePAM (KLONOPIN) 2 MG tablet, Take 1 tablet (2 mg) by mouth 2 times daily Take at 8am and 4 pm, Disp: 60 tablet, Rfl: 5    cloZAPine (CLOZARIL) 50 MG tablet, Take 1 tablet (50 mg) by mouth At Bedtime, Disp: 30 tablet, Rfl: 0    gabapentin (NEURONTIN) 800 MG tablet, Take 1 tablet (800 mg) by mouth 3 times daily, Disp: 90 tablet, Rfl: 0    hydrOXYzine (ATARAX) 25 MG tablet, Take 2 tablets (50 mg) by mouth every 6 hours as needed for anxiety (up to 3 timrd daily), Disp: 20 tablet, Rfl: 0    ketoconazole (NIZORAL) 2 % external cream, Apply topically 2 times daily, Disp: , Rfl:     ketoconazole (NIZORAL) 2 % external shampoo, Apply topically once a week On Wednesdays, Disp: , Rfl:     lactulose (CHRONULAC) 10 GM/15ML solution, Take 15 mLs by mouth 2 times daily, Disp: 473 mL, Rfl:  0    linaclotide (LINZESS) 290 MCG capsule, Take 1 capsule (290 mcg) by mouth daily as needed (IBSc), Disp: 30 capsule, Rfl: 0    metoprolol succinate ER (TOPROL XL) 25 MG 24 hr tablet, Take 0.5 tablets (12.5 mg) by mouth 2 times daily, Disp: 30 tablet, Rfl: 0    multivitamin, therapeutic (THERA-VIT) TABS tablet, Take 1 tablet by mouth daily, Disp: 30 tablet, Rfl: 0    oxyCODONE (ROXICODONE) 5 MG tablet, Take 1 tablet (5 mg) by mouth every 4 hours as needed for moderate pain, Disp: 20 tablet, Rfl: 0    pantoprazole (PROTONIX) 40 MG EC tablet, Take 1 tablet (40 mg) by mouth daily Take 1 tablet (40mg) by mouth daily at 8am, Disp: 30 tablet, Rfl: 0    polyethylene glycol (MIRALAX) 17 GM/Dose powder, Take 17 g by mouth 2 times daily One capful in liquid by mouth twice a day, 8 AM and one dose at supper, Disp: 850 g, Rfl: 0    senna-docusate (SENOKOT-S/PERICOLACE) 8.6-50 MG tablet, Take 1-2 tablets by mouth 2 times daily Take while on oral narcotics to prevent or treat constipation., Disp: 30 tablet, Rfl: 0    sodium phosphate (FLEET ENEMA) 7-19 GM/118ML rectal enema, Place 1 enema rectally daily as needed for constipation, Disp: , Rfl:     traZODone (DESYREL) 100 MG tablet, Take 100 mg by mouth At Bedtime, Disp: , Rfl:     traZODone (DESYREL) 50 MG tablet, Take 1 tablet (50 mg) by mouth every morning, Disp: 30 tablet, Rfl: 0    venlafaxine (EFFEXOR XR) 150 MG 24 hr capsule, Take 2 capsules (300 mg) by mouth daily, Disp: 60 capsule, Rfl: 0    vitamin C (ASCORBIC ACID) 500 MG tablet, Take 1 tablet (500 mg) by mouth daily, Disp: 30 tablet, Rfl: 0    Current Facility-Administered Medications:     botulinum toxin type A (BOTOX) 100 units injection 400 Units, 400 Units, Intramuscular, Q90 Days, Ember Arita MD, 270 Units at 23 0810       BOTULINUM NEUROTOXIN INJECTION PROCEDURES    VERIFICATION OF PATIENT IDENTIFICATION AND PROCEDURE     Initials   Patient Name PS   Patient  PS   Procedure Verified by: GINGER      Prior to the start of the procedure and with procedural staff participation, I verbally confirmed the patient s identity using two indicators, relevant allergies, that the procedure was appropriate and matched the consent or emergent situation, and that the correct equipment/implants were available. Immediately prior to starting the procedure I conducted the Time Out with the procedural staff and re-confirmed the patient s name, procedure, and site/side. (The Joint Commission universal protocol was followed.)  Yes    Sedation (Moderate or Deep): None    ABOVE ASSESSMENTS PERFORMED BY  Ember Arita MD      INDICATIONS FOR PROCEDURES  Benjamin Mathews is a 58 year old patient with cervical and segmental dystonia secondary to the diagnosis of Parkinson's disease. His baseline symptoms have been recalcitrant to oral medications and conservative therapy.  He is here today for reinjection with Botox.    GOAL OF PROCEDURE  The goal of this procedure is to increase active range of motion, improve volitional motor control, decrease pain  and enhance functional independence.      TOTAL DOSE ADMINISTERED  Dose Administered: 100 units  Botox (Botulinum Toxin Type A)       2:1 Dilution   Unavoidable Drug Waste: No  Diluent Used:  Preservative Free Normal Saline  Total Volume of Diluent Used: 2ml  See MAR  NDC #: Botox 100u ([address]-50)      CONSENT  The risks, benefits, and treatment options were discussed with Benjamin Mathews and he agreed to proceed.    Written consent was obtained by PS.     EQUIPMENT USED  Needle-35mm stimulating/recording  EMG/NCS Machine    SKIN PREPARATION  Skin preparation was performed using an alcohol wipe.    GUIDANCE DESCRIPTION  Electro-myographic guidance was necessary throughout the procedure to accurately identify all areas of dystonic muscles while avoiding injection of non-dystonic muscles and underlying muscles , neighboring nerves and nearby vascular structures.     AREA/MUSCLE  "INJECTED  Right side     SCM 10 units at 1 site  Splenius capitis 10 units at 1 site  Splenius cervicis 10 units at 1 site  Levator a scap insertion 20 units at 1 site  Levator at neck 10 units at 1 site   Lateral trap 30 units at 3 sites   Pectoralis major 10 units at 1 site       RESPONSE TO PROCEDURE  Benjamin Mathews tolerated the procedure well and there were no immediate complications. He was allowed to recover for an appropriate period of time and was discharged home in stable condition.    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  Osteopenia (DXA Lowest T-Score -1.8) likely due to disuse c/b limited functional mobility leading to increased fracture risk - assessment 5/2023     We have discussed treatment options for dystonia including therapies, medications and interventions.  He has responded very well to Klonopin but his symptoms are not well controlled especially spasmodic torticollis and dystonia in the distal part of his right lower extremity.  Adding another oral agent does not seem appropriate because 1-he did not respond to baclofen and 2- risk of polypharmacy and side effects.  He would benefit from physical therapy as discussed before.    Clonazepam was discontinued during his hospital stay; recommended dose per Dr. Carlos was   Clonazepam 2 mg 1   1         Clonazepam 1 mg   1       1     Current MAR from TCU shows 1mg prn for anxiety and 2mg bid. He agreed to stay on the lower dose as he is recovering from surgery and his symptoms are manageable.     Recommended sinemet dose was   Carbidopa/levodopa IR  mg 2 2 2         Carbidopa/levodopa CR  mg       1        Current dose per MAR is  2 tid and  at bed time. He wants to send a message to Dr. Carlos to possibly decrease the dose as sinemet \"makes him " "really stiff\".       Work-up: None     Therapy/equipment/braces: continue therapies at TCU and then at home     Medications: No changes today; see above     Interventions: started the first round at 130 units and increased the dose to 270 units on 2/1/2023; kept on the same dose in April. Decreased the total dose to 100 today with no injections in RLE due to recent fracture. Added pec major given worsening tone at R shoulder add by exam.     Referral / follow up with other providers: Nereida will coordinate a f/up with Dr. Rodriguez given his new fracture. He will continue to follow-up with palliative care team and movement disorder clinic.     Follow up: in 12 weeks           Again, thank you for allowing me to participate in the care of your patient.      Sincerely,    Ember rAita MD    "

## 2023-07-25 NOTE — PROGRESS NOTES
[facility]    PM&R CLINIC NOTE  BOTULINUM TOXIN PROCEDURE      HPI  Chief Complaint   Patient presents with    RECHECK     Botox injections confirmed with patient     Benjamin Mathews is a 58 year old male who was referred to our clinic for more evaluation of his dystonia and trial of botulinum toxin injections (referral from Dr. Suazo). He was seen on 10/6/22 as initial consult; please see the consult note for details. She has history of Parkinsonism and is followed by Dr. Perry. He was referred to palliative care team and has been followed by them since then.     --saw urology team in Nov 2022 for LUTS likely due to BPH and was started on alfuzosin  --saw Dr. Carlos in Jan; it was recommended to continue carbidopa/levodopa and amantadine. He is also on clonazepam prn. Ok to continue botox injections.   --had neuropsych testing 1/13; reviewed the results.      SINCE LAST VISIT  Benjamin Mathews was last seen here in clinic on 4/27/2023.    Patient reports the following new medical problems since last visit: **  Saw Dr. Rodriguez 5/10 - it was recommended to repeat DEXA in 2 years and f/unit(s).     Saw Dr. Carlos 6/7 - note was reviewed. He stays on clonazepam and sinemet.     Was admitted on 5/20 after a fall resulting in right ankle fracture s/p closed reduction and external fixation by ortho team. He has been at TCU since then. Had a f/up on 7/3 - He was placed into a CAM boot.  He was told to remain Non-WB with the exception of transfers. He may remove the boot for hygiene. He has an appointment to see Dr. Bonilla in a few days and at that time Dr. Bonilla will determine further restrictions.    Today, he was doing well. Noted that his RLE has been weak due to CAM boot and not walking. Therapy team has been working on transfers; he stands on his RLE but no walking.     He was not getting his clonazepam during the hospital stay so his spasms got really  worse. They restarted that at TCU but seems to less than before. He thinks sinemet makes him really stiff and wants to cut down on the dose.      It's hard for him to talk about his response to the injections. He thinks neck injections were definitely helpful but he has been dealing with pain and some muscle weakness in his RLE due to surgery and medication changes as above.       PHYSICAL EXAM  VS: /65 (BP Location: Left arm, Patient Position: Sitting, Cuff Size: Adult Large)   Pulse 79   SpO2 97%    GEN: Pleasant and cooperative, in no acute distress  HEENT: Moist mucous membranes     General: Sitting in his power wheelchair leaning to the right with his neck tilted to the right and rotated to the left  C-spine range of motion was moderately limited with extension and left lateral bending, he was also mildly limited in all other directionsReduced left sided rotation as compared to the right side. Restricted extension of the neck. Tight right scm   Continues to have left sided weakness    Right leg in CAM boot and was not examined today       ALLERGIES  Allergies   Allergen Reactions    Amantadine Other (See Comments)     Other reaction(s): Hallucinations  Hallucinations/ lost self control/gambling.     halluicnations  Hallucinations/ lost self control/gambling.     hallucinates  halluicnations  hallucinates  Hallucinations/ lost self control/gambling.       Quetiapine GI Disturbance, Diarrhea and Other (See Comments)     Diarrhea    Diarrhea  Other reaction(s): GI Disturbance  Diarrhea      Duloxetine Other (See Comments)     suicidal  suicidal         CURRENT MEDICATIONS    Current Outpatient Medications:     alfuzosin ER (UROXATRAL) 10 MG 24 hr tablet, Take 1 tablet (10 mg) by mouth daily, Disp: 30 tablet, Rfl: 0    apixaban ANTICOAGULANT (ELIQUIS) 5 MG tablet, Take 5 mg by mouth 2 times daily, Disp: , Rfl:     atorvastatin (LIPITOR) 40 MG tablet, Take 1 tablet (40 mg) by mouth every evening, Disp: 30  tablet, Rfl: 0    bisacodyl (DULCOLAX) 10 MG suppository, Place 1 suppository (10 mg) rectally daily as needed for constipation, Disp: 10 suppository, Rfl: 0    carbidopa-levodopa (SINEMET CR)  MG CR tablet, Take 1 tablet by mouth At Bedtime (Patient taking differently: Take 1 tablet by mouth At Bedtime At 8pm), Disp: 30 tablet, Rfl: 11    carbidopa-levodopa (SINEMET)  MG tablet, Take 2 tablets by mouth 3 times daily (Patient taking differently: Take 2 tablets by mouth 3 times daily 8000, 1200, 1600), Disp: 180 tablet, Rfl: 11    cholecalciferol (VITAMIN D3) 125 mcg (5000 units) capsule, Take 1 capsule (125 mcg) by mouth daily, Disp: 30 capsule, Rfl: 0    clonazePAM (KLONOPIN) 1 MG tablet, Take 1 tablet scheduled at noon, may take 1 additional tablet PRN, Disp: 60 tablet, Rfl: 5    clonazePAM (KLONOPIN) 2 MG tablet, Take 1 tablet (2 mg) by mouth 2 times daily Take at 8am and 4 pm, Disp: 60 tablet, Rfl: 5    cloZAPine (CLOZARIL) 50 MG tablet, Take 1 tablet (50 mg) by mouth At Bedtime, Disp: 30 tablet, Rfl: 0    gabapentin (NEURONTIN) 800 MG tablet, Take 1 tablet (800 mg) by mouth 3 times daily, Disp: 90 tablet, Rfl: 0    hydrOXYzine (ATARAX) 25 MG tablet, Take 2 tablets (50 mg) by mouth every 6 hours as needed for anxiety (up to 3 timrd daily), Disp: 20 tablet, Rfl: 0    ketoconazole (NIZORAL) 2 % external cream, Apply topically 2 times daily, Disp: , Rfl:     ketoconazole (NIZORAL) 2 % external shampoo, Apply topically once a week On Wednesdays, Disp: , Rfl:     lactulose (CHRONULAC) 10 GM/15ML solution, Take 15 mLs by mouth 2 times daily, Disp: 473 mL, Rfl: 0    linaclotide (LINZESS) 290 MCG capsule, Take 1 capsule (290 mcg) by mouth daily as needed (IBSc), Disp: 30 capsule, Rfl: 0    metoprolol succinate ER (TOPROL XL) 25 MG 24 hr tablet, Take 0.5 tablets (12.5 mg) by mouth 2 times daily, Disp: 30 tablet, Rfl: 0    multivitamin, therapeutic (THERA-VIT) TABS tablet, Take 1 tablet by mouth daily, Disp:  30 tablet, Rfl: 0    oxyCODONE (ROXICODONE) 5 MG tablet, Take 1 tablet (5 mg) by mouth every 4 hours as needed for moderate pain, Disp: 20 tablet, Rfl: 0    pantoprazole (PROTONIX) 40 MG EC tablet, Take 1 tablet (40 mg) by mouth daily Take 1 tablet (40mg) by mouth daily at 8am, Disp: 30 tablet, Rfl: 0    polyethylene glycol (MIRALAX) 17 GM/Dose powder, Take 17 g by mouth 2 times daily One capful in liquid by mouth twice a day, 8 AM and one dose at supper, Disp: 850 g, Rfl: 0    senna-docusate (SENOKOT-S/PERICOLACE) 8.6-50 MG tablet, Take 1-2 tablets by mouth 2 times daily Take while on oral narcotics to prevent or treat constipation., Disp: 30 tablet, Rfl: 0    sodium phosphate (FLEET ENEMA) 7-19 GM/118ML rectal enema, Place 1 enema rectally daily as needed for constipation, Disp: , Rfl:     traZODone (DESYREL) 100 MG tablet, Take 100 mg by mouth At Bedtime, Disp: , Rfl:     traZODone (DESYREL) 50 MG tablet, Take 1 tablet (50 mg) by mouth every morning, Disp: 30 tablet, Rfl: 0    venlafaxine (EFFEXOR XR) 150 MG 24 hr capsule, Take 2 capsules (300 mg) by mouth daily, Disp: 60 capsule, Rfl: 0    vitamin C (ASCORBIC ACID) 500 MG tablet, Take 1 tablet (500 mg) by mouth daily, Disp: 30 tablet, Rfl: 0    Current Facility-Administered Medications:     botulinum toxin type A (BOTOX) 100 units injection 400 Units, 400 Units, Intramuscular, Q90 Days, Ember Arita MD, 270 Units at 23 0810       BOTULINUM NEUROTOXIN INJECTION PROCEDURES    VERIFICATION OF PATIENT IDENTIFICATION AND PROCEDURE     Initials   Patient Name PS   Patient  PS   Procedure Verified by: PS     Prior to the start of the procedure and with procedural staff participation, I verbally confirmed the patient s identity using two indicators, relevant allergies, that the procedure was appropriate and matched the consent or emergent situation, and that the correct equipment/implants were available. Immediately prior to starting the procedure I  conducted the Time Out with the procedural staff and re-confirmed the patient s name, procedure, and site/side. (The Joint Commission universal protocol was followed.)  Yes    Sedation (Moderate or Deep): None    ABOVE ASSESSMENTS PERFORMED BY  Ember Arita MD      INDICATIONS FOR PROCEDURES  Benjamin Mathews is a 58 year old patient with cervical and segmental dystonia secondary to the diagnosis of Parkinson's disease. His baseline symptoms have been recalcitrant to oral medications and conservative therapy.  He is here today for reinjection with Botox.    GOAL OF PROCEDURE  The goal of this procedure is to increase active range of motion, improve volitional motor control, decrease pain  and enhance functional independence.      TOTAL DOSE ADMINISTERED  Dose Administered: 100 units  Botox (Botulinum Toxin Type A)       2:1 Dilution   Unavoidable Drug Waste: No  Diluent Used:  Preservative Free Normal Saline  Total Volume of Diluent Used: 2ml  See MAR  NDC #: Botox 100u ([address]-52)      CONSENT  The risks, benefits, and treatment options were discussed with Benjamin Mathews and he agreed to proceed.    Written consent was obtained by PS.     EQUIPMENT USED  Needle-35mm stimulating/recording  EMG/NCS Machine    SKIN PREPARATION  Skin preparation was performed using an alcohol wipe.    GUIDANCE DESCRIPTION  Electro-myographic guidance was necessary throughout the procedure to accurately identify all areas of dystonic muscles while avoiding injection of non-dystonic muscles and underlying muscles , neighboring nerves and nearby vascular structures.     AREA/MUSCLE INJECTED  Right side     SCM 10 units at 1 site  Splenius capitis 10 units at 1 site  Splenius cervicis 10 units at 1 site  Levator a scap insertion 20 units at 1 site  Levator at neck 10 units at 1 site   Lateral trap 30 units at 3 sites   Pectoralis major 10 units at 1 site       RESPONSE TO PROCEDURE  Benjamin Mathews tolerated the procedure well and there  "were no immediate complications. He was allowed to recover for an appropriate period of time and was discharged home in stable condition.    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  Osteopenia (DXA Lowest T-Score -1.8) likely due to disuse c/b limited functional mobility leading to increased fracture risk - assessment 5/2023     We have discussed treatment options for dystonia including therapies, medications and interventions.  He has responded very well to Klonopin but his symptoms are not well controlled especially spasmodic torticollis and dystonia in the distal part of his right lower extremity.  Adding another oral agent does not seem appropriate because 1-he did not respond to baclofen and 2- risk of polypharmacy and side effects.  He would benefit from physical therapy as discussed before.    Clonazepam was discontinued during his hospital stay; recommended dose per Dr. Carlos was   Clonazepam 2 mg 1   1         Clonazepam 1 mg   1       1     Current MAR from TCU shows 1mg prn for anxiety and 2mg bid. He agreed to stay on the lower dose as he is recovering from surgery and his symptoms are manageable.     Recommended sinemet dose was   Carbidopa/levodopa IR  mg 2 2 2         Carbidopa/levodopa CR  mg       1        Current dose per MAR is  2 tid and  at bed time. He wants to send a message to Dr. Carlos to possibly decrease the dose as sinemet \"makes him really stiff\".       Work-up: None     Therapy/equipment/braces: continue therapies at TCU and then at home     Medications: No changes today; see above     Interventions: started the first round at 130 units and increased the dose to 270 units on 2/1/2023; kept on the same dose in April. Decreased the total dose to 100 today with no injections in RLE due " to recent fracture. Added pec major given worsening tone at R shoulder add by exam.     Referral / follow up with other providers: Nereida will coordinate a f/up with Dr. Rodriguez given his new fracture. He will continue to follow-up with palliative care team and movement disorder clinic.     Follow up: in 12 weeks       Ember Arita MD  Physical Medicine & Rehabilitation

## 2023-07-25 NOTE — TELEPHONE ENCOUNTER
Pt seen in PM &R clinic today with Dr Arita for Botox injections, he told her that he will be moving out of the TCU and back to his Assisted Living apartment in the near future, probably by Aug 1st.    Will coordinate to book an appt in August or early September with Dr Rodriguez, since he had a NEW Fx 5/20/23,. just days after his evaluation by Dr Rodriguez on May 10, so he should have bone health re-evaluated and not wait for one or two years.    Nereida Delvalle RN on 7/25/2023 at 4:34 PM

## 2023-07-28 ENCOUNTER — OFFICE VISIT (OUTPATIENT)
Dept: ORTHOPEDICS | Facility: CLINIC | Age: 58
End: 2023-07-28
Payer: COMMERCIAL

## 2023-07-28 ENCOUNTER — ANCILLARY PROCEDURE (OUTPATIENT)
Dept: GENERAL RADIOLOGY | Facility: CLINIC | Age: 58
End: 2023-07-28
Attending: ORTHOPAEDIC SURGERY
Payer: COMMERCIAL

## 2023-07-28 DIAGNOSIS — Z98.890 STATUS POST SURGERY: ICD-10-CM

## 2023-07-28 DIAGNOSIS — Z98.890 STATUS POST SURGERY: Primary | ICD-10-CM

## 2023-07-28 PROCEDURE — 99024 POSTOP FOLLOW-UP VISIT: CPT | Performed by: ORTHOPAEDIC SURGERY

## 2023-07-28 PROCEDURE — 73610 X-RAY EXAM OF ANKLE: CPT | Mod: RT | Performed by: RADIOLOGY

## 2023-07-28 NOTE — LETTER
7/28/2023         RE: Benjamin Mathews  [address]  [facility] [address]        Dear Colleague,    Thank you for referring your patient, Benjamin Mathews, to the [facility] [place]. Please see a copy of my visit note below.    Orthopaedic Surgery Clinic - Staff Note    DOI: 05/20/2023, GLF, syncopal event, closed R trimalleolar ankle / pilon fracture-dislocation.  DOS: 05/21/2023, closed reduction, application of spanning external fixator.  DOS: 06/15/2023, ORIF R ankle/pilon, removal of external fixator.    I saw Benjamin today in follow-up for his right trimalleolar ankle/pilon fracture/dislocation, most recently status post removal of external fixator and ORIF 6/15/2023.  He denies fevers, chills, chest pain, shortness of breath, or wound drainage.  He is here today with Mikhail, his home health care nurse.  He does have a history of Parkinson's, CVA, and other medical conditions and has baseline left extremity weakness listed on his problem list, though today also notes that his right ankle has also been somewhat weak in the past prior to this injury as well.    Examination today shows the patient to be a pleasant, cooperative, awake, and alert adult sitting upright in a wheelchair in no acute distress.  He is nonseptic appearing from a general clinical standpoint.  His boot has been doffed for the examination.  His surgical incisions appear to be well-healed without any evidence for infection, drainage, or dehiscence.  No sutures are visible.  His fracture sites are nontender to palpation.  His right ankle is nontender to palpation throughout.  3/4 easily palpable right dorsalis pedis pulse.  Light touch sensation endorsed as intact in all dermatomes.  Right tib ant, EHL, gastrocsoleus, PTT, and peroneals are 5/5.  Right ankle motion is somewhat stiff, though I can passively dorsiflex his right ankle to about neutral.    Independent review of imaging was performed including  nonweightbearing right ankle radiographs dated today, 7/28/2023.  Images were discussed with the patient.  Comparison is made with previous images.  Fracture alignment appears to be stable.  Hardware appears to be intact and stable.  The ankle mortise appears to be congruent.  Signs for interim bony healing are present.  No obvious new/acute fractures.  No obvious signs for infection.    Impression: 58-year-old patient with multiple comorbidities and now status post ORIF of right ankle/pilon fracture/dislocation 6/15/2023.    Plan: Findings and treatment plan were discussed with the patient and his caregiver.  The generally poorer prognosis for this type of severe injury were discussed as well.   I discussed that maximal recovery, which is not necessarily or expected to be full 100% recovery, it is expected to take a year or longer.  I discussed that it would be normal and expected to have some weakness, stiffness, and/or pain long-term.  Continue nonweightbearing right lower extremity with the boot.  Remove several times a day for range of motion exercises which were personally discussed and demonstrated.  Tobacco cessation including the rationale why was discussed and recommended at this time.  Signs and symptoms to watch out for that should prompt immediate medical attention were discussed.  Follow-up in 1 month for repeat examination and weightbearing radiographs out of the boot, AP, mortise, and lateral right ankle.  If clinically and radiographically appropriate to do so at that time, anticipate starting to weight-bear in the boot then.  All questions were answered.    Disclaimer:  This note consists of symbols derived from keyboarding, dictation, and/or voice recognition software. As a result, although I do diligently check for accuracy, there may be errors in the script that have gone undetected. Please consider this when interpreting information found in this chart.    Sincerely,        Jose Bonilla MD

## 2023-07-28 NOTE — PROGRESS NOTES
Orthopaedic Surgery Clinic - Staff Note    DOI: 05/20/2023, GLF, syncopal event, closed R trimalleolar ankle / pilon fracture-dislocation.  DOS: 05/21/2023, closed reduction, application of spanning external fixator.  DOS: 06/15/2023, ORIF R ankle/pilon, removal of external fixator.    I saw Benjamin today in follow-up for his right trimalleolar ankle/pilon fracture/dislocation, most recently status post removal of external fixator and ORIF 6/15/2023.  He denies fevers, chills, chest pain, shortness of breath, or wound drainage.  He is here today with Mikhail, his home health care nurse.  He does have a history of Parkinson's, CVA, and other medical conditions and has baseline left extremity weakness listed on his problem list, though today also notes that his right ankle has also been somewhat weak in the past prior to this injury as well.    Examination today shows the patient to be a pleasant, cooperative, awake, and alert adult sitting upright in a wheelchair in no acute distress.  He is nonseptic appearing from a general clinical standpoint.  His boot has been doffed for the examination.  His surgical incisions appear to be well-healed without any evidence for infection, drainage, or dehiscence.  No sutures are visible.  His fracture sites are nontender to palpation.  His right ankle is nontender to palpation throughout.  3/4 easily palpable right dorsalis pedis pulse.  Light touch sensation endorsed as intact in all dermatomes.  Right tib ant, EHL, gastrocsoleus, PTT, and peroneals are 5/5.  Right ankle motion is somewhat stiff, though I can passively dorsiflex his right ankle to about neutral.    Independent review of imaging was performed including nonweightbearing right ankle radiographs dated today, 7/28/2023.  Images were discussed with the patient.  Comparison is made with previous images.  Fracture alignment appears to be stable.  Hardware appears to be intact and stable.  The ankle mortise appears to be  congruent.  Signs for interim bony healing are present.  No obvious new/acute fractures.  No obvious signs for infection.    Impression: 58-year-old patient with multiple comorbidities and now status post ORIF of right ankle/pilon fracture/dislocation 6/15/2023.    Plan: Findings and treatment plan were discussed with the patient and his caregiver.  The generally poorer prognosis for this type of severe injury were discussed as well.   I discussed that maximal recovery, which is not necessarily or expected to be full 100% recovery, it is expected to take a year or longer.  I discussed that it would be normal and expected to have some weakness, stiffness, and/or pain long-term.  Continue nonweightbearing right lower extremity with the boot.  Remove several times a day for range of motion exercises which were personally discussed and demonstrated.  Tobacco cessation including the rationale why was discussed and recommended at this time.  Signs and symptoms to watch out for that should prompt immediate medical attention were discussed.  Follow-up in 1 month for repeat examination and weightbearing radiographs out of the boot, AP, mortise, and lateral right ankle.  If clinically and radiographically appropriate to do so at that time, anticipate starting to weight-bear in the boot then.  All questions were answered.    Disclaimer:  This note consists of symbols derived from keyboarding, dictation, and/or voice recognition software. As a result, although I do diligently check for accuracy, there may be errors in the script that have gone undetected. Please consider this when interpreting information found in this chart.

## 2023-07-28 NOTE — NURSING NOTE
Reason For Visit:   Chief Complaint   Patient presents with    RECHECK     DOS: 6/15/23 // OPEN REDUCTION INTERNAL FIXATION, FRACTURE, ANKLE, possible adjustment of external fixator       There were no vitals taken for this visit.    Pain Assessment  Patient Currently in Pain: Julissa Edwards MA

## 2023-08-03 DIAGNOSIS — Z98.890 STATUS POST SURGERY: Primary | ICD-10-CM

## 2023-08-12 ENCOUNTER — ANCILLARY PROCEDURE (OUTPATIENT)
Dept: GENERAL RADIOLOGY | Facility: CLINIC | Age: 58
End: 2023-08-12
Attending: FAMILY MEDICINE
Payer: COMMERCIAL

## 2023-08-12 ENCOUNTER — OFFICE VISIT (OUTPATIENT)
Dept: URGENT CARE | Facility: URGENT CARE | Age: 58
End: 2023-08-12
Payer: COMMERCIAL

## 2023-08-12 VITALS
SYSTOLIC BLOOD PRESSURE: 104 MMHG | TEMPERATURE: 99.2 F | OXYGEN SATURATION: 97 % | HEART RATE: 85 BPM | RESPIRATION RATE: 22 BRPM | DIASTOLIC BLOOD PRESSURE: 70 MMHG

## 2023-08-12 DIAGNOSIS — M79.675 PAIN OF LEFT GREAT TOE: Primary | ICD-10-CM

## 2023-08-12 DIAGNOSIS — M79.675 PAIN OF LEFT GREAT TOE: ICD-10-CM

## 2023-08-12 PROCEDURE — 73660 X-RAY EXAM OF TOE(S): CPT | Mod: TC | Performed by: RADIOLOGY

## 2023-08-12 PROCEDURE — 99213 OFFICE O/P EST LOW 20 MIN: CPT | Performed by: FAMILY MEDICINE

## 2023-08-16 ENCOUNTER — ONCOLOGY VISIT (OUTPATIENT)
Dept: PALLIATIVE CARE | Facility: CLINIC | Age: 58
End: 2023-08-16
Attending: INTERNAL MEDICINE
Payer: COMMERCIAL

## 2023-08-16 VITALS
RESPIRATION RATE: 16 BRPM | SYSTOLIC BLOOD PRESSURE: 98 MMHG | TEMPERATURE: 98.4 F | HEART RATE: 81 BPM | DIASTOLIC BLOOD PRESSURE: 66 MMHG | OXYGEN SATURATION: 96 %

## 2023-08-16 DIAGNOSIS — G20.A1 PARKINSON'S DISEASE (TREMOR, STIFFNESS, SLOW MOTION, UNSTABLE POSTURE) (H): Primary | ICD-10-CM

## 2023-08-16 DIAGNOSIS — M62.838 MUSCLE SPASM: ICD-10-CM

## 2023-08-16 PROCEDURE — 99215 OFFICE O/P EST HI 40 MIN: CPT | Mod: 24 | Performed by: INTERNAL MEDICINE

## 2023-08-16 PROCEDURE — G0463 HOSPITAL OUTPT CLINIC VISIT: HCPCS | Performed by: INTERNAL MEDICINE

## 2023-08-16 ASSESSMENT — PAIN SCALES - GENERAL: PAINLEVEL: SEVERE PAIN (6)

## 2023-08-16 NOTE — PROGRESS NOTES
-- DO NOT REPLY / DO NOT REPLY ALL --  -- Message is from the [facility]--    Result Request  Type of Test / Lab: Blood work   Date Test / Lab: 3/2/2020  Location: [facility]  Test / Lab ordered/authorized by: Dr. Velazquez    Other comments: patient requesting his blood results    Preferred Delivery Method   Call back patient with results.  Phone number:  [phone]    Caller Information       Type Contact Phone    03/05/2020 11:40 AM Phone (Incoming) Prerna De Anda (Self) [phone] (M)          Alternative phone number: none    Turnaround time given to caller:   \"This message will be sent to [state Provider's name]. 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 Follows with Dr Carlos. Sees PM&R and has botox for dystonias. Has a psychologist and psychiatrist via [employer].   5/2023 syncope--R trimalleolar ankle fx-->ORIF 6/12/23.    Social - RN and respiratory therapist, now disabled. Lives in a group home. Brother in [place] and a sister in [place]. Single, no kids. 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   Uses walker in home; wheelchair for longer distances.  Has an [employer] worker.     Care Planning - No HCD. +DNR/DNI/comfort POLST completed      History:  History gathered today from: patient, medical chart    This is the first time I've met him; he followed with a fellow MD who has now graduated previously.    His main concern today are spasms  Particularly of LLE -- toes curl then twisting all up his LLE. Lasts 3-4 h. Excruciating.  It's not clear to me we are managing this vs Dr Carlos but I reviewed his meds:  She has clonazepam Rx'd 2mg-1mg-2mg scheduled + 1 mg daily extra prn for spasms.  His group home med list only has him taking 2mg bid + 1mg prn   I suspect in all his recent transitions this got messed up.  He was in rehab until just a couple weeks ago.  He is nonambulatory (non weight bearing on his LLE) right now    PE: There were no vitals taken for this visit.   Wt Readings from Last 3 Encounters:   06/15/23 108.8 kg (239 lb 14.4 oz)   05/27/23 106.6 kg (235 lb)   05/20/23 106.6 kg (235 lb)     Alert NAD  Clear sensorium full affect  L hand athetoid tremor      Data reviewed:  I reviewed recent labs and imaging, my comments:  CTOH May nothing acute  Hgb 9.4  cR 0.7     database reviewed: Y      Impression & Recommendations:  57 yo with Parkinsons disease     Spasms/dystonias  As above I worry his 1mg midday dose has been inadvertently dropped; I reviewed Dr Carlos's notes and it's clear she intends him on this and  I wrote a note to his  about this.    Otherwise his mood and coping seem to be doing well.  He is receiving excellent supportive care and I'm not sure we will add much more to it at this point; we are happy to see him back anytime we can be of help          Over 40 minutes spent on the date of the encounter doing chart review, history and exam, patient education & counseling, documentation and other activities as noted above.    Thank you for involving us in the patient's care.   Trav Medel MD / Palliative Medicine / Text me via [email or web address]

## 2023-08-16 NOTE — NURSING NOTE
"Oncology Rooming Note    August 16, 2023 4:14 PM   Benjamin Mathews is a 58 year old male who presents for:    Chief Complaint   Patient presents with    Oncology Clinic Visit     Adenomatous polyp of colon     Initial Vitals: BP 98/66 (BP Location: Left arm, Patient Position: Sitting, Cuff Size: Adult Regular)   Pulse 81   Temp 98.4  F (36.9  C) (Oral)   Resp 16   SpO2 96%  Estimated body mass index is 29.21 kg/m  as calculated from the following:    Height as of 6/15/23: 1.93 m (6' 3.98\").    Weight as of 6/15/23: 108.8 kg (239 lb 14.4 oz). There is no height or weight on file to calculate BSA.  Severe Pain (6) Comment: Data Unavailable   No LMP for male patient.  Allergies reviewed: Yes  Medications reviewed: Yes    Medications: Medication refills not needed today.  Pharmacy name entered into [employer]:    [facility] - [place], [place] [facility] [address] [address]  [facility] - [place], [place] - [address] [place] [address]    Clinical concerns:     Lyle Chacon              "

## 2023-08-16 NOTE — LETTER
8/16/2023       RE: Benjamin Mathews  [address]  [facility] [address]     Dear Colleague,    Thank you for referring your patient, Benjamin Mathews, to the [facility] at [facility]. Please see a copy of my visit note below.    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 Follows with Dr Carlos. Sees PM&R and has botox for dystonias. Has a psychologist and psychiatrist via [employer].   5/2023 syncope--R trimalleolar ankle fx-->ORIF 6/12/23.    Social - RN and respiratory therapist, now disabled. Lives in a group home. Brother in [place] and a sister in [place]. Single, no kids. 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   Uses walker in home; wheelchair for longer distances.  Has an [employer] worker.     Care Planning - No HCD. +DNR/DNI/comfort POLST completed      History:  History gathered today from: patient, medical chart    This is the first time I've met him; he followed with a fellow MD who has now graduated previously.    His main concern today are spasms  Particularly of LLE -- toes curl then twisting all up his LLE. Lasts 3-4 h. Excruciating.  It's not clear to me we are managing this vs Dr Carlos but I reviewed his meds:  She has clonazepam Rx'd 2mg-1mg-2mg scheduled + 1 mg daily extra prn for spasms.  His group home med list only has him taking 2mg bid + 1mg prn   I suspect in all his recent transitions this got messed up.  He was in rehab until just a couple weeks ago.  He is nonambulatory (non weight bearing on his LLE) right now    PE: There were no vitals taken for this visit.   Wt Readings from Last 3 Encounters:   06/15/23 108.8 kg (239 lb 14.4 oz)   05/27/23 106.6 kg (235 lb)   05/20/23 106.6 kg (235 lb)     Alert NAD  Clear sensorium full affect  L hand athetoid tremor      Data reviewed:  I reviewed recent labs and imaging, my  comments:  CTOH May nothing acute  Hgb 9.4  cR 0.7     database reviewed: Y      Impression & Recommendations:  59 yo with Parkinsons disease     Spasms/dystonias  As above I worry his 1mg midday dose has been inadvertently dropped; I reviewed Dr Carlos's notes and it's clear she intends him on this and I wrote a note to his GH about this.    Otherwise his mood and coping seem to be doing well.  He is receiving excellent supportive care and I'm not sure we will add much more to it at this point; we are happy to see him back anytime we can be of help    Over 40 minutes spent on the date of the encounter doing chart review, history and exam, patient education & counseling, documentation and other activities as noted above.    Thank you for involving us in the patient's care.   Trav Medel MD / Palliative Medicine / Text me via [employer]          Again, thank you for allowing me to participate in the care of your patient.      Sincerely,    Trav Medel MD

## 2023-08-17 ENCOUNTER — THERAPY VISIT (OUTPATIENT)
Dept: PHYSICAL THERAPY | Facility: CLINIC | Age: 58
End: 2023-08-17
Attending: ORTHOPAEDIC SURGERY
Payer: COMMERCIAL

## 2023-08-17 ENCOUNTER — MYC MEDICAL ADVICE (OUTPATIENT)
Dept: NEUROLOGY | Facility: CLINIC | Age: 58
End: 2023-08-17

## 2023-08-17 DIAGNOSIS — G24.9 DYSTONIA: ICD-10-CM

## 2023-08-17 DIAGNOSIS — S82.853A TRIMALLEOLAR FRACTURE: ICD-10-CM

## 2023-08-17 DIAGNOSIS — G20.A1 PARKINSON'S DISEASE (H): ICD-10-CM

## 2023-08-17 DIAGNOSIS — Z98.890 STATUS POST SURGERY: ICD-10-CM

## 2023-08-17 DIAGNOSIS — M25.571 PAIN IN JOINT INVOLVING ANKLE AND FOOT, RIGHT: Primary | ICD-10-CM

## 2023-08-17 PROCEDURE — 97110 THERAPEUTIC EXERCISES: CPT | Mod: GP | Performed by: PHYSICAL THERAPIST

## 2023-08-17 PROCEDURE — 97162 PT EVAL MOD COMPLEX 30 MIN: CPT | Mod: GP | Performed by: PHYSICAL THERAPIST

## 2023-08-17 RX ORDER — CLONAZEPAM 2 MG/1
2 TABLET ORAL 2 TIMES DAILY
Qty: 60 TABLET | Refills: 5 | Status: SHIPPED | OUTPATIENT
Start: 2023-08-17 | End: 2023-12-20

## 2023-08-17 RX ORDER — CLONAZEPAM 1 MG/1
TABLET ORAL
Qty: 60 TABLET | Refills: 5 | Status: SHIPPED | OUTPATIENT
Start: 2023-08-17 | End: 2023-12-20

## 2023-08-17 NOTE — TELEPHONE ENCOUNTER
Called and left message on Benjamin's cell # that we would like to set up a new visit with Dr Rodriguez as Dr Arita explained to him at her last visit on 7/25/23.    Reason to see Dr Rodriguez again is because he had consult with Dr Rodriguez on 5/10/23, then 10 days later sustained ankle Fx, so his bone health treatment needs to be reevaluated.    Called to speak to staff at [facility]  Spoke to Veronica, staff member, Benjamin is out to another appt. For Physical Therapy at this time.    Booked VIDEO appt with Dr Rodriguez for 9/6/23 at 2 pm   Veronica will get this appointment on Benjamin's calendar and update Benjamin and the RN    Gave writer's call back number should the appt need to be changed or for any questions.    Nereida Delvalle RN on 8/17/2023 at 1:06 PM      TIMELINE:  5/10/23 initial bone health consult - no new orders, f/u in 2 yrs after next DEXA or sooner in the event of fall/ fx    5/20/23 ED to inpt  [facility] for [facility]. Ankle Fx after ground level fall while using his walker.  s/p closed reduc w/ ExFix placement  5/27/23 - Discharged to TCU     5/27/23 rtn to ED slid OOB to floor, hit head, eval hip pain - no new Fx, returned to TCU    6/15/23 - ORIF, Dr Bonilla done at [facility]  6/18/23, returned to TCU    7/25/23- saw Dr Arita for botox injections, reported he was returning to his long term [facility] in [place] in August.    9/6/23 - recheck with Dr. Rodriguez due to new Fx since consult

## 2023-08-17 NOTE — PROGRESS NOTES
PHYSICAL THERAPY EVALUATION  Type of Visit: Evaluation    See electronic medical record for Abuse and Falls Screening details.    Subjective       Presenting condition or subjective complaint: Right trimalleolar ankle/pilon fracture/dislocation, most recently status post removal of external fixator and ORIF 6/15/2023. Patient presents today in power wheelchair with CAM boot on right foot. Went to TCU after surgery for 2 months. He lives at an [facility] with daily help. He currently needs Ax2 and a walker for transfers and is NWB with the CAM boot for at least a few more weeks.  Date of onset: 06/15/23    Relevant medical history:   bladder or bowel problems, cold or hot arm or leg, concussions, depression, DVT, fibromyalgia, foot drop, history of fractures, incontinence, mental illness, dystonia, Parkinson's, progressive neurological deficits, smoking, stroke, substance use disorder, vision problems  Dates & types of surgery:      Prior diagnostic imaging/testing results: X-ray     Prior therapy history for the same diagnosis, illness or injury: No      Prior Level of Function  Transfers: Assistive equipment  Ambulation:  Power chair for longer distances; 4WW for in home activities  ADL: Assistive equipment and person (normally receives help with toileting and showering); normally is independent with transfers  IADL:  assist for most IADLs at baseline    Living Environment  Social support: With a caregiver/helper ([facility])   Type of home: Assisted Living   Stairs to enter the home: No   Is there a railing: No   Ramp: No   Stairs inside the home: No       Help at home: Assist for driving and community activities; Meals on wheels/meal service; Medication and/or finances; Home management tasks (cooking, cleaning); Self Cares (home health aide/personal care attendant, family, etc)  Equipment owned:       Employment: No    Hobbies/Interests:      Patient goals for therapy:      Pain assessment: See objective evaluation for  additional pain details     Objective   FOOT/ANKLE EVALUATION  PAIN: Pain Level at Rest: 0/10  Pain Level with Use: 6/10  Pain Location: ankle  Pain Quality: Aching  Pain is Exacerbated By: trying to move it  Pain Progression: Improved  INTEGUMENTARY (edema, incisions):  2+ edema in right foot. Ace wrap on at arrival. Incisions well healed.  POSTURE:   GAIT:   Weightbearing Status: NWB  Assistive Device(s): CAM, Wheelchair (power)  Gait Deviations:  unable to assess  BALANCE/PROPRIOCEPTION:  unable to assess  WEIGHT BEARING ALIGNMENT:   NON-WEIGHTBEARING ALIGNMENT:    ROM:  AROM: 38 degrees PF at rest; about 5 degrees total arc of motion    STRENGTH: not assessed (limited ROM and NWB status)  FLEXIBILITY:  gastroc and soleus complex all very tight  SPECIAL TESTS:   FUNCTIONAL TESTS:   PALPATION: medial and lateral malleoli, first ray, gastroc and Achilles  JOINT MOBILITY:  not assessed (post operative)    Assessment & Plan   CLINICAL IMPRESSIONS  Medical Diagnosis: s/p trimalleolar fracture with external fixation and hardware removal    Treatment Diagnosis: Right ankle pain, limited mobility   Impression/Assessment: Patient is a 58 year old male with right ankle and mobility complaints.  The following significant findings have been identified: Pain, Decreased ROM/flexibility, Decreased joint mobility, Decreased strength, Impaired balance, Edema, Impaired muscle performance, and Decreased activity tolerance. These impairments interfere with their ability to perform self care tasks, work tasks, recreational activities, household chores, household mobility, and community mobility as compared to previous level of function.     Clinical Decision Making (Complexity):  Clinical Presentation: Evolving/Changing  Clinical Presentation Rationale: based on medical and personal factors listed in PT evaluation  Clinical Decision Making (Complexity): Moderate complexity    PLAN OF CARE  Treatment Interventions:  Interventions:  Manual Therapy, Neuromuscular Re-education, Therapeutic Activity, Therapeutic Exercise, Self-Care/Home Management    Long Term Goals     PT Goal 1  Goal Identifier: LTG 1  Goal Description: Patient will be able to transfer from wheelchair to toilet with supervision and walker  Rationale: to maximize safety and independence with self cares;to maximize safety and independence within the home;to maximize safety and independence with performance of ADLs and functional tasks  Target Date: 10/12/23      Frequency of Treatment: 1x/week  Duration of Treatment: 12 weeks    Recommended Referrals to Other Professionals:  none  Education Assessment:   Learner/Method: No Barriers to Learning;Pictures/Video;Demonstration;Reading;Listening;Patient    Risks and benefits of evaluation/treatment have been explained.   Patient/Family/caregiver agrees with Plan of Care.     Evaluation Time:     PT Eval, Moderate Complexity Minutes (14159): 20       Signing Clinician: Dominic Zafar PT      [facility]                                                                                   OUTPATIENT PHYSICAL THERAPY      PLAN OF TREATMENT FOR OUTPATIENT REHABILITATION   Patient's Last Name, First Name, CARMENSHAUN  UmeshBenjamin smith YOB: 1965   Provider's Name   [facility]   Medical Record No.  [id number]     Onset Date: 06/15/23  Start of Care Date: 08/17/23     Medical Diagnosis:  s/p trimalleolar fracture with external fixation and hardware removal      PT Treatment Diagnosis:  Right ankle pain, limited mobility Plan of Treatment  Frequency/Duration: 1x/week/ 12 weeks    Certification date from 08/17/23 to 11/09/23         See note for plan of treatment details and functional goals     Dominic Zafar, PT                         I CERTIFY THE NEED FOR THESE SERVICES FURNISHED UNDER        THIS PLAN OF TREATMENT AND WHILE UNDER MY CARE     (Physician attestation of this  document indicates review and certification of the therapy plan).                Referring Provider:  Jose Bonilla      Initial Assessment  See Epic Evaluation- Start of Care Date: 08/17/23

## 2023-08-17 NOTE — TELEPHONE ENCOUNTER
Patients nurse at facility is requesting clonazepam orders be sent to [facility] (preferred), they were previously sent to [facility].     Pended below for your signature.     Jayson Gonzales, RN, BSN  [facility]

## 2023-08-18 NOTE — TELEPHONE ENCOUNTER
Returned call to Veronica, had to [facility]. Stated the appt with Dr Rodriguez on 9/6 is NOT in per son it's a video visit. Asked Veronica to call me back and confirm the message.

## 2023-08-22 ENCOUNTER — MYC MEDICAL ADVICE (OUTPATIENT)
Dept: GASTROENTEROLOGY | Facility: CLINIC | Age: 58
End: 2023-08-22
Payer: COMMERCIAL

## 2023-08-24 ENCOUNTER — THERAPY VISIT (OUTPATIENT)
Dept: PHYSICAL THERAPY | Facility: CLINIC | Age: 58
End: 2023-08-24
Attending: ORTHOPAEDIC SURGERY
Payer: COMMERCIAL

## 2023-08-24 DIAGNOSIS — M25.571 PAIN IN JOINT INVOLVING ANKLE AND FOOT, RIGHT: Primary | ICD-10-CM

## 2023-08-24 DIAGNOSIS — S82.853A TRIMALLEOLAR FRACTURE: ICD-10-CM

## 2023-08-24 PROCEDURE — 97110 THERAPEUTIC EXERCISES: CPT | Mod: GP | Performed by: PHYSICAL THERAPIST

## 2023-08-29 ENCOUNTER — OFFICE VISIT (OUTPATIENT)
Dept: PODIATRY | Facility: CLINIC | Age: 58
End: 2023-08-29
Payer: COMMERCIAL

## 2023-08-29 DIAGNOSIS — Z87.2 HISTORY OF FOOT ULCER: ICD-10-CM

## 2023-08-29 DIAGNOSIS — B35.1 ONYCHOMYCOSIS: Primary | ICD-10-CM

## 2023-08-29 DIAGNOSIS — G60.9 IDIOPATHIC PERIPHERAL NEUROPATHY: ICD-10-CM

## 2023-08-29 PROCEDURE — 99213 OFFICE O/P EST LOW 20 MIN: CPT | Performed by: PODIATRIST
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
## 2023-08-30 ENCOUNTER — TELEPHONE (OUTPATIENT)
Dept: PHYSICAL MEDICINE AND REHAB | Facility: CLINIC | Age: 58
End: 2023-08-30
Payer: COMMERCIAL

## 2023-08-30 NOTE — TELEPHONE ENCOUNTER
Spoke to patient on the phone in regard s to cancel his 9/6/23 appointment at 2 pm.  Patient stated to understand his appointment will be cancel.    Rocio Hardy on 8/30/2023 at 1:52 PM

## 2023-08-31 ENCOUNTER — THERAPY VISIT (OUTPATIENT)
Dept: PHYSICAL THERAPY | Facility: CLINIC | Age: 58
End: 2023-08-31
Attending: ORTHOPAEDIC SURGERY
Payer: COMMERCIAL

## 2023-08-31 DIAGNOSIS — M25.571 PAIN IN JOINT INVOLVING ANKLE AND FOOT, RIGHT: Primary | ICD-10-CM

## 2023-08-31 DIAGNOSIS — S82.853A TRIMALLEOLAR FRACTURE: ICD-10-CM

## 2023-08-31 PROCEDURE — 97110 THERAPEUTIC EXERCISES: CPT | Mod: GP | Performed by: PHYSICAL THERAPIST

## 2023-08-31 PROCEDURE — 97140 MANUAL THERAPY 1/> REGIONS: CPT | Mod: GP | Performed by: PHYSICAL THERAPIST

## 2023-08-31 NOTE — PROGRESS NOTES
08/31/23 0500   Appointment Info   Signing clinician's name / credentials Timothy Sandra DPT   Total/Authorized Visits 12 (PT POC)   Visits Used 3   Medical Diagnosis s/p trimalleolar fracture with external fixation and hardware removal   PT Tx Diagnosis Right ankle pain, limited mobility   Precautions/Limitations Multiple comorbidities including Parkinson's, dystonia; fall risk   Other pertinent information NWB RLE with CAM boot; in power wheelchair   Quick Adds Certification   Progress Note/Certification   Start of Care Date 08/17/23   Onset of illness/injury or Date of Surgery 06/15/23   Therapy Frequency 1x/week   Predicted Duration 12 weeks   Certification date from 08/17/23   Certification date to 11/09/23   PT Goal 1   Goal Identifier LTG 1   Goal Description Patient will be able to transfer from wheelchair to toilet with supervision and walker   Rationale to maximize safety and independence with self cares;to maximize safety and independence within the home;to maximize safety and independence with performance of ADLs and functional tasks   Target Date 10/12/23   Subjective Report   Subjective Report no concerns with exercises or pain. still NWB in boot. See Dr. Bonilla 9/1/23   Objective Measures   Objective Measures Objective Measure 1;Objective Measure 2   Objective Measure 1   Objective Measure ROM   Details DF lacking 8 deg, PF 44 deg, inversion 16 deg eversion 5 deg.   Treatment Interventions (PT)   Interventions Therapeutic Procedure/Exercise;Manual Therapy   Therapeutic Procedure/Exercise   Therapeutic Procedures: strength, endurance, ROM, flexibillity minutes (63589) 10   Therapeutic Procedures Ther Proc 2   Ther Proc 2 supine ankle stretching PT assisted   Ther Proc 2 - Details x 10 min   Skilled Intervention ankle mobility assistance, progressed LE strengthening. struggles with self mobilization cause of parkinsons so assisted   Patient Response/Progress Very limited motion. Does need moderate  cues for proper technique   Manual Therapy   Manual Therapy: Mobilization, MFR, MLD, friction massage minutes (39284) 15   Manual Therapy 1 efflurage for swelling   Manual Therapy 1 - Details left foot pt supine   Skilled Intervention efflurage massage for edema   Patient Response/Progress pt tolerated well. slight change in ROM since last visit.   Education   Learner/Method No Barriers to Learning;Pictures/Video;Demonstration;Reading;Listening;Patient   Plan   Home program ankle mobility and LE strengthening.   Plan for next session Focus on LE strengthening for safe transfer and progress ankle ROM as able   Total Session Time   Timed Code Treatment Minutes 25   Total Treatment Time (sum of timed and untimed services) 25         PLAN  Continue therapy per current plan of care.    Beginning/End Dates of Progress Note Reporting Period:    to 08/31/2023    Referring Provider:  Jose Bonilla

## 2023-09-01 ENCOUNTER — ANCILLARY PROCEDURE (OUTPATIENT)
Dept: GENERAL RADIOLOGY | Facility: CLINIC | Age: 58
End: 2023-09-01
Attending: ORTHOPAEDIC SURGERY
Payer: COMMERCIAL

## 2023-09-01 ENCOUNTER — OFFICE VISIT (OUTPATIENT)
Dept: ORTHOPEDICS | Facility: CLINIC | Age: 58
End: 2023-09-01
Payer: COMMERCIAL

## 2023-09-01 DIAGNOSIS — Z98.890 STATUS POST SURGERY: Primary | ICD-10-CM

## 2023-09-01 DIAGNOSIS — Z98.890 STATUS POST SURGERY: ICD-10-CM

## 2023-09-01 PROCEDURE — 73610 X-RAY EXAM OF ANKLE: CPT | Mod: RT | Performed by: RADIOLOGY

## 2023-09-01 PROCEDURE — 99024 POSTOP FOLLOW-UP VISIT: CPT | Performed by: ORTHOPAEDIC SURGERY

## 2023-09-01 NOTE — PROGRESS NOTES
[facility]thopaedic Surgery Clinic - Follow-up Appointment    DOI: 05/20/2023, GLF, syncopal event, closed R trimalleolar ankle / pilon fracture-dislocation.  DOS: 05/21/2023, closed reduction, application of spanning external fixator.  DOS: 06/15/2023, ORIF R ankle/pilon, removal of external fixator.    I saw this pleasant 58-year-old gentleman today in follow-up after ORIF of a trimalleolar closed right ankle fracture dislocation with a pilon variant.  He underwent staged treatment with initial closed reduction and spanning external fixator followed by staged ORIF.  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  Mr. Mathews reports he is doing well.  He denies any pain or new injury.  He denies any fevers or chills.  He is accompanied today by his nurse caregiver Amber.  He endorses he has been removing his boot with physical therapy and doing range of motion exercises.  He remains nonweightbearing at this time.  They verbalized no other concerns or questions.    Examination today shows the patient to be a pleasant, cooperative, awake, and alert adult gentleman sitting upright in a manual wheelchair in no acute distress.  He is accompanied by his nurse caregiver Amber.  He is nonseptic appearing from a general medical standpoint.  Breathing pattern is regular and nonlabored on room air.  The right lower extremity boot has been doffed for examination.  Underlying skin appears to be intact without visible ulceration.  Surgical incisions are well-healed without any evidence for infection, drainage, or dehiscence.  His right ankle is nontender to palpation throughout, including the syndesmosis, anterior ankle joint line, medial malleolus, posterior malleolus, and distal fibula.  Approximate range of motion exam shows ankle dorsiflexion to about neutral (with difficulty) with the knee extended, to 45 plantarflexion.  Right tib ant,  gastrocsoleus, PTT, and peroneals are 5/5.  There is a 3/4 easily palpable right dorsalis pedis pulse.  Present but diminished sensation is endorsed to light touch in the right SPN, DPN, saphenous, and sural distributions, and endorsed as intact in the right tibial distribution.  The right foot is plantigrade.    I independently reviewed weightbearing radiographs of the right ankle dated today, 09/01/2023, and discussed the results with the patient.  Comparison is made with previous radiographs.  Fracture alignment appears to be stable, and the fractures appear to be radiographically united.  The ankle mortise is congruent without evidence for medial clear space or syndesmotic widening.  Hardware appears to be intact.  No obvious evidence for infection.    Impression: 58-year-old patient with radiographically united right ankle/pilon fracture dislocation.    Plan: Findings and plan were discussed with the patient and his caregiver in layman's terms.  I would now clear him to weight-bear as tolerated on the right lower extremity with the boot on while weightbearing, though he may have the boot off whenever not weightbearing.  He should continue range of motion exercises especially with ankle dorsiflexion, and the importance of this was discussed with the patient and his caregiver.  Physical therapy will be ordered at this time to help assist with gait training and strengthening / range of motion exercises especially ankle dorsiflexion.  Follow-up in 6 weeks with repeat examination and weightbearing right ankle radiographs, or sooner for any problems, questions, or concerns; signs and symptoms to watch out for that should prompt sooner medical attention were discussed.  All questions were answered.

## 2023-09-01 NOTE — LETTER
9/1/2023         RE: Benjamin Mathews  [address]  [facility] [address]        Dear Colleague,    Thank you for referring your patient, Benjamin Mathews, to the [facility] [place]. Please see a copy of my visit note below.    Orthopaedic Surgery Clinic - Follow-up Appointment    DOI: 05/20/2023, GLF, syncopal event, closed R trimalleolar ankle / pilon fracture-dislocation.  DOS: 05/21/2023, closed reduction, application of spanning external fixator.  DOS: 06/15/2023, ORIF R ankle/pilon, removal of external fixator.    I saw this pleasant 58-year-old gentleman today in follow-up after ORIF of a trimalleolar closed right ankle fracture dislocation with a pilon variant.  He underwent staged treatment with initial closed reduction and spanning external fixator followed by staged ORIF.  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  Mr. Mathews reports he is doing well.  He denies any pain or new injury.  He denies any fevers or chills.  He is accompanied today by his nurse caregiver Amber.  He endorses he has been removing his boot with physical therapy and doing range of motion exercises.  He remains nonweightbearing at this time.  They verbalized no other concerns or questions.    Examination today shows the patient to be a pleasant, cooperative, awake, and alert adult gentleman sitting upright in a manual wheelchair in no acute distress.  He is accompanied by his nurse caregiver Amber.  He is nonseptic appearing from a general medical standpoint.  Breathing pattern is regular and nonlabored on room air.  The right lower extremity boot has been doffed for examination.  Underlying skin appears to be intact without visible ulceration.  Surgical incisions are well-healed without any evidence for infection, drainage, or dehiscence.  His right ankle is nontender to palpation throughout, including the  syndesmosis, anterior ankle joint line, medial malleolus, posterior malleolus, and distal fibula.  Approximate range of motion exam shows ankle dorsiflexion to about neutral (with difficulty) with the knee extended, to 45 plantarflexion.  Right tib ant, gastrocsoleus, PTT, and peroneals are 5/5.  There is a 3/4 easily palpable right dorsalis pedis pulse.  Present but diminished sensation is endorsed to light touch in the right SPN, DPN, saphenous, and sural distributions, and endorsed as intact in the right tibial distribution.  The right foot is plantigrade.    I independently reviewed weightbearing radiographs of the right ankle dated today, 09/01/2023, and discussed the results with the patient.  Comparison is made with previous radiographs.  Fracture alignment appears to be stable, and the fractures appear to be radiographically united.  The ankle mortise is congruent without evidence for medial clear space or syndesmotic widening.  Hardware appears to be intact.  No obvious evidence for infection.    Impression: 58-year-old patient with radiographically united right ankle/pilon fracture dislocation.    Plan: Findings and plan were discussed with the patient and his caregiver in layman's terms.  I would now clear him to weight-bear as tolerated on the right lower extremity with the boot on while weightbearing, though he may have the boot off whenever not weightbearing.  He should continue range of motion exercises especially with ankle dorsiflexion, and the importance of this was discussed with the patient and his caregiver.  Physical therapy will be ordered at this time to help assist with gait training and strengthening / range of motion exercises especially ankle dorsiflexion.  Follow-up in 6 weeks with repeat examination and weightbearing right ankle radiographs, or sooner for any problems, questions, or concerns; signs and symptoms to watch out for that should prompt sooner medical attention were discussed.   All questions were answered.      Sincerely,        Jose Bonilla MD

## 2023-09-08 ENCOUNTER — TELEPHONE (OUTPATIENT)
Dept: GASTROENTEROLOGY | Facility: CLINIC | Age: 58
End: 2023-09-08

## 2023-09-08 ENCOUNTER — THERAPY VISIT (OUTPATIENT)
Dept: PHYSICAL THERAPY | Facility: CLINIC | Age: 58
End: 2023-09-08
Payer: COMMERCIAL

## 2023-09-08 DIAGNOSIS — M25.571 PAIN IN JOINT INVOLVING ANKLE AND FOOT, RIGHT: Primary | ICD-10-CM

## 2023-09-08 DIAGNOSIS — S82.853A TRIMALLEOLAR FRACTURE: ICD-10-CM

## 2023-09-08 PROCEDURE — 97110 THERAPEUTIC EXERCISES: CPT | Mod: GP | Performed by: PHYSICAL THERAPIST

## 2023-09-08 PROCEDURE — 97140 MANUAL THERAPY 1/> REGIONS: CPT | Mod: GP | Performed by: PHYSICAL THERAPIST

## 2023-09-08 NOTE — TELEPHONE ENCOUNTER
Patient currently resides at [facility]. The patient arrived 60+ minutes late after being no showed.     Patient stated his nurse called earlier to ask if he could be seen late due to transportation issues and was told that is ok, we were not informed of this by any staff and we would have requested for patient to be rescheduled as there were no available time slots.     I spoke with his caretaker, KWADWO Clark, at [phone] regarding this issue. She stated she spoke with two people today about patient arriving late and I informed her that we did not get any calls/messages about this. The number she called was [phone]. I gave her the [facility] number - [phone] - to call for when they are able to reschedule this appointment or if they have any further transportation issues leading up to the appointment time.     We did offer this coming Monday at 2:15pm, but they are unable to make that time work due to needing time to schedule transportation.     I will leave a note for Nitish OGDEN to reach out to the patient/RN on Monday to get rescheduled.     Bailey Ortega MA

## 2023-09-08 NOTE — TELEPHONE ENCOUNTER
[facility] Call Center    Phone Message    May a detailed message be left on voicemail: yes     Reason for Call: Other: Jemal BURKETT is requesting a call back from the team please to discuss if Pt can still be seen today. He was just dropped off at the clinic by medical transport, she is wanting to discuss if he can be seen. Please advise. Thank you!      Action Taken: Message routed to:  [facility] GI    Travel Screening: Not Applicable

## 2023-09-13 ENCOUNTER — THERAPY VISIT (OUTPATIENT)
Dept: PHYSICAL THERAPY | Facility: CLINIC | Age: 58
End: 2023-09-13
Payer: COMMERCIAL

## 2023-09-13 DIAGNOSIS — M25.571 PAIN IN JOINT INVOLVING ANKLE AND FOOT, RIGHT: Primary | ICD-10-CM

## 2023-09-13 DIAGNOSIS — Z98.890 STATUS POST SURGERY: ICD-10-CM

## 2023-09-13 DIAGNOSIS — S82.853A TRIMALLEOLAR FRACTURE: ICD-10-CM

## 2023-09-13 PROCEDURE — 97110 THERAPEUTIC EXERCISES: CPT | Mod: GP | Performed by: PHYSICAL THERAPIST

## 2023-09-13 PROCEDURE — 97140 MANUAL THERAPY 1/> REGIONS: CPT | Mod: GP | Performed by: PHYSICAL THERAPIST

## 2023-09-15 ENCOUNTER — TELEPHONE (OUTPATIENT)
Dept: PHARMACY | Facility: CLINIC | Age: 58
End: 2023-09-15
Payer: COMMERCIAL

## 2023-09-15 NOTE — TELEPHONE ENCOUNTER
Left voice message for patient to call back:    Hello, my name is Javier and I am a pharmacist calling from [facility]. I work with your doctor/provider at the [facility]. You have been identified as someone who would benefit from a visit with a pharmacist. This is an appointment with a specially trained pharmacist to review your medications to make sure they are working for you, safe, affordable and easy to take. This appointment is covered under your insurance plan. Please call the [phone] to schedule a time that works well for you.     Javier Li, PharmD, [facility] Pharmacist

## 2023-09-20 ENCOUNTER — THERAPY VISIT (OUTPATIENT)
Dept: PHYSICAL THERAPY | Facility: CLINIC | Age: 58
End: 2023-09-20
Payer: COMMERCIAL

## 2023-09-20 DIAGNOSIS — M79.671 RIGHT FOOT PAIN: ICD-10-CM

## 2023-09-20 DIAGNOSIS — S82.853A TRIMALLEOLAR FRACTURE: ICD-10-CM

## 2023-09-20 DIAGNOSIS — M25.571 PAIN IN JOINT INVOLVING ANKLE AND FOOT, RIGHT: Primary | ICD-10-CM

## 2023-09-20 PROCEDURE — 97110 THERAPEUTIC EXERCISES: CPT | Mod: GP | Performed by: PHYSICAL THERAPIST

## 2023-09-20 PROCEDURE — 97140 MANUAL THERAPY 1/> REGIONS: CPT | Mod: GP | Performed by: PHYSICAL THERAPIST

## 2023-09-21 NOTE — TELEPHONE ENCOUNTER
We want to offer oct 25 at 4 pm or any half hour spot in November with Dr Rodriguez  Need to speak with pt as well as group home staff to set up VIDEO    Nereida Delvalle RN on 9/20/2023 at 7:43 PM

## 2023-09-22 ENCOUNTER — TELEPHONE (OUTPATIENT)
Dept: NEUROLOGY | Facility: CLINIC | Age: 58
End: 2023-09-22
Payer: COMMERCIAL

## 2023-09-22 NOTE — TELEPHONE ENCOUNTER
[facility] Call Center    Phone Message    May a detailed message be left on voicemail: yes     Reason for Call: Medication Question or concern regarding medication   Prescription Clarification  Name of Medication: clonazePAM (KLONOPIN) 1 MG tablet [[id number]] (Order [id number]  Prescribing Provider:     Ching Carlos DO      Pharmacy: [facility] Address: [address] [place] [id number]   What on the order needs clarification? Caller is asking for a new order for a new strength to get the patient to the 1mg  dose for noon.    Please call the Pharmacy @ [phone] to update.      Action Taken: Message routed to:  Other: [facility]    Travel Screening: Not Applicable

## 2023-09-25 ENCOUNTER — MYC MEDICAL ADVICE (OUTPATIENT)
Dept: PHYSICAL MEDICINE AND REHAB | Facility: CLINIC | Age: 58
End: 2023-09-25
Payer: COMMERCIAL

## 2023-09-25 NOTE — TELEPHONE ENCOUNTER
Called and spoke with pharmacist who states 1mg dose strength for clonazapam is on back order, and pharmacist would like verbal authorization to substitute the 0.5mg dosage strength to equal what has been prescribed (Clonazepam 2 x 0.5mg at noon, with additional 2 tablets PRN).    Writer will discuss authorization for change with provider. Controlled substance needs approval and providers JOSE#.    Ron Sam RN, BSN  [facility]

## 2023-09-26 NOTE — TELEPHONE ENCOUNTER
Pt had a new bone fracture and needs to be re-evaluated for Rx     We want to offer oct 25 at 4 pm or any half hour spot in November with Dr Rodriguez  Need to speak with pt as well as group home staff to set up VIDEO    Thank you!    Nereida Delvalle RN on 9/25/2023 at 7:11 PM

## 2023-09-26 NOTE — TELEPHONE ENCOUNTER
Reached out to Benjamin, had to [facility], stated this tis to confirm his scheduled appt for 10/25 at 4pm video visit with Dr Rodriguez, as well as acknowledged his home group  Nica agreed to scheduling his appt for 10/25 at 4pm video visit.

## 2023-09-27 ENCOUNTER — THERAPY VISIT (OUTPATIENT)
Dept: PHYSICAL THERAPY | Facility: CLINIC | Age: 58
End: 2023-09-27
Payer: COMMERCIAL

## 2023-09-27 DIAGNOSIS — S82.853A TRIMALLEOLAR FRACTURE: ICD-10-CM

## 2023-09-27 DIAGNOSIS — M25.571 PAIN IN JOINT INVOLVING ANKLE AND FOOT, RIGHT: Primary | ICD-10-CM

## 2023-09-27 DIAGNOSIS — M79.671 RIGHT FOOT PAIN: ICD-10-CM

## 2023-09-27 PROCEDURE — 97140 MANUAL THERAPY 1/> REGIONS: CPT | Mod: GP | Performed by: PHYSICAL THERAPIST

## 2023-09-27 PROCEDURE — 97110 THERAPEUTIC EXERCISES: CPT | Mod: GP | Performed by: PHYSICAL THERAPIST

## 2023-09-27 NOTE — PROGRESS NOTES
09/27/23 0500   Appointment Info   Signing clinician's name / credentials Timothy Sandra DPT   Total/Authorized Visits 12 (PT POC)   Visits Used 7   Medical Diagnosis s/p trimalleolar fracture with external fixation and hardware removal   PT Tx Diagnosis Right ankle pain, limited mobility   Precautions/Limitations Multiple comorbidities including Parkinson's, dystonia; fall risk   Other pertinent information allowed to WB in boot, no boot need when non weight bearing.   Quick Adds Certification   Progress Note/Certification   Start of Care Date 08/17/23   Onset of illness/injury or Date of Surgery 06/15/23   Therapy Frequency 1x/week   Predicted Duration 12 weeks   Certification date from 08/17/23   Certification date to 11/09/23   Progress Note Completed Date 08/17/23   PT Goal 1   Goal Identifier LTG 1   Goal Description Patient will be able to transfer from wheelchair to toilet with supervision and walker   Rationale to maximize safety and independence with self cares;to maximize safety and independence within the home;to maximize safety and independence with performance of ADLs and functional tasks   Goal Progress tranfering bearing weight in boot with min CGA   Target Date 10/12/23   Subjective Report   Subjective Report pt reports no foot issues.   Objective Measures   Objective Measures Objective Measure 1;Objective Measure 2   Objective Measure 1   Objective Measure ROM   Details DF lacking 2 deg with o/p. PF 55 deg, inversion 28 deg eversion 13 deg.   Treatment Interventions (PT)   Interventions Therapeutic Procedure/Exercise;Manual Therapy   Therapeutic Procedure/Exercise   Therapeutic Procedures: strength, endurance, ROM, flexibillity minutes (80006) 25   Therapeutic Procedures Ther Proc 2   Ther Proc 1 sit to stand contact guard assist   Ther Proc 1 - Details x 5   Ther Proc 2 supine ankle stretching PT assisted   Ther Proc 2 - Details x 10 min   PTRx Ther Proc 1 Ankle Circles in Supine or Long  Sitting   PTRx Ther Proc 1 - Details No Notes   PTRx Ther Proc 2 Great Toe Flexion with Patient Generated Overpressure   PTRx Ther Proc 2 - Details 3 x 30 sec.    PTRx Ther Proc 3 Seated Ankle Gastroc Stretch with Towel   PTRx Ther Proc 3 - Details 3 x 30 sec.    PTRx Ther Proc 4 Ankle Pumps in Long Sitting   PTRx Ther Proc 4 - Details No Notes   PTRx Ther Proc 5 Ankle Plantar Flexion Stretch    PTRx Ther Proc 5 - Details 3 x 30 sec.    PTRx Ther Proc 6 Seated Hip Flexion   PTRx Ther Proc 6 - Details x10   PTRx Ther Proc 7 Short Arc Knee Extension   PTRx Ther Proc 7 - Details x10   PTRx Ther Proc 8 Hip Flexion Straight Leg Raise   PTRx Ther Proc 8 - Details x 20    PTRx Ther Proc 9 Seated Hamstring Curl with Tubing   PTRx Ther Proc 9 - Details x 30 black    PTRx Ther Proc 10 Roll Outs    PTRx Ther Proc 10 - Details black x 20    PTRx Ther Proc 11 Theraband Ankle Plantarflexion   PTRx Ther Proc 11 - Details GTB x 20    PTRx Ther Proc 12 Theraband Ankle Inversion   PTRx Ther Proc 12 - Details xGTB 18    PTRx Ther Proc 13 Ankle Eversion Strengthening With Theraband   PTRx Ther Proc 13 - Details GTB x 20    Skilled Intervention ankle mobility assistance, progressed LE strengthening. struggles with self mobilization cause of parkinsons so assisted, progressed strngtehning. will reach out to next therapist   Patient Response/Progress Very limited motion. Does need moderate cues for proper technique, ROM improving.   Manual Therapy   Manual Therapy: Mobilization, MFR, MLD, friction massage minutes (84188) 15   Manual Therapy 1 efflurage   Manual Therapy 1 - Details left foot pt supine   Skilled Intervention efflurage massage for edema   Patient Response/Progress pt tolerated well. slight change in ROM since last visit.   Education   Learner/Method No Barriers to Learning;Pictures/Video;Demonstration;Reading;Listening;Patient   Plan   Home program WB , ankle strenghtening with RTB   Updates to plan of care Work with rehab  PT down stairs with lift for safe gait training and continue ankle mobilization / Strengthening already has appts setup.       DISCHARGE  Reason for Discharge: Going to complete rehab with rehab side of ealth to work on gait with lift for safety and continue with LE strenthening / flexibility      Equipment Issued:     Discharge Plan: Other services: Rehab down stairs. .    Referring Provider:  Jose Bonilla

## 2023-09-27 NOTE — TELEPHONE ENCOUNTER
Called pharmacy back and gave JOSE# as requested. Pharmacy substituting as described previously (see below).    Ron Sam RN, BSN  [facility]

## 2023-10-03 ENCOUNTER — TELEPHONE (OUTPATIENT)
Dept: GASTROENTEROLOGY | Facility: CLINIC | Age: 58
End: 2023-10-03

## 2023-10-03 ENCOUNTER — OFFICE VISIT (OUTPATIENT)
Dept: GASTROENTEROLOGY | Facility: CLINIC | Age: 58
End: 2023-10-03
Payer: COMMERCIAL

## 2023-10-03 VITALS — HEART RATE: 75 BPM | DIASTOLIC BLOOD PRESSURE: 67 MMHG | OXYGEN SATURATION: 91 % | SYSTOLIC BLOOD PRESSURE: 101 MMHG

## 2023-10-03 DIAGNOSIS — S82.891A CLOSED FRACTURE OF RIGHT ANKLE, INITIAL ENCOUNTER: ICD-10-CM

## 2023-10-03 PROCEDURE — 99214 OFFICE O/P EST MOD 30 MIN: CPT | Performed by: PHYSICIAN ASSISTANT

## 2023-10-03 RX ORDER — LACTULOSE 10 G/15ML
15 SOLUTION ORAL 2 TIMES DAILY
Qty: 473 ML | Refills: 11 | Status: SHIPPED | OUTPATIENT
Start: 2023-10-03

## 2023-10-03 ASSESSMENT — PAIN SCALES - GENERAL: PAINLEVEL: SEVERE PAIN (6)
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
## 2023-10-03 NOTE — NURSING NOTE
Chief Complaint   Patient presents with    Follow Up     Follow up for constipation and abdominal pain.  Last seen per Miryam Montemayor on 01/09/2023.     Vitals:    10/03/23 0822   BP: 101/67   BP Location: Right arm   Patient Position: Sitting   Cuff Size: Adult Small   Pulse: 75   SpO2: 91%     There is no height or weight on file to calculate BMI.    Medications were reconciled.    Does patient need any medication refills at today's visit? Not sure        Avani Horn, CMA

## 2023-10-03 NOTE — TELEPHONE ENCOUNTER
[facility] Call Center    Phone Message    May a detailed message be left on voicemail: yes     Reason for Call: Other: Sennofides-Docusate 8.6/50 MG orders for cancellation sent by Marquez Joseph. The cancellation order for the medication has different instructions from the RX on file. Please call to confirm     Action Taken: Message routed to:  [facility] gi    Travel Screening: Not Applicable

## 2023-10-03 NOTE — PATIENT INSTRUCTIONS
It was nice meeting you today.  As we discussed, I would like you to start taking the linaclotide (Linzess) every day now, 30 minutes before breakfast.  I would also like you to continue using the lactulose twice daily.  I think the combination of these 2 medications should be enough to facilitate daily bowel movements that are easily passed.  If you do go more than 3 days without a bowel movement, I would like you to use one of your bisacodyl suppositories or an oral bisacodyl tablet.  I will reach out to you in a month or so to get an update on your symptoms, but you are welcome to reach out to me sooner as needed with any concerns.

## 2023-10-03 NOTE — TELEPHONE ENCOUNTER
"[facility] Call Center    Phone Message    May a detailed message be left on voicemail: yes     Reason for Call: Medication Question or concern regarding medication   Prescription Clarification  Name of Medication: linaclotide (LINZESS) 290 MCG capsule   Prescribing Provider: Dr Joseph   [facility] [address]    What on the order needs clarification? [facility] calling to verify directions as they say \"as needed\" and is usually prescribed as scheduled dosing.      Action Taken: Message routed to:  [facility] Gastro    Travel Screening: Not Applicable                                                                   "
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
## 2023-10-03 NOTE — TELEPHONE ENCOUNTER
Messaged provider that pharmacy wanting to clarify order for Linzess - prescribed as PRN, pharmacy stating that usually it is ordered scheduled. Provider returned message stating that med should be prescribed as scheduled daily.   Spoke with Gregory, pharmacist at [facility] and gave verbal order that Linzess should be prescribed as 1 capsule scheduled once daily. VORB provided by Gregory.     Yari Hansen RN

## 2023-10-04 ENCOUNTER — THERAPY VISIT (OUTPATIENT)
Dept: PHYSICAL THERAPY | Facility: CLINIC | Age: 58
End: 2023-10-04
Attending: ORTHOPAEDIC SURGERY
Payer: COMMERCIAL

## 2023-10-04 DIAGNOSIS — M79.671 RIGHT FOOT PAIN: ICD-10-CM

## 2023-10-04 DIAGNOSIS — Z98.890 STATUS POST SURGERY: Primary | ICD-10-CM

## 2023-10-04 PROCEDURE — 97116 GAIT TRAINING THERAPY: CPT | Mod: GP | Performed by: PHYSICAL THERAPIST

## 2023-10-04 PROCEDURE — 97162 PT EVAL MOD COMPLEX 30 MIN: CPT | Mod: GP | Performed by: PHYSICAL THERAPIST

## 2023-10-04 NOTE — TELEPHONE ENCOUNTER
Call to pharmacy to clarify that per visit note on 10/2/23 provider did discontinue the senna.    Siria Witt RN

## 2023-10-04 NOTE — PROGRESS NOTES
PHYSICAL THERAPY EVALUATION  Type of Visit: Evaluation    See electronic medical record for Abuse and Falls Screening details.    Subjective       Presenting condition or subjective complaint:    Date of onset: 05/20/23    Relevant medical history:     Dates & types of surgery:      Prior diagnostic imaging/testing results:       Prior therapy history for the same diagnosis, illness or injury:        Prior Level of Function  Transfers: Assistive equipment  Ambulation: Assistive equipment  ADL: Assistive equipment, Assistive person  IADL:  Assist at Assisted Living    Living Environment  Social support:   AL  Type of home:   AL  Equipment owned:   Power wc, Intrinsic-ID, Contract Live    Employment:    Disability  Hobbies/Interests:  Reading, books on tape, shopping    Patient goals for therapy:  Get up and walk    Pain assessment: Pain denied     Objective   Cognitive Status Examination  Orientation: Oriented to person, place and time   Level of Consciousness: Alert  Follows Commands and Answers Questions: 100% of the time, Follows multi step instructions  Personal Safety and Judgement: Intact  Memory: Impaired    OBSERVATION: Comes to clinic in power chair and CAM boot on Right foot  INTEGUMENTARY:  Mottled skin at right lower leg that is wrapped for edema control  POSTURE: Sitting Posture: Rounded shoulders, Forward head, Lordosis increased  PALPATION: Pitting edema to mild to bilateral lower legs  RANGE OF MOTION:  Bilateral ankles limited through 75% on R and 50% on L in all movements; B knee extension to approx 20-0  STRENGTH:  B Ankle DF= 2/5,  L knee flexion/ext=3/4, R knee flex/ext=4/5, Left hip flexion=4/5, Right hip flexion= 5/5    BED MOBILITY:  NT-reports independent     TRANSFERS:  Stand and pivot with SBA    WHEELCHAIR MOBILITY: Independent with power chair    GAIT:   Level of Bradley: Dependent  Assistive Device(s): Walker (four wheeled)  Gait Deviations:  WBAT on R with CAM boot, needs assist for forward  weight shift to gain increased weight bear  with overhead harness and min A  Gait Distance: 10 feet with with overhead harness and min A with 4ww  Stairs: NT    BALANCE: Standing Balance (dynamic):Poor    Able to sit with no back support and stand with harness support and 4ww    SENSATION:  Mild tingling in R foot    MUSCLE TONE: WNL        Assessment & Plan   CLINICAL IMPRESSIONS  Medical Diagnosis: S/P R ankle ORIF    Treatment Diagnosis: Impaired gait and transfers, weakness   Impression/Assessment: Patient is a 58 year old male with impaired mobility complaints.  The following significant findings have been identified: Decreased ROM/flexibility, Decreased strength, Impaired balance, Impaired gait, and Decreased activity tolerance. These impairments interfere with their ability to perform self care tasks, recreational activities, household chores, and household mobility as compared to previous level of function.     Clinical Decision Making (Complexity):  Clinical Presentation: Evolving/Changing  Clinical Presentation Rationale: based on medical and personal factors listed in PT evaluation  Clinical Decision Making (Complexity): Moderate complexity    PLAN OF CARE  Treatment Interventions:  Interventions: Gait Training, Manual Therapy, Neuromuscular Re-education, Therapeutic Activity, Therapeutic Exercise    Long Term Goals     PT Goal 1  Goal Identifier: Transfer  Goal Description: Benjamin will perform bed<>chair transfers with modified independence with a 4ww in 3/3 trials for safety at Assisted Living.  Target Date: 01/01/24  PT Goal 2  Goal Identifier: Gait  Goal Description: Benjamin will walk 25 feet with a 4ww with SBA in 2/2 trials to access areas in his Assisted Living.  Target Date: 01/01/24  PT Goal 3  Goal Identifier: Stand  Goal Description: Benjamin will demonstrate the ability ot stand with UE support for 3 minutes for daily cares on a daily basis.  Target Date: 01/01/24      Frequency of Treatment:  Weekly  Duration of Treatment: 90 days    Recommended Referrals to Other Professionals:  None at this point  Education Assessment:   Learner/Method: Patient  Education Comments: Use of harness    Risks and benefits of evaluation/treatment have been explained.   Patient/Family/caregiver agrees with Plan of Care.     Evaluation Time:     PT Eval, Moderate Complexity Minutes (24300): 30       Signing Clinician: Radha Thornton PT      [facility]                                                                                   OUTPATIENT PHYSICAL THERAPY      PLAN OF TREATMENT FOR OUTPATIENT REHABILITATION   Patient's Last Name, First Name, CARMENSHAUN  UmeshBenjamin smith  KENN YOB: 1965   Provider's Name   [facility]   Medical Record No.  [id number]     Onset Date: 05/20/23  Start of Care Date: 10/04/23     Medical Diagnosis:  S/P R ankle ORIF      PT Treatment Diagnosis:  Impaired gait and transfers, weakness Plan of Treatment  Frequency/Duration: Weekly/ 90 days    Certification date from 10/04/23 to 01/01/24         See note for plan of treatment details and functional goals     Radha Thornton PT                         I CERTIFY THE NEED FOR THESE SERVICES FURNISHED UNDER        THIS PLAN OF TREATMENT AND WHILE UNDER MY CARE     (Physician attestation of this document indicates review and certification of the therapy plan).                Referring Provider:  Jose Bonilla MD      Initial Assessment  See Epic Evaluation- Start of Care Date: 10/04/23

## 2023-10-06 ENCOUNTER — ANCILLARY PROCEDURE (OUTPATIENT)
Dept: GENERAL RADIOLOGY | Facility: CLINIC | Age: 58
End: 2023-10-06
Attending: ORTHOPAEDIC SURGERY
Payer: COMMERCIAL

## 2023-10-06 ENCOUNTER — OFFICE VISIT (OUTPATIENT)
Dept: ORTHOPEDICS | Facility: CLINIC | Age: 58
End: 2023-10-06
Payer: COMMERCIAL

## 2023-10-06 DIAGNOSIS — S82.851D CLOSED TRIMALLEOLAR FRACTURE OF RIGHT ANKLE WITH ROUTINE HEALING, SUBSEQUENT ENCOUNTER: Primary | ICD-10-CM

## 2023-10-06 DIAGNOSIS — Z98.890 STATUS POST SURGERY: ICD-10-CM

## 2023-10-06 DIAGNOSIS — Z98.890 STATUS POST SURGERY: Primary | ICD-10-CM

## 2023-10-06 PROCEDURE — 73610 X-RAY EXAM OF ANKLE: CPT | Mod: RT | Performed by: RADIOLOGY

## 2023-10-06 PROCEDURE — 99213 OFFICE O/P EST LOW 20 MIN: CPT | Performed by: ORTHOPAEDIC SURGERY

## 2023-10-06 NOTE — PROGRESS NOTES
Orthopaedic Surgery Clinic - Follow-up Appointment    DOI: 05/20/2023, GLF, syncopal event, closed R trimalleolar ankle / pilon fracture-dislocation.  DOS: 05/21/2023, closed reduction, application of spanning external fixator.  DOS: 06/15/2023, ORIF R ankle/pilon, removal of external fixator.      I saw this pleasant 58-year-old patient today, with a past medical history notable for left hemiparesis, chronic DVT, muscle spasms, alcohol abuse, alcohol dependence, anemia, CVA, pulmonary embolism, Parkinson's disease and dyskinesia, multiple falls, syncope, and multiple other issues, in follow-up after ORIF of a right closed trimalleolar ankle/pilon fracture dislocation.  He reports that he is doing well and denies any pain in the right ankle at this time.  He endorses ambulating and denies any pain when ambulating on it.  He is using a boot for his ambulation.  He is at a care facility and undergoing regular therapy there.  He is seen and accompanied today by his nurse caregiver, Mikhail.  He denies any fevers, chills, chest pain, shortness of breath, or wound drainage.    Examination shows the patient to be a pleasant, cooperative, awake, and alert adult sitting upright in a manual wheelchair in no acute distress.  He is nonseptic appearing from a general clinical standpoint.  There is a right lower extremity boot which is doffed for examination and then redonned after examination.  Breathing pattern is regular and nonlabored on room air.  Skin is intact throughout the right ankle and foot without visible ulcerations or open wounds.  The surgical incisions from the ORIF are well-healed without any evidence of infection, drainage, or dehiscence.  The previous external fixator pin sites likewise appear to be closed, healed, and dry without overt signs for infection.  The right ankle is nontender to palpation throughout except for mild tenderness at the distal fibula.  There is no tenderness at the posterior ankle.   Ankle range of motion exam shows dorsiflexion to about 5 degrees above neutral with the knee extended, to 45 plantarflexion.  5/5 right tib ant, EHL, gastrocsoleus, PTT, and peroneals.  There is a 3/4 easily palpable right dorsalis pedis pulse.  Light touch sensation is endorsed as intact in the tibial distribution, and present but diminished in all other distributions.    Independent review of imaging was performed including weightbearing AP, mortise, and lateral right ankle radiographs dated 10/06/2023.  Images were discussed with and shown to the patient and his caregiver.  I compared them with previous images.  The fracture appears to be well aligned with stable alignment.  The ankle mortise is congruent without evidence of medial clear space widening or syndesmotic widening.  The distal fibula and medial malleolar fractures appear to be well aligned and solidly united.  The posterior fracture line, which appeared to be previously well healed, is visualized in a slightly different projection at this time as this is a weightbearing image, and a portion of the fracture line actually does appear to be visible.  This likely represents a projectional change.  Hardware appears to be intact and stable.  No obvious evidence of infection or acute fracture.    Impression:  58-year-old patient with multiple comorbidities and stable appearing and healing closed right trimalleolar ankle fracture dislocation/pilon variant, approximately four months postop.    Plan:  Findings and plan were discussed with the patient and his nurse caregiver in layman's terms.  He may continue to weight-bear as tolerated on the right lower extremity with appropriate assistive devices and assistance.  He may transition from a boot to a Tri-Lock ankle brace at this time which is provided today.  Signs and symptoms to watch out for that should prompt medical attention were discussed.  Continue physical therapy for right ankle range of motion,  stretching, and strengthening exercises.  Follow-up in 6 weeks with repeat weightbearing AP, mortise, and lateral right ankle radiographs, or sooner for any problems, questions or concerns.  All questions were answered.  The boot is to be removed at least daily and at nighttime, for hygiene, bathing, PT for the above-noted exercises, and skin checks.

## 2023-10-06 NOTE — LETTER
10/6/2023         RE: Benjamin Mathews  [address]  [facility] [address]        Dear Colleague,    Thank you for referring your patient, Benjamin Mathews, to the [facility] [place]. Please see a copy of my visit note below.    DME FITTING    Relevant Diagnosis: Right ankle ORIF  SHAJI Burns brace was fit on patient's Right ankle.     Person(s) involved in teaching:   Patient and Care taker    Brace was applied in standard Manner:  Yes  Brace fit well:  Yes  Patient reports brace to fit comfortably:  Yes    Education:   Patient shown self application and removal of brace: Yes  Patient shown how to adjust brace fit, if necessary: Yes  Patient educated on billing and return policy: Yes  Patient confirmed understanding when and how to contact clinic with concerns: Yes      Orthopa[facility] Surgery Clinic - Follow-up Appointment    DOI: 05/20/2023, GLF, syncopal event, closed R trimalleolar ankle / pilon fracture-dislocation.  DOS: 05/21/2023, closed reduction, application of spanning external fixator.  DOS: 06/15/2023, ORIF R ankle/pilon, removal of external fixator.      I saw this pleasant 58-year-old patient today, with a past medical history notable for left hemiparesis, chronic DVT, muscle spasms, alcohol abuse, alcohol dependence, anemia, CVA, pulmonary embolism, Parkinson's disease and dyskinesia, multiple falls, syncope, and multiple other issues, in follow-up after ORIF of a right closed trimalleolar ankle/pilon fracture dislocation.  He reports that he is doing well and denies any pain in the right ankle at this time.  He endorses ambulating and denies any pain when ambulating on it.  He is using a boot for his ambulation.  He is at a care facility and undergoing regular therapy there.  He is seen and accompanied today by his nurse caregiver, Mikhail.  He denies any fevers, chills, chest pain, shortness of breath, or wound drainage.    Examination shows the patient to be a  pleasant, cooperative, awake, and alert adult sitting upright in a manual wheelchair in no acute distress.  He is nonseptic appearing from a general clinical standpoint.  There is a right lower extremity boot which is doffed for examination and then redonned after examination.  Breathing pattern is regular and nonlabored on room air.  Skin is intact throughout the right ankle and foot without visible ulcerations or open wounds.  The surgical incisions from the ORIF are well-healed without any evidence of infection, drainage, or dehiscence.  The previous external fixator pin sites likewise appear to be closed, healed, and dry without overt signs for infection.  The right ankle is nontender to palpation throughout except for mild tenderness at the distal fibula.  There is no tenderness at the posterior ankle.  Ankle range of motion exam shows dorsiflexion to about 5 degrees above neutral with the knee extended, to 45 plantarflexion.  5/5 right tib ant, EHL, gastrocsoleus, PTT, and peroneals.  There is a 3/4 easily palpable right dorsalis pedis pulse.  Light touch sensation is endorsed as intact in the tibial distribution, and present but diminished in all other distributions.    Independent review of imaging was performed including weightbearing AP, mortise, and lateral right ankle radiographs dated 10/06/2023.  Images were discussed with and shown to the patient and his caregiver.  I compared them with previous images.  The fracture appears to be well aligned with stable alignment.  The ankle mortise is congruent without evidence of medial clear space widening or syndesmotic widening.  The distal fibula and medial malleolar fractures appear to be well aligned and solidly united.  The posterior fracture line, which appeared to be previously well healed, is visualized in a slightly different projection at this time as this is a weightbearing image, and a portion of the fracture line actually does appear to be visible.   This likely represents a projectional change.  Hardware appears to be intact and stable.  No obvious evidence of infection or acute fracture.    Impression:  58-year-old patient with multiple comorbidities and stable appearing and healing closed right trimalleolar ankle fracture dislocation/pilon variant, approximately four months postop.    Plan:  Findings and plan were discussed with the patient and his nurse caregiver in layman's terms.  He may continue to weight-bear as tolerated on the right lower extremity with appropriate assistive devices and assistance.  He may transition from a boot to a Tri-Lock ankle brace at this time which is provided today.  Signs and symptoms to watch out for that should prompt medical attention were discussed.  Continue physical therapy for right ankle range of motion, stretching, and strengthening exercises.  Follow-up in 6 weeks with repeat weightbearing AP, mortise, and lateral right ankle radiographs, or sooner for any problems, questions or concerns.  All questions were answered.  The boot is to be removed at least daily and at nighttime, for hygiene, bathing, PT for the above-noted exercises, and skin checks.      Sincerely,        Jose Bonilla MD

## 2023-10-06 NOTE — PROGRESS NOTES
DME FITTING    Relevant Diagnosis: Right ankle ORIF  Triok, LG brace was fit on patient's Right ankle.     Person(s) involved in teaching:   Patient and Care taker    Brace was applied in standard Manner:  Yes  Brace fit well:  Yes  Patient reports brace to fit comfortably:  Yes    Education:   Patient shown self application and removal of brace: Yes  Patient shown how to adjust brace fit, if necessary: Yes  Patient educated on billing and return policy: Yes  Patient confirmed understanding when and how to contact clinic with concerns: Yes

## 2023-10-06 NOTE — NURSING NOTE
Reason For Visit:   Chief Complaint   Patient presents with    RECHECK     Follow up on ORIF of right ankle/pilon, removal of external fixator DOS 6/15/23       There were no vitals taken for this visit.    Pain Assessment  Patient Currently in Pain: No (denies pain, states it feels sort of numb)    Kirti Duke, ATC

## 2023-10-09 ENCOUNTER — TELEPHONE (OUTPATIENT)
Dept: ORTHOPEDICS | Facility: CLINIC | Age: 58
End: 2023-10-09
Payer: COMMERCIAL

## 2023-10-10 ENCOUNTER — TELEPHONE (OUTPATIENT)
Dept: ORTHOPEDICS | Facility: CLINIC | Age: 58
End: 2023-10-10
Payer: COMMERCIAL

## 2023-10-10 NOTE — TELEPHONE ENCOUNTER
LM for KWADWO Clark [phone] to reschedule patient 9/11/2023 8:17 am Jennifer PUGA cma  LM for KWADWO Clark [phone] to reschedule patient 9/20/2023 10:48 am Jennifer PUGA cma  LM for KWADWO Clark [phone] to reschedule patient 10/2/2023 11:04 am santo Saenz RN states patient is seeing a different GI provider 10/10 3:25 pm am Jennifer PUGA cma

## 2023-10-18 ENCOUNTER — APPOINTMENT (OUTPATIENT)
Dept: GENERAL RADIOLOGY | Facility: CLINIC | Age: 58
End: 2023-10-18
Attending: STUDENT IN AN ORGANIZED HEALTH CARE EDUCATION/TRAINING PROGRAM
Payer: COMMERCIAL

## 2023-10-18 ENCOUNTER — THERAPY VISIT (OUTPATIENT)
Dept: PHYSICAL THERAPY | Facility: CLINIC | Age: 58
End: 2023-10-18
Attending: ORTHOPAEDIC SURGERY
Payer: COMMERCIAL

## 2023-10-18 ENCOUNTER — HOSPITAL ENCOUNTER (EMERGENCY)
Facility: CLINIC | Age: 58
Discharge: HOME | End: 2023-10-18
Attending: STUDENT IN AN ORGANIZED HEALTH CARE EDUCATION/TRAINING PROGRAM | Admitting: STUDENT IN AN ORGANIZED HEALTH CARE EDUCATION/TRAINING PROGRAM
Payer: COMMERCIAL

## 2023-10-18 VITALS
SYSTOLIC BLOOD PRESSURE: 110 MMHG | RESPIRATION RATE: 16 BRPM | HEIGHT: 76 IN | OXYGEN SATURATION: 98 % | DIASTOLIC BLOOD PRESSURE: 62 MMHG | HEART RATE: 71 BPM | WEIGHT: 225 LBS | BODY MASS INDEX: 27.4 KG/M2 | TEMPERATURE: 98.2 F

## 2023-10-18 DIAGNOSIS — K59.00 CONSTIPATION, UNSPECIFIED CONSTIPATION TYPE: ICD-10-CM

## 2023-10-18 DIAGNOSIS — S82.851N: Primary | ICD-10-CM

## 2023-10-18 DIAGNOSIS — Z98.890 STATUS POST SURGERY: ICD-10-CM

## 2023-10-18 DIAGNOSIS — K56.41 FECAL IMPACTION (H): ICD-10-CM

## 2023-10-18 PROCEDURE — 97110 THERAPEUTIC EXERCISES: CPT | Mod: GP | Performed by: PHYSICAL THERAPIST

## 2023-10-18 PROCEDURE — 97116 GAIT TRAINING THERAPY: CPT | Mod: GP | Performed by: PHYSICAL THERAPIST

## 2023-10-18 PROCEDURE — 99284 EMERGENCY DEPT VISIT MOD MDM: CPT | Performed by: STUDENT IN AN ORGANIZED HEALTH CARE EDUCATION/TRAINING PROGRAM

## 2023-10-18 PROCEDURE — 99283 EMERGENCY DEPT VISIT LOW MDM: CPT | Performed by: STUDENT IN AN ORGANIZED HEALTH CARE EDUCATION/TRAINING PROGRAM

## 2023-10-18 PROCEDURE — 74019 RADEX ABDOMEN 2 VIEWS: CPT | Mod: 26 | Performed by: RADIOLOGY

## 2023-10-18 PROCEDURE — 74019 RADEX ABDOMEN 2 VIEWS: CPT

## 2023-10-18 RX ORDER — POLYETHYLENE GLYCOL 3350 17 G/17G
1 POWDER, FOR SOLUTION ORAL DAILY
Qty: 510 G | Refills: 0 | Status: SHIPPED | OUTPATIENT
Start: 2023-10-18 | End: 2023-11-17

## 2023-10-18 ASSESSMENT — ACTIVITIES OF DAILY LIVING (ADL)
ADLS_ACUITY_SCORE: 35
ADLS_ACUITY_SCORE: 33

## 2023-10-18 NOTE — PATIENT INSTRUCTIONS
To whom It may concern,    Today in PT Benjamin walked 50 feet with the 4ww twice. I di have a gait belt on him in case he had a loss of balance. He does not need to use our overhead lift any longer for safety.     We also did some leg exercises. He needs ot bring the other ankle brace to next appt so we can start weaning off the CAM boot.     Cyndi Bernard DPT, MPT, NCS  Physical Therapist   Board Certified Neurologic Clinical Specialist     [facility]   [address] [place] [address]   [email or web address]  [email or web address]   [phone]   Clinic: [phone] //   Fax: [phone]

## 2023-10-19 NOTE — DISCHARGE INSTRUCTIONS
Thank you for coming to the [facility] ED.  Your constipation appears to be complicated by a fecal impaction, a mass of hard stool in the rectum.  This was disimpacted (removed) at the bedside and an enema was administered afterwards with improvement.    Please continue an aggressive bowel regimen at home and follow-up with your primary care physician.  You should be taking at least 1 capful of MiraLAX daily, if you are not having regular bowel movements you increase this to 2 daily.    If your symptoms worsen or you have any new or concerning symptoms please return to the emergency department.

## 2023-10-19 NOTE — ED TRIAGE NOTES
Pt from [facility] BIB by EMS for c/o constipation for the last two weeks. Been progressively getting worse for the last 4 days. Pt tried laxatives and suppositories with no remedy. Hx of SBO. Pt endorses abd pain and n/v.     Triage Assessment (Adult)       Row Name 10/18/23 2046          Triage Assessment    Airway WDL WDL        Respiratory WDL    Respiratory WDL WDL        Skin Circulation/Temperature WDL    Skin Circulation/Temperature WDL WDL        Cardiac WDL    Cardiac WDL WDL        Peripheral/Neurovascular WDL    Peripheral Neurovascular WDL WDL        Cognitive/Neuro/Behavioral WDL    Cognitive/Neuro/Behavioral WDL WDL        Izaiah Coma Scale    Best Eye Response 4-->(E4) spontaneous     Best Motor Response 6-->(M6) obeys commands     Best Verbal Response 5-->(V5) oriented     [place] Coma Scale Score 15

## 2023-10-19 NOTE — ED PROVIDER NOTES
ED Provider Note  [facility]      History     Chief Complaint   Patient presents with    Constipation     HPI  Benjamin Mathews is a 58 year old male with past medical history of hemiparesis, chronic DVT, muscle spasms, alcohol use dependence, anemia, CVA, pulmonary embolism, parkinson's disease (dyskinesia falls and syncope).  Reportedly has a history of constipation and SBO as well.  Comes in today after not having a bowel movement for 10 days.  Some nausea, no vomiting.  No significant abdominal pain just feeling of fullness.  He is not having relief from lactulose and suppositories.  He is not any fevers or chills.  No hematochezia or melena.  He feels like there is some hard stool at the rectum that is not passing.    This part of the medical record was transcribed by Nam Beatty Medical Scribe, from a dictation done by Yvon Sierra MD.     Past Medical History  Past Medical History:   Diagnosis Date    Cerebral infarction (H)     Clotting disorder (H24)     PE 2019    Dystonia     Parkinson disease      Past Surgical History:   Procedure Laterality Date    APPLY EXTERNAL FIXATOR LOWER EXTREMITY Right 5/21/2023    Procedure: Right  Ankle Closed Reduction and  External Fixator Placement;  Surgeon: Nicole Apodaca MD;  Location: [facility] OR    OPEN REDUCTION INTERNAL FIXATION ANKLE Right 6/15/2023    Procedure: OPEN REDUCTION INTERNAL FIXATION, FRACTURE, ANKLE, removal of external fixator device.;  Surgeon: Jose Bonilla MD;  Location: [facility] OR     polyethylene glycol (MIRALAX) 17 GM/Dose powder  alfuzosin ER (UROXATRAL) 10 MG 24 hr tablet  apixaban ANTICOAGULANT (ELIQUIS) 5 MG tablet  atorvastatin (LIPITOR) 40 MG tablet  bisacodyl (DULCOLAX) 10 MG suppository  carbidopa-levodopa (SINEMET CR)  MG CR tablet  carbidopa-levodopa (SINEMET)  MG tablet  cholecalciferol (VITAMIN D3) 125 mcg (5000 units) capsule  clonazePAM (KLONOPIN) 1 MG tablet  clonazePAM (KLONOPIN) 2 MG  tablet  cloZAPine (CLOZARIL) 50 MG tablet  diclofenac (VOLTAREN) 1 % topical gel  gabapentin (NEURONTIN) 800 MG tablet  hydrOXYzine (ATARAX) 25 MG tablet  ketoconazole (NIZORAL) 2 % external cream  ketoconazole (NIZORAL) 2 % external shampoo  lactulose (CHRONULAC) 10 GM/15ML solution  linaclotide (LINZESS) 290 MCG capsule  metoprolol succinate ER (TOPROL XL) 25 MG 24 hr tablet  multivitamin, therapeutic (THERA-VIT) TABS tablet  pantoprazole (PROTONIX) 40 MG EC tablet  traZODone (DESYREL) 100 MG tablet  traZODone (DESYREL) 50 MG tablet  venlafaxine (EFFEXOR XR) 150 MG 24 hr capsule  vitamin C (ASCORBIC ACID) 500 MG tablet      Allergies   Allergen Reactions    Amantadine Other (See Comments)     Other reaction(s): Hallucinations  Hallucinations/ lost self control/gambling.     halluicnations  Hallucinations/ lost self control/gambling.     hallucinates  halluicnations  hallucinates  Hallucinations/ lost self control/gambling.       Quetiapine GI Disturbance, Diarrhea and Other (See Comments)     Diarrhea    Diarrhea  Other reaction(s): GI Disturbance  Diarrhea      Duloxetine Other (See Comments)     suicidal  suicidal       Family History  Family History   Problem Relation Age of Onset    Other - See Comments Mother         pulmonary embolism from hip fracture    Pulmonary Embolism Mother     Parkinsonism Father     Neurologic Disorder Sister     Parkinsonism Sister         ?med related    Other - See Comments Sister         12/7/1950    Depression Sister     Neurologic Disorder Brother         dystonia    Dystonia Brother     Other - See Comments Brother             Heart Disease Nephew      Social History   Social History     Tobacco Use    Smoking status: Some Days     Types: Pipe, Cigars, Cigarettes    Smokeless tobacco: Never   Vaping Use    Vaping Use: Never used   Substance Use Topics    Alcohol use: Not Currently     Comment: quit 2014    Drug use: Not Currently      Past medical history, past  "surgical history, medications, allergies, family history, and social history were reviewed with the patient. No additional pertinent items.      A complete review of systems was performed with pertinent positives and negatives noted in the HPI, and all other systems negative.    Physical Exam   BP: 134/77  Pulse: 90  Temp: 98.2  F (36.8  C)  Resp: 16  Height: 193 cm (6' 4\")  Weight: 102.1 kg (225 lb)  SpO2: 98 %  Physical Exam  Vital Signs Reviewed  Gen: Well nourished, well developed, resting comfortably, no acute distress  HEENT: NC/AT, PERRL, EOMI, MMM  Neck: Supple, FROM  CV: Regular Rate  Lungs/Chest: Normal Effort  Abd: Non-distended, nontender with no peritoneal signs  MSK/Back: FROM, no visible deformity  Neuro: A&Ox3, GCS 15, CN II-XII unremarkable.  Skin: Warm, Dry, Intact, no visible lesions    ED Course, Procedures, & Data     ED Course as of 10/18/23 2244   Wed Oct 18, 2023   2144 Patient with productive bowel movement after enema   2235 XR obtained after enema. No obstruction. No ileus. Will discharge with close return precautions.   10:45 PM patient comfortable discharge plan.  Continues to feel well.  Symptoms improved after enema.    Procedures                     Results for orders placed or performed during the hospital encounter of 10/18/23   Abdomen XR, 2 vw, flat and upright     Status: None    Narrative    EXAM: XR ABDOMEN 2 VIEWS  LOCATION: [facility]  DATE: 10/18/2023    INDICATION: constipation x10 days, hx SBO  COMPARISON: None.      Impression    IMPRESSION: Moderate amount of gas and stool seen in the colon most typical for mild obstipation. No impaction seen in the rectum. Presumed pelvic phlebolith. Mild degenerative changes in the lumbar spine. No evidence for an obstructive ileus or free   air.     Medications - No data to display  Labs Ordered and Resulted from Time of ED Arrival to Time of ED Departure - No data to display  Abdomen XR, " 2 vw, flat and upright   Final Result   IMPRESSION: Moderate amount of gas and stool seen in the colon most typical for mild obstipation. No impaction seen in the rectum. Presumed pelvic phlebolith. Mild degenerative changes in the lumbar spine. No evidence for an obstructive ileus or free    air.             Critical care was not performed.     Medical Decision Making  The patient's presentation was of moderate complexity (a chronic illness mild to moderate exacerbation, progression, or side effect of treatment).    The patient's evaluation involved:  ordering and/or review of 1 test(s) in this encounter (see separate area of note for details)  independent interpretation of testing performed by another health professional (I independently interpreted the abdominal x-ray and did not see any obvious free air in the abdomen, did not see any air-fluid levels or signs of acute obstruction)    The patient's management necessitated moderate risk (prescription drug management including medications given in the ED) and moderate risk (a decision regarding minor procedure (Manual Disimpaction) with risk factors of none).    Assessment & Plan    MDM 58-year-old man insert past medical history of hemiparesis, chronic DVT, muscle spasms, alcohol use dependence, anemia, CVA, pulmonary embolism, parkinson's disease (dyskinesia falls and syncope) presenting to the emergency department with constipation and nausea.  Reported history of SBO's.  He is still passing gas have a lower suspicion for obstruction at this time.  His vital signs are reassuring.  Abdomen is nontender and nonperitoneal.  He has a significant hard stool in the rectum on rectal exam.  This was disimpacted as much as the patient would tolerate.  We will get abdominal x-ray and administer a soapsuds enema and proceed forward from there.    Patient had a productive bowel movement after enema.  Symptoms resolved.  X-ray showed stool and gas in the bowel.  Was  interpreted as consistent with obstipation by radiology.  He did not appreciate any obvious signs of obstruction noted on my dependent assessment.  No obvious ileus.    Given the patient was not completely obstipated and had been passing gas throughout symptoms over the last 10 days I have a lower suspicion for obstruction at this time.  I do not think he needs CT imaging.  Given patient's complete resolution of symptoms at time of discharge I think is appropriate to discharge to outpatient follow-up with close return precautions.  Patient is comfortable with this plan.  Discussed use of MiraLAX, he is requesting prescription for this.  Discussed using MiraLAX 1 capful daily titrating up to 2 capsules daily in 2 to 3 days if he does not have productive bowel movement.  Return precautions provided for worsening pain vomiting or any other concerns he may have.    This part of the medical record was transcribed by Nel Cortesibe, from a dictation done by Yvon Sierra MD.     I have reviewed the nursing notes. I have reviewed the findings, diagnosis, plan and need for follow up with the patient.    New Prescriptions    POLYETHYLENE GLYCOL (MIRALAX) 17 GM/DOSE POWDER    Take 17 g (1 Capful) by mouth daily for 30 days       Final diagnoses:   Constipation, unspecified constipation type   Fecal impaction (H)   I, Nam Beatty, am serving as a trained medical scribe to document services personally performed by Yvon Sierra MD based on the provider's statements to me on October 18, 2023.  This document has been checked and approved by the attending provider.    IYvon MD, was physically present and have reviewed and verified the accuracy of this note documented by nel Cortes.      Yvon Sierra Jr., MD   [facility] EMERGENCY DEPARTMENT  10/18/2023     Yvon Sierra MD  10/18/23 7888

## 2023-10-24 ENCOUNTER — DOCUMENTATION ONLY (OUTPATIENT)
Dept: OTHER | Facility: CLINIC | Age: 58
End: 2023-10-24

## 2023-10-24 ENCOUNTER — OFFICE VISIT (OUTPATIENT)
Dept: PHYSICAL MEDICINE AND REHAB | Facility: CLINIC | Age: 58
End: 2023-10-24
Payer: COMMERCIAL

## 2023-10-24 VITALS — OXYGEN SATURATION: 96 % | SYSTOLIC BLOOD PRESSURE: 98 MMHG | HEART RATE: 85 BPM | DIASTOLIC BLOOD PRESSURE: 67 MMHG

## 2023-10-24 DIAGNOSIS — G24.8 SEGMENTAL DYSTONIA: ICD-10-CM

## 2023-10-24 DIAGNOSIS — G24.3 CERVICAL DYSTONIA: Primary | ICD-10-CM

## 2023-10-24 PROCEDURE — 64642 CHEMODENERV 1 EXTREMITY 1-4: CPT | Performed by: PHYSICAL MEDICINE & REHABILITATION

## 2023-10-24 PROCEDURE — 95874 GUIDE NERV DESTR NEEDLE EMG: CPT | Performed by: PHYSICAL MEDICINE & REHABILITATION

## 2023-10-24 PROCEDURE — 64616 CHEMODENERV MUSC NECK DYSTON: CPT | Mod: RT | Performed by: PHYSICAL MEDICINE & REHABILITATION

## 2023-10-24 ASSESSMENT — PAIN SCALES - GENERAL: PAINLEVEL: NO PAIN (0)

## 2023-10-24 NOTE — LETTER
10/24/2023       RE: Benjamin Mathews  [address]  [facility] [address]     Dear Colleague,    Thank you for referring your patient, Benjamin Mathews, to the [facility] PHYSICAL MEDICINE AND REHABILITATION CLINIC [place] at [facility]. Please see a copy of my visit note below.      [facility] CLINIC    PM&R CLINIC NOTE  BOTULINUM TOXIN PROCEDURE      HPI  Chief Complaint   Patient presents with    RECHECK     Botox injections confirmed with patient     Benjamin Mathews is a 58 year old male who was referred to our clinic for more evaluation of his dystonia and trial of botulinum toxin injections (referral from Dr. Suazo). He was seen on 10/6/22 as initial consult; please see the consult note for details. She has history of Parkinsonism and is followed by Dr. Perry. He was referred to palliative care team and has been followed by them since then.       Background information   --saw urology team in Nov 2022 for LUTS likely due to BPH and was started on alfuzosin  --saw Dr. Carlos in Jan 2023; it was recommended to continue carbidopa/levodopa and amantadine. He is also on clonazepam prn. Ok to continue botox injections.   --had neuropsych testing 1/13/23; reviewed the results.    Was admitted on 5/20/23 after a fall resulting in right ankle fracture s/p closed reduction and external fixation by ortho team. [facility].     SINCE LAST VISIT  Benjamin Mathews was last seen here in clinic on 7/25/2023.    Patient reports the following new medical problems since last visit: **  Was in ED 10/18 for constipation - resolved and doing better now.   Was in UC 8/12 for L great toe pain - saw podiatry 8/29 for Onychomycosis and Onychocryptosis bilaterally, ulcer dorsal left second toe is closed and pain has resolved.     Saw Dr. Bonilla 10/6  He may continue to weight-bear as tolerated on the right  "lower extremity with appropriate assistive devices and assistance.  He may transition from a boot to a Tri-Lock ankle brace at this time which is provided today.       He was doing well today. He is working with PT at [facility]; they work on ROM and some walking.     Denied any side effects from the injections last time. The benefits wore off in 1.5-2 months. When botox is working, his neck movement and R shoulder ROM is a lot more fluid and he has less \"tugging sensation\" in his pec area and neck.       PHYSICAL EXAM  VS: BP 98/67   Pulse 85   SpO2 96%    GEN: Pleasant and cooperative, in no acute distress  HEENT: Moist mucous membranes     General: Sitting in his power wheelchair leaning to the right with his neck tilted to the right and rotated to the left  C-spine range of motion was moderately limited with extension and left lateral bending, he was also mildly limited in all other directionsReduced left sided rotation as compared to the right side. Restricted extension of the neck. Tight right scm   Continues to have left sided weakness    Right leg in CAM boot and was not examined today       ALLERGIES  Allergies   Allergen Reactions    Amantadine Other (See Comments)     Other reaction(s): Hallucinations  Hallucinations/ lost self control/gambling.     halluicnations  Hallucinations/ lost self control/gambling.     hallucinates  halluicnations  hallucinates  Hallucinations/ lost self control/gambling.       Quetiapine GI Disturbance, Diarrhea and Other (See Comments)     Diarrhea    Diarrhea  Other reaction(s): GI Disturbance  Diarrhea      Duloxetine Other (See Comments)     suicidal  suicidal         CURRENT MEDICATIONS    Current Outpatient Medications:     alfuzosin ER (UROXATRAL) 10 MG 24 hr tablet, Take 1 tablet (10 mg) by mouth daily, Disp: 30 tablet, Rfl: 0    apixaban ANTICOAGULANT (ELIQUIS) 5 MG tablet, Take 5 mg by mouth 2 times daily, Disp: , Rfl:     atorvastatin (LIPITOR) 40 MG tablet, Take " 1 tablet (40 mg) by mouth every evening, Disp: 30 tablet, Rfl: 0    bisacodyl (DULCOLAX) 10 MG suppository, Place 1 suppository (10 mg) rectally daily as needed for constipation, Disp: 10 suppository, Rfl: 0    carbidopa-levodopa (SINEMET CR)  MG CR tablet, Take 1 tablet by mouth At Bedtime (Patient taking differently: Take 1 tablet by mouth At Bedtime At 8pm), Disp: 30 tablet, Rfl: 11    carbidopa-levodopa (SINEMET)  MG tablet, Take 2 tablets by mouth 3 times daily (Patient taking differently: Take 2 tablets by mouth 3 times daily 8000, 1200, 1600), Disp: 180 tablet, Rfl: 11    cholecalciferol (VITAMIN D3) 125 mcg (5000 units) capsule, Take 1 capsule (125 mcg) by mouth daily, Disp: 30 capsule, Rfl: 0    clonazePAM (KLONOPIN) 1 MG tablet, Take 1 tablet scheduled at noon, may take 1 additional tablet PRN, Disp: 60 tablet, Rfl: 5    clonazePAM (KLONOPIN) 2 MG tablet, Take 1 tablet (2 mg) by mouth 2 times daily Take at 8am and 4 pm, Disp: 60 tablet, Rfl: 5    cloZAPine (CLOZARIL) 50 MG tablet, Take 1 tablet (50 mg) by mouth At Bedtime, Disp: 30 tablet, Rfl: 0    diclofenac (VOLTAREN) 1 % topical gel, Apply 2 g topically 3 times daily as needed for moderate pain, Disp: 150 g, Rfl: 0    gabapentin (NEURONTIN) 800 MG tablet, Take 1 tablet (800 mg) by mouth 3 times daily, Disp: 90 tablet, Rfl: 0    hydrOXYzine (ATARAX) 25 MG tablet, Take 2 tablets (50 mg) by mouth every 6 hours as needed for anxiety (up to 3 timrd daily), Disp: 20 tablet, Rfl: 0    ketoconazole (NIZORAL) 2 % external cream, Apply topically 2 times daily, Disp: , Rfl:     ketoconazole (NIZORAL) 2 % external shampoo, Apply topically once a week On Wednesdays, Disp: , Rfl:     lactulose (CHRONULAC) 10 GM/15ML solution, Take 15 mLs by mouth 2 times daily, Disp: 473 mL, Rfl: 11    linaclotide (LINZESS) 290 MCG capsule, Take 1 capsule (290 mcg) by mouth daily as needed (IBS), Disp: 90 capsule, Rfl: 3    metoprolol succinate ER (TOPROL XL) 25 MG 24  hr tablet, Take 0.5 tablets (12.5 mg) by mouth 2 times daily, Disp: 30 tablet, Rfl: 0    multivitamin, therapeutic (THERA-VIT) TABS tablet, Take 1 tablet by mouth daily, Disp: 30 tablet, Rfl: 0    pantoprazole (PROTONIX) 40 MG EC tablet, Take 1 tablet (40 mg) by mouth daily Take 1 tablet (40mg) by mouth daily at 8am, Disp: 30 tablet, Rfl: 0    polyethylene glycol (MIRALAX) 17 GM/Dose powder, Take 17 g (1 Capful) by mouth daily for 30 days, Disp: 510 g, Rfl: 0    traZODone (DESYREL) 100 MG tablet, Take 100 mg by mouth At Bedtime, Disp: , Rfl:     traZODone (DESYREL) 50 MG tablet, Take 1 tablet (50 mg) by mouth every morning, Disp: 30 tablet, Rfl: 0    venlafaxine (EFFEXOR XR) 150 MG 24 hr capsule, Take 2 capsules (300 mg) by mouth daily, Disp: 60 capsule, Rfl: 0    vitamin C (ASCORBIC ACID) 500 MG tablet, Take 1 tablet (500 mg) by mouth daily, Disp: 30 tablet, Rfl: 0    Current Facility-Administered Medications:     botulinum toxin type A (BOTOX) 100 units injection 400 Units, 400 Units, Intramuscular, Q90 Days, Ember Arita MD, 140 Units at 10/24/23 1004       BOTULINUM NEUROTOXIN INJECTION PROCEDURES    VERIFICATION OF PATIENT IDENTIFICATION AND PROCEDURE     Initials   Patient Name PS   Patient  PS   Procedure Verified by: PS     Prior to the start of the procedure and with procedural staff participation, I verbally confirmed the patient s identity using two indicators, relevant allergies, that the procedure was appropriate and matched the consent or emergent situation, and that the correct equipment/implants were available. Immediately prior to starting the procedure I conducted the Time Out with the procedural staff and re-confirmed the patient s name, procedure, and site/side. (The Joint Commission universal protocol was followed.)  Yes    Sedation (Moderate or Deep): None    ABOVE ASSESSMENTS PERFORMED BY  Ember Arita MD      INDICATIONS FOR PROCEDURES  Benjamin Mathews is a 58 year old patient with  cervical and segmental dystonia secondary to the diagnosis of Parkinson's disease. His baseline symptoms have been recalcitrant to oral medications and conservative therapy.  He is here today for reinjection with Botox.    GOAL OF PROCEDURE  The goal of this procedure is to increase active range of motion, improve volitional motor control, decrease pain  and enhance functional independence.      TOTAL DOSE ADMINISTERED  Dose Administered: 140 units  Botox (Botulinum Toxin Type A) 100 units      2:1 Dilution and 100 units 1:1 dilution - will do 1:1 for all next time   Unavoidable Drug Waste: Yes  Amount of drug waste (mL): 60 units.  Reason for waste:  Single use vial  Diluent Used:  Preservative Free Normal Saline  Total Volume of Diluent Used: 2ml  See MAR  NDC #: Botox 100u ([address]-14)      CONSENT  The risks, benefits, and treatment options were discussed with Benjamin HAWK Bisi and he agreed to proceed.    Written consent was obtained by PS.     EQUIPMENT USED  Needle-35mm stimulating/recording  EMG/NCS Machine    SKIN PREPARATION  Skin preparation was performed using an alcohol wipe.    GUIDANCE DESCRIPTION  Electro-myographic guidance was necessary throughout the procedure to accurately identify all areas of dystonic muscles while avoiding injection of non-dystonic muscles and underlying muscles , neighboring nerves and nearby vascular structures.     AREA/MUSCLE INJECTED  Right side     SCM 10 units at 1 site  Splenius capitis 15 units at 1 site  Splenius cervicis 15 units at 1 site  Levator a scap insertion 40 units at 1 site  Levator at neck 10 units at 1 site   Lateral trap 20 units at 2 sites   Pectoralis major 30 units at 1 site       RESPONSE TO PROCEDURE  Benjamin Mathews tolerated the procedure well and there were no immediate complications. He was allowed to recover for an appropriate period of time and was discharged home in stable condition.    ASSESSMENT AND PLAN   Parkinsonism  History of CVA with  "residual left hemiparesis  Cervical dystonia   Dystonic movement of right upper and lower extremities, worse in the right lower extremity  Peripheral neuropathy?  Osteopenia (DXA Lowest T-Score -1.8) likely due to disuse c/b limited functional mobility leading to increased fracture risk - assessment 5/2023     DOI: 05/20/2023, GLF, syncopal event, closed R trimalleolar ankle / pilon fracture-dislocation.  DOS: 05/21/2023, closed reduction, application of spanning external fixator.  DOS: 06/15/2023, ORIF R ankle/pilon, removal of external fixator.      We have discussed treatment options for dystonia including therapies, medications and interventions.  He has responded very well to Klonopin but his symptoms are not well controlled especially spasmodic torticollis and dystonia in the distal part of his right lower extremity.  Adding another oral agent does not seem appropriate because 1-he did not respond to baclofen and 2- risk of polypharmacy and side effects.  He would benefit from physical therapy as discussed before.    Clonazepam was discontinued during his hospital stay; recommended dose per Dr. Carlos was   Clonazepam 2 mg 1   1         Clonazepam 1 mg   1       1     Current MAR from TCU shows 1mg prn for anxiety and 2mg bid. He agreed to stay on the lower dose as he is recovering from surgery and his symptoms are manageable.     Recommended sinemet dose was   Carbidopa/levodopa IR  mg 2 2 2         Carbidopa/levodopa CR  mg       1        Current dose per MAR is  2 tid and  at bed time. He will see Dr. Carlos in Dec as scheduled; wants to decrease the dose as sinemet \"makes him really stiff\".       Work-up: None     Therapy/equipment/braces: continue therapies and regular HEP    Medications: No changes today; see above     Interventions: started the first round at 130 units and increased the dose to 270 units on 2/1/2023; kept on the same dose in April. Decreased the total dose to " 100 in July with no injections in RLE due to recent fracture. Increased the dose today from 100 to 140 units with the goal of increasing effectiveness and prolonging duration of benefit of Botox injections. Consider to repeat the injections in his RLE at next visit pending his course.     Referral / follow up with other providers: should repeat DEXA in 2 years 5/2025 and will need a new referral for bone health eval at that point. He will continue to follow-up with palliative care team and movement disorder clinic.     Follow up: in 12 weeks         Again, thank you for allowing me to participate in the care of your patient.      Sincerely,    Ember Arita MD

## 2023-10-24 NOTE — PROGRESS NOTES
[facility]    PM&R CLINIC NOTE  BOTULINUM TOXIN PROCEDURE      HPI  Chief Complaint   Patient presents with    RECHECK     Botox injections confirmed with patient     Benjamin Mathews is a 58 year old male who was referred to our clinic for more evaluation of his dystonia and trial of botulinum toxin injections (referral from Dr. Suazo). He was seen on 10/6/22 as initial consult; please see the consult note for details. She has history of Parkinsonism and is followed by Dr. Perry. He was referred to palliative care team and has been followed by them since then.       Background information   --saw urology team in Nov 2022 for LUTS likely due to BPH and was started on alfuzosin  --saw Dr. Carlos in Jan 2023; it was recommended to continue carbidopa/levodopa and amantadine. He is also on clonazepam prn. Ok to continue botox injections.   --had neuropsych testing 1/13/23; reviewed the results.    Was admitted on 5/20/23 after a fall resulting in right ankle fracture s/p closed reduction and external fixation by ortho team. [facility] has been at [facility] since then.     SINCE LAST VISIT  Benjamin Mathews was last seen here in clinic on 7/25/2023.    Patient reports the following new medical problems since last visit: **  Was in ED 10/18 for constipation - resolved and doing better now.   Was in UC 8/12 for L great toe pain - saw podiatry 8/29 for Onychomycosis and Onychocryptosis bilaterally, ulcer dorsal left second toe is closed and pain has resolved.     Saw Dr. Bonilla 10/6  He may continue to weight-bear as tolerated on the right lower extremity with appropriate assistive devices and assistance.  He may transition from a boot to a Tri-Lock ankle brace at this time which is provided today.       He was doing well today. He is working with PT at [facility]; they work on ROM and some walking.     Denied any side effects from the injections last time. The benefits  "wore off in 1.5-2 months. When botox is working, his neck movement and R shoulder ROM is a lot more fluid and he has less \"tugging sensation\" in his pec area and neck.       PHYSICAL EXAM  VS: BP 98/67   Pulse 85   SpO2 96%    GEN: Pleasant and cooperative, in no acute distress  HEENT: Moist mucous membranes     General: Sitting in his power wheelchair leaning to the right with his neck tilted to the right and rotated to the left  C-spine range of motion was moderately limited with extension and left lateral bending, he was also mildly limited in all other directionsReduced left sided rotation as compared to the right side. Restricted extension of the neck. Tight right scm   Continues to have left sided weakness    Right leg in CAM boot and was not examined today       ALLERGIES  Allergies   Allergen Reactions    Amantadine Other (See Comments)     Other reaction(s): Hallucinations  Hallucinations/ lost self control/gambling.     halluicnations  Hallucinations/ lost self control/gambling.     hallucinates  halluicnations  hallucinates  Hallucinations/ lost self control/gambling.       Quetiapine GI Disturbance, Diarrhea and Other (See Comments)     Diarrhea    Diarrhea  Other reaction(s): GI Disturbance  Diarrhea      Duloxetine Other (See Comments)     suicidal  suicidal         CURRENT MEDICATIONS    Current Outpatient Medications:     alfuzosin ER (UROXATRAL) 10 MG 24 hr tablet, Take 1 tablet (10 mg) by mouth daily, Disp: 30 tablet, Rfl: 0    apixaban ANTICOAGULANT (ELIQUIS) 5 MG tablet, Take 5 mg by mouth 2 times daily, Disp: , Rfl:     atorvastatin (LIPITOR) 40 MG tablet, Take 1 tablet (40 mg) by mouth every evening, Disp: 30 tablet, Rfl: 0    bisacodyl (DULCOLAX) 10 MG suppository, Place 1 suppository (10 mg) rectally daily as needed for constipation, Disp: 10 suppository, Rfl: 0    carbidopa-levodopa (SINEMET CR)  MG CR tablet, Take 1 tablet by mouth At Bedtime (Patient taking differently: Take 1 " tablet by mouth At Bedtime At 8pm), Disp: 30 tablet, Rfl: 11    carbidopa-levodopa (SINEMET)  MG tablet, Take 2 tablets by mouth 3 times daily (Patient taking differently: Take 2 tablets by mouth 3 times daily 8000, 1200, 1600), Disp: 180 tablet, Rfl: 11    cholecalciferol (VITAMIN D3) 125 mcg (5000 units) capsule, Take 1 capsule (125 mcg) by mouth daily, Disp: 30 capsule, Rfl: 0    clonazePAM (KLONOPIN) 1 MG tablet, Take 1 tablet scheduled at noon, may take 1 additional tablet PRN, Disp: 60 tablet, Rfl: 5    clonazePAM (KLONOPIN) 2 MG tablet, Take 1 tablet (2 mg) by mouth 2 times daily Take at 8am and 4 pm, Disp: 60 tablet, Rfl: 5    cloZAPine (CLOZARIL) 50 MG tablet, Take 1 tablet (50 mg) by mouth At Bedtime, Disp: 30 tablet, Rfl: 0    diclofenac (VOLTAREN) 1 % topical gel, Apply 2 g topically 3 times daily as needed for moderate pain, Disp: 150 g, Rfl: 0    gabapentin (NEURONTIN) 800 MG tablet, Take 1 tablet (800 mg) by mouth 3 times daily, Disp: 90 tablet, Rfl: 0    hydrOXYzine (ATARAX) 25 MG tablet, Take 2 tablets (50 mg) by mouth every 6 hours as needed for anxiety (up to 3 timrd daily), Disp: 20 tablet, Rfl: 0    ketoconazole (NIZORAL) 2 % external cream, Apply topically 2 times daily, Disp: , Rfl:     ketoconazole (NIZORAL) 2 % external shampoo, Apply topically once a week On Wednesdays, Disp: , Rfl:     lactulose (CHRONULAC) 10 GM/15ML solution, Take 15 mLs by mouth 2 times daily, Disp: 473 mL, Rfl: 11    linaclotide (LINZESS) 290 MCG capsule, Take 1 capsule (290 mcg) by mouth daily as needed (IBS), Disp: 90 capsule, Rfl: 3    metoprolol succinate ER (TOPROL XL) 25 MG 24 hr tablet, Take 0.5 tablets (12.5 mg) by mouth 2 times daily, Disp: 30 tablet, Rfl: 0    multivitamin, therapeutic (THERA-VIT) TABS tablet, Take 1 tablet by mouth daily, Disp: 30 tablet, Rfl: 0    pantoprazole (PROTONIX) 40 MG EC tablet, Take 1 tablet (40 mg) by mouth daily Take 1 tablet (40mg) by mouth daily at 8am, Disp: 30 tablet,  Rfl: 0    polyethylene glycol (MIRALAX) 17 GM/Dose powder, Take 17 g (1 Capful) by mouth daily for 30 days, Disp: 510 g, Rfl: 0    traZODone (DESYREL) 100 MG tablet, Take 100 mg by mouth At Bedtime, Disp: , Rfl:     traZODone (DESYREL) 50 MG tablet, Take 1 tablet (50 mg) by mouth every morning, Disp: 30 tablet, Rfl: 0    venlafaxine (EFFEXOR XR) 150 MG 24 hr capsule, Take 2 capsules (300 mg) by mouth daily, Disp: 60 capsule, Rfl: 0    vitamin C (ASCORBIC ACID) 500 MG tablet, Take 1 tablet (500 mg) by mouth daily, Disp: 30 tablet, Rfl: 0    Current Facility-Administered Medications:     botulinum toxin type A (BOTOX) 100 units injection 400 Units, 400 Units, Intramuscular, Q90 Days, Ember Arita MD, 140 Units at 10/24/23 1004       BOTULINUM NEUROTOXIN INJECTION PROCEDURES    VERIFICATION OF PATIENT IDENTIFICATION AND PROCEDURE     Initials   Patient Name PS   Patient  PS   Procedure Verified by: PS     Prior to the start of the procedure and with procedural staff participation, I verbally confirmed the patient s identity using two indicators, relevant allergies, that the procedure was appropriate and matched the consent or emergent situation, and that the correct equipment/implants were available. Immediately prior to starting the procedure I conducted the Time Out with the procedural staff and re-confirmed the patient s name, procedure, and site/side. (The Joint Commission universal protocol was followed.)  Yes    Sedation (Moderate or Deep): None    ABOVE ASSESSMENTS PERFORMED BY  Ember Arita MD      INDICATIONS FOR PROCEDURES  Benjamin Mathews is a 58 year old patient with cervical and segmental dystonia secondary to the diagnosis of Parkinson's disease. His baseline symptoms have been recalcitrant to oral medications and conservative therapy.  He is here today for reinjection with Botox.    GOAL OF PROCEDURE  The goal of this procedure is to increase active range of motion, improve volitional motor  control, decrease pain  and enhance functional independence.      TOTAL DOSE ADMINISTERED  Dose Administered: 140 units  Botox (Botulinum Toxin Type A) 100 units      2:1 Dilution and 100 units 1:1 dilution - will do 1:1 for all next time   Unavoidable Drug Waste: Yes  Amount of drug waste (mL): 60 units.  Reason for waste:  Single use vial  Diluent Used:  Preservative Free Normal Saline  Total Volume of Diluent Used: 2ml  See MAR  NDC #: Botox 100u ([address]-20)      CONSENT  The risks, benefits, and treatment options were discussed with Benjamin Mathews and he agreed to proceed.    Written consent was obtained by PS.     EQUIPMENT USED  Needle-35mm stimulating/recording  EMG/NCS Machine    SKIN PREPARATION  Skin preparation was performed using an alcohol wipe.    GUIDANCE DESCRIPTION  Electro-myographic guidance was necessary throughout the procedure to accurately identify all areas of dystonic muscles while avoiding injection of non-dystonic muscles and underlying muscles , neighboring nerves and nearby vascular structures.     AREA/MUSCLE INJECTED  Right side     SCM 10 units at 1 site  Splenius capitis 15 units at 1 site  Splenius cervicis 15 units at 1 site  Levator a scap insertion 40 units at 1 site  Levator at neck 10 units at 1 site   Lateral trap 20 units at 2 sites   Pectoralis major 30 units at 1 site       RESPONSE TO PROCEDURE  Benjamin Mathews tolerated the procedure well and there were no immediate complications. He was allowed to recover for an appropriate period of time and was discharged home in stable condition.    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  Osteopenia (DXA Lowest T-Score -1.8) likely due to disuse c/b limited functional mobility leading to increased fracture risk - assessment 5/2023     DOI: 05/20/2023, GLF,  "syncopal event, closed R trimalleolar ankle / pilon fracture-dislocation.  DOS: 05/21/2023, closed reduction, application of spanning external fixator.  DOS: 06/15/2023, ORIF R ankle/pilon, removal of external fixator.      We have discussed treatment options for dystonia including therapies, medications and interventions.  He has responded very well to Klonopin but his symptoms are not well controlled especially spasmodic torticollis and dystonia in the distal part of his right lower extremity.  Adding another oral agent does not seem appropriate because 1-he did not respond to baclofen and 2- risk of polypharmacy and side effects.  He would benefit from physical therapy as discussed before.    Clonazepam was discontinued during his hospital stay; recommended dose per Dr. Carlos was   Clonazepam 2 mg 1   1         Clonazepam 1 mg   1       1     Current MAR from [facility] shows 1mg prn for anxiety and 2mg bid. He agreed to stay on the lower dose as he is recovering from surgery and his symptoms are manageable.     Recommended sinemet dose was   Carbidopa/levodopa IR  mg 2 2 2         Carbidopa/levodopa CR  mg       1        Current dose per MAR is  2 tid and  at bed time. He will see Dr. Carlos in Dec as scheduled; wants to decrease the dose as sinemet \"makes him really stiff\".       Work-up: None     Therapy/equipment/braces: continue therapies and regular HEP    Medications: No changes today; see above     Interventions: started the first round at 130 units and increased the dose to 270 units on 2/1/2023; kept on the same dose in April. Decreased the total dose to 100 in July with no injections in RLE due to recent fracture. Increased the dose today from 100 to 140 units with the goal of increasing effectiveness and prolonging duration of benefit of Botox injections. Consider to repeat the injections in his RLE at next visit pending his course.     Referral / follow up with other providers: " should repeat DEXA in 2 years 5/2025 and will need a new referral for bone health eval at that point. He will continue to follow-up with palliative care team and movement disorder clinic.     Follow up: in 12 weeks       Ember Arita MD  Physical Medicine & Rehabilitation

## 2023-10-24 NOTE — NURSING NOTE
Chief Complaint   Patient presents with    RECHECK     Botox injections confirmed with patient   BP 98/67   Pulse 85   SpO2 96%     Lindsay Madrid CMA at 8:39 AM on 10/24/2023.

## 2023-10-25 ENCOUNTER — TELEPHONE (OUTPATIENT)
Dept: PHYSICAL MEDICINE AND REHAB | Facility: CLINIC | Age: 58
End: 2023-10-25

## 2023-10-25 NOTE — TELEPHONE ENCOUNTER
[facility] Dr Rodriguez is on [facility] indefinitely.

## 2023-10-25 NOTE — TELEPHONE ENCOUNTER
[facility] Call Center    Phone Message    May a detailed message be left on voicemail: yes     Reason for Call: Other: Pt nurse from assisted living facility called to ask why the appt for today was cancelled and why no one told them. Assisted living thinks they should have been the ones to be informed about the appt, writer let them know that that's not necessarily how it works if they are not his guardian or authorized to speak to the clinic. Notes are unclear about who Pt should be scheduled with now that  is no longer available. Just states  recommends someone else, not WHO she recommends. Please call back to rescheduled cancelled appt from today 10/25/23     Action Taken: Message routed to:  [facility] ([facility]): PMR    Travel Screening: Not Applicable

## 2023-10-30 ENCOUNTER — DOCUMENTATION ONLY (OUTPATIENT)
Dept: OTHER | Facility: CLINIC | Age: 58
End: 2023-10-30
Payer: COMMERCIAL

## 2023-11-01 ENCOUNTER — THERAPY VISIT (OUTPATIENT)
Dept: PHYSICAL THERAPY | Facility: CLINIC | Age: 58
End: 2023-11-01
Attending: ORTHOPAEDIC SURGERY
Payer: COMMERCIAL

## 2023-11-01 DIAGNOSIS — S82.851D CLOSED TRIMALLEOLAR FRACTURE OF RIGHT ANKLE WITH ROUTINE HEALING, SUBSEQUENT ENCOUNTER: Primary | ICD-10-CM

## 2023-11-01 PROCEDURE — 97110 THERAPEUTIC EXERCISES: CPT | Mod: GP | Performed by: PHYSICAL THERAPIST

## 2023-11-01 PROCEDURE — 97116 GAIT TRAINING THERAPY: CPT | Mod: GP | Performed by: PHYSICAL THERAPIST

## 2023-11-01 NOTE — PROGRESS NOTES
To caregivers of Benjamin Mathews-      Please walk with Benjamin 1x day with a 4ww. He is now able to walk for 50 feet with minimal A of 1 person with use of a 4ww. Please repeat daily.      Thank you,  Radha Thornton, PT  [phone]

## 2023-11-02 NOTE — PROGRESS NOTES
Urology Office Visit - Follow Up    Reason for visit: follow up for LUTS    HPI: Benjamin Mathews is a 58 year old male who is seen today for annual follow up for LUTS. He has been seen by Dr. Serna previously, last visit 11/3/2022 from which the following history was reviewed:    Initially seen 8/2/2022:  Benjamin Mathews is a 57 year old male who is being seen for evaluation of LUTS     4-5 times per month he will wake up soaked  During the day he only voids about 2 times  Symptoms have been worsening for the past 3 months  He notes that his tamsulosin 0.4mg (Flomax) was stopped for some reason - possibly due to fall risk  He had been on this for at least 2 years  He notes a weak and intermittent stream  Some urgency and urge incontinence at times     He previously saw Urology in [place]  He has since moved      AUASS: 4-3-3-0-5-2-5 = 22  QOL = 3  PVR = 20cc     11/3/2022:  Started Alfuzosin at last visit  He continues to urinate very little during the day  He urinates a few times overnight a good volume  He does feel like the alfuzosin has been beneficial  He has not had any issues with urinary tract infections     AUASS: 5-3-1-0-5-5-2 = 26  QOL = 3  PVR = 47cc    TODAY   11/3/2023:  Benjamin is doing well. He has no concerns. Voiding well and feels to be emptying his bladder. No recent bedwetting.    PEx  There were no vitals taken for this visit.  GENERAL: Healthy, alert and no distress. Resting comfortably in motorized wheelchair.  EYES: Eyes grossly normal to inspection.  No discharge or erythema, or obvious scleral/conjunctival abnormalities.  RESP: No audible wheeze, cough, or visible cyanosis.  No visible retractions or increased work of breathing.    SKIN: Visible skin clear. No significant rash, abnormal pigmentation or lesions.  NEURO: Cranial nerves grossly intact.  Mentation and speech appropriate for age.  PSYCH: Mentation appears normal, affect normal/bright, judgement and insight intact, normal speech  and appearance well-groomed.    LAB:  Prostate Specific Antigen Screen   Date Value Ref Range Status   05/16/2017 0.5 0.0 - 3.5 ng/mL Final       Creatinine   Date Value Ref Range Status   06/15/2023 0.71 0.67 - 1.17 mg/dL Final   07/09/2021 0.77 0.66 - 1.25 mg/dL Final       Color Urine (no units)   Date Value   05/27/2023 Yellow     Appearance Urine (no units)   Date Value   05/27/2023 Clear     Glucose Urine (mg/dL)   Date Value   05/27/2023 Negative     Bilirubin Urine (no units)   Date Value   05/27/2023 Negative     Ketones Urine (mg/dL)   Date Value   05/27/2023 Trace (A)     Specific Gravity Urine (no units)   Date Value   05/27/2023 1.025     pH Urine (no units)   Date Value   05/27/2023 5.5     Protein Albumin Urine (mg/dL)   Date Value   05/27/2023 30 (A)     Nitrite Urine (no units)   Date Value   05/27/2023 Negative     Leukocyte Esterase Urine (no units)   Date Value   05/27/2023 Negative       ASSESSMENT/PLAN:  58 year old male complicated PMH and LUTS, doing well on alfuzosin. No concerns. Will continue with the same.  -Continue alfuzosin 10 mg daily. Refilled.  -Follow up in 1 year, sooner as needed.    Juju Bernardo PA-C  Department of Urology    10 minutes spent on the date of the encounter doing chart review, review of test results, patient visit, and documentation

## 2023-11-03 ENCOUNTER — OFFICE VISIT (OUTPATIENT)
Dept: UROLOGY | Facility: CLINIC | Age: 58
End: 2023-11-03
Payer: COMMERCIAL

## 2023-11-03 DIAGNOSIS — R39.9 LOWER URINARY TRACT SYMPTOMS (LUTS): Primary | ICD-10-CM

## 2023-11-03 PROCEDURE — 99213 OFFICE O/P EST LOW 20 MIN: CPT | Performed by: PHYSICIAN ASSISTANT

## 2023-11-03 RX ORDER — ALFUZOSIN HYDROCHLORIDE 10 MG/1
10 TABLET, EXTENDED RELEASE ORAL DAILY
Qty: 30 TABLET | Refills: 11 | Status: SHIPPED | OUTPATIENT
Start: 2023-11-03

## 2023-11-03 NOTE — NURSING NOTE
Benjamin Mathews's goals for this visit include:   Chief Complaint   Patient presents with    RECHECK     LUTS       He requests these members of his care team be copied on today's visit information:       PCP: Stephanie Maldonado    Referring Provider:  Referred Self, MD  No address on file    There were no vitals taken for this visit.    Do you need any medication refills at today's visit?     Carrie Velez LPN on 11/3/2023 at 10:54 AM

## 2023-11-03 NOTE — PATIENT INSTRUCTIONS
UROLOGY CLINIC VISIT PATIENT INSTRUCTIONS    Continue alfuzosin 10 mg once daily.    Follow up in 1 year.    If you have any issues, questions or concerns in the meantime, do not hesitate to contact us at [phone] or via [employer].     It was a pleasure meeting with you today.  Thank you for allowing me and my team the privilege of caring for you today.  YOU are the reason we are here, and I truly hope we provided you with the excellent service you deserve.  Please let us know if there is anything else we can do for you so that we can be sure you are leaving completely satisfied with your care experience.

## 2023-11-08 ENCOUNTER — OFFICE VISIT (OUTPATIENT)
Dept: PODIATRY | Facility: OTHER | Age: 58
End: 2023-11-08
Payer: COMMERCIAL

## 2023-11-08 ENCOUNTER — ANCILLARY PROCEDURE (OUTPATIENT)
Dept: GENERAL RADIOLOGY | Facility: OTHER | Age: 58
End: 2023-11-08
Attending: PODIATRIST
Payer: COMMERCIAL

## 2023-11-08 VITALS
SYSTOLIC BLOOD PRESSURE: 92 MMHG | BODY MASS INDEX: 26.79 KG/M2 | TEMPERATURE: 97.7 F | WEIGHT: 220 LBS | DIASTOLIC BLOOD PRESSURE: 56 MMHG | HEIGHT: 76 IN

## 2023-11-08 DIAGNOSIS — G60.9 IDIOPATHIC PERIPHERAL NEUROPATHY: ICD-10-CM

## 2023-11-08 DIAGNOSIS — L97.522 SKIN ULCER OF SECOND TOE OF LEFT FOOT WITH FAT LAYER EXPOSED (H): ICD-10-CM

## 2023-11-08 DIAGNOSIS — Z87.2 HISTORY OF FOOT ULCER: ICD-10-CM

## 2023-11-08 DIAGNOSIS — G20.B1 DYSKINESIA DUE TO PARKINSON'S DISEASE (H): ICD-10-CM

## 2023-11-08 DIAGNOSIS — M20.42 HAMMERTOE OF LEFT FOOT: ICD-10-CM

## 2023-11-08 DIAGNOSIS — M79.672 LEFT FOOT PAIN: ICD-10-CM

## 2023-11-08 DIAGNOSIS — L97.521 SKIN ULCER OF SECOND TOE OF LEFT FOOT, LIMITED TO BREAKDOWN OF SKIN (H): Primary | ICD-10-CM

## 2023-11-08 DIAGNOSIS — L97.521 SKIN ULCER OF SECOND TOE OF LEFT FOOT, LIMITED TO BREAKDOWN OF SKIN (H): ICD-10-CM

## 2023-11-08 PROCEDURE — 73630 X-RAY EXAM OF FOOT: CPT | Mod: TC | Performed by: RADIOLOGY

## 2023-11-08 PROCEDURE — 99213 OFFICE O/P EST LOW 20 MIN: CPT | Performed by: PODIATRIST

## 2023-11-08 RX ORDER — CEPHALEXIN 500 MG/1
500 CAPSULE ORAL 4 TIMES DAILY
Qty: 40 CAPSULE | Refills: 0 | Status: SHIPPED | OUTPATIENT
Start: 2023-11-08 | End: 2024-04-04

## 2023-11-08 ASSESSMENT — PAIN SCALES - GENERAL: PAINLEVEL: MILD PAIN (2)
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
## 2023-11-08 NOTE — NURSING NOTE
Dispensed 1 post op shoe, Size men's large, with [facility] agreement signed by patient. Sheila Noriega CMA, November 8, 2023

## 2023-11-15 ENCOUNTER — THERAPY VISIT (OUTPATIENT)
Dept: PHYSICAL THERAPY | Facility: CLINIC | Age: 58
End: 2023-11-15
Attending: ORTHOPAEDIC SURGERY
Payer: COMMERCIAL

## 2023-11-15 DIAGNOSIS — S82.851D CLOSED TRIMALLEOLAR FRACTURE OF RIGHT ANKLE WITH ROUTINE HEALING, SUBSEQUENT ENCOUNTER: Primary | ICD-10-CM

## 2023-11-15 PROCEDURE — 97116 GAIT TRAINING THERAPY: CPT | Mod: GP | Performed by: PHYSICAL THERAPIST

## 2023-11-15 PROCEDURE — 97110 THERAPEUTIC EXERCISES: CPT | Mod: GP | Performed by: PHYSICAL THERAPIST

## 2023-11-20 ENCOUNTER — TELEPHONE (OUTPATIENT)
Dept: PHARMACY | Facility: CLINIC | Age: 58
End: 2023-11-20
Payer: COMMERCIAL

## 2023-11-20 NOTE — TELEPHONE ENCOUNTER
Left voice message for patient to call back:    Hello, my name is Javier and I am a pharmacist calling from [facility]. I work with your doctor/provider at the [facility]. You have been identified as someone who would benefit from a visit with a pharmacist. This is an appointment with a specially trained pharmacist to review your medications to make sure they are working for you, safe, affordable and easy to take. This appointment is covered under your insurance plan. Please call the [phone] to schedule a time that works well for you.     Javier Li, PharmD, [facility] Pharmacist

## 2023-11-22 ENCOUNTER — THERAPY VISIT (OUTPATIENT)
Dept: PHYSICAL THERAPY | Facility: CLINIC | Age: 58
End: 2023-11-22
Attending: ORTHOPAEDIC SURGERY
Payer: COMMERCIAL

## 2023-11-22 DIAGNOSIS — S82.853A TRIMALLEOLAR FRACTURE: Primary | ICD-10-CM

## 2023-11-22 PROCEDURE — 97116 GAIT TRAINING THERAPY: CPT | Mod: GP | Performed by: PHYSICAL THERAPIST

## 2023-11-22 PROCEDURE — 97535 SELF CARE MNGMENT TRAINING: CPT | Mod: GP | Performed by: PHYSICAL THERAPIST

## 2023-11-22 NOTE — PATIENT INSTRUCTIONS
[facility]  Benjamin needs ot reschedule an appt with Dr Bonilla orthopedic MD. He was suppose to have a follow up appt around 2023. Please call     The goal is get Benjamin out of the CAM BOOT. Today in PT he used the trilock brace and it went well. Please start using the trilock brace at home daily for a few hours when walking for exercise. He needs all 3 parts of the trilock brace (pictures of how to do it are included in the box). He needs to wear a sock over his toes/heel with the brace in his shoe. Today no sock was sent with his shoes. He does NOT wear the ace wrap with the trilock brace.     Please make sure his brace, shoes and socks are sent to PT appts.     Cyndi Bernard DPT, MPT, NCS  Physical Therapist   Board Certified Neurologic Clinical Specialist     [facility]   [address] [place] [address]   [email or web address]  [email or web address]   [phone]   Clinic: [phone] //   Fax: [phone]

## 2023-11-28 ENCOUNTER — OFFICE VISIT (OUTPATIENT)
Dept: PODIATRY | Facility: CLINIC | Age: 58
End: 2023-11-28
Payer: COMMERCIAL

## 2023-11-28 DIAGNOSIS — M20.42 HAMMERTOE OF LEFT FOOT: ICD-10-CM

## 2023-11-28 DIAGNOSIS — G60.9 IDIOPATHIC PERIPHERAL NEUROPATHY: ICD-10-CM

## 2023-11-28 DIAGNOSIS — B35.1 ONYCHOMYCOSIS: ICD-10-CM

## 2023-11-28 DIAGNOSIS — L97.521 SKIN ULCER OF SECOND TOE OF LEFT FOOT, LIMITED TO BREAKDOWN OF SKIN (H): Primary | ICD-10-CM

## 2023-11-28 PROCEDURE — 99214 OFFICE O/P EST MOD 30 MIN: CPT | Performed by: PODIATRIST
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
## 2023-11-28 NOTE — NURSING NOTE
Benjamin Mathews's chief complaint for this visit includes:  Chief Complaint   Patient presents with    Consult     Diabetic foot cares     PCP: Stephanie Maldonado    Referring Provider:  No referring provider defined for this encounter.    There were no vitals taken for this visit.  Data Unavailable     Do you need any medication refills at today's visit? NO    Allergies   Allergen Reactions    Amantadine Other (See Comments)     Other reaction(s): Hallucinations  Hallucinations/ lost self control/gambling.     halluicnations  Hallucinations/ lost self control/gambling.     hallucinates  halluicnations  hallucinates  Hallucinations/ lost self control/gambling.       Quetiapine GI Disturbance, Diarrhea and Other (See Comments)     Diarrhea    Diarrhea  Other reaction(s): GI Disturbance  Diarrhea      Duloxetine Other (See Comments)     suicidal  suicidal         Jill Downey LPN

## 2023-11-29 ENCOUNTER — THERAPY VISIT (OUTPATIENT)
Dept: PHYSICAL THERAPY | Facility: CLINIC | Age: 58
End: 2023-11-29
Attending: ORTHOPAEDIC SURGERY
Payer: COMMERCIAL

## 2023-11-29 DIAGNOSIS — S82.851D CLOSED TRIMALLEOLAR FRACTURE OF RIGHT ANKLE WITH ROUTINE HEALING, SUBSEQUENT ENCOUNTER: Primary | ICD-10-CM

## 2023-11-29 PROCEDURE — 97116 GAIT TRAINING THERAPY: CPT | Mod: GP | Performed by: PHYSICAL THERAPIST

## 2023-11-29 PROCEDURE — 97110 THERAPEUTIC EXERCISES: CPT | Mod: GP | Performed by: PHYSICAL THERAPIST

## 2023-11-30 DIAGNOSIS — S82.851D CLOSED TRIMALLEOLAR FRACTURE OF RIGHT ANKLE WITH ROUTINE HEALING, SUBSEQUENT ENCOUNTER: Primary | ICD-10-CM

## 2023-12-01 ENCOUNTER — ANCILLARY PROCEDURE (OUTPATIENT)
Dept: GENERAL RADIOLOGY | Facility: CLINIC | Age: 58
End: 2023-12-01
Attending: ORTHOPAEDIC SURGERY
Payer: COMMERCIAL

## 2023-12-01 ENCOUNTER — OFFICE VISIT (OUTPATIENT)
Dept: ORTHOPEDICS | Facility: CLINIC | Age: 58
End: 2023-12-01
Payer: COMMERCIAL

## 2023-12-01 DIAGNOSIS — S82.851D CLOSED TRIMALLEOLAR FRACTURE OF RIGHT ANKLE WITH ROUTINE HEALING, SUBSEQUENT ENCOUNTER: Primary | ICD-10-CM

## 2023-12-01 DIAGNOSIS — S82.851D CLOSED TRIMALLEOLAR FRACTURE OF RIGHT ANKLE WITH ROUTINE HEALING, SUBSEQUENT ENCOUNTER: ICD-10-CM

## 2023-12-01 PROCEDURE — 99213 OFFICE O/P EST LOW 20 MIN: CPT | Mod: GC | Performed by: ORTHOPAEDIC SURGERY

## 2023-12-01 PROCEDURE — 73610 X-RAY EXAM OF ANKLE: CPT | Mod: RT | Performed by: RADIOLOGY

## 2023-12-01 NOTE — NURSING NOTE
Reason For Visit:   Chief Complaint   Patient presents with    RECHECK     Patient returns for recheck of right ankle.  Patient reports no pain to the right ankle.  He indicates the ankle is improving.       There were no vitals taken for this visit.    Pain Assessment  Patient Currently in Pain: Yes  0-10 Pain Scale: 1  Primary Pain Location: Ankle (right)    Dylan Conn, ATC

## 2023-12-01 NOTE — PROGRESS NOTES
Orthopaedic Surgery Clinic Note - New Patient      Chief Complaint:  Follow up right ankle       History:  Benjamin is a 58 year old male now six months s/p fixation of right pilon fracture. Since last visit he continues to work with therapy, ambulating about 200 feet a day, bearing weight on the LLE, using a TriLock ankle brace. He has no concerns today. He is keeping himself busy with audiobooks.     Exam:  There were no vitals taken for this visit.    General: On examination today, the patient is pleasant, cooperative, awake and alert, and in no acute distress.  Normal shoewear, removed for examination. Utilizing motorized wheelchair.   Psych: Normal appearing mood and affect.   Pulm: Respirations are nonlabored and regular.  Dermatologic: The skin on the right foot and ankle is intact without visible open wounds, blisters, or any skin that appears to be at immediate obvious risk. No obvious visible ecchymosis, erythema, or surgical scars. Skin is warm and dry with normal tone and turgor.  Musculoskeletal (focused exam of the right foot/ankle): no sensation baseline, no pain with palpation, incisions well healed. No surrounding erythema or swelling, slight edema in most distal foot and toes. Able to wiggle toes and fires EHL, FHL, TA, GSC. Ankle ROM with dorsiflexion to neutral, plantarflexion to 30 deg.   Circulation: Easily palpable DP and PT pulse with a warm well-perfused foot.      Imaging:  Independent review of imaging studies was performed including 3V Right ankle. The relevant findings were discussed with the patient. No obvious evidence for tumor, infection, or acute fracture. Implant fixation remains stable, no evidence of failure or mikey-implant lucency.       Impression:  58 year old male now 6 months s/p fixation right pilon fracture, doing well.        Plan:  Reviewed return to clinic criteria. Discussed plan to ambulate daily if able. Okay to continue with brace, otherwise okay to discontinue as well.  Will plan to follow up in May 2024 at one year tiffanie. Questions answered, paper work completed.     Patient seen and discussed with Dr. Bonilla.     Austyn Aguiar MD

## 2023-12-01 NOTE — LETTER
12/1/2023         RE: Benjamin Mathews  [address]  [facility] [address]        Dear Colleague,    Thank you for referring your patient, Benjamin Mathews, to the [facility] [place]. Please see a copy of my visit note below.    Orthopaedic Surgery Clinic Note - New Patient      Chief Complaint:  Follow up right ankle       History:  Benjamin is a 58 year old male now six months s/p fixation of right pilon fracture. Since last visit he continues to work with therapy, ambulating about 200 feet a day, bearing weight on the LLE, using a TriLock ankle brace. He has no concerns today. He is keeping himself busy with audiobooks.     Exam:  There were no vitals taken for this visit.    General: On examination today, the patient is pleasant, cooperative, awake and alert, and in no acute distress.  Normal shoewear, removed for examination. Utilizing motorized wheelchair.   Psych: Normal appearing mood and affect.   Pulm: Respirations are nonlabored and regular.  Dermatologic: The skin on the right foot and ankle is intact without visible open wounds, blisters, or any skin that appears to be at immediate obvious risk. No obvious visible ecchymosis, erythema, or surgical scars. Skin is warm and dry with normal tone and turgor.  Musculoskeletal (focused exam of the right foot/ankle): no sensation baseline, no pain with palpation, incisions well healed. No surrounding erythema or swelling, slight edema in most distal foot and toes. Able to wiggle toes and fires EHL, FHL, TA, GSC. Ankle ROM with dorsiflexion to neutral, plantarflexion to 30 deg.   Circulation: Easily palpable DP and PT pulse with a warm well-perfused foot.      Imaging:  Independent review of imaging studies was performed including 3V Right ankle. The relevant findings were discussed with the patient. No obvious evidence for tumor, infection, or acute fracture. Implant fixation remains stable, no evidence of failure or mikey-implant  "lucency.       Impression:  58 year old male now 6 months s/p fixation right pilon fracture, doing well.        Plan:  Reviewed return to clinic criteria. Discussed plan to ambulate daily if able. Okay to continue with brace, otherwise okay to discontinue as well. Will plan to follow up in May 2024 at one year tiffanie. Questions answered, paper work completed.     Patient seen and discussed with Dr. Bonilla.     Austyn Aguiar MD           Orthopaedic Surgery Clinic - Staff Note    Very pleasant 57yo patient seen in follow-up after ORIF of closed R trimall fx/dislocation, pilon variant (\"posterior pilon\").  Required staged treatment with initial spanning ex-fix followed by referral to me for definitive ORIF.  Reports he is doing well with regard to the right ankle, that it is improving, and denies pain.  Has relatively modest physical functional demands on the ankle at baseline with history of Parkinson's.    Exam shows the patient to be a pleasant, cooperative, awake, and alert adult sitting upright in a wheelchair in NAD.  Breathing pattern is regular and nonlabored on room air.  Skin intact throughout right ankle and foot.  All prior surgical ankle wounds healed.  Nontender at fracture sites.    Independent review of imaging was performed including weightbearing AP, mortise, and lateral radiographs of the right ankle dated 12/01/2023.  Findings were discussed with the patient.  Well aligned fractures with abundant bridging healing bone.  Ankle mortise congruent.  Hardware intact.  No obvious evidence for infection.    Impression:  57yo male patient with Parkinson's and limited ambulation with radiographically healed right trimall ankle fracture-dislocation / posterior pilon variant, clinically doing well with regard to the ankle.    Plan:  May continue to WBAT on R LE with appropriate assistance.  He may use the TriLock brace as needed.  Follow-up May 2024.  Signs and symptoms that should prompt immediate medical " attention, and the importance of this, were discussed.  All questions were answered.      I saw and examined this patient with the resident, discussed the findings and plan of care with the resident, and agree with the resident's findings and plan of care as documented in the attached note by Dr. Aguiar.  I independently performed a history, examination, review of imaging, and discussion of the findings and treatment plan with the patient at this encounter.    Jose Bonilla MD  Date of Service (when I saw the patient): 12/01/2023

## 2023-12-02 ENCOUNTER — OFFICE VISIT (OUTPATIENT)
Dept: URGENT CARE | Facility: URGENT CARE | Age: 58
End: 2023-12-02
Payer: COMMERCIAL

## 2023-12-02 VITALS
TEMPERATURE: 97.1 F | SYSTOLIC BLOOD PRESSURE: 103 MMHG | WEIGHT: 234 LBS | RESPIRATION RATE: 18 BRPM | OXYGEN SATURATION: 98 % | BODY MASS INDEX: 28.48 KG/M2 | DIASTOLIC BLOOD PRESSURE: 70 MMHG | HEART RATE: 81 BPM

## 2023-12-02 DIAGNOSIS — H93.8X3 EAR CONGESTION, BILATERAL: ICD-10-CM

## 2023-12-02 DIAGNOSIS — M26.621 TMJ TENDERNESS, RIGHT: Primary | ICD-10-CM

## 2023-12-02 DIAGNOSIS — H61.22 IMPACTED CERUMEN OF LEFT EAR: ICD-10-CM

## 2023-12-02 PROCEDURE — 69209 REMOVE IMPACTED EAR WAX UNI: CPT | Mod: LT | Performed by: INTERNAL MEDICINE

## 2023-12-02 PROCEDURE — 99213 OFFICE O/P EST LOW 20 MIN: CPT | Mod: 25 | Performed by: INTERNAL MEDICINE

## 2023-12-02 RX ORDER — ACETAMINOPHEN 325 MG/1
325-650 TABLET ORAL EVERY 8 HOURS PRN
Qty: 100 TABLET | Refills: 0 | Status: SHIPPED | OUTPATIENT
Start: 2023-12-02

## 2023-12-02 RX ORDER — FLUTICASONE PROPIONATE 50 MCG
1 SPRAY, SUSPENSION (ML) NASAL 2 TIMES DAILY
Qty: 16 G | Refills: 0 | Status: SHIPPED | OUTPATIENT
Start: 2023-12-02 | End: 2023-12-12

## 2023-12-02 NOTE — PROGRESS NOTES
"Orthopaedic Surgery Clinic - Staff Note    Very pleasant 57yo patient seen in follow-up after ORIF of closed R trimall fx/dislocation, pilon variant (\"posterior pilon\").  Required staged treatment with initial spanning ex-fix followed by referral to me for definitive ORIF.  Reports he is doing well with regard to the right ankle, that it is improving, and denies pain.  Has relatively modest physical functional demands on the ankle at baseline with history of Parkinson's.    Exam shows the patient to be a pleasant, cooperative, awake, and alert adult sitting upright in a wheelchair in NAD.  Breathing pattern is regular and nonlabored on room air.  Skin intact throughout right ankle and foot.  All prior surgical ankle wounds healed.  Nontender at fracture sites.    Independent review of imaging was performed including weightbearing AP, mortise, and lateral radiographs of the right ankle dated 12/01/2023.  Findings were discussed with the patient.  Well aligned fractures with abundant bridging healing bone.  Ankle mortise congruent.  Hardware intact.  No obvious evidence for infection.    Impression:  57yo male patient with Parkinson's and limited ambulation with radiographically healed right trimall ankle fracture-dislocation / posterior pilon variant, clinically doing well with regard to the ankle.    Plan:  May continue to WBAT on R LE with appropriate assistance.  He may use the TriLock brace as needed.  Follow-up May 2024.  Signs and symptoms that should prompt immediate medical attention, and the importance of this, were discussed.  All questions were answered.      I saw and examined this patient with the resident, discussed the findings and plan of care with the resident, and agree with the resident's findings and plan of care as documented in the attached note by Dr. Aguiar.  I independently performed a history, examination, review of imaging, and discussion of the findings and treatment plan with the " patient at this encounter.    Jose Bonilla MD  Date of Service (when I saw the patient): 12/01/2023

## 2023-12-02 NOTE — PATIENT INSTRUCTIONS
Tylenol 325-650 mg up to 3 x day as needed for right jaw pain  Soft foods   Ice & heat to jaw area.    Flonase may be tried 2 x day for 10 days for sinus / ear congestion.    Fluids    Recheck with primary as scheduled.

## 2023-12-02 NOTE — PROGRESS NOTES
ASSESSMENT AND PLAN:      ICD-10-CM    1. TMJ tenderness, right  M26.621 acetaminophen (TYLENOL) 325 MG tablet      2. Ear congestion, bilateral  H93.8X3 fluticasone (FLONASE) 50 MCG/ACT nasal spray      3. Impacted cerumen of left ear  H61.22 ND REMOVAL IMPACTED CERUMEN IRRIGATION/LVG UNILAT        Patient Instructions       Tylenol 325-650 mg up to 3 x day as needed for right jaw pain  Soft foods   Ice & heat to jaw area.    Flonase may be tried 2 x day for 10 days for sinus / ear congestion.    Fluids    Recheck with primary as scheduled.            Glory Carranza MD  [facility]    Subjective     Benjamin Mathews is a 58 year old who presents for Patient presents with:  Otalgia: Right ear pain on and off for few months. Pain got intense yesterday    an established patient of [facility].    URI Adult    Onset of symptoms was few month(s) ago.  Course of illness is waxing and waning.      Current and Associated symptoms: ear pain right  Treatment measures tried include None tried.  Predisposing factors include none.      Review of Systems   HENT:          Has cold symptoms            Objective    /70 (BP Location: Right arm, Patient Position: Sitting, Cuff Size: Adult Large)   Pulse 81   Temp 97.1  F (36.2  C) (Tympanic)   Resp 18   Wt 106.1 kg (234 lb)   SpO2 98%   BMI 28.48 kg/m    Physical Exam  Vitals reviewed.   Constitutional:       Appearance: Normal appearance. He is not ill-appearing.   HENT:      Right Ear: Tympanic membrane and ear canal normal.      Left Ear: There is impacted cerumen.      Ears:      Comments: R TMJ pain     Nose: Nose normal.      Mouth/Throat:      Mouth: Mucous membranes are moist.      Pharynx: Oropharynx is clear.      Comments: Teeth nontender   Neurological:      Mental Status: He is alert.        M.A irrigation   Re-exam   Wax cleared out   TM normal

## 2023-12-03 ENCOUNTER — HEALTH MAINTENANCE LETTER (OUTPATIENT)
Age: 58
End: 2023-12-03

## 2023-12-06 ENCOUNTER — THERAPY VISIT (OUTPATIENT)
Dept: PHYSICAL THERAPY | Facility: CLINIC | Age: 58
End: 2023-12-06
Attending: ORTHOPAEDIC SURGERY
Payer: COMMERCIAL

## 2023-12-06 DIAGNOSIS — S82.851D CLOSED TRIMALLEOLAR FRACTURE OF RIGHT ANKLE WITH ROUTINE HEALING, SUBSEQUENT ENCOUNTER: Primary | ICD-10-CM

## 2023-12-06 PROCEDURE — 97116 GAIT TRAINING THERAPY: CPT | Mod: GP | Performed by: PHYSICAL THERAPIST

## 2023-12-06 PROCEDURE — 97110 THERAPEUTIC EXERCISES: CPT | Mod: GP | Performed by: PHYSICAL THERAPIST

## 2023-12-13 ENCOUNTER — THERAPY VISIT (OUTPATIENT)
Dept: PHYSICAL THERAPY | Facility: CLINIC | Age: 58
End: 2023-12-13
Attending: ORTHOPAEDIC SURGERY
Payer: COMMERCIAL

## 2023-12-13 DIAGNOSIS — S82.851D CLOSED TRIMALLEOLAR FRACTURE OF RIGHT ANKLE WITH ROUTINE HEALING, SUBSEQUENT ENCOUNTER: Primary | ICD-10-CM

## 2023-12-13 DIAGNOSIS — G81.94 ACUTE LEFT HEMIPARESIS (H): ICD-10-CM

## 2023-12-13 DIAGNOSIS — R29.6 MULTIPLE FALLS: ICD-10-CM

## 2023-12-13 DIAGNOSIS — G20.B1 DYSKINESIA DUE TO PARKINSON'S DISEASE (H): ICD-10-CM

## 2023-12-13 PROCEDURE — 97116 GAIT TRAINING THERAPY: CPT | Mod: GP | Performed by: PHYSICAL THERAPIST

## 2023-12-13 PROCEDURE — 97110 THERAPEUTIC EXERCISES: CPT | Mod: GP | Performed by: PHYSICAL THERAPIST

## 2023-12-13 NOTE — PROGRESS NOTES
12/13/23 0500   Appointment Info   Signing clinician's name / credentials Radha Thornton, PT   Visits Used 8   Medical Diagnosis S/P R ankle ORIF   PT Tx Diagnosis Impaired gait and transfers, weakness   Quick Adds Certification   Progress Note/Certification   Start of Care Date 10/04/23   Onset of illness/injury or Date of Surgery 05/20/23   Therapy Frequency Weekly   Predicted Duration 90 days   Certification date from 10/04/23   Certification date to 01/01/24   PT Goal 1   Goal Identifier Transfer   Goal Description Benjamin will perform bed<>chair transfers with modified independence with a 4ww in 3/3 trials for safety at Assisted Living.   Rationale to maximize safety and independence with performance of ADLs and functional tasks;to maximize safety and independence within the home;to maximize safety and independence within the community;to maximize safety and independence with self cares   Goal Progress progressing here in clinic nicely   Target Date 01/01/24   Date Met 11/15/23   PT Goal 2   Goal Identifier Gait   Goal Description Benjamin will walk 25 feet with a 4ww with SBA in 2/2 trials to access areas in his Assisted Living.   Rationale to maximize safety and independence within the home;to maximize safety and independence within the community;to maximize safety and independence with performance of ADLs and functional tasks;to maximize safety and independence with self cares   Goal Progress progressing here in the clinic nicely   Target Date 01/01/24   Date Met 11/15/23   PT Goal 3   Goal Identifier Stand   Goal Description Benjamin will demonstrate the ability ot stand with UE support for 3 minutes for daily cares on a daily basis.   Rationale to maximize safety and independence with performance of ADLs and functional tasks;to maximize safety and independence within the home;to maximize safety and independence within the community;to maximize safety and independence with self cares   Goal Progress progressing nicely    Target Date 01/01/24   Date Met 11/15/23   PT Goal 4   Goal Identifier Gait   Goal Description Benjamin will ambulate 250 feet with 4ww with Right Trilock brace and shoes for community distances   Rationale to maximize safety and independence within the community   Goal Progress progressing   Target Date 01/01/24   PT Goal 5   Goal Identifier Stairs   Goal Description Benjamin will negotiate 4 stairs with rail assist and SBA for safety for community access in 3/3 trials.   Rationale to maximize safety and independence within the community   Goal Progress 125 feet x 3 with 4ww   Target Date 01/01/24   Subjective Report   Subjective Report Trilock brace on the Right and comes to PT today with both shoes on.   Objective Measure 1   Objective Measure 10M walk   Details 13 steps in 15.13 seconds with 4ww and trilock brace with both shoes   Objective Measure 2   Objective Measure Gait with 4ww and trilock brace   Details Trials with and without L Walk Lite AFO   Therapeutic Procedure/Exercise   Therapeutic Procedures: strength, endurance, ROM, flexibillity minutes (06181) 15   Ther Proc 1 Seated LE strengthening with GREEN theraband   Ther Proc 1 - Details 15 reps of: marching,leg kicks, HS curls, hip abduction.   Ther Proc 2 trunk rotation   Ther Proc 2 - Details x 12 with opening shoulder to gain lengthening and cues for being upright.   Ther Proc 3 Arm lifts   Ther Proc 3 - Details x15 with dowel- needs cues to activate abdominals for posture- difficult for his to coordinate both   Ther Proc 4 STS   Ther Proc 4 - Details x 5 with safe sequence of UE push off with cues   Gait Training   Gait Training Minutes, includes stair climbing (25585) 30   Gait 1 Gait with 4ww and trilock brace; both shoes on   Gait 1 - Details 4 x 125 feet with 2 trials with a Left carbon Fiber AFO and reports ease of L foot clearance compared to no AFO and triclock and brace remaining on his R ankle wtih no pain   Gait 2 Turns   Gait 2 - Details  stayed in between walker in all trials today   Skilled Intervention Trial of L AFO with improved benefit fro L foot clearance and will request orthotics order   Patient Response/Progress Likes the L AFO in addition to R Trilock brace.   Education   Learner/Method Patient;Listening   Education Comments Try to walk 3x day; another picutre of his exercises provided   Plan   Updates to plan of care Requesting orthtoics order for L foot drop   Plan for next session Stairs   Total Session Time   Timed Code Treatment Minutes 45   Total Treatment Time (sum of timed and untimed services) 45       PLAN  Continue therapy per current plan of care. He will now benefit from a L AFO for foot drop with ability to wear both shoes and continue to wear R Trilock brace for R ankle support.    Beginning/End Dates of Progress Note Reporting Period:    11/22/2023 to 12/13/2023    Referring Provider:  Jose Bonilla

## 2023-12-19 ENCOUNTER — THERAPY VISIT (OUTPATIENT)
Dept: PHYSICAL THERAPY | Facility: CLINIC | Age: 58
End: 2023-12-19
Attending: ORTHOPAEDIC SURGERY
Payer: COMMERCIAL

## 2023-12-19 DIAGNOSIS — G20.B1 DYSKINESIA DUE TO PARKINSON'S DISEASE (H): ICD-10-CM

## 2023-12-19 DIAGNOSIS — S82.851D CLOSED TRIMALLEOLAR FRACTURE OF RIGHT ANKLE WITH ROUTINE HEALING, SUBSEQUENT ENCOUNTER: Primary | ICD-10-CM

## 2023-12-19 DIAGNOSIS — R29.6 MULTIPLE FALLS: ICD-10-CM

## 2023-12-19 PROCEDURE — 97116 GAIT TRAINING THERAPY: CPT | Mod: GP | Performed by: PHYSICAL THERAPIST

## 2023-12-19 PROCEDURE — 97110 THERAPEUTIC EXERCISES: CPT | Mod: GP | Performed by: PHYSICAL THERAPIST

## 2023-12-20 ENCOUNTER — TRANSFERRED RECORDS (OUTPATIENT)
Dept: HEALTH INFORMATION MANAGEMENT | Facility: CLINIC | Age: 58
End: 2023-12-20

## 2023-12-20 ENCOUNTER — OFFICE VISIT (OUTPATIENT)
Dept: NEUROLOGY | Facility: CLINIC | Age: 58
End: 2023-12-20
Payer: COMMERCIAL

## 2023-12-20 VITALS — DIASTOLIC BLOOD PRESSURE: 63 MMHG | SYSTOLIC BLOOD PRESSURE: 92 MMHG | HEART RATE: 85 BPM

## 2023-12-20 DIAGNOSIS — G20.A1 PARKINSON'S DISEASE WITHOUT DYSKINESIA OR FLUCTUATING MANIFESTATIONS (H): Primary | ICD-10-CM

## 2023-12-20 DIAGNOSIS — G24.9 DYSTONIA: ICD-10-CM

## 2023-12-20 DIAGNOSIS — G24.1 DYSTONIA, TORSION, FRAGMENTS OF: ICD-10-CM

## 2023-12-20 PROCEDURE — 99214 OFFICE O/P EST MOD 30 MIN: CPT | Performed by: PSYCHIATRY & NEUROLOGY

## 2023-12-20 RX ORDER — AMANTADINE HYDROCHLORIDE 100 MG/1
100 CAPSULE, GELATIN COATED ORAL 2 TIMES DAILY
Qty: 60 CAPSULE | Refills: 11 | Status: SHIPPED | OUTPATIENT
Start: 2023-12-20

## 2023-12-20 RX ORDER — CLONAZEPAM 1 MG/1
TABLET ORAL
Qty: 60 TABLET | Refills: 5 | Status: SHIPPED | OUTPATIENT
Start: 2023-12-20 | End: 2024-05-09

## 2023-12-20 RX ORDER — CARBIDOPA AND LEVODOPA 50; 200 MG/1; MG/1
1 TABLET, EXTENDED RELEASE ORAL AT BEDTIME
Qty: 30 TABLET | Refills: 11 | Status: SHIPPED | OUTPATIENT
Start: 2023-12-20 | End: 2024-03-22

## 2023-12-20 RX ORDER — CLONAZEPAM 1 MG/1
TABLET ORAL
Qty: 40 TABLET | Refills: 5 | Status: CANCELLED | OUTPATIENT
Start: 2023-12-20

## 2023-12-20 RX ORDER — CLONAZEPAM 2 MG/1
2 TABLET ORAL 2 TIMES DAILY
Qty: 60 TABLET | Refills: 5 | Status: SHIPPED | OUTPATIENT
Start: 2023-12-20 | End: 2024-07-16

## 2023-12-20 RX ORDER — CARBIDOPA AND LEVODOPA 25; 100 MG/1; MG/1
2 TABLET ORAL 3 TIMES DAILY
Qty: 180 TABLET | Refills: 11 | Status: SHIPPED | OUTPATIENT
Start: 2023-12-20 | End: 2024-03-22

## 2023-12-20 ASSESSMENT — UNIFIED PARKINSONS DISEASE RATING SCALE (UPDRS)
FINGER_TAPPING_LEFT: (1) SLIGHT: ANY OF THE FOLLOWING: A) THE REGULAR RHYTHM IS BROKEN WITH ONE WITH ONE OR TWO INTERRUPTIONS OR HESITATIONS OF THE MOVEMENT  B) SLIGHT SLOWING  C) THE AMPLITUDE DECREMENTS NEAR THE END OF THE 10 MOVEMENTS.
LEG_AGILITY_LEFT: (3) MODERATE: ANY OF THE FOLLOWING: A) MORE THAN 5 INTERRUPTIONS OR AT LEAST ONE LONGER ARREST (FREEZE) IN ONGOING MOVEMENT  B) MODERATE SLOWING  C) THE AMPLITUDE DECREMENTS STARTING AFTER THE FIRST MOVEMENT.
PRONATION_SUPINATION_LEFT: (1) SLIGHT: ANY OF THE FOLLOWING: A) THE REGULAR RHYTHM IS BROKEN WITH ONE WITH ONE OR TWO INTERRUPTIONS OR HESITATIONS OF THE MOVEMENT  B) SLIGHT SLOWING  C) THE AMPLITUDE DECREMENTS NEAR THE END OF THE 10 MOVEMENTS.
HANDMOVEMENTS_LEFT: (2) MILD: ANY OF THE FOLLOWING: A) 3 TO 5 INTERRUPTIONS DURING TAPPING  B) MILD SLOWING  C) THE AMPLITUDE DECREMENTS MIDWAY IN THE 10-MOVEMENT SEQUENCE.
LEG_AGILITY_RIGHT: (3) MODERATE: ANY OF THE FOLLOWING: A) MORE THAN 5 INTERRUPTIONS  OR AT LEAST ONE LONGER ARREST (FREEZE) IN ONGOING MOVEMENT  B) MODERATE SLOWING  C) THE AMPLITUDE DECREMENTS STARTING AFTER THE FIRST MOVEMENT.
CONSTANCY_TREMOR_ATREST: (0) NORMAL: NO TREMOR.
DYSKINESIAS_PRESENT: NO
AMPLITUDE_RLE: (0) NORMAL: NO TREMOR.
AMPLITUDE_LUE: (0) NORMAL: NO TREMOR.
FACIAL_EXPRESSION: (1) SLIGHT: MINIMAL MASKED FACIES MANIFESTED ONLY BY DECREASED FREQUENCY OF BLINKING.
HANDMOVEMENTS_RIGHT: (1) SLIGHT: ANY OF THE FOLLOWING: A) THE REGULAR RHYTHM IS BROKEN WITH ONE WITH ONE OR TWO INTERRUPTIONS OR HESITATIONS OF THE MOVEMENT  B) SLIGHT SLOWING  C) THE AMPLITUDE DECREMENTS NEAR THE END OF THE 10 MOVEMENTS.
TOETAPPING_LEFT: (2) MILD: ANY OF THE FOLLOWING: A) 3 TO 5 INTERRUPTIONS DURING TAPPING  B) MILD SLOWING  C) THE AMPLITUDE DECREMENTS MIDWAY IN THE 10-MOVEMENT SEQUENCE.
AMPLITUDE_LIP_JAW: (1) SLIGHT: < 1 CM IN MAXIMAL AMPLITUDE.
SPONTANEITY_OF_MOVEMENT: (1) SLIGHT: SLIGHT GLOBAL SLOWNESS AND POVERTY OF SPONTANEOUS MOVEMENTS.
PRONATION_SUPINATION_RIGHT: (0) NORMAL: NO PROBLEMS.
AMPLITUDE_LLE: (0) NORMAL: NO TREMOR.
AMPLITUDE_RUE: (0) NORMAL: NO TREMOR.
SPEECH: (0) NORMAL: NO SPEECH PROBLEMS.
FINGER_TAPPING_RIGHT: (1) SLIGHT: ANY OF THE FOLLOWING: A) THE REGULAR RHYTHM IS BROKEN WITH ONE WITH ONE OR TWO INTERRUPTIONS OR HESITATIONS OF THE MOVEMENT  B) SLIGHT SLOWING  C) THE AMPLITUDE DECREMENTS NEAR THE END OF THE 10 MOVEMENTS.
PARKINSONS_MEDS: ON

## 2023-12-20 NOTE — LETTER
"    2023         RE: Benjamin Mathews  [address]  [facility] [address]        Dear Colleague,    Thank you for referring your patient, Benjamin Mathews, to the [facility]. Please see a copy of my visit note below.    Department of Neurology  Movement Disorders Division     Patient: Benjamin Mathews   MRN: [id number]   : 1965   Date of Visit:  2023    History of Present Illness  Mr. Mathews is a pleasant 58 year old male that presents to Neurology Movement clinic for follow up regarding Parkinsonism management.  Patient was last seen on 2023 for management of parkinsonism where his main complaint at this day was cramping and dystonia after falling breaking his right foot.  To improve parkinsonism symptoms, carbidopa/levodopa IR dose was increased to 2 tablets 3 times daily.    History obtained from patient.   Main complaint: none  Patient reports that the current Parkinson Disease medication regimen is working well to control symptoms.   Falls/balance: denies issues   Swallowing: \"Yes.\" Food sticks \"there\" and has to drink water to get it down, usually potatoes and thick foods.    Persistent mouth opens: improved   Muscle spasm pain/dystonia: Currently treated with clonazepam up to 6 mg daily.  He follows with Dr. Arita for neurotoxin injections.  GI: Following with GI due to constipation.  No longer taking amantadine, unclear who discontinued. Patient is fine off of this medication.   Movement Disorder-related Medications                   8 AM 12 PM 4 pm 8pm At bedtime  prn   Amantadine 100 mg  1 1       Carbidopa/levodopa IR  mg 2 2 2         Carbidopa/levodopa CR  mg       1       Clonazepam 2 mg 1   1         Clonazepam 1 mg   1       1   Hasn't been using clonazepam 1 mg prn, uses it 1-2 times in the past few months    Review of Systems:  Other than that mentioned above, the remainder of 12 systems reviewed were " negative.    PMH: unchanged  PSH: unchanged  FH: unchanged  SH: unchanged    Medications:  Current Outpatient Medications   Medication Sig Dispense Refill     acetaminophen (TYLENOL) 325 MG tablet Take 1-2 tablets (325-650 mg) by mouth every 8 hours as needed for mild pain 100 tablet 0     alfuzosin ER (UROXATRAL) 10 MG 24 hr tablet Take 1 tablet (10 mg) by mouth daily 30 tablet 11     apixaban ANTICOAGULANT (ELIQUIS) 5 MG tablet Take 5 mg by mouth 2 times daily       atorvastatin (LIPITOR) 40 MG tablet Take 1 tablet (40 mg) by mouth every evening 30 tablet 0     bisacodyl (DULCOLAX) 10 MG suppository Place 1 suppository (10 mg) rectally daily as needed for constipation 10 suppository 0     carbidopa-levodopa (SINEMET CR)  MG CR tablet Take 1 tablet by mouth At Bedtime (Patient taking differently: Take 1 tablet by mouth at bedtime At 8pm) 30 tablet 11     carbidopa-levodopa (SINEMET)  MG tablet Take 2 tablets by mouth 3 times daily (Patient taking differently: Take 2 tablets by mouth 3 times daily 8000, 1200, 1600) 180 tablet 11     cephALEXin (KEFLEX) 500 MG capsule Take 1 capsule (500 mg) by mouth 4 times daily 40 capsule 0     cholecalciferol (VITAMIN D3) 125 mcg (5000 units) capsule Take 1 capsule (125 mcg) by mouth daily 30 capsule 0     clonazePAM (KLONOPIN) 1 MG tablet Take 1 tablet scheduled at noon, may take 1 additional tablet PRN 60 tablet 5     clonazePAM (KLONOPIN) 2 MG tablet Take 1 tablet (2 mg) by mouth 2 times daily Take at 8am and 4 pm 60 tablet 5     cloZAPine (CLOZARIL) 50 MG tablet Take 1 tablet (50 mg) by mouth At Bedtime 30 tablet 0     diclofenac (VOLTAREN) 1 % topical gel Apply 2 g topically 3 times daily as needed for moderate pain 150 g 0     gabapentin (NEURONTIN) 800 MG tablet Take 1 tablet (800 mg) by mouth 3 times daily 90 tablet 0     hydrOXYzine (ATARAX) 25 MG tablet Take 2 tablets (50 mg) by mouth every 6 hours as needed for anxiety (up to 3 timrd daily) 20 tablet 0      ketoconazole (NIZORAL) 2 % external cream Apply topically 2 times daily       ketoconazole (NIZORAL) 2 % external shampoo Apply topically once a week On Wednesdays       lactulose (CHRONULAC) 10 GM/15ML solution Take 15 mLs by mouth 2 times daily 473 mL 11     linaclotide (LINZESS) 290 MCG capsule Take 1 capsule (290 mcg) by mouth daily as needed (IBSc) 90 capsule 3     metoprolol succinate ER (TOPROL XL) 25 MG 24 hr tablet Take 0.5 tablets (12.5 mg) by mouth 2 times daily 30 tablet 0     multivitamin, therapeutic (THERA-VIT) TABS tablet Take 1 tablet by mouth daily 30 tablet 0     pantoprazole (PROTONIX) 40 MG EC tablet Take 1 tablet (40 mg) by mouth daily Take 1 tablet (40mg) by mouth daily at 8am 30 tablet 0     traZODone (DESYREL) 100 MG tablet Take 100 mg by mouth At Bedtime       traZODone (DESYREL) 50 MG tablet Take 1 tablet (50 mg) by mouth every morning 30 tablet 0     venlafaxine (EFFEXOR XR) 150 MG 24 hr capsule Take 2 capsules (300 mg) by mouth daily 60 capsule 0     vitamin C (ASCORBIC ACID) 500 MG tablet Take 1 tablet (500 mg) by mouth daily 30 tablet 0           Allergies   Allergen Reactions     Amantadine Other (See Comments)     Other reaction(s): Hallucinations  Hallucinations/ lost self control/gambling.     halluicnations  Hallucinations/ lost self control/gambling.     hallucinates  halluicnations  hallucinates  Hallucinations/ lost self control/gambling.        Quetiapine GI Disturbance, Diarrhea and Other (See Comments)     Diarrhea    Diarrhea  Other reaction(s): GI Disturbance  Diarrhea       Duloxetine Other (See Comments)     suicidal  suicidal            Physical Exam:  The patient's  blood pressure is 92/63 and his pulse is 85.    Brace on right ankle  Sitting in electric wheelchair      12/20/2023     1:00 PM   UPDRS Motor Scale   Medication On   R Brain DBS: None   L Brain DBS: None   Dyskinesia (LID) No   Speech 0   Facial Expression 1   Finger Taps R 1   Finger Taps L 1   Hand Mvt R  1   Hand Mvt L 2   Pron-/Supinate R 0   Pron-/Supinate L 1   Toe Tap L 2   Leg Agility R 3   Leg Agility L 3   Global Spont Mvt 1   Postural Tremor RUE 0   Postural Tremor LUE 0   Kinetic Tremor RUE 0   Kinetic Tremor LUE 0   Rest Tremor RUE 0   Rest Tremor LUE 0   Rest Tremor RLE 0   Rest Tremor LLE 0   Rest Tremor Lip/Jaw 1   Rest Tremor Constancy 0     Impression:  Benjamin Mathews is a 58 year old male with Parkinson Disease. Parkinson Disease symptoms well controlled on current Parkinson Disease medication regimen.     Plan:   - No changes to Parkinson Disease medications. Follow the table below:  Movement Disorder-related Medications                   8 AM 12 PM 4 pm 8pm At bedtime  prn   Amantadine 100 mg  1 1       Carbidopa/levodopa IR  mg 2 2 2         Carbidopa/levodopa CR  mg       1       Clonazepam 2 mg 1   1         Clonazepam 1 mg   1       1   - Continue with psychiatric and psychotherapeutic care to manage anxiety and depression. Follow symptoms closely while on clozapine and clonazepam, though there have been no issues in the past with this combination of medication.   - Follow-up neuropsychological evaluation is recommended in 1 to 2 years, 1/[id number] in order to assess and update recommendations as appropriate  - Continue following palliative care medical providers at [facility]  - Continue to refrain from driving  - Continue daily and safe exercises   - Continue follow with Dr. Arita for toxin injections  - Continue to monitor swallowing    Patient to return in 8-12 months, for in-person visit, 30 minutes.     Ching Carlos DO, MA   of Neurology   [facility]    30 minutes spent on date of encounter doing chart reviews and exam and documentation and further activities as noted above.                    Again, thank you for allowing me to participate in the care of your patient.        Sincerely,        Ching Carlos DO

## 2023-12-20 NOTE — PROGRESS NOTES
"Department of Neurology  Movement Disorders Division     Patient: Benjamin Mathews   MRN: [id number]   : 1965   Date of Visit:  2023    History of Present Illness  Mr. Mathews is a pleasant 58 year old male that presents to Neurology Movement clinic for follow up regarding Parkinsonism management.  Patient was last seen on 2023 for management of parkinsonism where his main complaint at this day was cramping and dystonia after falling breaking his right foot.  To improve parkinsonism symptoms, carbidopa/levodopa IR dose was increased to 2 tablets 3 times daily.    History obtained from patient.   Main complaint: none  Patient reports that the current Parkinson Disease medication regimen is working well to control symptoms.   Falls/balance: denies issues   Swallowing: \"Yes.\" Food sticks \"there\" and has to drink water to get it down, usually potatoes and thick foods.    Persistent mouth opens: improved   Muscle spasm pain/dystonia: Currently treated with clonazepam up to 6 mg daily.  He follows with Dr. Arita for neurotoxin injections.  GI: Following with GI due to constipation.  No longer taking amantadine, unclear who discontinued. Patient is fine off of this medication.   Movement Disorder-related Medications                   8 AM 12 PM 4 pm 8pm At bedtime  prn   Amantadine 100 mg  1 1       Carbidopa/levodopa IR  mg 2 2 2         Carbidopa/levodopa CR  mg       1       Clonazepam 2 mg 1   1         Clonazepam 1 mg   1       1   Hasn't been using clonazepam 1 mg prn, uses it 1-2 times in the past few months    Review of Systems:  Other than that mentioned above, the remainder of 12 systems reviewed were negative.    PMH: unchanged  PSH: unchanged  FH: unchanged  SH: unchanged    Medications:  Current Outpatient Medications   Medication Sig Dispense Refill    acetaminophen (TYLENOL) 325 MG tablet Take 1-2 tablets (325-650 mg) by mouth every 8 hours as needed for mild pain 100 tablet " 0    alfuzosin ER (UROXATRAL) 10 MG 24 hr tablet Take 1 tablet (10 mg) by mouth daily 30 tablet 11    apixaban ANTICOAGULANT (ELIQUIS) 5 MG tablet Take 5 mg by mouth 2 times daily      atorvastatin (LIPITOR) 40 MG tablet Take 1 tablet (40 mg) by mouth every evening 30 tablet 0    bisacodyl (DULCOLAX) 10 MG suppository Place 1 suppository (10 mg) rectally daily as needed for constipation 10 suppository 0    carbidopa-levodopa (SINEMET CR)  MG CR tablet Take 1 tablet by mouth At Bedtime (Patient taking differently: Take 1 tablet by mouth at bedtime At 8pm) 30 tablet 11    carbidopa-levodopa (SINEMET)  MG tablet Take 2 tablets by mouth 3 times daily (Patient taking differently: Take 2 tablets by mouth 3 times daily 8000, 1200, 1600) 180 tablet 11    cephALEXin (KEFLEX) 500 MG capsule Take 1 capsule (500 mg) by mouth 4 times daily 40 capsule 0    cholecalciferol (VITAMIN D3) 125 mcg (5000 units) capsule Take 1 capsule (125 mcg) by mouth daily 30 capsule 0    clonazePAM (KLONOPIN) 1 MG tablet Take 1 tablet scheduled at noon, may take 1 additional tablet PRN 60 tablet 5    clonazePAM (KLONOPIN) 2 MG tablet Take 1 tablet (2 mg) by mouth 2 times daily Take at 8am and 4 pm 60 tablet 5    cloZAPine (CLOZARIL) 50 MG tablet Take 1 tablet (50 mg) by mouth At Bedtime 30 tablet 0    diclofenac (VOLTAREN) 1 % topical gel Apply 2 g topically 3 times daily as needed for moderate pain 150 g 0    gabapentin (NEURONTIN) 800 MG tablet Take 1 tablet (800 mg) by mouth 3 times daily 90 tablet 0    hydrOXYzine (ATARAX) 25 MG tablet Take 2 tablets (50 mg) by mouth every 6 hours as needed for anxiety (up to 3 timrd daily) 20 tablet 0    ketoconazole (NIZORAL) 2 % external cream Apply topically 2 times daily      ketoconazole (NIZORAL) 2 % external shampoo Apply topically once a week On Wednesdays      lactulose (CHRONULAC) 10 GM/15ML solution Take 15 mLs by mouth 2 times daily 473 mL 11    linaclotide (LINZESS) 290 MCG capsule Take  1 capsule (290 mcg) by mouth daily as needed (IBSc) 90 capsule 3    metoprolol succinate ER (TOPROL XL) 25 MG 24 hr tablet Take 0.5 tablets (12.5 mg) by mouth 2 times daily 30 tablet 0    multivitamin, therapeutic (THERA-VIT) TABS tablet Take 1 tablet by mouth daily 30 tablet 0    pantoprazole (PROTONIX) 40 MG EC tablet Take 1 tablet (40 mg) by mouth daily Take 1 tablet (40mg) by mouth daily at 8am 30 tablet 0    traZODone (DESYREL) 100 MG tablet Take 100 mg by mouth At Bedtime      traZODone (DESYREL) 50 MG tablet Take 1 tablet (50 mg) by mouth every morning 30 tablet 0    venlafaxine (EFFEXOR XR) 150 MG 24 hr capsule Take 2 capsules (300 mg) by mouth daily 60 capsule 0    vitamin C (ASCORBIC ACID) 500 MG tablet Take 1 tablet (500 mg) by mouth daily 30 tablet 0           Allergies   Allergen Reactions    Amantadine Other (See Comments)     Other reaction(s): Hallucinations  Hallucinations/ lost self control/gambling.     halluicnations  Hallucinations/ lost self control/gambling.     hallucinates  halluicnations  hallucinates  Hallucinations/ lost self control/gambling.       Quetiapine GI Disturbance, Diarrhea and Other (See Comments)     Diarrhea    Diarrhea  Other reaction(s): GI Disturbance  Diarrhea      Duloxetine Other (See Comments)     suicidal  suicidal            Physical Exam:  The patient's  blood pressure is 92/63 and his pulse is 85.    Brace on right ankle  Sitting in electric wheelchair      12/20/2023     1:00 PM   UPDRS Motor Scale   Medication On   R Brain DBS: None   L Brain DBS: None   Dyskinesia (LID) No   Speech 0   Facial Expression 1   Finger Taps R 1   Finger Taps L 1   Hand Mvt R 1   Hand Mvt L 2   Pron-/Supinate R 0   Pron-/Supinate L 1   Toe Tap L 2   Leg Agility R 3   Leg Agility L 3   Global Spont Mvt 1   Postural Tremor RUE 0   Postural Tremor LUE 0   Kinetic Tremor RUE 0   Kinetic Tremor LUE 0   Rest Tremor RUE 0   Rest Tremor LUE 0   Rest Tremor RLE 0   Rest Tremor LLE 0   Rest  Tremor Lip/Jaw 1   Rest Tremor Constancy 0     Impression:  Benjamin Mathews is a 58 year old male with Parkinson Disease. Parkinson Disease symptoms well controlled on current Parkinson Disease medication regimen.     Plan:   - No changes to Parkinson Disease medications. Follow the table below:  Movement Disorder-related Medications                   8 AM 12 PM 4 pm 8pm At bedtime  prn   Amantadine 100 mg  1 1       Carbidopa/levodopa IR  mg 2 2 2         Carbidopa/levodopa CR  mg       1       Clonazepam 2 mg 1   1         Clonazepam 1 mg   1       1   - Continue with psychiatric and psychotherapeutic care to manage anxiety and depression. Follow symptoms closely while on clozapine and clonazepam, though there have been no issues in the past with this combination of medication.   - Follow-up neuropsychological evaluation is recommended in 1 to 2 years, [id number] in order to assess and update recommendations as appropriate  - Continue following palliative care medical providers at [facility]  - Continue to refrain from driving  - Continue daily and safe exercises   - Continue follow with Dr. Arita for toxin injections  - Continue to monitor swallowing    Patient to return in 8-12 months, for in-person visit, 30 minutes.     Ching Carlos DO, MA   of Neurology   [facility]    30 minutes spent on date of encounter doing chart reviews and exam and documentation and further activities as noted above.

## 2023-12-20 NOTE — PATIENT INSTRUCTIONS
Plan:   - Continue clonazepam as scheduled. Okay to take an extra clonazepam 1 mg prn daily for breakthrough dystonia.    - No changes to remaining Parkinson Disease medications. Follow the table below:  Movement Disorder-related Medications                   8 AM 12 PM 4 pm 8pm At bedtime  prn   Amantadine 100 mg  1 1           Carbidopa/levodopa IR  mg 2 2 2         Carbidopa/levodopa CR  mg       1       Clonazepam 2 mg 1   1         Clonazepam 1 mg   1       1   - Continue with psychiatric and psychotherapeutic care to manage anxiety and depression. Follow symptoms closely while on clozapine and clonazepam, though there have been no issues in the past with this combination of medication.   - Follow-up neuropsychological evaluation is recommended in 1 to 2 years, [id number] in order to assess and update recommendations as appropriate  - Continue following palliative care medical providers at [facility]  - Continue to refrain from driving  - Continue daily and safe exercises   - Continue follow with Dr. Arita for toxin injections  - Continue to monitor swallowing    Patient to return in 8-12 months, for in-person visit, 30 minutes.

## 2023-12-29 ENCOUNTER — OFFICE VISIT (OUTPATIENT)
Dept: PODIATRY | Facility: CLINIC | Age: 58
End: 2023-12-29
Payer: COMMERCIAL

## 2023-12-29 DIAGNOSIS — G60.9 IDIOPATHIC PERIPHERAL NEUROPATHY: Primary | ICD-10-CM

## 2023-12-29 DIAGNOSIS — Z87.2 HISTORY OF FOOT ULCER: ICD-10-CM

## 2023-12-29 PROCEDURE — 99213 OFFICE O/P EST LOW 20 MIN: CPT | Performed by: PODIATRIST
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
## 2024-01-06 ENCOUNTER — ANCILLARY PROCEDURE (OUTPATIENT)
Dept: GENERAL RADIOLOGY | Facility: CLINIC | Age: 59
End: 2024-01-06
Attending: FAMILY MEDICINE
Payer: COMMERCIAL

## 2024-01-06 ENCOUNTER — OFFICE VISIT (OUTPATIENT)
Dept: URGENT CARE | Facility: URGENT CARE | Age: 59
End: 2024-01-06
Payer: COMMERCIAL

## 2024-01-06 VITALS
OXYGEN SATURATION: 97 % | TEMPERATURE: 99 F | HEIGHT: 76 IN | SYSTOLIC BLOOD PRESSURE: 108 MMHG | WEIGHT: 231 LBS | DIASTOLIC BLOOD PRESSURE: 70 MMHG | HEART RATE: 82 BPM | BODY MASS INDEX: 28.13 KG/M2

## 2024-01-06 DIAGNOSIS — M79.671 RIGHT FOOT PAIN: Primary | ICD-10-CM

## 2024-01-06 DIAGNOSIS — M79.671 RIGHT FOOT PAIN: ICD-10-CM

## 2024-01-06 PROCEDURE — 73630 X-RAY EXAM OF FOOT: CPT | Mod: TC | Performed by: RADIOLOGY

## 2024-01-06 PROCEDURE — 99213 OFFICE O/P EST LOW 20 MIN: CPT | Performed by: FAMILY MEDICINE

## 2024-01-06 NOTE — PROGRESS NOTES
"  Assessment & Plan     Right foot pain  Differentials discussed in detail including symptoms related to arthritis along with surgical hardware.  Right foot x-ray findings reviewed independently which showed arthritic changes.  No fracture or acute abnormality noted.  Recommended Tylenol, topical analgesia and activity as tolerated.  Stressed to follow-up with podiatrist as scheduled.  All question answered.  - XR Foot Right G/E 3 Views; Future      Aayush Simpson MD  [facility]    Evie Alexander is a 58 year old, presenting for the following health issues:  Musculoskeletal Problem (/ )      HPI   Had surgery back in May from a fall, right ankle pain not from a fall when walking painful when not walking it's swelling up. No fever, chills or other relevant systemic symptoms.  Patient has an appointment with podiatrist on January 19.      Review of Systems   Constitutional, HEENT, cardiovascular, pulmonary, gi and gu systems are negative, except as otherwise noted.        Objective    /70 (BP Location: Left arm, Patient Position: Sitting, Cuff Size: Adult Large)   Pulse 82   Temp 99  F (37.2  C) (Tympanic)   Ht 1.93 m (6' 4\")   Wt 104.8 kg (231 lb)   SpO2 97%   BMI 28.12 kg/m    Body mass index is 28.12 kg/m .  Physical Exam   GENERAL: alert and no distress  RESP: no wheezes  MS: Right ankle mildly swollen and tender on palpation, no significant skin discoloration noted, pedal pulses 3+, sensation to touch and pressure intact  SKIN: no suspicious lesions or rashes  PSYCH: mentation appears normal, affect normal/bright                  "

## 2024-01-08 ENCOUNTER — TELEPHONE (OUTPATIENT)
Dept: PHYSICAL MEDICINE AND REHAB | Facility: CLINIC | Age: 59
End: 2024-01-08

## 2024-01-08 ENCOUNTER — OFFICE VISIT (OUTPATIENT)
Dept: ORTHOPEDICS | Facility: CLINIC | Age: 59
End: 2024-01-08
Payer: COMMERCIAL

## 2024-01-08 ENCOUNTER — PRE VISIT (OUTPATIENT)
Dept: ORTHOPEDICS | Facility: CLINIC | Age: 59
End: 2024-01-08

## 2024-01-08 ENCOUNTER — TELEPHONE (OUTPATIENT)
Dept: ORTHOPEDICS | Facility: CLINIC | Age: 59
End: 2024-01-08
Payer: COMMERCIAL

## 2024-01-08 ENCOUNTER — ANCILLARY PROCEDURE (OUTPATIENT)
Dept: GENERAL RADIOLOGY | Facility: CLINIC | Age: 59
End: 2024-01-08
Attending: ORTHOPAEDIC SURGERY
Payer: COMMERCIAL

## 2024-01-08 DIAGNOSIS — S82.851D CLOSED TRIMALLEOLAR FRACTURE OF RIGHT ANKLE WITH ROUTINE HEALING, SUBSEQUENT ENCOUNTER: Primary | ICD-10-CM

## 2024-01-08 DIAGNOSIS — Z98.890 STATUS POST SURGERY: ICD-10-CM

## 2024-01-08 DIAGNOSIS — S82.851D CLOSED TRIMALLEOLAR FRACTURE OF RIGHT ANKLE WITH ROUTINE HEALING, SUBSEQUENT ENCOUNTER: ICD-10-CM

## 2024-01-08 DIAGNOSIS — M12.571 TRAUMATIC ARTHROPATHY OF ANKLE, RIGHT: ICD-10-CM

## 2024-01-08 PROCEDURE — 73610 X-RAY EXAM OF ANKLE: CPT | Mod: RT | Performed by: RADIOLOGY

## 2024-01-08 PROCEDURE — 99213 OFFICE O/P EST LOW 20 MIN: CPT | Performed by: ORTHOPAEDIC SURGERY

## 2024-01-08 NOTE — NURSING NOTE
Reason For Visit:   Chief Complaint   Patient presents with    RECHECK     Patient returns for recheck of right foot/ankle.  Seen in  on 1/6/24 for atraumatic recurrence of right foot pain.  He presents to clinic in wheelchair and tall CAM boot.  He is endorsing pain in the medial right ankle, posterior to the tibial malleolus.       There were no vitals taken for this visit.    Pain Assessment  Patient Currently in Pain: Yes  0-10 Pain Scale: 5 (pain with walking.  pain decreases while sitting)  Primary Pain Location: Ankle (right)    Dylan Conn, ATC

## 2024-01-08 NOTE — TELEPHONE ENCOUNTER
ATC returned call from RN MyChart message requesting for patient to be seen by Dr. Irene DIEGO for follow up on his ankle/foot.  ATC also replied to Flavio and stated that we have 3 openings for this afternoon that they could book and come see us today if there is an urgent concern.    Dylan Conn MS, ATC, LAT, [facility]  Dr. Mac Bonilla
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
## 2024-01-08 NOTE — LETTER
1/8/2024         RE: Benjamin Mathews  [address]  [facility] [address]        Dear Colleague,    Thank you for referring your patient, Benjamin Mathews, to the [facility] [place]. Please see a copy of my visit note below.    Orthopaedic Surgery Clinic - Follow-up Appointment    DOI: 05/20/2023, GLF, syncopal event, closed R trimalleolar ankle / pilon fracture-dislocation.  DOS: 05/21/2023, closed reduction, application of spanning external fixator, Dr. Apodaca.  DOS: 06/15/2023, ORIF R ankle/pilon, removal of external fixator, Dr. Bonilla.    I saw this very pleasant 58-year-old gentleman with a past medical history notable for CVA and Parkinson's disease today in follow-up for a recent acute onset of exacerbation of his right ankle pain.  He reports he had been doing well for at least several weeks after our last appointment on 12/01/2023.  At that appointment, he was instructed to ambulate daily, continue using his Tri-Lock ankle brace but it was okay to discontinue the brace if he was able to, and then to follow-up for the next regularly scheduled appointment in May 2024.  However, the patient reports that about 2 weeks ago, with no history of any trauma that he could recall, he started to have increased right ankle pain located across the anterior ankle line as well as over the medial malleolus.  He denies any fevers, chills, night sweats, or wound drainage.  He then went to urgent care on 01/06/2024, 2 days ago, where nonweightbearing right foot x-rays were performed, no new fractures were noted, and he was instructed to follow-up here today.  The patient reports that since Saturday when he was in the urgent care he has been resuming use of his cam walker and that this is working better for him than the Tri-Lock brace did.  He rates his pain as about 5 out of 10 in severity, with walking, and decreased with rest and sitting down.    Examination today shows the patient to be a  pleasant, cooperative, awake, and alert adult sitting upright in a manual wheelchair in no acute distress.  He is seen and examined here today with his nurse caregiver Mikhail present for the entire encounter.  He is nonseptic appearing from a general clinical standpoint.  There is a right lower extremity tall cam boot, removed for examination.  Breathing pattern is regular and nonlabored on room air.  Skin on the right leg, ankle, and foot is intact without open wounds, fluctuance, blistering, or ecchymosis.  There is no tenderness to palpation at his previous ex-fix sites in the tibia and calcaneus.  The dorsal midfoot including Lisfranc joint is nontender to palpation.  There is no tenderness to palpation over the fibular fracture site or fibular hardware.  There is tenderness to palpation directly at the medial aspect of the medial malleolus.  The medial gutter is nontender to palpation.  The posterior malleolus is nontender to palpation.  The anterior ankle joint line is tender to palpation.  Right tib ant, EHL, gastrocsoleus, PTT, and peroneal strength are 5/5.  There is a 3/4 easily palpable right dorsalis pedis pulse.  Light touch sensation is endorsed as being at or around his baseline level of sensibility/numbness in all dermatomes.    Imaging:   Independent review of imaging was performed including nonweightbearing AP, oblique, and lateral right foot radiographs dated 01/06/2024 and weightbearing AP, mortise, and lateral right ankle radiographs dated 01/08/2024.  Images were discussed with and shown to the patient and his caregiver.  They are also compared with previous images of the right ankle.  Fracture alignment appears to be stable without any evidence for loss of reduction.  They do appear to be united.  The ankle mortise is congruent without evidence for medial clear space widening or syndesmotic widening.  No obvious evidence for infection.  Hardware appears to be stable, well-positioned, and  intact without evident hardware failure.  There does appear to be some evidence for posttraumatic arthritis present including what may be some erosive changes across the anterior distal tibia, as well as some irregularity in the medial gutter.    Impression:  58-year-old gentleman with multiple comorbidities including Parkinson's disease and CVA, with recent acute on chronic exacerbation of right ankle pain with no obvious evidence for infection or fracture.  This may be due to progression/development of symptoms from posttraumatic arthritis including anterior ankle impingement and medial gutter arthritis.    Plan:  The findings, suspected diagnosis, and treatment options, both nonsurgical and surgical, were discussed with the patient and his caregiver in layman's terms.  Full opportunity was given to them to participate in the shared decision-making process.  I discussed ice, rest, compression, elevation, oral and topical medications, corticosteroid injections, various types of bracing including a prescription for a new AFO, physical therapy, hardware removal, or even (though not at the current time, but in the future should his symptoms worsen despite all reasonable attempts at conservative therapy) a right ankle fusion.  After a thorough discussion of the options, the patient verbalized his wish for diclofenac gel which is prescribed today, physical therapy for soft tissue modalities including ultrasound, ionto/phonophoresis, and soft tissue massage, and a prescription for an AFO to replace his cam walker.  In the meantime, he should use his cam walker until he has his AFO.  I discussed that this could take several weeks or even longer for him to get the AFO.  Follow-up in 6 weeks for repeat clinical examination; no new radiographs should be needed that time unless clinically warranted.  All questions were answered.          Jose Bonilla MD

## 2024-01-08 NOTE — TELEPHONE ENCOUNTER
DIAGNOSIS: Right foot concern    APPOINTMENT DATE: 1/8/2024   NOTES STATUS DETAILS   OFFICE NOTE from referring provider Internal   Aayush Simpson MD      OFFICE NOTE from other specialist Internal 1/6/2024 with Dr. Simpson   MEDICATION LIST Internal    EMG (for Spine) Internal    LABS     CBC/DIFF Care Everywhere    XRAYS (IMAGES & REPORTS) Internal Xray Foot 1/6/2024   Xray Ankle 12/1/2023  Xray Foot 11/8/2023  Xray Ankle Right 10/6/2023  More in PACS

## 2024-01-09 PROBLEM — M12.571 TRAUMATIC ARTHROPATHY OF ANKLE, RIGHT: Status: ACTIVE | Noted: 2024-01-09

## 2024-01-17 ENCOUNTER — THERAPY VISIT (OUTPATIENT)
Dept: PHYSICAL THERAPY | Facility: CLINIC | Age: 59
End: 2024-01-17
Attending: ORTHOPAEDIC SURGERY
Payer: COMMERCIAL

## 2024-01-17 DIAGNOSIS — S82.851D CLOSED TRIMALLEOLAR FRACTURE OF RIGHT ANKLE WITH ROUTINE HEALING, SUBSEQUENT ENCOUNTER: Primary | ICD-10-CM

## 2024-01-17 PROCEDURE — 97116 GAIT TRAINING THERAPY: CPT | Mod: GP | Performed by: PHYSICAL THERAPIST

## 2024-01-17 PROCEDURE — 97110 THERAPEUTIC EXERCISES: CPT | Mod: GP | Performed by: PHYSICAL THERAPIST

## 2024-01-22 ENCOUNTER — OFFICE VISIT (OUTPATIENT)
Dept: PHYSICAL MEDICINE AND REHAB | Facility: CLINIC | Age: 59
End: 2024-01-22
Payer: COMMERCIAL

## 2024-01-22 VITALS — DIASTOLIC BLOOD PRESSURE: 61 MMHG | SYSTOLIC BLOOD PRESSURE: 97 MMHG | HEART RATE: 87 BPM | OXYGEN SATURATION: 98 %

## 2024-01-22 DIAGNOSIS — G24.8 SEGMENTAL DYSTONIA: ICD-10-CM

## 2024-01-22 DIAGNOSIS — G24.3 CERVICAL DYSTONIA: Primary | ICD-10-CM

## 2024-01-22 DIAGNOSIS — G20.A2 PARKINSON'S DISEASE WITHOUT DYSKINESIA, WITH FLUCTUATING MANIFESTATIONS (H): ICD-10-CM

## 2024-01-22 PROCEDURE — 64616 CHEMODENERV MUSC NECK DYSTON: CPT | Mod: RT | Performed by: PHYSICAL MEDICINE & REHABILITATION

## 2024-01-22 PROCEDURE — 95874 GUIDE NERV DESTR NEEDLE EMG: CPT | Mod: GC | Performed by: PHYSICAL MEDICINE & REHABILITATION

## 2024-01-22 PROCEDURE — 64642 CHEMODENERV 1 EXTREMITY 1-4: CPT | Mod: GC | Performed by: PHYSICAL MEDICINE & REHABILITATION

## 2024-01-22 RX ORDER — AMMONIUM LACTATE 12 G/100G
LOTION TOPICAL DAILY PRN
COMMUNITY
Start: 2024-01-19

## 2024-01-22 NOTE — PROGRESS NOTES
[facility]    PM&R CLINIC NOTE  BOTULINUM TOXIN PROCEDURE      HPI  Chief Complaint   Patient presents with    RECHECK     Botox injections confirmed with patient     Benjamin Mathews is a 58 year old male who was referred to our clinic for more evaluation of his dystonia and trial of botulinum toxin injections (referral from Dr. Suazo). He was seen on 10/6/22 as initial consult; please see the consult note for details. She has history of Parkinsonism and is followed by Dr. Perry. He was referred to palliative care team and has been followed by them since then.       Background information   --saw urology team in Nov 2022 for LUTS likely due to BPH and was started on alfuzosin  --saw Dr. Carlos in Jan 2023; it was recommended to continue carbidopa/levodopa and amantadine. He is also on clonazepam prn. Ok to continue botox injections.   --had neuropsych testing 1/13/23; reviewed the results.    Was admitted on 5/20/23 after a fall resulting in right ankle fracture s/p closed reduction and external fixation by ortho team. [facility] has been at [facility] since then.     SINCE LAST VISIT  Benjamin Mathews was last seen here in clinic on 10/24/23    Patient reports the following new medical problems since last visit:    Saw podiatry several times for ulcer on L 2nd toe, which was closed 12/29/23.  Saw Dr. Carlos in Dec as well - reviewed her note.   Saw Dr. Bonilla several times, most recently 1/8/24  After a thorough discussion of the options, the patient verbalized his wish for diclofenac gel which is prescribed today, physical therapy for soft tissue modalities including ultrasound, ionto/phonophoresis, and soft tissue massage, and a prescription for an AFO to replace his cam walker.  In the meantime, he should use his cam walker until he has his AFO     He was doing well today. He continues working with PT at [place]; they work on ROM and some walking(150 ft).  "Does continue to notice curling of the R toes that can be painful.     Denied any side effects from the injections last time. The benefits wore off in 2.5-3 months. When botox is working, his neck movement and R shoulder ROM is a lot more fluid and he has less \"tugging sensation\" in his pec area and neck.       PHYSICAL EXAM  VS: BP 97/61   Pulse 87   SpO2 98%    GEN: Pleasant and cooperative, in no acute distress  HEENT: Moist mucous membranes     General: Sitting in his power wheelchair leaning to the right with his neck tilted to the right and rotated to the left  C-spine range of motion was moderately limited with extension and left lateral bending, he was also mildly limited in all other directionsReduced left sided rotation as compared to the right side. Restricted extension of the neck. Tight right scm   Continues to have left sided weakness    Right leg in CAM boot, toes curled      ALLERGIES  Allergies   Allergen Reactions    Amantadine Other (See Comments)     Other reaction(s): Hallucinations  Hallucinations/ lost self control/gambling.     halluicnations  Hallucinations/ lost self control/gambling.     hallucinates  halluicnations  hallucinates  Hallucinations/ lost self control/gambling.       Quetiapine GI Disturbance, Diarrhea and Other (See Comments)     Diarrhea    Diarrhea  Other reaction(s): GI Disturbance  Diarrhea      Duloxetine Other (See Comments)     suicidal  suicidal         CURRENT MEDICATIONS    Current Outpatient Medications:     acetaminophen (TYLENOL) 325 MG tablet, Take 1-2 tablets (325-650 mg) by mouth every 8 hours as needed for mild pain, Disp: 100 tablet, Rfl: 0    alfuzosin ER (UROXATRAL) 10 MG 24 hr tablet, Take 1 tablet (10 mg) by mouth daily, Disp: 30 tablet, Rfl: 11    amantadine (SYMMETREL) 100 MG capsule, Take 1 capsule (100 mg) by mouth 2 times daily, Disp: 60 capsule, Rfl: 11    ammonium lactate (LAC-HYDRIN) 12 % external lotion, Apply topically daily as needed, Disp: " , Rfl:     apixaban ANTICOAGULANT (ELIQUIS) 5 MG tablet, Take 5 mg by mouth 2 times daily, Disp: , Rfl:     atorvastatin (LIPITOR) 40 MG tablet, Take 1 tablet (40 mg) by mouth every evening, Disp: 30 tablet, Rfl: 0    bisacodyl (DULCOLAX) 10 MG suppository, Place 1 suppository (10 mg) rectally daily as needed for constipation, Disp: 10 suppository, Rfl: 0    carbidopa-levodopa (SINEMET CR)  MG CR tablet, Take 1 tablet by mouth at bedtime, Disp: 30 tablet, Rfl: 11    carbidopa-levodopa (SINEMET)  MG tablet, Take 2 tablets by mouth 3 times daily 8000, 1200, 1600, Disp: 180 tablet, Rfl: 11    cephALEXin (KEFLEX) 500 MG capsule, Take 1 capsule (500 mg) by mouth 4 times daily, Disp: 40 capsule, Rfl: 0    cholecalciferol (VITAMIN D3) 125 mcg (5000 units) capsule, Take 1 capsule (125 mcg) by mouth daily, Disp: 30 capsule, Rfl: 0    clonazePAM (KLONOPIN) 1 MG tablet, Take 1 tablet scheduled at noon, may take 1 additional tablet PRN, Disp: 60 tablet, Rfl: 5    clonazePAM (KLONOPIN) 2 MG tablet, Take 1 tablet (2 mg) by mouth 2 times daily Take at 8am and 4 pm, Disp: 60 tablet, Rfl: 5    cloZAPine (CLOZARIL) 50 MG tablet, Take 1 tablet (50 mg) by mouth At Bedtime, Disp: 30 tablet, Rfl: 0    diclofenac (VOLTAREN) 1 % topical gel, Apply 2 g topically 3 times daily as needed for moderate pain, Disp: 150 g, Rfl: 0    gabapentin (NEURONTIN) 800 MG tablet, Take 1 tablet (800 mg) by mouth 3 times daily, Disp: 90 tablet, Rfl: 0    hydrOXYzine (ATARAX) 25 MG tablet, Take 2 tablets (50 mg) by mouth every 6 hours as needed for anxiety (up to 3 timrd daily), Disp: 20 tablet, Rfl: 0    ketoconazole (NIZORAL) 2 % external cream, Apply topically 2 times daily, Disp: , Rfl:     ketoconazole (NIZORAL) 2 % external shampoo, Apply topically once a week On Wednesdays, Disp: , Rfl:     lactulose (CHRONULAC) 10 GM/15ML solution, Take 15 mLs by mouth 2 times daily, Disp: 473 mL, Rfl: 11    linaclotide (LINZESS) 290 MCG capsule, Take 1  capsule (290 mcg) by mouth daily as needed ([facility]), Disp: 90 capsule, Rfl: 3    metoprolol succinate ER (TOPROL XL) 25 MG 24 hr tablet, Take 0.5 tablets (12.5 mg) by mouth 2 times daily, Disp: 30 tablet, Rfl: 0    multivitamin, therapeutic (THERA-VIT) TABS tablet, Take 1 tablet by mouth daily, Disp: 30 tablet, Rfl: 0    pantoprazole (PROTONIX) 40 MG EC tablet, Take 1 tablet (40 mg) by mouth daily Take 1 tablet (40mg) by mouth daily at 8am, Disp: 30 tablet, Rfl: 0    traZODone (DESYREL) 100 MG tablet, Take 100 mg by mouth At Bedtime, Disp: , Rfl:     traZODone (DESYREL) 50 MG tablet, Take 1 tablet (50 mg) by mouth every morning, Disp: 30 tablet, Rfl: 0    venlafaxine (EFFEXOR XR) 150 MG 24 hr capsule, Take 2 capsules (300 mg) by mouth daily, Disp: 60 capsule, Rfl: 0    vitamin C (ASCORBIC ACID) 500 MG tablet, Take 1 tablet (500 mg) by mouth daily, Disp: 30 tablet, Rfl: 0    diclofenac (VOLTAREN) 1 % topical gel, Apply 2 g topically 4 times daily (Patient not taking: Reported on 2024), Disp: 150 g, Rfl: 1    Current Facility-Administered Medications:     botulinum toxin type A (BOTOX) 100 units injection 400 Units, 400 Units, Intramuscular, Q90 Days, Ember Arita MD, 140 Units at 24 0842       BOTULINUM NEUROTOXIN INJECTION PROCEDURES    VERIFICATION OF PATIENT IDENTIFICATION AND PROCEDURE     Initials   Patient Name PS   Patient  PS   Procedure Verified by: GINGER     Prior to the start of the procedure and with procedural staff participation, I verbally confirmed the patient s identity using two indicators, relevant allergies, that the procedure was appropriate and matched the consent or emergent situation, and that the correct equipment/implants were available. Immediately prior to starting the procedure I conducted the Time Out with the procedural staff and re-confirmed the patient s name, procedure, and site/side. (The Joint Commission universal protocol was followed.)  Yes    Sedation (Moderate or  Deep): None    ABOVE ASSESSMENTS PERFORMED BY  Ember Arita MD      INDICATIONS FOR PROCEDURES  Benjamin Mathews is a 58 year old patient with cervical and segmental dystonia secondary to the diagnosis of Parkinson's disease. His baseline symptoms have been recalcitrant to oral medications and conservative therapy.  He is here today for reinjection with Botox.    GOAL OF PROCEDURE  The goal of this procedure is to increase active range of motion, improve volitional motor control, decrease pain  and enhance functional independence.      TOTAL DOSE ADMINISTERED  Dose Administered: 170 units  Botox (Botulinum Toxin Type A) 200 units  1:1 dilution -  Unavoidable Drug Waste: Yes  Amount of drug waste (mL): 30 units.  Reason for waste:  Single use vial  Diluent Used:  Preservative Free Normal Saline  Total Volume of Diluent Used: 2ml  See MAR  NDC #: Botox 100u ([address]-58)      CONSENT  The risks, benefits, and treatment options were discussed with Benjamin Mathews and he agreed to proceed.    Written consent was obtained by PS.     EQUIPMENT USED  Needle-35mm stimulating/recording  EMG/NCS Machine    SKIN PREPARATION  Skin preparation was performed using an alcohol wipe.    GUIDANCE DESCRIPTION  Electro-myographic guidance was necessary throughout the procedure to accurately identify all areas of dystonic muscles while avoiding injection of non-dystonic muscles and underlying muscles , neighboring nerves and nearby vascular structures.     AREA/MUSCLE INJECTED  Right neck/shoulder girdle  SCM 10 units at 1 site  Splenius capitis 15 units at 1 site  Splenius cervicis 15 units at 1 site  Levator a scap insertion 40 units at 1 site  Levator at neck 10 units at 1 site   Lateral trap 20 units at 2 sites   Pectoralis major 30 units at 1 site     Right lower extremity  Flexor digitorum longus 30 units at 1 site      RESPONSE TO PROCEDURE  Benjamin Mathews tolerated the procedure well and there were no immediate complications. He  was allowed to recover for an appropriate period of time and was discharged home in stable condition.    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  Osteopenia (DXA Lowest T-Score -1.8) likely due to disuse c/b limited functional mobility leading to increased fracture risk - assessment 5/2023     DOI: 05/20/2023, GLF, syncopal event, closed R trimalleolar ankle / pilon fracture-dislocation.  DOS: 05/21/2023, closed reduction, application of spanning external fixator.  DOS: 06/15/2023, ORIF R ankle/pilon, removal of external fixator.      We have discussed treatment options for dystonia including therapies, medications and interventions.  He has responded very well to Klonopin but his symptoms are not well controlled especially spasmodic torticollis and dystonia in the distal part of his right lower extremity.  Adding another oral agent does not seem appropriate because 1-he did not respond to baclofen and 2- risk of polypharmacy and side effects.  He would benefit from physical therapy as discussed before.        Work-up: None     Therapy/equipment/braces: continue therapies and regular HEP    Medications: No changes today; currently on clonazepam, sinemet and amantadine by Dr. Carlos.     Interventions: started the first round at 130 units and increased the dose to 270 units on 2/1/2023; kept on the same dose in April. Decreased the total dose to 100 in July with no injections in RLE due to recent fracture. Increased the dose 10/24/23 from 100 to140 units with the goal of increasing effectiveness and prolonging duration of benefit of Botox injections. Increased dose to 170 units on 1/22/2024 added 30 units to right FDL to help facilitate comfort and AFO tolerance when he receives it     Referral / follow up with other providers: should repeat  DEXA in 2 years 5/2025 and will need a new referral for bone health eval at that point. He will continue to follow-up with palliative care team and movement disorder clinic.     Follow up: in 12 weeks       Patient was seen and discussed with attending physician, Dr. Lyla Milner MD  Physical Medicine & Rehabilitation     Physician Attestation   I, Ember Arita MD, saw this patient and agree with the findings and plan of care as documented in the note.      Items personally reviewed/procedural attestation: I was present for and supervised the entire procedure.    Ember Arita MD

## 2024-01-22 NOTE — NURSING NOTE
Chief Complaint   Patient presents with    RECHECK     Botox injections confirmed with patient     Lindsay Madrid CMA at 8:18 AM on 1/22/2024.

## 2024-01-22 NOTE — NURSING NOTE
Chief Complaint   Patient presents with    RECHECK     Botox injections confirmed with patient   Pulse 87   SpO2 98%     Lindsay Madrid CMA at 8:24 AM on 1/22/2024.

## 2024-01-22 NOTE — LETTER
1/22/2024       RE: Benjamin Mathews  [address]  [facility] [address]     Dear Colleague,    Thank you for referring your patient, Benjamin Mathews, to the [facility] [place] at [facility]. Please see a copy of my visit note below.      [facility]    PM&R CLINIC NOTE  BOTULINUM TOXIN PROCEDURE      HPI  Chief Complaint   Patient presents with    RECHECK     Botox injections confirmed with patient     Benjamin Mathews is a 58 year old male who was referred to our clinic for more evaluation of his dystonia and trial of botulinum toxin injections (referral from Dr. Suazo). He was seen on 10/6/22 as initial consult; please see the consult note for details. She has history of Parkinsonism and is followed by Dr. Perry. He was referred to palliative care team and has been followed by them since then.       Background information   --saw urology team in Nov 2022 for LUTS likely due to BPH and was started on alfuzosin  --saw Dr. Carlos in Jan 2023; it was recommended to continue carbidopa/levodopa and amantadine. He is also on clonazepam prn. Ok to continue botox injections.   --had neuropsych testing 1/13/23; reviewed the results.    Was admitted on 5/20/23 after a fall resulting in right ankle fracture s/p closed reduction and external fixation by ortho team. [facility] has been at [facility] since then.     SINCE LAST VISIT  Benjamin Mathews was last seen here in clinic on 10/24/23    Patient reports the following new medical problems since last visit:    Saw podiatry several times for ulcer on L 2nd toe, which was closed 12/29/23.  Saw Dr. Carlos in Dec as well - reviewed her note.   Saw Dr. Bonilla several times, most recently 1/8/24  After a thorough discussion of the options, the patient verbalized his wish for diclofenac gel which is prescribed today, physical therapy  "for soft tissue modalities including ultrasound, ionto/phonophoresis, and soft tissue massage, and a prescription for an AFO to replace his cam walker.  In the meantime, he should use his cam walker until he has his AFO     He was doing well today. He continues working with PT at [place]; they work on ROM and some walking(150 ft). Does continue to notice curling of the R toes that can be painful.     Denied any side effects from the injections last time. The benefits wore off in 2.5-3 months. When botox is working, his neck movement and R shoulder ROM is a lot more fluid and he has less \"tugging sensation\" in his pec area and neck.       PHYSICAL EXAM  VS: BP 97/61   Pulse 87   SpO2 98%    GEN: Pleasant and cooperative, in no acute distress  HEENT: Moist mucous membranes     General: Sitting in his power wheelchair leaning to the right with his neck tilted to the right and rotated to the left  C-spine range of motion was moderately limited with extension and left lateral bending, he was also mildly limited in all other directionsReduced left sided rotation as compared to the right side. Restricted extension of the neck. Tight right scm   Continues to have left sided weakness    Right leg in CAM boot, toes curled      ALLERGIES  Allergies   Allergen Reactions    Amantadine Other (See Comments)     Other reaction(s): Hallucinations  Hallucinations/ lost self control/gambling.     halluicnations  Hallucinations/ lost self control/gambling.     hallucinates  halluicnations  hallucinates  Hallucinations/ lost self control/gambling.       Quetiapine GI Disturbance, Diarrhea and Other (See Comments)     Diarrhea    Diarrhea  Other reaction(s): GI Disturbance  Diarrhea      Duloxetine Other (See Comments)     suicidal  suicidal         CURRENT MEDICATIONS    Current Outpatient Medications:     acetaminophen (TYLENOL) 325 MG tablet, Take 1-2 tablets (325-650 mg) by mouth every 8 hours as needed for mild pain, Disp: 100 " tablet, Rfl: 0    alfuzosin ER (UROXATRAL) 10 MG 24 hr tablet, Take 1 tablet (10 mg) by mouth daily, Disp: 30 tablet, Rfl: 11    amantadine (SYMMETREL) 100 MG capsule, Take 1 capsule (100 mg) by mouth 2 times daily, Disp: 60 capsule, Rfl: 11    ammonium lactate (LAC-HYDRIN) 12 % external lotion, Apply topically daily as needed, Disp: , Rfl:     apixaban ANTICOAGULANT (ELIQUIS) 5 MG tablet, Take 5 mg by mouth 2 times daily, Disp: , Rfl:     atorvastatin (LIPITOR) 40 MG tablet, Take 1 tablet (40 mg) by mouth every evening, Disp: 30 tablet, Rfl: 0    bisacodyl (DULCOLAX) 10 MG suppository, Place 1 suppository (10 mg) rectally daily as needed for constipation, Disp: 10 suppository, Rfl: 0    carbidopa-levodopa (SINEMET CR)  MG CR tablet, Take 1 tablet by mouth at bedtime, Disp: 30 tablet, Rfl: 11    carbidopa-levodopa (SINEMET)  MG tablet, Take 2 tablets by mouth 3 times daily 8000, 1200, 1600, Disp: 180 tablet, Rfl: 11    cephALEXin (KEFLEX) 500 MG capsule, Take 1 capsule (500 mg) by mouth 4 times daily, Disp: 40 capsule, Rfl: 0    cholecalciferol (VITAMIN D3) 125 mcg (5000 units) capsule, Take 1 capsule (125 mcg) by mouth daily, Disp: 30 capsule, Rfl: 0    clonazePAM (KLONOPIN) 1 MG tablet, Take 1 tablet scheduled at noon, may take 1 additional tablet PRN, Disp: 60 tablet, Rfl: 5    clonazePAM (KLONOPIN) 2 MG tablet, Take 1 tablet (2 mg) by mouth 2 times daily Take at 8am and 4 pm, Disp: 60 tablet, Rfl: 5    cloZAPine (CLOZARIL) 50 MG tablet, Take 1 tablet (50 mg) by mouth At Bedtime, Disp: 30 tablet, Rfl: 0    diclofenac (VOLTAREN) 1 % topical gel, Apply 2 g topically 3 times daily as needed for moderate pain, Disp: 150 g, Rfl: 0    gabapentin (NEURONTIN) 800 MG tablet, Take 1 tablet (800 mg) by mouth 3 times daily, Disp: 90 tablet, Rfl: 0    hydrOXYzine (ATARAX) 25 MG tablet, Take 2 tablets (50 mg) by mouth every 6 hours as needed for anxiety (up to 3 timrd daily), Disp: 20 tablet, Rfl: 0    ketoconazole  (NIZORAL) 2 % external cream, Apply topically 2 times daily, Disp: , Rfl:     ketoconazole (NIZORAL) 2 % external shampoo, Apply topically once a week On , Disp: , Rfl:     lactulose (CHRONULAC) 10 GM/15ML solution, Take 15 mLs by mouth 2 times daily, Disp: 473 mL, Rfl: 11    linaclotide (LINZESS) 290 MCG capsule, Take 1 capsule (290 mcg) by mouth daily as needed ([facility]), Disp: 90 capsule, Rfl: 3    metoprolol succinate ER (TOPROL XL) 25 MG 24 hr tablet, Take 0.5 tablets (12.5 mg) by mouth 2 times daily, Disp: 30 tablet, Rfl: 0    multivitamin, therapeutic (THERA-VIT) TABS tablet, Take 1 tablet by mouth daily, Disp: 30 tablet, Rfl: 0    pantoprazole (PROTONIX) 40 MG EC tablet, Take 1 tablet (40 mg) by mouth daily Take 1 tablet (40mg) by mouth daily at 8am, Disp: 30 tablet, Rfl: 0    traZODone (DESYREL) 100 MG tablet, Take 100 mg by mouth At Bedtime, Disp: , Rfl:     traZODone (DESYREL) 50 MG tablet, Take 1 tablet (50 mg) by mouth every morning, Disp: 30 tablet, Rfl: 0    venlafaxine (EFFEXOR XR) 150 MG 24 hr capsule, Take 2 capsules (300 mg) by mouth daily, Disp: 60 capsule, Rfl: 0    vitamin C (ASCORBIC ACID) 500 MG tablet, Take 1 tablet (500 mg) by mouth daily, Disp: 30 tablet, Rfl: 0    diclofenac (VOLTAREN) 1 % topical gel, Apply 2 g topically 4 times daily (Patient not taking: Reported on 2024), Disp: 150 g, Rfl: 1    Current Facility-Administered Medications:     botulinum toxin type A (BOTOX) 100 units injection 400 Units, 400 Units, Intramuscular, Q90 Days, Ember Arita MD, 140 Units at 24 0842       BOTULINUM NEUROTOXIN INJECTION PROCEDURES    VERIFICATION OF PATIENT IDENTIFICATION AND PROCEDURE     Initials   Patient Name PS   Patient  PS   Procedure Verified by: PS     Prior to the start of the procedure and with procedural staff participation, I verbally confirmed the patient s identity using two indicators, relevant allergies, that the procedure was appropriate and matched the  consent or emergent situation, and that the correct equipment/implants were available. Immediately prior to starting the procedure I conducted the Time Out with the procedural staff and re-confirmed the patient s name, procedure, and site/side. (The Joint Commission universal protocol was followed.)  Yes    Sedation (Moderate or Deep): None    ABOVE ASSESSMENTS PERFORMED BY  Ember Arita MD      INDICATIONS FOR PROCEDURES  Benjamin Mathews is a 58 year old patient with cervical and segmental dystonia secondary to the diagnosis of Parkinson's disease. His baseline symptoms have been recalcitrant to oral medications and conservative therapy.  He is here today for reinjection with Botox.    GOAL OF PROCEDURE  The goal of this procedure is to increase active range of motion, improve volitional motor control, decrease pain  and enhance functional independence.      TOTAL DOSE ADMINISTERED  Dose Administered: 170 units  Botox (Botulinum Toxin Type A) 200 units  1:1 dilution -  Unavoidable Drug Waste: Yes  Amount of drug waste (mL): 30 units.  Reason for waste:  Single use vial  Diluent Used:  Preservative Free Normal Saline  Total Volume of Diluent Used: 2ml  See MAR  NDC #: Botox 100u ([address]-72)      CONSENT  The risks, benefits, and treatment options were discussed with Benjamin Mathews and he agreed to proceed.    Written consent was obtained by PS.     EQUIPMENT USED  Needle-35mm stimulating/recording  EMG/NCS Machine    SKIN PREPARATION  Skin preparation was performed using an alcohol wipe.    GUIDANCE DESCRIPTION  Electro-myographic guidance was necessary throughout the procedure to accurately identify all areas of dystonic muscles while avoiding injection of non-dystonic muscles and underlying muscles , neighboring nerves and nearby vascular structures.     AREA/MUSCLE INJECTED  Right neck/shoulder girdle  SCM 10 units at 1 site  Splenius capitis 15 units at 1 site  Splenius cervicis 15 units at 1 site  Levator a  scap insertion 40 units at 1 site  Levator at neck 10 units at 1 site   Lateral trap 20 units at 2 sites   Pectoralis major 30 units at 1 site     Right lower extremity  Flexor digitorum longus 30 units at 1 site      RESPONSE TO PROCEDURE  Benjamin Mathews tolerated the procedure well and there were no immediate complications. He was allowed to recover for an appropriate period of time and was discharged home in stable condition.    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  Osteopenia (DXA Lowest T-Score -1.8) likely due to disuse c/b limited functional mobility leading to increased fracture risk - assessment 5/2023     DOI: 05/20/2023, GLF, syncopal event, closed R trimalleolar ankle / pilon fracture-dislocation.  DOS: 05/21/2023, closed reduction, application of spanning external fixator.  DOS: 06/15/2023, ORIF R ankle/pilon, removal of external fixator.      We have discussed treatment options for dystonia including therapies, medications and interventions.  He has responded very well to Klonopin but his symptoms are not well controlled especially spasmodic torticollis and dystonia in the distal part of his right lower extremity.  Adding another oral agent does not seem appropriate because 1-he did not respond to baclofen and 2- risk of polypharmacy and side effects.  He would benefit from physical therapy as discussed before.        Work-up: None     Therapy/equipment/braces: continue therapies and regular HEP    Medications: No changes today; currently on clonazepam, sinemet and amantadine by Dr. Carlos.     Interventions: started the first round at 130 units and increased the dose to 270 units on 2/1/2023; kept on the same dose in April. Decreased the total dose to 100 in July with no injections in RLE due to recent fracture. Increased  the dose 10/24/23 from 100 to140 units with the goal of increasing effectiveness and prolonging duration of benefit of Botox injections. Increased dose to 170 units on 1/22/2024 added 30 units to right FDL to help facilitate comfort and AFO tolerance when he receives it     Referral / follow up with other providers: should repeat DEXA in 2 years 5/2025 and will need a new referral for bone health eval at that point. He will continue to follow-up with palliative care team and movement disorder clinic.     Follow up: in 12 weeks       Patient was seen and discussed with attending physician, Dr. Lyla Milner MD  Physical Medicine & Rehabilitation     Physician Attestation   I, Ember Arita MD, saw this patient and agree with the findings and plan of care as documented in the note.      Items personally reviewed/procedural attestation: I was present for and supervised the entire procedure.          Again, thank you for allowing me to participate in the care of your patient.      Sincerely,    Ember Arita MD

## 2024-01-23 ENCOUNTER — DOCUMENTATION ONLY (OUTPATIENT)
Dept: OTHER | Facility: CLINIC | Age: 59
End: 2024-01-23
Payer: COMMERCIAL

## 2024-01-24 ENCOUNTER — THERAPY VISIT (OUTPATIENT)
Dept: PHYSICAL THERAPY | Facility: CLINIC | Age: 59
End: 2024-01-24
Attending: ORTHOPAEDIC SURGERY
Payer: COMMERCIAL

## 2024-01-24 DIAGNOSIS — S82.851D CLOSED TRIMALLEOLAR FRACTURE OF RIGHT ANKLE WITH ROUTINE HEALING, SUBSEQUENT ENCOUNTER: ICD-10-CM

## 2024-01-24 DIAGNOSIS — Z98.890 STATUS POST SURGERY: ICD-10-CM

## 2024-01-24 PROCEDURE — 97110 THERAPEUTIC EXERCISES: CPT | Mod: GP | Performed by: PHYSICAL THERAPIST

## 2024-01-24 PROCEDURE — 97116 GAIT TRAINING THERAPY: CPT | Mod: GP | Performed by: PHYSICAL THERAPIST

## 2024-01-24 NOTE — PROGRESS NOTES
01/24/24 0500   Appointment Info   Signing clinician's name / credentials Radha Thornton, PT   Visits Used 11   Medical Diagnosis S/P R ankle ORIF   PT Tx Diagnosis Impaired gait and transfers, weakness   Quick Adds Certification   Progress Note/Certification   Start of Care Date 10/04/23   Onset of illness/injury or Date of Surgery 05/20/23   Therapy Frequency Weekly   Predicted Duration 90 days   Certification date from 01/02/24   Certification date to 03/31/24   PT Goal 1   Goal Identifier Transfer   Goal Description Benjamin will perform bed<>chair transfers with modified independence with a 4ww in 3/3 trials for safety at Assisted Living.   Rationale to maximize safety and independence with performance of ADLs and functional tasks;to maximize safety and independence within the home;to maximize safety and independence within the community;to maximize safety and independence with self cares   Goal Progress progressing here in clinic nicely   Target Date 01/01/24   Date Met 11/15/23   PT Goal 2   Goal Identifier Gait   Goal Description Benjamin will walk 25 feet with a 4ww with SBA in 2/2 trials to access areas in his Assisted Living.   Rationale to maximize safety and independence within the home;to maximize safety and independence within the community;to maximize safety and independence with performance of ADLs and functional tasks;to maximize safety and independence with self cares   Goal Progress progressing here in the clinic nicely   Target Date 01/01/24   Date Met 11/15/23   PT Goal 3   Goal Identifier Stand   Goal Description Benjamin will demonstrate the ability ot stand with UE support for 3 minutes for daily cares on a daily basis.   Rationale to maximize safety and independence with performance of ADLs and functional tasks;to maximize safety and independence within the home;to maximize safety and independence within the community;to maximize safety and independence with self cares   Goal Progress progressing  nicely   Target Date 01/01/24   Date Met 11/15/23   PT Goal 4   Goal Identifier Gait   Goal Description Benjamin will ambulate 250 feet with 4ww with bilateral AFOs and shoes for community distances- Goal addended today for bilateral AFOs   Rationale to maximize safety and independence within the community   Goal Progress progressing   Target Date 03/31/24   PT Goal 5   Goal Identifier Stairs   Goal Description Benjamin will negotiate 4 stairs with rail assist and SBA for safety for community access in 3/3 trials.   Rationale to maximize safety and independence within the community   Goal Progress 125 feet x 3 with 4ww   Target Date 03/31/24   Subjective Report   Subjective Report He reports he hasn't had follow up on the walker. I will continue to make sure this happens as he really needs the wider walker for better support.   Objective Measure 1   Objective Measure Gait with Right cam boot and no AFO on L with 4ww   Details 125 feet intervals with wide 4ww   Therapeutic Procedure/Exercise   Therapeutic Procedures: strength, endurance, ROM, flexibillity minutes (82633) 10   Ther Proc 1 Standing WS and marching in right camboot   Ther Proc 1 - Details Able to toelrate 1 minute intervals of this today at wall bar x 8 minutes with rest breaks.   Ther Proc 4 STS   Ther Proc 4 - Details x5 with raides mat at 21 inches. Needs the higher mat today for safe transfers due to increased dystonia on his Right. Impulsive x 2 with sitting and plopping. Instructions that he needs to control lower with hands for safety but right UE is hurting today due to dystonia.   Gait Training   Gait Training Minutes, includes stair climbing (58501) 35   Gait 1 Gait with camboot on right   Gait 1 - Details 125 foot intervals x 5 with Right camboot donned and use of 4ww. He requires the wide frame walker to stay within the back wheels for safety with his turns. Also, the wider frame is a needs for safety with hisright dystonia that impacts his right  shoulder and ankle. The wider fram allows him improved support to turn and sit when he feels the cramping and increased flexion tone coming on. This was the cause for his fall with right ankle injury previously and his regular walker did not support him resulting in the fall.   Gait 2 Turns   Gait 2 - Details Turns in both directions with ability to stay in between the wide walker for improved safety   Skilled Intervention Gait in camboot and 4ww   Patient Response/Progress Moves safely with the Wide and Tall walker provided for him in the clinic. He does not have a wide walker at his group home increasing his risk for falls.   Education   Learner/Method Patient;Listening   Education Comments Call to [facility] to determine if they can order him a wide walker which they can.   Plan   Home program Walk with Right cam boot on only at this time until he gets his Bilateral AFOs for support.   Plan for next session Gait with wide 4ww   Total Session Time   Timed Code Treatment Minutes 45   Total Treatment Time (sum of timed and untimed services) 45         PLAN  Continue therapy per current plan of care.    Beginning/End Dates of Progress Note Reporting Period:    1/17/2024 to 01/24/2024    Referring Provider:  Jose Bonilla

## 2024-01-31 ENCOUNTER — THERAPY VISIT (OUTPATIENT)
Dept: PHYSICAL THERAPY | Facility: CLINIC | Age: 59
End: 2024-01-31
Attending: ORTHOPAEDIC SURGERY
Payer: COMMERCIAL

## 2024-01-31 DIAGNOSIS — S82.851D CLOSED TRIMALLEOLAR FRACTURE OF RIGHT ANKLE WITH ROUTINE HEALING, SUBSEQUENT ENCOUNTER: Primary | ICD-10-CM

## 2024-01-31 PROCEDURE — 97140 MANUAL THERAPY 1/> REGIONS: CPT | Mod: GP | Performed by: PHYSICAL THERAPIST

## 2024-01-31 PROCEDURE — 97110 THERAPEUTIC EXERCISES: CPT | Mod: GP | Performed by: PHYSICAL THERAPIST

## 2024-02-07 ENCOUNTER — MEDICAL CORRESPONDENCE (OUTPATIENT)
Dept: HEALTH INFORMATION MANAGEMENT | Facility: CLINIC | Age: 59
End: 2024-02-07

## 2024-02-08 ENCOUNTER — MEDICAL CORRESPONDENCE (OUTPATIENT)
Dept: HEALTH INFORMATION MANAGEMENT | Facility: CLINIC | Age: 59
End: 2024-02-08
Payer: COMMERCIAL

## 2024-02-08 ENCOUNTER — DOCUMENTATION ONLY (OUTPATIENT)
Dept: NEUROLOGY | Facility: CLINIC | Age: 59
End: 2024-02-08
Payer: COMMERCIAL

## 2024-02-08 NOTE — PROGRESS NOTES
Received order from [facility], order was fax to [phone], along with last office note

## 2024-02-21 ENCOUNTER — THERAPY VISIT (OUTPATIENT)
Dept: PHYSICAL THERAPY | Facility: CLINIC | Age: 59
End: 2024-02-21
Attending: ORTHOPAEDIC SURGERY
Payer: COMMERCIAL

## 2024-02-21 DIAGNOSIS — Z98.890 STATUS POST SURGERY: ICD-10-CM

## 2024-02-21 DIAGNOSIS — S82.851D CLOSED TRIMALLEOLAR FRACTURE OF RIGHT ANKLE WITH ROUTINE HEALING, SUBSEQUENT ENCOUNTER: Primary | ICD-10-CM

## 2024-02-21 DIAGNOSIS — R29.6 MULTIPLE FALLS: ICD-10-CM

## 2024-02-21 PROCEDURE — 97110 THERAPEUTIC EXERCISES: CPT | Mod: GP | Performed by: PHYSICAL THERAPIST

## 2024-02-21 PROCEDURE — 97116 GAIT TRAINING THERAPY: CPT | Mod: GP | Performed by: PHYSICAL THERAPIST

## 2024-02-23 ENCOUNTER — OFFICE VISIT (OUTPATIENT)
Dept: ORTHOPEDICS | Facility: CLINIC | Age: 59
End: 2024-02-23
Payer: COMMERCIAL

## 2024-02-23 DIAGNOSIS — M12.571 TRAUMATIC ARTHROPATHY OF ANKLE, RIGHT: ICD-10-CM

## 2024-02-23 DIAGNOSIS — S82.851D CLOSED TRIMALLEOLAR FRACTURE OF RIGHT ANKLE WITH ROUTINE HEALING, SUBSEQUENT ENCOUNTER: Primary | ICD-10-CM

## 2024-02-23 PROCEDURE — 99213 OFFICE O/P EST LOW 20 MIN: CPT | Performed by: ORTHOPAEDIC SURGERY

## 2024-02-23 NOTE — NURSING NOTE
Reason For Visit:   Chief Complaint   Patient presents with    RECHECK     Patient returns for 6 week recheck of right ankle/foot.  Patient states that things are going well with the right ankle.  He endorses some friction or soreness of the lateral right toes/distal midfoot and this may be from the shoe and not the splint.  We can try to pad/protect the area today and see if improvement is made.       There were no vitals taken for this visit.    Pain Assessment  Patient Currently in Pain: Yes  0-10 Pain Scale: 3 (3/10 while walking)  Primary Pain Location: Ankle (right)    Dylan Conn, ATC

## 2024-02-23 NOTE — PROGRESS NOTES
Orthopaedic Surgery Clinic Follow-up Appointment    DOI: 05/20/2023, GLF, syncopal event, closed R trimalleolar ankle / pilon fracture-dislocation.  DOS: 05/21/2023, closed reduction, application of spanning external fixator (Kirill Apodaca/Steve/Nara)  DOS: 06/15/2023, ORIF R ankle/pilon, removal of external fixator (Kirill Bonilla/Nia)    Pleasant 57yo patient, well known to me, follows up for R ankle PTA and healed closed trimalleolar ankle/pilon fx-dislocation  States R ankle pain now better than when he was last seen - now only about 3/10 in severity  Endorses that things are going well with his right ankle, with some friction/soreness lateral right distal midfoot possibly from his shoe - Pads given to cushion this  At last appt 01/08/2024 discussed PT, voltaren gel, AFO  States he received his AFO about a week ago  Endorses ambulating daily  Max recalled ambulatory distance at one time is about 175 feet    Exam  Pleasant, cooperative, awake, alert adult sitting upright in motorized wheelchair in NAD  Here by himself  Breathing nonlabored on room air  Well fitting appearing R LE AFO  Avia shoes, compression stocking, AFO doffed for exam of R ankle/foot  Skin intact R ankle and foot  Tender medial malleolus  Otherwise entire right foot/ankle nontender throughout including lateral plate and ant ankle joint line  Approximate ROM ankle: 0 DF, 35 PF, slight inversion/eversion  3/4 easily palp R DP pulse, endorses numbness in stocking distribution, motor 5/5 for TA, GS, PTT, peroneals, though limited ROM DF, eversion    No new imaging obtained today  Prior images 01/08/2024 showed united fractures in good alignment, intact hardware, post-traumatic right ankle arthritis    Impression  Healed closed R trimalleolar ankle/pilon fracture-dislocation  Posttraumatic arthritis, improved symptoms at this time    Plan  Findings and treatment plan discussed with patient in layman's terms.  Full opportunity was given to the  patient to participate in the shared decision making process.  Discussed conservative tx options thoroughly at length as well as surgcial options including scope, HWR, or ankle fusion should symptoms worsen with time  Cont WBAT at this time with AFO and walker  Ambulate daily  Cont ROM exercises especially dorsiflexion  Signs and symptoms that should prompt immediate medical attention, and the importance of this, were discussed.  Otherwise follow up 3 mos with weightbearing R ankle xrays  Pt askes when he might be able to come out of AFO - I recommend continue for now, can discuss potential timing of transitioning out process at next appt
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
## 2024-02-27 ENCOUNTER — TELEPHONE (OUTPATIENT)
Dept: ORTHOPEDICS | Facility: CLINIC | Age: 59
End: 2024-02-27
Payer: COMMERCIAL

## 2024-02-27 NOTE — TELEPHONE ENCOUNTER
Patient Contacted for the patient to call back and schedule the following:    Appointment type: Return foot/ankle  Provider: Dr. Bonilla  Return date: 5/10

## 2024-02-28 ENCOUNTER — THERAPY VISIT (OUTPATIENT)
Dept: PHYSICAL THERAPY | Facility: CLINIC | Age: 59
End: 2024-02-28
Attending: ORTHOPAEDIC SURGERY
Payer: COMMERCIAL

## 2024-02-28 DIAGNOSIS — S82.851D CLOSED TRIMALLEOLAR FRACTURE OF RIGHT ANKLE WITH ROUTINE HEALING, SUBSEQUENT ENCOUNTER: Primary | ICD-10-CM

## 2024-02-28 DIAGNOSIS — R29.6 MULTIPLE FALLS: ICD-10-CM

## 2024-02-28 DIAGNOSIS — Z98.890 STATUS POST SURGERY: ICD-10-CM

## 2024-02-28 DIAGNOSIS — G20.B1 DYSKINESIA DUE TO PARKINSON'S DISEASE (H): ICD-10-CM

## 2024-02-28 PROCEDURE — 97110 THERAPEUTIC EXERCISES: CPT | Mod: GP | Performed by: PHYSICAL THERAPIST

## 2024-02-28 PROCEDURE — 97116 GAIT TRAINING THERAPY: CPT | Mod: GP | Performed by: PHYSICAL THERAPIST

## 2024-02-28 NOTE — PROGRESS NOTES
"To whom it may concern,    Benjamin Mathews is in need of a wide framed four wheeled walker due to his height at 6'4\" and weight at 231lbs. He wears bilateral AFOs for support. He fell using his regular frame four wheeled walker due to the walker being too narrow and it allowed him to tip and fall resulting in Right ankle ORIF. He has Parkinson Disease with dyskinesia making him very instable and a high fall risk with mobility. [place] has denied coverage of another walker because he was issued a hospital based two wheeled walker due to falls in 2021. It resulted in more falls due to not being the right fit, so he has been able to borrow a regular frame four wheeled walker, but the frame is not stable and wide enough to support his body weight safely with the dyskinesias. He was using the regular framed four wheeled walker when he fell and fractured his right ankle. Please approve covering a wide four wheeled walker with 8 inch wheels for increased stability for fall reduction for Benjamin's mobility. He has used an Evolution Wide Four Wheeled Walker and has demonstrated a safe gait pattern consistently resulting in independent gait following recovery from a Right ORIF.    Thank you,  Radha Thornton, PT  [phone]  "

## 2024-02-29 ENCOUNTER — OFFICE VISIT (OUTPATIENT)
Dept: PODIATRY | Facility: CLINIC | Age: 59
End: 2024-02-29
Payer: COMMERCIAL

## 2024-02-29 DIAGNOSIS — G60.8 PERIPHERAL SENSORY NEUROPATHY: ICD-10-CM

## 2024-02-29 DIAGNOSIS — B35.1 ONYCHOMYCOSIS: ICD-10-CM

## 2024-02-29 DIAGNOSIS — Z87.2 HISTORY OF FOOT ULCER: Primary | ICD-10-CM

## 2024-02-29 PROCEDURE — 99213 OFFICE O/P EST LOW 20 MIN: CPT | Mod: 25 | Performed by: PODIATRIST

## 2024-02-29 PROCEDURE — 11720 DEBRIDE NAIL 1-5: CPT | Performed by: PODIATRIST

## 2024-02-29 RX ORDER — ECONAZOLE NITRATE 10 MG/G
CREAM TOPICAL DAILY
Qty: 85 G | Refills: 5 | Status: SHIPPED | OUTPATIENT
Start: 2024-02-29
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
## 2024-02-29 NOTE — NURSING NOTE
Benjamin Mathews's chief complaint for this visit includes:  Chief Complaint   Patient presents with    Consult     Left foot recheck and foot cares     PCP: Stephanie Maldonado    Referring Provider:  No referring provider defined for this encounter.    There were no vitals taken for this visit.  Data Unavailable     Do you need any medication refills at today's visit? NO    Allergies   Allergen Reactions    Amantadine Other (See Comments)     Other reaction(s): Hallucinations  Hallucinations/ lost self control/gambling.     halluicnations  Hallucinations/ lost self control/gambling.     hallucinates  halluicnations  hallucinates  Hallucinations/ lost self control/gambling.       Quetiapine GI Disturbance, Diarrhea and Other (See Comments)     Diarrhea    Diarrhea  Other reaction(s): GI Disturbance  Diarrhea      Duloxetine Other (See Comments)     suicidal  suicidal         Jill Downey LPN

## 2024-03-13 ENCOUNTER — THERAPY VISIT (OUTPATIENT)
Dept: PHYSICAL THERAPY | Facility: CLINIC | Age: 59
End: 2024-03-13
Attending: ORTHOPAEDIC SURGERY
Payer: COMMERCIAL

## 2024-03-13 DIAGNOSIS — S82.851D CLOSED TRIMALLEOLAR FRACTURE OF RIGHT ANKLE WITH ROUTINE HEALING, SUBSEQUENT ENCOUNTER: Primary | ICD-10-CM

## 2024-03-13 PROCEDURE — 97116 GAIT TRAINING THERAPY: CPT | Mod: GP | Performed by: PHYSICAL THERAPIST

## 2024-03-13 PROCEDURE — 97110 THERAPEUTIC EXERCISES: CPT | Mod: GP | Performed by: PHYSICAL THERAPIST

## 2024-03-13 NOTE — PROGRESS NOTES
03/13/24 0500   Appointment Info   Signing clinician's name / credentials Radha Thornton, PT   Visits Used 15   Medical Diagnosis S/P R ankle ORIF   PT Tx Diagnosis Impaired gait and transfers, weakness   Quick Adds Certification   Progress Note/Certification   Start of Care Date 10/04/23   Onset of illness/injury or Date of Surgery 05/20/23   Therapy Frequency Weekly   Predicted Duration 90 days   Certification date from 01/02/24   Certification date to 03/31/24   PT Goal 1   Goal Identifier Transfer   Goal Description Benjamin will perform bed<>chair transfers with modified independence with a 4ww in 3/3 trials for safety at Assisted Living.   Rationale to maximize safety and independence with performance of ADLs and functional tasks;to maximize safety and independence within the home;to maximize safety and independence within the community;to maximize safety and independence with self cares   Goal Progress progressing here in clinic nicely   Target Date 01/01/24   Date Met 11/15/23   PT Goal 2   Goal Identifier Gait   Goal Description Benjamin will walk 25 feet with a 4ww with SBA in 2/2 trials to access areas in his Assisted Living.   Rationale to maximize safety and independence within the home;to maximize safety and independence within the community;to maximize safety and independence with performance of ADLs and functional tasks;to maximize safety and independence with self cares   Goal Progress progressing here in the clinic nicely   Target Date 01/01/24   Date Met 11/15/23   PT Goal 3   Goal Identifier Stand   Goal Description Benjamin will demonstrate the ability ot stand with UE support for 3 minutes for daily cares on a daily basis.   Rationale to maximize safety and independence with performance of ADLs and functional tasks;to maximize safety and independence within the home;to maximize safety and independence within the community;to maximize safety and independence with self cares   Goal Progress progressing  nicely   Target Date 01/01/24   Date Met 11/15/23   PT Goal 4   Goal Identifier Gait   Goal Description Benjamin will ambulate 250 feet with 4ww with bilateral AFOs and shoes for community distances- Goal addended today for bilateral AFOs   Rationale to maximize safety and independence within the community   Goal Progress Walked 250 feet today with bilateral AFOs and 4ww   Target Date 03/31/24   Date Met 03/13/24   PT Goal 5   Goal Identifier Stairs   Goal Description Benjamin will negotiate 4 stairs with rail assist and SBA for safety for community access in 3/3 trials.   Rationale to maximize safety and independence within the community   Goal Progress 125 feet x 3 with 4ww   Target Date 03/31/24   Date Met 03/13/24   Subjective Report   Subjective Report He doesn't have an update on the walker but the nurse is working on it.   Objective Measure 1   Objective Measure 10 M Walk   Details 14 steps in 13.9 seconds   Objective Measure 2   Objective Measure Gait   Details 250 feet with B AFOs and 4ww   Objective Measure 3   Objective Measure Stairs   Details x4 with rail assist and non-reciprocal pattern   Therapeutic Procedure/Exercise   Therapeutic Procedures: strength, endurance, ROM, flexibility minutes (16498) 10   Ther Proc 4 Hip Abduction   Ther Proc 4 - Details Standing at 4ww today for 3 sets of 10 bilaterally   PTRx Ther Proc 1 Marches   PTRx Ther Proc 1 - Details 2 x 20   PTRx Ther Proc 2 Leg kicks in sitting   PTRx Ther Proc 2 - Details 1-2 second holds x 20   Gait Training   Gait Training Minutes, includes stair climbing (80477) 30   Gait 1 Gait with B AFOs and wide 4ww   Gait 1 - Details 1x 250 feet, 2x 200 feet and 1x 75 feet with B AFOs and 4ww and no loss of balance with SBA   Gait 2 Stairs   Gait 2 - Details Negotatied 4 with rail assist   Skilled Intervention Gait with B AFOs   Patient Response/Progress Tolerated longer gait distances better   Education   Learner/Method  Patient;Caregiver;Listening;Pictures/Video   Education Comments Benjamin feels he is ready to discharge   Plan   Home program Will try to obtain wide walker with  waiver- still in progress   Updates to plan of care Discharge   Total Session Time   Timed Code Treatment Minutes 40   Total Treatment Time (sum of timed and untimed services) 40       DISCHARGE  Reason for Discharge: Patient has met all goals.    Equipment Issued: B AFOs and working on purchasing a wide based tall walker    Discharge Plan: Patient to continue home program.    Referring Provider:  Jose Bonilla

## 2024-03-16 ENCOUNTER — HOSPITAL ENCOUNTER (EMERGENCY)
Facility: CLINIC | Age: 59
Discharge: HOME | End: 2024-03-17
Attending: EMERGENCY MEDICINE | Admitting: EMERGENCY MEDICINE
Payer: COMMERCIAL

## 2024-03-16 DIAGNOSIS — M62.838 SPASM OF MUSCLE: ICD-10-CM

## 2024-03-16 LAB
ALBUMIN SERPL BCG-MCNC: 3.8 G/DL (ref 3.5–5.2)
ALP SERPL-CCNC: 138 U/L (ref 40–150)
ALT SERPL W P-5'-P-CCNC: 6 U/L (ref 0–70)
ANION GAP SERPL CALCULATED.3IONS-SCNC: 9 MMOL/L (ref 7–15)
AST SERPL W P-5'-P-CCNC: 26 U/L (ref 0–45)
ATRIAL RATE - MUSE: 67 BPM
BASOPHILS # BLD AUTO: 0 10E3/UL (ref 0–0.2)
BASOPHILS NFR BLD AUTO: 0 %
BILIRUB SERPL-MCNC: 0.2 MG/DL
BUN SERPL-MCNC: 20.8 MG/DL (ref 6–20)
CALCIUM SERPL-MCNC: 8.6 MG/DL (ref 8.6–10)
CHLORIDE SERPL-SCNC: 105 MMOL/L (ref 98–107)
CREAT SERPL-MCNC: 0.83 MG/DL (ref 0.67–1.17)
DEPRECATED HCO3 PLAS-SCNC: 26 MMOL/L (ref 22–29)
DIASTOLIC BLOOD PRESSURE - MUSE: NORMAL MMHG
EGFRCR SERPLBLD CKD-EPI 2021: >90 ML/MIN/1.73M2
EOSINOPHIL # BLD AUTO: 0.1 10E3/UL (ref 0–0.7)
EOSINOPHIL NFR BLD AUTO: 1 %
ERYTHROCYTE [DISTWIDTH] IN BLOOD BY AUTOMATED COUNT: 13.1 % (ref 10–15)
GLUCOSE SERPL-MCNC: 95 MG/DL (ref 70–99)
HCT VFR BLD AUTO: 38.4 % (ref 40–53)
HGB BLD-MCNC: 12.1 G/DL (ref 13.3–17.7)
IMM GRANULOCYTES # BLD: 0 10E3/UL
IMM GRANULOCYTES NFR BLD: 1 %
INTERPRETATION ECG - MUSE: NORMAL
LYMPHOCYTES # BLD AUTO: 2.1 10E3/UL (ref 0.8–5.3)
LYMPHOCYTES NFR BLD AUTO: 29 %
MCH RBC QN AUTO: 31.1 PG (ref 26.5–33)
MCHC RBC AUTO-ENTMCNC: 31.5 G/DL (ref 31.5–36.5)
MCV RBC AUTO: 99 FL (ref 78–100)
MONOCYTES # BLD AUTO: 0.4 10E3/UL (ref 0–1.3)
MONOCYTES NFR BLD AUTO: 6 %
NEUTROPHILS # BLD AUTO: 4.5 10E3/UL (ref 1.6–8.3)
NEUTROPHILS NFR BLD AUTO: 63 %
NRBC # BLD AUTO: 0 10E3/UL
NRBC BLD AUTO-RTO: 0 /100
P AXIS - MUSE: 94 DEGREES
PLATELET # BLD AUTO: 239 10E3/UL (ref 150–450)
POTASSIUM SERPL-SCNC: 3.6 MMOL/L (ref 3.4–5.3)
PR INTERVAL - MUSE: 164 MS
PROT SERPL-MCNC: 7 G/DL (ref 6.4–8.3)
QRS DURATION - MUSE: 90 MS
QT - MUSE: 380 MS
QTC - MUSE: 401 MS
R AXIS - MUSE: -56 DEGREES
RBC # BLD AUTO: 3.89 10E6/UL (ref 4.4–5.9)
SODIUM SERPL-SCNC: 140 MMOL/L (ref 135–145)
SYSTOLIC BLOOD PRESSURE - MUSE: NORMAL MMHG
T AXIS - MUSE: 74 DEGREES
TROPONIN T SERPL HS-MCNC: 8 NG/L
VENTRICULAR RATE- MUSE: 67 BPM
WBC # BLD AUTO: 7.2 10E3/UL (ref 4–11)

## 2024-03-16 PROCEDURE — 85004 AUTOMATED DIFF WBC COUNT: CPT | Performed by: EMERGENCY MEDICINE

## 2024-03-16 PROCEDURE — 93010 ELECTROCARDIOGRAM REPORT: CPT | Performed by: EMERGENCY MEDICINE

## 2024-03-16 PROCEDURE — 36415 COLL VENOUS BLD VENIPUNCTURE: CPT | Performed by: EMERGENCY MEDICINE

## 2024-03-16 PROCEDURE — 999N000248 HC STATISTIC IV INSERT WITH US BY RN

## 2024-03-16 PROCEDURE — 99284 EMERGENCY DEPT VISIT MOD MDM: CPT | Mod: 25 | Performed by: EMERGENCY MEDICINE

## 2024-03-16 PROCEDURE — 93005 ELECTROCARDIOGRAM TRACING: CPT | Performed by: EMERGENCY MEDICINE

## 2024-03-16 PROCEDURE — 250N000013 HC RX MED GY IP 250 OP 250 PS 637: Performed by: EMERGENCY MEDICINE

## 2024-03-16 PROCEDURE — 84484 ASSAY OF TROPONIN QUANT: CPT | Performed by: EMERGENCY MEDICINE

## 2024-03-16 PROCEDURE — 99284 EMERGENCY DEPT VISIT MOD MDM: CPT | Performed by: EMERGENCY MEDICINE

## 2024-03-16 PROCEDURE — 80053 COMPREHEN METABOLIC PANEL: CPT | Performed by: EMERGENCY MEDICINE

## 2024-03-16 RX ORDER — CLONAZEPAM 0.5 MG/1
1 TABLET ORAL 2 TIMES DAILY
Qty: 4 TABLET | Refills: 0 | Status: DISCONTINUED | OUTPATIENT
Start: 2024-03-16 | End: 2024-03-17 | Stop reason: HOSPADM

## 2024-03-16 RX ADMIN — CLONAZEPAM 1 MG: 0.5 TABLET ORAL at 19:44

## 2024-03-16 ASSESSMENT — COLUMBIA-SUICIDE SEVERITY RATING SCALE - C-SSRS
6. HAVE YOU EVER DONE ANYTHING, STARTED TO DO ANYTHING, OR PREPARED TO DO ANYTHING TO END YOUR LIFE?: NO
1. IN THE PAST MONTH, HAVE YOU WISHED YOU WERE DEAD OR WISHED YOU COULD GO TO SLEEP AND NOT WAKE UP?: NO
2. HAVE YOU ACTUALLY HAD ANY THOUGHTS OF KILLING YOURSELF IN THE PAST MONTH?: NO

## 2024-03-16 ASSESSMENT — ACTIVITIES OF DAILY LIVING (ADL)
ADLS_ACUITY_SCORE: 39

## 2024-03-16 NOTE — ED PROVIDER NOTES
[place] EMERGENCY DEPARTMENT ([facility])    3/16/24       ED PROVIDER NOTE   History     Chief Complaint   Patient presents with    Spasms     HPI  Benjamin Mathews is a 58 year old male with a past medical history of hemiparesis, Parkinson's, dystonia, muscle spasms, (dyskinesia who presents to the ED via EMS from [facility] for evaluation of spasms. He reports baseline dystonia and spasms of the right side. He is on klonipin and other parkinson's medicines for these. He denies missing any doses. He states today his spasms seemed a little worse than usual today. He reports intermittent spasms to the right leg and arm which are consistent with his prior issues. No fevers/chills. Otherwise he states he feels well.  No trauma or injury.    Past Medical History  Past Medical History:   Diagnosis Date    Cerebral infarction (H)     Clotting disorder (H24)     PE 2019    Dystonia     Parkinson disease      Past Surgical History:   Procedure Laterality Date    APPLY EXTERNAL FIXATOR LOWER EXTREMITY Right 5/21/2023    Procedure: Right  Ankle Closed Reduction and  External Fixator Placement;  Surgeon: Nicole Apodaca MD;  Location: [facility] OR    OPEN REDUCTION INTERNAL FIXATION ANKLE Right 6/15/2023    Procedure: OPEN REDUCTION INTERNAL FIXATION, FRACTURE, ANKLE, removal of external fixator device.;  Surgeon: Jose Bonilla MD;  Location: [facility] OR     acetaminophen (TYLENOL) 325 MG tablet  alfuzosin ER (UROXATRAL) 10 MG 24 hr tablet  amantadine (SYMMETREL) 100 MG capsule  ammonium lactate (LAC-HYDRIN) 12 % external lotion  apixaban ANTICOAGULANT (ELIQUIS) 5 MG tablet  atorvastatin (LIPITOR) 40 MG tablet  bisacodyl (DULCOLAX) 10 MG suppository  carbidopa-levodopa (SINEMET CR)  MG CR tablet  carbidopa-levodopa (SINEMET)  MG tablet  cephALEXin (KEFLEX) 500 MG capsule  cholecalciferol (VITAMIN D3) 125 mcg (5000 units) capsule  clonazePAM (KLONOPIN) 1 MG tablet  clonazePAM (KLONOPIN) 2 MG  tablet  cloZAPine (CLOZARIL) 50 MG tablet  diclofenac (VOLTAREN) 1 % topical gel  diclofenac (VOLTAREN) 1 % topical gel  econazole nitrate 1 % external cream  gabapentin (NEURONTIN) 800 MG tablet  hydrOXYzine (ATARAX) 25 MG tablet  ketoconazole (NIZORAL) 2 % external cream  ketoconazole (NIZORAL) 2 % external shampoo  lactulose (CHRONULAC) 10 GM/15ML solution  linaclotide (LINZESS) 290 MCG capsule  metoprolol succinate ER (TOPROL XL) 25 MG 24 hr tablet  multivitamin, therapeutic (THERA-VIT) TABS tablet  pantoprazole (PROTONIX) 40 MG EC tablet  traZODone (DESYREL) 100 MG tablet  traZODone (DESYREL) 50 MG tablet  venlafaxine (EFFEXOR XR) 150 MG 24 hr capsule  vitamin C (ASCORBIC ACID) 500 MG tablet      Allergies   Allergen Reactions    Amantadine Other (See Comments)     Other reaction(s): Hallucinations  Hallucinations/ lost self control/gambling.     halluicnations  Hallucinations/ lost self control/gambling.     hallucinates  halluicnations  hallucinates  Hallucinations/ lost self control/gambling.       Quetiapine GI Disturbance, Diarrhea and Other (See Comments)     Diarrhea    Diarrhea  Other reaction(s): GI Disturbance  Diarrhea      Duloxetine Other (See Comments)     suicidal  suicidal       Family History  Family History   Problem Relation Age of Onset    Other - See Comments Mother         pulmonary embolism from hip fracture    Pulmonary Embolism Mother     Parkinsonism Father     Neurologic Disorder Sister     Parkinsonism Sister         ?med related    Other - See Comments Sister         12/7/1950    Depression Sister     Neurologic Disorder Brother         dystonia    Dystonia Brother     Other - See Comments Brother             Heart Disease Nephew      Social History   Social History     Tobacco Use    Smoking status: Some Days     Types: Pipe, Cigars, Cigarettes    Smokeless tobacco: Never   Vaping Use    Vaping Use: Never used   Substance Use Topics    Alcohol use: Not Currently     Comment:  quit 2014    Drug use: Not Currently         A medically appropriate review of systems was performed with pertinent positives and negatives noted in the HPI, and all other systems negative.    Physical Exam   BP: 113/59  Pulse: 82  Temp: 96.8  F (36  C)  Resp: 17  SpO2: 99 %  Physical Exam  Constitutional:       General: He is not in acute distress.     Appearance: Normal appearance. He is not toxic-appearing.   HENT:      Head: Normocephalic and atraumatic.      Nose: Nose normal. No congestion.      Mouth/Throat:      Mouth: Mucous membranes are moist.      Pharynx: Oropharynx is clear.   Eyes:      Extraocular Movements: Extraocular movements intact.      Pupils: Pupils are equal, round, and reactive to light.   Cardiovascular:      Rate and Rhythm: Normal rate and regular rhythm.   Pulmonary:      Effort: Pulmonary effort is normal. No respiratory distress.      Breath sounds: No wheezing or rales.   Abdominal:      General: There is no distension.      Palpations: Abdomen is soft.      Tenderness: There is no abdominal tenderness. There is no guarding.   Musculoskeletal:         General: Normal range of motion.      Cervical back: Normal range of motion.   Skin:     General: Skin is warm and dry.   Neurological:      General: No focal deficit present.      Mental Status: He is alert and oriented to person, place, and time.           ED Course, Procedures, & Data      Procedures               Results for orders placed or performed during the hospital encounter of 03/16/24   Comprehensive metabolic panel     Status: Abnormal   Result Value Ref Range    Sodium 140 135 - 145 mmol/L    Potassium 3.6 3.4 - 5.3 mmol/L    Carbon Dioxide (CO2) 26 22 - 29 mmol/L    Anion Gap 9 7 - 15 mmol/L    Urea Nitrogen 20.8 (H) 6.0 - 20.0 mg/dL    Creatinine 0.83 0.67 - 1.17 mg/dL    GFR Estimate >90 >60 mL/min/1.73m2    Calcium 8.6 8.6 - 10.0 mg/dL    Chloride 105 98 - 107 mmol/L    Glucose 95 70 - 99 mg/dL    Alkaline Phosphatase  138 40 - 150 U/L    AST 26 0 - 45 U/L    ALT 6 0 - 70 U/L    Protein Total 7.0 6.4 - 8.3 g/dL    Albumin 3.8 3.5 - 5.2 g/dL    Bilirubin Total 0.2 <=1.2 mg/dL   Troponin T, High Sensitivity     Status: Normal   Result Value Ref Range    Troponin T, High Sensitivity 8 <=22 ng/L   CBC with platelets and differential     Status: Abnormal   Result Value Ref Range    WBC Count 7.2 4.0 - 11.0 10e3/uL    RBC Count 3.89 (L) 4.40 - 5.90 10e6/uL    Hemoglobin 12.1 (L) 13.3 - 17.7 g/dL    Hematocrit 38.4 (L) 40.0 - 53.0 %    MCV 99 78 - 100 fL    MCH 31.1 26.5 - 33.0 pg    MCHC 31.5 31.5 - 36.5 g/dL    RDW 13.1 10.0 - 15.0 %    Platelet Count 239 150 - 450 10e3/uL    % Neutrophils 63 %    % Lymphocytes 29 %    % Monocytes 6 %    % Eosinophils 1 %    % Basophils 0 %    % Immature Granulocytes 1 %    NRBCs per 100 WBC 0 <1 /100    Absolute Neutrophils 4.5 1.6 - 8.3 10e3/uL    Absolute Lymphocytes 2.1 0.8 - 5.3 10e3/uL    Absolute Monocytes 0.4 0.0 - 1.3 10e3/uL    Absolute Eosinophils 0.1 0.0 - 0.7 10e3/uL    Absolute Basophils 0.0 0.0 - 0.2 10e3/uL    Absolute Immature Granulocytes 0.0 <=0.4 10e3/uL    Absolute NRBCs 0.0 10e3/uL   EKG 12 lead     Status: None   Result Value Ref Range    Systolic Blood Pressure  mmHg    Diastolic Blood Pressure  mmHg    Ventricular Rate 67 BPM    Atrial Rate 67 BPM    DE Interval 164 ms    QRS Duration 90 ms     ms    QTc 401 ms    P Axis 94 degrees    R AXIS -56 degrees    T Axis 74 degrees    Interpretation ECG       Sinus rhythm  Pulmonary disease pattern  Left anterior fascicular block  Abnormal ECG  Unconfirmed report - interpretation of this ECG is computer generated - see medical record for final interpretation  Confirmed by - EMERGENCY ROOM, PHYSICIAN ([id number]),  PADMINI ROMAN ([id number]) on 3/16/2024 10:36:40 PM     CBC with platelets differential     Status: Abnormal    Narrative    The following orders were created for panel order CBC with platelets differential.  Procedure                                Abnormality         Status                     ---------                               -----------         ------                     CBC with platelets and d...[[id number]]  Abnormal            Final result                 Please view results for these tests on the individual orders.     Medications   clonazePAM (klonoPIN) tablet 1 mg (1 mg Oral $Given 3/16/24 3405)     Labs Ordered and Resulted from Time of ED Arrival to Time of ED Departure   COMPREHENSIVE METABOLIC PANEL - Abnormal       Result Value    Sodium 140      Potassium 3.6      Carbon Dioxide (CO2) 26      Anion Gap 9      Urea Nitrogen 20.8 (*)     Creatinine 0.83      GFR Estimate >90      Calcium 8.6      Chloride 105      Glucose 95      Alkaline Phosphatase 138      AST 26      ALT 6      Protein Total 7.0      Albumin 3.8      Bilirubin Total 0.2     CBC WITH PLATELETS AND DIFFERENTIAL - Abnormal    WBC Count 7.2      RBC Count 3.89 (*)     Hemoglobin 12.1 (*)     Hematocrit 38.4 (*)     MCV 99      MCH 31.1      MCHC 31.5      RDW 13.1      Platelet Count 239      % Neutrophils 63      % Lymphocytes 29      % Monocytes 6      % Eosinophils 1      % Basophils 0      % Immature Granulocytes 1      NRBCs per 100 WBC 0      Absolute Neutrophils 4.5      Absolute Lymphocytes 2.1      Absolute Monocytes 0.4      Absolute Eosinophils 0.1      Absolute Basophils 0.0      Absolute Immature Granulocytes 0.0      Absolute NRBCs 0.0     TROPONIN T, HIGH SENSITIVITY - Normal    Troponin T, High Sensitivity 8       No orders to display          Critical care was not performed.     Medical Decision Making  The patient's presentation was of moderate complexity (a chronic illness mild to moderate exacerbation, progression, or side effect of treatment).    The patient's evaluation involved:  review of external note(s) from 2 sources (cbc, bmp)  ordering and/or review of 3+ test(s) in this encounter (see separate area of note for  details)    The patient's management necessitated moderate risk (prescription drug management including medications given in the ED).    Assessment & Plan    This is a 58-year-old male with history of Parkinson's, dystonia, muscle spasms presenting with muscle spasm.  He was afebrile, hemodynamically stable.  Overall he was well-appearing.  Exam showed no focal findings.  He reports history of similar spasms related to his Parkinson's and dystonia.  No known missed doses of his medications.    Blood work was obtained to assess for possible medical cause for exacerbation in his spasms.  CBC showed baseline anemia and.  CMP showed no significant electrolyte derangement or KAILEE.  Troponin within normal limits and his EKG showed normal sinus rhythm without acute ischemic findings.    The patient was given a dose of his home Klonopin and reported significant remittent symptoms.  I discussed these findings with the patient and recommend he closely follow-up with his neurologist to discuss if additional changes in medications are required.  Return precautions were discussed as well and all questions answered.  He was discharged stable condition.    I have reviewed the nursing notes. I have reviewed the findings, diagnosis, plan and need for follow up with the patient.    New Prescriptions    No medications on file       Final diagnoses:   Spasm of muscle       Dylan Hernandez  [facility] EMERGENCY DEPARTMENT  3/16/2024     Dylan Hernandez MD  03/17/24 0002

## 2024-03-16 NOTE — ED TRIAGE NOTES
BIBA from  for baseline R sided spasms that have worsened over the past 3 hours, not relieved with home meds  Hx of parkinsons, W/C bound       Triage Assessment (Adult)       Row Name 03/16/24 1869          Triage Assessment    Airway WDL WDL        Respiratory WDL    Respiratory WDL WDL        Skin Circulation/Temperature WDL    Skin Circulation/Temperature WDL WDL        Cardiac WDL    Cardiac WDL WDL        Peripheral/Neurovascular WDL    Peripheral Neurovascular WDL WDL        Cognitive/Neuro/Behavioral WDL    Cognitive/Neuro/Behavioral WDL WDL

## 2024-03-17 VITALS
RESPIRATION RATE: 20 BRPM | SYSTOLIC BLOOD PRESSURE: 150 MMHG | DIASTOLIC BLOOD PRESSURE: 81 MMHG | TEMPERATURE: 97.8 F | HEART RATE: 110 BPM | OXYGEN SATURATION: 97 %

## 2024-03-17 VITALS
RESPIRATION RATE: 20 BRPM | SYSTOLIC BLOOD PRESSURE: 113 MMHG | OXYGEN SATURATION: 95 % | HEART RATE: 110 BPM | DIASTOLIC BLOOD PRESSURE: 74 MMHG | TEMPERATURE: 98.2 F

## 2024-03-17 DIAGNOSIS — M62.838 MUSCLE SPASM: ICD-10-CM

## 2024-03-17 PROCEDURE — 99283 EMERGENCY DEPT VISIT LOW MDM: CPT | Performed by: EMERGENCY MEDICINE

## 2024-03-17 PROCEDURE — 99284 EMERGENCY DEPT VISIT MOD MDM: CPT | Mod: 25 | Performed by: EMERGENCY MEDICINE

## 2024-03-17 PROCEDURE — 93005 ELECTROCARDIOGRAM TRACING: CPT | Performed by: EMERGENCY MEDICINE

## 2024-03-17 PROCEDURE — 93010 ELECTROCARDIOGRAM REPORT: CPT | Performed by: EMERGENCY MEDICINE

## 2024-03-17 RX ORDER — CLONAZEPAM 0.5 MG/1
1 TABLET ORAL ONCE
Status: DISCONTINUED | OUTPATIENT
Start: 2024-03-17 | End: 2024-03-17 | Stop reason: HOSPADM

## 2024-03-17 ASSESSMENT — ACTIVITIES OF DAILY LIVING (ADL)
ADLS_ACUITY_SCORE: 39

## 2024-03-17 ASSESSMENT — COLUMBIA-SUICIDE SEVERITY RATING SCALE - C-SSRS
2. HAVE YOU ACTUALLY HAD ANY THOUGHTS OF KILLING YOURSELF IN THE PAST MONTH?: NO
1. IN THE PAST MONTH, HAVE YOU WISHED YOU WERE DEAD OR WISHED YOU COULD GO TO SLEEP AND NOT WAKE UP?: NO
6. HAVE YOU EVER DONE ANYTHING, STARTED TO DO ANYTHING, OR PREPARED TO DO ANYTHING TO END YOUR LIFE?: NO

## 2024-03-17 NOTE — PROGRESS NOTES
"Late entry: Pt in van to discontinue home, pt made Suicidal comment of \"I can't take the pain, Im going to kill myself because you guys aren't fixing my pain\". Charge nurse went out to talk to patient. Pt requesting to check back in to hospital. When patient was walked back to the waiting room, pt declining to check in. ERP notified. ERP told RN to inform patient that he can check back in to the ED if he would like, but at this time, no need for a legal hold. Pt calm and cowoperative in the waiting room, pt states \"I was just making a joke\".  "

## 2024-03-17 NOTE — ED TRIAGE NOTES
Pt checked back in despite recent discharge for pain on his right side from his shoulder to his toe.     Triage Assessment (Adult)       Row Name 03/17/24 0302          Triage Assessment    Airway WDL WDL        Respiratory WDL    Respiratory WDL WDL        Skin Circulation/Temperature WDL    Skin Circulation/Temperature WDL WDL        Cardiac WDL    Cardiac WDL WDL        Peripheral/Neurovascular WDL    Peripheral Neurovascular WDL WDL        Cognitive/Neuro/Behavioral WDL    Cognitive/Neuro/Behavioral WDL WDL

## 2024-03-17 NOTE — ED NOTES
Called [facility] to get transport set up. No eta given. Group home aware pt coming back

## 2024-03-17 NOTE — DISCHARGE INSTRUCTIONS
Follow up with your primary care doctor and your neurologist. If you have any new or worsening symptoms return to the emergency department.

## 2024-03-17 NOTE — ED PROVIDER NOTES
ED Provider Note  [facility] [place]      History     Chief Complaint   Patient presents with    Toe Pain    Shoulder Pain     HPI  Benjamin Mathews is a 58 year old male with history of hemiparesis, Parkinson's, dystonia, muscle spasms, dyskinesia, who presents to the ED for evaluation of muscle spasms.  Patient was seen in the ED just a few hours ago for the same symptoms.  He had reported a spasm that started in his toe and went up into his shoulder.  He states that this is very typical for him.  He normally takes clonazepam for this issue.  He was given a dose of clonazepam in the ED.  His laboratory workup including CBC and CMP was reassuring.  He did not have recurrence of his symptoms while in the ED so was discharged back to his facility.  Patient's transport arrived, the patient got in but then felt that he did not want to go home.  He got out of the transport and checked back in to be reseen for the same issue.  Transport left.  Patient tells me that he has not actually had a reoccurrence of his muscle spasm while awaiting transport but has not had any recurrence while he has waited in the waiting room for approximately 4 hours to be reseen.  Requests another dose of clonazepam.    Past Medical History  Past Medical History:   Diagnosis Date    Cerebral infarction (H)     Clotting disorder (H24)     PE 2019    Dystonia     Parkinson disease      Past Surgical History:   Procedure Laterality Date    APPLY EXTERNAL FIXATOR LOWER EXTREMITY Right 5/21/2023    Procedure: Right  Ankle Closed Reduction and  External Fixator Placement;  Surgeon: Nicole Apodaca MD;  Location: [facility] OR    OPEN REDUCTION INTERNAL FIXATION ANKLE Right 6/15/2023    Procedure: OPEN REDUCTION INTERNAL FIXATION, FRACTURE, ANKLE, removal of external fixator device.;  Surgeon: Jose Bonilla MD;  Location: [facility] OR     acetaminophen (TYLENOL) 325 MG tablet  alfuzosin ER (UROXATRAL) 10 MG 24 hr tablet  amantadine  (SYMMETREL) 100 MG capsule  ammonium lactate (LAC-HYDRIN) 12 % external lotion  apixaban ANTICOAGULANT (ELIQUIS) 5 MG tablet  atorvastatin (LIPITOR) 40 MG tablet  bisacodyl (DULCOLAX) 10 MG suppository  carbidopa-levodopa (SINEMET CR)  MG CR tablet  carbidopa-levodopa (SINEMET)  MG tablet  cephALEXin (KEFLEX) 500 MG capsule  cholecalciferol (VITAMIN D3) 125 mcg (5000 units) capsule  clonazePAM (KLONOPIN) 1 MG tablet  clonazePAM (KLONOPIN) 2 MG tablet  cloZAPine (CLOZARIL) 50 MG tablet  diclofenac (VOLTAREN) 1 % topical gel  diclofenac (VOLTAREN) 1 % topical gel  econazole nitrate 1 % external cream  gabapentin (NEURONTIN) 800 MG tablet  hydrOXYzine (ATARAX) 25 MG tablet  ketoconazole (NIZORAL) 2 % external cream  ketoconazole (NIZORAL) 2 % external shampoo  lactulose (CHRONULAC) 10 GM/15ML solution  linaclotide (LINZESS) 290 MCG capsule  metoprolol succinate ER (TOPROL XL) 25 MG 24 hr tablet  multivitamin, therapeutic (THERA-VIT) TABS tablet  pantoprazole (PROTONIX) 40 MG EC tablet  traZODone (DESYREL) 100 MG tablet  traZODone (DESYREL) 50 MG tablet  venlafaxine (EFFEXOR XR) 150 MG 24 hr capsule  vitamin C (ASCORBIC ACID) 500 MG tablet      Allergies   Allergen Reactions    Amantadine Other (See Comments)     Other reaction(s): Hallucinations  Hallucinations/ lost self control/gambling.     halluicnations  Hallucinations/ lost self control/gambling.     hallucinates  halluicnations  hallucinates  Hallucinations/ lost self control/gambling.       Quetiapine GI Disturbance, Diarrhea and Other (See Comments)     Diarrhea    Diarrhea  Other reaction(s): GI Disturbance  Diarrhea      Duloxetine Other (See Comments)     suicidal  suicidal       Family History  Family History   Problem Relation Age of Onset    Other - See Comments Mother         pulmonary embolism from hip fracture    Pulmonary Embolism Mother     Parkinsonism Father     Neurologic Disorder Sister     Parkinsonism Sister         ?med related     Other - See Comments Sister         12/7/1950    Depression Sister     Neurologic Disorder Brother         dystonia    Dystonia Brother     Other - See Comments Brother             Heart Disease Nephew      Social History   Social History     Tobacco Use    Smoking status: Some Days     Types: Pipe, Cigars, Cigarettes    Smokeless tobacco: Never   Vaping Use    Vaping Use: Never used   Substance Use Topics    Alcohol use: Not Currently     Comment: quit 2014    Drug use: Not Currently         A medically appropriate review of systems was performed with pertinent positives and negatives noted in the HPI, and all other systems negative.    Physical Exam   BP: (!) 142/79  Pulse: 120  Temp: 97.8  F (36.6  C)  Resp: 20  SpO2: 98 %  Physical Exam  Vitals and nursing note reviewed.   Constitutional:       General: He is not in acute distress.     Appearance: Normal appearance.   HENT:      Head: Normocephalic.      Nose: Nose normal.   Eyes:      Pupils: Pupils are equal, round, and reactive to light.   Cardiovascular:      Rate and Rhythm: Normal rate and regular rhythm.   Pulmonary:      Effort: Pulmonary effort is normal.   Abdominal:      General: There is no distension.   Musculoskeletal:         General: No deformity. Normal range of motion.      Cervical back: Normal range of motion.   Skin:     General: Skin is warm.   Neurological:      Mental Status: He is alert and oriented to person, place, and time.   Psychiatric:         Mood and Affect: Mood normal.           ED Course, Procedures, & Data        Results for orders placed or performed during the hospital encounter of 03/16/24   Comprehensive metabolic panel     Status: Abnormal   Result Value Ref Range    Sodium 140 135 - 145 mmol/L    Potassium 3.6 3.4 - 5.3 mmol/L    Carbon Dioxide (CO2) 26 22 - 29 mmol/L    Anion Gap 9 7 - 15 mmol/L    Urea Nitrogen 20.8 (H) 6.0 - 20.0 mg/dL    Creatinine 0.83 0.67 - 1.17 mg/dL    GFR Estimate >90 >60 mL/min/1.73m2     Calcium 8.6 8.6 - 10.0 mg/dL    Chloride 105 98 - 107 mmol/L    Glucose 95 70 - 99 mg/dL    Alkaline Phosphatase 138 40 - 150 U/L    AST 26 0 - 45 U/L    ALT 6 0 - 70 U/L    Protein Total 7.0 6.4 - 8.3 g/dL    Albumin 3.8 3.5 - 5.2 g/dL    Bilirubin Total 0.2 <=1.2 mg/dL   Troponin T, High Sensitivity     Status: Normal   Result Value Ref Range    Troponin T, High Sensitivity 8 <=22 ng/L   CBC with platelets and differential     Status: Abnormal   Result Value Ref Range    WBC Count 7.2 4.0 - 11.0 10e3/uL    RBC Count 3.89 (L) 4.40 - 5.90 10e6/uL    Hemoglobin 12.1 (L) 13.3 - 17.7 g/dL    Hematocrit 38.4 (L) 40.0 - 53.0 %    MCV 99 78 - 100 fL    MCH 31.1 26.5 - 33.0 pg    MCHC 31.5 31.5 - 36.5 g/dL    RDW 13.1 10.0 - 15.0 %    Platelet Count 239 150 - 450 10e3/uL    % Neutrophils 63 %    % Lymphocytes 29 %    % Monocytes 6 %    % Eosinophils 1 %    % Basophils 0 %    % Immature Granulocytes 1 %    NRBCs per 100 WBC 0 <1 /100    Absolute Neutrophils 4.5 1.6 - 8.3 10e3/uL    Absolute Lymphocytes 2.1 0.8 - 5.3 10e3/uL    Absolute Monocytes 0.4 0.0 - 1.3 10e3/uL    Absolute Eosinophils 0.1 0.0 - 0.7 10e3/uL    Absolute Basophils 0.0 0.0 - 0.2 10e3/uL    Absolute Immature Granulocytes 0.0 <=0.4 10e3/uL    Absolute NRBCs 0.0 10e3/uL   EKG 12 lead     Status: None   Result Value Ref Range    Systolic Blood Pressure  mmHg    Diastolic Blood Pressure  mmHg    Ventricular Rate 67 BPM    Atrial Rate 67 BPM    KS Interval 164 ms    QRS Duration 90 ms     ms    QTc 401 ms    P Axis 94 degrees    R AXIS -56 degrees    T Axis 74 degrees    Interpretation ECG       Sinus rhythm  Pulmonary disease pattern  Left anterior fascicular block  Abnormal ECG  Unconfirmed report - interpretation of this ECG is computer generated - see medical record for final interpretation  Confirmed by - EMERGENCY ROOM, PHYSICIAN ([id number]),  PADMINI ROMAN ([id number]) on 3/16/2024 10:36:40 PM     CBC with platelets differential     Status:  Abnormal    Narrative    The following orders were created for panel order CBC with platelets differential.  Procedure                               Abnormality         Status                     ---------                               -----------         ------                     CBC with platelets and d...[[id number]]  Abnormal            Final result                 Please view results for these tests on the individual orders.     Medications   clonazePAM (klonoPIN) tablet 1 mg (has no administration in time range)     Labs Ordered and Resulted from Time of ED Arrival to Time of ED Departure - No data to display  No orders to display          Critical care was not performed.     Medical Decision Making  The patient's presentation was of moderate complexity (a chronic illness mild to moderate exacerbation, progression, or side effect of treatment).    The patient's evaluation involved:  review of 2 test result(s) ordered prior to this encounter (CBC, CMP from a few hours ago)    The patient's management necessitated moderate risk (prescription drug management including medications given in the ED).    Assessment & Plan    Medical screening exam was performed.  Patient does not have any new medical complaints.  He has been symptom-free for the past 4 hours while waiting in the waiting room.  He was given his nighttime dose of clonazepam.  He request discharge back to his facility.    I have reviewed the nursing notes. I have reviewed the findings, diagnosis, plan and need for follow up with the patient.    New Prescriptions    No medications on file       Final diagnoses:   Muscle spasm       Jani Quiroz DO  [facility] EMERGENCY DEPARTMENT  3/17/2024     Jani Quiroz DO  03/18/24 0158

## 2024-03-22 ENCOUNTER — OFFICE VISIT (OUTPATIENT)
Dept: NEUROLOGY | Facility: CLINIC | Age: 59
End: 2024-03-22
Payer: COMMERCIAL

## 2024-03-22 VITALS — HEART RATE: 90 BPM | DIASTOLIC BLOOD PRESSURE: 68 MMHG | SYSTOLIC BLOOD PRESSURE: 104 MMHG

## 2024-03-22 DIAGNOSIS — G20.A1 PARKINSON'S DISEASE WITHOUT DYSKINESIA OR FLUCTUATING MANIFESTATIONS (H): Primary | ICD-10-CM

## 2024-03-22 PROCEDURE — G2211 COMPLEX E/M VISIT ADD ON: HCPCS | Performed by: PSYCHIATRY & NEUROLOGY

## 2024-03-22 PROCEDURE — 99214 OFFICE O/P EST MOD 30 MIN: CPT | Performed by: PSYCHIATRY & NEUROLOGY

## 2024-03-22 RX ORDER — CARBIDOPA AND LEVODOPA 25; 100 MG/1; MG/1
TABLET ORAL
Qty: 240 TABLET | Refills: 11 | Status: SHIPPED | OUTPATIENT
Start: 2024-03-22 | End: 2024-03-22

## 2024-03-22 RX ORDER — CARBIDOPA AND LEVODOPA 50; 200 MG/1; MG/1
2 TABLET, EXTENDED RELEASE ORAL AT BEDTIME
Qty: 60 TABLET | Refills: 11 | Status: SHIPPED | OUTPATIENT
Start: 2024-03-22

## 2024-03-22 RX ORDER — CARBIDOPA AND LEVODOPA 25; 100 MG/1; MG/1
TABLET ORAL
Qty: 330 TABLET | Refills: 11 | Status: SHIPPED | OUTPATIENT
Start: 2024-03-22 | End: 2024-07-24

## 2024-03-22 NOTE — LETTER
3/22/2024         RE: Benjamin Mathews  [address]  [place] [facility] [address]        Dear Colleague,    Thank you for referring your patient, Benjamin Mathews, to the [facility]UROLOG[facility]. Please see a copy of my visit note below.    Department of Neurology  Movement Disorders Division     Patient: Benjamin Mathews   MRN: [id number]   : 1965   Date of Visit: 2024    Impression:  Benjamin Mathews is a 58 year old male with Parkinsonism. The patient's main concern today is muscle spasms.    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 Week 1 take 2.5 tabs three times daily then week 2 take 3 tabs three times daily and continue on this dose. If you notice side effect (dyskinesia, etc) return to previous dose. Stop at lowest effective dose.   - okay to take carbidopa/levodopa IR 25/100 mg 0.5-1 tab twice daily prn   - Increase carbidopa/levodopa CR 50/200 mg to 2 tabs at bedtime   - No changes to clonazepam   Movement Disorder-related Medications                   8 AM 12 PM 4 pm 8pm At bedtime  prn   Amantadine 100 mg  1 1           Carbidopa/levodopa IR  mg 2.5-3 2.5-3 2.5-3     0.5-1 twice daily     Carbidopa/levodopa CR  mg       2       Clonazepam 2 mg 1   1         Clonazepam 1 mg   1       1   - Continue with psychiatric and psychotherapeutic care to manage anxiety and depression. Follow symptoms closely while on clozapine and clonazepam, though there have been no issues in the past with this combination of medication.   - Follow-up neuropsychological evaluation is recommended in 1 to 2 years, [id number] in order to assess and update recommendations as appropriate  - Continue following palliative care medical providers at [facility]  - Continue to refrain from  driving  - Continue daily and safe exercises   - Continue follow with Dr. Arita for toxin injections and could consider right upper extremity injections to improve dystonia  - Continue to monitor swallowing  - Consider trying guided meditation. Here are some apps I recommend:.     - Headspace miguel (www.headspace.com): 14 day free trial and then there is a monthly or annual rate  - Calm miguel (www.calm.com): 7 day free trial and then there is an annual rate  - InsightTimer miguel (www.[employer]agon [employer]yr.com): FREE   - Consider seeing therapist as shorter intervals to improve stress/anxiety/mood   - MTM (Neurology Pharmacy) referral placed for close follow up and continued medication management. Follow up should be scheduled in 4 weeks. Please call the clinic at [phone] for MTM coordinators/scheduling.    Patient to return in 3-5 months, for in-person visit, 30 minutes.     Ching Carlos, DO  Movement Disorders Specialist  [facility]     Note dictated using voice recognition software.   31 minutes spent on date of encounter doing chart reviews and exam and documentation and further activities as noted above.     The longitudinal plan of care for the diagnosis(es)/condition(s) as documented were addressed during this visit. Due to the added complexity in care, I will continue to support Benjamin in the subsequent management and with ongoing continuity of care.       ------------------------------------------------------------------------------------------------------------      History of Present Illness  Mr. Mathews is a pleasant 58 year old male that presents to Neurology Movement clinic for follow up regarding Parkinsonism and muscle spasms.     History obtained from patient.   Main complaint: muscle spasms  Patient reports stiffness and spasms in right arm and hemiparesis in left arm from previous CVA.   Spasms/Dystonia: He was under stress and started having spasms. He manages stress by zoning out and  watching TV. He talks with a therapist twice a month.   He described his terrible experience at the ER and had to wait from 2 pm -7 am to get seen/treated. He was seen for spasms and treated with clonazepam 1 mg.   Wearing off: He reports waking up really stiff in his back   Movement Disorder-related Medications                   8 AM 12 PM 4 pm 8pm At bedtime  prn   Amantadine 100 mg  1 1           Carbidopa/levodopa IR  mg 2 2 2         Carbidopa/levodopa CR  mg       1       Clonazepam 2 mg 1   1         Clonazepam 1 mg   1       1       Review of Systems:  Other than that mentioned above, the remainder of 12 systems reviewed were negative.    PMH: unchanged  PSH: unchanged  FH: unchanged  SH: unchanged    Medications:  Current Outpatient Medications   Medication Sig Dispense Refill     carbidopa-levodopa (SINEMET CR)  MG CR tablet Take 2 tablets by mouth at bedtime 60 tablet 11     carbidopa-levodopa (SINEMET)  MG tablet Week 1 take 2.5 tabs three times daily at 8000, 1200, 1600 then week 2 take 3 tabs three times daily and continue on this dose. Stop at lowest effective dose. Okay to take 0.5-1 tab as needed twice daily = up to 11 tabs daily 330 tablet 11     acetaminophen (TYLENOL) 325 MG tablet Take 1-2 tablets (325-650 mg) by mouth every 8 hours as needed for mild pain 100 tablet 0     alfuzosin ER (UROXATRAL) 10 MG 24 hr tablet Take 1 tablet (10 mg) by mouth daily 30 tablet 11     amantadine (SYMMETREL) 100 MG capsule Take 1 capsule (100 mg) by mouth 2 times daily 60 capsule 11     ammonium lactate (LAC-HYDRIN) 12 % external lotion Apply topically daily as needed       apixaban ANTICOAGULANT (ELIQUIS) 5 MG tablet Take 5 mg by mouth 2 times daily       atorvastatin (LIPITOR) 40 MG tablet Take 1 tablet (40 mg) by mouth every evening 30 tablet 0     bisacodyl (DULCOLAX) 10 MG suppository Place 1 suppository (10 mg) rectally daily as needed for constipation 10 suppository 0      cholecalciferol (VITAMIN D3) 125 mcg (5000 units) capsule Take 1 capsule (125 mcg) by mouth daily 30 capsule 0     clonazePAM (KLONOPIN) 1 MG tablet Take 1 tablet scheduled at noon, may take 1 additional tablet PRN 60 tablet 5     clonazePAM (KLONOPIN) 2 MG tablet Take 1 tablet (2 mg) by mouth 2 times daily Take at 8am and 4 pm 60 tablet 5     cloZAPine (CLOZARIL) 50 MG tablet Take 1 tablet (50 mg) by mouth At Bedtime 30 tablet 0     diclofenac (VOLTAREN) 1 % topical gel Apply 2 g topically 3 times daily as needed for moderate pain 150 g 0     econazole nitrate 1 % external cream Apply topically daily To toes and toenails. 85 g 5     gabapentin (NEURONTIN) 800 MG tablet Take 1 tablet (800 mg) by mouth 3 times daily 90 tablet 0     hydrOXYzine (ATARAX) 25 MG tablet Take 2 tablets (50 mg) by mouth every 6 hours as needed for anxiety (up to 3 timrd daily) 20 tablet 0     ketoconazole (NIZORAL) 2 % external cream Apply topically 2 times daily       ketoconazole (NIZORAL) 2 % external shampoo Apply topically once a week On Wednesdays       lactulose (CHRONULAC) 10 GM/15ML solution Take 15 mLs by mouth 2 times daily 473 mL 11     linaclotide (LINZESS) 290 MCG capsule Take 1 capsule (290 mcg) by mouth daily as needed (IBSc) 90 capsule 3     metoprolol succinate ER (TOPROL XL) 25 MG 24 hr tablet Take 0.5 tablets (12.5 mg) by mouth 2 times daily 30 tablet 0     multivitamin, therapeutic (THERA-VIT) TABS tablet Take 1 tablet by mouth daily 30 tablet 0     pantoprazole (PROTONIX) 40 MG EC tablet Take 1 tablet (40 mg) by mouth daily Take 1 tablet (40mg) by mouth daily at 8am 30 tablet 0     traZODone (DESYREL) 100 MG tablet Take 100 mg by mouth At Bedtime       traZODone (DESYREL) 50 MG tablet Take 1 tablet (50 mg) by mouth every morning 30 tablet 0     venlafaxine (EFFEXOR XR) 150 MG 24 hr capsule Take 2 capsules (300 mg) by mouth daily 60 capsule 0     vitamin C (ASCORBIC ACID) 500 MG tablet Take 1 tablet (500 mg) by mouth  daily 30 tablet 0           Allergies   Allergen Reactions     Amantadine Other (See Comments)     Other reaction(s): Hallucinations  Hallucinations/ lost self control/gambling.     halluicnations  Hallucinations/ lost self control/gambling.     hallucinates  halluicnations  hallucinates  Hallucinations/ lost self control/gambling.        Quetiapine GI Disturbance, Diarrhea and Other (See Comments)     Diarrhea    Diarrhea  Other reaction(s): GI Disturbance  Diarrhea       Duloxetine Other (See Comments)     suicidal  suicidal            Physical Exam:  The patient's  blood pressure is 104/68 and his pulse is 90.    Physical Exam:  GENERAL: alert, active, attentive, appropriately groomed   HEENT: normocephalic, eyes open with no discharge, nares patent, oropharynx clear-no lesions  CHEST: non labored breathing  EXTREMITIES: no edema/cyanosis in extremities visible during exam  PSYCH: mood stable     Neurologic Exam:  MENTAL STATUS: Alert and oriented to person, place, time, and situation. Follows commands. Recent and remote memory intact. Attention span and concentration intact. Fund of knowledge intact to current events.   SPEECH: Fluent, intact comprehension and articulation, hypophonic  CN: overall visual fields intact, facial movement symmetric, hearing grossly intact to conversation   MOTOR: Moves bilateral upper extremity against gravity  Involuntary movements:   Tone: increased in BLE axial   GAIT: sitting in wheelchair    Data Reviewed: I have personally reviewed the tests/studies below.       Lab Results   Component Value Date    A1C 6.0 07/08/2021     TSH   Date Value Ref Range Status   05/27/2022 2.99 0.40 - 4.00 mU/L Final   07/08/2021 2.20 0.40 - 4.00 mU/L Final     CBC RESULTS:   Recent Labs   Lab Test 03/16/24 2036   WBC 7.2   RBC 3.89*   HGB 12.1*   HCT 38.4*   MCV 99   MCH 31.1   MCHC 31.5   RDW 13.1        Last Comprehensive Metabolic Panel:  Sodium   Date Value Ref Range Status    03/16/2024 140 135 - 145 mmol/L Final     Comment:     Reference intervals for this test were updated on 09/26/2023 to more accurately reflect our healthy population. There may be differences in the flagging of prior results with similar values performed with this method. Interpretation of those prior results can be made in the context of the updated reference intervals.    07/09/2021 143 133 - 144 mmol/L Final     Potassium   Date Value Ref Range Status   03/16/2024 3.6 3.4 - 5.3 mmol/L Final   05/09/2023 4.1 3.4 - 5.3 mmol/L Final   07/09/2021 3.7 3.4 - 5.3 mmol/L Final     Chloride   Date Value Ref Range Status   03/16/2024 105 98 - 107 mmol/L Final   05/09/2023 113 (H) 94 - 109 mmol/L Final   07/09/2021 108 94 - 109 mmol/L Final     Carbon Dioxide   Date Value Ref Range Status   07/09/2021 28 20 - 32 mmol/L Final     Carbon Dioxide (CO2)   Date Value Ref Range Status   03/16/2024 26 22 - 29 mmol/L Final   05/09/2023 25 20 - 32 mmol/L Final     Anion Gap   Date Value Ref Range Status   03/16/2024 9 7 - 15 mmol/L Final   05/09/2023 4 3 - 14 mmol/L Final   07/09/2021 6 3 - 14 mmol/L Final     Glucose   Date Value Ref Range Status   03/16/2024 95 70 - 99 mg/dL Final   05/09/2023 98 70 - 99 mg/dL Final   07/09/2021 102 (H) 70 - 99 mg/dL Final     GLUCOSE BY METER POCT   Date Value Ref Range Status   06/17/2023 124 (H) 70 - 99 mg/dL Final     Urea Nitrogen   Date Value Ref Range Status   03/16/2024 20.8 (H) 6.0 - 20.0 mg/dL Final   05/09/2023 24 7 - 30 mg/dL Final   07/09/2021 25 7 - 30 mg/dL Final     Creatinine   Date Value Ref Range Status   03/16/2024 0.83 0.67 - 1.17 mg/dL Final   07/09/2021 0.77 0.66 - 1.25 mg/dL Final     GFR Estimate   Date Value Ref Range Status   03/16/2024 >90 >60 mL/min/1.73m2 Final   07/09/2021 >90 >60 mL/min/[1.73_m2] Final     Comment:     Non  GFR Calc  Starting 12/18/2018, serum creatinine based estimated GFR (eGFR) will be   calculated using the Chronic Kidney  Disease Epidemiology Collaboration   (CKD-EPI) equation.       GFR, ESTIMATED POCT   Date Value Ref Range Status   05/27/2022 >60 >60 mL/min/1.73m2 Final     Calcium   Date Value Ref Range Status   03/16/2024 8.6 8.6 - 10.0 mg/dL Final   07/09/2021 8.8 8.5 - 10.1 mg/dL Final     Bilirubin Total   Date Value Ref Range Status   03/16/2024 0.2 <=1.2 mg/dL Final   07/07/2021 0.3 0.2 - 1.3 mg/dL Final     Alkaline Phosphatase   Date Value Ref Range Status   03/16/2024 138 40 - 150 U/L Final     Comment:     Reference intervals for this test were updated on 11/14/2023 to more accurately reflect our healthy population. There may be differences in the flagging of prior results with similar values performed with this method. Interpretation of those prior results can be made in the context of the updated reference intervals.   07/07/2021 82 40 - 150 U/L Final     ALT   Date Value Ref Range Status   03/16/2024 6 0 - 70 U/L Final     Comment:     Reference intervals for this test were updated on 6/12/2023 to more accurately reflect our healthy population. There may be differences in the flagging of prior results with similar values performed with this method. Interpretation of those prior results can be made in the context of the updated reference intervals.     07/07/2021 31 0 - 70 U/L Final     AST   Date Value Ref Range Status   03/16/2024 26 0 - 45 U/L Final     Comment:     Reference intervals for this test were updated on 6/12/2023 to more accurately reflect our healthy population. There may be differences in the flagging of prior results with similar values performed with this method. Interpretation of those prior results can be made in the context of the updated reference intervals.   07/07/2021 20 0 - 45 U/L Final                        Again, thank you for allowing me to participate in the care of your patient.        Sincerely,        Ching Carlos DO

## 2024-03-22 NOTE — PATIENT INSTRUCTIONS
Plan:   - Increase carbidopa/levodopa IR. Week 1 take 2.5 tabs three times daily then week 2 take 3 tabs three times daily and continue on this dose. If you notice side effect (dyskinesia, etc) return to previous dose. Stop at lowest effective dose.   - okay to take carbidopa/levodopa IR 25/100 mg 0.5-1 tab twice daily prn   - Increase carbidopa/levodopa CR 50/200 mg to 2 tabs at bedtime   - No changes to clonazepam   Movement Disorder-related Medications                   8 AM 12 PM 4 pm 8pm At bedtime  prn   Amantadine 100 mg  1 1           Carbidopa/levodopa IR  mg 2.5-3 2.5-3 2.5-3     0.5-1 twice daily     Carbidopa/levodopa CR  mg       2       Clonazepam 2 mg 1   1         Clonazepam 1 mg   1       1   - Continue with psychiatric and psychotherapeutic care to manage anxiety and depression. Follow symptoms closely while on clozapine and clonazepam, though there have been no issues in the past with this combination of medication.   - Follow-up neuropsychological evaluation is recommended in 1 to 2 years, [id number] in order to assess and update recommendations as appropriate  - Continue following palliative care medical providers at [facility]  - Continue to refrain from driving  - Continue daily and safe exercises   - Continue follow with Dr. Arita for toxin injections and could consider right upper extremity injections to improve dystonia  - Continue to monitor swallowing  - Consider trying guided meditation. Here are some apps I recommend:.     - Next Jumpce miguel (www.headspace.com): 14 day free trial and then there is a monthly or annual rate  - Calm miguel (www.calm.com): 7 day free trial and then there is an annual rate  - InsightTimer miguel (www.RunRevtimer.com): FREE   - Consider seeing therapist as shorter intervals to improve stress/anxiety/mood   - MTM (Neurology Pharmacy) referral placed for close follow up and continued medication management. Follow up should be scheduled in 4 weeks.  Please call the clinic at [phone] for MTM coordinators/scheduling.    Patient to return in 3-5 months, for in-person visit, 30 minutes.

## 2024-03-22 NOTE — PROGRESS NOTES
Department of Neurology  Movement Disorders Division     Patient: Benjamin Mathews   MRN: [id number]   : 1965   Date of Visit: 2024    Impression:  Benjamin Mathews is a 58 year old male with Parkinsonism. The patient's main concern today is muscle spasms.    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 Week 1 take 2.5 tabs three times daily then week 2 take 3 tabs three times daily and continue on this dose. If you notice side effect (dyskinesia, etc) return to previous dose. Stop at lowest effective dose.   - okay to take carbidopa/levodopa IR 25/100 mg 0.5-1 tab twice daily prn   - Increase carbidopa/levodopa CR 50/200 mg to 2 tabs at bedtime   - No changes to clonazepam   Movement Disorder-related Medications                   8 AM 12 PM 4 pm 8pm At bedtime  prn   Amantadine 100 mg  1 1           Carbidopa/levodopa IR  mg 2.5-3 2.5-3 2.5-3     0.5-1 twice daily     Carbidopa/levodopa CR  mg       2       Clonazepam 2 mg 1   1         Clonazepam 1 mg   1       1   - Continue with psychiatric and psychotherapeutic care to manage anxiety and depression. Follow symptoms closely while on clozapine and clonazepam, though there have been no issues in the past with this combination of medication.   - Follow-up neuropsychological evaluation is recommended in 1 to 2 years, [id number] in order to assess and update recommendations as appropriate  - Continue following palliative care medical providers at [facility]  - Continue to refrain from driving  - Continue daily and safe exercises   - Continue follow with Dr. Arita for toxin injections and could consider right upper extremity injections to improve dystonia  - Continue to monitor swallowing  - Consider trying guided meditation. Here are some apps I  recommend:.     - Headspace miguel (www.Find That File.com): 14 day free trial and then there is a monthly or annual rate  - Calm miguel (www.calm.com): 7 day free trial and then there is an annual rate  - InsightTimer miguel (www.insighttimer.com): FREE   - Consider seeing therapist as shorter intervals to improve stress/anxiety/mood   - MTM (Neurology Pharmacy) referral placed for close follow up and continued medication management. Follow up should be scheduled in 4 weeks. Please call the clinic at [phone] for MTM coordinators/scheduling.    Patient to return in 3-5 months, for in-person visit, 30 minutes.     Ching Carlos, DO  Movement Disorders Specialist  [facility]     Note dictated using voice recognition software.   31 minutes spent on date of encounter doing chart reviews and exam and documentation and further activities as noted above.     The longitudinal plan of care for the diagnosis(es)/condition(s) as documented were addressed during this visit. Due to the added complexity in care, I will continue to support Benjamin in the subsequent management and with ongoing continuity of care.       ------------------------------------------------------------------------------------------------------------      History of Present Illness  Mr. Mathews is a pleasant 58 year old male that presents to Neurology Movement clinic for follow up regarding Parkinsonism and muscle spasms.     History obtained from patient.   Main complaint: muscle spasms  Patient reports stiffness and spasms in right arm and hemiparesis in left arm from previous CVA.   Spasms/Dystonia: He was under stress and started having spasms. He manages stress by zoning out and watching TV. He talks with a therapist twice a month.   He described his terrible experience at the ER and had to wait from 2 pm -7 am to get seen/treated. He was seen for spasms and treated with clonazepam 1 mg.   Wearing off: He reports waking up really stiff in  his back   Movement Disorder-related Medications                   8 AM 12 PM 4 pm 8pm At bedtime  prn   Amantadine 100 mg  1 1           Carbidopa/levodopa IR  mg 2 2 2         Carbidopa/levodopa CR  mg       1       Clonazepam 2 mg 1   1         Clonazepam 1 mg   1       1       Review of Systems:  Other than that mentioned above, the remainder of 12 systems reviewed were negative.    PMH: unchanged  PSH: unchanged  FH: unchanged  SH: unchanged    Medications:  Current Outpatient Medications   Medication Sig Dispense Refill    carbidopa-levodopa (SINEMET CR)  MG CR tablet Take 2 tablets by mouth at bedtime 60 tablet 11    carbidopa-levodopa (SINEMET)  MG tablet Week 1 take 2.5 tabs three times daily at 8000, 1200, 1600 then week 2 take 3 tabs three times daily and continue on this dose. Stop at lowest effective dose. Okay to take 0.5-1 tab as needed twice daily = up to 11 tabs daily 330 tablet 11    acetaminophen (TYLENOL) 325 MG tablet Take 1-2 tablets (325-650 mg) by mouth every 8 hours as needed for mild pain 100 tablet 0    alfuzosin ER (UROXATRAL) 10 MG 24 hr tablet Take 1 tablet (10 mg) by mouth daily 30 tablet 11    amantadine (SYMMETREL) 100 MG capsule Take 1 capsule (100 mg) by mouth 2 times daily 60 capsule 11    ammonium lactate (LAC-HYDRIN) 12 % external lotion Apply topically daily as needed      apixaban ANTICOAGULANT (ELIQUIS) 5 MG tablet Take 5 mg by mouth 2 times daily      atorvastatin (LIPITOR) 40 MG tablet Take 1 tablet (40 mg) by mouth every evening 30 tablet 0    bisacodyl (DULCOLAX) 10 MG suppository Place 1 suppository (10 mg) rectally daily as needed for constipation 10 suppository 0    cholecalciferol (VITAMIN D3) 125 mcg (5000 units) capsule Take 1 capsule (125 mcg) by mouth daily 30 capsule 0    clonazePAM (KLONOPIN) 1 MG tablet Take 1 tablet scheduled at noon, may take 1 additional tablet PRN 60 tablet 5    clonazePAM (KLONOPIN) 2 MG tablet Take 1 tablet (2 mg)  by mouth 2 times daily Take at 8am and 4 pm 60 tablet 5    cloZAPine (CLOZARIL) 50 MG tablet Take 1 tablet (50 mg) by mouth At Bedtime 30 tablet 0    diclofenac (VOLTAREN) 1 % topical gel Apply 2 g topically 3 times daily as needed for moderate pain 150 g 0    econazole nitrate 1 % external cream Apply topically daily To toes and toenails. 85 g 5    gabapentin (NEURONTIN) 800 MG tablet Take 1 tablet (800 mg) by mouth 3 times daily 90 tablet 0    hydrOXYzine (ATARAX) 25 MG tablet Take 2 tablets (50 mg) by mouth every 6 hours as needed for anxiety (up to 3 timrd daily) 20 tablet 0    ketoconazole (NIZORAL) 2 % external cream Apply topically 2 times daily      ketoconazole (NIZORAL) 2 % external shampoo Apply topically once a week On Wednesdays      lactulose (CHRONULAC) 10 GM/15ML solution Take 15 mLs by mouth 2 times daily 473 mL 11    linaclotide (LINZESS) 290 MCG capsule Take 1 capsule (290 mcg) by mouth daily as needed (IBSc) 90 capsule 3    metoprolol succinate ER (TOPROL XL) 25 MG 24 hr tablet Take 0.5 tablets (12.5 mg) by mouth 2 times daily 30 tablet 0    multivitamin, therapeutic (THERA-VIT) TABS tablet Take 1 tablet by mouth daily 30 tablet 0    pantoprazole (PROTONIX) 40 MG EC tablet Take 1 tablet (40 mg) by mouth daily Take 1 tablet (40mg) by mouth daily at 8am 30 tablet 0    traZODone (DESYREL) 100 MG tablet Take 100 mg by mouth At Bedtime      traZODone (DESYREL) 50 MG tablet Take 1 tablet (50 mg) by mouth every morning 30 tablet 0    venlafaxine (EFFEXOR XR) 150 MG 24 hr capsule Take 2 capsules (300 mg) by mouth daily 60 capsule 0    vitamin C (ASCORBIC ACID) 500 MG tablet Take 1 tablet (500 mg) by mouth daily 30 tablet 0           Allergies   Allergen Reactions    Amantadine Other (See Comments)     Other reaction(s): Hallucinations  Hallucinations/ lost self control/gambling.     halluicnations  Hallucinations/ lost self control/gambling.     hallucinates  halluicnations  hallucinates  Hallucinations/  lost self control/gambling.       Quetiapine GI Disturbance, Diarrhea and Other (See Comments)     Diarrhea    Diarrhea  Other reaction(s): GI Disturbance  Diarrhea      Duloxetine Other (See Comments)     suicidal  suicidal            Physical Exam:  The patient's  blood pressure is 104/68 and his pulse is 90.    Physical Exam:  GENERAL: alert, active, attentive, appropriately groomed   HEENT: normocephalic, eyes open with no discharge, nares patent, oropharynx clear-no lesions  CHEST: non labored breathing  EXTREMITIES: no edema/cyanosis in extremities visible during exam  PSYCH: mood stable     Neurologic Exam:  MENTAL STATUS: Alert and oriented to person, place, time, and situation. Follows commands. Recent and remote memory intact. Attention span and concentration intact. Fund of knowledge intact to current events.   SPEECH: Fluent, intact comprehension and articulation, hypophonic  CN: overall visual fields intact, facial movement symmetric, hearing grossly intact to conversation   MOTOR: Moves bilateral upper extremity against gravity  Involuntary movements:   Tone: increased in BLE axial   GAIT: sitting in wheelchair    Data Reviewed: I have personally reviewed the tests/studies below.       Lab Results   Component Value Date    A1C 6.0 07/08/2021     TSH   Date Value Ref Range Status   05/27/2022 2.99 0.40 - 4.00 mU/L Final   07/08/2021 2.20 0.40 - 4.00 mU/L Final     CBC RESULTS:   Recent Labs   Lab Test 03/16/24 2036   WBC 7.2   RBC 3.89*   HGB 12.1*   HCT 38.4*   MCV 99   MCH 31.1   MCHC 31.5   RDW 13.1        Last Comprehensive Metabolic Panel:  Sodium   Date Value Ref Range Status   03/16/2024 140 135 - 145 mmol/L Final     Comment:     Reference intervals for this test were updated on 09/26/2023 to more accurately reflect our healthy population. There may be differences in the flagging of prior results with similar values performed with this method. Interpretation of those prior results can be  made in the context of the updated reference intervals.    07/09/2021 143 133 - 144 mmol/L Final     Potassium   Date Value Ref Range Status   03/16/2024 3.6 3.4 - 5.3 mmol/L Final   05/09/2023 4.1 3.4 - 5.3 mmol/L Final   07/09/2021 3.7 3.4 - 5.3 mmol/L Final     Chloride   Date Value Ref Range Status   03/16/2024 105 98 - 107 mmol/L Final   05/09/2023 113 (H) 94 - 109 mmol/L Final   07/09/2021 108 94 - 109 mmol/L Final     Carbon Dioxide   Date Value Ref Range Status   07/09/2021 28 20 - 32 mmol/L Final     Carbon Dioxide (CO2)   Date Value Ref Range Status   03/16/2024 26 22 - 29 mmol/L Final   05/09/2023 25 20 - 32 mmol/L Final     Anion Gap   Date Value Ref Range Status   03/16/2024 9 7 - 15 mmol/L Final   05/09/2023 4 3 - 14 mmol/L Final   07/09/2021 6 3 - 14 mmol/L Final     Glucose   Date Value Ref Range Status   03/16/2024 95 70 - 99 mg/dL Final   05/09/2023 98 70 - 99 mg/dL Final   07/09/2021 102 (H) 70 - 99 mg/dL Final     GLUCOSE BY METER POCT   Date Value Ref Range Status   06/17/2023 124 (H) 70 - 99 mg/dL Final     Urea Nitrogen   Date Value Ref Range Status   03/16/2024 20.8 (H) 6.0 - 20.0 mg/dL Final   05/09/2023 24 7 - 30 mg/dL Final   07/09/2021 25 7 - 30 mg/dL Final     Creatinine   Date Value Ref Range Status   03/16/2024 0.83 0.67 - 1.17 mg/dL Final   07/09/2021 0.77 0.66 - 1.25 mg/dL Final     GFR Estimate   Date Value Ref Range Status   03/16/2024 >90 >60 mL/min/1.73m2 Final   07/09/2021 >90 >60 mL/min/[1.73_m2] Final     Comment:     Non  GFR Calc  Starting 12/18/2018, serum creatinine based estimated GFR (eGFR) will be   calculated using the Chronic Kidney Disease Epidemiology Collaboration   (CKD-EPI) equation.       GFR, ESTIMATED POCT   Date Value Ref Range Status   05/27/2022 >60 >60 mL/min/1.73m2 Final     Calcium   Date Value Ref Range Status   03/16/2024 8.6 8.6 - 10.0 mg/dL Final   07/09/2021 8.8 8.5 - 10.1 mg/dL Final     Bilirubin Total   Date Value Ref Range Status    03/16/2024 0.2 <=1.2 mg/dL Final   07/07/2021 0.3 0.2 - 1.3 mg/dL Final     Alkaline Phosphatase   Date Value Ref Range Status   03/16/2024 138 40 - 150 U/L Final     Comment:     Reference intervals for this test were updated on 11/14/2023 to more accurately reflect our healthy population. There may be differences in the flagging of prior results with similar values performed with this method. Interpretation of those prior results can be made in the context of the updated reference intervals.   07/07/2021 82 40 - 150 U/L Final     ALT   Date Value Ref Range Status   03/16/2024 6 0 - 70 U/L Final     Comment:     Reference intervals for this test were updated on 6/12/2023 to more accurately reflect our healthy population. There may be differences in the flagging of prior results with similar values performed with this method. Interpretation of those prior results can be made in the context of the updated reference intervals.     07/07/2021 31 0 - 70 U/L Final     AST   Date Value Ref Range Status   03/16/2024 26 0 - 45 U/L Final     Comment:     Reference intervals for this test were updated on 6/12/2023 to more accurately reflect our healthy population. There may be differences in the flagging of prior results with similar values performed with this method. Interpretation of those prior results can be made in the context of the updated reference intervals.   07/07/2021 20 0 - 45 U/L Final

## 2024-03-22 NOTE — NURSING NOTE
Chief Complaint   Patient presents with    Follow Up     [facility] Emergency Department for RT SIDE SPASMS    Medication Follow-up       [facility] Urias on 3/22/2024 at 9:15 AM

## 2024-04-04 ENCOUNTER — VIRTUAL VISIT (OUTPATIENT)
Dept: NEUROLOGY | Facility: CLINIC | Age: 59
End: 2024-04-04
Attending: PSYCHIATRY & NEUROLOGY
Payer: COMMERCIAL

## 2024-04-04 DIAGNOSIS — R39.9 LOWER URINARY TRACT SYMPTOMS (LUTS): ICD-10-CM

## 2024-04-04 DIAGNOSIS — I82.5Y9 CHRONIC DEEP VEIN THROMBOSIS (DVT) OF PROXIMAL VEIN OF LOWER EXTREMITY, UNSPECIFIED LATERALITY (H): ICD-10-CM

## 2024-04-04 DIAGNOSIS — F41.1 GAD (GENERALIZED ANXIETY DISORDER): ICD-10-CM

## 2024-04-04 DIAGNOSIS — R44.3 HALLUCINATIONS: ICD-10-CM

## 2024-04-04 DIAGNOSIS — G20.A1 PARKINSON'S DISEASE WITHOUT DYSKINESIA OR FLUCTUATING MANIFESTATIONS (H): Primary | ICD-10-CM

## 2024-04-04 DIAGNOSIS — R00.0 TACHYCARDIA: ICD-10-CM

## 2024-04-04 DIAGNOSIS — F33.0 MILD EPISODE OF RECURRENT MAJOR DEPRESSIVE DISORDER (H): ICD-10-CM

## 2024-04-04 DIAGNOSIS — Z78.9 TAKES DIETARY SUPPLEMENTS: ICD-10-CM

## 2024-04-04 DIAGNOSIS — K59.01 SLOW TRANSIT CONSTIPATION: ICD-10-CM

## 2024-04-04 DIAGNOSIS — E78.5 HYPERLIPIDEMIA, UNSPECIFIED HYPERLIPIDEMIA TYPE: ICD-10-CM

## 2024-04-04 DIAGNOSIS — K21.9 GASTROESOPHAGEAL REFLUX DISEASE WITHOUT ESOPHAGITIS: ICD-10-CM

## 2024-04-04 DIAGNOSIS — G62.9 NEUROPATHY: ICD-10-CM

## 2024-04-04 NOTE — PATIENT INSTRUCTIONS
"Recommendations from today's MTM visit:                                                    MTM (medication therapy management) is a service provided by a clinical pharmacist designed to help you get the most of out of your medicines.   Today we reviewed what your medicines are for, how to know if they are working, that your medicines are safe and how to make your medicine regimen as easy as possible.      -Reduce carbidopa/levodopa 4pm dose from 3 to 2.5 tablets    Follow-up: 6/6/24    It was great speaking with you today.  I value your experience and would be very thankful for your time in providing feedback in our clinic survey. In the next few days, you may receive an email or text message from [employer] with a link to a survey related to your  clinical pharmacist.\"     To schedule another MTM appointment, please call the clinic directly or you may call the MTM scheduling line at [phone].    My Clinical Pharmacist's contact information:                                                      Please feel free to contact me with any questions or concerns you have.      Cary Sol, PharmD  Medication Therapy Management Pharmacist  [facility]    "

## 2024-04-04 NOTE — Clinical Note
He was noticing a bit of dyskinesia after 4pm dose, so going back down from 3 to 2.5 tablets at that time.

## 2024-04-04 NOTE — PROGRESS NOTES
Medication Therapy Management (MTM) Encounter    ASSESSMENT:                            Medication Adherence/Access: No issues identified      Hyperlipidemia   Stable. Continue current medication.    Parkinson's Disease:  Dyskinesia occurring only after the 4pm dose since the increase so will reduce back to previous dose at that time. Overall feeling much improved.     Lower Urinary Tract Symptoms:   Still feeling better since starting this medication, though incontinence continues (maybe worse?). Next neurology visit not until November. Will check in with provider.     Mental Health/sleep:   Continue with therapist. Encouraged him to discuss potential antidepressant changes if low mood persists. Continue current medication.    GERD:   Stable. Continue current medication.    Neuropathy:    Stable. Continue current medication.    DVT/PE prophylaxis/SVT:  Stable. Continue current medication.    IBS:    Stable. Continue current medication.    Supplements:   Stable. Continue current medication.    PLAN:                            -Reduce carbidopa/levodopa 4pm dose from 3 to 2.5 tablets    Follow-up: 6/6/24    SUBJECTIVE/OBJECTIVE:                          Benjamin Mathews is a 58 year old male called for an initial visit. He was referred to me from Dr. Carlos, Neurology.    Reason for visit: med review.    Allergies/ADRs: Reviewed in chart  Past Medical History: Reviewed in chart  Tobacco: He reports that he has been smoking cigars and cigarettes. He has never used smokeless tobacco.Nicotine/Tobacco Cessation Plan  Information offered: Patient not interested at this time    Alcohol: not currently using    Medication Adherence/Access: no issues reported    Hyperlipidemia     atorvastatin 40mg daily  Patient reports no significant myalgias or other side effects.    Parkinson's Disease:  Movement Disorder-related Medications                   8 AM 12 PM 4 pm 8pm At bedtime  prn   Amantadine 100 mg  1 1          "  Carbidopa/levodopa IR  mg 3 3 3     0.5-1 twice daily     Carbidopa/levodopa CR  mg       2       Clonazepam 2 mg 1   1         Clonazepam 1 mg   1       1     Patient met with Neurology on 3/22/24, at which time:  - Increase carbidopa/levodopa IR. Week 1 take 2.5 tabs three times daily then week 2 take 3 tabs three times daily and continue on this dose. If you notice side effect (dyskinesia, etc) return to previous dose. Stop at lowest effective dose.  - Increase carbidopa/levodopa CR 50/200 mg to 2 tabs at bedtime     Today, he reported that stiffness overnight and first thing in the morning is much improved since increasing the carbidopa/levodopa ER dose. During the daytime, he feels better overall, though couldn't say specifically what is different. He does notice some new dyskinesia after taking the 4pm dose that lasts up to 90 minutes. He has not had any muscle spasms since he was in the ER 3/17/24. Denied any freezing.   He is falling asleep ok and generally stays asleep during the night.      Lower Urinary Tract Symptoms:   -alfuzosin ER 10mg daily    States that he may wet the bed overnight twice weekly due to not waking up and he doesn't realize that he's urinated. During the day, he has a very small urine stream. Saw Urology in 11/2023 and note indicates stable and to continue current med. He thinks overnight incontinence might be happening a bit more frequently now. Next follow up 11/2024.     Mental Health/sleep:   -hydroxyzine 25mg prn- not very often  -venlafaxine ER 300mg daily  -trazodone 50mg every morning and 100mg at bedtime  -clozapine 50mg at bedtime    Reported that mood is \"not that great, but alright.\" Has discussed with psychiatry recently, who increased trazodone a bit. Thinks he has another appointment in a couple months. Sees therapist every 2 weeks. Gets monthly blood draws for clozapine monitoring. Denied any hallucinations. Reportedly feels stable. No side " "effects.    GERD:   Pantoprazole 40 mg once daily   Patient feels that current regimen is effective.    Neuropathy:    -gabapentin 800mg TID  Med is helpful, though feet still feel \"mushy and tingly\" at times. No concerns.    DVT/PE prophylaxis/SVT:  -Eliquis 5mg BID  -metoprolol ER 12.5mg BID  H/o PE and DVT. No concerns.    IBS:    -Linzess 290mcg daily as needed  -lactulose 15mL BID-most helpful  Daily BM's. Feels stable. No issues.     Supplements:   -Vitamin C 500mg daily  -Vitamin D 5000units daily  -multivitamin daily  No concerns.   ----------------  Post Discharge Medication Reconciliation Status: discharge medications reconciled, continue medications without change.    I spent 30 minutes with this patient today. All changes were made via collaborative practice agreement with Ching Carlos. A copy of the visit note was provided to the patient's provider(s).    A summary of these recommendations was sent via clinic portal.    Justin CheneyD  Medication Therapy Management Pharmacist  [facility]    Telemedicine Visit Details  Type of service:  Telephone visit  Start Time:  12:30pm  End Time:  1:00pm     Medication Therapy Recommendations  Parkinson's disease without dyskinesia or fluctuating manifestations (H)    Current Medication: carbidopa-levodopa (SINEMET)  MG tablet   Rationale: Dose too high - Dosage too high - Safety   Recommendation: Decrease Dose - per plan   Status: Accepted per CPA            "

## 2024-04-04 NOTE — Clinical Note
4/4/2024       RE: Benjamin Mathews  [address]  [facility] [address]     Dear Colleague,    Thank you for referring your patient, Benjamin Mathews, to the [facility] [place] at [facility]. Please see a copy of my visit note below.    Medication Therapy Management (MTM) Encounter    ASSESSMENT:                            Medication Adherence/Access: {adherencechoices:672334}    ***:   ***    PLAN:                            ***    Follow-up: {followuptest2:845148}    SUBJECTIVE/OBJECTIVE:                          Benjamin Mathews is a 58 year old male { :389641} for {mtmvisit:587969}     Reason for visit: ***.    Allergies/ADRs: {1/2/3/4/5:302346}  Past Medical History: {1/2/3/4/5:386860}  Tobacco: He reports that he has been smoking cigars and cigarettes. He has never used smokeless tobacco.Nicotine/Tobacco Cessation Plan  {Nicotine/Tobacco Cessation Plan:531125}    Alcohol: {ALCOHOL CONSUMPTION HX:677185}  {Social and Goals:029206}  Medication Adherence/Access: {fumedadherence:804607}    {MTM SUBJECTIVE (Optional):157909}    Plan:   - Increase carbidopa/levodopa IR. Week 1 take 2.5 tabs three times daily then week 2 take 3 tabs three times daily and continue on this dose. If you notice side effect (dyskinesia, etc) return to previous dose. Stop at lowest effective dose.   - okay to take carbidopa/levodopa IR 25/100 mg 0.5-1 tab twice daily prn   - Increase carbidopa/levodopa CR 50/200 mg to 2 tabs at bedtime   - No changes to clonazepam   Movement Disorder-related Medications                   8 AM 12 PM 4 pm 8pm At bedtime  prn   Amantadine 100 mg  1 1           Carbidopa/levodopa IR  mg 2.5-3 2.5-3 2.5-3     0.5-1 twice daily     Carbidopa/levodopa CR  mg       2       Clonazepam 2 mg 1   1         Clonazepam 1 mg   1       1   - Continue with psychiatric and psychotherapeutic care to manage anxiety and depression.  "Follow symptoms closely while on clozapine and clonazepam, though there have been no issues in the past with this combination of medication.   - Follow-up neuropsychological evaluation is recommended in 1 to 2 years, [id number] in order to assess and update recommendations as appropriate  - Continue following palliative care medical providers at [facility]  - Continue to refrain from driving  - Continue daily and safe exercises   - Continue follow with Dr. Arita for toxin injections and could consider right upper extremity injections to improve dystonia  - Continue to monitor swallowing  - Consider trying guided meditation. Here are some apps I recommend:.     - kooaba miguel (www.headspace.com): 14 day free trial and then there is a monthly or annual rate  - Calm miguel (www.calm.com): 7 day free trial and then there is an annual rate  - InsightTimer miguel (www.[employer].com): FREE   - Consider seeing therapist as shorter intervals to improve stress/anxiety/mood   - MTM (Neurology Pharmacy) referral placed for close follow up and continued medication management. Follow up should be scheduled in 4 weeks. Please call the clinic at [phone] for MTM coordinators/scheduling.    Parkinsonism:  Medication Dose Timing    8am 12p 4p 8p Bedtime prn   Carbidopa/levodopa 25-100mg 2 2 1.5         Carbidopa/levodopa CR        1       clonazepam 2mg 1mg 2mg     0.5mg                      Pt recently saw Dr. Perry on 8/1He stated that carbidopa/levodopa works ok, but \"interferes\" with clonazepam as a muscle relaxant, as he notices that clonazepam seems to work better when not taken with carbidopa/levodopa. He reported that if he misses a clonazepam dose, his whole right side \"freezes up.\" He feels that movements are generally well managed if he doesn't miss medication doses. He does take an extra clonazepam 0.5mg 2-3 times per week when he notices eye muscles twitching.  He is considering Botox.  Pt appears to be " "knowledgeable about his medications to some degree, but needed assistance in reading the printed med list, as he was frequently reporting incorrect information during the call.        Per Neuro note,   Review of surgical or medication options   Reviewed  Amantadine - hallucinations  rasagiline (azilect) = has not been on but may not be able to take with other medications  Selegiline (eldepryl) - has not been on this  rytary -  h as not been on this  comtan (entacapone) - has not been on this  stalevo (entacapone/carbidopa/levodopa) - has not been on this  Ropinirole (requip) - has not been on this  mirapex (pramipexole) = has not been on this   rotigotine (neupro) - has not been on this     Lower Urinary Tract Symptoms: Pt was recently prescribed alfuzosin 10mg daily on 8/2 by Urology.  Per their note, \"4-5 times per month he will wake up soaked. During the day he only voids about 2 times. Symptoms have been worsening for the past 3 months. He notes that his tamsulosin 0.4mg (Flomax) was stopped for some reason - possibly due to fall risk. Given that he had previously been doing better from a urinary standpoint with tamsulosin, however had some increased falling risk with this, we discussed the possibility for trial of alfuzosin 10 mg daily.  This is also a selective alpha-blocker with less orthostatic hypotension\"     Today, pt reported that new medication \"has been working.\"  He has noticed a \"steadier stream\" in the morning and thinks he is voiding more frequently during the day. He has noticed \"less frequent\" overnight incontinence, though could not quantify.     Mental Health: Pt has been diagnosed with depression and had hallucinations.  Pt is taking venlafaxine ER 150mg+75mg (225mg) at 8am, trazodone 50mg every morning at 8am and 100mg at bedtime. hydroxyzine 50mg as needed a few times per week, clozapine 50mg at bedtime (x 12-18months).  He reported feeling that medications \"are very effective\" and feels " "that his mood is stable. Sometimes feels increased anxiety in the afternoons when there are more people around his residence and \"are asking a lot of questions.\"  He reported that clozapine has been helpful and hasn't had any hallucinations since starting the med. Gets [facility] blood draws [facility].     GERD: Current medications include: Protonix (pantoprazole) 40mg once daily. Patient reports noticing some breakthrough symptoms based on what he's eaten (especially spicy foods), but otherwise feels symptoms are well managed.     Hyperlipidemia: Current therapy includes atorvastatin 40mg daily.  Patient reports no significant myalgias or other side effects.  The ASCVD Risk score (Michaelrober SARMIENTO Jr., et al., 2013) failed to calculate for the following reasons:    The patient has a prior MI or stroke diagnosis        Recent Labs   Lab Test 07/12/20  0938 01/06/20  0735   CHOL 121 214*   HDL 58 44   LDL 44 120   TRIG 94 252*      Supplements: Pt takes multivitamin daily,Vitamin D 5000 units daily, Vitamin C 500mg once daily, thiamine 100mg daily  Vitamin D Deficiency Screening Results:        Lab Results   Component Value Date     VITDT 35 07/12/2020      Skin: Pt has hydrocortisone and triamcinolone cream for flaky skin on face, which he finds helpful when alternates.  No irritation.    ***:   ***    Today's Vitals: There were no vitals taken for this visit.  ----------------  {CAROL?:082170}    I spent {mt total time 3:121522} with this patient today{MTMpartdbillingquestion:055780}. { :413266}. A copy of the visit note was provided to the patient's provider(s).    A summary of these recommendations {GIVEN/NOT GIVEN:673456}.    ***    Telemedicine Visit Details  Type of service:  {telemedvisitmtm:292045::\"Telephone visit\"}  Start Time: {video/phone visit start time:152948}  End Time: {video/phone visit end time:152948}     Medication Therapy Recommendations  No medication therapy recommendations to display "     Medication Therapy Management (MTM) Encounter    ASSESSMENT:                            Medication Adherence/Access: {adherencechoices:059075}      Hyperlipidemia     atorvastatin 40mg daily  Patient reports no significant myalgias or other side effects.    Parkinson's Disease:  Movement Disorder-related Medications                   8 AM 12 PM 4 pm 8pm At bedtime  prn   Amantadine 100 mg  1 1           Carbidopa/levodopa IR  mg 3 3 3     0.5-1 twice daily     Carbidopa/levodopa CR  mg       2       Clonazepam 2 mg 1   1         Clonazepam 1 mg   1       1     Patient met with Neurology on 3/22/24, at which time:  - Increase carbidopa/levodopa IR. Week 1 take 2.5 tabs three times daily then week 2 take 3 tabs three times daily and continue on this dose. If you notice side effect (dyskinesia, etc) return to previous dose. Stop at lowest effective dose.  - Increase carbidopa/levodopa CR 50/200 mg to 2 tabs at bedtime     Today, he reported that stiffness overnight and first thing in the morning is much improved since increasing the carbidopa/levodopa ER dose. During the daytime, he feels better overall, though couldn't say specifically what is different. He does notice some new dyskinesia after taking the 4pm dose that lasts up to 90 minutes. He has not had any muscle spasms since he was in the ER 3/17/24. Denied any freezing.   He is falling asleep ok and generally stays asleep during the night.      Lower Urinary Tract Symptoms:   -alfuzosin ER 10mg daily    States that he may wet the bed overnight twice weekly due to not waking up and he doesn't realize that he's urinated. During the day, he has a very small urine stream. Saw Urology in 11/2023 and note indicates stable and to continue current med. He thinks overnight incontinence might be happening a bit more frequently now. Next follow up 11/2024.     Mental Health/sleep:   Continue with therapist. Encouraged him to discuss potential  antidepressant changes if low mood persists. Continue current medication.    GERD:   Stable. Continue current medication.    Neuropathy:    Stable. Continue current medication.    DVT/PE prophylaxis/SVT:  Stable. Continue current medication.    IBS:    Stable. Continue current medication.    Supplements:   Stable. Continue current medication.    PLAN:                            -Reduce 4pm dose from 3 to 2.5 tablets    Follow-up: 6/6/24    SUBJECTIVE/OBJECTIVE:                          Benjamin Mathews is a 58 year old male called for an initial visit. He was referred to me from Dr. Carlos, Neurology.    Reason for visit: med review.    Allergies/ADRs: Reviewed in chart  Past Medical History: Reviewed in chart  Tobacco: He reports that he has been smoking cigars and cigarettes. He has never used smokeless tobacco.Nicotine/Tobacco Cessation Plan  Information offered: Patient not interested at this time    Alcohol: not currently using    Medication Adherence/Access: no issues reported    Hyperlipidemia    atorvastatin 40mg daily  Patient reports no significant myalgias or other side effects.    Parkinson's Disease:  Movement Disorder-related Medications                   8 AM 12 PM 4 pm 8pm At bedtime  prn   Amantadine 100 mg  1 1           Carbidopa/levodopa IR  mg 3 3 3     0.5-1 twice daily     Carbidopa/levodopa CR  mg       2       Clonazepam 2 mg 1   1         Clonazepam 1 mg   1       1     Patient met with Neurology on 3/22/24, at which time:  - Increase carbidopa/levodopa IR. Week 1 take 2.5 tabs three times daily then week 2 take 3 tabs three times daily and continue on this dose. If you notice side effect (dyskinesia, etc) return to previous dose. Stop at lowest effective dose.  - Increase carbidopa/levodopa CR 50/200 mg to 2 tabs at bedtime     Today, he reported that stiffness overnight and first thing in the morning is much improved since increasing the carbidopa/levodopa ER dose. During the  "daytime, he feels better overall, though couldn't say specifically what is different. He does notice some new dyskinesia after taking the 4pm dose that lasts up to 90 minutes. He has not had any muscle spasms since he was in the ER 3/17/24. Denied any freezing.   He is falling asleep ok and generally stays asleep during the night.      Lower Urinary Tract Symptoms:   -alfuzosin ER 10mg daily    States that he may wet the bed overnight twice weekly due to not waking up and he doesn't realize that he's urinated. During the day, he has a very small urine stream. Saw Urology in 11/2023 and note indicates stable and to continue current med. He thinks overnight incontinence might be happening a bit more frequently now. Next follow up 11/2024.     Mental Health/sleep:   -hydroxyzine 25mg prn- not very often  -venlafaxine ER 300mg daily  -trazodone 50mg every morning and 100mg at bedtime  -clozapine 50mg at bedtime    Reported that mood is \"not that great, but alright.\" Has discussed with psychiatry recently, who increased trazodone a bit. Thinks he has another appointment in a couple months. Sees therapist every 2 weeks. Gets monthly blood draws for clozapine monitoring. Denied any hallucinations. Reportedly feels stable. No side effects.    GERD:   Pantoprazole 40 mg once daily   Patient feels that current regimen is effective.    Neuropathy:    -gabapentin 800mg TID  Med is helpful, though feet still feel \"mushy and tingly\" at times. No concerns.    DVT/PE prophylaxis/SVT:  -Eliquis 5mg BID  -metoprolol ER 12.5mg BID  H/o PE and DVT. No concerns.    IBS:    -Linzess 290mcg daily as needed  -lactulose 15mL BID-most helpful  Daily BM's. Feels stable. No issues.     Supplements:   -Vitamin C 500mg daily  -Vitamin D 5000units daily  -multivitamin daily  No concerns.   ----------------  Post Discharge Medication Reconciliation Status: discharge medications reconciled, continue medications without change.    I spent 30 minutes " with this patient today. All changes were made via collaborative practice agreement with Ching Carlos. A copy of the visit note was provided to the patient's provider(s).    A summary of these recommendations was sent via clinic portal.    Cary Sol PharmD  Medication Therapy Management Pharmacist  [facility]    Telemedicine Visit Details  Type of service:  Telephone visit  Start Time:  12:30pm  End Time:  1:00pm     Medication Therapy Recommendations  No medication therapy recommendations to display       Again, thank you for allowing me to participate in the care of your patient.      Sincerely,    Cary Sol PharmD

## 2024-04-11 DIAGNOSIS — S82.851D CLOSED TRIMALLEOLAR FRACTURE OF RIGHT ANKLE WITH ROUTINE HEALING, SUBSEQUENT ENCOUNTER: Primary | ICD-10-CM

## 2024-04-24 ENCOUNTER — OFFICE VISIT (OUTPATIENT)
Dept: PHYSICAL MEDICINE AND REHAB | Facility: CLINIC | Age: 59
End: 2024-04-24
Payer: COMMERCIAL

## 2024-04-24 DIAGNOSIS — G20.A2 PARKINSON'S DISEASE WITHOUT DYSKINESIA, WITH FLUCTUATING MANIFESTATIONS (H): ICD-10-CM

## 2024-04-24 DIAGNOSIS — G24.3 CERVICAL DYSTONIA: Primary | ICD-10-CM

## 2024-04-24 DIAGNOSIS — G24.8 SEGMENTAL DYSTONIA: ICD-10-CM

## 2024-04-24 PROCEDURE — 95874 GUIDE NERV DESTR NEEDLE EMG: CPT | Performed by: PHYSICAL MEDICINE & REHABILITATION

## 2024-04-24 PROCEDURE — 64616 CHEMODENERV MUSC NECK DYSTON: CPT | Mod: RT | Performed by: PHYSICAL MEDICINE & REHABILITATION

## 2024-04-24 PROCEDURE — 64642 CHEMODENERV 1 EXTREMITY 1-4: CPT | Performed by: PHYSICAL MEDICINE & REHABILITATION

## 2024-04-24 PROCEDURE — 64643 CHEMODENERV 1 EXTREM 1-4 EA: CPT | Performed by: PHYSICAL MEDICINE & REHABILITATION

## 2024-04-24 NOTE — PROGRESS NOTES
[facility]    PM&R CLINIC NOTE  BOTULINUM TOXIN PROCEDURE      HPI  Chief Complaint   Patient presents with    Botox     Benjamin Mathews is a 59 year old male who was referred to our clinic for more evaluation of his dystonia and trial of botulinum toxin injections (referral from Dr. Suazo). He was seen on 10/6/22 as initial consult; please see the consult note for details. She has history of Parkinsonism and is followed by Dr. Perry. He was referred to palliative care team and has been followed by them since then.       Background information   --saw urology team in Nov 2022 for LUTS likely due to BPH and was started on alfuzosin  --saw Dr. Carlos in Jan 2023; it was recommended to continue carbidopa/levodopa and amantadine. He is also on clonazepam prn. Ok to continue botox injections.   --had neuropsych testing 1/13/23; reviewed the results.    Was admitted on 5/20/23 after a fall resulting in right ankle fracture s/p closed reduction and external fixation by ortho team. He has been at [facility] since then.     Was followed by podiatry team for ulcer on L 2nd toe, which was closed/healed 12/29/23.    SINCE LAST VISIT  Benjamin Mathews was last seen here in clinic on 1/22/24    Patient reports the following new medical problems since last visit:    --Saw podiatry for Onychomycosis and Onychocryptosis bilaterally 2/29 - was treated.    --Saw Dr. Carlos 2/23 for healed closed R trimalleolar ankle/pilon fracture-dislocation -  reviewed recs as below   Cont WBAT at this time with AFO and walker  Ambulate daily  Cont ROM exercises especially dorsiflexion  Signs and symptoms that should prompt immediate medical attention, and the importance of this, were discussed.  Otherwise follow up 3 mos with weightbearing R ankle xrays  Pt askes when he might be able to come out of AFO - I recommend continue for now, can discuss potential timing of transitioning out  "process at next appt      --He had two ED visit sin March due to worsening muscle spasms in her RUE, RLE and neck/shoulder area. He unfortunately had a very bad experience and had significantly long wait time with no plan. He noted that he was seen by the ED physician but the nursing team didn't pay enough attention and didn't discharge home for hours.     --Saw Dr. Carlos 3/22; reviewed her excellent note and recs.     He was overall doing ok today. He is off the CAM boot and slowly improving. He uses his 4ww to talk to the bathroom during the day. He also asks the nursing staff to walk with him x2/day.     Denied any side effects from the injections last time. The benefits wore off in 2.5-3 months. When botox is working, his neck movement and R shoulder ROM is a lot more fluid and he has less \"tugging sensation\" in his pec area and neck.     Spasms have been worse as above. His R ankle/foot turns inward and down and limits his walking.     He thinks he is very sensitive and doesn't tolerate higher doses of sinemet well. Wants to talk to the pharmacy team and adjust the dose again.         PHYSICAL EXAM  VS: There were no vitals taken for this visit.   GEN: Pleasant and cooperative, in no acute distress  HEENT: Moist mucous membranes     General: Sitting in his power wheelchair leaning to the right with his neck tilted to the right and rotated to the left  C-spine range of motion was moderately limited with extension and left lateral bending, he was also mildly limited in all other directionsReduced left sided rotation as compared to the right side. Restricted extension of the neck. Tight right SCM   Continues to have left sided weakness    Increased tone at R shoulder add and EF; more dystonic in action and less velocity dependent tone.   Same in RLE - increased tone in R PF and inversion       ALLERGIES  Allergies   Allergen Reactions    Amantadine Other (See Comments)     Other reaction(s): " Hallucinations  Hallucinations/ lost self control/gambling.     halluicnations  Hallucinations/ lost self control/gambling.     hallucinates  halluicnations  hallucinates  Hallucinations/ lost self control/gambling.       Quetiapine GI Disturbance, Diarrhea and Other (See Comments)     Diarrhea    Diarrhea  Other reaction(s): GI Disturbance  Diarrhea      Duloxetine Other (See Comments)     suicidal  suicidal         CURRENT MEDICATIONS    Current Outpatient Medications:     acetaminophen (TYLENOL) 325 MG tablet, Take 1-2 tablets (325-650 mg) by mouth every 8 hours as needed for mild pain, Disp: 100 tablet, Rfl: 0    alfuzosin ER (UROXATRAL) 10 MG 24 hr tablet, Take 1 tablet (10 mg) by mouth daily, Disp: 30 tablet, Rfl: 11    amantadine (SYMMETREL) 100 MG capsule, Take 1 capsule (100 mg) by mouth 2 times daily, Disp: 60 capsule, Rfl: 11    ammonium lactate (LAC-HYDRIN) 12 % external lotion, Apply topically daily as needed, Disp: , Rfl:     apixaban ANTICOAGULANT (ELIQUIS) 5 MG tablet, Take 5 mg by mouth 2 times daily, Disp: , Rfl:     atorvastatin (LIPITOR) 40 MG tablet, Take 1 tablet (40 mg) by mouth every evening, Disp: 30 tablet, Rfl: 0    bisacodyl (DULCOLAX) 10 MG suppository, Place 1 suppository (10 mg) rectally daily as needed for constipation, Disp: 10 suppository, Rfl: 0    carbidopa-levodopa (SINEMET CR)  MG CR tablet, Take 2 tablets by mouth at bedtime, Disp: 60 tablet, Rfl: 11    carbidopa-levodopa (SINEMET)  MG tablet, Week 1 take 2.5 tabs three times daily at 8000, 1200, 1600 then week 2 take 3 tabs three times daily and continue on this dose. Stop at lowest effective dose. Okay to take 0.5-1 tab as needed twice daily = up to 11 tabs daily, Disp: 330 tablet, Rfl: 11    cholecalciferol (VITAMIN D3) 125 mcg (5000 units) capsule, Take 1 capsule (125 mcg) by mouth daily, Disp: 30 capsule, Rfl: 0    clonazePAM (KLONOPIN) 1 MG tablet, Take 1 tablet scheduled at noon, may take 1 additional tablet  PRN, Disp: 60 tablet, Rfl: 5    clonazePAM (KLONOPIN) 2 MG tablet, Take 1 tablet (2 mg) by mouth 2 times daily Take at 8am and 4 pm, Disp: 60 tablet, Rfl: 5    cloZAPine (CLOZARIL) 50 MG tablet, Take 1 tablet (50 mg) by mouth At Bedtime, Disp: 30 tablet, Rfl: 0    diclofenac (VOLTAREN) 1 % topical gel, Apply 2 g topically 3 times daily as needed for moderate pain, Disp: 150 g, Rfl: 0    econazole nitrate 1 % external cream, Apply topically daily To toes and toenails., Disp: 85 g, Rfl: 5    gabapentin (NEURONTIN) 800 MG tablet, Take 1 tablet (800 mg) by mouth 3 times daily, Disp: 90 tablet, Rfl: 0    hydrOXYzine (ATARAX) 25 MG tablet, Take 2 tablets (50 mg) by mouth every 6 hours as needed for anxiety (up to 3 timrd daily), Disp: 20 tablet, Rfl: 0    ketoconazole (NIZORAL) 2 % external cream, Apply topically 2 times daily, Disp: , Rfl:     ketoconazole (NIZORAL) 2 % external shampoo, Apply topically once a week On Wednesdays, Disp: , Rfl:     lactulose (CHRONULAC) 10 GM/15ML solution, Take 15 mLs by mouth 2 times daily, Disp: 473 mL, Rfl: 11    levofloxacin (LEVAQUIN) 500 MG tablet, Take 1 tablet (500 mg) by mouth daily for 10 days For infection., Disp: 10 tablet, Rfl: 0    linaclotide (LINZESS) 290 MCG capsule, Take 1 capsule (290 mcg) by mouth daily as needed (IBS), Disp: 90 capsule, Rfl: 3    metoprolol succinate ER (TOPROL XL) 25 MG 24 hr tablet, Take 0.5 tablets (12.5 mg) by mouth 2 times daily, Disp: 30 tablet, Rfl: 0    multivitamin, therapeutic (THERA-VIT) TABS tablet, Take 1 tablet by mouth daily, Disp: 30 tablet, Rfl: 0    pantoprazole (PROTONIX) 40 MG EC tablet, Take 1 tablet (40 mg) by mouth daily Take 1 tablet (40mg) by mouth daily at 8am, Disp: 30 tablet, Rfl: 0    traZODone (DESYREL) 100 MG tablet, Take 100 mg by mouth At Bedtime, Disp: , Rfl:     traZODone (DESYREL) 50 MG tablet, Take 1 tablet (50 mg) by mouth every morning, Disp: 30 tablet, Rfl: 0    venlafaxine (EFFEXOR XR) 150 MG 24 hr capsule,  Take 2 capsules (300 mg) by mouth daily, Disp: 60 capsule, Rfl: 0    vitamin C (ASCORBIC ACID) 500 MG tablet, Take 1 tablet (500 mg) by mouth daily, Disp: 30 tablet, Rfl: 0    Current Facility-Administered Medications:     botulinum toxin type A (BOTOX) 100 units injection 400 Units, 400 Units, Intramuscular, Q90 Days, Ember Arita MD, 270 Units at 24 1126       BOTULINUM NEUROTOXIN INJECTION PROCEDURES    VERIFICATION OF PATIENT IDENTIFICATION AND PROCEDURE     Initials   Patient Name PS   Patient  PS   Procedure Verified by: PS     Prior to the start of the procedure and with procedural staff participation, I verbally confirmed the patient s identity using two indicators, relevant allergies, that the procedure was appropriate and matched the consent or emergent situation, and that the correct equipment/implants were available. Immediately prior to starting the procedure I conducted the Time Out with the procedural staff and re-confirmed the patient s name, procedure, and site/side. (The Joint Commission universal protocol was followed.)  Yes    Sedation (Moderate or Deep): None    ABOVE ASSESSMENTS PERFORMED BY  Ember Arita MD      INDICATIONS FOR PROCEDURES  Benjamin Mathews is a 59 year old patient with cervical and segmental dystonia secondary to the diagnosis of Parkinson's disease. His baseline symptoms have been recalcitrant to oral medications and conservative therapy.  He is here today for reinjection with Botox.    GOAL OF PROCEDURE  The goal of this procedure is to increase active range of motion, improve volitional motor control, decrease pain  and enhance functional independence.      TOTAL DOSE ADMINISTERED  Dose Administered: 260 units  Botox (Botulinum Toxin Type A) 200 units  1:1 dilution -  Unavoidable Drug Waste: Yes  Amount of drug waste (mL): 40 units.  Reason for waste:  Single use vial  Diluent Used:  Preservative Free Normal Saline  Total Volume of Diluent Used: 3 ml  See  MAR  NDC #: Botox 100u ([address]-44)      CONSENT  The risks, benefits, and treatment options were discussed with Benjamin Mathews and he agreed to proceed.    Written consent was obtained by PS.     EQUIPMENT USED  Needle-35mm stimulating/recording  EMG/NCS Machine    SKIN PREPARATION  Skin preparation was performed using an alcohol wipe.    GUIDANCE DESCRIPTION  Electro-myographic guidance was necessary throughout the procedure to accurately identify all areas of dystonic muscles while avoiding injection of non-dystonic muscles and underlying muscles , neighboring nerves and nearby vascular structures.     AREA/MUSCLE INJECTED  Right neck/shoulder girdle  SCM 10 units at 1 site  Splenius capitis 15 units at 1 site  Splenius cervicis 15 units at 1 site  Levator a scap insertion 40 units at 1 site  Levator at neck 10 units at 1 site   Lateral trap 20 units at 2 sites   Pectoralis major 30 units at 1 site     Right upper extremity   Biceps 20 units at 1 site    Right lower extremity  Flexor digitorum longus 40 units at 1 site  Gastrocnemius 30 units at 2 site  Posterior tibialis 30 units at 1 site      RESPONSE TO PROCEDURE  Benjamin Mathews tolerated the procedure well and there were no immediate complications. He was allowed to recover for an appropriate period of time and was discharged home in stable condition.    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  Osteopenia (DXA Lowest T-Score -1.8) likely due to disuse c/b limited functional mobility leading to increased fracture risk - assessment 5/2023     DOI: 05/20/2023, GLF, syncopal event, closed R trimalleolar ankle / pilon fracture-dislocation.  DOS: 05/21/2023, closed reduction, application of spanning external fixator.  DOS: 06/15/2023, ORIF R ankle/pilon, removal of external  fixator.      We have discussed treatment options for dystonia including therapies, medications and interventions.  He has responded very well to Klonopin but his symptoms are not well controlled especially spasmodic torticollis and dystonia in the distal part of his right lower extremity.  Adding another oral agent does not seem appropriate because 1-he did not respond to baclofen and 2- risk of polypharmacy and side effects.  He would benefit from physical therapy as discussed before.      Work-up: None     Therapy/equipment/braces: completed therapies for now; continue HEP and walk as much as possible and safe     Medications: No changes today; currently on clonazepam, sinemet and amantadine by Dr. Carlos.     Interventions: started the first round at 130 units and increased the dose to 270 units on 2/1/2023; kept on the same dose in April 2023. Decreased the total dose to 100 in July 2023 with no injections in RLE due to recent fracture. Increased the dose 10/24/23 from 100 to140 units with the goal of increasing effectiveness and prolonging duration of benefit of Botox injections. Increased dose to 170 units on 1/22/2024 added 30 units to right FDL to help facilitate comfort and AFO tolerance when he receives it. Today, increased from 170 to 260, added right biceps to help with his spasms and tone, also added PF and inverter muscles as above. He is very deconditioned and just came out of CAM boot so need to be careful with dosing.     Referral / follow up with other providers: should repeat DEXA in 2 years 5/2025 and will need a new referral for bone health eval at that point. He will continue to follow-up with palliative care team and movement disorder clinic. Sent a message to [place] per his request to see him for follow up regarding adjusting sinemet dose.     Follow up: in 12 weeks         Ember Arita MD  Physical Medicine & Rehabilitation

## 2024-04-24 NOTE — LETTER
4/24/2024       RE: Benjamin Mathews  [address]  [facility] [address]       Dear Colleague,    Thank you for referring your patient, Benjamin Mathews, to the [facility] PHYSICAL MEDICINE AND REHABILITATION CLINIC [place] at [facility]. Please see a copy of my visit note below.      [facility] CLINIC    PM&R CLINIC NOTE  BOTULINUM TOXIN PROCEDURE      HPI  Chief Complaint   Patient presents with    Botox     Benjamin Mathews is a 59 year old male who was referred to our clinic for more evaluation of his dystonia and trial of botulinum toxin injections (referral from Dr. Suazo). He was seen on 10/6/22 as initial consult; please see the consult note for details. She has history of Parkinsonism and is followed by Dr. Perry. He was referred to palliative care team and has been followed by them since then.       Background information   --saw urology team in Nov 2022 for LUTS likely due to BPH and was started on alfuzosin  --saw Dr. Carlos in Jan 2023; it was recommended to continue carbidopa/levodopa and amantadine. He is also on clonazepam prn. Ok to continue botox injections.   --had neuropsych testing 1/13/23; reviewed the results.    Was admitted on 5/20/23 after a fall resulting in right ankle fracture s/p closed reduction and external fixation by ortho team. He has been at [facility] since then.     Was followed by podiatry team for ulcer on L 2nd toe, which was closed/healed 12/29/23.    SINCE LAST VISIT  Benjamin Mathews was last seen here in clinic on 1/22/24    Patient reports the following new medical problems since last visit:    --Saw podiatry for Onychomycosis and Onychocryptosis bilaterally 2/29 - was treated.    --Saw Dr. Carlos 2/23 for healed closed R trimalleolar ankle/pilon fracture-dislocation -  reviewed recs as below   Cont WBAT at this time with AFO and walker  Ambulate daily  Cont ROM  "exercises especially dorsiflexion  Signs and symptoms that should prompt immediate medical attention, and the importance of this, were discussed.  Otherwise follow up 3 mos with weightbearing R ankle xrays  Pt askes when he might be able to come out of AFO - I recommend continue for now, can discuss potential timing of transitioning out process at next appt      --He had two ED visit sin March due to worsening muscle spasms in her RUE, RLE and neck/shoulder area. He unfortunately had a very bad experience and had significantly long wait time with no plan. He noted that he was seen by the ED physician but the nursing team didn't pay enough attention and didn't discharge home for hours.     --Saw Dr. Carlos 3/22; reviewed her excellent note and recs.     He was overall doing ok today. He is off the CAM boot and slowly improving. He uses his 4ww to talk to the bathroom during the day. He also asks the nursing staff to walk with him x2/day.     Denied any side effects from the injections last time. The benefits wore off in 2.5-3 months. When botox is working, his neck movement and R shoulder ROM is a lot more fluid and he has less \"tugging sensation\" in his pec area and neck.     Spasms have been worse as above. His R ankle/foot turns inward and down and limits his walking.     He thinks he is very sensitive and doesn't tolerate higher doses of sinemet well. Wants to talk to the pharmacy team and adjust the dose again.         PHYSICAL EXAM  VS: There were no vitals taken for this visit.   GEN: Pleasant and cooperative, in no acute distress  HEENT: Moist mucous membranes     General: Sitting in his power wheelchair leaning to the right with his neck tilted to the right and rotated to the left  C-spine range of motion was moderately limited with extension and left lateral bending, he was also mildly limited in all other directionsReduced left sided rotation as compared to the right side. Restricted extension of the " neck. Tight right SCM   Continues to have left sided weakness    Increased tone at R shoulder add and EF; more dystonic in action and less velocity dependent tone.   Same in RLE - increased tone in R PF and inversion       ALLERGIES  Allergies   Allergen Reactions    Amantadine Other (See Comments)     Other reaction(s): Hallucinations  Hallucinations/ lost self control/gambling.     halluicnations  Hallucinations/ lost self control/gambling.     hallucinates  halluicnations  hallucinates  Hallucinations/ lost self control/gambling.       Quetiapine GI Disturbance, Diarrhea and Other (See Comments)     Diarrhea    Diarrhea  Other reaction(s): GI Disturbance  Diarrhea      Duloxetine Other (See Comments)     suicidal  suicidal         CURRENT MEDICATIONS    Current Outpatient Medications:     acetaminophen (TYLENOL) 325 MG tablet, Take 1-2 tablets (325-650 mg) by mouth every 8 hours as needed for mild pain, Disp: 100 tablet, Rfl: 0    alfuzosin ER (UROXATRAL) 10 MG 24 hr tablet, Take 1 tablet (10 mg) by mouth daily, Disp: 30 tablet, Rfl: 11    amantadine (SYMMETREL) 100 MG capsule, Take 1 capsule (100 mg) by mouth 2 times daily, Disp: 60 capsule, Rfl: 11    ammonium lactate (LAC-HYDRIN) 12 % external lotion, Apply topically daily as needed, Disp: , Rfl:     apixaban ANTICOAGULANT (ELIQUIS) 5 MG tablet, Take 5 mg by mouth 2 times daily, Disp: , Rfl:     atorvastatin (LIPITOR) 40 MG tablet, Take 1 tablet (40 mg) by mouth every evening, Disp: 30 tablet, Rfl: 0    bisacodyl (DULCOLAX) 10 MG suppository, Place 1 suppository (10 mg) rectally daily as needed for constipation, Disp: 10 suppository, Rfl: 0    carbidopa-levodopa (SINEMET CR)  MG CR tablet, Take 2 tablets by mouth at bedtime, Disp: 60 tablet, Rfl: 11    carbidopa-levodopa (SINEMET)  MG tablet, Week 1 take 2.5 tabs three times daily at 8000, 1200, 1600 then week 2 take 3 tabs three times daily and continue on this dose. Stop at lowest effective dose.  Okay to take 0.5-1 tab as needed twice daily = up to 11 tabs daily, Disp: 330 tablet, Rfl: 11    cholecalciferol (VITAMIN D3) 125 mcg (5000 units) capsule, Take 1 capsule (125 mcg) by mouth daily, Disp: 30 capsule, Rfl: 0    clonazePAM (KLONOPIN) 1 MG tablet, Take 1 tablet scheduled at noon, may take 1 additional tablet PRN, Disp: 60 tablet, Rfl: 5    clonazePAM (KLONOPIN) 2 MG tablet, Take 1 tablet (2 mg) by mouth 2 times daily Take at 8am and 4 pm, Disp: 60 tablet, Rfl: 5    cloZAPine (CLOZARIL) 50 MG tablet, Take 1 tablet (50 mg) by mouth At Bedtime, Disp: 30 tablet, Rfl: 0    diclofenac (VOLTAREN) 1 % topical gel, Apply 2 g topically 3 times daily as needed for moderate pain, Disp: 150 g, Rfl: 0    econazole nitrate 1 % external cream, Apply topically daily To toes and toenails., Disp: 85 g, Rfl: 5    gabapentin (NEURONTIN) 800 MG tablet, Take 1 tablet (800 mg) by mouth 3 times daily, Disp: 90 tablet, Rfl: 0    hydrOXYzine (ATARAX) 25 MG tablet, Take 2 tablets (50 mg) by mouth every 6 hours as needed for anxiety (up to 3 timrd daily), Disp: 20 tablet, Rfl: 0    ketoconazole (NIZORAL) 2 % external cream, Apply topically 2 times daily, Disp: , Rfl:     ketoconazole (NIZORAL) 2 % external shampoo, Apply topically once a week On Wednesdays, Disp: , Rfl:     lactulose (CHRONULAC) 10 GM/15ML solution, Take 15 mLs by mouth 2 times daily, Disp: 473 mL, Rfl: 11    levofloxacin (LEVAQUIN) 500 MG tablet, Take 1 tablet (500 mg) by mouth daily for 10 days For infection., Disp: 10 tablet, Rfl: 0    linaclotide (LINZESS) 290 MCG capsule, Take 1 capsule (290 mcg) by mouth daily as needed (IBSc), Disp: 90 capsule, Rfl: 3    metoprolol succinate ER (TOPROL XL) 25 MG 24 hr tablet, Take 0.5 tablets (12.5 mg) by mouth 2 times daily, Disp: 30 tablet, Rfl: 0    multivitamin, therapeutic (THERA-VIT) TABS tablet, Take 1 tablet by mouth daily, Disp: 30 tablet, Rfl: 0    pantoprazole (PROTONIX) 40 MG EC tablet, Take 1 tablet (40 mg) by  mouth daily Take 1 tablet (40mg) by mouth daily at 8am, Disp: 30 tablet, Rfl: 0    traZODone (DESYREL) 100 MG tablet, Take 100 mg by mouth At Bedtime, Disp: , Rfl:     traZODone (DESYREL) 50 MG tablet, Take 1 tablet (50 mg) by mouth every morning, Disp: 30 tablet, Rfl: 0    venlafaxine (EFFEXOR XR) 150 MG 24 hr capsule, Take 2 capsules (300 mg) by mouth daily, Disp: 60 capsule, Rfl: 0    vitamin C (ASCORBIC ACID) 500 MG tablet, Take 1 tablet (500 mg) by mouth daily, Disp: 30 tablet, Rfl: 0    Current Facility-Administered Medications:     botulinum toxin type A (BOTOX) 100 units injection 400 Units, 400 Units, Intramuscular, Q90 Days, Ember Arita MD, 270 Units at 24 1126       BOTULINUM NEUROTOXIN INJECTION PROCEDURES    VERIFICATION OF PATIENT IDENTIFICATION AND PROCEDURE     Initials   Patient Name PS   Patient  PS   Procedure Verified by: PS     Prior to the start of the procedure and with procedural staff participation, I verbally confirmed the patient s identity using two indicators, relevant allergies, that the procedure was appropriate and matched the consent or emergent situation, and that the correct equipment/implants were available. Immediately prior to starting the procedure I conducted the Time Out with the procedural staff and re-confirmed the patient s name, procedure, and site/side. (The Joint Commission universal protocol was followed.)  Yes    Sedation (Moderate or Deep): None    ABOVE ASSESSMENTS PERFORMED BY  Ember Arita MD      INDICATIONS FOR PROCEDURES  Benjamin Mathews is a 59 year old patient with cervical and segmental dystonia secondary to the diagnosis of Parkinson's disease. His baseline symptoms have been recalcitrant to oral medications and conservative therapy.  He is here today for reinjection with Botox.    GOAL OF PROCEDURE  The goal of this procedure is to increase active range of motion, improve volitional motor control, decrease pain  and enhance functional  independence.      TOTAL DOSE ADMINISTERED  Dose Administered: 260 units  Botox (Botulinum Toxin Type A) 200 units  1:1 dilution -  Unavoidable Drug Waste: Yes  Amount of drug waste (mL): 40 units.  Reason for waste:  Single use vial  Diluent Used:  Preservative Free Normal Saline  Total Volume of Diluent Used: 3 ml  See MAR  NDC #: Botox 100u ([address]-66)      CONSENT  The risks, benefits, and treatment options were discussed with Benjamin Mathews and he agreed to proceed.    Written consent was obtained by PS.     EQUIPMENT USED  Needle-35mm stimulating/recording  EMG/NCS Machine    SKIN PREPARATION  Skin preparation was performed using an alcohol wipe.    GUIDANCE DESCRIPTION  Electro-myographic guidance was necessary throughout the procedure to accurately identify all areas of dystonic muscles while avoiding injection of non-dystonic muscles and underlying muscles , neighboring nerves and nearby vascular structures.     AREA/MUSCLE INJECTED  Right neck/shoulder girdle  SCM 10 units at 1 site  Splenius capitis 15 units at 1 site  Splenius cervicis 15 units at 1 site  Levator a scap insertion 40 units at 1 site  Levator at neck 10 units at 1 site   Lateral trap 20 units at 2 sites   Pectoralis major 30 units at 1 site     Right upper extremity   Biceps 20 units at 1 site    Right lower extremity  Flexor digitorum longus 40 units at 1 site  Gastrocnemius 30 units at 2 site  Posterior tibialis 30 units at 1 site      RESPONSE TO PROCEDURE  Benjamin Mathews tolerated the procedure well and there were no immediate complications. He was allowed to recover for an appropriate period of time and was discharged home in stable condition.    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  Osteopenia (DXA Lowest T-Score -1.8) likely due to disuse c/b limited  functional mobility leading to increased fracture risk - assessment 5/2023     DOI: 05/20/2023, GLF, syncopal event, closed R trimalleolar ankle / pilon fracture-dislocation.  DOS: 05/21/2023, closed reduction, application of spanning external fixator.  DOS: 06/15/2023, ORIF R ankle/pilon, removal of external fixator.      We have discussed treatment options for dystonia including therapies, medications and interventions.  He has responded very well to Klonopin but his symptoms are not well controlled especially spasmodic torticollis and dystonia in the distal part of his right lower extremity.  Adding another oral agent does not seem appropriate because 1-he did not respond to baclofen and 2- risk of polypharmacy and side effects.  He would benefit from physical therapy as discussed before.      Work-up: None     Therapy/equipment/braces: completed therapies for now; continue HEP and walk as much as possible and safe     Medications: No changes today; currently on clonazepam, sinemet and amantadine by Dr. Carlos.     Interventions: started the first round at 130 units and increased the dose to 270 units on 2/1/2023; kept on the same dose in April 2023. Decreased the total dose to 100 in July 2023 with no injections in RLE due to recent fracture. Increased the dose 10/24/23 from 100 to140 units with the goal of increasing effectiveness and prolonging duration of benefit of Botox injections. Increased dose to 170 units on 1/22/2024 added 30 units to right FDL to help facilitate comfort and AFO tolerance when he receives it. Today, increased from 170 to 260, added right biceps to help with his spasms and tone, also added PF and inverter muscles as above. He is very deconditioned and just came out of CAM boot so need to be careful with dosing.     Referral / follow up with other providers: should repeat DEXA in 2 years 5/2025 and will need a new referral for bone health eval at that point. He will continue to  follow-up with palliative care team and movement disorder clinic. Sent a message to [place] per his request to see him for follow up regarding adjusting sinemet dose.     Follow up: in 12 weeks         Again, thank you for allowing me to participate in the care of your patient.      Sincerely,    Ember Arita MD

## 2024-04-26 ENCOUNTER — APPOINTMENT (OUTPATIENT)
Dept: ULTRASOUND IMAGING | Facility: CLINIC | Age: 59
End: 2024-04-26
Attending: FAMILY MEDICINE
Payer: COMMERCIAL

## 2024-04-26 ENCOUNTER — HOSPITAL ENCOUNTER (EMERGENCY)
Facility: CLINIC | Age: 59
Discharge: HOME | End: 2024-04-26
Attending: FAMILY MEDICINE | Admitting: FAMILY MEDICINE
Payer: COMMERCIAL

## 2024-04-26 VITALS
SYSTOLIC BLOOD PRESSURE: 115 MMHG | RESPIRATION RATE: 15 BRPM | OXYGEN SATURATION: 96 % | DIASTOLIC BLOOD PRESSURE: 57 MMHG | HEART RATE: 73 BPM | TEMPERATURE: 97.5 F

## 2024-04-26 DIAGNOSIS — N45.2 ORCHITIS: ICD-10-CM

## 2024-04-26 LAB
ALBUMIN SERPL BCG-MCNC: 3.7 G/DL (ref 3.5–5.2)
ALP SERPL-CCNC: 158 U/L (ref 40–150)
ALT SERPL W P-5'-P-CCNC: <5 U/L (ref 0–70)
ANION GAP SERPL CALCULATED.3IONS-SCNC: 10 MMOL/L (ref 7–15)
APTT PPP: 29 SECONDS (ref 22–38)
AST SERPL W P-5'-P-CCNC: 17 U/L (ref 0–45)
BASOPHILS # BLD AUTO: 0 10E3/UL (ref 0–0.2)
BASOPHILS NFR BLD AUTO: 0 %
BILIRUB SERPL-MCNC: 0.2 MG/DL
BUN SERPL-MCNC: 16.5 MG/DL (ref 8–23)
CALCIUM SERPL-MCNC: 9.2 MG/DL (ref 8.6–10)
CHLORIDE SERPL-SCNC: 107 MMOL/L (ref 98–107)
CREAT SERPL-MCNC: 0.8 MG/DL (ref 0.67–1.17)
CRP SERPL-MCNC: 14.8 MG/L
DEPRECATED HCO3 PLAS-SCNC: 25 MMOL/L (ref 22–29)
EGFRCR SERPLBLD CKD-EPI 2021: >90 ML/MIN/1.73M2
EOSINOPHIL # BLD AUTO: 0.1 10E3/UL (ref 0–0.7)
EOSINOPHIL NFR BLD AUTO: 1 %
ERYTHROCYTE [DISTWIDTH] IN BLOOD BY AUTOMATED COUNT: 13.2 % (ref 10–15)
GLUCOSE SERPL-MCNC: 110 MG/DL (ref 70–99)
HCT VFR BLD AUTO: 38.9 % (ref 40–53)
HGB BLD-MCNC: 12.4 G/DL (ref 13.3–17.7)
HOLD SPECIMEN: NORMAL
IMM GRANULOCYTES # BLD: 0.1 10E3/UL
IMM GRANULOCYTES NFR BLD: 1 %
INR PPP: 1.14 (ref 0.85–1.15)
LYMPHOCYTES # BLD AUTO: 1.5 10E3/UL (ref 0.8–5.3)
LYMPHOCYTES NFR BLD AUTO: 18 %
MCH RBC QN AUTO: 30.9 PG (ref 26.5–33)
MCHC RBC AUTO-ENTMCNC: 31.9 G/DL (ref 31.5–36.5)
MCV RBC AUTO: 97 FL (ref 78–100)
MONOCYTES # BLD AUTO: 0.6 10E3/UL (ref 0–1.3)
MONOCYTES NFR BLD AUTO: 7 %
NEUTROPHILS # BLD AUTO: 6.2 10E3/UL (ref 1.6–8.3)
NEUTROPHILS NFR BLD AUTO: 73 %
NRBC # BLD AUTO: 0 10E3/UL
NRBC BLD AUTO-RTO: 0 /100
PLATELET # BLD AUTO: 260 10E3/UL (ref 150–450)
POTASSIUM SERPL-SCNC: 3.7 MMOL/L (ref 3.4–5.3)
PROT SERPL-MCNC: 7 G/DL (ref 6.4–8.3)
RBC # BLD AUTO: 4.01 10E6/UL (ref 4.4–5.9)
SODIUM SERPL-SCNC: 142 MMOL/L (ref 135–145)
WBC # BLD AUTO: 8.5 10E3/UL (ref 4–11)

## 2024-04-26 PROCEDURE — 76870 US EXAM SCROTUM: CPT

## 2024-04-26 PROCEDURE — 80053 COMPREHEN METABOLIC PANEL: CPT | Performed by: FAMILY MEDICINE

## 2024-04-26 PROCEDURE — 86140 C-REACTIVE PROTEIN: CPT | Performed by: FAMILY MEDICINE

## 2024-04-26 PROCEDURE — 85025 COMPLETE CBC W/AUTO DIFF WBC: CPT | Performed by: FAMILY MEDICINE

## 2024-04-26 PROCEDURE — 85610 PROTHROMBIN TIME: CPT | Performed by: FAMILY MEDICINE

## 2024-04-26 PROCEDURE — 76870 US EXAM SCROTUM: CPT | Mod: 26 | Performed by: RADIOLOGY

## 2024-04-26 PROCEDURE — 93976 VASCULAR STUDY: CPT

## 2024-04-26 PROCEDURE — 99285 EMERGENCY DEPT VISIT HI MDM: CPT | Performed by: FAMILY MEDICINE

## 2024-04-26 PROCEDURE — 99207 US TESTICULAR AND SCROTUM WITH DOPPLER LIMITED: CPT | Mod: 26 | Performed by: RADIOLOGY

## 2024-04-26 PROCEDURE — 99284 EMERGENCY DEPT VISIT MOD MDM: CPT | Mod: 25 | Performed by: FAMILY MEDICINE

## 2024-04-26 PROCEDURE — 36415 COLL VENOUS BLD VENIPUNCTURE: CPT | Performed by: FAMILY MEDICINE

## 2024-04-26 PROCEDURE — 250N000013 HC RX MED GY IP 250 OP 250 PS 637: Performed by: FAMILY MEDICINE

## 2024-04-26 PROCEDURE — 85730 THROMBOPLASTIN TIME PARTIAL: CPT | Performed by: FAMILY MEDICINE

## 2024-04-26 RX ORDER — LEVOFLOXACIN 500 MG/1
500 TABLET, FILM COATED ORAL ONCE
Status: COMPLETED | OUTPATIENT
Start: 2024-04-26 | End: 2024-04-26

## 2024-04-26 RX ORDER — LIDOCAINE 40 MG/G
CREAM TOPICAL
Status: DISCONTINUED | OUTPATIENT
Start: 2024-04-26 | End: 2024-04-26 | Stop reason: HOSPADM

## 2024-04-26 RX ORDER — LEVOFLOXACIN 500 MG/1
500 TABLET, FILM COATED ORAL DAILY
Qty: 10 TABLET | Refills: 0 | Status: SHIPPED | OUTPATIENT
Start: 2024-04-26 | End: 2024-05-06

## 2024-04-26 RX ADMIN — LEVOFLOXACIN 500 MG: 500 TABLET, FILM COATED ORAL at 15:04

## 2024-04-26 ASSESSMENT — ACTIVITIES OF DAILY LIVING (ADL)
ADLS_ACUITY_SCORE: 39

## 2024-04-26 ASSESSMENT — COLUMBIA-SUICIDE SEVERITY RATING SCALE - C-SSRS
2. HAVE YOU ACTUALLY HAD ANY THOUGHTS OF KILLING YOURSELF IN THE PAST MONTH?: NO
6. HAVE YOU EVER DONE ANYTHING, STARTED TO DO ANYTHING, OR PREPARED TO DO ANYTHING TO END YOUR LIFE?: NO
1. IN THE PAST MONTH, HAVE YOU WISHED YOU WERE DEAD OR WISHED YOU COULD GO TO SLEEP AND NOT WAKE UP?: NO

## 2024-04-26 NOTE — DISCHARGE INSTRUCTIONS
Home.  Your ultrasound noted below.  Discussed with urology.  Feel symptoms may be from infection.  Take the levaquin 500mg daily for 10 days.  A referral to [facility] placed.  See MD for recheck  Return if fever or other concerns.    Please make an appointment to follow up with Your Primary Care Provider and Urology Clinic (phone: [phone]) as soon as possible.      Results for orders placed or performed during the hospital encounter of 04/26/24   US Testicular & Scrotum w Doppler Ltd     Status: None    Narrative    EXAMINATION: US TESTICULAR AND SCROTAL, 4/26/2024 11:43 AM     COMPARISON: None.    HISTORY: Testicular pain    TECHNIQUE: The scrotum was scanned in standard fashion with  specialized ultrasound transducer(s) using grey scale, color Doppler,  and spectral flow  techniques.    Findings:  Difficult sonographic assessment due to patient's Parkinson's disease  with dystonia and difficulty positioning the patient as per  technologist's note.    The testes demonstrate symmetric vascularity. The right testicle  measures 2.6 x 1.7 x 2.2 cm with a volume of 6.9 cc and the left  measures 3.4 x 1.2 x 2.2 cm with a volume of 6.3 cc (volume calculated  with separate etiology volume calculated). There is no evidence of  acute torsion. The right testicle is heterogeneous with a peripheral  lenticular shaped hypoechoic area noted in the right testicle with  possible volume loss in this region although overall testicles are  fairly symmetric in volume. Small right testicular appendage on cine  imaging noted. On the midline transverse images including both  testicles, and the right testicle has a more and horizontal lie while  the left testicle has a more vertical lie. When this was discussed  with the technologist, there was difficulty positioning the scrotum  due to patient factors. Presumably this difference in positioning of  the testicles relates to same.    There is no evidence of a significant hydrocele  or varicocele. Trace  peritesticular fluid.    Both the right and left epididymis are within normal limits.      Impression    Impression:   1.  Right testicle is overall heterogeneous with a peripheral  hypoechoic area noted in possible volume loss in this region although  testicles are symmetric overall. No hypervascularity to suggest  orchitis. Normal vascularity present with no evidence of torsion.  Findings are most suggestive of previous infectious/vascular insult.  Suggest short-term follow-up ultrasound scrotal study in 1 to 2 months  to reevaluate.  2.  There is a small right testicular appendage suspected on cine  images, not included on static images. Correlation clinically with  whether patient's symptoms suggest possible torsion of a testicular  appendage suggested although this does not appear edematous.    ELBERT COLLADO MD         SYSTEM ID:  [id number]   CRP inflammation     Status: Abnormal   Result Value Ref Range    CRP Inflammation 14.80 (H) <5.00 mg/L   INR     Status: Normal   Result Value Ref Range    INR 1.14 0.85 - 1.15   Partial thromboplastin time     Status: Normal   Result Value Ref Range    aPTT 29 22 - 38 Seconds   Comprehensive metabolic panel     Status: Abnormal   Result Value Ref Range    Sodium 142 135 - 145 mmol/L    Potassium 3.7 3.4 - 5.3 mmol/L    Carbon Dioxide (CO2) 25 22 - 29 mmol/L    Anion Gap 10 7 - 15 mmol/L    Urea Nitrogen 16.5 8.0 - 23.0 mg/dL    Creatinine 0.80 0.67 - 1.17 mg/dL    GFR Estimate >90 >60 mL/min/1.73m2    Calcium 9.2 8.6 - 10.0 mg/dL    Chloride 107 98 - 107 mmol/L    Glucose 110 (H) 70 - 99 mg/dL    Alkaline Phosphatase 158 (H) 40 - 150 U/L    AST 17 0 - 45 U/L    ALT <5 0 - 70 U/L    Protein Total 7.0 6.4 - 8.3 g/dL    Albumin 3.7 3.5 - 5.2 g/dL    Bilirubin Total 0.2 <=1.2 mg/dL   CBC with platelets and differential     Status: Abnormal   Result Value Ref Range    WBC Count 8.5 4.0 - 11.0 10e3/uL    RBC Count 4.01 (L) 4.40 - 5.90 10e6/uL     Hemoglobin 12.4 (L) 13.3 - 17.7 g/dL    Hematocrit 38.9 (L) 40.0 - 53.0 %    MCV 97 78 - 100 fL    MCH 30.9 26.5 - 33.0 pg    MCHC 31.9 31.5 - 36.5 g/dL    RDW 13.2 10.0 - 15.0 %    Platelet Count 260 150 - 450 10e3/uL    % Neutrophils 73 %    % Lymphocytes 18 %    % Monocytes 7 %    % Eosinophils 1 %    % Basophils 0 %    % Immature Granulocytes 1 %    NRBCs per 100 WBC 0 <1 /100    Absolute Neutrophils 6.2 1.6 - 8.3 10e3/uL    Absolute Lymphocytes 1.5 0.8 - 5.3 10e3/uL    Absolute Monocytes 0.6 0.0 - 1.3 10e3/uL    Absolute Eosinophils 0.1 0.0 - 0.7 10e3/uL    Absolute Basophils 0.0 0.0 - 0.2 10e3/uL    Absolute Immature Granulocytes 0.1 <=0.4 10e3/uL    Absolute NRBCs 0.0 10e3/uL   [place] Draw     Status: None    Narrative    The following orders were created for panel order [place] Draw.  Procedure                               Abnormality         Status                     ---------                               -----------         ------                     Extra Blue Top Tube[[id number]]                              Final result               Extra Red Top Tube[[id number]]                               Final result               Extra Purple Top Tube[[id number]]                            Final result                 Please view results for these tests on the individual orders.   Extra Blue Top Tube     Status: None   Result Value Ref Range    Hold Specimen JIC    Extra Red Top Tube     Status: None   Result Value Ref Range    Hold Specimen JIC    Extra Purple Top Tube     Status: None   Result Value Ref Range    Hold Specimen JIC    CBC with platelets differential     Status: Abnormal    Narrative    The following orders were created for panel order CBC with platelets differential.  Procedure                               Abnormality         Status                     ---------                               -----------         ------                     CBC with platelets and d...[[id number]]  Abnormal             Final result                 Please view results for these tests on the individual orders.

## 2024-04-26 NOTE — ED PROVIDER NOTES
ED Provider Note  [facility]      History   No chief complaint on file.    The history is provided by the patient and medical records.     Benjamin Mathews is a 59 year old male with a history of hemiparesis, Parkinson's, dystonia, muscle spasms, dyskinesia presents to the emergency department via EMS from [facility] due to testicular pain.    Patient states he is currently experiencing 10/10 testicular pain that worsens with touch.  Patient reports pain started this morning when nurse touch them.  Patient is also experiencing testicle swelling, which is worse on the right side.  Patient states that this comes and goes.  Patient has had pain like this before, but has never been this severe.  Patient has no history of urinary symptoms or kidney stones.  No reports of any dysuric symptoms.  No rash no discharge no retention no trauma reported    Past Medical History  Past Medical History:   Diagnosis Date    Cerebral infarction (H)     Clotting disorder (H24)     PE 2019    Dystonia     Parkinson disease      Past Surgical History:   Procedure Laterality Date    APPLY EXTERNAL FIXATOR LOWER EXTREMITY Right 5/21/2023    Procedure: Right  Ankle Closed Reduction and  External Fixator Placement;  Surgeon: Nicole Apodaca MD;  Location: [facility] OR    OPEN REDUCTION INTERNAL FIXATION ANKLE Right 6/15/2023    Procedure: OPEN REDUCTION INTERNAL FIXATION, FRACTURE, ANKLE, removal of external fixator device.;  Surgeon: Jose Bonilla MD;  Location: [facility] OR     levofloxacin (LEVAQUIN) 500 MG tablet  acetaminophen (TYLENOL) 325 MG tablet  alfuzosin ER (UROXATRAL) 10 MG 24 hr tablet  amantadine (SYMMETREL) 100 MG capsule  ammonium lactate (LAC-HYDRIN) 12 % external lotion  apixaban ANTICOAGULANT (ELIQUIS) 5 MG tablet  atorvastatin (LIPITOR) 40 MG tablet  bisacodyl (DULCOLAX) 10 MG suppository  carbidopa-levodopa (SINEMET CR)  MG CR tablet  carbidopa-levodopa (SINEMET)  MG tablet  cholecalciferol  (VITAMIN D3) 125 mcg (5000 units) capsule  clonazePAM (KLONOPIN) 1 MG tablet  clonazePAM (KLONOPIN) 2 MG tablet  cloZAPine (CLOZARIL) 50 MG tablet  diclofenac (VOLTAREN) 1 % topical gel  econazole nitrate 1 % external cream  gabapentin (NEURONTIN) 800 MG tablet  hydrOXYzine (ATARAX) 25 MG tablet  ketoconazole (NIZORAL) 2 % external cream  ketoconazole (NIZORAL) 2 % external shampoo  lactulose (CHRONULAC) 10 GM/15ML solution  linaclotide (LINZESS) 290 MCG capsule  metoprolol succinate ER (TOPROL XL) 25 MG 24 hr tablet  multivitamin, therapeutic (THERA-VIT) TABS tablet  pantoprazole (PROTONIX) 40 MG EC tablet  traZODone (DESYREL) 100 MG tablet  traZODone (DESYREL) 50 MG tablet  venlafaxine (EFFEXOR XR) 150 MG 24 hr capsule  vitamin C (ASCORBIC ACID) 500 MG tablet      Allergies   Allergen Reactions    Amantadine Other (See Comments)     Other reaction(s): Hallucinations  Hallucinations/ lost self control/gambling.     halluicnations  Hallucinations/ lost self control/gambling.     hallucinates  halluicnations  hallucinates  Hallucinations/ lost self control/gambling.       Quetiapine GI Disturbance, Diarrhea and Other (See Comments)     Diarrhea    Diarrhea  Other reaction(s): GI Disturbance  Diarrhea      Duloxetine Other (See Comments)     suicidal  suicidal       Family History  Family History   Problem Relation Age of Onset    Other - See Comments Mother         pulmonary embolism from hip fracture    Pulmonary Embolism Mother     Parkinsonism Father     Neurologic Disorder Sister     Parkinsonism Sister         ?med related    Other - See Comments Sister         12/7/1950    Depression Sister     Neurologic Disorder Brother         dystonia    Dystonia Brother     Other - See Comments Brother             Heart Disease Nephew      Social History   Social History     Tobacco Use    Smoking status: Every Day     Types: Cigars, Cigarettes    Smokeless tobacco: Never   Vaping Use    Vaping status: Never Used    Substance Use Topics    Alcohol use: Not Currently     Comment: quit 2014    Drug use: Not Currently         A medically appropriate review of systems was performed with pertinent positives and negatives noted in the HPI, and all other systems negative.    Physical Exam   BP: 108/71  Pulse: 85  Temp: 97.5  F (36.4  C)  Resp: 15  SpO2: 98 %  Physical Exam  Vitals and nursing note reviewed.   Constitutional:       General: He is in acute distress.      Appearance: He is well-developed. He is not toxic-appearing or diaphoretic.      Comments: Patient with Parkinson's features otherwise vitally stable afebrile nontoxic not writhing in pain   HENT:      Head: Normocephalic and atraumatic.      Nose: Nose normal. No congestion.      Mouth/Throat:      Mouth: Mucous membranes are moist.      Pharynx: Oropharynx is clear.   Eyes:      General: No scleral icterus.     Extraocular Movements: Extraocular movements intact.      Conjunctiva/sclera: Conjunctivae normal.      Pupils: Pupils are equal, round, and reactive to light.   Cardiovascular:      Rate and Rhythm: Normal rate.   Pulmonary:      Effort: Pulmonary effort is normal.   Abdominal:      General: Abdomen is flat.   Musculoskeletal:         General: No tenderness or deformity. Normal range of motion.      Cervical back: Normal range of motion and neck supple.   Lymphadenopathy:      Cervical: No cervical adenopathy.   Skin:     General: Skin is warm and dry.      Capillary Refill: Capillary refill takes less than 2 seconds.      Findings: No rash.   Neurological:      General: No focal deficit present.      Mental Status: He is alert and oriented to person, place, and time.      Cranial Nerves: No cranial nerve deficit.      Sensory: No sensory deficit.      Coordination: Coordination normal.           ED Course, Procedures, & Data      Records reviewed in epic.  Patient with Parkinson disease history of Parkinson disease with dyskinesias history of ankle  fractures history of muscle spasms etc.    In the ER IV established labs drawn reviewed.  Patient had a testicular ultrasound revealing normal flow to each there is some changes in the testicle on 1 but the size are equal no concern for any increasing vascularity or torsion noted.  Reviewed these findings with urology also recommended this point to treat for infection with patient's age etc. will treat with Levaquin I reviewed this with pharmacy also who agreed this would be appropriate.  Patient given a dose of Levaquin 5 mg orally in the ER.  White count 8.5 hemoglobin 12.4.  INR 1.14 CRP is 14.8  Sodium 142 potassium is 3.7.  Bicarb 25 gap is 10 creatinine 0.8.  Glucose 9110 alk phos 158.  We never did get a urinalysis but patient was treated for orchitis at this point talked urology I sent a referral for follow-up also with them patient was then discharged after given initial dose and then prescribed 10 days of Levaquin 500 mg daily.    Bladder scan only showed 150 cc in bladder    Procedures              Results for orders placed or performed during the hospital encounter of 04/26/24   US Testicular & Scrotum w Doppler Ltd     Status: None    Narrative    EXAMINATION: US TESTICULAR AND SCROTAL, 4/26/2024 11:43 AM     COMPARISON: None.    HISTORY: Testicular pain    TECHNIQUE: The scrotum was scanned in standard fashion with  specialized ultrasound transducer(s) using grey scale, color Doppler,  and spectral flow  techniques.    Findings:  Difficult sonographic assessment due to patient's Parkinson's disease  with dystonia and difficulty positioning the patient as per  technologist's note.    The testes demonstrate symmetric vascularity. The right testicle  measures 2.6 x 1.7 x 2.2 cm with a volume of 6.9 cc and the left  measures 3.4 x 1.2 x 2.2 cm with a volume of 6.3 cc (volume calculated  with separate etiology volume calculated). There is no evidence of  acute torsion. The right testicle is heterogeneous  with a peripheral  lenticular shaped hypoechoic area noted in the right testicle with  possible volume loss in this region although overall testicles are  fairly symmetric in volume. Small right testicular appendage on cine  imaging noted. On the midline transverse images including both  testicles, and the right testicle has a more and horizontal lie while  the left testicle has a more vertical lie. When this was discussed  with the technologist, there was difficulty positioning the scrotum  due to patient factors. Presumably this difference in positioning of  the testicles relates to same.    There is no evidence of a significant hydrocele or varicocele. Trace  peritesticular fluid.    Both the right and left epididymis are within normal limits.      Impression    Impression:   1.  Right testicle is overall heterogeneous with a peripheral  hypoechoic area noted in possible volume loss in this region although  testicles are symmetric overall. No hypervascularity to suggest  orchitis. Normal vascularity present with no evidence of torsion.  Findings are most suggestive of previous infectious/vascular insult.  Suggest short-term follow-up ultrasound scrotal study in 1 to 2 months  to reevaluate.  2.  There is a small right testicular appendage suspected on cine  images, not included on static images. Correlation clinically with  whether patient's symptoms suggest possible torsion of a testicular  appendage suggested although this does not appear edematous.    ELBERT COLLADO MD         SYSTEM ID:  [id number]   CRP inflammation     Status: Abnormal   Result Value Ref Range    CRP Inflammation 14.80 (H) <5.00 mg/L   INR     Status: Normal   Result Value Ref Range    INR 1.14 0.85 - 1.15   Partial thromboplastin time     Status: Normal   Result Value Ref Range    aPTT 29 22 - 38 Seconds   Comprehensive metabolic panel     Status: Abnormal   Result Value Ref Range    Sodium 142 135 - 145 mmol/L    Potassium 3.7 3.4 - 5.3  mmol/L    Carbon Dioxide (CO2) 25 22 - 29 mmol/L    Anion Gap 10 7 - 15 mmol/L    Urea Nitrogen 16.5 8.0 - 23.0 mg/dL    Creatinine 0.80 0.67 - 1.17 mg/dL    GFR Estimate >90 >60 mL/min/1.73m2    Calcium 9.2 8.6 - 10.0 mg/dL    Chloride 107 98 - 107 mmol/L    Glucose 110 (H) 70 - 99 mg/dL    Alkaline Phosphatase 158 (H) 40 - 150 U/L    AST 17 0 - 45 U/L    ALT <5 0 - 70 U/L    Protein Total 7.0 6.4 - 8.3 g/dL    Albumin 3.7 3.5 - 5.2 g/dL    Bilirubin Total 0.2 <=1.2 mg/dL   CBC with platelets and differential     Status: Abnormal   Result Value Ref Range    WBC Count 8.5 4.0 - 11.0 10e3/uL    RBC Count 4.01 (L) 4.40 - 5.90 10e6/uL    Hemoglobin 12.4 (L) 13.3 - 17.7 g/dL    Hematocrit 38.9 (L) 40.0 - 53.0 %    MCV 97 78 - 100 fL    MCH 30.9 26.5 - 33.0 pg    MCHC 31.9 31.5 - 36.5 g/dL    RDW 13.2 10.0 - 15.0 %    Platelet Count 260 150 - 450 10e3/uL    % Neutrophils 73 %    % Lymphocytes 18 %    % Monocytes 7 %    % Eosinophils 1 %    % Basophils 0 %    % Immature Granulocytes 1 %    NRBCs per 100 WBC 0 <1 /100    Absolute Neutrophils 6.2 1.6 - 8.3 10e3/uL    Absolute Lymphocytes 1.5 0.8 - 5.3 10e3/uL    Absolute Monocytes 0.6 0.0 - 1.3 10e3/uL    Absolute Eosinophils 0.1 0.0 - 0.7 10e3/uL    Absolute Basophils 0.0 0.0 - 0.2 10e3/uL    Absolute Immature Granulocytes 0.1 <=0.4 10e3/uL    Absolute NRBCs 0.0 10e3/uL   [place] Draw     Status: None    Narrative    The following orders were created for panel order [place] Draw.  Procedure                               Abnormality         Status                     ---------                               -----------         ------                     Extra Blue Top Tube[[id number]]                              Final result               Extra Red Top Tube[[id number]]                               Final result               Extra Purple Top Tube[[id number]]                            Final result                 Please view results for these tests on the individual orders.    Extra Blue Top Tube     Status: None   Result Value Ref Range    Hold Specimen JIC    Extra Red Top Tube     Status: None   Result Value Ref Range    Hold Specimen JIC    Extra Purple Top Tube     Status: None   Result Value Ref Range    Hold Specimen JIC    CBC with platelets differential     Status: Abnormal    Narrative    The following orders were created for panel order CBC with platelets differential.  Procedure                               Abnormality         Status                     ---------                               -----------         ------                     CBC with platelets and d...[[id number]]  Abnormal            Final result                 Please view results for these tests on the individual orders.     Medications   levofloxacin (LEVAQUIN) tablet 500 mg (500 mg Oral $Given 4/26/24 1504)     Labs Ordered and Resulted from Time of ED Arrival to Time of ED Departure   CRP INFLAMMATION - Abnormal       Result Value    CRP Inflammation 14.80 (*)    COMPREHENSIVE METABOLIC PANEL - Abnormal    Sodium 142      Potassium 3.7      Carbon Dioxide (CO2) 25      Anion Gap 10      Urea Nitrogen 16.5      Creatinine 0.80      GFR Estimate >90      Calcium 9.2      Chloride 107      Glucose 110 (*)     Alkaline Phosphatase 158 (*)     AST 17      ALT <5      Protein Total 7.0      Albumin 3.7      Bilirubin Total 0.2     CBC WITH PLATELETS AND DIFFERENTIAL - Abnormal    WBC Count 8.5      RBC Count 4.01 (*)     Hemoglobin 12.4 (*)     Hematocrit 38.9 (*)     MCV 97      MCH 30.9      MCHC 31.9      RDW 13.2      Platelet Count 260      % Neutrophils 73      % Lymphocytes 18      % Monocytes 7      % Eosinophils 1      % Basophils 0      % Immature Granulocytes 1      NRBCs per 100 WBC 0      Absolute Neutrophils 6.2      Absolute Lymphocytes 1.5      Absolute Monocytes 0.6      Absolute Eosinophils 0.1      Absolute Basophils 0.0      Absolute Immature Granulocytes 0.1      Absolute NRBCs 0.0     INR  - Normal    INR 1.14     PARTIAL THROMBOPLASTIN TIME - Normal    aPTT 29       US Testicular & Scrotum w Doppler Ltd   Final Result   Impression:    1.  Right testicle is overall heterogeneous with a peripheral   hypoechoic area noted in possible volume loss in this region although   testicles are symmetric overall. No hypervascularity to suggest   orchitis. Normal vascularity present with no evidence of torsion.   Findings are most suggestive of previous infectious/vascular insult.   Suggest short-term follow-up ultrasound scrotal study in 1 to 2 months   to reevaluate.   2.  There is a small right testicular appendage suspected on cine   images, not included on static images. Correlation clinically with   whether patient's symptoms suggest possible torsion of a testicular   appendage suggested although this does not appear edematous.      ELBERT COLLADO MD            SYSTEM ID:  [id number]             Critical care was not performed.     Medical Decision Making  The patient's presentation was of moderate complexity (an undiagnosed new problem with uncertain diagnosis).    The patient's evaluation involved:  review of external note(s) from 3+ sources (see separate area of note for details)  review of 3+ test result(s) ordered prior to this encounter (see separate area of note for details)  ordering and/or review of 3+ test(s) in this encounter (see separate area of note for details)  discussion of management or test interpretation with another health professional (see separate area of note for details)    The patient's management necessitated moderate risk (prescription drug management including medications given in the ED).    Assessment & Plan   59-year-old male history of Parkinson's presents ER with some intermittent bilateral testicular pain without fever unable to get a urine sample no history of previous surgeries ultrasound here in the ER revealed normal flow to both there is some defect noted in one of  the kidneys slightly unclear etiology though with this otherwise discussed with urology they reviewed these also felt outpatient appropriate along with treating for infection reviewed with pharmacy medications etc. Levaquin 5 mg given orally here in the ER will prescribe daily for 10 days patient urology referral also placed following up with MD and return if any concerns at all agrees with plan comfortable discharge stable.       I have reviewed the nursing notes. I have reviewed the findings, diagnosis, plan and need for follow up with the patient.    Discharge Medication List as of 4/26/2024  3:14 PM        START taking these medications    Details   levofloxacin (LEVAQUIN) 500 MG tablet Take 1 tablet (500 mg) by mouth daily for 10 days For infection., Disp-10 tablet, R-0, Local Print             Final diagnoses:   Orchitis   IBertin, am serving as a trained medical scribe to document services personally performed by Marquez Coburn MD, based to the provider's statements to me.     IMarquez MD, was physically present and have reviewed and verified the accuracy of this note documented by eBrtin Martinez.       Marquez Coburn MD  [facility] EMERGENCY DEPARTMENT  4/26/2024    This note was created at least in part by the use of dragon voice dictation system. Inadvertent typographical errors may still exist.  Marquez Coburn MD.  Patient evaluated in the emergency department during the COVID-19 pandemic period. Careful attention to patients safety was addressed throughout the evaluation. Evaluation and treatment management was initiated with disposition made efficiently and appropriate as possible to minimize any risk of potential exposure to patient during this evaluation.       Marquez Coburn MD  04/26/24 1924

## 2024-04-29 ENCOUNTER — TELEPHONE (OUTPATIENT)
Dept: UROLOGY | Facility: CLINIC | Age: 59
End: 2024-04-29
Payer: COMMERCIAL

## 2024-04-29 NOTE — TELEPHONE ENCOUNTER
Left Voicemail (1st Attempt) for the patient to call back and schedule the following:    Appointment type: Return   Provider: Juju Bernardo   Return date: Next available (per message within 2 weeks if possible)  Specialty phone number: [phone]  Additional appointment(s) needed: N/A  Additonal Notes: schedule patient with Juju Bernardo for testicular pain. - per message received

## 2024-05-01 DIAGNOSIS — S82.851D CLOSED TRIMALLEOLAR FRACTURE OF RIGHT ANKLE WITH ROUTINE HEALING, SUBSEQUENT ENCOUNTER: Primary | ICD-10-CM

## 2024-05-02 ENCOUNTER — VIRTUAL VISIT (OUTPATIENT)
Dept: NEUROLOGY | Facility: CLINIC | Age: 59
End: 2024-05-02
Attending: PSYCHIATRY & NEUROLOGY
Payer: COMMERCIAL

## 2024-05-02 DIAGNOSIS — G20.A1 PARKINSON'S DISEASE WITHOUT DYSKINESIA OR FLUCTUATING MANIFESTATIONS (H): Primary | ICD-10-CM

## 2024-05-02 DIAGNOSIS — G24.9 DYSTONIA: ICD-10-CM

## 2024-05-02 NOTE — Clinical Note
Thank you! As episodes are happening about once a month, I anticipate that use of this prn 2mg will be limited. I pended a new order for 1-2 tablets prn.  Could you please sign and route to navigators to have them fax the Rx to the group home? (You can let me know when it is signed and I can help coordinate, if needed). Thank you!

## 2024-05-02 NOTE — Clinical Note
"5/2/2024       RE: Benjamin Mathews  [address]  [facility] [address]     Dear Colleague,    Thank you for referring your patient, Benjamin Mathews, to the [facility] [place] [facility]. Please see a copy of my visit note below.    Medication Therapy Management (MTM) Encounter    ASSESSMENT:                            Medication Adherence/Access: {adherencechoices:171051}    ***:   ***    PLAN:                            ***    Follow-up: 6/6/24    SUBJECTIVE/OBJECTIVE:                          Benjamin Mathews is a 59 year old male { :135190} for {mtmvisit:060165}     Reason for visit: ***.    Allergies/ADRs: {1/2/3/4/5:675126}  Past Medical History: {1/2/3/4/5:114877}  Tobacco: He reports that he has been smoking cigars and cigarettes. He has never used smokeless tobacco.Nicotine/Tobacco Cessation Plan  {Nicotine/Tobacco Cessation Plan:483224}    Alcohol: {ALCOHOL CONSUMPTION HX:569717}  {Social and Goals:988823}  Medication Adherence/Access: {fumedadherence:422715}    {MTM SUBJECTIVE (Optional):727310}    Parkinson's Disease:  Movement Disorder-related Medications                   8 AM 12 PM 4 pm 8pm At bedtime  prn   Amantadine 100 mg  1 1           Carbidopa/levodopa IR  mg 3 3 3     0.5-1 twice daily     Carbidopa/levodopa CR  mg       2       Clonazepam 2 mg 1   1         Clonazepam 1 mg   1       1        Recent changes:  3/22/24:  - Increase carbidopa/levodopa IR. Week 1 take 2.5 tabs three times daily then week 2 take 3 tabs three times daily and continue on this dose. If you notice side effect (dyskinesia, etc) return to previous dose. Stop at lowest effective dose.  - Increase carbidopa/levodopa CR 50/200 mg to 2 tabs at bedtime     He saw Dr. Arita on 4/24/24:  \"Spasms have been worse as above. His R ankle/foot turns inward and down and limits his walking.   He thinks he is very sensitive and doesn't " "tolerate higher doses of sinemet well. Wants to talk to the pharmacy team and adjust the dose again. \"     Today, he reported         that stiffness overnight and first thing in the morning is much improved since increasing the carbidopa/levodopa ER dose. During the daytime, he feels better overall, though couldn't say specifically what is different. He does notice some new dyskinesia after taking the 4pm dose that lasts up to 90 minutes. He has not had any muscle spasms since he was in the ER 3/17/24. Denied any freezing.   He is falling asleep ok and generally stays asleep during the night.     ***:   ***    Today's Vitals: There were no vitals taken for this visit.  ----------------  {CAROL?:391537}    I spent {mtm total time 3:729931} with this patient today{MTMpartdbillingquestion:798592}. { :400820}. A copy of the visit note was provided to the patient's provider(s).    A summary of these recommendations {GIVEN/NOT GIVEN:088780}.    ***    Telemedicine Visit Details  Type of service:  {telemedvisitmtm:219930::\"Telephone visit\"}  Start Time: {video/phone visit start time:152948}  End Time: {video/phone visit end time:152948}     Medication Therapy Recommendations  No medication therapy recommendations to display     Medication Therapy Management (MTM) Encounter    ASSESSMENT:                            Medication Adherence/Access: {adherencechoices:094858}    ***:   ***    PLAN:                            ***    Follow-up: 6/6/24    SUBJECTIVE/OBJECTIVE:                          Benjamin Mathews is a 59 year old male called for a follow-up visit.       Reason for visit: med check.    Allergies/ADRs: Reviewed in chart  Past Medical History: Reviewed in chart  Tobacco: He reports that he has been smoking cigars and cigarettes. He has never used smokeless tobacco.Nicotine/Tobacco Cessation Plan  Information offered: Patient not interested at this time  Alcohol: not currently using    Medication Adherence/Access: no " "issues reported      Parkinson's Disease:  Movement Disorder-related Medications                   8 AM 12 PM 4 pm 8pm At bedtime  prn   Amantadine 100 mg  1 1           Carbidopa/levodopa IR  mg 3 3 3     0.5-1 twice daily     Carbidopa/levodopa CR  mg       2       Clonazepam 2 mg 1   1         Clonazepam 1 mg   1       1   Gabapentin 800mg 1 1 1           Recent changes:  3/22/24:  - Increase carbidopa/levodopa IR. Week 1 take 2.5 tabs three times daily then week 2 take 3 tabs three times daily and continue on this dose. If you notice side effect (dyskinesia, etc) return to previous dose. Stop at lowest effective dose.  - Increase carbidopa/levodopa CR 50/200 mg to 2 tabs at bedtime     He saw Dr. Arita on 4/24/24:  \"Spasms have been worse as above. His R ankle/foot turns inward and down and limits his walking.   He thinks he is very sensitive and doesn't tolerate higher doses of sinemet well. Wants to talk to the pharmacy team and adjust the dose again. \"     Today, he reported that meds seem to be working very well. At last visit stated that he noticed more dyskinesia after the 4pm dose, though this is no longer happening. Denied any dyskinesia and feels pretty even throughout the day. Reviewed above note from Dr. Arita and patient reported he has been feeling better. (Maybe from Botox that day?)  He is having \"really intense spasms\" about once per month, during which time he takes it as needed clonazepam 1 mg and goes to the ER.  States that he is \"locked up\" and cannot move that seems to last much of the day. He thinks it usually happens around noon or 4pm, but couldn't say if before or after the scheduled clonazepam doses.   He is falling asleep ok and generally stays asleep during the night.     ----------------  Post Discharge Medication Reconciliation Status: discharge medications reconciled, continue medications without change.    I spent 15 minutes with this patient today. A copy of the " "visit note was provided to the patient's provider(s).    A summary of these recommendations was sent via clinic portal.    Cary Sol PharmD  Medication Therapy Management Pharmacist  [facility] [place]      Telemedicine Visit Details  Type of service:  {[facility]067026::\"Telephone visit\"}  Start Time: {video/phone visit start time:152948}  End Time: {video/phone visit end time:152948}     Medication Therapy Recommendations  No medication therapy recommendations to display       Again, thank you for allowing me to participate in the care of your patient.      Sincerely,    Cary Sol PharmD    "

## 2024-05-02 NOTE — PROGRESS NOTES
Medication Therapy Management (MTM) Encounter    ASSESSMENT:                            Medication Adherence/Access: No issues identified    Parkinson's disease:   Possible that recent Botox with Dr. Arita has been quite helpful, as he notes that previously reported symptoms are much improved.  Unclear if there is a certain time of day when the intense spasms are happening or if related to clonazepam doses.  Could consider increasing the noon dose from 1 mg to 2 mg, though since these are happening only once per month, benefit may not outweigh risk.  Could consider increasing as needed dose from 1 mg to 2 mg to see if this keeps him out of the ER.  Will discuss with Dr. Carlos.     PLAN:                            -Consider increasing as needed clonazepam from 1 mg to 2 mg    Follow-up: 6/6/24    SUBJECTIVE/OBJECTIVE:                          Benjamin Mathews is a 59 year old male called for a follow-up visit.       Reason for visit: med check.    Allergies/ADRs: Reviewed in chart  Past Medical History: Reviewed in chart  Tobacco: He reports that he has been smoking cigars and cigarettes. He has never used smokeless tobacco.Nicotine/Tobacco Cessation Plan  Information offered: Patient not interested at this time  Alcohol: not currently using    Medication Adherence/Access: no issues reported    Parkinson's Disease:  Movement Disorder-related Medications                   8 AM 12 PM 4 pm 8pm At bedtime  prn   Amantadine 100 mg  1 1           Carbidopa/levodopa IR  mg 3 3 3     0.5-1 twice daily     Carbidopa/levodopa CR  mg       2       Clonazepam 2 mg 1   1         Clonazepam 1 mg   1       1   Gabapentin 800mg 1 1 1           Recent changes:  3/22/24:  - Increase carbidopa/levodopa IR. Week 1 take 2.5 tabs three times daily then week 2 take 3 tabs three times daily and continue on this dose. If you notice side effect (dyskinesia, etc) return to previous dose. Stop at lowest effective dose.  - Increase  "carbidopa/levodopa CR 50/200 mg to 2 tabs at bedtime     He saw Dr. Arita on 4/24/24:  \"Spasms have been worse as above. His R ankle/foot turns inward and down and limits his walking.   He thinks he is very sensitive and doesn't tolerate higher doses of sinemet well. Wants to talk to the pharmacy team and adjust the dose again. \"     Today, he reported that meds seem to be working very well. At last visit stated that he noticed more dyskinesia after the 4pm dose, though this is no longer happening. Denied any dyskinesia and feels pretty even throughout the day. Reviewed above note from Dr. Arita and patient reported he has been feeling better. (Maybe from Botox that day?)  He is having \"really intense spasms\" about once per month, during which time he takes it as needed clonazepam 1 mg and goes to the ER.  States that he is \"locked up\" and cannot move that seems to last much of the day. He thinks it usually happens around noon or 4pm, but couldn't say if before or after the scheduled clonazepam doses.   He is falling asleep ok and generally stays asleep during the night.     ----------------  Post Discharge Medication Reconciliation Status: discharge medications reconciled, continue medications without change.    I spent 15 minutes with this patient today. A copy of the visit note was provided to the patient's provider(s).    A summary of these recommendations was sent via clinic portal.    Cary Sol, PharmD  Medication Therapy Management Pharmacist  [facility]      Telemedicine Visit Details  Type of service:  Telephone visit  Start Time:  10:30am  End Time:  10:45am     Medication Therapy Recommendations  No medication therapy recommendations to display   "

## 2024-05-02 NOTE — Clinical Note
Shabbir, I spoke with Benjamin this morning after he reported to Dr. Arita he that he thought meds needed adjustment.  Today he reported feeling well and previous concerns were resolved.  Denied any dyskinesia.  He is having those intense spasms about once per month. Dr. Carlos-what do you think about increasing as needed clonazepam from 1 mg to 2 mg? Thanks, Cary

## 2024-05-02 NOTE — PATIENT INSTRUCTIONS
"Recommendations from today's MTM visit:                                                         Continue current medication.      Follow-up: 6/6/24    It was great speaking with you today.  I value your experience and would be very thankful for your time in providing feedback in our clinic survey. In the next few days, you may receive an email or text message from [employer] with a link to a survey related to your  clinical pharmacist.\"     To schedule another MTM appointment, please call the clinic directly or you may call the MTM scheduling line at [phone].    My Clinical Pharmacist's contact information:                                                      Please feel free to contact me with any questions or concerns you have.      Cary Sol, PharmD  Medication Therapy Management Pharmacist  [facility] [place] [facility]      "

## 2024-05-03 ENCOUNTER — HOSPITAL ENCOUNTER (EMERGENCY)
Facility: CLINIC | Age: 59
Discharge: NURSING, REHAB OR LONG-TERM CARE | End: 2024-05-04
Attending: EMERGENCY MEDICINE | Admitting: EMERGENCY MEDICINE
Payer: COMMERCIAL

## 2024-05-03 ENCOUNTER — APPOINTMENT (OUTPATIENT)
Dept: CT IMAGING | Facility: CLINIC | Age: 59
End: 2024-05-03
Attending: PHYSICIAN ASSISTANT
Payer: COMMERCIAL

## 2024-05-03 DIAGNOSIS — R55 SYNCOPE, UNSPECIFIED SYNCOPE TYPE: ICD-10-CM

## 2024-05-03 LAB
ALBUMIN UR-MCNC: 30 MG/DL
ANION GAP SERPL CALCULATED.3IONS-SCNC: 12 MMOL/L (ref 7–15)
APPEARANCE UR: CLEAR
BASOPHILS # BLD AUTO: 0 10E3/UL (ref 0–0.2)
BASOPHILS NFR BLD AUTO: 0 %
BILIRUB UR QL STRIP: NEGATIVE
BUN SERPL-MCNC: 19.6 MG/DL (ref 8–23)
CALCIUM SERPL-MCNC: 8.8 MG/DL (ref 8.6–10)
CHLORIDE SERPL-SCNC: 102 MMOL/L (ref 98–107)
COLOR UR AUTO: YELLOW
CREAT SERPL-MCNC: 0.96 MG/DL (ref 0.67–1.17)
DEPRECATED HCO3 PLAS-SCNC: 25 MMOL/L (ref 22–29)
EGFRCR SERPLBLD CKD-EPI 2021: >90 ML/MIN/1.73M2
EOSINOPHIL # BLD AUTO: 0.1 10E3/UL (ref 0–0.7)
EOSINOPHIL NFR BLD AUTO: 1 %
ERYTHROCYTE [DISTWIDTH] IN BLOOD BY AUTOMATED COUNT: 13.2 % (ref 10–15)
GLUCOSE SERPL-MCNC: 118 MG/DL (ref 70–99)
GLUCOSE UR STRIP-MCNC: NEGATIVE MG/DL
HCT VFR BLD AUTO: 36 % (ref 40–53)
HGB BLD-MCNC: 11.5 G/DL (ref 13.3–17.7)
HGB UR QL STRIP: NEGATIVE
HOLD SPECIMEN: NORMAL
HOLD SPECIMEN: NORMAL
HYALINE CASTS: 10 /LPF
IMM GRANULOCYTES # BLD: 0.1 10E3/UL
IMM GRANULOCYTES NFR BLD: 1 %
KETONES UR STRIP-MCNC: 10 MG/DL
LEUKOCYTE ESTERASE UR QL STRIP: ABNORMAL
LYMPHOCYTES # BLD AUTO: 1.4 10E3/UL (ref 0.8–5.3)
LYMPHOCYTES NFR BLD AUTO: 16 %
MCH RBC QN AUTO: 29.9 PG (ref 26.5–33)
MCHC RBC AUTO-ENTMCNC: 31.9 G/DL (ref 31.5–36.5)
MCV RBC AUTO: 94 FL (ref 78–100)
MONOCYTES # BLD AUTO: 0.5 10E3/UL (ref 0–1.3)
MONOCYTES NFR BLD AUTO: 6 %
MUCOUS THREADS #/AREA URNS LPF: PRESENT /LPF
NEUTROPHILS # BLD AUTO: 6.9 10E3/UL (ref 1.6–8.3)
NEUTROPHILS NFR BLD AUTO: 76 %
NITRATE UR QL: NEGATIVE
NRBC # BLD AUTO: 0 10E3/UL
NRBC BLD AUTO-RTO: 0 /100
PH UR STRIP: 5.5 [PH] (ref 5–7)
PLATELET # BLD AUTO: 267 10E3/UL (ref 150–450)
POTASSIUM SERPL-SCNC: 3.8 MMOL/L (ref 3.4–5.3)
RBC # BLD AUTO: 3.84 10E6/UL (ref 4.4–5.9)
RBC URINE: 1 /HPF
SODIUM SERPL-SCNC: 139 MMOL/L (ref 135–145)
SP GR UR STRIP: 1.03 (ref 1–1.03)
TROPONIN T SERPL HS-MCNC: 11 NG/L
UROBILINOGEN UR STRIP-MCNC: NORMAL MG/DL
WBC # BLD AUTO: 8.9 10E3/UL (ref 4–11)
WBC URINE: 2 /HPF

## 2024-05-03 PROCEDURE — 84484 ASSAY OF TROPONIN QUANT: CPT | Performed by: PHYSICIAN ASSISTANT

## 2024-05-03 PROCEDURE — 74177 CT ABD & PELVIS W/CONTRAST: CPT

## 2024-05-03 PROCEDURE — 74177 CT ABD & PELVIS W/CONTRAST: CPT | Mod: 26 | Performed by: RADIOLOGY

## 2024-05-03 PROCEDURE — 81001 URINALYSIS AUTO W/SCOPE: CPT | Performed by: PHYSICIAN ASSISTANT

## 2024-05-03 PROCEDURE — 93308 TTE F-UP OR LMTD: CPT | Mod: 26 | Performed by: PHYSICIAN ASSISTANT

## 2024-05-03 PROCEDURE — 36415 COLL VENOUS BLD VENIPUNCTURE: CPT | Performed by: PHYSICIAN ASSISTANT

## 2024-05-03 PROCEDURE — 99285 EMERGENCY DEPT VISIT HI MDM: CPT | Mod: 25 | Performed by: EMERGENCY MEDICINE

## 2024-05-03 PROCEDURE — 258N000003 HC RX IP 258 OP 636: Performed by: PHYSICIAN ASSISTANT

## 2024-05-03 PROCEDURE — 250N000011 HC RX IP 250 OP 636: Performed by: EMERGENCY MEDICINE

## 2024-05-03 PROCEDURE — 80048 BASIC METABOLIC PNL TOTAL CA: CPT | Performed by: PHYSICIAN ASSISTANT

## 2024-05-03 PROCEDURE — 93010 ELECTROCARDIOGRAM REPORT: CPT | Mod: 59 | Performed by: PHYSICIAN ASSISTANT

## 2024-05-03 PROCEDURE — 96361 HYDRATE IV INFUSION ADD-ON: CPT | Performed by: EMERGENCY MEDICINE

## 2024-05-03 PROCEDURE — 93308 TTE F-UP OR LMTD: CPT | Performed by: PHYSICIAN ASSISTANT

## 2024-05-03 PROCEDURE — 99284 EMERGENCY DEPT VISIT MOD MDM: CPT | Mod: 25 | Performed by: EMERGENCY MEDICINE

## 2024-05-03 PROCEDURE — 85025 COMPLETE CBC W/AUTO DIFF WBC: CPT | Performed by: PHYSICIAN ASSISTANT

## 2024-05-03 PROCEDURE — 93005 ELECTROCARDIOGRAM TRACING: CPT | Mod: 59 | Performed by: PHYSICIAN ASSISTANT

## 2024-05-03 PROCEDURE — 96360 HYDRATION IV INFUSION INIT: CPT | Mod: 59 | Performed by: EMERGENCY MEDICINE

## 2024-05-03 RX ORDER — IOPAMIDOL 755 MG/ML
135 INJECTION, SOLUTION INTRAVASCULAR ONCE
Status: COMPLETED | OUTPATIENT
Start: 2024-05-03 | End: 2024-05-03

## 2024-05-03 RX ADMIN — IOPAMIDOL 135 ML: 755 INJECTION, SOLUTION INTRAVENOUS at 22:13

## 2024-05-03 RX ADMIN — SODIUM CHLORIDE 1000 ML: 9 INJECTION, SOLUTION INTRAVENOUS at 20:56

## 2024-05-03 ASSESSMENT — ACTIVITIES OF DAILY LIVING (ADL)
ADLS_ACUITY_SCORE: 39

## 2024-05-04 VITALS
TEMPERATURE: 98.6 F | HEIGHT: 76 IN | SYSTOLIC BLOOD PRESSURE: 118 MMHG | RESPIRATION RATE: 16 BRPM | DIASTOLIC BLOOD PRESSURE: 57 MMHG | BODY MASS INDEX: 28.37 KG/M2 | WEIGHT: 233 LBS | HEART RATE: 100 BPM | OXYGEN SATURATION: 96 %

## 2024-05-04 LAB
ATRIAL RATE - MUSE: 85 BPM
DIASTOLIC BLOOD PRESSURE - MUSE: NORMAL MMHG
INTERPRETATION ECG - MUSE: NORMAL
P AXIS - MUSE: -10 DEGREES
PR INTERVAL - MUSE: 190 MS
QRS DURATION - MUSE: 92 MS
QT - MUSE: 432 MS
QTC - MUSE: 514 MS
R AXIS - MUSE: -38 DEGREES
SYSTOLIC BLOOD PRESSURE - MUSE: NORMAL MMHG
T AXIS - MUSE: 51 DEGREES
VENTRICULAR RATE- MUSE: 85 BPM

## 2024-05-04 PROCEDURE — 250N000013 HC RX MED GY IP 250 OP 250 PS 637: Performed by: EMERGENCY MEDICINE

## 2024-05-04 RX ORDER — CLONAZEPAM 0.5 MG/1
1 TABLET ORAL ONCE
Status: COMPLETED | OUTPATIENT
Start: 2024-05-04 | End: 2024-05-04

## 2024-05-04 RX ADMIN — CLONAZEPAM 1 MG: 0.5 TABLET ORAL at 02:29

## 2024-05-04 ASSESSMENT — ACTIVITIES OF DAILY LIVING (ADL)
ADLS_ACUITY_SCORE: 39

## 2024-05-04 NOTE — DISCHARGE INSTRUCTIONS
I was happy to see that your EKG and heart markers are reassuring pointing away from abnormal heart rhythms or strain or stress in your heart such as heart attack.  We did obtain further laboratory studies demonstrate no obvious infection or electrolyte abnormality explain your episode of passing out.  You did have CT imaging of your abdomen that demonstrates some irritation of the intestines most consistent with a condition called gastroenteritis that can contribute to diarrhea and dehydration.  You did receive IV fluids.  I would strongly recommend close outpatient follow-up with your primary care physician.  Should you have change, progression or any worsening of symptoms please call or return emergently for reevaluation.

## 2024-05-04 NOTE — ED TRIAGE NOTES
Pt was BIBA from group home due to an episode of syncopal episode was about a minute.Denies hitting head.BP-90/40, 24G R hand, 400mls of NS given and BP increased to 110/80.BG-120.Pt is on eliquis.Pt is capable of making his own decisions.  HX: Irma,

## 2024-05-04 NOTE — ED NOTES
Bed: ED11  Expected date:   Expected time:   Means of arrival:   Comments:  N713  59M  Syncope & Hypotension

## 2024-05-04 NOTE — PROGRESS NOTES
Patient belongings collected and sent with patient.  Facility called and notified patient is on his way.  Report given to EMS.  Patient sent in wheelchair with EMS and belongings, and discharge orders.

## 2024-05-04 NOTE — ED PROVIDER NOTES
ED Provider Note  [facility]      History     Chief Complaint   Patient presents with    Syncope     HPI  58yo M pmhx Perkinson disease, CVA w/ left hemiparesis, DVT/PE (on Eliquis), HTN, HLD p/w witnessed syncopal episode from .  Patient reportedly was on the toilet, when he had up to 1 minute of unresponsiveness.  Patient was accompanied by staff, and did not fall off of the toilet or strike head.  No reports of seizure-like activity, incontinence, tongue bite.  On arrival to the ED patient is alert and oriented, denying complaints.  He says he has been in his usual state of health preceding, but immediately before the event, did start to feel lightheaded, like his vision was tunneling, and like he might faint.  Reports remote history of syncope.  Denies associated palpitations, chest pain, shortness of breath, recent fever or chills.  Denies complaints at present.    Past Medical History  Past Medical History:   Diagnosis Date    Cerebral infarction (H)     Clotting disorder (H24)     PE 2019    Dystonia     Parkinson disease      Past Surgical History:   Procedure Laterality Date    APPLY EXTERNAL FIXATOR LOWER EXTREMITY Right 5/21/2023    Procedure: Right  Ankle Closed Reduction and  External Fixator Placement;  Surgeon: Nicole Apodaca MD;  Location: [facility] OR    OPEN REDUCTION INTERNAL FIXATION ANKLE Right 6/15/2023    Procedure: OPEN REDUCTION INTERNAL FIXATION, FRACTURE, ANKLE, removal of external fixator device.;  Surgeon: Jose Bonilla MD;  Location: [facility] OR     acetaminophen (TYLENOL) 325 MG tablet  alfuzosin ER (UROXATRAL) 10 MG 24 hr tablet  amantadine (SYMMETREL) 100 MG capsule  ammonium lactate (LAC-HYDRIN) 12 % external lotion  apixaban ANTICOAGULANT (ELIQUIS) 5 MG tablet  atorvastatin (LIPITOR) 40 MG tablet  bisacodyl (DULCOLAX) 10 MG suppository  carbidopa-levodopa (SINEMET CR)  MG CR tablet  carbidopa-levodopa (SINEMET)  MG tablet  cholecalciferol  (VITAMIN D3) 125 mcg (5000 units) capsule  clonazePAM (KLONOPIN) 1 MG tablet  clonazePAM (KLONOPIN) 2 MG tablet  cloZAPine (CLOZARIL) 50 MG tablet  diclofenac (VOLTAREN) 1 % topical gel  econazole nitrate 1 % external cream  gabapentin (NEURONTIN) 800 MG tablet  hydrOXYzine (ATARAX) 25 MG tablet  ketoconazole (NIZORAL) 2 % external cream  ketoconazole (NIZORAL) 2 % external shampoo  lactulose (CHRONULAC) 10 GM/15ML solution  levofloxacin (LEVAQUIN) 500 MG tablet  linaclotide (LINZESS) 290 MCG capsule  metoprolol succinate ER (TOPROL XL) 25 MG 24 hr tablet  multivitamin, therapeutic (THERA-VIT) TABS tablet  pantoprazole (PROTONIX) 40 MG EC tablet  traZODone (DESYREL) 100 MG tablet  traZODone (DESYREL) 50 MG tablet  venlafaxine (EFFEXOR XR) 150 MG 24 hr capsule  vitamin C (ASCORBIC ACID) 500 MG tablet      Allergies   Allergen Reactions    Amantadine Other (See Comments)     Other reaction(s): Hallucinations  Hallucinations/ lost self control/gambling.     halluicnations  Hallucinations/ lost self control/gambling.     hallucinates  halluicnations  hallucinates  Hallucinations/ lost self control/gambling.       Quetiapine GI Disturbance, Diarrhea and Other (See Comments)     Diarrhea    Diarrhea  Other reaction(s): GI Disturbance  Diarrhea      Duloxetine Other (See Comments)     suicidal  suicidal       Family History  Family History   Problem Relation Age of Onset    Other - See Comments Mother         pulmonary embolism from hip fracture    Pulmonary Embolism Mother     Parkinsonism Father     Neurologic Disorder Sister     Parkinsonism Sister         ?med related    Other - See Comments Sister         12/7/1950    Depression Sister     Neurologic Disorder Brother         dystonia    Dystonia Brother     Other - See Comments Brother             Heart Disease Nephew      Social History   Social History     Tobacco Use    Smoking status: Every Day     Types: Cigars, Cigarettes    Smokeless tobacco: Never  "  Vaping Use    Vaping status: Never Used   Substance Use Topics    Alcohol use: Not Currently     Comment: quit 2014    Drug use: Not Currently         A medically appropriate review of systems was performed with pertinent positives and negatives noted in the HPI, and all other systems negative.    Physical Exam   BP: (!) 168/152  Pulse: 115  Temp: 98.6  F (37  C)  Resp: 16  Height: 193 cm (6' 4\")  Weight: 105.7 kg (233 lb)  SpO2: 93 %  Physical Exam  Constitutional:       General: He is not in acute distress.     Appearance: Normal appearance. He is not toxic-appearing.   HENT:      Head: Atraumatic.   Eyes:      Conjunctiva/sclera: Conjunctivae normal.   Cardiovascular:      Rate and Rhythm: Normal rate and regular rhythm.      Heart sounds: Normal heart sounds.   Pulmonary:      Effort: Pulmonary effort is normal.      Breath sounds: Normal breath sounds.   Musculoskeletal:         General: No deformity.      Cervical back: Neck supple.   Skin:     General: Skin is warm.   Neurological:      Mental Status: He is alert.      GCS: GCS eye subscore is 4. GCS verbal subscore is 5. GCS motor subscore is 6.      Cranial Nerves: Cranial nerves 2-12 are intact.      Comments: Baseline dyskinesia           ED Course, Procedures, & Data      Procedures            EKG Interpretation:      EKG Number: 1  Interpreted by Zhang Solares PA-C  Symptoms at time of EKG: post syncope   Rhythm: Normal sinus   Rate: Normal  Axis: Left Axis Deviation  Ectopy: None  Conduction: Normal  ST Segments/ T Waves: No ST-T wave changes and No acute ischemic changes  Q Waves: None  Comparison to prior: Unchanged    Clinical Impression: Sinus rhythm. Computer read as STEMI, but reviewed with Dr. Milan, and not consistent with acute MI       Results for orders placed or performed during the hospital encounter of 05/03/24   POC US ECHO LIMITED     Status: None (In process)    Impression    Limited Bedside Cardiac Ultrasound, performed and " interpreted by me.   Indication: Syncope.  Parasternal long axis, parasternal short axis, apical 4 chamber, and subcostal views were acquired.   Technically difficult study.       Findings:    Global left ventricular function appears intact.  Chambers do not appear dilated.  There is no evidence of free fluid within the pericardium.    IMPRESSION: Grossly normal left ventricular function and chamber size.  No pericardial effusion..       CT Abdomen Pelvis w Contrast     Status: None    Narrative    EXAM: CT ABDOMEN PELVIS W CONTRAST  LOCATION: [facility]  DATE: 5/3/2024    INDICATION: Low abd pain  COMPARISON: CT from 5/28/2022.  TECHNIQUE: CT scan of the abdomen and pelvis was performed following injection of IV contrast. Multiplanar reformats were obtained. Dose reduction techniques were used.  CONTRAST: iopamidol (ISOVUE 370) solution 135 mL    FINDINGS:   LOWER CHEST: Bibasilar subsegmental atelectasis.    HEPATOBILIARY: Normal liver. Contracted gallbladder.    PANCREAS: Normal.    SPLEEN: Normal.    ADRENAL GLANDS: Normal.    KIDNEYS/BLADDER: Normal.    BOWEL: Heterogeneous ingested content in the stomach. Fluid content throughout the small bowel and proximal large bowel with some formed stool in the rectosigmoid region. Normal appendix. No free air.    LYMPH NODES: Normal.    VASCULATURE: Scant atherosclerotic calcification. No abdominal aortic aneurysm.    PELVIC ORGANS: Normal.    MUSCULOSKELETAL: Mild arthritic change of the hips.      Impression    IMPRESSION:   1.  Fluid content throughout the small bowel and much of the large bowel with more formed stool seen in the distal colon. Consider correlation for gastroenteritis. No mechanical obstruction, appendicitis, or free air.   Lindsay Draw     Status: None (In process)    Narrative    The following orders were created for panel order Lindsay Draw.  Procedure                               Abnormality          Status                     ---------                               -----------         ------                     Extra Blue Top Tube[[id number]]                              Final result               Extra Red Top Tube[[id number]]                               Final result               Extra Green Top (Lithium...[[id number]]                                                 Extra Purple Top Tube[[id number]]                                                         Please view results for these tests on the individual orders.   Basic metabolic panel     Status: Abnormal   Result Value Ref Range    Sodium 139 135 - 145 mmol/L    Potassium 3.8 3.4 - 5.3 mmol/L    Chloride 102 98 - 107 mmol/L    Carbon Dioxide (CO2) 25 22 - 29 mmol/L    Anion Gap 12 7 - 15 mmol/L    Urea Nitrogen 19.6 8.0 - 23.0 mg/dL    Creatinine 0.96 0.67 - 1.17 mg/dL    GFR Estimate >90 >60 mL/min/1.73m2    Calcium 8.8 8.6 - 10.0 mg/dL    Glucose 118 (H) 70 - 99 mg/dL   Troponin T, High Sensitivity     Status: Normal   Result Value Ref Range    Troponin T, High Sensitivity 11 <=22 ng/L   Extra Blue Top Tube     Status: None   Result Value Ref Range    Hold Specimen JIC    Extra Red Top Tube     Status: None   Result Value Ref Range    Hold Specimen JIC    CBC with platelets and differential     Status: Abnormal   Result Value Ref Range    WBC Count 8.9 4.0 - 11.0 10e3/uL    RBC Count 3.84 (L) 4.40 - 5.90 10e6/uL    Hemoglobin 11.5 (L) 13.3 - 17.7 g/dL    Hematocrit 36.0 (L) 40.0 - 53.0 %    MCV 94 78 - 100 fL    MCH 29.9 26.5 - 33.0 pg    MCHC 31.9 31.5 - 36.5 g/dL    RDW 13.2 10.0 - 15.0 %    Platelet Count 267 150 - 450 10e3/uL    % Neutrophils 76 %    % Lymphocytes 16 %    % Monocytes 6 %    % Eosinophils 1 %    % Basophils 0 %    % Immature Granulocytes 1 %    NRBCs per 100 WBC 0 <1 /100    Absolute Neutrophils 6.9 1.6 - 8.3 10e3/uL    Absolute Lymphocytes 1.4 0.8 - 5.3 10e3/uL    Absolute Monocytes 0.5 0.0 - 1.3 10e3/uL    Absolute Eosinophils 0.1  0.0 - 0.7 10e3/uL    Absolute Basophils 0.0 0.0 - 0.2 10e3/uL    Absolute Immature Granulocytes 0.1 <=0.4 10e3/uL    Absolute NRBCs 0.0 10e3/uL   UA with Microscopic reflex to Culture     Status: Abnormal    Specimen: Urine, Midstream   Result Value Ref Range    Color Urine Yellow Colorless, Straw, Light Yellow, Yellow    Appearance Urine Clear Clear    Glucose Urine Negative Negative mg/dL    Bilirubin Urine Negative Negative    Ketones Urine 10 (A) Negative mg/dL    Specific Gravity Urine 1.033 1.003 - 1.035    Blood Urine Negative Negative    pH Urine 5.5 5.0 - 7.0    Protein Albumin Urine 30 (A) Negative mg/dL    Urobilinogen Urine Normal Normal, 2.0 mg/dL    Nitrite Urine Negative Negative    Leukocyte Esterase Urine Trace (A) Negative    Mucus Urine Present (A) None Seen /LPF    RBC Urine 1 <=2 /HPF    WBC Urine 2 <=5 /HPF    Hyaline Casts Urine 10 (H) <=2 /LPF    Narrative    Urine Culture not indicated   EKG 12 lead     Status: None (Preliminary result)   Result Value Ref Range    Systolic Blood Pressure  mmHg    Diastolic Blood Pressure  mmHg    Ventricular Rate 85 BPM    Atrial Rate 85 BPM    NY Interval 190 ms    QRS Duration 92 ms     ms    QTc 514 ms    P Axis -10 degrees    R AXIS -38 degrees    T Axis 51 degrees    Interpretation ECG       Sinus rhythm  Left axis deviation  ST elevation consider inferior injury or acute infarct  Prolonged QT  ** ** ACUTE MI / STEMI ** **  Abnormal ECG     CBC with platelets differential     Status: Abnormal    Narrative    The following orders were created for panel order CBC with platelets differential.  Procedure                               Abnormality         Status                     ---------                               -----------         ------                     CBC with platelets and d...[[id number]]  Abnormal            Final result                 Please view results for these tests on the individual orders.     Medications   sodium chloride 0.9%  BOLUS 1,000 mL (1,000 mLs Intravenous $New Bag 5/3/24 2056)   iopamidol (ISOVUE-370) solution 135 mL (135 mLs Intravenous $Given 5/3/24 2213)   sodium chloride (PF) 0.9% PF flush 84 mL (84 mLs Intravenous $Given 5/3/24 2213)     Labs Ordered and Resulted from Time of ED Arrival to Time of ED Departure   BASIC METABOLIC PANEL - Abnormal       Result Value    Sodium 139      Potassium 3.8      Chloride 102      Carbon Dioxide (CO2) 25      Anion Gap 12      Urea Nitrogen 19.6      Creatinine 0.96      GFR Estimate >90      Calcium 8.8      Glucose 118 (*)    CBC WITH PLATELETS AND DIFFERENTIAL - Abnormal    WBC Count 8.9      RBC Count 3.84 (*)     Hemoglobin 11.5 (*)     Hematocrit 36.0 (*)     MCV 94      MCH 29.9      MCHC 31.9      RDW 13.2      Platelet Count 267      % Neutrophils 76      % Lymphocytes 16      % Monocytes 6      % Eosinophils 1      % Basophils 0      % Immature Granulocytes 1      NRBCs per 100 WBC 0      Absolute Neutrophils 6.9      Absolute Lymphocytes 1.4      Absolute Monocytes 0.5      Absolute Eosinophils 0.1      Absolute Basophils 0.0      Absolute Immature Granulocytes 0.1      Absolute NRBCs 0.0     ROUTINE UA WITH MICROSCOPIC REFLEX TO CULTURE - Abnormal    Color Urine Yellow      Appearance Urine Clear      Glucose Urine Negative      Bilirubin Urine Negative      Ketones Urine 10 (*)     Specific Gravity Urine 1.033      Blood Urine Negative      pH Urine 5.5      Protein Albumin Urine 30 (*)     Urobilinogen Urine Normal      Nitrite Urine Negative      Leukocyte Esterase Urine Trace (*)     Mucus Urine Present (*)     RBC Urine 1      WBC Urine 2      Hyaline Casts Urine 10 (*)    TROPONIN T, HIGH SENSITIVITY - Normal    Troponin T, High Sensitivity 11       CT Abdomen Pelvis w Contrast   Final Result   IMPRESSION:    1.  Fluid content throughout the small bowel and much of the large bowel with more formed stool seen in the distal colon. Consider correlation for gastroenteritis.  No mechanical obstruction, appendicitis, or free air.      POC US ECHO LIMITED   Preliminary Result   Limited Bedside Cardiac Ultrasound, performed and interpreted by me.    Indication: Syncope.   Parasternal long axis, parasternal short axis, apical 4 chamber, and subcostal views were acquired.    Technically difficult study.          Findings:     Global left ventricular function appears intact.   Chambers do not appear dilated.   There is no evidence of free fluid within the pericardium.      IMPRESSION: Grossly normal left ventricular function and chamber size.  No pericardial effusion..                   Critical care was not performed.     Medical Decision Making  The patient's presentation was of moderate complexity (an acute complicated injury).    The patient's evaluation involved:  review of external note(s) from 3+ sources (see separate area of note for details)  review of 3+ test result(s) ordered prior to this encounter (see separate area of note for details)  ordering and/or review of 3+ test(s) in this encounter (see separate area of note for details)        Assessment & Plan    60yo M pmhx Perkinson disease, CVA w/ left hemiparesis, DVT/PE (on Eliquis), HTN, HLD p/w witnessed syncopal episode from .  Was on the toilet, when he had up to 1 minute of unresponsiveness.  Was accompanied by staff, and did not fall off of the toilet or strike head.  No reports of seizure-like activity, incontinence, tongue bite.  In ED patient is A&O, denying complaints.  Does endorse presyncopal symptoms immediately before the event, with headedness, like his vision was tunneling, and like he might faint.  (+)remote history of syncope.  Denies associated palpitations, chest pain, shortness of breath, recent fever or chills.  Does note that he feels he is been having multiple weeks of lower abdominal pain, without urinary symptoms, nausea, vomiting.  Does however, stated he has been having profuse watery diarrhea.  Chart  review shows the patient recently completed course of Levaquin for orchitis/epididymitis.    In ED, normotensive, though mildly tachycardic.  Grossly neurologically intact.  Has a soft abdomen, with reported TTP suprapubically, no rebound, guarding, McBurney point tenderness. EKG sinus rhythm.       Likely vasovagal syncope in the setting of using the toilet.  DDx also includes arrhythmia/cardiogenic etiology, hypovolemia, neurogenic/CVA, infectious (C. difficile, intra-abdominal pathology), other.    Patient's labs tonight showed unremarkable CBC, BMP, negative troponin and noninfected UA.  He did not provide stool parameters in the ED to test for C. difficile. At completion of my shift, pt's CT a/p was pending. Please see Dr. Milan's attestation for final results, ED course and disposition.     --    ED Attending Physician Attestation    I Jani Milan MD, cared for this patient with the Advanced Practice Provider (ESTHER). I have performed a history and physical examination of the patient independent of the ESTHER. I reviewed the ESTHER's documentation above and agree with the documented findings and plan of care. I personally provided a substantive portion of the care for this patient, including the complete Medical Decision Making. Please see the ESTHER's documentation for full details.    Summary of HPI, PE, ED Course   Patient is a 59 year old male evaluated in the emergency department for evaluation after a suspected syncopal episode.  Patient does have a complex past medical history who reported recent loose stools.  The patient was on the toilet when he had a brief episode of decreased responsiveness.  Upon arrival to the emergency department the patient is noted to be alert he is presently afebrile and hemodynamically stable.  He is oriented to situation without signs of acute neurovascular deficit.  My clinical suspicion for acute intracranial pathology such as stroke would be low.  Low suspicion for  dysrhythmia, ACS, PE.  He reports no chest pain or shortness of breath.  EKG obtained which on my interpretation demonstrates a sinus rhythm with left axis deviation.  There is some prolongation of QT.  I did obtain troponin which is nonelevated.  I do not believe requires CT imaging of the chest.  With persistent diarrhea we did obtain CT imaging demonstrating findings consistent with gastroenteritis fitting symptoms.  Low suspicion for acute intra-abdominal process.  Patient did receive gentle fluid hydration in the emergency department with resolution of symptoms.  He was monitored on telemetry for 5 hours without recurrence.  He would prefer to go home.  I think this is reasonable with close outpatient follow-up with primary care physician.  Should he have any change, recurrence or worsening of symptoms we have encouraged him to call or return emergently.    Jani Milan MD  Emergency Medicine      I have reviewed the nursing notes. I have reviewed the findings, diagnosis, plan and need for follow up with the patient.    New Prescriptions    No medications on file       Final diagnoses:   Syncope, unspecified syncope type         Zhang Solares PA-C  [facility] EMERGENCY DEPARTMENT  5/3/2024     Jani Milan MD  05/04/24 0017       Zhang Solares PA-C  05/16/24 1306

## 2024-05-09 ENCOUNTER — PRE VISIT (OUTPATIENT)
Dept: UROLOGY | Facility: CLINIC | Age: 59
End: 2024-05-09
Payer: COMMERCIAL

## 2024-05-10 ENCOUNTER — OFFICE VISIT (OUTPATIENT)
Dept: ORTHOPEDICS | Facility: CLINIC | Age: 59
End: 2024-05-10
Payer: COMMERCIAL

## 2024-05-10 ENCOUNTER — ANCILLARY PROCEDURE (OUTPATIENT)
Dept: GENERAL RADIOLOGY | Facility: CLINIC | Age: 59
End: 2024-05-10
Attending: ORTHOPAEDIC SURGERY
Payer: COMMERCIAL

## 2024-05-10 DIAGNOSIS — S82.851D CLOSED TRIMALLEOLAR FRACTURE OF RIGHT ANKLE WITH ROUTINE HEALING, SUBSEQUENT ENCOUNTER: ICD-10-CM

## 2024-05-10 DIAGNOSIS — S82.851D CLOSED TRIMALLEOLAR FRACTURE OF RIGHT ANKLE WITH ROUTINE HEALING, SUBSEQUENT ENCOUNTER: Primary | ICD-10-CM

## 2024-05-10 PROCEDURE — 73610 X-RAY EXAM OF ANKLE: CPT | Mod: RT | Performed by: RADIOLOGY

## 2024-05-10 PROCEDURE — 99213 OFFICE O/P EST LOW 20 MIN: CPT | Performed by: ORTHOPAEDIC SURGERY

## 2024-05-10 RX ORDER — CLONAZEPAM 1 MG/1
TABLET ORAL
Qty: 60 TABLET | Refills: 5 | Status: SHIPPED | OUTPATIENT
Start: 2024-05-10 | End: 2024-07-15

## 2024-05-10 NOTE — PROGRESS NOTES
Orthopaedic Surgery Clinic Follow-up Appointment    DOI: 05/20/2023, GLF, syncopal event, closed R trimalleolar ankle / pilon fracture-dislocation.  DOS: 05/21/2023, closed reduction, application of spanning external fixator (Kirill Apodaca/Steve/Nara)  DOS: 06/15/2023, ORIF R ankle/pilon, removal of external fixator (Kirill Bonilla/Nia)    This very pleasant 59-year-old gentleman follows up for the above injury.  He reports that his right ankle is feeling pretty good and his no complaints with that at this time.  He does endorse walking on a regular basis perhaps not every day, but is able to ambulate for example in and out of the coffee shop.  He reports that this is up to about 200 feet at a time.  He denies any current right ankle pain.    Examination today shows the patient to be an awake, pleasant, cooperative, and alert adult sitting upright in a motorized wheelchair in no acute distress.  Nonseptic appearing from a general clinical standpoint.  Breathing pattern is regular and nonlabored on room air.  No braces or boots are present on the right lower extremity.  Normal shoewear, removed for examination on the right.  Intact skin without ulceration on the right ankle and foot.  3/4 easily palpable right dorsalis pedis pulse.  Nontender to palpation throughout the right ankle fracture sites as well as the anterior ankle joint line.  Range of motion is approximately 5 degrees above neutral dorsiflexion with the knee extended, to 35 of plantarflexion.  No pain with these maneuvers.  He endorses that sensation is at his baseline in a stocking distribution throughout his right foot.  Range of motion is 5/5 (though within limited motion arc), for right tib ant, EHL, gastrocsoleus, PTT, and peroneals.    Independent review of imaging was performed including weightbearing AP, mortise, and lateral radiographs of the right ankle dated today, 05/10/2024.  Results were discussed with the patient.  Well-healed fractures in  the posterior, medial, and lateral malleolar I with good alignment.  Stable and intact hardware including lateral malleolar and posterior malleolar plates and screws.  No fixation present in the medial malleolus.  Some signs for posttraumatic arthritis appear present at the tibiotalar joint.    Impression:  Very pleasant 59-year-old gentleman doing well with a healed closed trimalleolar right ankle/pilon fracture/dislocation, approximately 11 months status post ORIF.    Plan:  Findings and plan were discussed with the patient.  He may continue weightbearing as tolerated on the right lower extremity.  Regular walking was discussed and recommended.  The potential for symptoms from his posttraumatic arthritis developing or worsening in the future was discussed.  I discussed that there are nonsurgical and surgical treatment options for this.  It is encouraged that he is not having any symptoms at this point in time.  I would be very happy to see Mr. Mathews at any time in the future for follow-up for any concerns, questions, or problems he may have with his right ankle.  All questions this very pleasant gentleman had at this time were answered.

## 2024-05-10 NOTE — NURSING NOTE
Reason For Visit:   Chief Complaint   Patient presents with    RECHECK     Patient returns for 3 month recheck right ankle.  He is 11m s/p ORIF right distal tibia Fx and external fixator removal.  Patient states the ankle is feeling pretty good today.  He denies any significant issues or complaints.       There were no vitals taken for this visit.    Pain Assessment  Patient Currently in Pain: Yes  0-10 Pain Scale: 4  Primary Pain Location: Hip (left hip)    Dylan Conn, ATC

## 2024-05-10 NOTE — PROGRESS NOTES
Per Dr. Carlos agreement with prn clonazepam 2mg, pended new order to reflect this change. As he is only having this episodes once per month, anticipate that use of 2mg prn dose will be quite limited. Will continue to assess whether doses are still indicated.  Routed to Dr. Carlos to sign, will then need to fax order to group home with update.    Cary Sol, PharmD  Medication Therapy Management Pharmacist  [facility]

## 2024-05-10 NOTE — LETTER
5/10/2024         RE: Benjamin Mathews  [address]  [facility] [address]        Dear Colleague,    Thank you for referring your patient, Benjamin Mathews, to the [facility] [place]. Please see a copy of my visit note below.    Orthopaedic Surgery Clinic Follow-up Appointment    DOI: 05/20/2023, GLF, syncopal event, closed R trimalleolar ankle / pilon fracture-dislocation.  DOS: 05/21/2023, closed reduction, application of spanning external fixator (Kirill Apodaca/Steve/Nara)  DOS: 06/15/2023, ORIF R ankle/pilon, removal of external fixator (Kirill Bonilla/Nia)    This very pleasant 59-year-old gentleman follows up for the above injury.  He reports that his right ankle is feeling pretty good and his no complaints with that at this time.  He does endorse walking on a regular basis perhaps not every day, but is able to ambulate for example in and out of the coffee shop.  He reports that this is up to about 200 feet at a time.  He denies any current right ankle pain.    Examination today shows the patient to be an awake, pleasant, cooperative, and alert adult sitting upright in a motorized wheelchair in no acute distress.  Nonseptic appearing from a general clinical standpoint.  Breathing pattern is regular and nonlabored on room air.  No braces or boots are present on the right lower extremity.  Normal shoewear, removed for examination on the right.  Intact skin without ulceration on the right ankle and foot.  3/4 easily palpable right dorsalis pedis pulse.  Nontender to palpation throughout the right ankle fracture sites as well as the anterior ankle joint line.  Range of motion is approximately 5 degrees above neutral dorsiflexion with the knee extended, to 35 of plantarflexion.  No pain with these maneuvers.  He endorses that sensation is at his baseline in a stocking distribution throughout his right foot.  Range of motion is 5/5 (though within limited motion arc), for right tib  ant, EHL, gastrocsoleus, PTT, and peroneals.    Independent review of imaging was performed including weightbearing AP, mortise, and lateral radiographs of the right ankle dated today, 05/10/2024.  Results were discussed with the patient.  Well-healed fractures in the posterior, medial, and lateral malleolar I with good alignment.  Stable and intact hardware including lateral malleolar and posterior malleolar plates and screws.  No fixation present in the medial malleolus.  Some signs for posttraumatic arthritis appear present at the tibiotalar joint.    Impression:  Very pleasant 59-year-old gentleman doing well with a healed closed trimalleolar right ankle/pilon fracture/dislocation, approximately 11 months status post ORIF.    Plan:  Findings and plan were discussed with the patient.  He may continue weightbearing as tolerated on the right lower extremity.  Regular walking was discussed and recommended.  The potential for symptoms from his posttraumatic arthritis developing or worsening in the future was discussed.  I discussed that there are nonsurgical and surgical treatment options for this.  It is encouraged that he is not having any symptoms at this point in time.  I would be very happy to see Mr. Mathews at any time in the future for follow-up for any concerns, questions, or problems he may have with his right ankle.  All questions this very pleasant gentleman had at this time were answered.    Sincerely,        Jose Bonilla MD

## 2024-05-13 ENCOUNTER — TELEPHONE (OUTPATIENT)
Dept: NEUROLOGY | Facility: CLINIC | Age: 59
End: 2024-05-13
Payer: COMMERCIAL

## 2024-05-13 NOTE — TELEPHONE ENCOUNTER
I am unsure which group home the pt is at so I called the pt and left a message to call back which group home he is at prior to faxing the order.    Will wait for call back.      CUCO Urias on 5/13/2024 at 12:02 PM

## 2024-05-13 NOTE — TELEPHONE ENCOUNTER
Message  Received: 3 days ago  Ching Carlos, Cary Hahn, PharmD; P [facility] Clinical Care Team  Cary, order is signed.    [facility] team, will you please fax the new clonazepam Rx to Benjamin's group home please?    Thank you,    Ching

## 2024-05-14 ENCOUNTER — MYC MEDICAL ADVICE (OUTPATIENT)
Dept: NEUROLOGY | Facility: CLINIC | Age: 59
End: 2024-05-14

## 2024-05-14 ENCOUNTER — OFFICE VISIT (OUTPATIENT)
Dept: UROLOGY | Facility: CLINIC | Age: 59
End: 2024-05-14
Attending: FAMILY MEDICINE
Payer: COMMERCIAL

## 2024-05-14 VITALS
HEART RATE: 91 BPM | WEIGHT: 233 LBS | SYSTOLIC BLOOD PRESSURE: 100 MMHG | BODY MASS INDEX: 28.37 KG/M2 | DIASTOLIC BLOOD PRESSURE: 63 MMHG | HEIGHT: 76 IN

## 2024-05-14 DIAGNOSIS — R39.9 LOWER URINARY TRACT SYMPTOMS (LUTS): ICD-10-CM

## 2024-05-14 DIAGNOSIS — G20.B1 DYSKINESIA DUE TO PARKINSON'S DISEASE (H): ICD-10-CM

## 2024-05-14 DIAGNOSIS — Z87.438 HISTORY OF ORCHITIS: Primary | ICD-10-CM

## 2024-05-14 PROCEDURE — 99213 OFFICE O/P EST LOW 20 MIN: CPT | Performed by: PHYSICIAN ASSISTANT

## 2024-05-14 ASSESSMENT — PAIN SCALES - GENERAL: PAINLEVEL: NO PAIN (0)

## 2024-05-14 NOTE — PROGRESS NOTES
Urology Office Visit - Follow Up    Reason for visit: follow up for orchitis    HPI: Benjamin Mathews is a 59 year old male with PMH of Parkinson's disease, dystonia, hemiparesis, and dyskinesia who is seen today for follow up of orchitis. He has been followed in our clinic previously for LUTS; seen by Dr. Serna 11/3/2022 from which the following history was reviewed:    Initially seen 8/2/2022:  Benjamin Mathews is a 57 year old male who is being seen for evaluation of LUTS     4-5 times per month he will wake up soaked  During the day he only voids about 2 times  Symptoms have been worsening for the past 3 months  He notes that his tamsulosin 0.4mg (Flomax) was stopped for some reason - possibly due to fall risk  He had been on this for at least 2 years  He notes a weak and intermittent stream  Some urgency and urge incontinence at times     He previously saw Urology in [place]  He has since moved      AUASS: 4-3-3-0-5-2-5 = 22  QOL = 3  PVR = 20cc     11/3/2022:  Started Alfuzosin at last visit  He continues to urinate very little during the day  He urinates a few times overnight a good volume  He does feel like the alfuzosin has been beneficial  He has not had any issues with urinary tract infections     AUASS: 5-3-1-0-5-5-2 = 26  QOL = 3  PVR = 47cc    I then saw him in follow up 11/3/2023:  Benjamin is doing well. He has no concerns. Voiding well and feels to be emptying his bladder. No recent bedwetting.    TODAY   5/14/2024:  Follow up for recent ED visit on 4/26/24 for orchitis. He presented for 10/10 right-sided testicular pain and swelling, worse with touch. Scrotal ultrasound revealed:  Impression:   1.  Right testicle is overall heterogeneous with a peripheral  hypoechoic area noted in possible volume loss in this region although  testicles are symmetric overall. No hypervascularity to suggest  orchitis. Normal vascularity present with no evidence of torsion.  Findings are most suggestive of previous  "infectious/vascular insult.  Suggest short-term follow-up ultrasound scrotal study in 1 to 2 months  to reevaluate.  2.  There is a small right testicular appendage suspected on cine  images, not included on static images. Correlation clinically with  whether patient's symptoms suggest possible torsion of a testicular  appendage suggested although this does not appear edematous.    He was treated with a 10 day course of Levaquin and pain has resolved.    As an aside, he reports more urinary incontinence (\"flooding\") that occurs early in the mornings. He voids before he goes to bed and feels to empty his bladder. States that PVRs are often around 150 mL. He is still taking alfuzosin.     PEx  /63   Pulse 91   Ht 1.93 m (6' 4\")   Wt 105.7 kg (233 lb)   BMI 28.36 kg/m    GENERAL: Healthy, alert and no distress. Resting comfortably in motorized wheelchair.  EYES: Eyes grossly normal to inspection.  No discharge or erythema, or obvious scleral/conjunctival abnormalities.  RESP: No audible wheeze, cough, or visible cyanosis.  No visible retractions or increased work of breathing.    SKIN: Visible skin clear. No significant rash, abnormal pigmentation or lesions.  NEURO: Cranial nerves grossly intact.  Mentation and speech appropriate for age.  PSYCH: Mentation appears normal, affect normal/bright, judgement and insight intact, normal speech and appearance well-groomed.    LAB:  Prostate Specific Antigen Screen   Date Value Ref Range Status   05/16/2017 0.5 0.0 - 3.5 ng/mL Final       Creatinine   Date Value Ref Range Status   05/03/2024 0.96 0.67 - 1.17 mg/dL Final   07/09/2021 0.77 0.66 - 1.25 mg/dL Final       Color Urine (no units)   Date Value   05/03/2024 Yellow     Appearance Urine (no units)   Date Value   05/03/2024 Clear     Glucose Urine (mg/dL)   Date Value   05/03/2024 Negative     Bilirubin Urine (no units)   Date Value   05/03/2024 Negative     Ketones Urine (mg/dL)   Date Value   05/03/2024 10 (A) "     Specific Gravity Urine (no units)   Date Value   05/03/2024 1.033     pH Urine (no units)   Date Value   05/03/2024 5.5     Protein Albumin Urine (mg/dL)   Date Value   05/03/2024 30 (A)     Nitrite Urine (no units)   Date Value   05/03/2024 Negative     Leukocyte Esterase Urine (no units)   Date Value   05/03/2024 Trace (A)       5/3/24 - urine micro: 1 RBC, 2 WBC    IMAGING:   US TESTICULAR AND SCROTAL, 4/26/2024     Findings:  Difficult sonographic assessment due to patient's Parkinson's disease  with dystonia and difficulty positioning the patient as per  technologist's note.     The testes demonstrate symmetric vascularity. The right testicle  measures 2.6 x 1.7 x 2.2 cm with a volume of 6.9 cc and the left  measures 3.4 x 1.2 x 2.2 cm with a volume of 6.3 cc (volume calculated  with separate etiology volume calculated). There is no evidence of  acute torsion. The right testicle is heterogeneous with a peripheral  lenticular shaped hypoechoic area noted in the right testicle with  possible volume loss in this region although overall testicles are  fairly symmetric in volume. Small right testicular appendage on cine  imaging noted. On the midline transverse images including both  testicles, and the right testicle has a more and horizontal lie while  the left testicle has a more vertical lie. When this was discussed  with the technologist, there was difficulty positioning the scrotum  due to patient factors. Presumably this difference in positioning of  the testicles relates to same.     There is no evidence of a significant hydrocele or varicocele. Trace  peritesticular fluid.     Both the right and left epididymis are within normal limits.                                                                      Impression:   1.  Right testicle is overall heterogeneous with a peripheral  hypoechoic area noted in possible volume loss in this region although  testicles are symmetric overall. No hypervascularity to  "suggest  orchitis. Normal vascularity present with no evidence of torsion.  Findings are most suggestive of previous infectious/vascular insult.  Suggest short-term follow-up ultrasound scrotal study in 1 to 2 months  to reevaluate.  2.  There is a small right testicular appendage suspected on cine  images, not included on static images. Correlation clinically with  whether patient's symptoms suggest possible torsion of a testicular  appendage suggested although this does not appear edematous.      EXAM: CT ABDOMEN PELVIS W CONTRAST  LOCATION: [facility]  DATE: 5/3/2024    FINDINGS:   LOWER CHEST: Bibasilar subsegmental atelectasis.     HEPATOBILIARY: Normal liver. Contracted gallbladder.     PANCREAS: Normal.     SPLEEN: Normal.     ADRENAL GLANDS: Normal.     KIDNEYS/BLADDER: Normal.     BOWEL: Heterogeneous ingested content in the stomach. Fluid content throughout the small bowel and proximal large bowel with some formed stool in the rectosigmoid region. Normal appendix. No free air.     LYMPH NODES: Normal.     VASCULATURE: Scant atherosclerotic calcification. No abdominal aortic aneurysm.     PELVIC ORGANS: Normal.     MUSCULOSKELETAL: Mild arthritic change of the hips.                                                                      IMPRESSION:   1.  Fluid content throughout the small bowel and much of the large bowel with more formed stool seen in the distal colon. Consider correlation for gastroenteritis. No mechanical obstruction, appendicitis, or free air.      ASSESSMENT/PLAN:  59 year old male with complicated PMH and LUTS seen today for follow up after ED visit on 4/26/24 for right-sided orchitis. Symptoms resolved with a 10 day course of Levaquin. Will repeat scrotal ultrasound in 1 month to reevaluate given some non-specific subtle changes on 4/26/24 ultrasound. Regarding his voiding, he reports more \"flooding\" urinary incontinence that occurs early in " the morning. Unclear if overflow vs DO vs other. No significant daytime LUTS. We discussed further evaluation with urodynamics, but he declines for now and would like to monitor his symptoms.    -Continue alfuzosin 10 mg daily.   -Limit fluids before bed.  -Void before bed.  -Repeat scrotal ultrasound in 1 month.  -Follow up in 6 months, sooner as needed.     Juju Bernardo PA-C  Department of Urology    26 minutes spent on the date of the encounter doing chart review, review of test results, interpretation of tests, patient visit, and documentation

## 2024-05-14 NOTE — LETTER
5/14/2024       RE: Benjamin Mathews  [address]  [facility] [address]     Dear Colleague,    Thank you for referring your patient, Benjamin Mathews, to the [facility]OLOG[facility] [place] at [facility]. Please see a copy of my visit note below.    Urology Office Visit - Follow Up    Reason for visit: follow up for orchitis    HPI: Benjamin Mathews is a 59 year old male with PMH of Parkinson's disease, dystonia, hemiparesis, and dyskinesia who is seen today for follow up of orchitis. He has been followed in our clinic previously for LUTS; seen by Dr. Serna 11/3/2022 from which the following history was reviewed:    Initially seen 8/2/2022:  Benjamin Mathews is a 57 year old male who is being seen for evaluation of LUTS     4-5 times per month he will wake up soaked  During the day he only voids about 2 times  Symptoms have been worsening for the past 3 months  He notes that his tamsulosin 0.4mg (Flomax) was stopped for some reason - possibly due to fall risk  He had been on this for at least 2 years  He notes a weak and intermittent stream  Some urgency and urge incontinence at times     He previously saw Urology in [place]  He has since moved      AUASS: 4-3-3-0-5-2-5 = 22  QOL = 3  PVR = 20cc     11/3/2022:  Started Alfuzosin at last visit  He continues to urinate very little during the day  He urinates a few times overnight a good volume  He does feel like the alfuzosin has been beneficial  He has not had any issues with urinary tract infections     AUASS: 5-3-1-0-5-5-2 = 26  QOL = 3  PVR = 47cc    I then saw him in follow up 11/3/2023:  Benjamin is doing well. He has no concerns. Voiding well and feels to be emptying his bladder. No recent bedwetting.    TODAY   5/14/2024:  Follow up for recent ED visit on 4/26/24 for orchitis. He presented for 10/10 right-sided testicular pain and swelling, worse with touch. Scrotal ultrasound revealed:  Impression:   1.   "Right testicle is overall heterogeneous with a peripheral  hypoechoic area noted in possible volume loss in this region although  testicles are symmetric overall. No hypervascularity to suggest  orchitis. Normal vascularity present with no evidence of torsion.  Findings are most suggestive of previous infectious/vascular insult.  Suggest short-term follow-up ultrasound scrotal study in 1 to 2 months  to reevaluate.  2.  There is a small right testicular appendage suspected on cine  images, not included on static images. Correlation clinically with  whether patient's symptoms suggest possible torsion of a testicular  appendage suggested although this does not appear edematous.    He was treated with a 10 day course of Levaquin and pain has resolved.    As an aside, he reports more urinary incontinence (\"flooding\") that occurs early in the mornings. He voids before he goes to bed and feels to empty his bladder. States that PVRs are often around 150 mL. He is still taking alfuzosin.     PEx  /63   Pulse 91   Ht 1.93 m (6' 4\")   Wt 105.7 kg (233 lb)   BMI 28.36 kg/m    GENERAL: Healthy, alert and no distress. Resting comfortably in motorized wheelchair.  EYES: Eyes grossly normal to inspection.  No discharge or erythema, or obvious scleral/conjunctival abnormalities.  RESP: No audible wheeze, cough, or visible cyanosis.  No visible retractions or increased work of breathing.    SKIN: Visible skin clear. No significant rash, abnormal pigmentation or lesions.  NEURO: Cranial nerves grossly intact.  Mentation and speech appropriate for age.  PSYCH: Mentation appears normal, affect normal/bright, judgement and insight intact, normal speech and appearance well-groomed.    LAB:  Prostate Specific Antigen Screen   Date Value Ref Range Status   05/16/2017 0.5 0.0 - 3.5 ng/mL Final       Creatinine   Date Value Ref Range Status   05/03/2024 0.96 0.67 - 1.17 mg/dL Final   07/09/2021 0.77 0.66 - 1.25 mg/dL Final "       Color Urine (no units)   Date Value   05/03/2024 Yellow     Appearance Urine (no units)   Date Value   05/03/2024 Clear     Glucose Urine (mg/dL)   Date Value   05/03/2024 Negative     Bilirubin Urine (no units)   Date Value   05/03/2024 Negative     Ketones Urine (mg/dL)   Date Value   05/03/2024 10 (A)     Specific Gravity Urine (no units)   Date Value   05/03/2024 1.033     pH Urine (no units)   Date Value   05/03/2024 5.5     Protein Albumin Urine (mg/dL)   Date Value   05/03/2024 30 (A)     Nitrite Urine (no units)   Date Value   05/03/2024 Negative     Leukocyte Esterase Urine (no units)   Date Value   05/03/2024 Trace (A)       5/3/24 - urine micro: 1 RBC, 2 WBC    IMAGING:   US TESTICULAR AND SCROTAL, 4/26/2024     Findings:  Difficult sonographic assessment due to patient's Parkinson's disease  with dystonia and difficulty positioning the patient as per  technologist's note.     The testes demonstrate symmetric vascularity. The right testicle  measures 2.6 x 1.7 x 2.2 cm with a volume of 6.9 cc and the left  measures 3.4 x 1.2 x 2.2 cm with a volume of 6.3 cc (volume calculated  with separate etiology volume calculated). There is no evidence of  acute torsion. The right testicle is heterogeneous with a peripheral  lenticular shaped hypoechoic area noted in the right testicle with  possible volume loss in this region although overall testicles are  fairly symmetric in volume. Small right testicular appendage on cine  imaging noted. On the midline transverse images including both  testicles, and the right testicle has a more and horizontal lie while  the left testicle has a more vertical lie. When this was discussed  with the technologist, there was difficulty positioning the scrotum  due to patient factors. Presumably this difference in positioning of  the testicles relates to same.     There is no evidence of a significant hydrocele or varicocele. Trace  peritesticular fluid.     Both the right and  left epididymis are within normal limits.                                                                      Impression:   1.  Right testicle is overall heterogeneous with a peripheral  hypoechoic area noted in possible volume loss in this region although  testicles are symmetric overall. No hypervascularity to suggest  orchitis. Normal vascularity present with no evidence of torsion.  Findings are most suggestive of previous infectious/vascular insult.  Suggest short-term follow-up ultrasound scrotal study in 1 to 2 months  to reevaluate.  2.  There is a small right testicular appendage suspected on cine  images, not included on static images. Correlation clinically with  whether patient's symptoms suggest possible torsion of a testicular  appendage suggested although this does not appear edematous.      EXAM: CT ABDOMEN PELVIS W CONTRAST  LOCATION: [facility]  DATE: 5/3/2024    FINDINGS:   LOWER CHEST: Bibasilar subsegmental atelectasis.     HEPATOBILIARY: Normal liver. Contracted gallbladder.     PANCREAS: Normal.     SPLEEN: Normal.     ADRENAL GLANDS: Normal.     KIDNEYS/BLADDER: Normal.     BOWEL: Heterogeneous ingested content in the stomach. Fluid content throughout the small bowel and proximal large bowel with some formed stool in the rectosigmoid region. Normal appendix. No free air.     LYMPH NODES: Normal.     VASCULATURE: Scant atherosclerotic calcification. No abdominal aortic aneurysm.     PELVIC ORGANS: Normal.     MUSCULOSKELETAL: Mild arthritic change of the hips.                                                                      IMPRESSION:   1.  Fluid content throughout the small bowel and much of the large bowel with more formed stool seen in the distal colon. Consider correlation for gastroenteritis. No mechanical obstruction, appendicitis, or free air.      ASSESSMENT/PLAN:  59 year old male with complicated PMH and LUTS seen today for follow  "up after ED visit on 4/26/24 for right-sided orchitis. Symptoms resolved with a 10 day course of Levaquin. Will repeat scrotal ultrasound in 1 month to reevaluate given some non-specific subtle changes on 4/26/24 ultrasound. Regarding his voiding, he reports more \"flooding\" urinary incontinence that occurs early in the morning. Unclear if overflow vs DO vs other. No significant daytime LUTS. We discussed further evaluation with urodynamics, but he declines for now and would like to monitor his symptoms.    -Continue alfuzosin 10 mg daily.   -Limit fluids before bed.  -Void before bed.  -Repeat scrotal ultrasound in 1 month.  -Follow up in 6 months, sooner as needed.     Juju Bernardo PA-C  Department of Urology    26 minutes spent on the date of the encounter doing chart review, review of test results, interpretation of tests, patient visit, and documentation    "

## 2024-05-14 NOTE — TELEPHONE ENCOUNTER
Unable to get a hold of the pt. I sent a [employer] message asking pt which group home he is at.    CUCO Urias on 5/14/2024 at 10:01 AM

## 2024-05-14 NOTE — PATIENT INSTRUCTIONS
UROLOGY CLINIC VISIT PATIENT INSTRUCTIONS    Scrotal ultrasound at [place] in 1 month.     Follow up with Juju Bernardo PA-C in November 2024 as scheduled.     Continue alfuzosin 10 mg daily.    If you have any issues, questions or concerns in the meantime, do not hesitate to contact us at [phone] or via [employer].     It was a pleasure meeting with you today.  Thank you for allowing me and my team the privilege of caring for you today.  YOU are the reason we are here, and I truly hope we provided you with the excellent service you deserve.  Please let us know if there is anything else we can do for you so that we can be sure you are leaving completely satisfied with your care experience.

## 2024-05-16 ENCOUNTER — OFFICE VISIT (OUTPATIENT)
Dept: PODIATRY | Facility: CLINIC | Age: 59
End: 2024-05-16
Payer: COMMERCIAL

## 2024-05-16 DIAGNOSIS — Z87.2 HISTORY OF FOOT ULCER: ICD-10-CM

## 2024-05-16 DIAGNOSIS — G60.8 PERIPHERAL SENSORY NEUROPATHY: ICD-10-CM

## 2024-05-16 DIAGNOSIS — B35.1 ONYCHOMYCOSIS: Primary | ICD-10-CM

## 2024-05-16 PROCEDURE — 99213 OFFICE O/P EST LOW 20 MIN: CPT | Mod: 25 | Performed by: PODIATRIST

## 2024-05-16 PROCEDURE — 11720 DEBRIDE NAIL 1-5: CPT | Performed by: PODIATRIST
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
## 2024-05-16 NOTE — TELEPHONE ENCOUNTER
Received msg via [employer]. See [employer] dated 5/14/2024    CUCO Urias on 5/16/2024 at 10:49 AM

## 2024-05-16 NOTE — NURSING NOTE
Benjamin Mathews's chief complaint for this visit includes:  Chief Complaint   Patient presents with    Consult     Foot follow up      PCP: Stephanie Maldonado    Referring Provider:  No referring provider defined for this encounter.    There were no vitals taken for this visit.  Data Unavailable     Do you need any medication refills at today's visit? NO    Allergies   Allergen Reactions    Amantadine Other (See Comments)     Other reaction(s): Hallucinations  Hallucinations/ lost self control/gambling.     halluicnations  Hallucinations/ lost self control/gambling.     hallucinates  halluicnations  hallucinates  Hallucinations/ lost self control/gambling.       Quetiapine GI Disturbance, Diarrhea and Other (See Comments)     Diarrhea    Diarrhea  Other reaction(s): GI Disturbance  Diarrhea      Duloxetine Other (See Comments)     suicidal  suicidal         Jill Downey LPN
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
## 2024-05-21 ENCOUNTER — TELEPHONE (OUTPATIENT)
Dept: OPHTHALMOLOGY | Facility: CLINIC | Age: 59
End: 2024-05-21

## 2024-05-21 ENCOUNTER — OFFICE VISIT (OUTPATIENT)
Dept: OPHTHALMOLOGY | Facility: CLINIC | Age: 59
End: 2024-05-21
Payer: COMMERCIAL

## 2024-05-21 DIAGNOSIS — H25.813 COMBINED FORMS OF AGE-RELATED CATARACT OF BOTH EYES: ICD-10-CM

## 2024-05-21 DIAGNOSIS — H52.03 HYPEROPIA OF BOTH EYES WITH ASTIGMATISM AND PRESBYOPIA: Primary | ICD-10-CM

## 2024-05-21 DIAGNOSIS — G20.A1 PARKINSON'S DISEASE, UNSPECIFIED WHETHER DYSKINESIA PRESENT, UNSPECIFIED WHETHER MANIFESTATIONS FLUCTUATE (H): ICD-10-CM

## 2024-05-21 DIAGNOSIS — H51.11 CONVERGENCE INSUFFICIENCY: ICD-10-CM

## 2024-05-21 DIAGNOSIS — H52.203 HYPEROPIA OF BOTH EYES WITH ASTIGMATISM AND PRESBYOPIA: Primary | ICD-10-CM

## 2024-05-21 DIAGNOSIS — H52.4 HYPEROPIA OF BOTH EYES WITH ASTIGMATISM AND PRESBYOPIA: Primary | ICD-10-CM

## 2024-05-21 PROCEDURE — 92015 DETERMINE REFRACTIVE STATE: CPT | Performed by: OPHTHALMOLOGY

## 2024-05-21 PROCEDURE — 92004 COMPRE OPH EXAM NEW PT 1/>: CPT | Performed by: OPHTHALMOLOGY

## 2024-05-21 RX ORDER — DOCUSATE SODIUM 283 MG/5ML
1 LIQUID RECTAL
COMMUNITY
Start: 2024-05-08

## 2024-05-21 RX ORDER — POLYETHYLENE GLYCOL 3350 17 G/17G
1 POWDER, FOR SOLUTION ORAL DAILY
COMMUNITY
Start: 2023-09-04

## 2024-05-21 RX ORDER — DOCUSATE SODIUM 50MG AND SENNOSIDES 8.6MG 8.6; 5 MG/1; MG/1
2 TABLET, FILM COATED ORAL 2 TIMES DAILY
COMMUNITY
Start: 2024-04-26

## 2024-05-21 RX ORDER — TAMSULOSIN HYDROCHLORIDE 0.4 MG/1
0.4 CAPSULE ORAL DAILY
COMMUNITY
Start: 2023-05-25

## 2024-05-21 RX ORDER — HYDROCORTISONE 1 G/100G
CREAM TOPICAL 2 TIMES DAILY
COMMUNITY
Start: 2024-03-15

## 2024-05-21 RX ORDER — METOPROLOL TARTRATE 25 MG/1
25 TABLET, FILM COATED ORAL 2 TIMES DAILY
COMMUNITY
Start: 2024-05-06

## 2024-05-21 ASSESSMENT — CUP TO DISC RATIO
OD_RATIO: 0.6
OS_RATIO: 0.6

## 2024-05-21 ASSESSMENT — REFRACTION_MANIFEST
OD_ADD: +2.50
OD_SPHERE: +0.75
OS_SPHERE: +1.25
OS_CYLINDER: +1.00
OS_AXIS: 145
OD_CYLINDER: +0.75
OD_AXIS: 005
OS_ADD: +2.50

## 2024-05-21 ASSESSMENT — CONF VISUAL FIELD
OD_NORMAL: 1
OS_INFERIOR_NASAL_RESTRICTION: 0
OD_SUPERIOR_NASAL_RESTRICTION: 0
OD_INFERIOR_TEMPORAL_RESTRICTION: 0
OS_SUPERIOR_NASAL_RESTRICTION: 0
OS_INFERIOR_TEMPORAL_RESTRICTION: 0
OS_NORMAL: 1
OD_SUPERIOR_TEMPORAL_RESTRICTION: 0
OS_SUPERIOR_TEMPORAL_RESTRICTION: 0
METHOD: COUNTING FINGERS
OD_INFERIOR_NASAL_RESTRICTION: 0

## 2024-05-21 ASSESSMENT — TONOMETRY
OS_IOP_MMHG: 17
OD_IOP_MMHG: 19
OD_IOP_MMHG: 21
IOP_METHOD: ICARE
OS_IOP_MMHG: 18
IOP_METHOD: TONOPEN

## 2024-05-21 ASSESSMENT — VISUAL ACUITY
OD_CC+: +2
OS_CC: J7-2
OS_CC+: -2
OD_CC: J2-1
OD_PH_CC+: -1
OS_PH_CC: 20/30
OD_CC: 20/40
OD_PH_CC: 20/30
OS_PH_CC+: -2
METHOD: SNELLEN - LINEAR
CORRECTION_TYPE: GLASSES
OS_CC: 20/50

## 2024-05-21 ASSESSMENT — REFRACTION_WEARINGRX
OD_SPHERE: +0.75
OD_CYLINDER: SPHERE
OS_CYLINDER: SPHERE
OS_SPHERE: +1.00
SPECS_TYPE: BIFOCAL

## 2024-05-21 ASSESSMENT — SLIT LAMP EXAM - LIDS
COMMENTS: NORMAL
COMMENTS: NORMAL

## 2024-05-21 NOTE — NURSING NOTE
Chief Complaints and History of Present Illnesses   Patient presents with    Annual Eye Exam     Chief Complaint(s) and History of Present Illness(es)       Annual Eye Exam              Laterality: both eyes              Comments    Pt comes to the clinic as a new patient for a comprehensive eye exam. He states his last eye exam was about 1.5 years ago. Some distance blur with his present bifocals. He adds that he does have a strong family history of glaucoma with several aunts and uncles, so wants to ensure he is not at risk today. H/O Parkinson's

## 2024-05-21 NOTE — PROGRESS NOTES
HPI       Annual Eye Exam    In both eyes.             Comments    Pt comes to the clinic as a new patient for a comprehensive eye exam. He states his last eye exam was about 1.5 years ago. Some distance blur with his present bifocals. He adds that he does have a strong family history of glaucoma with several aunts and uncles, so wants to ensure he is not at risk today. H/O Parkinson's          Last edited by Cherry Bazzi on 5/21/2024 12:11 PM.         Review of systems for the eyes was negative other than the pertinent positives/negatives listed in the HPI.      Assessment & Plan    HPI:  Benjamin Mathews is a 59 year old male with history of parkinson's disease, CVA, migraine, chronic DVT, PE, MDD, HLD, BPH, HTN, hyperopia with astigmatism, convergence insufficiency presents for CEE> He notes some blurry vision.     POHx: hyperopia with astigmatism, convergence insufficiency  PMHx: parkinson's disease, CVA, migraine, chronic DVT, PE, MDD, HLD, BPH, HTN,   Current Medications:   Current Outpatient Medications   Medication Sig Dispense Refill    acetaminophen (TYLENOL) 325 MG tablet Take 1-2 tablets (325-650 mg) by mouth every 8 hours as needed for mild pain 100 tablet 0    alfuzosin ER (UROXATRAL) 10 MG 24 hr tablet Take 1 tablet (10 mg) by mouth daily 30 tablet 11    amantadine (SYMMETREL) 100 MG capsule Take 1 capsule (100 mg) by mouth 2 times daily 60 capsule 11    ammonium lactate (LAC-HYDRIN) 12 % external lotion Apply topically daily as needed      ANTI-ITCH MAXIMUM STRENGTH 1 % external cream Apply topically 2 times daily      apixaban ANTICOAGULANT (ELIQUIS) 5 MG tablet Take 5 mg by mouth 2 times daily      atorvastatin (LIPITOR) 40 MG tablet Take 1 tablet (40 mg) by mouth every evening 30 tablet 0    bisacodyl (DULCOLAX) 10 MG suppository Place 1 suppository (10 mg) rectally daily as needed for constipation 10 suppository 0    carbidopa-levodopa (SINEMET CR)  MG CR tablet Take 2 tablets by mouth  at bedtime 60 tablet 11    carbidopa-levodopa (SINEMET)  MG tablet Week 1 take 2.5 tabs three times daily at 8000, 1200, 1600 then week 2 take 3 tabs three times daily and continue on this dose. Stop at lowest effective dose. Okay to take 0.5-1 tab as needed twice daily = up to 11 tabs daily 330 tablet 11    cholecalciferol (VITAMIN D3) 125 mcg (5000 units) capsule Take 1 capsule (125 mcg) by mouth daily 30 capsule 0    clonazePAM (KLONOPIN) 1 MG tablet Take 1 tablet scheduled at noon, may take 1-2 additional tablet PRN 60 tablet 5    clonazePAM (KLONOPIN) 2 MG tablet Take 1 tablet (2 mg) by mouth 2 times daily Take at 8am and 4 pm 60 tablet 5    cloZAPine (CLOZARIL) 50 MG tablet Take 1 tablet (50 mg) by mouth At Bedtime 30 tablet 0    diclofenac (VOLTAREN) 1 % topical gel Apply 2 g topically 3 times daily as needed for moderate pain 150 g 0    econazole nitrate 1 % external cream Apply topically daily To toes and toenails. 85 g 5    ENEMEEZ MINI 283 MG/5ML enema Place 1 enema rectally      gabapentin (NEURONTIN) 800 MG tablet Take 1 tablet (800 mg) by mouth 3 times daily 90 tablet 0    hydrOXYzine (ATARAX) 25 MG tablet Take 2 tablets (50 mg) by mouth every 6 hours as needed for anxiety (up to 3 timrd daily) 20 tablet 0    ketoconazole (NIZORAL) 2 % external cream Apply topically 2 times daily      ketoconazole (NIZORAL) 2 % external shampoo Apply topically once a week On Wednesdays      lactulose (CHRONULAC) 10 GM/15ML solution Take 15 mLs by mouth 2 times daily 473 mL 11    linaclotide (LINZESS) 290 MCG capsule Take 1 capsule (290 mcg) by mouth daily as needed (IBSc) 90 capsule 3    metoprolol succinate ER (TOPROL XL) 25 MG 24 hr tablet Take 0.5 tablets (12.5 mg) by mouth 2 times daily 30 tablet 0    metoprolol tartrate (LOPRESSOR) 25 MG tablet Take 25 mg by mouth 2 times daily      multivitamin, therapeutic (THERA-VIT) TABS tablet Take 1 tablet by mouth daily 30 tablet 0    pantoprazole (PROTONIX) 40 MG EC  tablet Take 1 tablet (40 mg) by mouth daily Take 1 tablet (40mg) by mouth daily at 8am 30 tablet 0    polyethylene glycol (MIRALAX) 17 g packet Take 1 packet by mouth daily      SENEXON-S 8.6-50 MG tablet Take 2 tablets by mouth 2 times daily      tamsulosin (FLOMAX) 0.4 MG capsule Take 0.4 mg by mouth daily      traZODone (DESYREL) 100 MG tablet Take 100 mg by mouth At Bedtime      traZODone (DESYREL) 50 MG tablet Take 1 tablet (50 mg) by mouth every morning 30 tablet 0    venlafaxine (EFFEXOR XR) 150 MG 24 hr capsule Take 2 capsules (300 mg) by mouth daily 60 capsule 0    vitamin C (ASCORBIC ACID) 500 MG tablet Take 1 tablet (500 mg) by mouth daily 30 tablet 0     Current Facility-Administered Medications   Medication Dose Route Frequency Provider Last Rate Last Admin    botulinum toxin type A (BOTOX) 100 units injection 400 Units  400 Units Intramuscular Q90 Days Ember Arita MD   270 Units at 04/25/24 1126     FHx: glaucoma  PSHx: denies history of ocular surgeries       Current Eye Medications:      Assessment & Plan:  (H52.03,  H52.203,  H52.4) Hyperopia of both eyes with astigmatism and presbyopia  (primary encounter diagnosis)  Patient has minimal change in hyperopia but a copy of today's glasses prescription was given.  The patient may wish to update the glasses if the lenses are scratched or the frames are too small.  Presbyopia is difficulty seeing up close and is treated with bifocals or over the counter reading glasses      (H51.11) Convergence insufficiency  No prism in current spectacles  Discussed adding prism to bifocal, but would be most effective with dedicated reading glasses  Will first attempt pencil pushups, if bothered by diplopia, add prism  Previously 7BI both eyes for near, 2BI both eyes for distance    (G20.A1) Parkinson's disease, unspecified whether dyskinesia present, unspecified whether manifestations fluctuate (H)  Decreased blink rate, but no evidence of dry eye today  May use AT  PRN    (H25.813) Combined forms of age-related cataract of both eyes  Mild cataracts are present and may account for some of the patient's visual complaints. No treatment currently recommended. The patient will monitor for vision changes and contact us with any decrease in vision. Recheck next visit   Note history of tamsulosin and alfuzosin use, high risk of floppy iris syndrome          Return in about 1 year (around 5/21/2025) for Annual Visit-v/t/d/MRx.        Miguelangel Gan MD     Attending Physician Attestation:  Complete documentation of historical and exam elements from today's encounter can be found in the full encounter summary report (not reduplicated in this progress note).  I personally obtained the chief complaint(s) and history of present illness.  I confirmed and edited as necessary the review of systems, past medical/surgical history, family history, social history, and examination findings as documented by others; and I examined the patient myself.  I personally reviewed the relevant tests, images, and reports as documented above.  I formulated and edited as necessary the assessment and plan and discussed the findings and management plan with the patient and family. - Miguelangel Gan MD

## 2024-05-21 NOTE — TELEPHONE ENCOUNTER
Left Voicemail (1st Attempt) for the patient to call back and schedule the following:    Appointment type: return  Provider: dr. rob  Return date: 5/21/2025  Specialty phone number: [phone]   Additonal Notes:  Return in about 1 year (around 5/21/2025) for Annual Visit-v/t/d/[facility]   Orthopedics, Podiatry, Sports Medicine, Ent ,Eye , Audiology, Adult Endocrine & Diabetes, Nutrition & Medication Therapy Management Specialties   [facility]

## 2024-05-28 ENCOUNTER — TELEPHONE (OUTPATIENT)
Dept: UROLOGY | Facility: CLINIC | Age: 59
End: 2024-05-28
Payer: COMMERCIAL

## 2024-05-28 NOTE — TELEPHONE ENCOUNTER
5/28 Called patient and left voicemail. Provided patient with [phone] as a call back number.      Patient scheduled with Juju Bernardo PA-C and will need to reschedule as Juju Bernardo PA-C is departing from Urology effective 8/8/2024.      Reschedule patient with appropriate provider per protocol page.      [facility]   Dermatology, Surgery, Urology  [facility]

## 2024-06-25 ENCOUNTER — ANCILLARY PROCEDURE (OUTPATIENT)
Dept: ULTRASOUND IMAGING | Facility: CLINIC | Age: 59
End: 2024-06-25
Attending: PHYSICIAN ASSISTANT
Payer: COMMERCIAL

## 2024-06-25 DIAGNOSIS — Z87.438 HISTORY OF ORCHITIS: ICD-10-CM

## 2024-06-25 PROCEDURE — 93976 VASCULAR STUDY: CPT | Performed by: STUDENT IN AN ORGANIZED HEALTH CARE EDUCATION/TRAINING PROGRAM

## 2024-06-25 PROCEDURE — 76870 US EXAM SCROTUM: CPT | Performed by: STUDENT IN AN ORGANIZED HEALTH CARE EDUCATION/TRAINING PROGRAM

## 2024-07-09 DIAGNOSIS — G24.8 SEGMENTAL DYSTONIA: ICD-10-CM

## 2024-07-09 DIAGNOSIS — G24.3 CERVICAL DYSTONIA: Primary | ICD-10-CM

## 2024-07-09 DIAGNOSIS — G20.A2 PARKINSON'S DISEASE WITHOUT DYSKINESIA, WITH FLUCTUATING MANIFESTATIONS (H): ICD-10-CM

## 2024-07-15 ENCOUNTER — MYC MEDICAL ADVICE (OUTPATIENT)
Dept: NEUROLOGY | Facility: CLINIC | Age: 59
End: 2024-07-15
Payer: COMMERCIAL

## 2024-07-15 DIAGNOSIS — G20.A1 PARKINSON'S DISEASE WITHOUT DYSKINESIA OR FLUCTUATING MANIFESTATIONS (H): ICD-10-CM

## 2024-07-15 DIAGNOSIS — G24.9 DYSTONIA: ICD-10-CM

## 2024-07-15 RX ORDER — CLONAZEPAM 1 MG/1
TABLET ORAL
Qty: 60 TABLET | Refills: 5 | Status: SHIPPED | OUTPATIENT
Start: 2024-07-15

## 2024-07-15 NOTE — TELEPHONE ENCOUNTER
Refill request for the following medication (s) listed below.    Pending Prescriptions:                       Disp   Refills    clonazePAM (KLONOPIN) 1 MG tablet         60 tab*5            Sig: Take 1 tablet scheduled at noon, may take 1-2           additional tablet PRN      Last office visit provider:  3/22/24  Next appointment scheduled: 7/24/2024      Medication T'd for review and signature    CUCO Urias on 7/15/2024 at 3:27 PM

## 2024-07-16 DIAGNOSIS — G20.A1 PARKINSON'S DISEASE WITHOUT DYSKINESIA OR FLUCTUATING MANIFESTATIONS (H): ICD-10-CM

## 2024-07-16 DIAGNOSIS — G24.9 DYSTONIA: ICD-10-CM

## 2024-07-16 NOTE — TELEPHONE ENCOUNTER
Health Call Center    Phone Message    May a detailed message be left on voicemail: no     Reason for Call: Medication Refill Request    Has the patient contacted the pharmacy for the refill? Yes   Name of medication being requested: clonazePAM (KLONOPIN) 1 MG tablet & clonazePAM (KLONOPIN) 2 MG tablet     Provider who prescribed the medication: Dr. Carlos    Pharmacy: [address]    Date medication is needed: 07/19     Action Taken: Message routed to:  Other: [facility]    Travel Screening: Not Applicable     Date of Service:

## 2024-07-16 NOTE — TELEPHONE ENCOUNTER
Refill request for the following medication (s) listed below.     Pending Prescriptions:                       Disp   Refills    clonazePAM (KLONOPIN) 2 MG tablet         60 tab*5            Sig - Route: Take 1 tablet (2 mg) by mouth 2 times daily Take at 8am and 4 pm - Oral         Last office visit provider:  3/22/24  Next appointment scheduled: 7/24/2024        Medication T'd for review and signature          CUCO Urias on 7/16/2024 at 10:01 AM

## 2024-07-17 RX ORDER — CLONAZEPAM 2 MG/1
2 TABLET ORAL 2 TIMES DAILY
Qty: 60 TABLET | Refills: 5 | Status: SHIPPED | OUTPATIENT
Start: 2024-07-17

## 2024-07-22 ENCOUNTER — OFFICE VISIT (OUTPATIENT)
Dept: PHYSICAL MEDICINE AND REHAB | Facility: CLINIC | Age: 59
End: 2024-07-22
Payer: COMMERCIAL

## 2024-07-22 DIAGNOSIS — G20.A2 PARKINSON'S DISEASE WITHOUT DYSKINESIA, WITH FLUCTUATING MANIFESTATIONS (H): ICD-10-CM

## 2024-07-22 DIAGNOSIS — G24.8 SEGMENTAL DYSTONIA: ICD-10-CM

## 2024-07-22 DIAGNOSIS — G24.3 CERVICAL DYSTONIA: Primary | ICD-10-CM

## 2024-07-22 PROCEDURE — 64642 CHEMODENERV 1 EXTREMITY 1-4: CPT | Performed by: PHYSICAL MEDICINE & REHABILITATION

## 2024-07-22 PROCEDURE — 95874 GUIDE NERV DESTR NEEDLE EMG: CPT | Performed by: PHYSICAL MEDICINE & REHABILITATION

## 2024-07-22 PROCEDURE — 64643 CHEMODENERV 1 EXTREM 1-4 EA: CPT | Performed by: PHYSICAL MEDICINE & REHABILITATION

## 2024-07-22 PROCEDURE — 64616 CHEMODENERV MUSC NECK DYSTON: CPT | Mod: RT | Performed by: PHYSICAL MEDICINE & REHABILITATION

## 2024-07-22 NOTE — LETTER
7/22/2024       RE: Benjamin Mathews  [address]  [facility] [address]     Dear Colleague,    Thank you for referring your patient, Benjamin Mathews, to the [facility] PHYSICAL MEDICINE AND REHABILITATION CLINIC [place] at [facility]. Please see a copy of my visit note below.      [facility] CLINIC    PM&R CLINIC NOTE  BOTULINUM TOXIN PROCEDURE      HPI  Chief Complaint   Patient presents with    Procedure     Here for injections, confirmed with patient     Benjamin Mathews is a 59 year old male who was referred to our clinic for more evaluation of his dystonia and trial of botulinum toxin injections (referral from Dr. Suazo). He was seen on 10/6/22 as initial consult; please see the consult note for details. She has history of Parkinsonism and is followed by Dr. Perry. He was referred to palliative care team and has been followed by them since then.       Background information   --saw urology team in Nov 2022 for LUTS likely due to BPH and was started on alfuzosin  --saw Dr. Carlos in Jan 2023; it was recommended to continue carbidopa/levodopa and amantadine. He is also on clonazepam prn. Ok to continue botox injections.   --had neuropsych testing 1/13/23; reviewed the results.    Was admitted on 5/20/23 after a fall resulting in right ankle fracture s/p closed reduction and external fixation by ortho team. He has been at [facility] since then.     Was followed by podiatry team for ulcer on L 2nd toe, which was closed/healed 12/29/23.    SINCE LAST VISIT  Benjamin Mathews was last seen here in clinic on 4/24/24    Patient reports the following new medical problems since last visit:    --ED visit 4/26: Orchitis  --ED visit 5/3: Syncope, workup negative. Presumed Vasovagal response    --F/U with Ortho 5/10: Well healing fracture/dislocation right ankle/pilon 11 month s/p ORIF. Weightbaring as tolerated.  "    He was overall doing ok today. He uses his 4ww to talk to the bathroom during the day. He also asks the nursing staff to walk with him x2/day.     Denied any side effects from the injections last time. But noted that he had a \"lump\" on the left side of his neck (the opposite side) after the injections which lasted for 2-3 days; no bruise or wound or other symptoms.     The benefits wore off in 2.5-3 months. When botox is working, his neck movement and R shoulder ROM is a lot more fluid and he has less \"tugging sensation\" in his pec area and neck.     Injections to his RUE and RLE were beneficial as well. The benefits lasted for about a month with gradual decline in benefits after that.     Spasms in RLE have been worse lately; his foot constantly pulls down and in.    PHYSICAL EXAM  VS: There were no vitals taken for this visit.   GEN: Pleasant and cooperative, in no acute distress  HEENT: Moist mucous membranes     General: Sitting in his power wheelchair leaning to the right with his neck tilted to the right and rotated to the left  C-spine range of motion was moderately limited with extension and left lateral bending, he was also mildly limited in all other directionsReduced left sided rotation as compared to the right side. Restricted extension of the neck. Tight right SCM   Continues to have left sided weakness    Increased tone at R shoulder add and EF; more dystonic in action and less velocity dependent tone.   Same in RLE - increased tone in R PF and inversion       ALLERGIES  Allergies   Allergen Reactions    Amantadine Other (See Comments)     Other reaction(s): Hallucinations  Hallucinations/ lost self control/gambling.     halluicnations  Hallucinations/ lost self control/gambling.     hallucinates  halluicnations  hallucinates  Hallucinations/ lost self control/gambling.       Quetiapine GI Disturbance, Diarrhea and Other (See Comments)     Diarrhea    Diarrhea  Other reaction(s): GI " Disturbance  Diarrhea      Duloxetine Other (See Comments)     suicidal  suicidal         CURRENT MEDICATIONS    Current Outpatient Medications:     acetaminophen (TYLENOL) 325 MG tablet, Take 1-2 tablets (325-650 mg) by mouth every 8 hours as needed for mild pain, Disp: 100 tablet, Rfl: 0    alfuzosin ER (UROXATRAL) 10 MG 24 hr tablet, Take 1 tablet (10 mg) by mouth daily, Disp: 30 tablet, Rfl: 11    amantadine (SYMMETREL) 100 MG capsule, Take 1 capsule (100 mg) by mouth 2 times daily, Disp: 60 capsule, Rfl: 11    ammonium lactate (LAC-HYDRIN) 12 % external lotion, Apply topically daily as needed, Disp: , Rfl:     ANTI-ITCH MAXIMUM STRENGTH 1 % external cream, Apply topically 2 times daily, Disp: , Rfl:     apixaban ANTICOAGULANT (ELIQUIS) 5 MG tablet, Take 5 mg by mouth 2 times daily, Disp: , Rfl:     atorvastatin (LIPITOR) 40 MG tablet, Take 1 tablet (40 mg) by mouth every evening, Disp: 30 tablet, Rfl: 0    bisacodyl (DULCOLAX) 10 MG suppository, Place 1 suppository (10 mg) rectally daily as needed for constipation, Disp: 10 suppository, Rfl: 0    carbidopa-levodopa (SINEMET CR)  MG CR tablet, Take 2 tablets by mouth at bedtime, Disp: 60 tablet, Rfl: 11    cholecalciferol (VITAMIN D3) 125 mcg (5000 units) capsule, Take 1 capsule (125 mcg) by mouth daily, Disp: 30 capsule, Rfl: 0    clonazePAM (KLONOPIN) 1 MG tablet, Take 1 tablet scheduled at noon, may take 1 additional tablet PRN, Disp: 60 tablet, Rfl: 5    clonazePAM (KLONOPIN) 2 MG tablet, Take 1 tablet (2 mg) by mouth 2 times daily Take at 8am and 4 pm, Disp: 60 tablet, Rfl: 5    cloZAPine (CLOZARIL) 50 MG tablet, Take 1 tablet (50 mg) by mouth At Bedtime, Disp: 30 tablet, Rfl: 0    diclofenac (VOLTAREN) 1 % topical gel, Apply 2 g topically 3 times daily as needed for moderate pain, Disp: 150 g, Rfl: 0    econazole nitrate 1 % external cream, Apply topically daily To toes and toenails., Disp: 85 g, Rfl: 5    ENEMEEZ MINI 283 MG/5ML enema, Place 1 enema  rectally, Disp: , Rfl:     gabapentin (NEURONTIN) 800 MG tablet, Take 1 tablet (800 mg) by mouth 3 times daily, Disp: 90 tablet, Rfl: 0    hydrOXYzine (ATARAX) 25 MG tablet, Take 2 tablets (50 mg) by mouth every 6 hours as needed for anxiety (up to 3 timrd daily), Disp: 20 tablet, Rfl: 0    ketoconazole (NIZORAL) 2 % external cream, Apply topically 2 times daily, Disp: , Rfl:     ketoconazole (NIZORAL) 2 % external shampoo, Apply topically once a week On Wednesdays, Disp: , Rfl:     lactulose (CHRONULAC) 10 GM/15ML solution, Take 15 mLs by mouth 2 times daily, Disp: 473 mL, Rfl: 11    linaclotide (LINZESS) 290 MCG capsule, Take 1 capsule (290 mcg) by mouth daily as needed ([facility]), Disp: 90 capsule, Rfl: 3    metoprolol succinate ER (TOPROL XL) 25 MG 24 hr tablet, Take 0.5 tablets (12.5 mg) by mouth 2 times daily, Disp: 30 tablet, Rfl: 0    metoprolol tartrate (LOPRESSOR) 25 MG tablet, Take 25 mg by mouth 2 times daily, Disp: , Rfl:     multivitamin, therapeutic (THERA-VIT) TABS tablet, Take 1 tablet by mouth daily, Disp: 30 tablet, Rfl: 0    pantoprazole (PROTONIX) 40 MG EC tablet, Take 1 tablet (40 mg) by mouth daily Take 1 tablet (40mg) by mouth daily at 8am, Disp: 30 tablet, Rfl: 0    polyethylene glycol (MIRALAX) 17 g packet, Take 1 packet by mouth daily, Disp: , Rfl:     SENEXON-S 8.6-50 MG tablet, Take 2 tablets by mouth 2 times daily, Disp: , Rfl:     tamsulosin (FLOMAX) 0.4 MG capsule, Take 0.4 mg by mouth daily (Patient not taking: Reported on 7/24/2024), Disp: , Rfl:     traZODone (DESYREL) 100 MG tablet, Take 100 mg by mouth At Bedtime, Disp: , Rfl:     traZODone (DESYREL) 50 MG tablet, Take 1 tablet (50 mg) by mouth every morning, Disp: 30 tablet, Rfl: 0    venlafaxine (EFFEXOR XR) 150 MG 24 hr capsule, Take 2 capsules (300 mg) by mouth daily, Disp: 60 capsule, Rfl: 0    vitamin C (ASCORBIC ACID) 500 MG tablet, Take 1 tablet (500 mg) by mouth daily, Disp: 30 tablet, Rfl: 0    carbidopa-levodopa (SINEMET)   MG tablet, Take 3 tabs at 8 am and 12 pm and 2.5 tabs at 4 pm. Okay to take 0.5-1 tab as needed twice daily = up to 10.5 tabs daily, Disp: 315 tablet, Rfl: 11    Sodium Phosphates (ENEMA) 7-19 GM/118ML ENEM, , Disp: , Rfl:     Current Facility-Administered Medications:     botulinum toxin type A (BOTOX) 100 units injection 400 Units, 400 Units, Intramuscular, Q90 Days, , 300 Units at 24 1703    botulinum toxin type A (BOTOX) 100 units injection 400 Units, 400 Units, Intramuscular, Q90 Days, Ember Arita MD, 270 Units at 24 1126       BOTULINUM NEUROTOXIN INJECTION PROCEDURES    VERIFICATION OF PATIENT IDENTIFICATION AND PROCEDURE     Initials   Patient Name PS   Patient  PS   Procedure Verified by: PS     Prior to the start of the procedure and with procedural staff participation, I verbally confirmed the patient s identity using two indicators, relevant allergies, that the procedure was appropriate and matched the consent or emergent situation, and that the correct equipment/implants were available. Immediately prior to starting the procedure I conducted the Time Out with the procedural staff and re-confirmed the patient s name, procedure, and site/side. (The Joint Commission universal protocol was followed.)  Yes    Sedation (Moderate or Deep): None    ABOVE ASSESSMENTS PERFORMED BY  Ember Arita MD      INDICATIONS FOR PROCEDURES  Benjamin Mathews is a 59 year old patient with cervical and segmental dystonia secondary to the diagnosis of Parkinson's disease. His baseline symptoms have been recalcitrant to oral medications and conservative therapy.  He is here today for reinjection with Botox.    GOAL OF PROCEDURE  The goal of this procedure is to increase active range of motion, improve volitional motor control, decrease pain  and enhance functional independence.      TOTAL DOSE ADMINISTERED  Dose Administered: 300 units  Botox (Botulinum Toxin Type A) 200 units  1:1 dilution  -  Unavoidable Drug Waste: No  Diluent Used:  Preservative Free Normal Saline  Total Volume of Diluent Used: 3 ml  See MAR  NDC #: Botox 100u ([address]-93)      CONSENT  The risks, benefits, and treatment options were discussed with Benjamin Mathews and he agreed to proceed.    Written consent was obtained by PS.     EQUIPMENT USED  Needle-35mm stimulating/recording  EMG/NCS Machine    SKIN PREPARATION  Skin preparation was performed using an alcohol wipe.    GUIDANCE DESCRIPTION  Electro-myographic guidance was necessary throughout the procedure to accurately identify all areas of dystonic muscles while avoiding injection of non-dystonic muscles and underlying muscles , neighboring nerves and nearby vascular structures.     AREA/MUSCLE INJECTED  Right neck/shoulder girdle  SCM 10 units at 1 site  Splenius capitis 15 units at 1 site  Splenius cervicis 15 units at 1 site  Levator a scap insertion 40 units at 1 site  Levator at neck 10 units at 1 site   Lateral trap 20 units at 2 sites   Pectoralis major 30 units at 1 site     Right upper extremity   Biceps 20 units at 1 site    Right lower extremity  Flexor digitorum longus 40 units at 1 site  Gastrocnemius 50 units at 2 site  Posterior tibialis 50 units at 1 site      RESPONSE TO PROCEDURE  Benjamin Mathews tolerated the procedure well and there were no immediate complications. He was allowed to recover for an appropriate period of time and was discharged home in stable condition.    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  Osteopenia (DXA Lowest T-Score -1.8) likely due to disuse c/b limited functional mobility leading to increased fracture risk - assessment 5/2023     DOI: 05/20/2023, GLF, syncopal event, closed R trimalleolar ankle / pilon fracture-dislocation.  DOS: 05/21/2023, closed  reduction, application of spanning external fixator.  DOS: 06/15/2023, ORIF R ankle/pilon, removal of external fixator.      We have discussed treatment options for dystonia including therapies, medications and interventions.  He has responded very well to Klonopin but his symptoms are not well controlled especially spasmodic torticollis and dystonia in the distal part of his right lower extremity.  Adding another oral agent does not seem appropriate because 1-he did not respond to baclofen and 2- risk of polypharmacy and side effects.  He would benefit from physical therapy as discussed before.      Work-up: None     Therapy/equipment/braces: completed therapies for now; continue HEP and walk as much as possible and safe. Will discuss new round of therapies in October.     Medications: No changes today; currently on clonazepam, sinemet and amantadine by Dr. Carlos.     Interventions: started the first round at 130 units and increased the dose to 270 units on 2/1/2023; kept on the same dose in April 2023. Decreased the total dose to 100 in July 2023 with no injections in RLE due to recent fracture. Increased the dose 10/24/23 from 100 to140 units with the goal of increasing effectiveness and prolonging duration of benefit of Botox injections. Increased dose to 170 units on 1/22/2024 added 30 units to right FDL to help facilitate comfort and AFO tolerance when he receives it. Today, increased from 260 to 300 with more dose in the RLE for better control of his foot symptoms.    Referral / follow up with other providers: should repeat DEXA in 1 year 5/2025 and will need a new referral for bone health eval at that point. He will continue to follow-up with palliative care team and movement disorder clinic. Sent a message to [place] per his request to see him for follow up regarding adjusting sinemet dose.     Follow up: in 12 weeks         Again, thank you for allowing me to participate in the care of your patient.       Sincerely,    Ember Arita MD

## 2024-07-22 NOTE — NURSING NOTE
Chief Complaint   Patient presents with    Procedure     Here for injections, confirmed with patient     Earnestine Brown

## 2024-07-22 NOTE — PROGRESS NOTES
"  [facility]    PM&R CLINIC NOTE  BOTULINUM TOXIN PROCEDURE      HPI  Chief Complaint   Patient presents with    Procedure     Here for injections, confirmed with patient     Benjamin Mathews is a 59 year old male who was referred to our clinic for more evaluation of his dystonia and trial of botulinum toxin injections (referral from Dr. Suazo). He was seen on 10/6/22 as initial consult; please see the consult note for details. She has history of Parkinsonism and is followed by Dr. Perry. He was referred to palliative care team and has been followed by them since then.       Background information   --saw urology team in Nov 2022 for LUTS likely due to BPH and was started on alfuzosin  --saw Dr. Carlos in Jan 2023; it was recommended to continue carbidopa/levodopa and amantadine. He is also on clonazepam prn. Ok to continue botox injections.   --had neuropsych testing 1/13/23; reviewed the results.    Was admitted on 5/20/23 after a fall resulting in right ankle fracture s/p closed reduction and external fixation by ortho team. He has been at [facility] since then.     Was followed by podiatry team for ulcer on L 2nd toe, which was closed/healed 12/29/23.    SINCE LAST VISIT  Benjamin Mathews was last seen here in clinic on 4/24/24    Patient reports the following new medical problems since last visit:    --ED visit 4/26: Orchitis  --ED visit 5/3: Syncope, workup negative. Presumed Vasovagal response    --F/U with Ortho 5/10: Well healing fracture/dislocation right ankle/pilon 11 month s/p ORIF. Weightbaring as tolerated.     He was overall doing ok today. He uses his 4ww to talk to the bathroom during the day. He also asks the nursing staff to walk with him x2/day.     Denied any side effects from the injections last time. But noted that he had a \"lump\" on the left side of his neck (the opposite side) after the injections which lasted for 2-3 days; no bruise " "or wound or other symptoms.     The benefits wore off in 2.5-3 months. When botox is working, his neck movement and R shoulder ROM is a lot more fluid and he has less \"tugging sensation\" in his pec area and neck.     Injections to his RUE and RLE were beneficial as well. The benefits lasted for about a month with gradual decline in benefits after that.     Spasms in RLE have been worse lately; his foot constantly pulls down and in.    PHYSICAL EXAM  VS: There were no vitals taken for this visit.   GEN: Pleasant and cooperative, in no acute distress  HEENT: Moist mucous membranes     General: Sitting in his power wheelchair leaning to the right with his neck tilted to the right and rotated to the left  C-spine range of motion was moderately limited with extension and left lateral bending, he was also mildly limited in all other directionsReduced left sided rotation as compared to the right side. Restricted extension of the neck. Tight right SCM   Continues to have left sided weakness    Increased tone at R shoulder add and EF; more dystonic in action and less velocity dependent tone.   Same in RLE - increased tone in R PF and inversion       ALLERGIES  Allergies   Allergen Reactions    Amantadine Other (See Comments)     Other reaction(s): Hallucinations  Hallucinations/ lost self control/gambling.     halluicnations  Hallucinations/ lost self control/gambling.     hallucinates  halluicnations  hallucinates  Hallucinations/ lost self control/gambling.       Quetiapine GI Disturbance, Diarrhea and Other (See Comments)     Diarrhea    Diarrhea  Other reaction(s): GI Disturbance  Diarrhea      Duloxetine Other (See Comments)     suicidal  suicidal         CURRENT MEDICATIONS    Current Outpatient Medications:     acetaminophen (TYLENOL) 325 MG tablet, Take 1-2 tablets (325-650 mg) by mouth every 8 hours as needed for mild pain, Disp: 100 tablet, Rfl: 0    alfuzosin ER (UROXATRAL) 10 MG 24 hr tablet, Take 1 tablet (10 " mg) by mouth daily, Disp: 30 tablet, Rfl: 11    amantadine (SYMMETREL) 100 MG capsule, Take 1 capsule (100 mg) by mouth 2 times daily, Disp: 60 capsule, Rfl: 11    ammonium lactate (LAC-HYDRIN) 12 % external lotion, Apply topically daily as needed, Disp: , Rfl:     ANTI-ITCH MAXIMUM STRENGTH 1 % external cream, Apply topically 2 times daily, Disp: , Rfl:     apixaban ANTICOAGULANT (ELIQUIS) 5 MG tablet, Take 5 mg by mouth 2 times daily, Disp: , Rfl:     atorvastatin (LIPITOR) 40 MG tablet, Take 1 tablet (40 mg) by mouth every evening, Disp: 30 tablet, Rfl: 0    bisacodyl (DULCOLAX) 10 MG suppository, Place 1 suppository (10 mg) rectally daily as needed for constipation, Disp: 10 suppository, Rfl: 0    carbidopa-levodopa (SINEMET CR)  MG CR tablet, Take 2 tablets by mouth at bedtime, Disp: 60 tablet, Rfl: 11    cholecalciferol (VITAMIN D3) 125 mcg (5000 units) capsule, Take 1 capsule (125 mcg) by mouth daily, Disp: 30 capsule, Rfl: 0    clonazePAM (KLONOPIN) 1 MG tablet, Take 1 tablet scheduled at noon, may take 1 additional tablet PRN, Disp: 60 tablet, Rfl: 5    clonazePAM (KLONOPIN) 2 MG tablet, Take 1 tablet (2 mg) by mouth 2 times daily Take at 8am and 4 pm, Disp: 60 tablet, Rfl: 5    cloZAPine (CLOZARIL) 50 MG tablet, Take 1 tablet (50 mg) by mouth At Bedtime, Disp: 30 tablet, Rfl: 0    diclofenac (VOLTAREN) 1 % topical gel, Apply 2 g topically 3 times daily as needed for moderate pain, Disp: 150 g, Rfl: 0    econazole nitrate 1 % external cream, Apply topically daily To toes and toenails., Disp: 85 g, Rfl: 5    ENEMEEZ MINI 283 MG/5ML enema, Place 1 enema rectally, Disp: , Rfl:     gabapentin (NEURONTIN) 800 MG tablet, Take 1 tablet (800 mg) by mouth 3 times daily, Disp: 90 tablet, Rfl: 0    hydrOXYzine (ATARAX) 25 MG tablet, Take 2 tablets (50 mg) by mouth every 6 hours as needed for anxiety (up to 3 timrd daily), Disp: 20 tablet, Rfl: 0    ketoconazole (NIZORAL) 2 % external cream, Apply topically 2  times daily, Disp: , Rfl:     ketoconazole (NIZORAL) 2 % external shampoo, Apply topically once a week On Wednesdays, Disp: , Rfl:     lactulose (CHRONULAC) 10 GM/15ML solution, Take 15 mLs by mouth 2 times daily, Disp: 473 mL, Rfl: 11    linaclotide (LINZESS) 290 MCG capsule, Take 1 capsule (290 mcg) by mouth daily as needed ([facility]), Disp: 90 capsule, Rfl: 3    metoprolol succinate ER (TOPROL XL) 25 MG 24 hr tablet, Take 0.5 tablets (12.5 mg) by mouth 2 times daily, Disp: 30 tablet, Rfl: 0    metoprolol tartrate (LOPRESSOR) 25 MG tablet, Take 25 mg by mouth 2 times daily, Disp: , Rfl:     multivitamin, therapeutic (THERA-VIT) TABS tablet, Take 1 tablet by mouth daily, Disp: 30 tablet, Rfl: 0    pantoprazole (PROTONIX) 40 MG EC tablet, Take 1 tablet (40 mg) by mouth daily Take 1 tablet (40mg) by mouth daily at 8am, Disp: 30 tablet, Rfl: 0    polyethylene glycol (MIRALAX) 17 g packet, Take 1 packet by mouth daily, Disp: , Rfl:     SENEXON-S 8.6-50 MG tablet, Take 2 tablets by mouth 2 times daily, Disp: , Rfl:     tamsulosin (FLOMAX) 0.4 MG capsule, Take 0.4 mg by mouth daily (Patient not taking: Reported on 7/24/2024), Disp: , Rfl:     traZODone (DESYREL) 100 MG tablet, Take 100 mg by mouth At Bedtime, Disp: , Rfl:     traZODone (DESYREL) 50 MG tablet, Take 1 tablet (50 mg) by mouth every morning, Disp: 30 tablet, Rfl: 0    venlafaxine (EFFEXOR XR) 150 MG 24 hr capsule, Take 2 capsules (300 mg) by mouth daily, Disp: 60 capsule, Rfl: 0    vitamin C (ASCORBIC ACID) 500 MG tablet, Take 1 tablet (500 mg) by mouth daily, Disp: 30 tablet, Rfl: 0    carbidopa-levodopa (SINEMET)  MG tablet, Take 3 tabs at 8 am and 12 pm and 2.5 tabs at 4 pm. Okay to take 0.5-1 tab as needed twice daily = up to 10.5 tabs daily, Disp: 315 tablet, Rfl: 11    Sodium Phosphates (ENEMA) 7-19 GM/118ML ENEM, , Disp: , Rfl:     Current Facility-Administered Medications:     botulinum toxin type A (BOTOX) 100 units injection 400 Units, 400 Units,  Intramuscular, Q90 Days, , 300 Units at 24 1703    botulinum toxin type A (BOTOX) 100 units injection 400 Units, 400 Units, Intramuscular, Q90 Days, Ember Arita MD, 270 Units at 24 1126       BOTULINUM NEUROTOXIN INJECTION PROCEDURES    VERIFICATION OF PATIENT IDENTIFICATION AND PROCEDURE     Initials   Patient Name PS   Patient  PS   Procedure Verified by: PS     Prior to the start of the procedure and with procedural staff participation, I verbally confirmed the patient s identity using two indicators, relevant allergies, that the procedure was appropriate and matched the consent or emergent situation, and that the correct equipment/implants were available. Immediately prior to starting the procedure I conducted the Time Out with the procedural staff and re-confirmed the patient s name, procedure, and site/side. (The Joint Commission universal protocol was followed.)  Yes    Sedation (Moderate or Deep): None    ABOVE ASSESSMENTS PERFORMED BY  Ember Arita MD      INDICATIONS FOR PROCEDURES  Benjamin Mathews is a 59 year old patient with cervical and segmental dystonia secondary to the diagnosis of Parkinson's disease. His baseline symptoms have been recalcitrant to oral medications and conservative therapy.  He is here today for reinjection with Botox.    GOAL OF PROCEDURE  The goal of this procedure is to increase active range of motion, improve volitional motor control, decrease pain  and enhance functional independence.      TOTAL DOSE ADMINISTERED  Dose Administered: 300 units  Botox (Botulinum Toxin Type A) 200 units  1:1 dilution -  Unavoidable Drug Waste: No  Diluent Used:  Preservative Free Normal Saline  Total Volume of Diluent Used: 3 ml  See MAR  NDC #: Botox 100u ([id number])      CONSENT  The risks, benefits, and treatment options were discussed with Benjamin Mathews and he agreed to proceed.    Written consent was obtained by PS.     EQUIPMENT USED  Needle-35mm  stimulating/recording  EMG/NCS Machine    SKIN PREPARATION  Skin preparation was performed using an alcohol wipe.    GUIDANCE DESCRIPTION  Electro-myographic guidance was necessary throughout the procedure to accurately identify all areas of dystonic muscles while avoiding injection of non-dystonic muscles and underlying muscles , neighboring nerves and nearby vascular structures.     AREA/MUSCLE INJECTED  Right neck/shoulder girdle  SCM 10 units at 1 site  Splenius capitis 15 units at 1 site  Splenius cervicis 15 units at 1 site  Levator a scap insertion 40 units at 1 site  Levator at neck 10 units at 1 site   Lateral trap 20 units at 2 sites   Pectoralis major 30 units at 1 site     Right upper extremity   Biceps 20 units at 1 site    Right lower extremity  Flexor digitorum longus 40 units at 1 site  Gastrocnemius 50 units at 2 site  Posterior tibialis 50 units at 1 site      RESPONSE TO PROCEDURE  Benjamin Mathews tolerated the procedure well and there were no immediate complications. He was allowed to recover for an appropriate period of time and was discharged home in stable condition.    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  Osteopenia (DXA Lowest T-Score -1.8) likely due to disuse c/b limited functional mobility leading to increased fracture risk - assessment 5/2023     DOI: 05/20/2023, GLF, syncopal event, closed R trimalleolar ankle / pilon fracture-dislocation.  DOS: 05/21/2023, closed reduction, application of spanning external fixator.  DOS: 06/15/2023, ORIF R ankle/pilon, removal of external fixator.      We have discussed treatment options for dystonia including therapies, medications and interventions.  He has responded very well to Klonopin but his symptoms are not well controlled especially spasmodic torticollis and  dystonia in the distal part of his right lower extremity.  Adding another oral agent does not seem appropriate because 1-he did not respond to baclofen and 2- risk of polypharmacy and side effects.  He would benefit from physical therapy as discussed before.      Work-up: None     Therapy/equipment/braces: completed therapies for now; continue HEP and walk as much as possible and safe. Will discuss new round of therapies in October.     Medications: No changes today; currently on clonazepam, sinemet and amantadine by Dr. Carlos.     Interventions: started the first round at 130 units and increased the dose to 270 units on 2/1/2023; kept on the same dose in April 2023. Decreased the total dose to 100 in July 2023 with no injections in RLE due to recent fracture. Increased the dose 10/24/23 from 100 to140 units with the goal of increasing effectiveness and prolonging duration of benefit of Botox injections. Increased dose to 170 units on 1/22/2024 added 30 units to right FDL to help facilitate comfort and AFO tolerance when he receives it. Today, increased from 260 to 300 with more dose in the RLE for better control of his foot symptoms.    Referral / follow up with other providers: should repeat DEXA in 1 year 5/2025 and will need a new referral for bone health eval at that point. He will continue to follow-up with palliative care team and movement disorder clinic. Sent a message to [place] per his request to see him for follow up regarding adjusting sinemet dose.     Follow up: in 12 weeks         Ember Arita MD  Physical Medicine & Rehabilitation

## 2024-07-24 ENCOUNTER — OFFICE VISIT (OUTPATIENT)
Dept: NEUROLOGY | Facility: CLINIC | Age: 59
End: 2024-07-24
Payer: COMMERCIAL

## 2024-07-24 VITALS — SYSTOLIC BLOOD PRESSURE: 98 MMHG | HEART RATE: 91 BPM | DIASTOLIC BLOOD PRESSURE: 64 MMHG

## 2024-07-24 DIAGNOSIS — T42.8X5A LEVODOPA-INDUCED DYSKINESIA: ICD-10-CM

## 2024-07-24 DIAGNOSIS — G20.A1 PARKINSON'S DISEASE WITHOUT DYSKINESIA OR FLUCTUATING MANIFESTATIONS (H): ICD-10-CM

## 2024-07-24 DIAGNOSIS — G20.C PARKINSONISM, UNSPECIFIED PARKINSONISM TYPE (H): Primary | ICD-10-CM

## 2024-07-24 DIAGNOSIS — G24.01 LEVODOPA-INDUCED DYSKINESIA: ICD-10-CM

## 2024-07-24 DIAGNOSIS — G24.9 DYSTONIA: ICD-10-CM

## 2024-07-24 PROCEDURE — 99214 OFFICE O/P EST MOD 30 MIN: CPT | Mod: GC | Performed by: PSYCHIATRY & NEUROLOGY

## 2024-07-24 PROCEDURE — G2211 COMPLEX E/M VISIT ADD ON: HCPCS | Performed by: PSYCHIATRY & NEUROLOGY

## 2024-07-24 RX ORDER — CARBIDOPA AND LEVODOPA 25; 100 MG/1; MG/1
TABLET ORAL
Qty: 315 TABLET | Refills: 11 | Status: SHIPPED | OUTPATIENT
Start: 2024-07-24 | End: 2024-08-28

## 2024-07-24 RX ORDER — SODIUM PHOSPHATE,MONO-DIBASIC 19G-7G/118
ENEMA (ML) RECTAL
COMMUNITY
Start: 2024-07-08

## 2024-07-24 NOTE — PATIENT INSTRUCTIONS
- Continue carbidopa/levodopa IR. Take 3 tabs 8 AM, 12 PM as before, decrease the 3PM dose to 2.5 tablets due to abnormal movements. If abnormal movements become bothersome to you then we can consider decreasing carbidopa/levodopa.  - okay to take carbidopa/levodopa IR 25/100 mg 0.5-1 tab twice daily prn   - Continue carbidopa/levodopa CR 50/200 mg to 2 tabs at bedtime   - No changes to clonazepam   Movement Disorder-related Medications                   8 AM 12 PM 4 pm 8pm At bedtime  prn   Amantadine 100 mg  1 1           Carbidopa/levodopa IR  mg 3 3 2.5     0.5-1 twice daily     Carbidopa/levodopa CR  mg       2       Clonazepam 2 mg 1   1         Clonazepam 1 mg   1       1   - Continue with psychiatric and psychotherapeutic care to manage anxiety and depression. Follow symptoms closely while on clozapine and clonazepam, though there have been no issues in the past with this combination of medication.   - Follow-up neuropsychological evaluation is recommended in 1 to 2 years, [id number] in order to assess and update recommendations as appropriate  - Continue following palliative care medical providers at [facility]  - Continue to refrain from driving  - Continue daily and safe exercises   - Continue follow with Dr. Arita for toxin injections and could consider right upper extremity injections to improve dystonia  - Continue to monitor swallowing  - Consider trying guided meditation. Here are some apps I recommend:.     - Headspace miguel (www.headspace.com): 14 day free trial and then there is a monthly or annual rate  - Calm miguel (www.calm.com): 7 day free trial and then there is an annual rate  - InsightTimer miguel (www.insighttimer.com): FREE   - Consider seeing therapist as shorter intervals to improve stress/anxiety/mood   - MTM (Neurology Pharmacy) referral placed for close follow up and continued medication management.  Please call the clinic at [phone] for MTM coordinators/scheduling.      Patient to return in 6 months, for in-person visit, 30 minutes.     Patient was discussed with the Movement Disorder attending, Dr Carlos

## 2024-07-24 NOTE — PROGRESS NOTES
Department of Neurology  Movement Disorders Division     Patient: Benjamin Mathews  MRN: [id number]   : 1965   Date of Visit: 2024    Impression:  Benjamin Mathews is a 58 year old male with Parkinsonism. The patient's main concern today is muscle spasms. Today he notes worsening dyskinesia after 4 PM, his 3 PM dose was decreased for this reason.     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 Take 3 tabs 8 AM, 12 PM as before, decrease the 3PM dose to 2.5 tablets due to abnormal movements. If abnormal movements become bothersome to you then we can consider decreasing carbidopa/levodopa.  - okay to take carbidopa/levodopa IR 25/100 mg 0.5-1 tab twice daily prn   - Continue carbidopa/levodopa CR 50/200 mg to 2 tabs at bedtime   - No changes to clonazepam   Movement Disorder-related Medications                   8 AM 12 PM 4 pm 8pm At bedtime  prn   Amantadine 100 mg  1 1           Carbidopa/levodopa IR  mg 3 3 2.5     0.5-1 twice daily     Carbidopa/levodopa CR  mg       2       Clonazepam 2 mg 1   1         Clonazepam 1 mg   1       1   - Continue with psychiatric and psychotherapeutic care to manage anxiety and depression. Follow symptoms closely while on clozapine and clonazepam, though there have been no issues in the past with this combination of medication.   - Follow-up neuropsychological evaluation is recommended in 1 to 2 years, [id number] in order to assess and update recommendations as appropriate  - Continue following palliative care medical providers at [facility]  - Continue to refrain from driving  - Continue daily and safe exercises   - Continue follow with Dr. Arita for toxin injections and could consider right upper extremity injections to improve dystonia  - Continue to monitor  swallowing  - Consider trying guided meditation. Here are some apps I recommend:.     - Headspace miguel (www.headspace.com): 14 day free trial and then there is a monthly or annual rate  - Calm miguel (www.calm.com): 7 day free trial and then there is an annual rate  - InsightTimer miguel (www.insighttimer.com): FREE   - Consider seeing therapist as shorter intervals to improve stress/anxiety/mood   - MTM (Neurology Pharmacy) referral placed for close follow up and continued medication management. Follow up should be scheduled in 4 weeks. Please call the clinic at [phone] for MTM coordinators/scheduling.     Patient to return in 3-5 months, for in-person visit, 30 minutes.     Patient was discussed with the Movement Disorder attending, Dr Terrance Toledo MD  Neurology PGY-4  [facility]      I, Ching Carlos DO, personally saw this patient with the Neurology resident and agree with Dr. Toledo's findings and plan of care as documented in Dr. Toledo's note. I personally performed/observed salient aspects of the history and neurological examination.     I personally reviewed the medications and labs. I personally viewed the imaging, and agree with the interpretation documented by the resident.    Date of Service (when I saw the patient): 07/24/24    Time spent with patient: 20 minutes  30 minutes spent on date of encounter doing chart reviews and exam and documentation and further activities as noted above.     Ching Carlos DO, MA   of Neurology   [facility]             History of Present Illness  Mr. Mathews is a pleasant 58 year old male that presents to Neurology Movement clinic for follow up regarding Parkinsonism and muscle spasms. His symptom started in 2013 with decreased  strength and stiffness of gait and bradykinesia with walking and left hand rest tremor.  Patient was diagnosed with Parkinson's disease in 2013 at the [facility].     "  History obtained from patient.   Main complaint: muscle spasms  Patient reports stiffness and spasms in right arm and hemiparesis in left arm from previous CVA.   Spasms/Dystonia: He was under stress and started having spasms. He manages stress by zoning out and watching TV. He talks with a therapist twice a month.   He described his terrible experience at the ER and had to wait from 2 pm -7 am to get seen/treated. He was seen for spasms and treated with clonazepam 1 mg.   Wearing off: He reports waking up really stiff in his back   Movement Disorder-related Medications                   8 AM 12 PM 4 pm 8pm At bedtime  prn   Amantadine 100 mg  1 1           Carbidopa/levodopa IR  mg 3 3 3     0.5-1 twice daily     Carbidopa/levodopa CR  mg       2       Clonazepam 2 mg 1   1         Clonazepam 1 mg   1       1        Parkinson Disease Motor Symptom Review:  Motor fluctuations:     Dyskinesia: yes, oral, that is more noticeable prior to his next dose of carbidopa/levodopa. This may interfere with eating and speaking due to feeling fatigued from constant facial movements. This symptom may or may not be bothersome to patient.   Wearing off:  Improved with amantadine. Was happening around 4 pm prior to started amantadine.   Freezing of gait: denies   Dystonia: He reports having spasms that go into \"tetany\" and freezes up, lasting up to 3 hours. It starts with right foot turns \"inward\" then right leg spasms then muscles shake and all of right body freezes up. If he \"catches these symptoms\" soon enough with prn clonazepam 1 mg, he can prevent symptoms from progressing to right body freezing. Thinks adding amantadine has helped with this symptom and is not having as much dystonic symptoms. He reports that amantadine may affect his mood in a good and bad way and he also reports feeling \"foggy headed.\" Recent Botox injections improved foot for about 30 days.  Tremor: Left hand > right hand. Improved with " "amantadine. Tremors don't interfere with activities.  Rigidity: \"Not really.\"  Bradykinesia: No at as bad as it used to be.  Balance/Falls: Last fell a year ago while using his walker and lost his balance while backing up.  Change in gait: He is able to walk with a walker for 200-300 feet. Use electric WC for about 5 years due to worsening stamina.  Exercise: He tries to walk around the house. Big therapy ended 2 months ago.     Parkinson's Disease Non-motor Symptom Review:  Sleep disturbances -no issues with falling asleep, takes trazodone 100 mg nightly.  He reports active dreaming and believes he snores.  Urinary symptoms - He wakes up once per night to urinate.  He has been told in the past that he has an enlarged prostate.  GI symptoms - He is on an extensive bowel movement regimen. He has a BM every once every 4-5 days and has to get digital manipulation. Drinks 2 quarts daily of water.   *Psychiatric disturbances - Mood is \"okay.\" Follows with Dr. Li, psychology.  Patient follows closely with health psychology team at [facility]. Follow with [facility].   Cognitive impairment -  \"Pretty good.\" He thinks his reading comprehension is improved and is reading again. He thinks this has improved since depression is improved.   Hallucinations - Currently treated with clozapine. He will see visions or people in the periphery of his vision. He knows these images are not real. Not scary to patient.   Speech - \"It's alright.\" Is not asked to repeat himself.  *Swallowing - Has a hard time swallowing potatoes and rice and will cough. No issues with liquids. He does not adhere to the recommended diet of thickened liquid diet. He tries to adhere to swallowing techniques he was taught.   Salivation - no issues  Dopamine agonist side effects - n/a   History of dopamine depleting therapies - n/a   Family history of Parkinsonism: Father with parkinsonism and \"vascular dementia\",  at 82 years old.   "   Additional history:   Driving: not driving   Medication compliance: yes, medications are organized for patient as his Assisted Living area.   ADL's: the patient requires help with bathing, showering, cooking, making bed. Doesn't require help with using the bathroom.   He has a prior history of a PE and a stroke with symptoms of terrible headache and left sided weakness with sequelae of left hemiparesis. Currently treated with anticoagulation.     No issues with Restless Leg Syndrome symptoms.   Physical Exam:  There were no vitals taken for this visit.    General: Sitting comfortably in chair, NAD  Neuro:  MENTAL STATUS: Alert and oriented to person, place, time, and situation. Follows commands. Recent and remote memory intact. Attention span and concentration intact.   SPEECH: Fluent, intact comprehension and articulation, hypophonic  CN: overall visual fields intact, tardive dyskinesia with movements involving oral area, facial movement symmetric, hearing grossly intact to conversation   MOTOR: Ongoing dyskinesia involving R>L side, mild resting tremor seen in LUE, mild dystonia in LUE and LLE, Moves bilateral upper and lower extremity anti gravity and anti resistance, L side possibly 5-/5  Involuntary movements: dyskinesia  Tone: increased in BLE axial   GAIT: sitting in wheelchair    Review of Systems:  Other than that mentioned above, the remainder of 12 systems reviewed were negative.    Past Medical History:   Past Medical History:   Diagnosis Date    Cerebral infarction (H)     Clotting disorder (H24)     PE 2019    Dystonia     Parkinson disease (H)        Past Surgical History:   Past Surgical History:   Procedure Laterality Date    APPLY EXTERNAL FIXATOR LOWER EXTREMITY Right 05/21/2023    Procedure: Right  Ankle Closed Reduction and  External Fixator Placement;  Surgeon: Nicole Apodaca MD;  Location: [facility] OR    OPEN REDUCTION INTERNAL FIXATION ANKLE Right 06/15/2023    Procedure: OPEN REDUCTION  INTERNAL FIXATION, FRACTURE, ANKLE, removal of external fixator device.;  Surgeon: Jose Bonilla MD;  Location: [facility] OR       Family History:   Family History   Problem Relation Age of Onset    Other - See Comments Mother         pulmonary embolism from hip fracture    Pulmonary Embolism Mother     Parkinsonism Father     Neurologic Disorder Brother         dystonia    Dystonia Brother     Other - See Comments Brother             Neurologic Disorder Sister     Parkinsonism Sister         ?med related    Other - See Comments Sister         12/7/1950    Depression Sister     Heart Disease Nephew     Glaucoma Maternal Aunt     Glaucoma Maternal Uncle     Macular Degeneration No family hx of        Social History:   Social History     Tobacco Use    Smoking status: Every Day     Types: Cigars, Cigarettes    Smokeless tobacco: Never   Vaping Use    Vaping status: Never Used   Substance Use Topics    Alcohol use: Not Currently     Comment: quit 2014    Drug use: Not Currently       Medications:    Current Outpatient Medications:     acetaminophen (TYLENOL) 325 MG tablet, Take 1-2 tablets (325-650 mg) by mouth every 8 hours as needed for mild pain, Disp: 100 tablet, Rfl: 0    alfuzosin ER (UROXATRAL) 10 MG 24 hr tablet, Take 1 tablet (10 mg) by mouth daily, Disp: 30 tablet, Rfl: 11    amantadine (SYMMETREL) 100 MG capsule, Take 1 capsule (100 mg) by mouth 2 times daily, Disp: 60 capsule, Rfl: 11    ammonium lactate (LAC-HYDRIN) 12 % external lotion, Apply topically daily as needed, Disp: , Rfl:     ANTI-ITCH MAXIMUM STRENGTH 1 % external cream, Apply topically 2 times daily, Disp: , Rfl:     apixaban ANTICOAGULANT (ELIQUIS) 5 MG tablet, Take 5 mg by mouth 2 times daily, Disp: , Rfl:     atorvastatin (LIPITOR) 40 MG tablet, Take 1 tablet (40 mg) by mouth every evening, Disp: 30 tablet, Rfl: 0    bisacodyl (DULCOLAX) 10 MG suppository, Place 1 suppository (10 mg) rectally daily as needed for constipation, Disp: 10  suppository, Rfl: 0    carbidopa-levodopa (SINEMET CR)  MG CR tablet, Take 2 tablets by mouth at bedtime, Disp: 60 tablet, Rfl: 11    carbidopa-levodopa (SINEMET)  MG tablet, Week 1 take 2.5 tabs three times daily at 8000, 1200, 1600 then week 2 take 3 tabs three times daily and continue on this dose. Stop at lowest effective dose. Okay to take 0.5-1 tab as needed twice daily = up to 11 tabs daily, Disp: 330 tablet, Rfl: 11    cholecalciferol (VITAMIN D3) 125 mcg (5000 units) capsule, Take 1 capsule (125 mcg) by mouth daily, Disp: 30 capsule, Rfl: 0    clonazePAM (KLONOPIN) 1 MG tablet, Take 1 tablet scheduled at noon, may take 1 additional tablet PRN, Disp: 60 tablet, Rfl: 5    clonazePAM (KLONOPIN) 2 MG tablet, Take 1 tablet (2 mg) by mouth 2 times daily Take at 8am and 4 pm, Disp: 60 tablet, Rfl: 5    cloZAPine (CLOZARIL) 50 MG tablet, Take 1 tablet (50 mg) by mouth At Bedtime, Disp: 30 tablet, Rfl: 0    diclofenac (VOLTAREN) 1 % topical gel, Apply 2 g topically 3 times daily as needed for moderate pain, Disp: 150 g, Rfl: 0    econazole nitrate 1 % external cream, Apply topically daily To toes and toenails., Disp: 85 g, Rfl: 5    ENEMEEZ MINI 283 MG/5ML enema, Place 1 enema rectally, Disp: , Rfl:     gabapentin (NEURONTIN) 800 MG tablet, Take 1 tablet (800 mg) by mouth 3 times daily, Disp: 90 tablet, Rfl: 0    hydrOXYzine (ATARAX) 25 MG tablet, Take 2 tablets (50 mg) by mouth every 6 hours as needed for anxiety (up to 3 timrd daily), Disp: 20 tablet, Rfl: 0    ketoconazole (NIZORAL) 2 % external cream, Apply topically 2 times daily, Disp: , Rfl:     ketoconazole (NIZORAL) 2 % external shampoo, Apply topically once a week On Wednesdays, Disp: , Rfl:     lactulose (CHRONULAC) 10 GM/15ML solution, Take 15 mLs by mouth 2 times daily, Disp: 473 mL, Rfl: 11    linaclotide (LINZESS) 290 MCG capsule, Take 1 capsule (290 mcg) by mouth daily as needed (IBSc), Disp: 90 capsule, Rfl: 3    metoprolol succinate ER  (TOPROL XL) 25 MG 24 hr tablet, Take 0.5 tablets (12.5 mg) by mouth 2 times daily, Disp: 30 tablet, Rfl: 0    metoprolol tartrate (LOPRESSOR) 25 MG tablet, Take 25 mg by mouth 2 times daily, Disp: , Rfl:     multivitamin, therapeutic (THERA-VIT) TABS tablet, Take 1 tablet by mouth daily, Disp: 30 tablet, Rfl: 0    pantoprazole (PROTONIX) 40 MG EC tablet, Take 1 tablet (40 mg) by mouth daily Take 1 tablet (40mg) by mouth daily at 8am, Disp: 30 tablet, Rfl: 0    polyethylene glycol (MIRALAX) 17 g packet, Take 1 packet by mouth daily, Disp: , Rfl:     SENEXON-S 8.6-50 MG tablet, Take 2 tablets by mouth 2 times daily, Disp: , Rfl:     tamsulosin (FLOMAX) 0.4 MG capsule, Take 0.4 mg by mouth daily, Disp: , Rfl:     traZODone (DESYREL) 100 MG tablet, Take 100 mg by mouth At Bedtime, Disp: , Rfl:     traZODone (DESYREL) 50 MG tablet, Take 1 tablet (50 mg) by mouth every morning, Disp: 30 tablet, Rfl: 0    venlafaxine (EFFEXOR XR) 150 MG 24 hr capsule, Take 2 capsules (300 mg) by mouth daily, Disp: 60 capsule, Rfl: 0    vitamin C (ASCORBIC ACID) 500 MG tablet, Take 1 tablet (500 mg) by mouth daily, Disp: 30 tablet, Rfl: 0    Current Facility-Administered Medications:     botulinum toxin type A (BOTOX) 100 units injection 400 Units, 400 Units, Intramuscular, Q90 Days, , 300 Units at 07/22/24 1703    botulinum toxin type A (BOTOX) 100 units injection 400 Units, 400 Units, Intramuscular, Q90 Days, Ember Arita MD, 270 Units at 04/25/24 1126     Allergies:     Allergies   Allergen Reactions    Amantadine Other (See Comments)     Other reaction(s): Hallucinations  Hallucinations/ lost self control/gambling.     halluicnations  Hallucinations/ lost self control/gambling.     hallucinates  halluicnations  hallucinates  Hallucinations/ lost self control/gambling.       Quetiapine GI Disturbance, Diarrhea and Other (See Comments)     Diarrhea    Diarrhea  Other reaction(s): GI Disturbance  Diarrhea      Duloxetine Other (See  Comments)     suicidal  suicidal            Data Reviewed: I have personally reviewed the tests/studies below.   CBC RESULTS:   Recent Labs   Lab Test 05/03/24 1944   WBC 8.9   RBC 3.84*   HGB 11.5*   HCT 36.0*   MCV 94   MCH 29.9   MCHC 31.9   RDW 13.2        Last Comprehensive Metabolic Panel:  Sodium   Date Value Ref Range Status   05/03/2024 139 135 - 145 mmol/L Final     Comment:     Reference intervals for this test were updated on 09/26/2023 to more accurately reflect our healthy population. There may be differences in the flagging of prior results with similar values performed with this method. Interpretation of those prior results can be made in the context of the updated reference intervals.    07/09/2021 143 133 - 144 mmol/L Final     Potassium   Date Value Ref Range Status   05/03/2024 3.8 3.4 - 5.3 mmol/L Final   05/09/2023 4.1 3.4 - 5.3 mmol/L Final   07/09/2021 3.7 3.4 - 5.3 mmol/L Final     Chloride   Date Value Ref Range Status   05/03/2024 102 98 - 107 mmol/L Final   05/09/2023 113 (H) 94 - 109 mmol/L Final   07/09/2021 108 94 - 109 mmol/L Final     Carbon Dioxide   Date Value Ref Range Status   07/09/2021 28 20 - 32 mmol/L Final     Carbon Dioxide (CO2)   Date Value Ref Range Status   05/03/2024 25 22 - 29 mmol/L Final   05/09/2023 25 20 - 32 mmol/L Final     Anion Gap   Date Value Ref Range Status   05/03/2024 12 7 - 15 mmol/L Final   05/09/2023 4 3 - 14 mmol/L Final   07/09/2021 6 3 - 14 mmol/L Final     Glucose   Date Value Ref Range Status   05/03/2024 118 (H) 70 - 99 mg/dL Final   05/09/2023 98 70 - 99 mg/dL Final   07/09/2021 102 (H) 70 - 99 mg/dL Final     GLUCOSE BY METER POCT   Date Value Ref Range Status   06/17/2023 124 (H) 70 - 99 mg/dL Final     Urea Nitrogen   Date Value Ref Range Status   05/03/2024 19.6 8.0 - 23.0 mg/dL Final   05/09/2023 24 7 - 30 mg/dL Final   07/09/2021 25 7 - 30 mg/dL Final     Creatinine   Date Value Ref Range Status   05/03/2024 0.96 0.67 - 1.17 mg/dL  Final   07/09/2021 0.77 0.66 - 1.25 mg/dL Final     GFR Estimate   Date Value Ref Range Status   05/03/2024 >90 >60 mL/min/1.73m2 Final   07/09/2021 >90 >60 mL/min/[1.73_m2] Final     Comment:     Non  GFR Calc  Starting 12/18/2018, serum creatinine based estimated GFR (eGFR) will be   calculated using the Chronic Kidney Disease Epidemiology Collaboration   (CKD-EPI) equation.       GFR, ESTIMATED POCT   Date Value Ref Range Status   05/27/2022 >60 >60 mL/min/1.73m2 Final     Calcium   Date Value Ref Range Status   05/03/2024 8.8 8.6 - 10.0 mg/dL Final   07/09/2021 8.8 8.5 - 10.1 mg/dL Final     Bilirubin Total   Date Value Ref Range Status   04/26/2024 0.2 <=1.2 mg/dL Final   07/07/2021 0.3 0.2 - 1.3 mg/dL Final     Alkaline Phosphatase   Date Value Ref Range Status   04/26/2024 158 (H) 40 - 150 U/L Final     Comment:     Reference intervals for this test were updated on 11/14/2023 to more accurately reflect our healthy population. There may be differences in the flagging of prior results with similar values performed with this method. Interpretation of those prior results can be made in the context of the updated reference intervals.   07/07/2021 82 40 - 150 U/L Final     ALT   Date Value Ref Range Status   04/26/2024 <5 0 - 70 U/L Final     Comment:     Reference intervals for this test were updated on 6/12/2023 to more accurately reflect our healthy population. There may be differences in the flagging of prior results with similar values performed with this method. Interpretation of those prior results can be made in the context of the updated reference intervals.     07/07/2021 31 0 - 70 U/L Final     AST   Date Value Ref Range Status   04/26/2024 17 0 - 45 U/L Final     Comment:     Reference intervals for this test were updated on 6/12/2023 to more accurately reflect our healthy population. There may be differences in the flagging of prior results with similar values performed with this  method. Interpretation of those prior results can be made in the context of the updated reference intervals.   07/07/2021 20 0 - 45 U/L Final       TSH   Date Value Ref Range Status   05/27/2022 2.99 0.40 - 4.00 mU/L Final   07/08/2021 2.20 0.40 - 4.00 mU/L Final     Lab Results   Component Value Date    A1C 6.0 07/08/2021

## 2024-07-24 NOTE — PROGRESS NOTES
Department of Neurology  Movement Disorders Division     Patient: Benjamin Mathews   MRN: [id number]   : 1965   Date of Visit: 2024    Impression:  Benjamin Mathews is a 59 year old male with ***. The patient's main concern today is ***. We discussed     Plan:   ***      Patient to return in *** months, for in-person or virtual visit, *** minutes.     Ching Carlos DO  Movement Disorders Specialist  [facility]     Note dictated using voice recognition software.   *** minutes spent on date of encounter doing chart reviews and exam and documentation and further activities as noted above.        ------------------------------------------------------------------------------------------------------------      History of Present Illness  Mr. Mathews is a pleasant 59 year old male that presents to Neurology Movement clinic for follow up regarding ***  The patient was initially seen in neurologic consultation on *** and most recently *** for management of ***. Please see the comprehensive neurologic consultation notes from those dates in the Epic records for details.      History obtained from patient. Patient is accompanied by ***.  Main complaint:   Patient reports    Movement Disorder-related Medications                   Indication                                            *** last taken at ***    Review of Systems:  Other than that mentioned above, the remainder of 12 systems reviewed were negative.    PMH: unchanged  PSH: unchanged  FH: unchanged  SH: unchanged    Medications:  Current Outpatient Medications   Medication Sig Dispense Refill    acetaminophen (TYLENOL) 325 MG tablet Take 1-2 tablets (325-650 mg) by mouth every 8 hours as needed for mild pain 100 tablet 0    alfuzosin ER (UROXATRAL) 10 MG 24 hr tablet Take 1 tablet (10 mg) by mouth daily 30 tablet 11    amantadine (SYMMETREL) 100 MG capsule Take 1 capsule (100 mg) by mouth 2 times daily 60 capsule 11    ammonium lactate  (LAC-HYDRIN) 12 % external lotion Apply topically daily as needed      ANTI-ITCH MAXIMUM STRENGTH 1 % external cream Apply topically 2 times daily      apixaban ANTICOAGULANT (ELIQUIS) 5 MG tablet Take 5 mg by mouth 2 times daily      atorvastatin (LIPITOR) 40 MG tablet Take 1 tablet (40 mg) by mouth every evening 30 tablet 0    bisacodyl (DULCOLAX) 10 MG suppository Place 1 suppository (10 mg) rectally daily as needed for constipation 10 suppository 0    carbidopa-levodopa (SINEMET CR)  MG CR tablet Take 2 tablets by mouth at bedtime 60 tablet 11    carbidopa-levodopa (SINEMET)  MG tablet Week 1 take 2.5 tabs three times daily at 8000, 1200, 1600 then week 2 take 3 tabs three times daily and continue on this dose. Stop at lowest effective dose. Okay to take 0.5-1 tab as needed twice daily = up to 11 tabs daily 330 tablet 11    cholecalciferol (VITAMIN D3) 125 mcg (5000 units) capsule Take 1 capsule (125 mcg) by mouth daily 30 capsule 0    clonazePAM (KLONOPIN) 1 MG tablet Take 1 tablet scheduled at noon, may take 1 additional tablet PRN 60 tablet 5    clonazePAM (KLONOPIN) 2 MG tablet Take 1 tablet (2 mg) by mouth 2 times daily Take at 8am and 4 pm 60 tablet 5    cloZAPine (CLOZARIL) 50 MG tablet Take 1 tablet (50 mg) by mouth At Bedtime 30 tablet 0    diclofenac (VOLTAREN) 1 % topical gel Apply 2 g topically 3 times daily as needed for moderate pain 150 g 0    econazole nitrate 1 % external cream Apply topically daily To toes and toenails. 85 g 5    ENEMEEZ MINI 283 MG/5ML enema Place 1 enema rectally      gabapentin (NEURONTIN) 800 MG tablet Take 1 tablet (800 mg) by mouth 3 times daily 90 tablet 0    hydrOXYzine (ATARAX) 25 MG tablet Take 2 tablets (50 mg) by mouth every 6 hours as needed for anxiety (up to 3 timrd daily) 20 tablet 0    ketoconazole (NIZORAL) 2 % external cream Apply topically 2 times daily      ketoconazole (NIZORAL) 2 % external shampoo Apply topically once a week On Wednesdays       lactulose (CHRONULAC) 10 GM/15ML solution Take 15 mLs by mouth 2 times daily 473 mL 11    linaclotide (LINZESS) 290 MCG capsule Take 1 capsule (290 mcg) by mouth daily as needed (IBSc) 90 capsule 3    metoprolol succinate ER (TOPROL XL) 25 MG 24 hr tablet Take 0.5 tablets (12.5 mg) by mouth 2 times daily 30 tablet 0    metoprolol tartrate (LOPRESSOR) 25 MG tablet Take 25 mg by mouth 2 times daily      multivitamin, therapeutic (THERA-VIT) TABS tablet Take 1 tablet by mouth daily 30 tablet 0    pantoprazole (PROTONIX) 40 MG EC tablet Take 1 tablet (40 mg) by mouth daily Take 1 tablet (40mg) by mouth daily at 8am 30 tablet 0    polyethylene glycol (MIRALAX) 17 g packet Take 1 packet by mouth daily      SENEXON-S 8.6-50 MG tablet Take 2 tablets by mouth 2 times daily      tamsulosin (FLOMAX) 0.4 MG capsule Take 0.4 mg by mouth daily      traZODone (DESYREL) 100 MG tablet Take 100 mg by mouth At Bedtime      traZODone (DESYREL) 50 MG tablet Take 1 tablet (50 mg) by mouth every morning 30 tablet 0    venlafaxine (EFFEXOR XR) 150 MG 24 hr capsule Take 2 capsules (300 mg) by mouth daily 60 capsule 0    vitamin C (ASCORBIC ACID) 500 MG tablet Take 1 tablet (500 mg) by mouth daily 30 tablet 0           Allergies   Allergen Reactions    Amantadine Other (See Comments)     Other reaction(s): Hallucinations  Hallucinations/ lost self control/gambling.     halluicnations  Hallucinations/ lost self control/gambling.     hallucinates  halluicnations  hallucinates  Hallucinations/ lost self control/gambling.       Quetiapine GI Disturbance, Diarrhea and Other (See Comments)     Diarrhea    Diarrhea  Other reaction(s): GI Disturbance  Diarrhea      Duloxetine Other (See Comments)     suicidal  suicidal            Physical Exam:  The patient's  vitals were not taken for this visit.        Data Reviewed: I have personally reviewed the tests/studies below.   ***    Lab Results   Component Value Date    A1C 6.0 07/08/2021     TSH    Date Value Ref Range Status   05/27/2022 2.99 0.40 - 4.00 mU/L Final   07/08/2021 2.20 0.40 - 4.00 mU/L Final     CBC RESULTS:   Recent Labs   Lab Test 05/03/24 1944   WBC 8.9   RBC 3.84*   HGB 11.5*   HCT 36.0*   MCV 94   MCH 29.9   MCHC 31.9   RDW 13.2        Last Comprehensive Metabolic Panel:  Sodium   Date Value Ref Range Status   05/03/2024 139 135 - 145 mmol/L Final     Comment:     Reference intervals for this test were updated on 09/26/2023 to more accurately reflect our healthy population. There may be differences in the flagging of prior results with similar values performed with this method. Interpretation of those prior results can be made in the context of the updated reference intervals.    07/09/2021 143 133 - 144 mmol/L Final     Potassium   Date Value Ref Range Status   05/03/2024 3.8 3.4 - 5.3 mmol/L Final   05/09/2023 4.1 3.4 - 5.3 mmol/L Final   07/09/2021 3.7 3.4 - 5.3 mmol/L Final     Chloride   Date Value Ref Range Status   05/03/2024 102 98 - 107 mmol/L Final   05/09/2023 113 (H) 94 - 109 mmol/L Final   07/09/2021 108 94 - 109 mmol/L Final     Carbon Dioxide   Date Value Ref Range Status   07/09/2021 28 20 - 32 mmol/L Final     Carbon Dioxide (CO2)   Date Value Ref Range Status   05/03/2024 25 22 - 29 mmol/L Final   05/09/2023 25 20 - 32 mmol/L Final     Anion Gap   Date Value Ref Range Status   05/03/2024 12 7 - 15 mmol/L Final   05/09/2023 4 3 - 14 mmol/L Final   07/09/2021 6 3 - 14 mmol/L Final     Glucose   Date Value Ref Range Status   05/03/2024 118 (H) 70 - 99 mg/dL Final   05/09/2023 98 70 - 99 mg/dL Final   07/09/2021 102 (H) 70 - 99 mg/dL Final     GLUCOSE BY METER POCT   Date Value Ref Range Status   06/17/2023 124 (H) 70 - 99 mg/dL Final     Urea Nitrogen   Date Value Ref Range Status   05/03/2024 19.6 8.0 - 23.0 mg/dL Final   05/09/2023 24 7 - 30 mg/dL Final   07/09/2021 25 7 - 30 mg/dL Final     Creatinine   Date Value Ref Range Status   05/03/2024 0.96 0.67 - 1.17  mg/dL Final   07/09/2021 0.77 0.66 - 1.25 mg/dL Final     GFR Estimate   Date Value Ref Range Status   05/03/2024 >90 >60 mL/min/1.73m2 Final   07/09/2021 >90 >60 mL/min/[1.73_m2] Final     Comment:     Non  GFR Calc  Starting 12/18/2018, serum creatinine based estimated GFR (eGFR) will be   calculated using the Chronic Kidney Disease Epidemiology Collaboration   (CKD-EPI) equation.       GFR, ESTIMATED POCT   Date Value Ref Range Status   05/27/2022 >60 >60 mL/min/1.73m2 Final     Calcium   Date Value Ref Range Status   05/03/2024 8.8 8.6 - 10.0 mg/dL Final   07/09/2021 8.8 8.5 - 10.1 mg/dL Final     Bilirubin Total   Date Value Ref Range Status   04/26/2024 0.2 <=1.2 mg/dL Final   07/07/2021 0.3 0.2 - 1.3 mg/dL Final     Alkaline Phosphatase   Date Value Ref Range Status   04/26/2024 158 (H) 40 - 150 U/L Final     Comment:     Reference intervals for this test were updated on 11/14/2023 to more accurately reflect our healthy population. There may be differences in the flagging of prior results with similar values performed with this method. Interpretation of those prior results can be made in the context of the updated reference intervals.   07/07/2021 82 40 - 150 U/L Final     ALT   Date Value Ref Range Status   04/26/2024 <5 0 - 70 U/L Final     Comment:     Reference intervals for this test were updated on 6/12/2023 to more accurately reflect our healthy population. There may be differences in the flagging of prior results with similar values performed with this method. Interpretation of those prior results can be made in the context of the updated reference intervals.     07/07/2021 31 0 - 70 U/L Final     AST   Date Value Ref Range Status   04/26/2024 17 0 - 45 U/L Final     Comment:     Reference intervals for this test were updated on 6/12/2023 to more accurately reflect our healthy population. There may be differences in the flagging of prior results with similar values performed with this  method. Interpretation of those prior results can be made in the context of the updated reference intervals.   07/07/2021 20 0 - 45 U/L Final

## 2024-07-24 NOTE — LETTER
2024      Benjamin Mathews  [address]  [facility] [address]      Dear Colleague,    Thank you for referring your patient, Benjamin Mathews, to the [facility] NEUROLOGY CLINIC [facility]. Please see a copy of my visit note below.    Department of Neurology  Movement Disorders Division     Patient: Benjamin Mathews  MRN: [id number]   : 1965   Date of Visit: 2024    Impression:  Benjamin Mathews is a 58 year old male with Parkinsonism. The patient's main concern today is muscle spasms.     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 Take 3 tabs 8 AM, 12 PM as before, decrease the 3PM dose to 2.5 tablets due to abnormal movements. If abnormal movements become bothersome to you then we can consider decreasing carbidopa/levodopa.  - okay to take carbidopa/levodopa IR 25/100 mg 0.5-1 tab twice daily prn   - Continue carbidopa/levodopa CR 50/200 mg to 2 tabs at bedtime   - No changes to clonazepam   Movement Disorder-related Medications                   8 AM 12 PM 4 pm 8pm At bedtime  prn   Amantadine 100 mg  1 1           Carbidopa/levodopa IR  mg 3 3 2.5     0.5-1 twice daily     Carbidopa/levodopa CR  mg       2       Clonazepam 2 mg 1   1         Clonazepam 1 mg   1       1   - Continue with psychiatric and psychotherapeutic care to manage anxiety and depression. Follow symptoms closely while on clozapine and clonazepam, though there have been no issues in the past with this combination of medication.   - Follow-up neuropsychological evaluation is recommended in 1 to 2 years, [id number] in order to assess and update recommendations as appropriate  - Continue following palliative care medical providers at [facility]  - Continue to refrain from driving  - Continue daily and safe  exercises   - Continue follow with Dr. Arita for toxin injections and could consider right upper extremity injections to improve dystonia  - Continue to monitor swallowing  - Consider trying guided meditation. Here are some apps I recommend:.     - Headspace miguel (www.headspace.com): 14 day free trial and then there is a monthly or annual rate  - Calm miguel (www.calm.com): 7 day free trial and then there is an annual rate  - InsightTimer miguel (www.insighttimer.com): FREE   - Consider seeing therapist as shorter intervals to improve stress/anxiety/mood   - MTM (Neurology Pharmacy) referral placed for close follow up and continued medication management. Follow up should be scheduled in 4 weeks. Please call the clinic at [phone] for MTM coordinators/scheduling.     Patient to return in 3-5 months, for in-person visit, 30 minutes.     Patient was discussed with the Movement Disorder attending, Dr Terrance Toledo MD  Neurology PGY-4  [facility]      I, Ching Carlos DO, personally saw this patient with the Neurology resident and agree with Dr. Toledo's findings and plan of care as documented in Dr. Toledo's note. I personally performed/observed salient aspects of the history and neurological examination.     I personally reviewed the medications and labs. I personally viewed the imaging, and agree with the interpretation documented by the resident.    Date of Service (when I saw the patient): 07/24/24    Time spent with patient: 20 minutes  30 minutes spent on date of encounter doing chart reviews and exam and documentation and further activities as noted above.     Ching Carlos DO, MA   of Neurology   [facility]             History of Present Illness  Mr. Mathews is a pleasant 58 year old male that presents to Neurology Movement clinic for follow up regarding Parkinsonism and muscle spasms. His symptom started in 2013 with decreased  strength  "and stiffness of gait and bradykinesia with walking and left hand rest tremor.  Patient was diagnosed with Parkinson's disease in 2013 at the [facility].      History obtained from patient.   Main complaint: muscle spasms  Patient reports stiffness and spasms in right arm and hemiparesis in left arm from previous CVA.   Spasms/Dystonia: He was under stress and started having spasms. He manages stress by zoning out and watching TV. He talks with a therapist twice a month.   He described his terrible experience at the ER and had to wait from 2 pm -7 am to get seen/treated. He was seen for spasms and treated with clonazepam 1 mg.   Wearing off: He reports waking up really stiff in his back   Movement Disorder-related Medications                   8 AM 12 PM 4 pm 8pm At bedtime  prn   Amantadine 100 mg  1 1           Carbidopa/levodopa IR  mg 3 3 3     0.5-1 twice daily     Carbidopa/levodopa CR  mg       2       Clonazepam 2 mg 1   1         Clonazepam 1 mg   1       1        Parkinson Disease Motor Symptom Review:  Motor fluctuations:     Dyskinesia: yes, oral, that is more noticeable prior to his next dose of carbidopa/levodopa. This may interfere with eating and speaking due to feeling fatigued from constant facial movements. This symptom may or may not be bothersome to patient.   Wearing off:  Improved with amantadine. Was happening around 4 pm prior to started amantadine.   Freezing of gait: denies   Dystonia: He reports having spasms that go into \"tetany\" and freezes up, lasting up to 3 hours. It starts with right foot turns \"inward\" then right leg spasms then muscles shake and all of right body freezes up. If he \"catches these symptoms\" soon enough with prn clonazepam 1 mg, he can prevent symptoms from progressing to right body freezing. Thinks adding amantadine has helped with this symptom and is not having as much dystonic symptoms. He reports that amantadine may affect his mood in a good and bad " "way and he also reports feeling \"foggy headed.\" Recent Botox injections improved foot for about 30 days.  Tremor: Left hand > right hand. Improved with amantadine. Tremors don't interfere with activities.  Rigidity: \"Not really.\"  Bradykinesia: No at as bad as it used to be.  Balance/Falls: Last fell a year ago while using his walker and lost his balance while backing up.  Change in gait: He is able to walk with a walker for 200-300 feet. Use electric WC for about 5 years due to worsening stamina.  Exercise: He tries to walk around the house. Big therapy ended 2 months ago.     Parkinson's Disease Non-motor Symptom Review:  Sleep disturbances -no issues with falling asleep, takes trazodone 100 mg nightly.  He reports active dreaming and believes he snores.  Urinary symptoms - He wakes up once per night to urinate.  He has been told in the past that he has an enlarged prostate.  GI symptoms - He is on an extensive bowel movement regimen. He has a BM every once every 4-5 days and has to get digital manipulation. Drinks 2 quarts daily of water.   *Psychiatric disturbances - Mood is \"okay.\" Follows with Dr. Li, psychology.  Patient follows closely with health psychology team at [facility]. Follow with [facility].   Cognitive impairment -  \"Pretty good.\" He thinks his reading comprehension is improved and is reading again. He thinks this has improved since depression is improved.   Hallucinations - Currently treated with clozapine. He will see visions or people in the periphery of his vision. He knows these images are not real. Not scary to patient.   Speech - \"It's alright.\" Is not asked to repeat himself.  *Swallowing - Has a hard time swallowing potatoes and rice and will cough. No issues with liquids. He does not adhere to the recommended diet of thickened liquid diet. He tries to adhere to swallowing techniques he was taught.   Salivation - no issues  Dopamine agonist side effects - n/a   History of " "dopamine depleting therapies - n/a   Family history of Parkinsonism: Father with parkinsonism and \"vascular dementia\",  at 82 years old.     Additional history:   Driving: not driving   Medication compliance: yes, medications are organized for patient as his Assisted Living area.   ADL's: the patient requires help with bathing, showering, cooking, making bed. Doesn't require help with using the bathroom.   He has a prior history of a PE and a stroke with symptoms of terrible headache and left sided weakness with sequelae of left hemiparesis. Currently treated with anticoagulation.     No issues with Restless Leg Syndrome symptoms.   Physical Exam:  There were no vitals taken for this visit.    General: Sitting comfortably in chair, NAD  Neuro:  Mental Status: Awake, alert, attentive, and oriented. Able to provide a detailed history and answer questions without issue. Speech is clear and fluid  Cranial Nerves: PERRL, conjugate gaze, EOMI w/o nystagmus, visual fields intact bilaterally, facial sensation intact, no facial asymmetry noted, hearing intact to conversation, no dysarthria, tongue is midline, shoulder shrug 5/5 bilaterally  Motor: Normal bulk and tone. No abnormal movements. Strength is 5/5 in all extremities in both proximal and distal muscle groups, although feels that his L side is sliightly week  Sensory: Intact to light touch in all extremities. Neg Romberg  Coordination: FNF and HS intact bilaterally  Reflexes: 2+ and symmetric in the bilateral biceps, brachioradialis, knees, and ankles. Down-going toes bilaterally, no clonus  Gait: Stance is narrow. Gait is smooth and balanced. No issue with turns    Review of Systems:  Other than that mentioned above, the remainder of 12 systems reviewed were negative.    Past Medical History:   Past Medical History:   Diagnosis Date     Cerebral infarction (H)      Clotting disorder (H24)     PE 2019     Dystonia      Parkinson disease (H)        Past Surgical " History:   Past Surgical History:   Procedure Laterality Date     APPLY EXTERNAL FIXATOR LOWER EXTREMITY Right 05/21/2023    Procedure: Right  Ankle Closed Reduction and  External Fixator Placement;  Surgeon: Nicole Apodaca MD;  Location: [facility] OR     OPEN REDUCTION INTERNAL FIXATION ANKLE Right 06/15/2023    Procedure: OPEN REDUCTION INTERNAL FIXATION, FRACTURE, ANKLE, removal of external fixator device.;  Surgeon: Jose Bonilla MD;  Location: [facility] OR       Family History:   Family History   Problem Relation Age of Onset     Other - See Comments Mother         pulmonary embolism from hip fracture     Pulmonary Embolism Mother      Parkinsonism Father      Neurologic Disorder Brother         dystonia     Dystonia Brother      Other - See Comments Brother              Neurologic Disorder Sister      Parkinsonism Sister         ?med related     Other - See Comments Sister         12/7/1950     Depression Sister      Heart Disease Nephew      Glaucoma Maternal Aunt      Glaucoma Maternal Uncle      Macular Degeneration No family hx of        Social History:   Social History     Tobacco Use     Smoking status: Every Day     Types: Cigars, Cigarettes     Smokeless tobacco: Never   Vaping Use     Vaping status: Never Used   Substance Use Topics     Alcohol use: Not Currently     Comment: quit 2014     Drug use: Not Currently       Medications:    Current Outpatient Medications:      acetaminophen (TYLENOL) 325 MG tablet, Take 1-2 tablets (325-650 mg) by mouth every 8 hours as needed for mild pain, Disp: 100 tablet, Rfl: 0     alfuzosin ER (UROXATRAL) 10 MG 24 hr tablet, Take 1 tablet (10 mg) by mouth daily, Disp: 30 tablet, Rfl: 11     amantadine (SYMMETREL) 100 MG capsule, Take 1 capsule (100 mg) by mouth 2 times daily, Disp: 60 capsule, Rfl: 11     ammonium lactate (LAC-HYDRIN) 12 % external lotion, Apply topically daily as needed, Disp: , Rfl:      ANTI-ITCH MAXIMUM STRENGTH 1 % external cream, Apply  topically 2 times daily, Disp: , Rfl:      apixaban ANTICOAGULANT (ELIQUIS) 5 MG tablet, Take 5 mg by mouth 2 times daily, Disp: , Rfl:      atorvastatin (LIPITOR) 40 MG tablet, Take 1 tablet (40 mg) by mouth every evening, Disp: 30 tablet, Rfl: 0     bisacodyl (DULCOLAX) 10 MG suppository, Place 1 suppository (10 mg) rectally daily as needed for constipation, Disp: 10 suppository, Rfl: 0     carbidopa-levodopa (SINEMET CR)  MG CR tablet, Take 2 tablets by mouth at bedtime, Disp: 60 tablet, Rfl: 11     carbidopa-levodopa (SINEMET)  MG tablet, Week 1 take 2.5 tabs three times daily at 8000, 1200, 1600 then week 2 take 3 tabs three times daily and continue on this dose. Stop at lowest effective dose. Okay to take 0.5-1 tab as needed twice daily = up to 11 tabs daily, Disp: 330 tablet, Rfl: 11     cholecalciferol (VITAMIN D3) 125 mcg (5000 units) capsule, Take 1 capsule (125 mcg) by mouth daily, Disp: 30 capsule, Rfl: 0     clonazePAM (KLONOPIN) 1 MG tablet, Take 1 tablet scheduled at noon, may take 1 additional tablet PRN, Disp: 60 tablet, Rfl: 5     clonazePAM (KLONOPIN) 2 MG tablet, Take 1 tablet (2 mg) by mouth 2 times daily Take at 8am and 4 pm, Disp: 60 tablet, Rfl: 5     cloZAPine (CLOZARIL) 50 MG tablet, Take 1 tablet (50 mg) by mouth At Bedtime, Disp: 30 tablet, Rfl: 0     diclofenac (VOLTAREN) 1 % topical gel, Apply 2 g topically 3 times daily as needed for moderate pain, Disp: 150 g, Rfl: 0     econazole nitrate 1 % external cream, Apply topically daily To toes and toenails., Disp: 85 g, Rfl: 5     ENEMEEZ MINI 283 MG/5ML enema, Place 1 enema rectally, Disp: , Rfl:      gabapentin (NEURONTIN) 800 MG tablet, Take 1 tablet (800 mg) by mouth 3 times daily, Disp: 90 tablet, Rfl: 0     hydrOXYzine (ATARAX) 25 MG tablet, Take 2 tablets (50 mg) by mouth every 6 hours as needed for anxiety (up to 3 timrd daily), Disp: 20 tablet, Rfl: 0     ketoconazole (NIZORAL) 2 % external cream, Apply topically 2  times daily, Disp: , Rfl:      ketoconazole (NIZORAL) 2 % external shampoo, Apply topically once a week On Wednesdays, Disp: , Rfl:      lactulose (CHRONULAC) 10 GM/15ML solution, Take 15 mLs by mouth 2 times daily, Disp: 473 mL, Rfl: 11     linaclotide (LINZESS) 290 MCG capsule, Take 1 capsule (290 mcg) by mouth daily as needed ([facility]), Disp: 90 capsule, Rfl: 3     metoprolol succinate ER (TOPROL XL) 25 MG 24 hr tablet, Take 0.5 tablets (12.5 mg) by mouth 2 times daily, Disp: 30 tablet, Rfl: 0     metoprolol tartrate (LOPRESSOR) 25 MG tablet, Take 25 mg by mouth 2 times daily, Disp: , Rfl:      multivitamin, therapeutic (THERA-VIT) TABS tablet, Take 1 tablet by mouth daily, Disp: 30 tablet, Rfl: 0     pantoprazole (PROTONIX) 40 MG EC tablet, Take 1 tablet (40 mg) by mouth daily Take 1 tablet (40mg) by mouth daily at 8am, Disp: 30 tablet, Rfl: 0     polyethylene glycol (MIRALAX) 17 g packet, Take 1 packet by mouth daily, Disp: , Rfl:      SENEXON-S 8.6-50 MG tablet, Take 2 tablets by mouth 2 times daily, Disp: , Rfl:      tamsulosin (FLOMAX) 0.4 MG capsule, Take 0.4 mg by mouth daily, Disp: , Rfl:      traZODone (DESYREL) 100 MG tablet, Take 100 mg by mouth At Bedtime, Disp: , Rfl:      traZODone (DESYREL) 50 MG tablet, Take 1 tablet (50 mg) by mouth every morning, Disp: 30 tablet, Rfl: 0     venlafaxine (EFFEXOR XR) 150 MG 24 hr capsule, Take 2 capsules (300 mg) by mouth daily, Disp: 60 capsule, Rfl: 0     vitamin C (ASCORBIC ACID) 500 MG tablet, Take 1 tablet (500 mg) by mouth daily, Disp: 30 tablet, Rfl: 0    Current Facility-Administered Medications:      botulinum toxin type A (BOTOX) 100 units injection 400 Units, 400 Units, Intramuscular, Q90 Days, , 300 Units at 07/22/24 1703     botulinum toxin type A (BOTOX) 100 units injection 400 Units, 400 Units, Intramuscular, Q90 Days, Ember Arita MD, 270 Units at 04/25/24 1126     Allergies:     Allergies   Allergen Reactions     Amantadine Other (See Comments)      Other reaction(s): Hallucinations  Hallucinations/ lost self control/gambling.     halluicnations  Hallucinations/ lost self control/gambling.     hallucinates  halluicnations  hallucinates  Hallucinations/ lost self control/gambling.        Quetiapine GI Disturbance, Diarrhea and Other (See Comments)     Diarrhea    Diarrhea  Other reaction(s): GI Disturbance  Diarrhea       Duloxetine Other (See Comments)     suicidal  suicidal            Data Reviewed: I have personally reviewed the tests/studies below.   CBC RESULTS:   Recent Labs   Lab Test 05/03/24 1944   WBC 8.9   RBC 3.84*   HGB 11.5*   HCT 36.0*   MCV 94   MCH 29.9   MCHC 31.9   RDW 13.2        Last Comprehensive Metabolic Panel:  Sodium   Date Value Ref Range Status   05/03/2024 139 135 - 145 mmol/L Final     Comment:     Reference intervals for this test were updated on 09/26/2023 to more accurately reflect our healthy population. There may be differences in the flagging of prior results with similar values performed with this method. Interpretation of those prior results can be made in the context of the updated reference intervals.    07/09/2021 143 133 - 144 mmol/L Final     Potassium   Date Value Ref Range Status   05/03/2024 3.8 3.4 - 5.3 mmol/L Final   05/09/2023 4.1 3.4 - 5.3 mmol/L Final   07/09/2021 3.7 3.4 - 5.3 mmol/L Final     Chloride   Date Value Ref Range Status   05/03/2024 102 98 - 107 mmol/L Final   05/09/2023 113 (H) 94 - 109 mmol/L Final   07/09/2021 108 94 - 109 mmol/L Final     Carbon Dioxide   Date Value Ref Range Status   07/09/2021 28 20 - 32 mmol/L Final     Carbon Dioxide (CO2)   Date Value Ref Range Status   05/03/2024 25 22 - 29 mmol/L Final   05/09/2023 25 20 - 32 mmol/L Final     Anion Gap   Date Value Ref Range Status   05/03/2024 12 7 - 15 mmol/L Final   05/09/2023 4 3 - 14 mmol/L Final   07/09/2021 6 3 - 14 mmol/L Final     Glucose   Date Value Ref Range Status   05/03/2024 118 (H) 70 - 99 mg/dL Final    05/09/2023 98 70 - 99 mg/dL Final   07/09/2021 102 (H) 70 - 99 mg/dL Final     GLUCOSE BY METER POCT   Date Value Ref Range Status   06/17/2023 124 (H) 70 - 99 mg/dL Final     Urea Nitrogen   Date Value Ref Range Status   05/03/2024 19.6 8.0 - 23.0 mg/dL Final   05/09/2023 24 7 - 30 mg/dL Final   07/09/2021 25 7 - 30 mg/dL Final     Creatinine   Date Value Ref Range Status   05/03/2024 0.96 0.67 - 1.17 mg/dL Final   07/09/2021 0.77 0.66 - 1.25 mg/dL Final     GFR Estimate   Date Value Ref Range Status   05/03/2024 >90 >60 mL/min/1.73m2 Final   07/09/2021 >90 >60 mL/min/[1.73_m2] Final     Comment:     Non  GFR Calc  Starting 12/18/2018, serum creatinine based estimated GFR (eGFR) will be   calculated using the Chronic Kidney Disease Epidemiology Collaboration   (CKD-EPI) equation.       GFR, ESTIMATED POCT   Date Value Ref Range Status   05/27/2022 >60 >60 mL/min/1.73m2 Final     Calcium   Date Value Ref Range Status   05/03/2024 8.8 8.6 - 10.0 mg/dL Final   07/09/2021 8.8 8.5 - 10.1 mg/dL Final     Bilirubin Total   Date Value Ref Range Status   04/26/2024 0.2 <=1.2 mg/dL Final   07/07/2021 0.3 0.2 - 1.3 mg/dL Final     Alkaline Phosphatase   Date Value Ref Range Status   04/26/2024 158 (H) 40 - 150 U/L Final     Comment:     Reference intervals for this test were updated on 11/14/2023 to more accurately reflect our healthy population. There may be differences in the flagging of prior results with similar values performed with this method. Interpretation of those prior results can be made in the context of the updated reference intervals.   07/07/2021 82 40 - 150 U/L Final     ALT   Date Value Ref Range Status   04/26/2024 <5 0 - 70 U/L Final     Comment:     Reference intervals for this test were updated on 6/12/2023 to more accurately reflect our healthy population. There may be differences in the flagging of prior results with similar values performed with this method. Interpretation of those  prior results can be made in the context of the updated reference intervals.     07/07/2021 31 0 - 70 U/L Final     AST   Date Value Ref Range Status   04/26/2024 17 0 - 45 U/L Final     Comment:     Reference intervals for this test were updated on 6/12/2023 to more accurately reflect our healthy population. There may be differences in the flagging of prior results with similar values performed with this method. Interpretation of those prior results can be made in the context of the updated reference intervals.   07/07/2021 20 0 - 45 U/L Final       TSH   Date Value Ref Range Status   05/27/2022 2.99 0.40 - 4.00 mU/L Final   07/08/2021 2.20 0.40 - 4.00 mU/L Final     Lab Results   Component Value Date    A1C 6.0 07/08/2021             Again, thank you for allowing me to participate in the care of your patient.        Sincerely,        Ching Carlos DO

## 2024-07-31 ENCOUNTER — APPOINTMENT (OUTPATIENT)
Dept: GENERAL RADIOLOGY | Facility: CLINIC | Age: 59
End: 2024-07-31
Attending: STUDENT IN AN ORGANIZED HEALTH CARE EDUCATION/TRAINING PROGRAM
Payer: COMMERCIAL

## 2024-07-31 ENCOUNTER — HOSPITAL ENCOUNTER (EMERGENCY)
Facility: CLINIC | Age: 59
Discharge: HOME | End: 2024-07-31
Attending: FAMILY MEDICINE | Admitting: FAMILY MEDICINE
Payer: COMMERCIAL

## 2024-07-31 ENCOUNTER — HOSPITAL ENCOUNTER (EMERGENCY)
Facility: CLINIC | Age: 59
Discharge: HOME | End: 2024-08-01
Attending: STUDENT IN AN ORGANIZED HEALTH CARE EDUCATION/TRAINING PROGRAM
Payer: COMMERCIAL

## 2024-07-31 ENCOUNTER — APPOINTMENT (OUTPATIENT)
Dept: CT IMAGING | Facility: CLINIC | Age: 59
End: 2024-07-31
Attending: STUDENT IN AN ORGANIZED HEALTH CARE EDUCATION/TRAINING PROGRAM
Payer: COMMERCIAL

## 2024-07-31 VITALS
HEART RATE: 80 BPM | TEMPERATURE: 98.3 F | HEIGHT: 76 IN | RESPIRATION RATE: 17 BRPM | DIASTOLIC BLOOD PRESSURE: 69 MMHG | OXYGEN SATURATION: 99 % | WEIGHT: 230 LBS | BODY MASS INDEX: 28.01 KG/M2 | SYSTOLIC BLOOD PRESSURE: 108 MMHG

## 2024-07-31 DIAGNOSIS — G20.B2 PARKINSON'S DISEASE WITH DYSKINESIA AND FLUCTUATING MANIFESTATIONS (H): ICD-10-CM
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
## 2024-07-31 DIAGNOSIS — G25.82 STIFF-MAN SYNDROME: ICD-10-CM

## 2024-07-31 LAB
ALBUMIN SERPL BCG-MCNC: 4 G/DL (ref 3.5–5.2)
ALP SERPL-CCNC: 141 U/L (ref 40–150)
ALT SERPL W P-5'-P-CCNC: <5 U/L (ref 0–70)
ANION GAP SERPL CALCULATED.3IONS-SCNC: 10 MMOL/L (ref 7–15)
ANION GAP SERPL CALCULATED.3IONS-SCNC: 10 MMOL/L (ref 7–15)
APTT PPP: 29 SECONDS (ref 22–38)
AST SERPL W P-5'-P-CCNC: 17 U/L (ref 0–45)
BASOPHILS # BLD AUTO: 0 10E3/UL (ref 0–0.2)
BASOPHILS # BLD AUTO: 0 10E3/UL (ref 0–0.2)
BASOPHILS NFR BLD AUTO: 0 %
BASOPHILS NFR BLD AUTO: 1 %
BILIRUB SERPL-MCNC: 0.2 MG/DL
BUN SERPL-MCNC: 16.6 MG/DL (ref 8–23)
BUN SERPL-MCNC: 18.3 MG/DL (ref 8–23)
CALCIUM SERPL-MCNC: 8.9 MG/DL (ref 8.8–10.4)
CALCIUM SERPL-MCNC: 8.9 MG/DL (ref 8.8–10.4)
CHLORIDE SERPL-SCNC: 106 MMOL/L (ref 98–107)
CHLORIDE SERPL-SCNC: 106 MMOL/L (ref 98–107)
CK SERPL-CCNC: 62 U/L (ref 39–308)
CREAT SERPL-MCNC: 0.76 MG/DL (ref 0.67–1.17)
CREAT SERPL-MCNC: 0.8 MG/DL (ref 0.67–1.17)
CRP SERPL-MCNC: 4.45 MG/L
EGFRCR SERPLBLD CKD-EPI 2021: >90 ML/MIN/1.73M2
EGFRCR SERPLBLD CKD-EPI 2021: >90 ML/MIN/1.73M2
EOSINOPHIL # BLD AUTO: 0.1 10E3/UL (ref 0–0.7)
EOSINOPHIL # BLD AUTO: 0.2 10E3/UL (ref 0–0.7)
EOSINOPHIL NFR BLD AUTO: 1 %
EOSINOPHIL NFR BLD AUTO: 2 %
ERYTHROCYTE [DISTWIDTH] IN BLOOD BY AUTOMATED COUNT: 13.7 % (ref 10–15)
ERYTHROCYTE [DISTWIDTH] IN BLOOD BY AUTOMATED COUNT: 13.9 % (ref 10–15)
ERYTHROCYTE [SEDIMENTATION RATE] IN BLOOD BY WESTERGREN METHOD: 17 MM/HR (ref 0–20)
GLUCOSE SERPL-MCNC: 118 MG/DL (ref 70–99)
GLUCOSE SERPL-MCNC: 98 MG/DL (ref 70–99)
HCO3 SERPL-SCNC: 25 MMOL/L (ref 22–29)
HCO3 SERPL-SCNC: 25 MMOL/L (ref 22–29)
HCT VFR BLD AUTO: 38.6 % (ref 40–53)
HCT VFR BLD AUTO: 42 % (ref 40–53)
HGB BLD-MCNC: 12.3 G/DL (ref 13.3–17.7)
HGB BLD-MCNC: 13.3 G/DL (ref 13.3–17.7)
IMM GRANULOCYTES # BLD: 0 10E3/UL
IMM GRANULOCYTES # BLD: 0.1 10E3/UL
IMM GRANULOCYTES NFR BLD: 1 %
IMM GRANULOCYTES NFR BLD: 1 %
INR PPP: 1.18 (ref 0.85–1.15)
INR PPP: 1.22 (ref 0.85–1.15)
LYMPHOCYTES # BLD AUTO: 1.2 10E3/UL (ref 0.8–5.3)
LYMPHOCYTES # BLD AUTO: 1.9 10E3/UL (ref 0.8–5.3)
LYMPHOCYTES NFR BLD AUTO: 17 %
LYMPHOCYTES NFR BLD AUTO: 21 %
MAGNESIUM SERPL-MCNC: 2.4 MG/DL (ref 1.7–2.3)
MCH RBC QN AUTO: 30.6 PG (ref 26.5–33)
MCH RBC QN AUTO: 30.8 PG (ref 26.5–33)
MCHC RBC AUTO-ENTMCNC: 31.7 G/DL (ref 31.5–36.5)
MCHC RBC AUTO-ENTMCNC: 31.9 G/DL (ref 31.5–36.5)
MCV RBC AUTO: 97 FL (ref 78–100)
MCV RBC AUTO: 97 FL (ref 78–100)
MONOCYTES # BLD AUTO: 0.4 10E3/UL (ref 0–1.3)
MONOCYTES # BLD AUTO: 0.6 10E3/UL (ref 0–1.3)
MONOCYTES NFR BLD AUTO: 6 %
MONOCYTES NFR BLD AUTO: 7 %
NEUTROPHILS # BLD AUTO: 4 10E3/UL (ref 1.6–8.3)
NEUTROPHILS # BLD AUTO: 8.2 10E3/UL (ref 1.6–8.3)
NEUTROPHILS NFR BLD AUTO: 69 %
NEUTROPHILS NFR BLD AUTO: 74 %
NRBC # BLD AUTO: 0 10E3/UL
NRBC # BLD AUTO: 0 10E3/UL
NRBC BLD AUTO-RTO: 0 /100
NRBC BLD AUTO-RTO: 0 /100
PLATELET # BLD AUTO: 238 10E3/UL (ref 150–450)
PLATELET # BLD AUTO: 260 10E3/UL (ref 150–450)
POTASSIUM SERPL-SCNC: 4 MMOL/L (ref 3.4–5.3)
POTASSIUM SERPL-SCNC: 4.7 MMOL/L (ref 3.4–5.3)
PROT SERPL-MCNC: 7.1 G/DL (ref 6.4–8.3)
RBC # BLD AUTO: 3.99 10E6/UL (ref 4.4–5.9)
RBC # BLD AUTO: 4.35 10E6/UL (ref 4.4–5.9)
SODIUM SERPL-SCNC: 141 MMOL/L (ref 135–145)
SODIUM SERPL-SCNC: 141 MMOL/L (ref 135–145)
TSH SERPL DL<=0.005 MIU/L-ACNC: 1.94 UIU/ML (ref 0.3–4.2)
WBC # BLD AUTO: 11 10E3/UL (ref 4–11)
WBC # BLD AUTO: 5.7 10E3/UL (ref 4–11)

## 2024-07-31 PROCEDURE — 70450 CT HEAD/BRAIN W/O DYE: CPT | Mod: 26 | Performed by: RADIOLOGY

## 2024-07-31 PROCEDURE — 73522 X-RAY EXAM HIPS BI 3-4 VIEWS: CPT

## 2024-07-31 PROCEDURE — 84443 ASSAY THYROID STIM HORMONE: CPT | Performed by: FAMILY MEDICINE

## 2024-07-31 PROCEDURE — 85652 RBC SED RATE AUTOMATED: CPT | Performed by: FAMILY MEDICINE

## 2024-07-31 PROCEDURE — 36415 COLL VENOUS BLD VENIPUNCTURE: CPT | Performed by: STUDENT IN AN ORGANIZED HEALTH CARE EDUCATION/TRAINING PROGRAM

## 2024-07-31 PROCEDURE — 86140 C-REACTIVE PROTEIN: CPT | Performed by: FAMILY MEDICINE

## 2024-07-31 PROCEDURE — 85041 AUTOMATED RBC COUNT: CPT | Performed by: STUDENT IN AN ORGANIZED HEALTH CARE EDUCATION/TRAINING PROGRAM

## 2024-07-31 PROCEDURE — 70450 CT HEAD/BRAIN W/O DYE: CPT

## 2024-07-31 PROCEDURE — 73522 X-RAY EXAM HIPS BI 3-4 VIEWS: CPT | Mod: 26 | Performed by: RADIOLOGY

## 2024-07-31 PROCEDURE — 85730 THROMBOPLASTIN TIME PARTIAL: CPT | Performed by: FAMILY MEDICINE

## 2024-07-31 PROCEDURE — 72125 CT NECK SPINE W/O DYE: CPT | Mod: 26 | Performed by: RADIOLOGY

## 2024-07-31 PROCEDURE — 99207 PR SERVICE NOT STAFFED W/SUPERV PROV: CPT | Performed by: STUDENT IN AN ORGANIZED HEALTH CARE EDUCATION/TRAINING PROGRAM

## 2024-07-31 PROCEDURE — 82040 ASSAY OF SERUM ALBUMIN: CPT | Performed by: FAMILY MEDICINE

## 2024-07-31 PROCEDURE — 72125 CT NECK SPINE W/O DYE: CPT

## 2024-07-31 PROCEDURE — 83735 ASSAY OF MAGNESIUM: CPT | Performed by: FAMILY MEDICINE

## 2024-07-31 PROCEDURE — 85041 AUTOMATED RBC COUNT: CPT | Performed by: FAMILY MEDICINE

## 2024-07-31 PROCEDURE — 99284 EMERGENCY DEPT VISIT MOD MDM: CPT | Performed by: FAMILY MEDICINE

## 2024-07-31 PROCEDURE — 99283 EMERGENCY DEPT VISIT LOW MDM: CPT | Performed by: FAMILY MEDICINE

## 2024-07-31 PROCEDURE — 99284 EMERGENCY DEPT VISIT MOD MDM: CPT | Mod: 25,27 | Performed by: STUDENT IN AN ORGANIZED HEALTH CARE EDUCATION/TRAINING PROGRAM

## 2024-07-31 PROCEDURE — 82374 ASSAY BLOOD CARBON DIOXIDE: CPT | Performed by: STUDENT IN AN ORGANIZED HEALTH CARE EDUCATION/TRAINING PROGRAM

## 2024-07-31 PROCEDURE — 85610 PROTHROMBIN TIME: CPT | Performed by: FAMILY MEDICINE

## 2024-07-31 PROCEDURE — 36415 COLL VENOUS BLD VENIPUNCTURE: CPT | Performed by: FAMILY MEDICINE

## 2024-07-31 PROCEDURE — 82550 ASSAY OF CK (CPK): CPT | Performed by: FAMILY MEDICINE

## 2024-07-31 PROCEDURE — 85610 PROTHROMBIN TIME: CPT | Performed by: STUDENT IN AN ORGANIZED HEALTH CARE EDUCATION/TRAINING PROGRAM

## 2024-07-31 RX ORDER — LIDOCAINE 40 MG/G
CREAM TOPICAL
Status: DISCONTINUED | OUTPATIENT
Start: 2024-07-31 | End: 2024-07-31 | Stop reason: HOSPADM

## 2024-07-31 ASSESSMENT — ACTIVITIES OF DAILY LIVING (ADL)
ADLS_ACUITY_SCORE: 37
ADLS_ACUITY_SCORE: 39
ADLS_ACUITY_SCORE: 39
ADLS_ACUITY_SCORE: 37
ADLS_ACUITY_SCORE: 39
ADLS_ACUITY_SCORE: 39
ADLS_ACUITY_SCORE: 37
ADLS_ACUITY_SCORE: 37
ADLS_ACUITY_SCORE: 39
ADLS_ACUITY_SCORE: 39

## 2024-07-31 ASSESSMENT — COLUMBIA-SUICIDE SEVERITY RATING SCALE - C-SSRS

## 2024-07-31 NOTE — DISCHARGE INSTRUCTIONS
Home.  You were seen by neurology  They recommend taking your levadopa carbidopa 1 hour earlier at 11 am daily.  You can also take 1/2 tab of levadopa carbidopa with your clonazepam for these spells if needed.  Follow up with Dr. Carlos in a week.  Return if any concerns.
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
## 2024-07-31 NOTE — ED TRIAGE NOTES
BIBA from [facility] for right side muscle spasm and pain from  neck down to toes.  Numbness and tingling at baseline . History of  stiff person's disease, take clonazepam at home   Triage Assessment (Adult)       Row Name 07/31/24 [id number]          Triage Assessment    Airway WDL WDL        Respiratory WDL    Respiratory WDL WDL        Skin Circulation/Temperature WDL    Skin Circulation/Temperature WDL WDL        Cardiac WDL    Cardiac WDL WDL        Peripheral/Neurovascular WDL    Peripheral Neurovascular WDL X   spams and pain, numbness tingling        Cognitive/Neuro/Behavioral WDL    Cognitive/Neuro/Behavioral WDL WDL

## 2024-07-31 NOTE — CONSULTS
[facility]  Neurology Consultation    Patient Name: Benjamin Mathews  Date of Service:  7/31/2024    Chief Complaint:  muscle spasms     History of Present Illness:   Benjamin Mathews is a 59-year-old male with a past medical history notable for atypical Parkinson's disease, CVA with left sided hemiparesis, DVT/PE on anticoagulation, HTN, HLD.  Patient presents today due to concerns for progressive muscle spasms today.  Patient reports that he has suffered from muscle spasms over the pat 3 years, which  are typically isolated to the right side of his face, right arm, and right lower extremity.  Patient specifically reports blepharospasms, jaw clenching, difficulty swallowing, and hypertonicity of the right upper extremity.  He also describes a twisting motion in his right lower extremity, which initiates from the knee and twisted around to his right ankle.  This twisting sensation is associated with severe pain. Patient reports that the symptoms are particularly noticeable around 11 AM, and typically improve after taking clonazepam.  In addition to taking clonazepam for these symptoms, patient reports that he has received botulism toxin injections in his neck and lower extremity with good relief. However, patient reports that this morning, his symptoms did not resolve after taking 2 doses of as needed clonazepam prompting his presentation to the ED.  Patient reports that he takes all medications as prescribed, as a nurse prescribes his medications at 8 AM, noon, 4 PM, and 8 PM as prescribed by his movement specialist.Patient follows with Dr. Carlos in the movement disorders division, for which she was last seen on 7/24 and adjustments were made to his current medication regimen including half tab reduction in his carbidopa levodopa.  Patient reports that since making these modifications, he has had significant improvement in the dyskinesia in his left hand.  Patient believes that the  muscle spasms are overall unchanged since making this medication adjustment.  Patient is not interested in staying in the emergency room today, as he feels that his symptoms have resolved since arriving in the ED.  Patient denies any fevers, chills, headaches, vision changes, nausea, vomiting, abdominal pain, dysuria, hematuria.  Patient does endorse mild constipation.  Patient also endorses poor p.o. intake due to the extreme heat, as he does not feel like eating or drinking when the weather is this warm.  Patient reports that he uses a walker at baseline, and is able to ambulate around his home.  Patient denies any alcohol use, drug use, tobacco use. Patient does not currently drive and needs assistance with activities of daily living including bathing, showering, cooking.       Physical Examination   General: Lying in bed, NAD  Cardiac: RRR. Extremities warm and well perfused.   Respiratory: Non-labored on RA  GI: Soft, non-tender, non-distended, bowel sounds present   Skin: No rash or lesion on exposed skin  Psych: Mood pleasant, oriented x 3  Neuro:  Mental status: Awake, alert, attentive, oriented to self, time, place, and circumstance. Hypophonic speech.   Cranial nerves: VFF, PERRL, EOMI, facial sensation intact, face symmetric, shoulder shrug strong, tongue/uvula midline, no dysarthria.   Motor: No resting tremor appreciated bilaterally, masked facies appreciated, muscle strength in R upper extremity 5/5, L upper extremity 4/5, muscle strength in bilateral lower extremities 5/5.   Sensory: Intact to light touch  Coordination: FNF without ataxia or dysmetria.   Gait: sitting in wheelchair    Investigations   I have personally reviewed pertinent labs, tests, and radiological imaging. Discussion of notable findings is included under Impression.     Impression  Mr. Mathews is a 60 yo male with past medical history notable for atypical Parkinson's disease, CVA with left sided hemiparesis, DVT/PE on  "anticoagulation, HTN, HLD. who presents to ED due to concerns for persistent muscle spasms. Overall presentation is most concerning for \"wear off dystonia\" given symptoms typically develop just prior to subsequent dose of carbidopa/levodopa. Discussed potential medication adjustments with patient who agreed to trial taking his noon dose of sinemet at 11 am to mitigate mid-morning muscle spasms consistent with dystonia. Also discussed that patient may take his PRN sinemet with his PRN clonazepam in the event that he develops muscle spasms/spacticity at other intervals throughout the day. We discussed that both of these options may precipitate dyskinesia, for which patient was understanding. He plans to follow-up with his primary neurologist, Dr. Carlos in 1 week after making these medication adjustments.      Recommendations  - Take 12 PM dose of sinemet at 11 AM   - Take PRN dose of sinemet with PRN clonazepam at onset of symptoms   - Follow-up with Dr. Carlos in 1 week after making medication adjustments   - Ok to discharge from ED from Neurology perspective     Patient was seen and discussed with Dr. Merlos. Recommendations discussed directly with ED provider in ED.     Veronique Clayton, PGY-2  Internal Medicine Resident   Neurology Service           "

## 2024-08-01 VITALS
BODY MASS INDEX: 28.01 KG/M2 | WEIGHT: 230 LBS | HEART RATE: 79 BPM | SYSTOLIC BLOOD PRESSURE: 130 MMHG | OXYGEN SATURATION: 97 % | HEIGHT: 76 IN | DIASTOLIC BLOOD PRESSURE: 69 MMHG | RESPIRATION RATE: 18 BRPM | TEMPERATURE: 98 F

## 2024-08-01 ASSESSMENT — ACTIVITIES OF DAILY LIVING (ADL)
ADLS_ACUITY_SCORE: 39
ADLS_ACUITY_SCORE: 39

## 2024-08-01 NOTE — ED TRIAGE NOTES
Patient was just seen in the ED for muscle spasms and was already discharged. While waiting for his ride home, he was wheeling himself in the wheelchair and tripped over. Denies hitting his head but complaining of pain on the hip and femur. Patient is on blood thinners.     Triage Assessment (Adult)       Row Name 07/31/24 7037          Triage Assessment    Airway WDL WDL        Respiratory WDL    Respiratory WDL WDL        Skin Circulation/Temperature WDL    Skin Circulation/Temperature WDL WDL        Cardiac WDL    Cardiac WDL WDL        Cognitive/Neuro/Behavioral WDL    Cognitive/Neuro/Behavioral WDL WDL        Watson Coma Scale    Best Eye Response 4-->(E4) spontaneous     Best Motor Response 6-->(M6) obeys commands     Best Verbal Response 5-->(V5) oriented     Watson Coma Scale Score 15

## 2024-08-01 NOTE — DISCHARGE INSTRUCTIONS
You were seen in the emergency room for a fall but your CT scans and x-rays were reassuring  If you have any new or worsening pain, weakness, vision changes, numbness, tingling please return immediately to the emergency room  Please follow-up with your primary care doctor in the next 1 to 2 days
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
## 2024-08-16 ENCOUNTER — OFFICE VISIT (OUTPATIENT)
Dept: PODIATRY | Facility: CLINIC | Age: 59
End: 2024-08-16
Payer: COMMERCIAL

## 2024-08-16 DIAGNOSIS — Z87.2 HISTORY OF FOOT ULCER: ICD-10-CM

## 2024-08-16 DIAGNOSIS — B35.1 ONYCHOMYCOSIS: Primary | ICD-10-CM

## 2024-08-16 DIAGNOSIS — G60.8 PERIPHERAL SENSORY NEUROPATHY: ICD-10-CM

## 2024-08-16 PROCEDURE — 99213 OFFICE O/P EST LOW 20 MIN: CPT | Mod: 25 | Performed by: PODIATRIST

## 2024-08-16 PROCEDURE — 11720 DEBRIDE NAIL 1-5: CPT | Performed by: PODIATRIST
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
## 2024-08-16 NOTE — NURSING NOTE
Benjamin Mathews's chief complaint for this visit includes:  Chief Complaint   Patient presents with    Consult     BL foot cares      PCP: Stephanie Maldonado    Referring Provider:  Dequan York DPM  [address] [place] [id number]    There were no vitals taken for this visit.  Data Unavailable        Allergies   Allergen Reactions    Amantadine Other (See Comments)     Other reaction(s): Hallucinations  Hallucinations/ lost self control/gambling.     halluicnations  Hallucinations/ lost self control/gambling.     hallucinates  halluicnations  hallucinates  Hallucinations/ lost self control/gambling.       Quetiapine GI Disturbance, Diarrhea and Other (See Comments)     Diarrhea    Diarrhea  Other reaction(s): GI Disturbance  Diarrhea      Duloxetine Other (See Comments)     suicidal  suicidal           Do you need any medication refills at today's visit?
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
## 2024-08-28 ENCOUNTER — VIRTUAL VISIT (OUTPATIENT)
Dept: NEUROLOGY | Facility: CLINIC | Age: 59
End: 2024-08-28
Payer: COMMERCIAL

## 2024-08-28 DIAGNOSIS — G20.A1 PARKINSON'S DISEASE WITHOUT DYSKINESIA OR FLUCTUATING MANIFESTATIONS (H): ICD-10-CM

## 2024-08-28 DIAGNOSIS — G20.C PARKINSONISM, UNSPECIFIED PARKINSONISM TYPE (H): Primary | ICD-10-CM

## 2024-08-28 PROCEDURE — 99214 OFFICE O/P EST MOD 30 MIN: CPT | Mod: 95 | Performed by: PSYCHIATRY & NEUROLOGY

## 2024-08-28 RX ORDER — CARBIDOPA AND LEVODOPA 25; 100 MG/1; MG/1
TABLET ORAL
Qty: 270 TABLET | Refills: 11 | Status: SHIPPED | OUTPATIENT
Start: 2024-08-28 | End: 2024-09-13

## 2024-08-28 NOTE — PATIENT INSTRUCTIONS
Plan:   - Decrease carbidopa/levodopa IR at 12 pm to 2.5 tabs.  Movement Disorder-related Medications                   8 AM 12 PM 4 pm 8pm At bedtime  prn   Amantadine 100 mg  1 tab 1           Carbidopa/levodopa IR  mg 3 tabs 2.5 2.5     0.5-1 twice daily     Carbidopa/levodopa CR  mg       2       Clonazepam 2 mg 1   1         Clonazepam 1 mg   1       1   - For constipation, per GI recommendations, take Miralax 17 gram twice a day instead of as needed. OK to continue lactulose daily.Will add oral bisacodyl 5 mg three times a week. OK to use bisacodyl suppository  - Continue follow ups with Dr. Cary Sol, PharmD, in between visits with me. Please call the clinic at [phone] for MTM coordinators/scheduling.   - Continue with psychiatric and psychotherapeutic care to manage anxiety and depression. Follow symptoms closely while on clozapine and clonazepam, though there have been no issues in the past with this combination of medication.    - Follow-up neuropsychological evaluation is recommended in 1 to 2 years, [id number] in order to assess and update recommendations as appropriate  - Continue following palliative care medical providers at [facility]  - Continue to refrain from driving  - Continue daily and safe exercises   - Continue follow with Dr. Arita for toxin injections and could consider right upper extremity injections to improve dystonia  - Continue to monitor swallowing    Patient to return in 3-4 months, for in-person or virtual visit, 30 minutes.

## 2024-08-28 NOTE — PROGRESS NOTES
Virtual Visit Details    Type of service:  Video Visit   Joined the call at 8/28/2024, 2:17:45 pm.  Left the call at 8/28/2024, 2:27:55 pm.  You were on the call for 10 minutes 10 seconds .    Originating Location (pt. Location): Home    Distant Location (provider location):  On-site  Platform used for Video Visit: MelyWell

## 2024-08-28 NOTE — NURSING NOTE
Current patient location: [address] [address] [place], [place] [address]  [facility] [address]    Is the patient currently in the state of [place]? YES    Visit mode:VIDEO    If the visit is dropped, the patient can be reconnected by: TELEPHONE VISIT: Phone number:   Telephone Information:   Mobile [phone]       Will anyone else be joining the visit? NO  (If patient encounters technical issues they should call [phone]    How would you like to obtain your AVS? MyChart    Are changes needed to the allergy or medication list? Pt stated no med changes    Are refills needed on medications prescribed by this physician? NO    Rooming Documentation:  Not applicable      Reason for visit: Video Visit (Follow-up )    Joanne GONZALEZ

## 2024-08-28 NOTE — LETTER
2024      Benjamin Mathews  [address]  [facility] [address]      Dear Colleague,    Thank you for referring your patient, Benjamin Mathews, to the [facility]. Please see a copy of my visit note below.    Department of Neurology  Movement Disorders Division   Telemedicine (Phone) Visit    Patient: Benjamin Mathews   MRN: [id number]   : 1965   Date of Visit: 2024    Impression:  Benjamin Mathews is a 59 year old male with Parkinsonism and muscle spasms. The patient's main concern today is depression. We discussed     Parkinosnism: His symptom started in  with decreased  strength and stiffness of gait and bradykinesia with walking and left hand rest tremor. Patient was diagnosed with Parkinson's disease in  at the [facility].   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  Movement Disorder-related Medications                   8 AM 12 PM 4 pm 8pm At bedtime  prn   Amantadine 100 mg  1 tab 1           Carbidopa/levodopa IR  mg 3 tabs 2.5 2.5     0.5-1 twice daily     Carbidopa/levodopa CR  mg       2       Clonazepam 2 mg 1   1         Clonazepam 1 mg   1       1   - For constipation, per GI recommendations, take Miralax 17 gram twice a day instead of as needed. OK to continue lactulose daily.Will add oral bisacodyl 5 mg three times a week. OK to use bisacodyl suppository  - Continue follow ups with Dr. Cary Sol, PharmD, in between visits with me. Please call the clinic at [phone] for MTM coordinators/scheduling.   - Continue with psychiatric and psychotherapeutic care to manage anxiety and depression. Follow symptoms closely while on clozapine and clonazepam, though there have been no issues in the past with this combination of  "medication.    - Follow-up neuropsychological evaluation is recommended in 1 to 2 years, [id number] in order to assess and update recommendations as appropriate  - Continue following palliative care medical providers at [facility]  - Continue to refrain from driving  - Continue daily and safe exercises   - Continue follow with Dr. Arita for toxin injections and could consider right upper extremity injections to improve dystonia  - Continue to monitor swallowing    Patient to return in 3-4 months, for in-person or virtual visit, 30 minutes.     Ching Carlos,   Movement Disorders Specialist  [facility]     Note dictated using voice recognition software.   20 minutes spent on date of encounter doing chart reviews and exam and documentation and further activities as noted above.        ------------------------------------------------------------------------------------------------------------      History of Present Illness  Mr. Mathews is a pleasant 59 year old male that presents to Neurology Movement clinic for follow up regarding Parkinsonism management.   The patient was most recently 7/24/24 for management of Parkinsonism. Please see the comprehensive neurologic consultation notes from those dates in the Epic records for details.      History obtained from patient.   Main complaint:   Patient reports, \"I am doing alright.\"   Muscle spasms: He reports this is better. He reports right sided neck muscles seem more active and causes discomfort. Clonazepam helps this symptom.  Rigidity: improved on current Parkinson Disease regimen  Wearing off: denies  He reports his current Parkinson Disease regimen is helping to treat symptoms.   Dyskinesia: He reports head and hand twitching around 3-4 pm and reports that it is bothersome to him.    Movement Disorder-related Medications                   8 AM 12 PM 4 pm 8pm At bedtime  prn   Amantadine 100 mg  1 1           Carbidopa/levodopa IR  " mg 3 3 2.5     0.5-1 twice daily     Carbidopa/levodopa CR  mg       2       Clonazepam 2 mg 1   1         Clonazepam 1 mg   1       1       Review of Systems:  Other than that mentioned above, the remainder of 12 systems reviewed were negative.    PMH: unchanged  PSH: unchanged  FH: unchanged  SH: unchanged    Medications:  Current Outpatient Medications   Medication Sig Dispense Refill     acetaminophen (TYLENOL) 325 MG tablet Take 1-2 tablets (325-650 mg) by mouth every 8 hours as needed for mild pain 100 tablet 0     alfuzosin ER (UROXATRAL) 10 MG 24 hr tablet Take 1 tablet (10 mg) by mouth daily 30 tablet 11     amantadine (SYMMETREL) 100 MG capsule Take 1 capsule (100 mg) by mouth 2 times daily 60 capsule 11     ammonium lactate (LAC-HYDRIN) 12 % external lotion Apply topically daily as needed       ANTI-ITCH MAXIMUM STRENGTH 1 % external cream Apply topically 2 times daily       apixaban ANTICOAGULANT (ELIQUIS) 5 MG tablet Take 5 mg by mouth 2 times daily       atorvastatin (LIPITOR) 40 MG tablet Take 1 tablet (40 mg) by mouth every evening 30 tablet 0     bisacodyl (DULCOLAX) 10 MG suppository Place 1 suppository (10 mg) rectally daily as needed for constipation 10 suppository 0     carbidopa-levodopa (SINEMET CR)  MG CR tablet Take 2 tablets by mouth at bedtime 60 tablet 11     carbidopa-levodopa (SINEMET)  MG tablet Take 3 tabs at 8 am and 12 pm and 2.5 tabs at 4 pm. Okay to take 0.5-1 tab as needed twice daily = up to 10.5 tabs daily 315 tablet 11     cholecalciferol (VITAMIN D3) 125 mcg (5000 units) capsule Take 1 capsule (125 mcg) by mouth daily 30 capsule 0     clonazePAM (KLONOPIN) 1 MG tablet Take 1 tablet scheduled at noon, may take 1 additional tablet PRN 60 tablet 5     clonazePAM (KLONOPIN) 2 MG tablet Take 1 tablet (2 mg) by mouth 2 times daily Take at 8am and 4 pm 60 tablet 5     cloZAPine (CLOZARIL) 50 MG tablet Take 1 tablet (50 mg) by mouth At Bedtime 30 tablet 0      diclofenac (VOLTAREN) 1 % topical gel Apply 2 g topically 3 times daily as needed for moderate pain 150 g 0     econazole nitrate 1 % external cream Apply topically daily To toes and toenails. 85 g 5     ENEMEEZ MINI 283 MG/5ML enema Place 1 enema rectally       gabapentin (NEURONTIN) 800 MG tablet Take 1 tablet (800 mg) by mouth 3 times daily 90 tablet 0     hydrOXYzine (ATARAX) 25 MG tablet Take 2 tablets (50 mg) by mouth every 6 hours as needed for anxiety (up to 3 timrd daily) 20 tablet 0     ketoconazole (NIZORAL) 2 % external cream Apply topically 2 times daily       ketoconazole (NIZORAL) 2 % external shampoo Apply topically once a week On Wednesdays       lactulose (CHRONULAC) 10 GM/15ML solution Take 15 mLs by mouth 2 times daily 473 mL 11     linaclotide (LINZESS) 290 MCG capsule Take 1 capsule (290 mcg) by mouth daily as needed (IBSc) 90 capsule 3     metoprolol succinate ER (TOPROL XL) 25 MG 24 hr tablet Take 0.5 tablets (12.5 mg) by mouth 2 times daily 30 tablet 0     metoprolol tartrate (LOPRESSOR) 25 MG tablet Take 25 mg by mouth 2 times daily       multivitamin, therapeutic (THERA-VIT) TABS tablet Take 1 tablet by mouth daily 30 tablet 0     pantoprazole (PROTONIX) 40 MG EC tablet Take 1 tablet (40 mg) by mouth daily Take 1 tablet (40mg) by mouth daily at 8am 30 tablet 0     polyethylene glycol (MIRALAX) 17 g packet Take 1 packet by mouth daily       SENEXON-S 8.6-50 MG tablet Take 2 tablets by mouth 2 times daily       Sodium Phosphates (ENEMA) 7-19 GM/118ML ENEM        tamsulosin (FLOMAX) 0.4 MG capsule Take 0.4 mg by mouth daily (Patient not taking: Reported on 7/24/2024)       traZODone (DESYREL) 100 MG tablet Take 100 mg by mouth At Bedtime       traZODone (DESYREL) 50 MG tablet Take 1 tablet (50 mg) by mouth every morning 30 tablet 0     venlafaxine (EFFEXOR XR) 150 MG 24 hr capsule Take 2 capsules (300 mg) by mouth daily 60 capsule 0     vitamin C (ASCORBIC ACID) 500 MG tablet Take 1 tablet  (500 mg) by mouth daily 30 tablet 0           Allergies   Allergen Reactions     Amantadine Other (See Comments)     Other reaction(s): Hallucinations  Hallucinations/ lost self control/gambling.     halluicnations  Hallucinations/ lost self control/gambling.     hallucinates  halluicnations  hallucinates  Hallucinations/ lost self control/gambling.        Quetiapine GI Disturbance, Diarrhea and Other (See Comments)     Diarrhea    Diarrhea  Other reaction(s): GI Disturbance  Diarrhea       Duloxetine Other (See Comments)     suicidal  suicidal            Physical Exam:  The patient's  vitals were not taken for this visit.    Physical Exam:  GENERAL: alert, active, attentive  CHEST: non labored breathing  PSYCH: mood stable     Neurologic Exam:  MENTAL STATUS: Alert and oriented to person, place, and situation.   SPEECH: Fluent, intact comprehension and articulation, slightly hypophonic  CN: hearing grossly intact to conversation    Data Reviewed: I have personally reviewed the tests/studies below.       Lab Results   Component Value Date    A1C 6.0 07/08/2021     TSH   Date Value Ref Range Status   07/31/2024 1.94 0.30 - 4.20 uIU/mL Final   05/27/2022 2.99 0.40 - 4.00 mU/L Final   07/08/2021 2.20 0.40 - 4.00 mU/L Final     CBC RESULTS:   Recent Labs   Lab Test 07/31/24  2239   WBC 11.0   RBC 3.99*   HGB 12.3*   HCT 38.6*   MCV 97   MCH 30.8   MCHC 31.9   RDW 13.7        Last Comprehensive Metabolic Panel:  Sodium   Date Value Ref Range Status   07/31/2024 141 135 - 145 mmol/L Final   07/09/2021 143 133 - 144 mmol/L Final     Potassium   Date Value Ref Range Status   07/31/2024 4.7 3.4 - 5.3 mmol/L Final   05/09/2023 4.1 3.4 - 5.3 mmol/L Final   07/09/2021 3.7 3.4 - 5.3 mmol/L Final     Chloride   Date Value Ref Range Status   07/31/2024 106 98 - 107 mmol/L Final   05/09/2023 113 (H) 94 - 109 mmol/L Final   07/09/2021 108 94 - 109 mmol/L Final     Carbon Dioxide   Date Value Ref Range Status   07/09/2021 28  20 - 32 mmol/L Final     Carbon Dioxide (CO2)   Date Value Ref Range Status   07/31/2024 25 22 - 29 mmol/L Final   05/09/2023 25 20 - 32 mmol/L Final     Anion Gap   Date Value Ref Range Status   07/31/2024 10 7 - 15 mmol/L Final   05/09/2023 4 3 - 14 mmol/L Final   07/09/2021 6 3 - 14 mmol/L Final     Glucose   Date Value Ref Range Status   07/31/2024 98 70 - 99 mg/dL Final   05/09/2023 98 70 - 99 mg/dL Final   07/09/2021 102 (H) 70 - 99 mg/dL Final     GLUCOSE BY METER POCT   Date Value Ref Range Status   06/17/2023 124 (H) 70 - 99 mg/dL Final     Urea Nitrogen   Date Value Ref Range Status   07/31/2024 18.3 8.0 - 23.0 mg/dL Final   05/09/2023 24 7 - 30 mg/dL Final   07/09/2021 25 7 - 30 mg/dL Final     Creatinine   Date Value Ref Range Status   07/31/2024 0.80 0.67 - 1.17 mg/dL Final   07/09/2021 0.77 0.66 - 1.25 mg/dL Final     GFR Estimate   Date Value Ref Range Status   07/31/2024 >90 >60 mL/min/1.73m2 Final     Comment:     eGFR calculated using 2021 CKD-EPI equation.   07/09/2021 >90 >60 mL/min/[1.73_m2] Final     Comment:     Non  GFR Calc  Starting 12/18/2018, serum creatinine based estimated GFR (eGFR) will be   calculated using the Chronic Kidney Disease Epidemiology Collaboration   (CKD-EPI) equation.       GFR, ESTIMATED POCT   Date Value Ref Range Status   05/27/2022 >60 >60 mL/min/1.73m2 Final     Calcium   Date Value Ref Range Status   07/31/2024 8.9 8.8 - 10.4 mg/dL Final     Comment:     Reference intervals for this test were updated on 7/16/2024 to reflect our healthy population more accurately. There may be differences in the flagging of prior results with similar values performed with this method. Those prior results can be interpreted in the context of the updated reference intervals.   07/09/2021 8.8 8.5 - 10.1 mg/dL Final     Bilirubin Total   Date Value Ref Range Status   07/31/2024 0.2 <=1.2 mg/dL Final   07/07/2021 0.3 0.2 - 1.3 mg/dL Final     Alkaline Phosphatase   Date  Value Ref Range Status   07/31/2024 141 40 - 150 U/L Final   07/07/2021 82 40 - 150 U/L Final     ALT   Date Value Ref Range Status   07/31/2024 <5 0 - 70 U/L Final   07/07/2021 31 0 - 70 U/L Final     AST   Date Value Ref Range Status   07/31/2024 17 0 - 45 U/L Final   07/07/2021 20 0 - 45 U/L Final                        Virtual Visit Details    Type of service:  Video Visit   Joined the call at 8/28/2024, 2:17:45 pm.  Left the call at 8/28/2024, 2:27:55 pm.  You were on the call for 10 minutes 10 seconds .    Originating Location (pt. Location): Home    Distant Location (provider location):  On-site  Platform used for Video Visit: Gavi      Again, thank you for allowing me to participate in the care of your patient.        Sincerely,        Ching Carlos DO

## 2024-08-28 NOTE — PROGRESS NOTES
Department of Neurology  Movement Disorders Division   Telemedicine (Phone) Visit    Patient: Benjamin Mathews   MRN: [id number]   : 1965   Date of Visit: 2024    Impression:  Benjamin Mathews is a 59 year old male with Parkinsonism and muscle spasms. The patient's main concern today is depression. We discussed     Parkinosnism: His symptom started in  with decreased  strength and stiffness of gait and bradykinesia with walking and left hand rest tremor. Patient was diagnosed with Parkinson's disease in 2013 at the [facility].   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  Movement Disorder-related Medications                   8 AM 12 PM 4 pm 8pm At bedtime  prn   Amantadine 100 mg  1 tab 1           Carbidopa/levodopa IR  mg 3 tabs 2.5 2.5     0.5-1 twice daily     Carbidopa/levodopa CR  mg       2       Clonazepam 2 mg 1   1         Clonazepam 1 mg   1       1   - For constipation, per GI recommendations, take Miralax 17 gram twice a day instead of as needed. OK to continue lactulose daily.Will add oral bisacodyl 5 mg three times a week. OK to use bisacodyl suppository  - Continue follow ups with Dr. Cary Sol, PharmD, in between visits with me. Please call the clinic at [phone] for MTM coordinators/scheduling.   - Continue with psychiatric and psychotherapeutic care to manage anxiety and depression. Follow symptoms closely while on clozapine and clonazepam, though there have been no issues in the past with this combination of medication.    - Follow-up neuropsychological evaluation is recommended in 1 to 2 years, [id number] in order to assess and update recommendations as appropriate  - Continue following palliative care medical providers at [facility]  - Continue to  "refrain from driving  - Continue daily and safe exercises   - Continue follow with Dr. Arita for toxin injections and could consider right upper extremity injections to improve dystonia  - Continue to monitor swallowing    Patient to return in 3-4 months, for in-person or virtual visit, 30 minutes.     Ching Carlos DO  Movement Disorders Specialist  [facility]     Note dictated using voice recognition software.   20 minutes spent on date of encounter doing chart reviews and exam and documentation and further activities as noted above.        ------------------------------------------------------------------------------------------------------------      History of Present Illness  Mr. Mathews is a pleasant 59 year old male that presents to Neurology Movement clinic for follow up regarding Parkinsonism management.   The patient was most recently 7/24/24 for management of Parkinsonism. Please see the comprehensive neurologic consultation notes from those dates in the Epic records for details.      History obtained from patient.   Main complaint:   Patient reports, \"I am doing alright.\"   Muscle spasms: He reports this is better. He reports right sided neck muscles seem more active and causes discomfort. Clonazepam helps this symptom.  Rigidity: improved on current Parkinson Disease regimen  Wearing off: denies  He reports his current Parkinson Disease regimen is helping to treat symptoms.   Dyskinesia: He reports head and hand twitching around 3-4 pm and reports that it is bothersome to him.    Movement Disorder-related Medications                   8 AM 12 PM 4 pm 8pm At bedtime  prn   Amantadine 100 mg  1 1           Carbidopa/levodopa IR  mg 3 3 2.5     0.5-1 twice daily     Carbidopa/levodopa CR  mg       2       Clonazepam 2 mg 1   1         Clonazepam 1 mg   1       1       Review of Systems:  Other than that mentioned above, the remainder of 12 systems reviewed were " negative.    PMH: unchanged  PSH: unchanged  FH: unchanged  SH: unchanged    Medications:  Current Outpatient Medications   Medication Sig Dispense Refill    acetaminophen (TYLENOL) 325 MG tablet Take 1-2 tablets (325-650 mg) by mouth every 8 hours as needed for mild pain 100 tablet 0    alfuzosin ER (UROXATRAL) 10 MG 24 hr tablet Take 1 tablet (10 mg) by mouth daily 30 tablet 11    amantadine (SYMMETREL) 100 MG capsule Take 1 capsule (100 mg) by mouth 2 times daily 60 capsule 11    ammonium lactate (LAC-HYDRIN) 12 % external lotion Apply topically daily as needed      ANTI-ITCH MAXIMUM STRENGTH 1 % external cream Apply topically 2 times daily      apixaban ANTICOAGULANT (ELIQUIS) 5 MG tablet Take 5 mg by mouth 2 times daily      atorvastatin (LIPITOR) 40 MG tablet Take 1 tablet (40 mg) by mouth every evening 30 tablet 0    bisacodyl (DULCOLAX) 10 MG suppository Place 1 suppository (10 mg) rectally daily as needed for constipation 10 suppository 0    carbidopa-levodopa (SINEMET CR)  MG CR tablet Take 2 tablets by mouth at bedtime 60 tablet 11    carbidopa-levodopa (SINEMET)  MG tablet Take 3 tabs at 8 am and 12 pm and 2.5 tabs at 4 pm. Okay to take 0.5-1 tab as needed twice daily = up to 10.5 tabs daily 315 tablet 11    cholecalciferol (VITAMIN D3) 125 mcg (5000 units) capsule Take 1 capsule (125 mcg) by mouth daily 30 capsule 0    clonazePAM (KLONOPIN) 1 MG tablet Take 1 tablet scheduled at noon, may take 1 additional tablet PRN 60 tablet 5    clonazePAM (KLONOPIN) 2 MG tablet Take 1 tablet (2 mg) by mouth 2 times daily Take at 8am and 4 pm 60 tablet 5    cloZAPine (CLOZARIL) 50 MG tablet Take 1 tablet (50 mg) by mouth At Bedtime 30 tablet 0    diclofenac (VOLTAREN) 1 % topical gel Apply 2 g topically 3 times daily as needed for moderate pain 150 g 0    econazole nitrate 1 % external cream Apply topically daily To toes and toenails. 85 g 5    ENEMEEZ MINI 283 MG/5ML enema Place 1 enema rectally       gabapentin (NEURONTIN) 800 MG tablet Take 1 tablet (800 mg) by mouth 3 times daily 90 tablet 0    hydrOXYzine (ATARAX) 25 MG tablet Take 2 tablets (50 mg) by mouth every 6 hours as needed for anxiety (up to 3 timrd daily) 20 tablet 0    ketoconazole (NIZORAL) 2 % external cream Apply topically 2 times daily      ketoconazole (NIZORAL) 2 % external shampoo Apply topically once a week On Wednesdays      lactulose (CHRONULAC) 10 GM/15ML solution Take 15 mLs by mouth 2 times daily 473 mL 11    linaclotide (LINZESS) 290 MCG capsule Take 1 capsule (290 mcg) by mouth daily as needed (IBSc) 90 capsule 3    metoprolol succinate ER (TOPROL XL) 25 MG 24 hr tablet Take 0.5 tablets (12.5 mg) by mouth 2 times daily 30 tablet 0    metoprolol tartrate (LOPRESSOR) 25 MG tablet Take 25 mg by mouth 2 times daily      multivitamin, therapeutic (THERA-VIT) TABS tablet Take 1 tablet by mouth daily 30 tablet 0    pantoprazole (PROTONIX) 40 MG EC tablet Take 1 tablet (40 mg) by mouth daily Take 1 tablet (40mg) by mouth daily at 8am 30 tablet 0    polyethylene glycol (MIRALAX) 17 g packet Take 1 packet by mouth daily      SENEXON-S 8.6-50 MG tablet Take 2 tablets by mouth 2 times daily      Sodium Phosphates (ENEMA) 7-19 GM/118ML ENEM       tamsulosin (FLOMAX) 0.4 MG capsule Take 0.4 mg by mouth daily (Patient not taking: Reported on 7/24/2024)      traZODone (DESYREL) 100 MG tablet Take 100 mg by mouth At Bedtime      traZODone (DESYREL) 50 MG tablet Take 1 tablet (50 mg) by mouth every morning 30 tablet 0    venlafaxine (EFFEXOR XR) 150 MG 24 hr capsule Take 2 capsules (300 mg) by mouth daily 60 capsule 0    vitamin C (ASCORBIC ACID) 500 MG tablet Take 1 tablet (500 mg) by mouth daily 30 tablet 0           Allergies   Allergen Reactions    Amantadine Other (See Comments)     Other reaction(s): Hallucinations  Hallucinations/ lost self control/gambling.     halluicnations  Hallucinations/ lost self control/gambling.      hallucinates  halluicnations  hallucinates  Hallucinations/ lost self control/gambling.       Quetiapine GI Disturbance, Diarrhea and Other (See Comments)     Diarrhea    Diarrhea  Other reaction(s): GI Disturbance  Diarrhea      Duloxetine Other (See Comments)     suicidal  suicidal            Physical Exam:  The patient's  vitals were not taken for this visit.    Physical Exam:  GENERAL: alert, active, attentive  CHEST: non labored breathing  PSYCH: mood stable     Neurologic Exam:  MENTAL STATUS: Alert and oriented to person, place, and situation.   SPEECH: Fluent, intact comprehension and articulation, slightly hypophonic  CN: hearing grossly intact to conversation    Data Reviewed: I have personally reviewed the tests/studies below.       Lab Results   Component Value Date    A1C 6.0 07/08/2021     TSH   Date Value Ref Range Status   07/31/2024 1.94 0.30 - 4.20 uIU/mL Final   05/27/2022 2.99 0.40 - 4.00 mU/L Final   07/08/2021 2.20 0.40 - 4.00 mU/L Final     CBC RESULTS:   Recent Labs   Lab Test 07/31/24  2239   WBC 11.0   RBC 3.99*   HGB 12.3*   HCT 38.6*   MCV 97   MCH 30.8   MCHC 31.9   RDW 13.7        Last Comprehensive Metabolic Panel:  Sodium   Date Value Ref Range Status   07/31/2024 141 135 - 145 mmol/L Final   07/09/2021 143 133 - 144 mmol/L Final     Potassium   Date Value Ref Range Status   07/31/2024 4.7 3.4 - 5.3 mmol/L Final   05/09/2023 4.1 3.4 - 5.3 mmol/L Final   07/09/2021 3.7 3.4 - 5.3 mmol/L Final     Chloride   Date Value Ref Range Status   07/31/2024 106 98 - 107 mmol/L Final   05/09/2023 113 (H) 94 - 109 mmol/L Final   07/09/2021 108 94 - 109 mmol/L Final     Carbon Dioxide   Date Value Ref Range Status   07/09/2021 28 20 - 32 mmol/L Final     Carbon Dioxide (CO2)   Date Value Ref Range Status   07/31/2024 25 22 - 29 mmol/L Final   05/09/2023 25 20 - 32 mmol/L Final     Anion Gap   Date Value Ref Range Status   07/31/2024 10 7 - 15 mmol/L Final   05/09/2023 4 3 - 14 mmol/L  Final   07/09/2021 6 3 - 14 mmol/L Final     Glucose   Date Value Ref Range Status   07/31/2024 98 70 - 99 mg/dL Final   05/09/2023 98 70 - 99 mg/dL Final   07/09/2021 102 (H) 70 - 99 mg/dL Final     GLUCOSE BY METER POCT   Date Value Ref Range Status   06/17/2023 124 (H) 70 - 99 mg/dL Final     Urea Nitrogen   Date Value Ref Range Status   07/31/2024 18.3 8.0 - 23.0 mg/dL Final   05/09/2023 24 7 - 30 mg/dL Final   07/09/2021 25 7 - 30 mg/dL Final     Creatinine   Date Value Ref Range Status   07/31/2024 0.80 0.67 - 1.17 mg/dL Final   07/09/2021 0.77 0.66 - 1.25 mg/dL Final     GFR Estimate   Date Value Ref Range Status   07/31/2024 >90 >60 mL/min/1.73m2 Final     Comment:     eGFR calculated using 2021 CKD-EPI equation.   07/09/2021 >90 >60 mL/min/[1.73_m2] Final     Comment:     Non  GFR Calc  Starting 12/18/2018, serum creatinine based estimated GFR (eGFR) will be   calculated using the Chronic Kidney Disease Epidemiology Collaboration   (CKD-EPI) equation.       GFR, ESTIMATED POCT   Date Value Ref Range Status   05/27/2022 >60 >60 mL/min/1.73m2 Final     Calcium   Date Value Ref Range Status   07/31/2024 8.9 8.8 - 10.4 mg/dL Final     Comment:     Reference intervals for this test were updated on 7/16/2024 to reflect our healthy population more accurately. There may be differences in the flagging of prior results with similar values performed with this method. Those prior results can be interpreted in the context of the updated reference intervals.   07/09/2021 8.8 8.5 - 10.1 mg/dL Final     Bilirubin Total   Date Value Ref Range Status   07/31/2024 0.2 <=1.2 mg/dL Final   07/07/2021 0.3 0.2 - 1.3 mg/dL Final     Alkaline Phosphatase   Date Value Ref Range Status   07/31/2024 141 40 - 150 U/L Final   07/07/2021 82 40 - 150 U/L Final     ALT   Date Value Ref Range Status   07/31/2024 <5 0 - 70 U/L Final   07/07/2021 31 0 - 70 U/L Final     AST   Date Value Ref Range Status   07/31/2024 17 0 - 45  U/L Final   07/07/2021 20 0 - 45 U/L Final

## 2024-09-13 DIAGNOSIS — G20.C PARKINSONISM, UNSPECIFIED PARKINSONISM TYPE (H): Primary | ICD-10-CM

## 2024-09-13 RX ORDER — CARBIDOPA AND LEVODOPA 25; 100 MG/1; MG/1
TABLET ORAL
Qty: 255 TABLET | Refills: 11 | Status: SHIPPED | OUTPATIENT
Start: 2024-09-13

## 2024-10-11 ENCOUNTER — OFFICE VISIT (OUTPATIENT)
Dept: PODIATRY | Facility: CLINIC | Age: 59
End: 2024-10-11
Payer: COMMERCIAL

## 2024-10-11 DIAGNOSIS — Z87.2 HISTORY OF FOOT ULCER: ICD-10-CM

## 2024-10-11 DIAGNOSIS — G60.8 PERIPHERAL SENSORY NEUROPATHY: ICD-10-CM

## 2024-10-11 DIAGNOSIS — B35.1 ONYCHOMYCOSIS: Primary | ICD-10-CM

## 2024-10-11 PROCEDURE — 99212 OFFICE O/P EST SF 10 MIN: CPT | Mod: 25 | Performed by: PODIATRIST

## 2024-10-11 PROCEDURE — 11720 DEBRIDE NAIL 1-5: CPT | Performed by: PODIATRIST
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
## 2024-10-11 NOTE — NURSING NOTE
Benjamin Mathews's chief complaint for this visit includes:  Chief Complaint   Patient presents with    Consult     Bilateral foot cares     PCP: Stephanie Maldonado    Referring Provider:  Referred Self, MD  No address on file    There were no vitals taken for this visit.  Data Unavailable     Do you need any medication refills at today's visit? NO    Allergies   Allergen Reactions    Amantadine Other (See Comments)     Other reaction(s): Hallucinations  Hallucinations/ lost self control/gambling.     halluicnations  Hallucinations/ lost self control/gambling.     hallucinates  halluicnations  hallucinates  Hallucinations/ lost self control/gambling.       Quetiapine GI Disturbance, Diarrhea and Other (See Comments)     Diarrhea    Diarrhea  Other reaction(s): GI Disturbance  Diarrhea      Duloxetine Other (See Comments)     suicidal  suicidal         Jill Downey LPN
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
## 2024-10-15 ENCOUNTER — MYC REFILL (OUTPATIENT)
Dept: GASTROENTEROLOGY | Facility: CLINIC | Age: 59
End: 2024-10-15
Payer: COMMERCIAL

## 2024-10-15 DIAGNOSIS — S82.891A CLOSED FRACTURE OF RIGHT ANKLE, INITIAL ENCOUNTER: ICD-10-CM

## 2024-10-16 ENCOUNTER — TELEPHONE (OUTPATIENT)
Dept: NEUROLOGY | Facility: CLINIC | Age: 59
End: 2024-10-16
Payer: COMMERCIAL

## 2024-10-16 NOTE — TELEPHONE ENCOUNTER
Medication request: linaclotide (LINZESS) 290 MCG capsule   Sig: Take 1 capsule (290 mcg) by mouth daily as needed ([facility])   Last filled: 10/3/23  Last Qty: 90, Refills: 3  Pt's last office visit: 10/3/23  Next scheduled office visit: None    Mychart message sent to patient to schedule follow up with Marquez Joseph PA-C.    Rx pended and routed to provider for review and approval if appropriate.      Lauren Martinez LPN

## 2024-10-16 NOTE — TELEPHONE ENCOUNTER
[facility] Call Center    Phone Message    May a detailed message be left on voicemail: yes     Reason for Call: Lana Shaikh calling to request a call back to discuss if an adjustment in medication is needed. Lana Shaikh stated that Benjamin is having a lot of rocking with his tardive dyskinesia for the last 3 days. Please call Lana Shaikh to discuss at your earliest convenience.    Action Taken: Message routed to:  Other: [facility]    Travel Screening: Not Applicable     Date of Service:

## 2024-10-18 NOTE — TELEPHONE ENCOUNTER
RN returned call to Lana Shaikh, nurse director for care facility.     She reports that for the past week there have been increased TD symptoms including rocking and pursing of lips. She noticed symptoms at 6pm, unable to tell me how long symptoms last or how frequent episodes are.     She does inform that evening seems to be worse. He sleeps well at night. She will monitor for patterns over the weekend. Writer will call back Monday to discuss any patterns for symptoms further prior to asking Dr. Carlos for input.     1 month ago CD/LD IR was decreased to 2.5 tablets TID (8am 12pm 4pm) d/t TD. He did not schedule with MTM as directed. He also takes CR CD/LD at 9pm.    Will postpone until Monday to discuss further with Lana Shaikh following her observations over the weekend.     Jayson HOOKER RN, BSN  [facility]

## 2024-10-21 NOTE — TELEPHONE ENCOUNTER
LM asking Mikhail to return call. She was planning to observe patients symptoms more over the weekend for any patterns for when intermittent TD symptoms occur.     Would like to discuss her observations further and then route to provider for review and guidance. MTM scheduled 10/24.     Jayson HOOKER RN, BSN  [facility]rology

## 2024-10-22 NOTE — TELEPHONE ENCOUNTER
[facility] Call Center    Phone Message    May a detailed message be left on voicemail: yes     Reason for Call: Other: Lana Terrazas calls in returning Jayson's call. Writer let caller know that they had left for the day but I would send a message letting Jayson know she called.      Lana Terrazas can be reached at [phone]    Action Taken: Message routed to:  Other: [facility]    Travel Screening: Not Applicable

## 2024-10-23 NOTE — TELEPHONE ENCOUNTER
Facility RN returning call with update. Reports that for the past 2 weeks there have been increased TD symptoms including full body rocking and pursing of lips. She notices symptoms from 4pm-8pm, symptoms are fairly consistent. They resolve by bedtime and he is sleeping well at night.      1 month ago CD/LD IR was decreased to 2.5 tablets TID (8am 12pm 4pm) d/t TD. He did not schedule with MTM as directed. He also takes CR CD/LD at 9pm.    Please advise if you want to make changes to medication regimen that is currently causing tardive dyskinesias from 4-8pm.     Jayson HOOKER, RN, BSN  [facility]rology

## 2024-10-23 NOTE — TELEPHONE ENCOUNTER
[facility] Call Center    Phone Message    May a detailed message be left on voicemail: yes     Reason for Call:  Mikhail is calling to speak to KWADWO Tyler,     Action Taken: Other: [facility]    Travel Screening: Not Applicable     Date of Service:

## 2024-10-24 ENCOUNTER — TELEPHONE (OUTPATIENT)
Dept: GASTROENTEROLOGY | Facility: CLINIC | Age: 59
End: 2024-10-24
Payer: COMMERCIAL

## 2024-10-24 ENCOUNTER — VIRTUAL VISIT (OUTPATIENT)
Dept: PHARMACY | Facility: CLINIC | Age: 59
End: 2024-10-24
Attending: PSYCHIATRY & NEUROLOGY
Payer: COMMERCIAL

## 2024-10-24 DIAGNOSIS — G20.C PARKINSONISM, UNSPECIFIED PARKINSONISM TYPE (H): ICD-10-CM

## 2024-10-24 DIAGNOSIS — K59.01 SLOW TRANSIT CONSTIPATION: Primary | ICD-10-CM

## 2024-10-24 DIAGNOSIS — S82.891A CLOSED FRACTURE OF RIGHT ANKLE, INITIAL ENCOUNTER: ICD-10-CM

## 2024-10-24 NOTE — TELEPHONE ENCOUNTER
Spoke to Nurse Clark with [facility] who requests to have Benjamin's Jaydens changed to daily.   Order pended and sent to provider to authorize.

## 2024-10-24 NOTE — TELEPHONE ENCOUNTER
[facility] Call Center    Phone Message    May a detailed message be left on voicemail: yes     Reason for Call: Other: Amber is requesting a call back please to discuss Pt's linaclotide (LINZESS) 290 MCG capsule orders. She would like it to be changed back to daily instead or PRN. Please advise. Thank you!     Action Taken: Message routed to:  [facility]    Travel Screening: Not Applicable     Date of Service:

## 2024-10-24 NOTE — PROGRESS NOTES
Medication Therapy Management (MTM) Encounter    ASSESSMENT:                            Medication Adherence/Access: No issues identified.    Parkinson's disease:   Dyskinesia seem quite related to the 4pm carbidopa/levodopa dose, so will target that time of day by reducing the 4pm dose slightly. He very much wants to reduce to 1.5 tabs, but agreed to first try 2 tablets to see how he feels. Provided education to stay at the 2 tablets if he's feeling any worse and reach out to clinic.    PLAN:                            Reduce 4pm carbidopa/levodopa from 2.5 to 2 tablets x 1 week. If no improvement and nothing is worse, ok to further reduce to 1.5 tablets    Follow-up: 11/15/24    SUBJECTIVE/OBJECTIVE:                          Benjamin Mathews is a 59 year old male seen for a follow-up visit.       Reason for visit: med check.    Allergies/ADRs: Reviewed in chart  Past Medical History: Reviewed in chart  Tobacco: He reports that he has been smoking cigars and cigarettes. He has never used smokeless tobacco.Nicotine/Tobacco Cessation Plan  Information offered: Patient not interested at this time  Alcohol: not currently using    Medication Adherence/Access: no issues reported.    Parkinson's Disease:  Movement Disorder-related Medications                   8 AM 12 PM 4 pm 8pm At bedtime  prn   Amantadine 100 mg  1 1           Carbidopa/levodopa IR  mg 2.5 2.5 2.5     0.5-1 twice daily     Carbidopa/levodopa CR  mg       2       Clonazepam 2 mg 1   1         Clonazepam 1 mg   1       1   Gabapentin 800mg 1 1 1               Recent changes:  3/22/24:  - Increase carbidopa/levodopa IR. Week 1 take 2.5 tabs three times daily then week 2 take 3 tabs three times daily and continue on this dose. If you notice side effect (dyskinesia, etc) return to previous dose. Stop at lowest effective dose.  - Increase carbidopa/levodopa CR 50/200 mg to 2 tabs at bedtime     Since that time, doses have been reduced by 0.5 tablets  "once per day down to current dose.  Most recent change occurred 9/11: reduced 8am carbidopa/levodopa dose from 3 to 2.5 tabs.    Since then, group home staff have called clinic noting that \"for the past 2 weeks there have been increased TD symptoms including full body rocking and pursing of lips. She notices symptoms from 4pm-8pm, symptoms are fairly consistent. They resolve by bedtime and he is sleeping well at night.\"    Today, he reported that he starts to notice his leg moving, body rocking, and lips moving most days about 20-30 minutes after his 4pm carbidopa/levodopa dose that lasts a few hours, usually much better by bedtime at 8:30pm. Thinks this has been happening for awhile and he's not sure it's been any worse or better in the last month since the morning dose was reduced. Patient mentioned a few times that he recalls feeling much better when taking just 1.5 tablets at 4pm, which appears to have more recently been 6/2023. Per chart review, the last time doses were increased was 3/22/2024, from 2 to 2.5, then to 3 tablets with each dose.    He notes  that muscle spasms have been getting a bit worse with the cooler temperatures, but not as bad as it used to be. Seems to target his neck and will \"seize up\" where he can only look at the ground. Taking a carbidopa/levodopa prn dose is helpful and symptoms remit within about 30 minutes. This has occurred about twice in the last month.    ----------------      I spent 40 minutes with this patient today. All changes were made via collaborative practice agreement with Ching Carlos. A copy of the visit note was provided to the patient's provider(s).    A summary of these recommendations was sent via clinic portal.    Cary Sol PharmD  Medication Therapy Management Pharmacist  [facility]      Telemedicine Visit Details  The patient's medications can be safely assessed via a telemedicine encounter.  Type of service:  " Telephone visit  Originating Location (pt. Location): Home    Distant Location (provider location):  Off-site  Start Time:  12:30pm  End Time:  1:10pm     Medication Therapy Recommendations  Parkinsonism, unspecified Parkinsonism type (H)   1 Current Medication: carbidopa-levodopa (SINEMET)  MG tablet   Current Medication Sig: Take 2.5 tabs at 8 am, 12 pm and take 2 tabs at 4 pm. After one week, ok to further reduce 4pm dose to 1.5 tabs if feeling ok. Okay to take 0.5-1 tab as needed twice daily = up to 9 tabs daily   Rationale: Dose too high - Dosage too high - Safety   Recommendation: Decrease Dose - per plan   Status: Accepted per CPA   Identified Date: 10/24/2024 Completed Date: 10/25/2024

## 2024-10-24 NOTE — Clinical Note
Hello, It seems that some dyskinesia is quite tied to the 4pm dose, so are reducing that a bit. He was quit adamant about going down from 2.5 to 1.5 tablets, but agreed to do 2 tablets for a week first to see how he feels. Will see him back in a few weeks. Cary

## 2024-10-25 RX ORDER — CARBIDOPA AND LEVODOPA 25; 100 MG/1; MG/1
TABLET ORAL
Qty: 240 TABLET | Refills: 11 | Status: SHIPPED | OUTPATIENT
Start: 2024-10-25 | End: 2024-11-05

## 2024-10-25 NOTE — PATIENT INSTRUCTIONS
"Recommendations from today's MTM visit:                                                       Reduce 4pm carbidopa/levodopa from 2.5 to 2 tablets x 1 week. If no improvement in dyskinesia and nothing is worse, ok to further reduce to 1.5 tablets    Follow-up: 11/15/24    It was great speaking with you today.  I value your experience and would be very thankful for your time in providing feedback in our clinic survey. In the next few days, you may receive an email or text message from [employer] with a link to a survey related to your  clinical pharmacist.\"     To schedule another MTM appointment, please call the clinic directly or you may call the MTM scheduling line at [phone].    My Clinical Pharmacist's contact information:                                                      Please feel free to contact me with any questions or concerns you have.      Cary Sol, PharmD  Medication Therapy Management Pharmacist  [facility] Neu[facility] Clin[facility]    "

## 2024-10-29 ENCOUNTER — OFFICE VISIT (OUTPATIENT)
Dept: PHYSICAL MEDICINE AND REHAB | Facility: CLINIC | Age: 59
End: 2024-10-29
Payer: COMMERCIAL

## 2024-10-29 DIAGNOSIS — G24.3 CERVICAL DYSTONIA: Primary | ICD-10-CM

## 2024-10-29 DIAGNOSIS — Z74.09 IMPAIRED FUNCTIONAL MOBILITY, BALANCE, GAIT, AND ENDURANCE: ICD-10-CM

## 2024-10-29 DIAGNOSIS — G20.A2 PARKINSON'S DISEASE WITHOUT DYSKINESIA, WITH FLUCTUATING MANIFESTATIONS (H): ICD-10-CM

## 2024-10-29 DIAGNOSIS — G24.8 SEGMENTAL DYSTONIA: ICD-10-CM

## 2024-10-29 ASSESSMENT — PAIN SCALES - GENERAL: PAINLEVEL_OUTOF10: NO PAIN (0)

## 2024-10-29 NOTE — PROGRESS NOTES
[facility]    PM&R CLINIC NOTE  BOTULINUM TOXIN PROCEDURE      HPI  Chief Complaint   Patient presents with    Botox     Pt confirmed procedure.     Benjamin Mathews is a 59 year old male who was referred to our clinic for more evaluation of his dystonia and trial of botulinum toxin injections (referral from Dr. Suazo). He was seen on 10/6/22 as initial consult; please see the consult note for details. She has history of Parkinsonism and is followed by Dr. Perry. He was referred to palliative care team and has been followed by them since then.       Background information   --saw urology team in Nov 2022 for LUTS likely due to BPH and was started on alfuzosin  --saw Dr. Carlos in Jan 2023; it was recommended to continue carbidopa/levodopa and amantadine. He is also on clonazepam prn. Ok to continue botox injections.   --had neuropsych testing 1/13/23; reviewed the results.    Was admitted on 5/20/23 after a fall resulting in right ankle fracture s/p closed reduction and external fixation by ortho team. He has been at [facility] since then.     Was followed by podiatry team for ulcer on L 2nd toe, which was closed/healed 12/29/23.    SINCE LAST VISIT  Benjamin Mathews was last seen here in clinic on 7/22/24    Patient reports the following new medical problems since last visit:    --Saw Dr. Carlos 7/24; reviewed note and recommendations.   - Continue follow with Dr. Arita for toxin injections and could consider right upper extremity injections to improve dystonia     --Was in ED 7/31 for spasms; was discharged and went back in as he fell on his way out     --8/28 saw Dr. Carlos - meds were adjusted again.     --10/24 saw MTM team - meds were adjusted again     --He is working closely with psychology team     Today, he noted that spasms are still happening x1-2/month and last 45 minutes. They are not frequent but extremely painful and uncomfortable.  "  Temperature change is a trigger.  New medication regimen is controlling them better.    He feels like his foot \"goes out\" more lately instead of turning in. He has some weakness in his R shoulder and right hand .     Denied any side effects from the injections last time.     The benefits wore off in 2.5-3 months. When botox is working, his neck movement and R shoulder ROM is a lot more fluid and he has less \"tugging sensation\" in his pec area and neck.     Injections to his RUE and RLE were beneficial as well. The benefits lasted for about a month with gradual decline in benefits after that.     All very similar to the last cycle.       PHYSICAL EXAM  VS: There were no vitals taken for this visit.   GEN: Pleasant and cooperative, in no acute distress  HEENT: Moist mucous membranes     General: Sitting in his power wheelchair leaning to the right with his neck tilted to the right and rotated to the left  C-spine range of motion was moderately limited with extension and left lateral bending, he was also mildly limited in all other directionsReduced left sided rotation as compared to the right side. Restricted extension of the neck. Tight right SCM   Continues to have left sided weakness    Increased tone at R shoulder add and EF; more dystonic in action and less velocity dependent tone.   Same in RLE - increased tone in R PF and inversion       ALLERGIES  Allergies   Allergen Reactions    Amantadine Other (See Comments)     Other reaction(s): Hallucinations  Hallucinations/ lost self control/gambling.     halluicnations  Hallucinations/ lost self control/gambling.     hallucinates  halluicnations  hallucinates  Hallucinations/ lost self control/gambling.       Quetiapine GI Disturbance, Diarrhea and Other (See Comments)     Diarrhea    Diarrhea  Other reaction(s): GI Disturbance  Diarrhea      Duloxetine Other (See Comments)     suicidal  suicidal         CURRENT MEDICATIONS    Current Outpatient Medications:    "  acetaminophen (TYLENOL) 325 MG tablet, Take 1-2 tablets (325-650 mg) by mouth every 8 hours as needed for mild pain, Disp: 100 tablet, Rfl: 0    alfuzosin ER (UROXATRAL) 10 MG 24 hr tablet, Take 1 tablet (10 mg) by mouth daily, Disp: 30 tablet, Rfl: 11    amantadine (SYMMETREL) 100 MG capsule, Take 1 capsule (100 mg) by mouth 2 times daily, Disp: 60 capsule, Rfl: 11    ammonium lactate (LAC-HYDRIN) 12 % external lotion, Apply topically daily as needed, Disp: , Rfl:     ANTI-ITCH MAXIMUM STRENGTH 1 % external cream, Apply topically 2 times daily, Disp: , Rfl:     apixaban ANTICOAGULANT (ELIQUIS) 5 MG tablet, Take 5 mg by mouth 2 times daily, Disp: , Rfl:     atorvastatin (LIPITOR) 40 MG tablet, Take 1 tablet (40 mg) by mouth every evening, Disp: 30 tablet, Rfl: 0    bisacodyl (DULCOLAX) 10 MG suppository, Place 1 suppository (10 mg) rectally daily as needed for constipation, Disp: 10 suppository, Rfl: 0    carbidopa-levodopa (SINEMET CR)  MG CR tablet, Take 2 tablets by mouth at bedtime, Disp: 60 tablet, Rfl: 11    cholecalciferol (VITAMIN D3) 125 mcg (5000 units) capsule, Take 1 capsule (125 mcg) by mouth daily, Disp: 30 capsule, Rfl: 0    clonazePAM (KLONOPIN) 1 MG tablet, Take 1 tablet scheduled at noon, may take 1 additional tablet PRN, Disp: 60 tablet, Rfl: 5    clonazePAM (KLONOPIN) 2 MG tablet, Take 1 tablet (2 mg) by mouth 2 times daily Take at 8am and 4 pm, Disp: 60 tablet, Rfl: 5    cloZAPine (CLOZARIL) 50 MG tablet, Take 1 tablet (50 mg) by mouth At Bedtime, Disp: 30 tablet, Rfl: 0    diclofenac (VOLTAREN) 1 % topical gel, Apply 2 g topically 3 times daily as needed for moderate pain, Disp: 150 g, Rfl: 0    econazole nitrate 1 % external cream, Apply topically daily To toes and toenails., Disp: 85 g, Rfl: 5    ENEMEEZ MINI 283 MG/5ML enema, Place 1 enema rectally, Disp: , Rfl:     gabapentin (NEURONTIN) 800 MG tablet, Take 1 tablet (800 mg) by mouth 3 times daily, Disp: 90 tablet, Rfl: 0     hydrOXYzine (ATARAX) 25 MG tablet, Take 2 tablets (50 mg) by mouth every 6 hours as needed for anxiety (up to 3 timrd daily), Disp: 20 tablet, Rfl: 0    ketoconazole (NIZORAL) 2 % external cream, Apply topically 2 times daily, Disp: , Rfl:     ketoconazole (NIZORAL) 2 % external shampoo, Apply topically once a week On Wednesdays, Disp: , Rfl:     lactulose (CHRONULAC) 10 GM/15ML solution, Take 15 mLs by mouth 2 times daily, Disp: 473 mL, Rfl: 11    linaclotide (LINZESS) 290 MCG capsule, Take 1 capsule (290 mcg) by mouth daily., Disp: 90 capsule, Rfl: 0    metoprolol succinate ER (TOPROL XL) 25 MG 24 hr tablet, Take 0.5 tablets (12.5 mg) by mouth 2 times daily, Disp: 30 tablet, Rfl: 0    metoprolol tartrate (LOPRESSOR) 25 MG tablet, Take 25 mg by mouth 2 times daily, Disp: , Rfl:     multivitamin, therapeutic (THERA-VIT) TABS tablet, Take 1 tablet by mouth daily, Disp: 30 tablet, Rfl: 0    pantoprazole (PROTONIX) 40 MG EC tablet, Take 1 tablet (40 mg) by mouth daily Take 1 tablet (40mg) by mouth daily at 8am, Disp: 30 tablet, Rfl: 0    polyethylene glycol (MIRALAX) 17 g packet, Take 1 packet by mouth daily, Disp: , Rfl:     SENEXON-S 8.6-50 MG tablet, Take 2 tablets by mouth 2 times daily, Disp: , Rfl:     Sodium Phosphates (ENEMA) 7-19 GM/118ML ENEM, , Disp: , Rfl:     tamsulosin (FLOMAX) 0.4 MG capsule, Take 0.4 mg by mouth daily, Disp: , Rfl:     traZODone (DESYREL) 100 MG tablet, Take 100 mg by mouth At Bedtime, Disp: , Rfl:     traZODone (DESYREL) 50 MG tablet, Take 1 tablet (50 mg) by mouth every morning, Disp: 30 tablet, Rfl: 0    venlafaxine (EFFEXOR XR) 150 MG 24 hr capsule, Take 2 capsules (300 mg) by mouth daily, Disp: 60 capsule, Rfl: 0    vitamin C (ASCORBIC ACID) 500 MG tablet, Take 1 tablet (500 mg) by mouth daily, Disp: 30 tablet, Rfl: 0    carbidopa-levodopa (SINEMET)  MG tablet, Take 2.5 tabs at 8 am, 12 pm and take 2 tabs at 4 pm., Disp: 210 tablet, Rfl: 11    Current Facility-Administered  Medications:     botulinum toxin type A (BOTOX) 100 units injection 400 Units, 400 Units, Intramuscular, Q90 Days, , 300 Units at 10/29/24 1353       BOTULINUM NEUROTOXIN INJECTION PROCEDURES    VERIFICATION OF PATIENT IDENTIFICATION AND PROCEDURE     Initials   Patient Name PS   Patient  PS   Procedure Verified by: PS     Prior to the start of the procedure and with procedural staff participation, I verbally confirmed the patient s identity using two indicators, relevant allergies, that the procedure was appropriate and matched the consent or emergent situation, and that the correct equipment/implants were available. Immediately prior to starting the procedure I conducted the Time Out with the procedural staff and re-confirmed the patient s name, procedure, and site/side. (The Joint Commission universal protocol was followed.)  Yes    Sedation (Moderate or Deep): None    ABOVE ASSESSMENTS PERFORMED BY  Ember Arita MD      INDICATIONS FOR PROCEDURES  Benjamin Mathews is a 59 year old patient with cervical and segmental dystonia secondary to the diagnosis of Parkinson's disease. His baseline symptoms have been recalcitrant to oral medications and conservative therapy.  He is here today for reinjection with Botox.    GOAL OF PROCEDURE  The goal of this procedure is to increase active range of motion, improve volitional motor control, decrease pain  and enhance functional independence.      TOTAL DOSE ADMINISTERED  Dose Administered: 300 units  Botox (Botulinum Toxin Type A) 1:1 dilution -  Unavoidable Drug Waste: No  Diluent Used:  Preservative Free Normal Saline  Total Volume of Diluent Used: 3 ml  See MAR  NDC #: Botox 100u ([address]-95)      CONSENT  The risks, benefits, and treatment options were discussed with Benjamin Mathews and he agreed to proceed.    Written consent was obtained by PS.     EQUIPMENT USED  Needle-35mm stimulating/recording  EMG/NCS Machine    SKIN PREPARATION  Skin preparation was  performed using an alcohol wipe.    GUIDANCE DESCRIPTION  Electro-myographic guidance was necessary throughout the procedure to accurately identify all areas of dystonic muscles while avoiding injection of non-dystonic muscles and underlying muscles , neighboring nerves and nearby vascular structures.     AREA/MUSCLE INJECTED  Right neck/shoulder girdle  SCM 10 units at 1 site  Splenius capitis 15 units at 1 site  Splenius cervicis 15 units at 1 site  Levator a scap insertion 40 units at 1 site  Levator at neck 10 units at 1 site   Lateral trap 20 units at 2 sites   Pectoralis major 30 units at 1 site     Right upper extremity   Biceps 20 units at 1 site  Brachialis 20 units at 1 site  - careful about not putting more distally give reported weakness     Right lower extremity  Flexor digitorum longus 40 units at 1 site  Gastrocnemius 50 units at 2 site  Fibular longus 30 units at 1 site      RESPONSE TO PROCEDURE  Benjamin Mathews tolerated the procedure well and there were no immediate complications. He was allowed to recover for an appropriate period of time and was discharged home in stable condition.    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  Osteopenia (DXA Lowest T-Score -1.8) likely due to disuse c/b limited functional mobility leading to increased fracture risk - assessment 5/2023     DOI: 05/20/2023, GLF, syncopal event, closed R trimalleolar ankle / pilon fracture-dislocation.  DOS: 05/21/2023, closed reduction, application of spanning external fixator.  DOS: 06/15/2023, ORIF R ankle/pilon, removal of external fixator.        Work-up: None     Therapy/equipment/braces: sent new referrals to PT and OT; will work on RUE weakness and dystonic movement. Also needs to work on core strengthening and overall mobility.     Medications: No  changes today; currently on clonazepam, sinemet and amantadine by Dr. Carlos.     Interventions: started the first round at 130 units and increased the dose to 270 units on 2/1/2023; kept on the same dose in April 2023. Decreased the total dose to 100 in July 2023 with no injections in RLE due to recent fracture. Increased the dose 10/24/23 from 100 to140 units with the goal of increasing effectiveness and prolonging duration of benefit of Botox injections. Increased dose to 170 units on 1/22/2024 added 30 units to right FDL to help facilitate comfort and AFO tolerance when he receives it. Increased from 260 to 300 7/2024 with more dose in the RLE for better control of his foot symptoms. Today, kept the same dose but adjusted the sites as above.    Referral / follow up with other providers: should repeat DEXA in 1 year 5/2025 and will need a new referral for bone health eval at that point. He will continue to follow-up with palliative care team, MTM and movement disorder clinic.     Follow up: in 12 weeks         Ember Arita MD  Physical Medicine & Rehabilitation

## 2024-10-29 NOTE — LETTER
10/29/2024       RE: Benjamin Mathews  [address]  [facility] [address]     Dear Colleague,    Thank you for referring your patient, Benjamin Mathews, to the [facility] PHYSICAL MEDICINE AND REHABILITATION CLINIC [place] at [facility]. Please see a copy of my visit note below.      [facility] CLINIC    PM&R CLINIC NOTE  BOTULINUM TOXIN PROCEDURE      HPI  Chief Complaint   Patient presents with     Botox     Pt confirmed procedure.     Benjamin Mathews is a 59 year old male who was referred to our clinic for more evaluation of his dystonia and trial of botulinum toxin injections (referral from Dr. Suazo). He was seen on 10/6/22 as initial consult; please see the consult note for details. She has history of Parkinsonism and is followed by Dr. Perry. He was referred to palliative care team and has been followed by them since then.       Background information   --saw urology team in Nov 2022 for LUTS likely due to BPH and was started on alfuzosin  --saw Dr. Carlos in Jan 2023; it was recommended to continue carbidopa/levodopa and amantadine. He is also on clonazepam prn. Ok to continue botox injections.   --had neuropsych testing 1/13/23; reviewed the results.    Was admitted on 5/20/23 after a fall resulting in right ankle fracture s/p closed reduction and external fixation by ortho team. He has been at [facility] since then.     Was followed by podiatry team for ulcer on L 2nd toe, which was closed/healed 12/29/23.    SINCE LAST VISIT  Benjamin Mathews was last seen here in clinic on 7/22/24    Patient reports the following new medical problems since last visit:    --Saw Dr. Carlos 7/24; reviewed note and recommendations.   - Continue follow with Dr. Arita for toxin injections and could consider right upper extremity injections to improve dystonia     --Was in ED 7/31 for spasms; was discharged and went  "back in as he fell on his way out     --8/28 saw Dr. Carlos - meds were adjusted again.     --10/24 saw MTM team - meds were adjusted again     --He is working closely with psychology team     Today, he noted that spasms are still happening x1-2/month and last 45 minutes. They are not frequent but extremely painful and uncomfortable.   Temperature change is a trigger.  New medication regimen is controlling them better.    He feels like his foot \"goes out\" more lately instead of turning in. He has some weakness in his R shoulder and right hand .     Denied any side effects from the injections last time.     The benefits wore off in 2.5-3 months. When botox is working, his neck movement and R shoulder ROM is a lot more fluid and he has less \"tugging sensation\" in his pec area and neck.     Injections to his RUE and RLE were beneficial as well. The benefits lasted for about a month with gradual decline in benefits after that.     All very similar to the last cycle.       PHYSICAL EXAM  VS: There were no vitals taken for this visit.   GEN: Pleasant and cooperative, in no acute distress  HEENT: Moist mucous membranes     General: Sitting in his power wheelchair leaning to the right with his neck tilted to the right and rotated to the left  C-spine range of motion was moderately limited with extension and left lateral bending, he was also mildly limited in all other directionsReduced left sided rotation as compared to the right side. Restricted extension of the neck. Tight right SCM   Continues to have left sided weakness    Increased tone at R shoulder add and EF; more dystonic in action and less velocity dependent tone.   Same in RLE - increased tone in R PF and inversion       ALLERGIES  Allergies   Allergen Reactions     Amantadine Other (See Comments)     Other reaction(s): Hallucinations  Hallucinations/ lost self control/gambling.     halluicnations  Hallucinations/ lost self control/gambling. "     hallucinates  halluicnations  hallucinates  Hallucinations/ lost self control/gambling.        Quetiapine GI Disturbance, Diarrhea and Other (See Comments)     Diarrhea    Diarrhea  Other reaction(s): GI Disturbance  Diarrhea       Duloxetine Other (See Comments)     suicidal  suicidal         CURRENT MEDICATIONS    Current Outpatient Medications:      acetaminophen (TYLENOL) 325 MG tablet, Take 1-2 tablets (325-650 mg) by mouth every 8 hours as needed for mild pain, Disp: 100 tablet, Rfl: 0     alfuzosin ER (UROXATRAL) 10 MG 24 hr tablet, Take 1 tablet (10 mg) by mouth daily, Disp: 30 tablet, Rfl: 11     amantadine (SYMMETREL) 100 MG capsule, Take 1 capsule (100 mg) by mouth 2 times daily, Disp: 60 capsule, Rfl: 11     ammonium lactate (LAC-HYDRIN) 12 % external lotion, Apply topically daily as needed, Disp: , Rfl:      ANTI-ITCH MAXIMUM STRENGTH 1 % external cream, Apply topically 2 times daily, Disp: , Rfl:      apixaban ANTICOAGULANT (ELIQUIS) 5 MG tablet, Take 5 mg by mouth 2 times daily, Disp: , Rfl:      atorvastatin (LIPITOR) 40 MG tablet, Take 1 tablet (40 mg) by mouth every evening, Disp: 30 tablet, Rfl: 0     bisacodyl (DULCOLAX) 10 MG suppository, Place 1 suppository (10 mg) rectally daily as needed for constipation, Disp: 10 suppository, Rfl: 0     carbidopa-levodopa (SINEMET CR)  MG CR tablet, Take 2 tablets by mouth at bedtime, Disp: 60 tablet, Rfl: 11     cholecalciferol (VITAMIN D3) 125 mcg (5000 units) capsule, Take 1 capsule (125 mcg) by mouth daily, Disp: 30 capsule, Rfl: 0     clonazePAM (KLONOPIN) 1 MG tablet, Take 1 tablet scheduled at noon, may take 1 additional tablet PRN, Disp: 60 tablet, Rfl: 5     clonazePAM (KLONOPIN) 2 MG tablet, Take 1 tablet (2 mg) by mouth 2 times daily Take at 8am and 4 pm, Disp: 60 tablet, Rfl: 5     cloZAPine (CLOZARIL) 50 MG tablet, Take 1 tablet (50 mg) by mouth At Bedtime, Disp: 30 tablet, Rfl: 0     diclofenac (VOLTAREN) 1 % topical gel, Apply 2 g  topically 3 times daily as needed for moderate pain, Disp: 150 g, Rfl: 0     econazole nitrate 1 % external cream, Apply topically daily To toes and toenails., Disp: 85 g, Rfl: 5     ENEMEEZ MINI 283 MG/5ML enema, Place 1 enema rectally, Disp: , Rfl:      gabapentin (NEURONTIN) 800 MG tablet, Take 1 tablet (800 mg) by mouth 3 times daily, Disp: 90 tablet, Rfl: 0     hydrOXYzine (ATARAX) 25 MG tablet, Take 2 tablets (50 mg) by mouth every 6 hours as needed for anxiety (up to 3 timrd daily), Disp: 20 tablet, Rfl: 0     ketoconazole (NIZORAL) 2 % external cream, Apply topically 2 times daily, Disp: , Rfl:      ketoconazole (NIZORAL) 2 % external shampoo, Apply topically once a week On Wednesdays, Disp: , Rfl:      lactulose (CHRONULAC) 10 GM/15ML solution, Take 15 mLs by mouth 2 times daily, Disp: 473 mL, Rfl: 11     linaclotide (LINZESS) 290 MCG capsule, Take 1 capsule (290 mcg) by mouth daily., Disp: 90 capsule, Rfl: 0     metoprolol succinate ER (TOPROL XL) 25 MG 24 hr tablet, Take 0.5 tablets (12.5 mg) by mouth 2 times daily, Disp: 30 tablet, Rfl: 0     metoprolol tartrate (LOPRESSOR) 25 MG tablet, Take 25 mg by mouth 2 times daily, Disp: , Rfl:      multivitamin, therapeutic (THERA-VIT) TABS tablet, Take 1 tablet by mouth daily, Disp: 30 tablet, Rfl: 0     pantoprazole (PROTONIX) 40 MG EC tablet, Take 1 tablet (40 mg) by mouth daily Take 1 tablet (40mg) by mouth daily at 8am, Disp: 30 tablet, Rfl: 0     polyethylene glycol (MIRALAX) 17 g packet, Take 1 packet by mouth daily, Disp: , Rfl:      SENEXON-S 8.6-50 MG tablet, Take 2 tablets by mouth 2 times daily, Disp: , Rfl:      Sodium Phosphates (ENEMA) 7-19 GM/118ML ENEM, , Disp: , Rfl:      tamsulosin (FLOMAX) 0.4 MG capsule, Take 0.4 mg by mouth daily, Disp: , Rfl:      traZODone (DESYREL) 100 MG tablet, Take 100 mg by mouth At Bedtime, Disp: , Rfl:      traZODone (DESYREL) 50 MG tablet, Take 1 tablet (50 mg) by mouth every morning, Disp: 30 tablet, Rfl: 0      venlafaxine (EFFEXOR XR) 150 MG 24 hr capsule, Take 2 capsules (300 mg) by mouth daily, Disp: 60 capsule, Rfl: 0     vitamin C (ASCORBIC ACID) 500 MG tablet, Take 1 tablet (500 mg) by mouth daily, Disp: 30 tablet, Rfl: 0     carbidopa-levodopa (SINEMET)  MG tablet, Take 2.5 tabs at 8 am, 12 pm and take 2 tabs at 4 pm., Disp: 210 tablet, Rfl: 11    Current Facility-Administered Medications:      botulinum toxin type A (BOTOX) 100 units injection 400 Units, 400 Units, Intramuscular, Q90 Days, , 300 Units at 10/29/24 1353       BOTULINUM NEUROTOXIN INJECTION PROCEDURES    VERIFICATION OF PATIENT IDENTIFICATION AND PROCEDURE     Initials   Patient Name PS   Patient  PS   Procedure Verified by: PS     Prior to the start of the procedure and with procedural staff participation, I verbally confirmed the patient s identity using two indicators, relevant allergies, that the procedure was appropriate and matched the consent or emergent situation, and that the correct equipment/implants were available. Immediately prior to starting the procedure I conducted the Time Out with the procedural staff and re-confirmed the patient s name, procedure, and site/side. (The Joint Commission universal protocol was followed.)  Yes    Sedation (Moderate or Deep): None    ABOVE ASSESSMENTS PERFORMED BY  Ember Arita MD      INDICATIONS FOR PROCEDURES  Benjamin Mathews is a 59 year old patient with cervical and segmental dystonia secondary to the diagnosis of Parkinson's disease. His baseline symptoms have been recalcitrant to oral medications and conservative therapy.  He is here today for reinjection with Botox.    GOAL OF PROCEDURE  The goal of this procedure is to increase active range of motion, improve volitional motor control, decrease pain  and enhance functional independence.      TOTAL DOSE ADMINISTERED  Dose Administered: 300 units  Botox (Botulinum Toxin Type A) 1:1 dilution -  Unavoidable Drug Waste: No  Diluent Used:   Preservative Free Normal Saline  Total Volume of Diluent Used: 3 ml  See MAR  NDC #: Botox 100u ([address]-94)      CONSENT  The risks, benefits, and treatment options were discussed with Benjamin Mathews and he agreed to proceed.    Written consent was obtained by PS.     EQUIPMENT USED  Needle-35mm stimulating/recording  EMG/NCS Machine    SKIN PREPARATION  Skin preparation was performed using an alcohol wipe.    GUIDANCE DESCRIPTION  Electro-myographic guidance was necessary throughout the procedure to accurately identify all areas of dystonic muscles while avoiding injection of non-dystonic muscles and underlying muscles , neighboring nerves and nearby vascular structures.     AREA/MUSCLE INJECTED  Right neck/shoulder girdle  SCM 10 units at 1 site  Splenius capitis 15 units at 1 site  Splenius cervicis 15 units at 1 site  Levator a scap insertion 40 units at 1 site  Levator at neck 10 units at 1 site   Lateral trap 20 units at 2 sites   Pectoralis major 30 units at 1 site     Right upper extremity   Biceps 20 units at 1 site  Brachialis 20 units at 1 site  - careful about not putting more distally give reported weakness     Right lower extremity  Flexor digitorum longus 40 units at 1 site  Gastrocnemius 50 units at 2 site  Fibular longus 30 units at 1 site      RESPONSE TO PROCEDURE  Benjamin Mathews tolerated the procedure well and there were no immediate complications. He was allowed to recover for an appropriate period of time and was discharged home in stable condition.    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  Osteopenia (DXA Lowest T-Score -1.8) likely due to disuse c/b limited functional mobility leading to increased fracture risk - assessment 5/2023     DOI: 05/20/2023, GLF, syncopal event, closed R trimalleolar ankle / pilon  fracture-dislocation.  DOS: 05/21/2023, closed reduction, application of spanning external fixator.  DOS: 06/15/2023, ORIF R ankle/pilon, removal of external fixator.        Work-up: None     Therapy/equipment/braces: sent new referrals to PT and OT; will work on RUE weakness and dystonic movement. Also needs to work on core strengthening and overall mobility.     Medications: No changes today; currently on clonazepam, sinemet and amantadine by Dr. Carlos.     Interventions: started the first round at 130 units and increased the dose to 270 units on 2/1/2023; kept on the same dose in April 2023. Decreased the total dose to 100 in July 2023 with no injections in RLE due to recent fracture. Increased the dose 10/24/23 from 100 to140 units with the goal of increasing effectiveness and prolonging duration of benefit of Botox injections. Increased dose to 170 units on 1/22/2024 added 30 units to right FDL to help facilitate comfort and AFO tolerance when he receives it. Increased from 260 to 300 7/2024 with more dose in the RLE for better control of his foot symptoms. Today, kept the same dose but adjusted the sites as above.    Referral / follow up with other providers: should repeat DEXA in 1 year 5/2025 and will need a new referral for bone health eval at that point. He will continue to follow-up with palliative care team, MTM and movement disorder clinic.     Follow up: in 12 weeks         Ember Arita MD  Physical Medicine & Rehabilitation       Again, thank you for allowing me to participate in the care of your patient.      Sincerely,    Ember Arita MD

## 2024-10-29 NOTE — NURSING NOTE
Chief Complaint   Patient presents with    Botox     Pt confirmed procedure.     Jud Courtney, EMT

## 2024-11-01 DIAGNOSIS — G20.C PARKINSONISM, UNSPECIFIED PARKINSONISM TYPE (H): ICD-10-CM

## 2024-11-01 NOTE — TELEPHONE ENCOUNTER
[facility] Call Center    Phone Message    May a detailed message be left on voicemail: yes     Reason for Call: Other: Amber from pt's group home calls in requesting a call back from the clinic regarding pt's  carbidopa-levodopa dosing. She states this is working well for him & would like all future scripts written with the current dosage instructions.    Amber can be reached at [phone] to discuss.    Action Taken: Message routed to:  Other: [facility]    Travel Screening: Not Applicable

## 2024-11-01 NOTE — TELEPHONE ENCOUNTER
Patient seen 10/24 by [facility] pharmacist, medication changes were made and there is plan for follow up with pharmacy team on 11/15.     Will close this encounter.     Jayson HOOKER, RN, BSN  [facility]

## 2024-11-05 NOTE — TELEPHONE ENCOUNTER
Pt taking CD/LD IR (2.5 tabs at 8am, 2.5 tablets at 12pm, 2 tablets at 4pm).     He is doing well on this dose and facility/pharmacy requesting that previous instructions for further taper/PRN doses are removed from sig to make instructions more clear. Would like to continue pt on this dosing.     Please review rx and sign if agreeable to this plan.     Jayson HOOKER RN, BSN  [facility]

## 2024-11-07 RX ORDER — CARBIDOPA AND LEVODOPA 25; 100 MG/1; MG/1
TABLET ORAL
Qty: 210 TABLET | Refills: 11 | Status: SHIPPED | OUTPATIENT
Start: 2024-11-07

## 2024-11-08 DIAGNOSIS — R39.9 LOWER URINARY TRACT SYMPTOMS (LUTS): Primary | ICD-10-CM

## 2024-11-08 RX ORDER — ALFUZOSIN HYDROCHLORIDE 10 MG/1
10 TABLET, EXTENDED RELEASE ORAL DAILY
Qty: 90 TABLET | Refills: 1 | Status: SHIPPED | OUTPATIENT
Start: 2024-11-08

## 2024-11-08 NOTE — TELEPHONE ENCOUNTER
After chart review and based on prior discussion with MD it was noted that this is appropriate for a refill.    AMISHA ChongN RN Specialty Triage 11/8/2024 12:18 PM

## 2024-11-11 ENCOUNTER — OFFICE VISIT (OUTPATIENT)
Dept: UROLOGY | Facility: CLINIC | Age: 59
End: 2024-11-11
Payer: COMMERCIAL

## 2024-11-11 VITALS — RESPIRATION RATE: 12 BRPM | HEART RATE: 67 BPM | OXYGEN SATURATION: 99 %

## 2024-11-11 DIAGNOSIS — Z12.5 SCREENING FOR PROSTATE CANCER: ICD-10-CM

## 2024-11-11 DIAGNOSIS — N40.1 BENIGN PROSTATIC HYPERPLASIA WITH WEAK URINARY STREAM: Primary | ICD-10-CM

## 2024-11-11 DIAGNOSIS — N50.811 PAIN IN BOTH TESTICLES: ICD-10-CM

## 2024-11-11 DIAGNOSIS — N39.44 NOCTURNAL ENURESIS: ICD-10-CM

## 2024-11-11 DIAGNOSIS — N50.812 PAIN IN BOTH TESTICLES: ICD-10-CM

## 2024-11-11 DIAGNOSIS — R39.12 BENIGN PROSTATIC HYPERPLASIA WITH WEAK URINARY STREAM: Primary | ICD-10-CM

## 2024-11-11 LAB — PSA SERPL DL<=0.01 NG/ML-MCNC: 0.74 NG/ML (ref 0–3.5)

## 2024-11-11 PROCEDURE — 84153 ASSAY OF PSA TOTAL: CPT | Performed by: MASSAGE THERAPIST

## 2024-11-11 PROCEDURE — 51798 US URINE CAPACITY MEASURE: CPT | Performed by: MASSAGE THERAPIST

## 2024-11-11 PROCEDURE — 36415 COLL VENOUS BLD VENIPUNCTURE: CPT | Performed by: MASSAGE THERAPIST

## 2024-11-11 PROCEDURE — 99214 OFFICE O/P EST MOD 30 MIN: CPT | Mod: 25 | Performed by: MASSAGE THERAPIST

## 2024-11-11 RX ORDER — FINASTERIDE 5 MG/1
5 TABLET, FILM COATED ORAL DAILY
Qty: 90 TABLET | Refills: 3 | Status: SHIPPED | OUTPATIENT
Start: 2024-11-11

## 2024-11-11 NOTE — PATIENT INSTRUCTIONS
"Sony Mathews, it was nice to meet you!    Thank you for allowing us the privilege of caring for you. We hope we provided you with the excellent service you deserve.   Please let us know if there is anything else we can do for you.  We want you to be completely satisfied with your care experience.    To ensure the quality of our services, you may be receiving a patient satisfaction survey from an independent patient satisfaction monitoring company.    The greatest compliment you can give is a \"Likely to Recommend.\"    Your visit was with VERO Ceballos CNP today.    Instructions per today's visit:     Start taking Proscar (Finasteride) for enlarged prostate (this can take several months to work)   We will check your PSA today I will let you know the results  Physical therapist will reach out to schedule you for ongoing testicular/pelvic pain.     Follow up in 6 months     ___________________________________________________________________________  Important contact and scheduling information:  Please call our contact center at [phone] to schedule your next appointments or to reach our nurse triage line.  Please call during clinic hours Monday through Friday 8:00a - 4:30p if you have questions.  You can contact us anytime via [employer] and we will reply during clinic hours.    Lab results will be communicated through My Chart or letter (if My Chart not used). Please call the clinic if you have not received communication after 1 week or if you have any questions.?  __________________________________________________________________________    If labs were ordered today:    Please make an appointment to have them drawn at your convenience.     To schedule the Lab Appointment using [employer]:  Select \"Schedule an Appointment\"  Select \"Lab Only\"  Answer simple questions about where you would like to be seen and your type of insurance  For \"Which locations work for you?, select the location and set up the " appointment.
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
## 2024-11-14 ENCOUNTER — THERAPY VISIT (OUTPATIENT)
Dept: OCCUPATIONAL THERAPY | Facility: CLINIC | Age: 59
End: 2024-11-14
Attending: PHYSICAL MEDICINE & REHABILITATION
Payer: COMMERCIAL

## 2024-11-14 DIAGNOSIS — Z74.09 IMPAIRED FUNCTIONAL MOBILITY, BALANCE, GAIT, AND ENDURANCE: ICD-10-CM

## 2024-11-14 DIAGNOSIS — G24.8 SEGMENTAL DYSTONIA: ICD-10-CM

## 2024-11-14 DIAGNOSIS — G24.3 CERVICAL DYSTONIA: ICD-10-CM

## 2024-11-14 DIAGNOSIS — G20.A2 PARKINSON'S DISEASE WITHOUT DYSKINESIA, WITH FLUCTUATING MANIFESTATIONS (H): ICD-10-CM

## 2024-11-14 PROCEDURE — 97110 THERAPEUTIC EXERCISES: CPT | Mod: GO

## 2024-11-14 PROCEDURE — 97166 OT EVAL MOD COMPLEX 45 MIN: CPT | Mod: GO

## 2024-11-14 NOTE — PROGRESS NOTES
OCCUPATIONAL THERAPY EVALUATION  Type of Visit: Evaluation       Fall Risk Screen:  Have you fallen 2 or more times in the past year?: Yes  Have you fallen and had an injury in the past year?: No  Is patient a fall risk?: Yes (Yes)    Subjective        Presenting condition or subjective complaint: (Patient-Rptd) Docter  Date of onset: 10/29/24 (order date)    Relevant medical history:   Parkinosnism: His symptom started in 2013 with decreased  strength and stiffness of gait and bradykinesia with walking and left hand rest tremor. Patient was diagnosed with Parkinson's disease in 2013 at the [facility]; muscle spasms; history of CVA with residual left hemiparesis, cervical dystonia, dystonic movement of right upper and lower extremities, worse in the right lower extremity   Dates & types of surgery:      Prior diagnostic imaging/testing results: (Patient-Rptd) CT scan     Prior therapy history for the same diagnosis, illness or injury:        Occupational Profile: Patient is a 59 year old male diagnosed with segmental dystonia and parkinson's disease who was referred to outpatient occupational therapy at the request of Dr. Arita in setting of dystonia and RUE weakness s/p botox injections on 10/19/24. PT referral placed for core strengthening and overall mobility. He presented to session via transportation services in power wheelchair. Patient follows Dr. Carlos in neurology, most recently seen 8/28/24. He is on carbidopa/levodopa and current regiment is helping. He was diagnosed with parkinson's in 2013. He has previously participated in outpatient physical therapy with [facility] 10/2023 due to right ankle injury. He lives in a group home and receives assistance from caregivers for ADL/IADL's.     Prior Level of Function  Transfers: Assistive equipment and person  Ambulation: Assistive equipment  ADL: Assistive person  IADL:  receives assistance for IADL's from assisted living    Living  Environment  Social support: (Patient-Rptd) Alone   Type of home: (Patient-Rptd) House; Assisted Living; 1 level   Stairs to enter the home:         Ramp: (Patient-Rptd) Yes   Stairs inside the home: (Patient-Rptd) No       Help at home: (Patient-Rptd) Self Cares (home health aide/personal care attendant, family, etc)  Equipment owned: (Patient-Rptd) Walker; Walker with wheels; Power wheelchair or scooter; Bedrail; Grab bars; Commode; Bath bench     Employment:    n/a  Hobbies/Interests:      Patient goals for therapy: (Patient-Rptd) [facility]    Pain assessment:  discomfort from muscle spasms in neck/upper trap and right side of body     Objective     Cognitive Status Examination  Orientation: Oriented to person, place and time   Level of Consciousness: Alert  Follows Commands and Answers Questions: 100% of the time  Personal Safety and Judgement: Intact    VISUAL SKILLS  Visual Acuity: No deficits identified    SENSATION: UE Sensation WNL    POSTURE:  Dystonic movement in neck; cervical rotation to right   RANGE OF MOTION:  Right UE AROM: SHLD FLEX: 110*; SHLDR ABD: 130*; ELBOW FLEX: 110*; ELBOW EXT: 0*; FOREARM, WRIST, DIGITS: WNL Left UE AROM: SHLD FLEX: 110*; SHLDR ABD: 130*; ELBOW FLEX: 110*; ELBOW EXT: 0*; FOREARM, WRIST, DIGITS: WNL  STRENGTH:   Pain: - none + mild ++ moderate +++ severe  Strength Scale: 0-5/5 Left Right    Strength (lbs) 40 lb 57 lb   Lateral Pinch (lbs)     3 Point Pinch (lbs)          Hand Dominance: Right  TREMOR: left hand tremor  MUSCLE TONE: Cervical tone abnormal  COORDINATION:  impaired gross and fine motor coordination  BALANCE:  impaired balance. Pt uses power wheelchair and reports he is able to walk 5-10 ft with walker.     FUNCTIONAL MOBILITY  Assistive Device(s): Walker (front wheeled), Wheelchair (power)  Ambulation: Mod I; short distance ambulation 5-10 ft   Wheelchair: independent in power wheelchair    BED MOBILITY: Independent    TRANSFERS:  Mod I    BATHING: Mod  Assist  Equipment: Grab bar    UPPER BODY DRESSING:  reports he receives some help with UB dressing from caregivers (shirts). He reports no challenges with dressing fasteners  Equipment:     LOWER BODY DRESSING:  Assistance from caregivers for pants/shorts; dons socks/shoes in sitting  Equipment:     TOILETING:  Mod I   Equipment: Grab bar, Raised toilet seat    GROOMING:  Mod I   Equipment:     EATING/SELF FEEDING:  Mod I    Equipment:     INSTRUMENTAL ACTIVITIES OF DAILY LIVING (IADL): Patient receives assistance from caregivers in assisted living for IADL's. He uses transportation services independently.     Assessment & Plan   CLINICAL IMPRESSIONS  Medical Diagnosis: Cervical dystonia (G24.3)  - Primary    Segmental dystonia (G24.8)    Parkinson's disease without dyskinesia, with fluctuating manifestations (H) (G20.A2)    Impaired functional mobility, balance, gait, and endurance (Z74.09)    Treatment Diagnosis: incoordination; generalized weakness    Impression/Assessment: Patient is a 59 year old right-hand dominant male diagnosed with segmental dystonia and parkinson's disease who was referred to outpatient occupational therapy at the request of Dr. Arita in setting of dystonia and RUE weakness s/p botox injections on 10/19/24. The following significant findings have been identified: Impaired balance, Impaired coordination, Impaired mobility, Impaired ROM, Impaired strength, and Pain.  These identified deficits interfere with their ability to perform self care tasks, household chores, and household mobility as compared to previous level of function.     Clinical Decision Making (Complexity):  Assessment of Occupational Performance: 3-5 Performance Deficits  Occupational Performance Limitations: bathing/showering, dressing, functional mobility, and home establishment and management  Clinical Decision Making (Complexity): Moderate complexity    PLAN OF CARE  Treatment Interventions:  Interventions:  Self-Care/Home Management, Therapeutic Activity, Therapeutic Exercise    Long Term Goals   OT Goal 1  Goal Identifier: RUE - AROM  Goal Description: Pt to increase bilateral shoulder flexion AROMby 20 degrees for improved UE function for ADL/IADLs (dressing, bathing, grooming tasks, etc).  Target Date: 12/12/24  OT Goal 2  Goal Identifier: HEP  Goal Description: Pt will demonstrate independent completion of 3-5 activity HEP for UE strength/coordination to promote independence with ADLs/IADLs (e.g. dressing, bathing, grooming/hygiene, etc).  Target Date: 12/12/24      Frequency of Treatment: 1x/week  Duration of Treatment: 4 weeks     Recommended Referrals to Other Professionals:  none at time of eval   Education Assessment: Learner/Method: Patient;Listening;Demonstration  Education Comments: OT role; POC; goals     Risks and benefits of evaluation/treatment have been explained.   Patient/Family/caregiver agrees with Plan of Care.     Evaluation Time:      Signing Clinician: Siobhan Hawkins, OTR/L, CNS, CSRS        [facility]                                                                                   OUTPATIENT OCCUPATIONAL THERAPY      PLAN OF TREATMENT FOR OUTPATIENT REHABILITATION   Patient's Last Name, First Name, Benjamin Ramirez YOB: 1965   Provider's Name   [facility]   Medical Record No.  [id number]     Onset Date: 10/29/24 (order date) Start of Care Date: 11/14/24     Medical Diagnosis:  Cervical dystonia (G24.3)  - Primary    Segmental dystonia (G24.8)    Parkinson's disease without dyskinesia, with fluctuating manifestations (H) (G20.A2)    Impaired functional mobility, balance, gait, and endurance (Z74.09)      OT Treatment Diagnosis:  incoordination; generalized weakness Plan of Treatment  Frequency/Duration:1x/week/4 weeks    Certification date from 11/14/24   To 12/12/24        See note for plan of treatment details  and functional goals     Siobhan Hawkins, OTR/L, CNS, CSRS                         I CERTIFY THE NEED FOR THESE SERVICES FURNISHED UNDER        THIS PLAN OF TREATMENT AND WHILE UNDER MY CARE     (Physician attestation of this document indicates review and certification of the therapy plan).              Referring Provider:  Ember Arita    Initial Assessment  See Epic Evaluation- 11/14/24

## 2024-11-14 NOTE — PROGRESS NOTES
"Medication Therapy Management (MTM) Encounter    ASSESSMENT:                            Medication Adherence/Access: No issues identified.    Parkinson's Disease/muscle spasm:   Symptoms are improved with the 4pm dose reduction, though need confirm if he has remained at 2 tabs or further reduced to 1.5 tabs at that dosing time. Will plan to continue with current dose based on actual MAR.    PLAN:                            -Need to clarify current carbidopa/levodopa dosing with RN before sending updates prescriptions    Follow-up: 3 months or sooner if needed  Neurology 1/31/24    SUBJECTIVE/OBJECTIVE:                          Benjamin Mathews is a 59 year old male seen for a follow-up visit.      RN: [phone]    Reason for visit: med check.    Allergies/ADRs: Reviewed in chart  Past Medical History: Reviewed in chart  Tobacco: He reports that he has been smoking cigars and cigarettes. He has never used smokeless tobacco.Nicotine/Tobacco Cessation Plan  Information offered: Patient not interested at this time    Alcohol: not currently using    Medication Adherence/Access: no issues reported.    Parkinson's Disease/muscle spasm:  Movement Disorder-related Medications                   8 AM 12 PM 4 pm 8pm At bedtime  prn   Amantadine 100 mg  1 1           Carbidopa/levodopa IR  mg 2.5 2.5 2.5     0.5-1 twice daily     Carbidopa/levodopa CR  mg       2       Clonazepam 2 mg 1   1         Clonazepam 1 mg   1       1   Gabapentin 800mg 1 1 1               Recent changes:  3/22/24:  - Increase carbidopa/levodopa IR. Week 1 take 2.5 tabs three times daily then week 2 take 3 tabs three times daily and continue on this dose. If you notice side effect (dyskinesia, etc) return to previous dose. Stop at lowest effective dose.  - Increase carbidopa/levodopa CR 50/200 mg to 2 tabs at bedtime      At last visit, patient reported \"that he starts to notice his leg moving, body rocking, and lips moving most " "days about 20-30 minutes after his 4pm carbidopa/levodopa dose that lasts a few hours, usually much better by bedtime at 8:30pm. Patient mentioned a few times that he recalls feeling much better when taking just 1.5 tablets at 4pm, which appears to have more recently been 6/2023. Per chart review, the last time doses were increased was 3/22/2024, from 2 to 2.5, then to 3 tablets with each dose.\"  Plan:  Reduce 4pm carbidopa/levodopa from 2.5 to 2 tablets x 1 week. If no improvement and nothing is worse, ok to further reduce to 1.5 tablets (the latter of which was his preference).    Today, he reported feeling better in general. He is no longer experiencing rocking or leg movements around 4pm and feeling that symptoms are pretty even throughout the day. He does report that neck has been feeling stiff and feels downward pulling that has been going on for the last several months. He did have botox injection on 10/29, which has been helpful, but often seems that they don't the last the full 12 weeks. Spasms are less severe.    Double checked carbidopa/levodopa dosing with staff, who stated that the label on the bubble pack from the pharmacy says 3 tablets twice daily and MAR states 3 tabs AM, 2.5 tabs at noon, 2 tabs at 4pm.  Asked staff to read me the label, which states \"take 2.5tablets twice daily at 8am and noon and 2 tabs at 4pm. After one week, ok to further reduce 4pm dose to 1.5 tablets if feeling ok.\"  Staff was unable to confirm the 4pm dose based on MAR and suggested I call RN.    ----------------    I spent 25 minutes with this patient today. All changes were made via collaborative practice agreement with Ching Carlos. A copy of the visit note was provided to the patient's provider(s).    A summary of these recommendations was sent via clinic portal.    Cary Sol, Cathy  Medication Therapy Management Pharmacist  [facility]      Telemedicine Visit Details  The " patient's medications can be safely assessed via a telemedicine encounter.  Type of service:  Telephone visit  Originating Location (pt. Location): Home    Distant Location (provider location):  Off-site  Start Time:  10:00am  End Time:  10:25am     Medication Therapy Recommendations  No medication therapy recommendations to display

## 2024-11-15 ENCOUNTER — VIRTUAL VISIT (OUTPATIENT)
Dept: PHARMACY | Facility: CLINIC | Age: 59
End: 2024-11-15
Attending: PSYCHIATRY & NEUROLOGY
Payer: COMMERCIAL

## 2024-11-15 DIAGNOSIS — G20.C PARKINSONISM, UNSPECIFIED PARKINSONISM TYPE (H): Primary | ICD-10-CM

## 2024-11-15 DIAGNOSIS — M62.838 MUSCLE SPASM: ICD-10-CM

## 2024-11-15 NOTE — PATIENT INSTRUCTIONS
"Recommendations from today's MTM visit:                                                         -Need to clarify current carbidopa/levodopa dosing with RN before sending updates prescriptions. I will call the nurse.    Follow-up: 3 months or sooner if needed  Neurology 1/31/24    It was great speaking with you today.  I value your experience and would be very thankful for your time in providing feedback in our clinic survey. In the next few days, you may receive an email or text message from [employer] with a link to a survey related to your  clinical pharmacist.\"     To schedule another MTM appointment, please call the clinic directly or you may call the MTM scheduling line at [phone].    My Clinical Pharmacist's contact information:                                                      Please feel free to contact me with any questions or concerns you have.      Cary Sol, PharmD  Medication Therapy Management Pharmacist  [facility]    "

## 2024-11-21 ENCOUNTER — TELEPHONE (OUTPATIENT)
Dept: OPHTHALMOLOGY | Facility: CLINIC | Age: 59
End: 2024-11-21
Payer: COMMERCIAL

## 2024-11-21 ENCOUNTER — THERAPY VISIT (OUTPATIENT)
Dept: OCCUPATIONAL THERAPY | Facility: CLINIC | Age: 59
End: 2024-11-21
Attending: PHYSICAL MEDICINE & REHABILITATION
Payer: COMMERCIAL

## 2024-11-21 DIAGNOSIS — G24.3 CERVICAL DYSTONIA: Primary | ICD-10-CM

## 2024-11-21 DIAGNOSIS — G20.A2 PARKINSON'S DISEASE WITHOUT DYSKINESIA, WITH FLUCTUATING MANIFESTATIONS (H): ICD-10-CM

## 2024-11-21 DIAGNOSIS — G24.8 SEGMENTAL DYSTONIA: ICD-10-CM

## 2024-11-21 PROCEDURE — 97110 THERAPEUTIC EXERCISES: CPT | Mod: GO

## 2024-11-21 NOTE — TELEPHONE ENCOUNTER
Left Voicemail (1st Attempt) for the patient to call back and schedule the following:    Appointment type: return  Provider: dr. rob  Return date: 5/21/2025  Specialty phone number: [phone]   Additonal Notes: Return in about 1 year (around 5/21/2025) for Annual Visit-v/t/stefania/Angel ludwig Complex   Orthopedics, Podiatry, Sports Medicine, Ent ,Eye , Audiology, Adult Endocrine & Diabetes, Nutrition & Medication Therapy Management Specialties   [facility]

## 2024-11-22 ENCOUNTER — HOSPITAL ENCOUNTER (EMERGENCY)
Facility: CLINIC | Age: 59
Discharge: HOME | End: 2024-11-22
Attending: FAMILY MEDICINE | Admitting: FAMILY MEDICINE
Payer: COMMERCIAL

## 2024-11-22 ENCOUNTER — APPOINTMENT (OUTPATIENT)
Dept: CT IMAGING | Facility: CLINIC | Age: 59
End: 2024-11-22
Attending: FAMILY MEDICINE
Payer: COMMERCIAL

## 2024-11-22 ENCOUNTER — APPOINTMENT (OUTPATIENT)
Dept: GENERAL RADIOLOGY | Facility: CLINIC | Age: 59
End: 2024-11-22
Attending: FAMILY MEDICINE
Payer: COMMERCIAL

## 2024-11-22 VITALS
OXYGEN SATURATION: 99 % | DIASTOLIC BLOOD PRESSURE: 65 MMHG | WEIGHT: 230 LBS | HEIGHT: 76 IN | RESPIRATION RATE: 16 BRPM | SYSTOLIC BLOOD PRESSURE: 109 MMHG | HEART RATE: 89 BPM | BODY MASS INDEX: 28.01 KG/M2 | TEMPERATURE: 98.1 F
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
## 2024-11-22 PROCEDURE — 99284 EMERGENCY DEPT VISIT MOD MDM: CPT | Performed by: FAMILY MEDICINE

## 2024-11-22 PROCEDURE — 73030 X-RAY EXAM OF SHOULDER: CPT | Mod: RT

## 2024-11-22 PROCEDURE — 72125 CT NECK SPINE W/O DYE: CPT

## 2024-11-22 PROCEDURE — 72100 X-RAY EXAM L-S SPINE 2/3 VWS: CPT

## 2024-11-22 PROCEDURE — 70450 CT HEAD/BRAIN W/O DYE: CPT

## 2024-11-22 ASSESSMENT — ACTIVITIES OF DAILY LIVING (ADL)
ADLS_ACUITY_SCORE: 0

## 2024-11-22 NOTE — ED PROVIDER NOTES
ED Provider Note  [facility]      History     Chief Complaint   Patient presents with    Fall     Patient comes with EMS, fell out of walker to steady (history of parkinson's) was grabbing a shirt and slipped and fell. Right shoulder took the impact. Has some low back pain. No bruising noticed. Denies hitting head. Takes eliquis. 4/10 pain in the shoulder and back.  Comes from group home.      HPI  Benjamin Mathews is a 59 year old male With a history of Parkinson's disease, alcohol abuse, DVT on Eliquis, MDD who presents to the emergency department via EMS from group home following a fall.  Patient fell out of walker and slipped and fell.  And right shoulder took the impact.  He is experiencing some low back pain and shoulder pain on 4/10 on pain scale.  He denies head strike.    Past Medical History  Past Medical History:   Diagnosis Date    Cerebral infarction (H)     Clotting disorder (H)     PE 2019    Dystonia     Parkinson disease (H)      Past Surgical History:   Procedure Laterality Date    APPLY EXTERNAL FIXATOR LOWER EXTREMITY Right 05/21/2023    Procedure: Right  Ankle Closed Reduction and  External Fixator Placement;  Surgeon: Nicole Apodaca MD;  Location: [facility] OR    OPEN REDUCTION INTERNAL FIXATION ANKLE Right 06/15/2023    Procedure: OPEN REDUCTION INTERNAL FIXATION, FRACTURE, ANKLE, removal of external fixator device.;  Surgeon: Jose Bonilla MD;  Location: [facility] OR     acetaminophen (TYLENOL) 325 MG tablet  alfuzosin ER (UROXATRAL) 10 MG 24 hr tablet  amantadine (SYMMETREL) 100 MG capsule  ammonium lactate (LAC-HYDRIN) 12 % external lotion  ANTI-ITCH MAXIMUM STRENGTH 1 % external cream  apixaban ANTICOAGULANT (ELIQUIS) 5 MG tablet  atorvastatin (LIPITOR) 40 MG tablet  bisacodyl (DULCOLAX) 10 MG suppository  carbidopa-levodopa (SINEMET CR)  MG CR tablet  carbidopa-levodopa (SINEMET)  MG tablet  cholecalciferol (VITAMIN D3) 125 mcg (5000 units)  capsule  clonazePAM (KLONOPIN) 1 MG tablet  clonazePAM (KLONOPIN) 2 MG tablet  cloZAPine (CLOZARIL) 50 MG tablet  diclofenac (VOLTAREN) 1 % topical gel  econazole nitrate 1 % external cream  ENEMEEZ MINI 283 MG/5ML enema  finasteride (PROSCAR) 5 MG tablet  gabapentin (NEURONTIN) 800 MG tablet  hydrOXYzine (ATARAX) 25 MG tablet  ketoconazole (NIZORAL) 2 % external cream  ketoconazole (NIZORAL) 2 % external shampoo  lactulose (CHRONULAC) 10 GM/15ML solution  linaclotide (LINZESS) 290 MCG capsule  metoprolol succinate ER (TOPROL XL) 25 MG 24 hr tablet  metoprolol tartrate (LOPRESSOR) 25 MG tablet  multivitamin, therapeutic (THERA-VIT) TABS tablet  pantoprazole (PROTONIX) 40 MG EC tablet  polyethylene glycol (MIRALAX) 17 g packet  SENEXON-S 8.6-50 MG tablet  Sodium Phosphates (ENEMA) 7-19 GM/118ML ENEM  tamsulosin (FLOMAX) 0.4 MG capsule  traZODone (DESYREL) 100 MG tablet  traZODone (DESYREL) 50 MG tablet  venlafaxine (EFFEXOR XR) 150 MG 24 hr capsule  vitamin C (ASCORBIC ACID) 500 MG tablet      Allergies   Allergen Reactions    Amantadine Other (See Comments)     Other reaction(s): Hallucinations  Hallucinations/ lost self control/gambling.     halluicnations  Hallucinations/ lost self control/gambling.     hallucinates  halluicnations  hallucinates  Hallucinations/ lost self control/gambling.       Quetiapine GI Disturbance, Diarrhea and Other (See Comments)     Diarrhea    Diarrhea  Other reaction(s): GI Disturbance  Diarrhea      Duloxetine Other (See Comments)     suicidal  suicidal       Family History  Family History   Problem Relation Age of Onset    Other - See Comments Mother         pulmonary embolism from hip fracture    Pulmonary Embolism Mother     Parkinsonism Father     Neurologic Disorder Brother         dystonia    Dystonia Brother     Other - See Comments Brother             Neurologic Disorder Sister     Parkinsonism Sister         ?med related    Other - See Comments Sister         12/7/1950  "   Depression Sister     Heart Disease Nephew     Glaucoma Maternal Aunt     Glaucoma Maternal Uncle     Macular Degeneration No family hx of      Social History   Social History     Tobacco Use    Smoking status: Every Day     Types: Cigars, Cigarettes    Smokeless tobacco: Never   Vaping Use    Vaping status: Never Used   Substance Use Topics    Alcohol use: Not Currently     Comment: quit 2014    Drug use: Not Currently      A medically appropriate review of systems was performed with pertinent positives and negatives noted in the HPI, and all other systems negative.    Physical Exam   BP: 109/65  Pulse: 89  Temp: 98.1  F (36.7  C)  Resp: 16  Height: 193 cm (6' 4\")  Weight: 104.3 kg (230 lb)  SpO2: 99 %  Physical Exam  Constitutional:       General: He is not in acute distress.     Appearance: Normal appearance. He is not toxic-appearing.   HENT:      Head: Atraumatic.   Eyes:      General: No scleral icterus.     Conjunctiva/sclera: Conjunctivae normal.   Cardiovascular:      Rate and Rhythm: Normal rate.      Heart sounds: Normal heart sounds.   Pulmonary:      Effort: Pulmonary effort is normal. No respiratory distress.      Breath sounds: Normal breath sounds.   Abdominal:      Palpations: Abdomen is soft.      Tenderness: There is no abdominal tenderness.   Musculoskeletal:         General: No deformity.      Right shoulder: Tenderness present. Decreased range of motion.      Cervical back: Neck supple. Spasms and tenderness present.      Lumbar back: Spasms and tenderness present.   Skin:     General: Skin is warm.   Neurological:      General: No focal deficit present.      Mental Status: He is alert and oriented to person, place, and time.      Sensory: No sensory deficit.      Motor: No weakness.      Coordination: Coordination normal.           ED Course, Procedures, & Data      Procedures       Results for orders placed or performed during the hospital encounter of 11/22/24   CT Head w/o Contrast     " Status: None    Narrative    CT SCAN OF THE HEAD WITHOUT CONTRAST   11/22/2024 12:28 PM     HISTORY: Fall.    TECHNIQUE:  Axial images of the head and coronal reformations without  IV contrast material. Radiation dose for this scan was reduced using  automated exposure control, adjustment of the mA and/or kV according  to patient size, or iterative reconstruction technique.    COMPARISON: Head CT 7/31/2024.    FINDINGS: There is no evidence of intracranial hemorrhage, mass, acute  infarct or anomaly. The ventricles are normal in size, shape and  configuration. The brain parenchyma and subarachnoid spaces are  normal.     Small polypoid mucosal lesions in the paranasal sinuses. The bony  calvarium and bones of the skull base appear intact.       Impression    IMPRESSION:   No evidence of acute intracranial hemorrhage, mass, or  herniation.    CHARLI COLE MD         SYSTEM ID:  [id number]   CT Cervical Spine w/o Contrast     Status: None    Narrative    CT CERVICAL SPINE WITHOUT CONTRAST 11/22/2024 12:26 PM     HISTORY: Fall; Neck pain; Trauma.     TECHNIQUE: Axial images of the cervical spine were obtained without  intravenous contrast. Multiplanar reformations were performed.  Radiation dose for this scan was reduced using automated exposure  control, adjustment of the mA and/or kV according to patient size, or  iterative reconstruction technique.    COMPARISON: Cervical spine CT 7/31/2024.    FINDINGS: Cervical spine alignment is within normal limits. No acute  lucent fracture lines. No significant loss of vertebral body height.  Mild to moderate degenerative endplate changes and loss of disc height  in the cervical spine, most pronounced at C3-4 and C4-5. No  significant facet hypertrophy. No significant spinal canal narrowing  in any level. Marked neural foraminal narrowing is on the left at C3-4  and right at C4-5.    Visualized paraspinous soft tissues are unremarkable.      Impression    IMPRESSION:   1. No  evidence of acute fracture or subluxation in the cervical spine.  2. Degenerative changes in the cervical spine as described above.      CHARLI COLE MD         SYSTEM ID:  [id number]   XR Shoulder Right 3 Views     Status: None    Narrative    XR SHOULDER RIGHT G/E 3 VIEWS  11/22/2024 12:47 PM     HISTORY: fall  COMPARISON: 11/6/2022      Impression    IMPRESSION:  No acute fracture or subluxation. Mild acromioclavicular joint  osteoarthrosis.    IRIS REYNAGA MD         SYSTEM ID:  [id number]   XR Lumbar Spine 2/3 Views     Status: None    Narrative    LUMBAR SPINE TWO TO THREE VIEWS  11/22/2024 12:46 PM     HISTORY: Fall.    COMPARISON: None.       Impression    IMPRESSION: Alignment of the lumbar spine is within normal limits. No  loss of vertebral body height. No significant degenerative endplate  changes or loss of intervertebral disc space.      CHARLI COLE MD         SYSTEM ID:  [id number]     Medications - No data to display  Labs Ordered and Resulted from Time of ED Arrival to Time of ED Departure - No data to display  XR Shoulder Right 3 Views   Final Result   IMPRESSION:   No acute fracture or subluxation. Mild acromioclavicular joint   osteoarthrosis.      IRIS REYNAGA MD            SYSTEM ID:  [id number]      XR Lumbar Spine 2/3 Views   Final Result   IMPRESSION: Alignment of the lumbar spine is within normal limits. No   loss of vertebral body height. No significant degenerative endplate   changes or loss of intervertebral disc space.        CHARLI COLE MD            SYSTEM ID:  [id number]      CT Head w/o Contrast   Final Result   IMPRESSION:   No evidence of acute intracranial hemorrhage, mass, or   herniation.      CHARLI COLE MD            SYSTEM ID:  [id number]      CT Cervical Spine w/o Contrast   Final Result   IMPRESSION:    1. No evidence of acute fracture or subluxation in the cervical spine.   2. Degenerative changes in the cervical spine as described above.         CHARLI COLE  MD            SYSTEM ID:  [id number]             Critical care was not performed.     Medical Decision Making  The patient's presentation was of moderate complexity (an acute complicated injury).    The patient's evaluation involved:  ordering and/or review of 3+ test(s) in this encounter (see separate area of note for details)    The patient's management necessitated moderate risk (limitations due to social determinants of health (patient requiring increased assistance at home and is working on obtaining this through his primary care.)).    Assessment & Plan        I have reviewed the nursing notes. I have reviewed the findings, diagnosis, plan and need for follow up with the patient.        Final diagnoses:   Fall at home, initial encounter       oNel Ramos MD  [facility] EMERGENCY DEPARTMENT  11/22/2024     Noel Ramos MD  11/24/24 1012

## 2024-11-22 NOTE — ED TRIAGE NOTES
Patient comes with EMS, fell out of walker to steady (history of parkinson's) was grabbing a shirt and slipped and fell. Right shoulder took the impact. Has some low back pain. No bruising noticed. Denies hitting head. Takes eliquis. 4/10 pain in the shoulder and back.  Comes from group home.   Alert and oriented,   116/76  HR 90  99% Room air     Triage Assessment (Adult)       Row Name 11/22/24 1048          Triage Assessment    Airway WDL WDL        Respiratory WDL    Respiratory WDL WDL        Skin Circulation/Temperature WDL    Skin Circulation/Temperature WDL WDL        Cardiac WDL    Cardiac WDL WDL        Peripheral/Neurovascular WDL    Peripheral Neurovascular WDL WDL        Cognitive/Neuro/Behavioral WDL    Cognitive/Neuro/Behavioral WDL WDL

## 2024-11-22 NOTE — DISCHARGE INSTRUCTIONS
Discharge to home with plans to follow-up with your primary MD continue with current supportive cares at assisted living.

## 2024-11-22 NOTE — ED NOTES
Bed: ED20  Expected date:   Expected time:   Means of arrival:   Comments:    59M  Ground fall on thinners  yellow

## 2024-11-23 NOTE — ED NOTES
Waiting on EMS transport. Spoke with EMS For update - estimating at least 1930 before pickup.  Patient transferred rooms and report given to Gabriel

## 2024-12-03 ENCOUNTER — THERAPY VISIT (OUTPATIENT)
Dept: OCCUPATIONAL THERAPY | Facility: CLINIC | Age: 59
End: 2024-12-03
Attending: PHYSICAL MEDICINE & REHABILITATION
Payer: COMMERCIAL

## 2024-12-03 DIAGNOSIS — G24.8 SEGMENTAL DYSTONIA: ICD-10-CM

## 2024-12-03 DIAGNOSIS — G20.A2 PARKINSON'S DISEASE WITHOUT DYSKINESIA, WITH FLUCTUATING MANIFESTATIONS (H): ICD-10-CM

## 2024-12-03 DIAGNOSIS — G24.3 CERVICAL DYSTONIA: Primary | ICD-10-CM

## 2024-12-03 PROCEDURE — 97110 THERAPEUTIC EXERCISES: CPT | Mod: GO

## 2024-12-04 NOTE — PROGRESS NOTES
12/03/24 0500   Appointment Info   Treating Provider Siobhan Hawkins OTR/L   Visits Used 3   Medical Diagnosis Cervical dystonia (G24.3)  - Primary    Segmental dystonia (G24.8)    Parkinson's disease without dyskinesia, with fluctuating manifestations (H) (G20.A2)    Impaired functional mobility, balance, gait, and endurance (Z74.09)   OT Tx Diagnosis incoordination; generalized weakness   Progress Note/Certification   Start Of Care Date 11/14/24   Onset of Illness/Injury or Date of Surgery 10/29/24  (order date)   Therapy Frequency 1x/week   Predicted Duration 4 weeks   Certification date from 11/14/24   Certification date to 12/12/24   Goals   OT Goals 1;2   OT Goal 1   Goal Identifier RUE - AROM   Goal Description Pt to increase bilateral shoulder flexion AROMby 20 degrees for improved UE function for ADL/IADLs (dressing, bathing, grooming tasks, etc).   Goal Progress Shoulder flexion and abduction AROM in sitting against gravity remains at ~110-120 degrees. He has been encouraged to continue with UE HEP including gravity reduced exercises to optimize shoulder ROM   Target Date 12/12/24   OT Goal 2   Goal Identifier HEP   Goal Description Pt will demonstrate independent completion of 3-5 activity HEP for UE strength/coordination to promote independence with ADLs/IADLs (e.g. dressing, bathing, grooming/hygiene, etc).   Goal Progress Pt has been trained in UE HEP including self-range of motion exercises in sitting and supine, AROM and UE strengthening exercise to enable improved ROM and strength  for improved IND in ADL/IADL's   Target Date 12/12/24   Date Met 12/03/24   Subjective Report   Subjective Report Pt presented to session in power wheelchair via transportation. He sustained a fall 11/222/24 when using walker and reaching to get a shirt and went to ER. He reports hip pain. He is completing his exercises. He is scheduled with PT next week   Therapeutic Procedure/Exercise   Therapeutic Procedure:  strength, endurance, ROM, flexibillity minutes (04470) 40   Ther Proc 1 - Details Skilled facilitation and progression of graded UE exercise to enable improved ROM and strength for ADL/IADL's including: dowel exercises including shoulder flexion, horizontal shoulder abduction, and and chest press using dowel 10x2 and then progressed to graded strengthening exercises using yellow therband including: horizontal shoulder abduction, shoulder flexion/punches, shoulder ER, and elbow extension 10x2.  Pt returns demo with verbal and visual cues. Patient performs exercises without pain. Issued instructional handouts and pt encouraged to perform 2-3 sets of 10, 100% comprehension   Patient Response/Progress Pt verbalizes and demo'd understanding of exercises and feels confident to discharge. He begins PT next week   Education   Learner/Method Patient;Listening;Demonstration   Comments   Comments Discharge Summary:    Patient is a 59 year old male diagnosed with segmental dystonia and parkinson's disease who was referred to outpatient occupational therapy at the request of Dr. Arita in setting of dystonia and RUE weakness s/p botox injections on 10/19/24.   He has participated in 3 outpatient occupational therapy sessions since start of care. He has been trained on and verbalizes/demo's understanding of graded UE exercises program to enable improved ROM and strength for ADL/IADL's including self-range of motion exercises in sitting and supine, AROM and UE strengthening exercises using yellow theraband to enable improved ROM and strength for improved IND in ADL/IADL's. Pt feels confident implementing exericses at home and ready for discharge. He is scheduled for PT next week.   Total Session Time   Timed Code Treatment Minutes 40   Total Treatment Time (sum of timed and untimed services) 40         DISCHARGE  Reason for Discharge: Patient has met or partially met all goals.    Equipment Issued: yellow therband    Discharge  Plan: Patient to continue home program.    Referring Provider:  Ember Arita

## 2024-12-04 NOTE — PROGRESS NOTES
Palliative Care Outpatient Clinic Progress Note    Patient Name: Benjamin Mathews is being evaluated in person.    Primary Provider:  Stephanie Maldonado  Last seen by palliative care: 02/08/2023      Chief Complaint: Dystonia, constipation, depression    Background/Summary    Medical:  Patient diagnosed with Parkinson's in 2013 after a short period of tremor and gait changes. 2015 symptoms significant worsened. He is followed by  of Neurology and most recently Dr. Carlos with the movement disorders clinic.      Social  Grew up in [place]. 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 Around this time is when he was diagnosed with Parkinson's and work became difficult. Brother Robert and sister-in law that come to visit him in his retirement which he appreciates. He has a sister and brother in law who are MDs, work for [employer] (In [place]). Another sister lives locally but they are not in contact. Father had Parkinson's and passed over 10 years ago. He was primary caretaker for his mother and father before they passed.      Lives in a group home with 4 other people in [place].  Is able to walk around the house using his four-wheel walker; uses his wheelchair for longer distances.       Care Planning   No advanced directive. Ongoing conversation regarding who would help make medical decisions. Overall is isolated, most likely his sister Isha.     Opioid Safety   : reviewed 05/11/2023   Clonazepam 2mg tablet x 60 tablets on 04/19/23  Clonazepam 1mg tablet - 30 tablets on 04/19/23    Interim History:  History gathered today from: patient     Mr. Mathews is a 57 year old male with Parkinson's disease who is following up for management of dystonia, constipation, and mood.     Last palliative visit on 02/08/23. No medication changes were made with regard to pain. We discussed constipation, he had  Prophylactic measure also seen GI who made recommendations including miralax, lubiprostone, bisacodyl.     Since last visit he has been seen in PM&R clinic on 04/27 for botox. Right neck and right lower extremity injected.   Plan to follow up in 12 weeks.     Today Benjamin reports that his spasms have remained stable. Feels botox has been helpful. Frequency of spasm episodes occurs less, about 3 times per month now. They do last a few hours each time, uses PRN rescue clonazepam. Continues to use scheduled clonazepam 2mg AM, 1mg afternoon, 2mg HS. Uses PRN dose once every few weeks as needed.     Long history of severe constipation. Patient is on multiple medications, administered by group home staff. Currently he knows he is taking miralax once daily and enulose once daily. Believes he is taking lubiprostone and oral bisacodyl, but unsure as meds are administered. He has needed fleet enema and suppository weekly.     Mood is low but stable. This past winter was hard for him with limited outdoor trips due to weather. He follows with psychiatrist at [facility] and seeing psychologist [facility] with [facility]. Finds this very helpful. An [facility] worker will be making a social visit. Reports his brother does visit from time to time and really appreciates these visits. Hoping to start playing pool again. Enjoys going to [employer], getting a donut and cigar. This gives him hope.     Appetite is stable. No nausea or vomiting. Sleeping quite well.     Functional Status:  Palliative Performance Scale: 50%  Previous: 50%            Data / Chart Review:    Review of Systems:   ROS: 10 point ROS neg other than the symptoms noted above in the HPI and pertinents here.         Physical Exam:   Physical Exam:  BP 96/65   Pulse 82   Temp 98.1  F (36.7  C) (Oral)   Resp 16   Wt 104.8 kg (231 lb)   SpO2 98%   BMI 28.10 kg/m        CONSTIT: awake, sitting up in wheel chair, appears comfortable  EENT: EOMI, no  icterus  CV: RRR  RESP: reg, normal effort, no cough, clear to auscultation  NEURO: alert, oriented x3  PSYCH: normal affect, memory and thought process intact        Current pertinent medications:  Current Outpatient Medications   Medication     acetaminophen (TYLENOL) 500 MG tablet     alfuzosin ER (UROXATRAL) 10 MG 24 hr tablet     amantadine (SYMMETREL) 100 MG capsule     atorvastatin (LIPITOR) 40 MG tablet     bisacodyl (DULCOLAX) 10 MG suppository     bisacodyl (DULCOLAX) 5 MG EC tablet     calcium polycarbophil (FIBER-LAX) 625 MG tablet     carbidopa-levodopa (SINEMET CR)  MG CR tablet     carbidopa-levodopa (SINEMET)  MG tablet     cholecalciferol (VITAMIN D3) 125 mcg (5000 units) capsule     clonazePAM (KLONOPIN) 1 MG tablet     clonazePAM (KLONOPIN) 1 MG tablet     clonazePAM (KLONOPIN) 2 MG tablet     cloZAPine (CLOZARIL) 50 MG tablet     ENULOSE 10 GM/15ML SOLUTION     gabapentin (NEURONTIN) 800 MG tablet     hydrocortisone 1 % CREA cream     hydrOXYzine (ATARAX) 25 MG tablet     lubiprostone (AMITIZA) 24 MCG capsule     metoprolol succinate ER (TOPROL-XL) 25 MG 24 hr tablet     Multiple Vitamin (DAILY-ALLAN) TABS     pantoprazole (PROTONIX) 40 MG EC tablet     polyethylene glycol (MIRALAX) 17 GM/Dose powder     rivaroxaban ANTICOAGULANT (XARELTO) 20 MG TABS tablet     sodium phosphate (FLEET ENEMA) 7-19 GM/118ML rectal enema     traZODone (DESYREL) 100 MG tablet     traZODone (DESYREL) 50 MG tablet     triamcinolone (KENALOG) 0.1 % external cream     venlafaxine (EFFEXOR XR) 150 MG 24 hr capsule     venlafaxine (EFFEXOR XR) 75 MG 24 hr capsule     vitamin C (ASCORBIC ACID) 500 MG tablet     Current Facility-Administered Medications   Medication     botulinum toxin type A (BOTOX) 100 units injection 400 Units            Allergies   Allergen Reactions     Amantadine Other (See Comments)     Other reaction(s): Hallucinations  Hallucinations/ lost self control/gambling.      halluicnations  Hallucinations/ lost self control/gambling.     hallucinates  halluicnations  hallucinates  Hallucinations/ lost self control/gambling.        Quetiapine GI Disturbance, Diarrhea and Other (See Comments)     Diarrhea    Diarrhea  Other reaction(s): GI Disturbance  Diarrhea       Duloxetine Other (See Comments)     suicidal  suicidal             Lab and imaging data reviewed:  DEXA scan - Osteopenia      Impression & Recommendations & Counseling:  Benjamin Mathews is a 58 yo male with Parkinson's disease with neurocognitive disorder, dyskinesias, stroke, chronic pain who presents for follow up of right sided dystonia, constipation, and depression.       Dystonia:  Significant improvement since starting botox, mostly with his right leg. Following with PM&R every 12 weeks. Will continue clonazepam current dose, has been very helpful.     - Continue clonazepam 2mg 8 AM, 1mg noon, 2mg 8 PM   - Continue amantadine 100 BID   - Followed by PM&R, next follow up 07/25/2023   - Will be starting PT at [facility] next month      Constipation:  Long history of constipation. He was see by GI in the past. Feels miralax and enulose was somewhat helpful but not taking it as frequently as prescribed. We discussed simplifying his regimen, however he would like to continue as is and increase miralax for now.   - Miralax 17g twice a day with breakfast and supper  - Bisacodyl 5mg three times a week  - Lubiprostone twice a day  - Bisacodyl suppository PRN  - Fleet enema PRN  - Pelvic floor PT - for constipation, placed external referral. He schedule at [facility] if available, if not will call us.      Low mood, social isolation:  Some improvement coming out of winter. He shared how challenging it is to know he has a progressive illness. Trying to stay motivated to get out of the home.   - Followed by psychiatry  - Continue seeing psychologist  -[facility] volunteer/worker visit      Return to clinic in 3-4 months.   Patient will call clinic with concerns.        AJAY Langley [facility] LUIGI  Palliative Medicine Fellow    I, Bernadette Arriaza MD personally examined and evaluated this patient on 5/11/23. I discussed the patient with Dr Sosa and care team, and agree with the assessment and plan of care as documented in the fellow s note of 5/11/23.  I personally reviewed medications, labs, imaging, vital signs as indicated.  Key findings: Patient seen for parkinson's disease in context of social isolation, made adjustments to constipation management based on history, chart review, exam.     Bernadette Arriaza MD  Palliative Medicine  Securely message with the Vocera Web Console (learn more here)

## 2024-12-12 ENCOUNTER — THERAPY VISIT (OUTPATIENT)
Dept: PHYSICAL THERAPY | Facility: CLINIC | Age: 59
End: 2024-12-12
Attending: PHYSICAL MEDICINE & REHABILITATION
Payer: COMMERCIAL

## 2024-12-12 DIAGNOSIS — Z74.09 IMPAIRED FUNCTIONAL MOBILITY, BALANCE, GAIT, AND ENDURANCE: ICD-10-CM

## 2024-12-12 DIAGNOSIS — G24.3 CERVICAL DYSTONIA: ICD-10-CM

## 2024-12-12 DIAGNOSIS — G24.8 SEGMENTAL DYSTONIA: ICD-10-CM

## 2024-12-12 DIAGNOSIS — G20.A2 PARKINSON'S DISEASE WITHOUT DYSKINESIA, WITH FLUCTUATING MANIFESTATIONS (H): ICD-10-CM

## 2024-12-12 PROCEDURE — 97110 THERAPEUTIC EXERCISES: CPT | Mod: GP

## 2024-12-12 PROCEDURE — 97162 PT EVAL MOD COMPLEX 30 MIN: CPT | Mod: GP

## 2024-12-12 NOTE — PROGRESS NOTES
PHYSICAL THERAPY EVALUATION  Type of Visit: Evaluation        Fall Risk Screen:  Fall screen completed by: PT  Have you fallen 2 or more times in the past year?: Yes  Have you fallen and had an injury in the past year?: No  Is patient a fall risk?: Yes; Department fall risk interventions implemented    Subjective   Per chart review Benjamin was diagnosed with Parkinson's Disease in 2013 and is currently taking carbidopa/levodopa. Recently had botox injections in the flexor digitorum longus, gastroc, fibular longus on 10/29 (noted to have increased tone in his R LE resulting in a foot PF/inversion pattern.    Noted to have had a fall resulting in right ankle fracture s/p closed reduction and external fixation on 5/20/23 and another fall on 11/22/24 when standing with the use of a walker, reaching for a shirt. Was living in a TCU after the fall in 2023 but is now living in a small assistive living.   Benjamin arrives independently driving [facility]. He shares that he does not have balance when standing (no ankle DF strength to catch self. He notes that he is currently using a 4WW to get from his chair to the bedroom or the bathroom with 1 person assist. Will go out to [place] and is able to walk from the car to a chair which is about 200 feet, will then feel fatigued and need to sit for about 5 minutes. Notes that he has a L sided tremor, that seems to be worst in his L hand. And will get spasms from his R eye down to his R toe that start as spasms and at times will lead to full R sided tremors. He shares that this has been happening more- about 1-2x/week and has made it hard to promise that he will be somewhere as it is difficult to predict spasms.   Notes that he is diagnosed with atypical Parkinson's disease, and shares that he is told providers think he may have stiff persons disease.   Eating is sometimes a challenge- wasn't able to swallow the other day. Has done a swallow study in the past but is not wanting to perform one  right now as he does not want to have to drink thickened liquids which feel dehydrating.   Weight training really sets muscles off (using weights and lifting them causes cramps in muscles)         Presenting condition or subjective complaint: (Patient-Rptd) Docter  Date of onset: 10/29/24    Relevant medical history:    has a past medical history of Cerebral infarction (H), Clotting disorder (H), Dystonia, and Parkinson disease (H). Hx of CVA w/L hemiparesis, cervical dystonia, dytonic movement of R UE and LE  Dates & types of surgery:      Prior diagnostic imaging/testing results: (Patient-Rptd) CT scan     Prior therapy history for the same diagnosis, illness or injury:        Living Environment  Social support: (Patient-Rptd) Alone   Type of home: (Patient-Rptd) House; Assisted Living; 1 level   Stairs to enter the home:         Ramp: (Patient-Rptd) Yes   Stairs inside the home: (Patient-Rptd) No       Help at home: (Patient-Rptd) Self Cares (home health aide/personal care attendant, family, etc)  Equipment owned: (Patient-Rptd) Walker; Walker with wheels; Power wheelchair or scooter; Bedrail; Grab bars; Commode; Bath bench     Employment:      Hobbies/Interests:      Patient goals for therapy: (Patient-Rptd) shannon hassan Would like better balance, improved standing balance, increase walking distance (shares that if he were able to walk 400 feet, this would feel like an accomplishment)     Pain assessment: No pain reported      Objective   Cognitive Status Examination  Level of Consciousness: Alert  Follows Commands and Answers Questions: 100% of the time, Follows multi step instructions  Personal Safety and Judgement: Intact  Memory: Intact    RANGE OF MOTION: Limited ROM, lacking ~30 degrees of knee extension, ~20 degrees of hip extension in standing, limited ankle ROM B   STRENGTH: 3/5 B hip and knee strength     TRANSFERS: Indpendent with use of B UE and rocking to use momentum     GAIT: Ambulates with 4WW  with CGA 55 feet    BALANCE: Able to balance without UE support on walker in standing for up to 5s    SPECIAL TESTS  Standing tolerance    1 minute 20 seconds with intermittne use of HR on 4WW         Modified CTSIB Conditions (sec) Cond 1: 5 s on first trial, 6 s on second trial   *performed with 2 inches between feet   Cond 2:   Cond 4:  Cond 5 :        Assessment & Plan   CLINICAL IMPRESSIONS  Medical Diagnosis: Cervical dystonia (G24.3), Segmental dystonia (G24.8), Parkinson's disease without dyskinesia, with fluctuating manifestations (H) (G20.A2), Impaired functional mobility, balance, gait, and endurance (Z74.09)    Treatment Diagnosis: Force production deficit, sensory detection deficit   Impression/Assessment: Patient reports decreased functional mobility and balance .  The following significant findings have been identified: Decreased ROM/flexibility, Decreased strength, Impaired balance, Impaired gait, Impaired muscle performance, Decreased activity tolerance, Impaired posture, and Instability. These impairments interfere with their ability to perform self care tasks, recreational activities, household chores, household mobility, and community mobility as compared to previous level of function. Testing as reported above indicate decreased safety and balance with functional mobility. This patient will benefit from skilled physical therapy services to address these concerns.      Clinical Decision Making (Complexity):  Clinical Presentation: Evolving/Changing  Clinical Presentation Rationale: based on medical and personal factors listed in PT evaluation  Clinical Decision Making (Complexity): Moderate complexity    PLAN OF CARE  Treatment Interventions:  Interventions: Gait Training, Manual Therapy, Neuromuscular Re-education, Therapeutic Activity, Therapeutic Exercise, Self-Care/Home Management, Standardized Testing    Long Term Goals     PT Goal 1  Goal Identifier: HEP  Goal Description: Pt will be able  to demonstrate HEP activities without need for cues from provider to demonstrate understanding and independence with HEP.  Rationale: to maximize safety and independence with performance of ADLs and functional tasks  Target Date: 02/13/25  PT Goal 2  Goal Identifier: Static balance  Goal Description: Pt will be able to stand for at least 5 minutes at a time with use of B UE on locked 4WW before needing to sit to demonstrate improved LE strength and activity tolerance in order to prepare quick meals for himself.  Rationale: to maximize safety and independence with performance of ADLs and functional tasks  Target Date: 02/13/25  PT Goal 3  Goal Identifier: Gait distance  Goal Description: Pt will be able to ambulate at least 400 feet with use of 4WW before needing to sit to demonstrate improved functional mobility for ambulation into resturants.  Rationale: to maximize safety and independence with performance of ADLs and functional tasks  Target Date: 02/13/25  PT Goal 4  Goal Identifier: STS  Goal Description: Pt will demonstrate the ability to stand from PWC without use of rocking or momentum to stand on 4/5 attempts during a session to demonstrate improved LE strength  Rationale: to maximize safety and independence with performance of ADLs and functional tasks  Target Date: 02/13/25      Frequency of Treatment: 2x/week  Duration of Treatment: 10 weeks    Recommended Referrals to Other Professionals:  None at this time  Education Assessment:   Learner/Method: Patient;Demonstration;Pictures/Video;Listening    Risks and benefits of evaluation/treatment have been explained.   Patient/Family/caregiver agrees with Plan of Care.     Evaluation Time:     PT Eval, Moderate Complexity Minutes (25562): 21     Signing Clinician: Camelia Hargrove PT        [facility]                                                                                   OUTPATIENT PHYSICAL THERAPY      PLAN OF  TREATMENT FOR OUTPATIENT REHABILITATION   Patient's Last Name, First Name, CARMENMonaJAYMona  Benjamin Mathews YOB: 1965   Provider's Name   [facility]   Medical Record No.  [id number]     Onset Date: 10/29/24  Start of Care Date: 12/12/24     Medical Diagnosis:  Cervical dystonia (G24.3), Segmental dystonia (G24.8), Parkinson's disease without dyskinesia, with fluctuating manifestations (H) (G20.A2), Impaired functional mobility, balance, gait, and endurance (Z74.09)    PT Treatment Diagnosis:  Force production deficit, sensory detection deficit Plan of Treatment  Frequency/Duration: 2x/week/ 10 weeks    Certification date from 12/12/24 to 02/13/25         See note for plan of treatment details and functional goals     Camelia Hargrove, PT                         I CERTIFY THE NEED FOR THESE SERVICES FURNISHED UNDER        THIS PLAN OF TREATMENT AND WHILE UNDER MY CARE     (Physician attestation of this document indicates review and certification of the therapy plan).              Referring Provider:  Ember Arita    Initial Assessment  See Epic Evaluation- Start of Care Date: 12/12/24

## 2024-12-17 ENCOUNTER — THERAPY VISIT (OUTPATIENT)
Dept: PHYSICAL THERAPY | Facility: CLINIC | Age: 59
End: 2024-12-17
Attending: PHYSICAL MEDICINE & REHABILITATION
Payer: COMMERCIAL

## 2024-12-17 DIAGNOSIS — G20.A2 PARKINSON'S DISEASE WITHOUT DYSKINESIA, WITH FLUCTUATING MANIFESTATIONS (H): Primary | ICD-10-CM

## 2024-12-17 PROCEDURE — 97110 THERAPEUTIC EXERCISES: CPT | Mod: GP | Performed by: PHYSICAL THERAPIST

## 2024-12-19 ENCOUNTER — THERAPY VISIT (OUTPATIENT)
Dept: PHYSICAL THERAPY | Facility: CLINIC | Age: 59
End: 2024-12-19
Attending: PHYSICAL MEDICINE & REHABILITATION
Payer: COMMERCIAL

## 2024-12-19 DIAGNOSIS — Z74.09 IMPAIRED FUNCTIONAL MOBILITY, BALANCE, GAIT, AND ENDURANCE: Primary | ICD-10-CM

## 2024-12-19 PROCEDURE — 97116 GAIT TRAINING THERAPY: CPT | Mod: GP | Performed by: PHYSICAL THERAPIST

## 2024-12-19 PROCEDURE — 97110 THERAPEUTIC EXERCISES: CPT | Mod: GP | Performed by: PHYSICAL THERAPIST

## 2024-12-26 ENCOUNTER — THERAPY VISIT (OUTPATIENT)
Dept: PHYSICAL THERAPY | Facility: CLINIC | Age: 59
End: 2024-12-26
Attending: PHYSICAL MEDICINE & REHABILITATION
Payer: COMMERCIAL

## 2024-12-26 DIAGNOSIS — Z74.09 IMPAIRED FUNCTIONAL MOBILITY, BALANCE, GAIT, AND ENDURANCE: Primary | ICD-10-CM

## 2024-12-26 PROCEDURE — 97110 THERAPEUTIC EXERCISES: CPT | Mod: GP | Performed by: PHYSICAL THERAPIST

## 2024-12-26 PROCEDURE — 97116 GAIT TRAINING THERAPY: CPT | Mod: GP | Performed by: PHYSICAL THERAPIST

## 2024-12-31 DIAGNOSIS — G20.A1 PARKINSON'S DISEASE WITHOUT DYSKINESIA OR FLUCTUATING MANIFESTATIONS (H): ICD-10-CM

## 2024-12-31 RX ORDER — AMANTADINE HYDROCHLORIDE 100 MG/1
100 CAPSULE, GELATIN COATED ORAL 2 TIMES DAILY
Qty: 60 CAPSULE | Refills: 11 | Status: SHIPPED | OUTPATIENT
Start: 2024-12-31

## 2024-12-31 NOTE — TELEPHONE ENCOUNTER
Pending Prescriptions:                       Disp   Refills    amantadine (SYMMETREL) 100 MG capsule     60 cap*11           Sig: Take 1 capsule (100 mg) by mouth 2 times daily.    Last refill:      Disp Refills Start End SONA   amantadine (SYMMETREL) 100 MG capsule 60 capsule 11 12/20/2023        Next appt:    1/31/2025 11:30 AM (Arrive by 11:15 AM) Ching Carlos DO [facility] Neurology Clinic [facility]       Medication T'd for review and signature

## 2025-01-05 ENCOUNTER — HEALTH MAINTENANCE LETTER (OUTPATIENT)
Age: 60
End: 2025-01-05

## 2025-01-13 ENCOUNTER — THERAPY VISIT (OUTPATIENT)
Dept: PHYSICAL THERAPY | Facility: CLINIC | Age: 60
End: 2025-01-13
Attending: PHYSICAL MEDICINE & REHABILITATION
Payer: COMMERCIAL

## 2025-01-13 DIAGNOSIS — Z74.09 IMPAIRED FUNCTIONAL MOBILITY, BALANCE, GAIT, AND ENDURANCE: Primary | ICD-10-CM

## 2025-01-13 PROCEDURE — 97110 THERAPEUTIC EXERCISES: CPT | Mod: GP | Performed by: PHYSICAL THERAPIST

## 2025-01-13 PROCEDURE — 97116 GAIT TRAINING THERAPY: CPT | Mod: GP | Performed by: PHYSICAL THERAPIST

## 2025-01-14 ENCOUNTER — TELEPHONE (OUTPATIENT)
Dept: NEUROLOGY | Facility: CLINIC | Age: 60
End: 2025-01-14
Payer: COMMERCIAL

## 2025-01-14 DIAGNOSIS — G24.9 DYSTONIA: ICD-10-CM

## 2025-01-14 DIAGNOSIS — G20.A1 PARKINSON'S DISEASE WITHOUT DYSKINESIA OR FLUCTUATING MANIFESTATIONS (H): ICD-10-CM

## 2025-01-14 NOTE — TELEPHONE ENCOUNTER
Refill request for the following medication (s) listed below.    Pending Prescriptions:                       Disp   Refills    clonazePAM (KLONOPIN) 2 MG tablet         60 tab*5            Sig: Take 1 tablet (2 mg) by mouth 2 times daily. Take           at 8am and 4 pm        Next appointment scheduled:   1/31/2025 11:30 AM (Arrive by 11:15 AM) Ching Carlos DO [facility]         Medication T'd for review and signature

## 2025-01-14 NOTE — TELEPHONE ENCOUNTER
[facility] Call Center    Phone Message    May a detailed message be left on voicemail: yes     Reason for Call: Medication Refill Request    Has the patient contacted the pharmacy for the refill? Yes   Name of medication being requested: clonazePAM (KLONOPIN) 2 MG tablet   Provider who prescribed the medication: Ching Carlos DO   Pharmacy: [facility]  Date medication is needed: 01/14/2025    Jenni, nurse at [facility] facility, requesting refill be sent as soon as possible as the patient is completely out of the medication.     Action Taken: Message routed to:  Other: [facility] Neu[facility]    Travel Screening: Not Applicable

## 2025-01-15 NOTE — TELEPHONE ENCOUNTER
Refill request sent to Dr. Carlos yesterday. Still waiting for the provider. Will call to let caller know when refilled.

## 2025-01-16 RX ORDER — CLONAZEPAM 2 MG/1
2 TABLET ORAL 2 TIMES DAILY
Qty: 60 TABLET | Refills: 5 | Status: SHIPPED | OUTPATIENT
Start: 2025-01-16

## 2025-01-17 ENCOUNTER — THERAPY VISIT (OUTPATIENT)
Dept: PHYSICAL THERAPY | Facility: CLINIC | Age: 60
End: 2025-01-17
Attending: PHYSICAL MEDICINE & REHABILITATION
Payer: COMMERCIAL

## 2025-01-17 DIAGNOSIS — Z74.09 IMPAIRED FUNCTIONAL MOBILITY, BALANCE, GAIT, AND ENDURANCE: Primary | ICD-10-CM

## 2025-01-17 PROCEDURE — 97116 GAIT TRAINING THERAPY: CPT | Mod: GP | Performed by: PHYSICAL THERAPIST

## 2025-01-17 PROCEDURE — 97110 THERAPEUTIC EXERCISES: CPT | Mod: GP | Performed by: PHYSICAL THERAPIST

## 2025-01-17 NOTE — PATIENT INSTRUCTIONS
Benjamin's PT Homework--       1) Standing for 1 minute 5 times a day to build leg strength and increase your standing abilities     2) Walk around living room and/or dining room area a few times per day with walker to build walking endurance/strength.     3) continue stretching your legs on your back throughout the day for tightness.     Thanks,  Lyle, PT

## 2025-01-21 ENCOUNTER — OFFICE VISIT (OUTPATIENT)
Dept: PHYSICAL MEDICINE AND REHAB | Facility: CLINIC | Age: 60
End: 2025-01-21
Payer: COMMERCIAL

## 2025-01-21 DIAGNOSIS — G20.A2 PARKINSON'S DISEASE WITHOUT DYSKINESIA, WITH FLUCTUATING MANIFESTATIONS (H): ICD-10-CM

## 2025-01-21 DIAGNOSIS — G24.8 SEGMENTAL DYSTONIA: ICD-10-CM

## 2025-01-21 DIAGNOSIS — G24.3 CERVICAL DYSTONIA: Primary | ICD-10-CM

## 2025-01-21 NOTE — LETTER
1/21/2025       RE: Benjamin Mathews  [address]  [facility] [address]     Dear Colleague,    Thank you for referring your patient, Benjamin Mathews, to the [facility] PHYSICAL MEDICINE AND REHABILITATION CLINIC [place] at [facility]. Please see a copy of my visit note below.      [facility] CLINIC    PM&R CLINIC NOTE  BOTULINUM TOXIN PROCEDURE      HPI  No chief complaint on file.    Benjamin Mathews is a 59 year old male who was referred to our clinic for more evaluation of his dystonia and trial of botulinum toxin injections (referral from Dr. Suazo). He was seen on 10/6/22 as initial consult; please see the consult note for details. She has history of Parkinsonism and is followed by Dr. Perry. He was referred to palliative care team and has been followed by them since then.       Background information   --saw urology team in Nov 2022 for LUTS likely due to BPH and was started on alfuzosin  --saw Dr. Carlos in Jan 2023; it was recommended to continue carbidopa/levodopa and amantadine. He is also on clonazepam prn. Ok to continue botox injections.   --had neuropsych testing 1/13/23; reviewed the results.    Was admitted on 5/20/23 after a fall resulting in right ankle fracture s/p closed reduction and external fixation by ortho team. He has been at [facility] since then.     Was followed by podiatry team for ulcer on L 2nd toe, which was closed/healed 12/29/23.    --Saw Dr. Carlos 7/24/24; reviewed note and recommendations.   - Continue follow with Dr. Arita for toxin injections and could consider right upper extremity injections to improve dystonia     --8/28/24 saw Dr. Carlos - meds were adjusted again.       --He is working closely with psychology team     SINCE LAST VISIT  Benjamin Mathews was last seen here in clinic on 10/29/24    He has been medically stable since our last visit.  Saw urology  "team on November 11 for symptoms associated with BPH; reviewed their note.  Saw Dr. Morales on November 15; reviewed her note as well.  He went to ED on November 22 after a fall but had no injuries and was discharged home.    Completed occupational therapy with good results.  He is currently working with physical therapy on his endurance with standing and walking and has noticed good improvement.    Denied any side effects from the last round of injections.    Continues to find the injections beneficial.   The benefits wore off in 2.5-3 months. When botox is working, his neck movement and R shoulder ROM is a lot more fluid and he has less \"tugging sensation\" in his pec area and neck.     Injections to his RUE and RLE were beneficial as well. The benefits lasted for about a month with gradual decline in benefits after that.       PHYSICAL EXAM  Full exam was deferred  Skin was intact at the sites of the injection      ALLERGIES  Allergies   Allergen Reactions     Amantadine Other (See Comments)     Other reaction(s): Hallucinations  Hallucinations/ lost self control/gambling.     halluicnations  Hallucinations/ lost self control/gambling.     hallucinates  halluicnations  hallucinates  Hallucinations/ lost self control/gambling.        Quetiapine GI Disturbance, Diarrhea and Other (See Comments)     Diarrhea    Diarrhea  Other reaction(s): GI Disturbance  Diarrhea       Duloxetine Other (See Comments)     suicidal  suicidal         CURRENT MEDICATIONS    Current Outpatient Medications:      acetaminophen (TYLENOL) 325 MG tablet, Take 1-2 tablets (325-650 mg) by mouth every 8 hours as needed for mild pain, Disp: 100 tablet, Rfl: 0     alfuzosin ER (UROXATRAL) 10 MG 24 hr tablet, Take 1 tablet (10 mg) by mouth daily., Disp: 90 tablet, Rfl: 1     amantadine (SYMMETREL) 100 MG capsule, Take 1 capsule (100 mg) by mouth 2 times daily., Disp: 60 capsule, Rfl: 11     ammonium lactate (LAC-HYDRIN) 12 % external lotion, Apply " topically daily as needed, Disp: , Rfl:      ANTI-ITCH MAXIMUM STRENGTH 1 % external cream, Apply topically 2 times daily, Disp: , Rfl:      apixaban ANTICOAGULANT (ELIQUIS) 5 MG tablet, Take 5 mg by mouth 2 times daily, Disp: , Rfl:      atorvastatin (LIPITOR) 40 MG tablet, Take 1 tablet (40 mg) by mouth every evening, Disp: 30 tablet, Rfl: 0     bisacodyl (DULCOLAX) 10 MG suppository, Place 1 suppository (10 mg) rectally daily as needed for constipation, Disp: 10 suppository, Rfl: 0     carbidopa-levodopa (SINEMET CR)  MG CR tablet, Take 2 tablets by mouth at bedtime, Disp: 60 tablet, Rfl: 11     carbidopa-levodopa (SINEMET)  MG tablet, Take 2.5 tabs at 8 am, 12 pm and take 2 tabs at 4 pm., Disp: 210 tablet, Rfl: 11     cholecalciferol (VITAMIN D3) 125 mcg (5000 units) capsule, Take 1 capsule (125 mcg) by mouth daily, Disp: 30 capsule, Rfl: 0     clonazePAM (KLONOPIN) 1 MG tablet, Take 1 tablet scheduled at noon, may take 1 additional tablet PRN, Disp: 60 tablet, Rfl: 5     clonazePAM (KLONOPIN) 2 MG tablet, Take 1 tablet (2 mg) by mouth 2 times daily. Take at 8am and 4 pm, Disp: 60 tablet, Rfl: 5     cloZAPine (CLOZARIL) 50 MG tablet, Take 1 tablet (50 mg) by mouth At Bedtime, Disp: 30 tablet, Rfl: 0     diclofenac (VOLTAREN) 1 % topical gel, Apply 2 g topically 3 times daily as needed for moderate pain, Disp: 150 g, Rfl: 0     econazole nitrate 1 % external cream, Apply topically daily To toes and toenails., Disp: 85 g, Rfl: 5     ENEMEEZ MINI 283 MG/5ML enema, Place 1 enema rectally, Disp: , Rfl:      finasteride (PROSCAR) 5 MG tablet, Take 1 tablet (5 mg) by mouth daily., Disp: 90 tablet, Rfl: 3     gabapentin (NEURONTIN) 800 MG tablet, Take 1 tablet (800 mg) by mouth 3 times daily, Disp: 90 tablet, Rfl: 0     hydrOXYzine (ATARAX) 25 MG tablet, Take 2 tablets (50 mg) by mouth every 6 hours as needed for anxiety (up to 3 timrd daily), Disp: 20 tablet, Rfl: 0     ketoconazole (NIZORAL) 2 % external  cream, Apply topically 2 times daily, Disp: , Rfl:      ketoconazole (NIZORAL) 2 % external shampoo, Apply topically once a week On , Disp: , Rfl:      lactulose (CHRONULAC) 10 GM/15ML solution, Take 15 mLs by mouth 2 times daily, Disp: 473 mL, Rfl: 11     linaclotide (LINZESS) 290 MCG capsule, Take 1 capsule (290 mcg) by mouth daily., Disp: 90 capsule, Rfl: 0     metoprolol succinate ER (TOPROL XL) 25 MG 24 hr tablet, Take 0.5 tablets (12.5 mg) by mouth 2 times daily, Disp: 30 tablet, Rfl: 0     metoprolol tartrate (LOPRESSOR) 25 MG tablet, Take 25 mg by mouth 2 times daily, Disp: , Rfl:      multivitamin, therapeutic (THERA-VIT) TABS tablet, Take 1 tablet by mouth daily, Disp: 30 tablet, Rfl: 0     pantoprazole (PROTONIX) 40 MG EC tablet, Take 1 tablet (40 mg) by mouth daily Take 1 tablet (40mg) by mouth daily at 8am, Disp: 30 tablet, Rfl: 0     polyethylene glycol (MIRALAX) 17 g packet, Take 1 packet by mouth daily, Disp: , Rfl:      SENEXON-S 8.6-50 MG tablet, Take 2 tablets by mouth 2 times daily, Disp: , Rfl:      Sodium Phosphates (ENEMA) 7-19 GM/118ML ENEM, , Disp: , Rfl:      tamsulosin (FLOMAX) 0.4 MG capsule, Take 0.4 mg by mouth daily, Disp: , Rfl:      traZODone (DESYREL) 100 MG tablet, Take 100 mg by mouth At Bedtime, Disp: , Rfl:      traZODone (DESYREL) 50 MG tablet, Take 1 tablet (50 mg) by mouth every morning, Disp: 30 tablet, Rfl: 0     venlafaxine (EFFEXOR XR) 150 MG 24 hr capsule, Take 2 capsules (300 mg) by mouth daily, Disp: 60 capsule, Rfl: 0     vitamin C (ASCORBIC ACID) 500 MG tablet, Take 1 tablet (500 mg) by mouth daily, Disp: 30 tablet, Rfl: 0    Current Facility-Administered Medications:      botulinum toxin type A (BOTOX) 100 units injection 400 Units, 400 Units, Intramuscular, Q90 Days, , 300 Units at 25 1425       BOTULINUM NEUROTOXIN INJECTION PROCEDURES    VERIFICATION OF PATIENT IDENTIFICATION AND PROCEDURE     Initials   Patient Name PS   Patient  PS    Procedure Verified by: PS     Prior to the start of the procedure and with procedural staff participation, I verbally confirmed the patient s identity using two indicators, relevant allergies, that the procedure was appropriate and matched the consent or emergent situation, and that the correct equipment/implants were available. Immediately prior to starting the procedure I conducted the Time Out with the procedural staff and re-confirmed the patient s name, procedure, and site/side. (The Joint Commission universal protocol was followed.)  Yes    Sedation (Moderate or Deep): None    ABOVE ASSESSMENTS PERFORMED BY  Ember Arita MD      INDICATIONS FOR PROCEDURES  Benjamin Mathews is a 59 year old patient with cervical and segmental dystonia secondary to the diagnosis of Parkinson's disease. His baseline symptoms have been recalcitrant to oral medications and conservative therapy.  He is here today for reinjection with Botox.    GOAL OF PROCEDURE  The goal of this procedure is to increase active range of motion, improve volitional motor control, decrease pain  and enhance functional independence.      TOTAL DOSE ADMINISTERED  Dose Administered: 300 units  Botox (Botulinum Toxin Type A) 1:1 dilution -  Unavoidable Drug Waste: No  Diluent Used:  Preservative Free Normal Saline  Total Volume of Diluent Used: 3 ml  See MAR  NDC #: Botox 100u ([address]-50)      CONSENT  The risks, benefits, and treatment options were discussed with Benjamin Mathews and he agreed to proceed.    Written consent was obtained by PS.     EQUIPMENT USED  Needle-35mm stimulating/recording  EMG/NCS Machine    SKIN PREPARATION  Skin preparation was performed using an alcohol wipe.    GUIDANCE DESCRIPTION  Electro-myographic guidance was necessary throughout the procedure to accurately identify all areas of dystonic muscles while avoiding injection of non-dystonic muscles and underlying muscles , neighboring nerves and nearby vascular structures.      AREA/MUSCLE INJECTED  Right neck/shoulder girdle  SCM 10 units at 1 site  Splenius capitis 15 units at 1 site  Splenius cervicis 15 units at 1 site  Levator a scap insertion 40 units at 1 site  Levator at neck 10 units at 1 site   Lateral trap 20 units at 2 sites   Pectoralis major 30 units at 1 site     Right upper extremity   Biceps 20 units at 1 site  Brachialis 20 units at 1 site     Right lower extremity  Flexor digitorum longus 40 units at 1 site  Gastrocnemius 50 units at 2 site  Fibular longus 30 units at 1 site      RESPONSE TO PROCEDURE  Benjamin Mathews tolerated the procedure well and there were no immediate complications. He was allowed to recover for an appropriate period of time and was discharged home in stable condition.    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  Osteopenia (DXA Lowest T-Score -1.8) likely due to disuse c/b limited functional mobility leading to increased fracture risk - assessment 5/2023     DOI: 05/20/2023, GLF, syncopal event, closed R trimalleolar ankle / pilon fracture-dislocation.  DOS: 05/21/2023, closed reduction, application of spanning external fixator.  DOS: 06/15/2023, ORIF R ankle/pilon, removal of external fixator.        Work-up: None     Therapy/equipment/braces: sent new referrals to PT and OT; will work on RUE weakness and dystonic movement. Also needs to work on core strengthening and overall mobility.     Medications: No changes today; currently on clonazepam, sinemet and amantadine by Dr. Carlos.     Interventions: started the first round at 130 units and increased the dose to 270 units on 2/1/2023; kept on the same dose in April 2023. Decreased the total dose to 100 in July 2023 with no injections in RLE due to recent fracture. Increased the dose 10/24/23 from 100 to140 units with  the goal of increasing effectiveness and prolonging duration of benefit of Botox injections. Increased dose to 170 units on 1/22/2024 added 30 units to right FDL to help facilitate comfort and AFO tolerance when he receives it. Increased from 260 to 300 7/2024 with more dose in the RLE for better control of his foot symptoms. No change in the total dose or sites of injections today.    Referral / follow up with other providers: should repeat DEXA in 1 year 5/2025 and will need a new referral for bone health eval at that point. He will continue to follow-up with palliative care team, MTM and movement disorder clinic.     Follow up: in 12 weeks         Ember Arita MD  Physical Medicine & Rehabilitation       Again, thank you for allowing me to participate in the care of your patient.      Sincerely,    Ember Arita MD

## 2025-01-21 NOTE — PROGRESS NOTES
[facility]    PM&R CLINIC NOTE  BOTULINUM TOXIN PROCEDURE      HPI  No chief complaint on file.    Benjamin Mathews is a 59 year old male who was referred to our clinic for more evaluation of his dystonia and trial of botulinum toxin injections (referral from Dr. Suazo). He was seen on 10/6/22 as initial consult; please see the consult note for details. She has history of Parkinsonism and is followed by Dr. Perry. He was referred to palliative care team and has been followed by them since then.       Background information   --saw urology team in Nov 2022 for LUTS likely due to BPH and was started on alfuzosin  --saw Dr. Carlos in Jan 2023; it was recommended to continue carbidopa/levodopa and amantadine. He is also on clonazepam prn. Ok to continue botox injections.   --had neuropsych testing 1/13/23; reviewed the results.    Was admitted on 5/20/23 after a fall resulting in right ankle fracture s/p closed reduction and external fixation by ortho team. He has been at [facility] since then.     Was followed by podiatry team for ulcer on L 2nd toe, which was closed/healed 12/29/23.    --Saw Dr. Carlos 7/24/24; reviewed note and recommendations.   - Continue follow with Dr. Arita for toxin injections and could consider right upper extremity injections to improve dystonia     --8/28/24 saw Dr. Carlos - meds were adjusted again.       --He is working closely with psychology team     SINCE LAST VISIT  Benjamin Mathews was last seen here in clinic on 10/29/24    He has been medically stable since our last visit.  Saw urology team on November 11 for symptoms associated with BPH; reviewed their note.  Saw Dr. Morales on November 15; reviewed her note as well.  He went to ED on November 22 after a fall but had no injuries and was discharged home.    Completed occupational therapy with good results.  He is currently working with physical therapy on his endurance  "with standing and walking and has noticed good improvement.    Denied any side effects from the last round of injections.    Continues to find the injections beneficial.   The benefits wore off in 2.5-3 months. When botox is working, his neck movement and R shoulder ROM is a lot more fluid and he has less \"tugging sensation\" in his pec area and neck.     Injections to his RUE and RLE were beneficial as well. The benefits lasted for about a month with gradual decline in benefits after that.       PHYSICAL EXAM  Full exam was deferred  Skin was intact at the sites of the injection      ALLERGIES  Allergies   Allergen Reactions    Amantadine Other (See Comments)     Other reaction(s): Hallucinations  Hallucinations/ lost self control/gambling.     halluicnations  Hallucinations/ lost self control/gambling.     hallucinates  halluicnations  hallucinates  Hallucinations/ lost self control/gambling.       Quetiapine GI Disturbance, Diarrhea and Other (See Comments)     Diarrhea    Diarrhea  Other reaction(s): GI Disturbance  Diarrhea      Duloxetine Other (See Comments)     suicidal  suicidal         CURRENT MEDICATIONS    Current Outpatient Medications:     acetaminophen (TYLENOL) 325 MG tablet, Take 1-2 tablets (325-650 mg) by mouth every 8 hours as needed for mild pain, Disp: 100 tablet, Rfl: 0    alfuzosin ER (UROXATRAL) 10 MG 24 hr tablet, Take 1 tablet (10 mg) by mouth daily., Disp: 90 tablet, Rfl: 1    amantadine (SYMMETREL) 100 MG capsule, Take 1 capsule (100 mg) by mouth 2 times daily., Disp: 60 capsule, Rfl: 11    ammonium lactate (LAC-HYDRIN) 12 % external lotion, Apply topically daily as needed, Disp: , Rfl:     ANTI-ITCH MAXIMUM STRENGTH 1 % external cream, Apply topically 2 times daily, Disp: , Rfl:     apixaban ANTICOAGULANT (ELIQUIS) 5 MG tablet, Take 5 mg by mouth 2 times daily, Disp: , Rfl:     atorvastatin (LIPITOR) 40 MG tablet, Take 1 tablet (40 mg) by mouth every evening, Disp: 30 tablet, Rfl: 0    " bisacodyl (DULCOLAX) 10 MG suppository, Place 1 suppository (10 mg) rectally daily as needed for constipation, Disp: 10 suppository, Rfl: 0    carbidopa-levodopa (SINEMET CR)  MG CR tablet, Take 2 tablets by mouth at bedtime, Disp: 60 tablet, Rfl: 11    carbidopa-levodopa (SINEMET)  MG tablet, Take 2.5 tabs at 8 am, 12 pm and take 2 tabs at 4 pm., Disp: 210 tablet, Rfl: 11    cholecalciferol (VITAMIN D3) 125 mcg (5000 units) capsule, Take 1 capsule (125 mcg) by mouth daily, Disp: 30 capsule, Rfl: 0    clonazePAM (KLONOPIN) 1 MG tablet, Take 1 tablet scheduled at noon, may take 1 additional tablet PRN, Disp: 60 tablet, Rfl: 5    clonazePAM (KLONOPIN) 2 MG tablet, Take 1 tablet (2 mg) by mouth 2 times daily. Take at 8am and 4 pm, Disp: 60 tablet, Rfl: 5    cloZAPine (CLOZARIL) 50 MG tablet, Take 1 tablet (50 mg) by mouth At Bedtime, Disp: 30 tablet, Rfl: 0    diclofenac (VOLTAREN) 1 % topical gel, Apply 2 g topically 3 times daily as needed for moderate pain, Disp: 150 g, Rfl: 0    econazole nitrate 1 % external cream, Apply topically daily To toes and toenails., Disp: 85 g, Rfl: 5    ENEMEEZ MINI 283 MG/5ML enema, Place 1 enema rectally, Disp: , Rfl:     finasteride (PROSCAR) 5 MG tablet, Take 1 tablet (5 mg) by mouth daily., Disp: 90 tablet, Rfl: 3    gabapentin (NEURONTIN) 800 MG tablet, Take 1 tablet (800 mg) by mouth 3 times daily, Disp: 90 tablet, Rfl: 0    hydrOXYzine (ATARAX) 25 MG tablet, Take 2 tablets (50 mg) by mouth every 6 hours as needed for anxiety (up to 3 timrd daily), Disp: 20 tablet, Rfl: 0    ketoconazole (NIZORAL) 2 % external cream, Apply topically 2 times daily, Disp: , Rfl:     ketoconazole (NIZORAL) 2 % external shampoo, Apply topically once a week On Wednesdays, Disp: , Rfl:     lactulose (CHRONULAC) 10 GM/15ML solution, Take 15 mLs by mouth 2 times daily, Disp: 473 mL, Rfl: 11    linaclotide (LINZESS) 290 MCG capsule, Take 1 capsule (290 mcg) by mouth daily., Disp: 90 capsule,  Rfl: 0    metoprolol succinate ER (TOPROL XL) 25 MG 24 hr tablet, Take 0.5 tablets (12.5 mg) by mouth 2 times daily, Disp: 30 tablet, Rfl: 0    metoprolol tartrate (LOPRESSOR) 25 MG tablet, Take 25 mg by mouth 2 times daily, Disp: , Rfl:     multivitamin, therapeutic (THERA-VIT) TABS tablet, Take 1 tablet by mouth daily, Disp: 30 tablet, Rfl: 0    pantoprazole (PROTONIX) 40 MG EC tablet, Take 1 tablet (40 mg) by mouth daily Take 1 tablet (40mg) by mouth daily at 8am, Disp: 30 tablet, Rfl: 0    polyethylene glycol (MIRALAX) 17 g packet, Take 1 packet by mouth daily, Disp: , Rfl:     SENEXON-S 8.6-50 MG tablet, Take 2 tablets by mouth 2 times daily, Disp: , Rfl:     Sodium Phosphates (ENEMA) 7-19 GM/118ML ENEM, , Disp: , Rfl:     tamsulosin (FLOMAX) 0.4 MG capsule, Take 0.4 mg by mouth daily, Disp: , Rfl:     traZODone (DESYREL) 100 MG tablet, Take 100 mg by mouth At Bedtime, Disp: , Rfl:     traZODone (DESYREL) 50 MG tablet, Take 1 tablet (50 mg) by mouth every morning, Disp: 30 tablet, Rfl: 0    venlafaxine (EFFEXOR XR) 150 MG 24 hr capsule, Take 2 capsules (300 mg) by mouth daily, Disp: 60 capsule, Rfl: 0    vitamin C (ASCORBIC ACID) 500 MG tablet, Take 1 tablet (500 mg) by mouth daily, Disp: 30 tablet, Rfl: 0    Current Facility-Administered Medications:     botulinum toxin type A (BOTOX) 100 units injection 400 Units, 400 Units, Intramuscular, Q90 Days, , 300 Units at 25 1425       BOTULINUM NEUROTOXIN INJECTION PROCEDURES    VERIFICATION OF PATIENT IDENTIFICATION AND PROCEDURE     Initials   Patient Name PS   Patient  PS   Procedure Verified by: PS     Prior to the start of the procedure and with procedural staff participation, I verbally confirmed the patient s identity using two indicators, relevant allergies, that the procedure was appropriate and matched the consent or emergent situation, and that the correct equipment/implants were available. Immediately prior to starting the procedure I conducted  the Time Out with the procedural staff and re-confirmed the patient s name, procedure, and site/side. (The Joint Commission universal protocol was followed.)  Yes    Sedation (Moderate or Deep): None    ABOVE ASSESSMENTS PERFORMED BY  Ember Arita MD      INDICATIONS FOR PROCEDURES  Benjamin Mathews is a 59 year old patient with cervical and segmental dystonia secondary to the diagnosis of Parkinson's disease. His baseline symptoms have been recalcitrant to oral medications and conservative therapy.  He is here today for reinjection with Botox.    GOAL OF PROCEDURE  The goal of this procedure is to increase active range of motion, improve volitional motor control, decrease pain  and enhance functional independence.      TOTAL DOSE ADMINISTERED  Dose Administered: 300 units  Botox (Botulinum Toxin Type A) 1:1 dilution -  Unavoidable Drug Waste: No  Diluent Used:  Preservative Free Normal Saline  Total Volume of Diluent Used: 3 ml  See MAR  NDC #: Botox 100u ([address]-33)      CONSENT  The risks, benefits, and treatment options were discussed with Benjamin Mathews and he agreed to proceed.    Written consent was obtained by PS.     EQUIPMENT USED  Needle-35mm stimulating/recording  EMG/NCS Machine    SKIN PREPARATION  Skin preparation was performed using an alcohol wipe.    GUIDANCE DESCRIPTION  Electro-myographic guidance was necessary throughout the procedure to accurately identify all areas of dystonic muscles while avoiding injection of non-dystonic muscles and underlying muscles , neighboring nerves and nearby vascular structures.     AREA/MUSCLE INJECTED  Right neck/shoulder girdle  SCM 10 units at 1 site  Splenius capitis 15 units at 1 site  Splenius cervicis 15 units at 1 site  Levator a scap insertion 40 units at 1 site  Levator at neck 10 units at 1 site   Lateral trap 20 units at 2 sites   Pectoralis major 30 units at 1 site     Right upper extremity   Biceps 20 units at 1 site  Brachialis 20 units at 1 site      Right lower extremity  Flexor digitorum longus 40 units at 1 site  Gastrocnemius 50 units at 2 site  Fibular longus 30 units at 1 site      RESPONSE TO PROCEDURE  Benjamin Mathews tolerated the procedure well and there were no immediate complications. He was allowed to recover for an appropriate period of time and was discharged home in stable condition.    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  Osteopenia (DXA Lowest T-Score -1.8) likely due to disuse c/b limited functional mobility leading to increased fracture risk - assessment 5/2023     DOI: 05/20/2023, GLF, syncopal event, closed R trimalleolar ankle / pilon fracture-dislocation.  DOS: 05/21/2023, closed reduction, application of spanning external fixator.  DOS: 06/15/2023, ORIF R ankle/pilon, removal of external fixator.        Work-up: None     Therapy/equipment/braces: sent new referrals to PT and OT; will work on RUE weakness and dystonic movement. Also needs to work on core strengthening and overall mobility.     Medications: No changes today; currently on clonazepam, sinemet and amantadine by Dr. Carlos.     Interventions: started the first round at 130 units and increased the dose to 270 units on 2/1/2023; kept on the same dose in April 2023. Decreased the total dose to 100 in July 2023 with no injections in RLE due to recent fracture. Increased the dose 10/24/23 from 100 to140 units with the goal of increasing effectiveness and prolonging duration of benefit of Botox injections. Increased dose to 170 units on 1/22/2024 added 30 units to right FDL to help facilitate comfort and AFO tolerance when he receives it. Increased from 260 to 300 7/2024 with more dose in the RLE for better control of his foot symptoms. No change in the total dose or sites of injections  today.    Referral / follow up with other providers: should repeat DEXA in 1 year 5/2025 and will need a new referral for bone health eval at that point. He will continue to follow-up with palliative care team, MTM and movement disorder clinic.     Follow up: in 12 weeks         Ember Arita MD  Physical Medicine & Rehabilitation

## 2025-01-22 ENCOUNTER — THERAPY VISIT (OUTPATIENT)
Dept: PHYSICAL THERAPY | Facility: CLINIC | Age: 60
End: 2025-01-22
Attending: PHYSICAL MEDICINE & REHABILITATION
Payer: COMMERCIAL

## 2025-01-22 DIAGNOSIS — Z74.09 IMPAIRED FUNCTIONAL MOBILITY, BALANCE, GAIT, AND ENDURANCE: Primary | ICD-10-CM

## 2025-01-22 PROCEDURE — 97116 GAIT TRAINING THERAPY: CPT | Mod: GP | Performed by: PHYSICAL THERAPIST

## 2025-01-22 PROCEDURE — 97110 THERAPEUTIC EXERCISES: CPT | Mod: GP | Performed by: PHYSICAL THERAPIST

## 2025-01-24 ENCOUNTER — THERAPY VISIT (OUTPATIENT)
Dept: PHYSICAL THERAPY | Facility: CLINIC | Age: 60
End: 2025-01-24
Attending: PHYSICAL MEDICINE & REHABILITATION
Payer: COMMERCIAL

## 2025-01-24 DIAGNOSIS — Z74.09 IMPAIRED FUNCTIONAL MOBILITY, BALANCE, GAIT, AND ENDURANCE: Primary | ICD-10-CM

## 2025-01-24 PROCEDURE — 97110 THERAPEUTIC EXERCISES: CPT | Mod: GP | Performed by: PHYSICAL THERAPIST

## 2025-01-24 PROCEDURE — 97116 GAIT TRAINING THERAPY: CPT | Mod: GP | Performed by: PHYSICAL THERAPIST

## 2025-01-28 ENCOUNTER — THERAPY VISIT (OUTPATIENT)
Dept: PHYSICAL THERAPY | Facility: CLINIC | Age: 60
End: 2025-01-28
Attending: PHYSICAL MEDICINE & REHABILITATION
Payer: COMMERCIAL

## 2025-01-28 DIAGNOSIS — Z74.09 IMPAIRED FUNCTIONAL MOBILITY, BALANCE, GAIT, AND ENDURANCE: Primary | ICD-10-CM

## 2025-01-28 PROCEDURE — 97110 THERAPEUTIC EXERCISES: CPT | Mod: GP | Performed by: PHYSICAL THERAPIST

## 2025-01-28 PROCEDURE — 97116 GAIT TRAINING THERAPY: CPT | Mod: GP | Performed by: PHYSICAL THERAPIST

## 2025-02-18 ENCOUNTER — MYC MEDICAL ADVICE (OUTPATIENT)
Dept: GASTROENTEROLOGY | Facility: CLINIC | Age: 60
End: 2025-02-18
Payer: COMMERCIAL

## 2025-02-18 DIAGNOSIS — K59.01 SLOW TRANSIT CONSTIPATION: ICD-10-CM

## 2025-02-18 NOTE — TELEPHONE ENCOUNTER
Faxed refill request from: [facility]  Medication request: linaclotide (LINZESS) 290 MCG capsule   Sig: Take 1 capsule (290 mcg) by mouth daily   Last filled: 10/24/24  Last Qty: 90, Refills: 0  Pt's last office visit: 10/3/23  Next scheduled office visit: None    Rx pended and routed to provider for review and approval if appropriate.

## 2025-02-26 ENCOUNTER — OFFICE VISIT (OUTPATIENT)
Dept: PODIATRY | Facility: CLINIC | Age: 60
End: 2025-02-26
Payer: COMMERCIAL

## 2025-02-26 DIAGNOSIS — S90.822A BLISTER OF LEFT FOOT, INITIAL ENCOUNTER: ICD-10-CM

## 2025-02-26 DIAGNOSIS — Z87.2 HISTORY OF FOOT ULCER: ICD-10-CM

## 2025-02-26 DIAGNOSIS — G60.9 IDIOPATHIC PERIPHERAL NEUROPATHY: Primary | ICD-10-CM

## 2025-02-26 PROCEDURE — 99214 OFFICE O/P EST MOD 30 MIN: CPT | Performed by: PODIATRIST

## 2025-02-26 NOTE — PATIENT INSTRUCTIONS
We wish you continued good healing. If you have any questions or concerns, please do not hesitate to contact us at  [phone]    [facility] (secure e-mail communication and access to your chart) to send a message or to make an appointment.    Please remember to call and schedule a follow up appointment if one was recommended at your earliest convenience.     PODIATRY CLINIC HOURS  TELEPHONE NUMBER    Dr. Cornell CAMPOSPSUZAN FACFAS        Clinics:  [facility]   [place]  Tuesday 1PM-6PM  [place]  Wednesday 745AM-330PM  [place]  Monday 2nd,4th  830AM-4PM  Thursday/Friday 745AM-230PM     [place] APPOINTMENTS  [phone]    [place] APPOINTMENTS  [phone]          If you need a medication refill, please contact us you may need lab work and/or a follow up visit prior to your refill (i.e. Antifungal medications).  If MRI needed please call Imaging at [phone]   HOW DO I GET MY KNEE SCOOTER? Knee scooters can be picked up at ANY Medical Supply stores with your knee scooter Prescription.  OR  Bring your signed prescription to an [facility] showroom.   Set up an appointment for your custom Orthotics. Call any Orthotics locations call [phone]         No need to place bandages on heel  Follow-up with Dr. York
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
## 2025-04-20 ENCOUNTER — HEALTH MAINTENANCE LETTER (OUTPATIENT)
Age: 60
End: 2025-04-20

## 2025-04-21 ENCOUNTER — TELEPHONE (OUTPATIENT)
Dept: GASTROENTEROLOGY | Facility: CLINIC | Age: 60
End: 2025-04-21
Payer: COMMERCIAL

## 2025-04-22 ENCOUNTER — OFFICE VISIT (OUTPATIENT)
Dept: PHYSICAL MEDICINE AND REHAB | Facility: CLINIC | Age: 60
End: 2025-04-22
Payer: COMMERCIAL

## 2025-04-22 DIAGNOSIS — G24.8 SEGMENTAL DYSTONIA: Primary | ICD-10-CM

## 2025-04-22 DIAGNOSIS — G20.A2 PARKINSON'S DISEASE WITHOUT DYSKINESIA, WITH FLUCTUATING MANIFESTATIONS (H): ICD-10-CM

## 2025-04-22 DIAGNOSIS — R13.19 OTHER DYSPHAGIA: ICD-10-CM

## 2025-04-22 PROCEDURE — 95874 GUIDE NERV DESTR NEEDLE EMG: CPT | Performed by: PHYSICAL MEDICINE & REHABILITATION

## 2025-04-22 PROCEDURE — 99213 OFFICE O/P EST LOW 20 MIN: CPT | Mod: 25 | Performed by: PHYSICAL MEDICINE & REHABILITATION

## 2025-04-22 PROCEDURE — 64643 CHEMODENERV 1 EXTREM 1-4 EA: CPT | Performed by: PHYSICAL MEDICINE & REHABILITATION

## 2025-04-22 PROCEDURE — 64642 CHEMODENERV 1 EXTREMITY 1-4: CPT | Performed by: PHYSICAL MEDICINE & REHABILITATION

## 2025-04-22 PROCEDURE — 64616 CHEMODENERV MUSC NECK DYSTON: CPT | Mod: RT | Performed by: PHYSICAL MEDICINE & REHABILITATION

## 2025-04-22 NOTE — PROGRESS NOTES
"  [facility]    PM&R CLINIC NOTE  BOTULINUM TOXIN PROCEDURE      HPI  No chief complaint on file.    Benjamin Mathews is a 60 year old male who was referred to our clinic for more evaluation of his dystonia and trial of botulinum toxin injections (referral from Dr. Suazo). He was seen on 10/6/22 as initial consult; please see the consult note for details. She has history of Parkinsonism and is followed by Dr. Perry. He was referred to palliative care team and has been followed by them since then.       Background information   --saw urology team in Nov 2022 for LUTS likely due to BPH and was started on alfuzosin  --saw Dr. Carlos in Jan 2023; it was recommended to continue carbidopa/levodopa and amantadine. He is also on clonazepam prn. Ok to continue botox injections.   --had neuropsych testing 1/13/23; reviewed the results.    Was admitted on 5/20/23 after a fall resulting in right ankle fracture s/p closed reduction and external fixation by ortho team. He has been at [facility] since then.     Was followed by podiatry team for ulcer on L 2nd toe, which was closed/healed 12/29/23.    --Saw Dr. Carlos 7/24/24; reviewed note and recommendations.   - Continue follow with Dr. Arita for toxin injections and could consider right upper extremity injections to improve dystonia     --8/28/24 saw Dr. Carlos - meds were adjusted again.       --He is working closely with psychology team     SINCE LAST VISIT  Benjamin Mathews was last seen here in clinic on 1/21/25 1/31 - saw Dr. Carlos Office Visit with Ching Carlos DO (01/31/2025)     Was admitted on 4/4 with generalized weakness - today he reported a severe episode of diffuse muscle spasms \"totally seized up\" and simply couldn't move. They gave him valium per his report with good results.     4/18 - saw MTM team   -Start bringing clonazepam 1mg tablet with you when you are away. Take one of these if you " "experience significant spasms that cause you to feel locked up.       - Experienced shoulder ache and LUE tremor and movement recently   - Noticed symptoms starting from the eye going down to his LUE, leading to inability to move.  - Clonazepam prn at 1 mg is insufficient. He wants to have higher dose available as needed so he can take and avoid going to ER.   - Reported difficulty swallowing started two weeks ago, unrelated to previous injections.  - Completed physical therapy, last session at the end of January.  - Symptoms from previous side are now appearing on the opposite side, including elbow issues and foot curling.    Denied any side effects from the last round of injections.    Continues to find the injections beneficial.   The benefits wore off in 2.5 months. When botox is working, his neck movement and R shoulder ROM is a lot more fluid and he has less \"tugging sensation\" in his pec area and neck.     Injections to his RUE and RLE were beneficial as well. The benefits lasted for about a month with gradual decline in benefits after that.       PHYSICAL EXAM  Full exam was deferred  Skin was intact at the sites of the injection      ALLERGIES  Allergies   Allergen Reactions    Amantadine Other (See Comments)     Other reaction(s): Hallucinations  Hallucinations/ lost self control/gambling.     halluicnations  Hallucinations/ lost self control/gambling.     hallucinates  halluicnations  hallucinates  Hallucinations/ lost self control/gambling.       Quetiapine GI Disturbance, Diarrhea and Other (See Comments)     Diarrhea    Diarrhea  Other reaction(s): GI Disturbance  Diarrhea      Duloxetine Other (See Comments)     suicidal  suicidal         CURRENT MEDICATIONS    Current Outpatient Medications:     acetaminophen (TYLENOL) 325 MG tablet, Take 1-2 tablets (325-650 mg) by mouth every 8 hours as needed for mild pain, Disp: 100 tablet, Rfl: 0    alfuzosin ER (UROXATRAL) 10 MG 24 hr tablet, Take 1 tablet (10 " mg) by mouth daily., Disp: 90 tablet, Rfl: 1    amantadine (SYMMETREL) 100 MG capsule, Take 1 capsule (100 mg) by mouth 2 times daily., Disp: 60 capsule, Rfl: 11    ammonium lactate (LAC-HYDRIN) 12 % external lotion, Apply topically daily as needed, Disp: , Rfl:     ANTI-ITCH MAXIMUM STRENGTH 1 % external cream, Apply topically 2 times daily, Disp: , Rfl:     apixaban ANTICOAGULANT (ELIQUIS) 5 MG tablet, Take 5 mg by mouth 2 times daily, Disp: , Rfl:     atorvastatin (LIPITOR) 40 MG tablet, Take 1 tablet (40 mg) by mouth every evening, Disp: 30 tablet, Rfl: 0    bisacodyl (DULCOLAX) 10 MG suppository, Place 1 suppository (10 mg) rectally daily as needed for constipation, Disp: 10 suppository, Rfl: 0    carbidopa-levodopa (SINEMET CR)  MG CR tablet, Take 2 tablets by mouth at bedtime., Disp: 60 tablet, Rfl: 11    carbidopa-levodopa (SINEMET)  MG tablet, Take 2.5 tabs at 8 am, 12 pm and take 2 tabs at 4 pm., Disp: 210 tablet, Rfl: 11    cholecalciferol (VITAMIN D3) 125 mcg (5000 units) capsule, Take 1 capsule (125 mcg) by mouth daily, Disp: 30 capsule, Rfl: 0    clonazePAM (KLONOPIN) 1 MG tablet, Take 1 tablet scheduled at noon, may take 1-2 additional tablet PRN, Disp: 70 tablet, Rfl: 5    clonazePAM (KLONOPIN) 2 MG tablet, Take 1 tablet (2 mg) by mouth 2 times daily. Take at 8am and 4 pm, Disp: 60 tablet, Rfl: 5    cloZAPine (CLOZARIL) 50 MG tablet, Take 1 tablet (50 mg) by mouth At Bedtime, Disp: 30 tablet, Rfl: 0    diclofenac (VOLTAREN) 1 % topical gel, Apply 2 g topically 3 times daily as needed for moderate pain, Disp: 150 g, Rfl: 0    econazole nitrate 1 % external cream, Apply topically daily To toes and toenails., Disp: 85 g, Rfl: 5    ENEMEEZ MINI 283 MG/5ML enema, Place 1 enema rectally, Disp: , Rfl:     finasteride (PROSCAR) 5 MG tablet, Take 1 tablet (5 mg) by mouth daily., Disp: 90 tablet, Rfl: 3    gabapentin (NEURONTIN) 800 MG tablet, Take 1 tablet (800 mg) by mouth 3 times daily, Disp: 90  tablet, Rfl: 0    hydrOXYzine (ATARAX) 25 MG tablet, Take 2 tablets (50 mg) by mouth every 6 hours as needed for anxiety (up to 3 timrd daily), Disp: 20 tablet, Rfl: 0    ketoconazole (NIZORAL) 2 % external cream, Apply topically 2 times daily, Disp: , Rfl:     ketoconazole (NIZORAL) 2 % external shampoo, Apply topically once a week On Wednesdays, Disp: , Rfl:     lactulose (CHRONULAC) 10 GM/15ML solution, Take 15 mLs by mouth 2 times daily, Disp: 473 mL, Rfl: 11    linaclotide (LINZESS) 290 MCG capsule, Take 1 capsule (290 mcg) by mouth daily., Disp: 90 capsule, Rfl: 0    metoprolol succinate ER (TOPROL XL) 25 MG 24 hr tablet, Take 0.5 tablets (12.5 mg) by mouth 2 times daily, Disp: 30 tablet, Rfl: 0    metoprolol tartrate (LOPRESSOR) 25 MG tablet, Take 25 mg by mouth 2 times daily, Disp: , Rfl:     multivitamin, therapeutic (THERA-VIT) TABS tablet, Take 1 tablet by mouth daily, Disp: 30 tablet, Rfl: 0    pantoprazole (PROTONIX) 40 MG EC tablet, Take 1 tablet (40 mg) by mouth daily Take 1 tablet (40mg) by mouth daily at 8am, Disp: 30 tablet, Rfl: 0    polyethylene glycol (MIRALAX) 17 g packet, Take 1 packet by mouth daily, Disp: , Rfl:     SENEXON-S 8.6-50 MG tablet, Take 2 tablets by mouth 2 times daily, Disp: , Rfl:     Sodium Phosphates (ENEMA) 7-19 GM/118ML ENEM, , Disp: , Rfl:     tamsulosin (FLOMAX) 0.4 MG capsule, Take 0.4 mg by mouth daily, Disp: , Rfl:     traZODone (DESYREL) 100 MG tablet, Take 100 mg by mouth At Bedtime, Disp: , Rfl:     traZODone (DESYREL) 50 MG tablet, Take 1 tablet (50 mg) by mouth every morning, Disp: 30 tablet, Rfl: 0    venlafaxine (EFFEXOR XR) 150 MG 24 hr capsule, Take 2 capsules (300 mg) by mouth daily, Disp: 60 capsule, Rfl: 0    vitamin C (ASCORBIC ACID) 500 MG tablet, Take 1 tablet (500 mg) by mouth daily, Disp: 30 tablet, Rfl: 0    Current Facility-Administered Medications:     botulinum toxin type A (BOTOX) 100 units injection 400 Units, 400 Units, Intramuscular, Q90 Days,  , 300 Units at 25 1431       BOTULINUM NEUROTOXIN INJECTION PROCEDURES    VERIFICATION OF PATIENT IDENTIFICATION AND PROCEDURE     Initials   Patient Name PS   Patient  PS   Procedure Verified by: PS     Prior to the start of the procedure and with procedural staff participation, I verbally confirmed the patient s identity using two indicators, relevant allergies, that the procedure was appropriate and matched the consent or emergent situation, and that the correct equipment/implants were available. Immediately prior to starting the procedure I conducted the Time Out with the procedural staff and re-confirmed the patient s name, procedure, and site/side. (The Joint Commission universal protocol was followed.)  Yes    Sedation (Moderate or Deep): None    ABOVE ASSESSMENTS PERFORMED BY  Ember Arita MD      INDICATIONS FOR PROCEDURES  Benjamin Mathews is a 60 year old patient with cervical and segmental dystonia secondary to the diagnosis of Parkinson's disease. His baseline symptoms have been recalcitrant to oral medications and conservative therapy.  He is here today for reinjection with Botox.    GOAL OF PROCEDURE  The goal of this procedure is to increase active range of motion, improve volitional motor control, decrease pain  and enhance functional independence.      TOTAL DOSE ADMINISTERED  Dose Administered: 300 units  Botox (Botulinum Toxin Type A) 1:1 dilution -  Unavoidable Drug Waste: No  Diluent Used:  Preservative Free Normal Saline  Total Volume of Diluent Used: 3 ml  See MAR  NDC #: Botox 100u ([address]-38)      CONSENT  The risks, benefits, and treatment options were discussed with Benjamin Mathews and he agreed to proceed.    Written consent was obtained by PS.     EQUIPMENT USED  Needle-35mm stimulating/recording  EMG/NCS Machine    SKIN PREPARATION  Skin preparation was performed using an alcohol wipe.    GUIDANCE DESCRIPTION  Electro-myographic guidance was necessary throughout the  procedure to accurately identify all areas of dystonic muscles while avoiding injection of non-dystonic muscles and underlying muscles , neighboring nerves and nearby vascular structures.     AREA/MUSCLE INJECTED  Right neck/shoulder girdle  SCM 10 units at 1 site  Splenius capitis 15 units at 1 site  Splenius cervicis 15 units at 1 site  Levator a scap insertion 40 units at 1 site  Levator at neck 10 units at 1 site   Lateral trap 20 units at 2 sites   Pectoralis major 30 units at 1 site     Left upper extremity   Biceps 20 units at 1 site  Brachialis 20 units at 1 site     Right lower extremity  Flexor digitorum longus 40 units at 1 site  Gastrocnemius 50 units at 2 site  Fibular longus 30 units at 1 site      RESPONSE TO PROCEDURE  Benjamin Mathews tolerated the procedure well and there were no immediate complications. He was allowed to recover for an appropriate period of time and was discharged home in stable condition.    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  Osteopenia (DXA Lowest T-Score -1.8) likely due to disuse c/b limited functional mobility leading to increased fracture risk - assessment 5/2023     DOI: 05/20/2023, GLF, syncopal event, closed R trimalleolar ankle / pilon fracture-dislocation.  DOS: 05/21/2023, closed reduction, application of spanning external fixator.  DOS: 06/15/2023, ORIF R ankle/pilon, removal of external fixator.    Assessment & Plan  Muscle Spasms and Weakness  - Recent muscle spasms and weakness led to an emergency room visit. Clonazepam was prescribed but the current dosage is insufficient.  - Message Mable and Dr. Carlos to adjust clonazepam orders for as-needed use every four hours.    Swallowing Difficulty  - Swallowing difficulty started two weeks ago, not related to previous injections.  -  Referral for a swallowing study to assess muscle function.    Injections for Muscle Tightness  - Previous injections have been beneficial for muscle tightness and toe curling.  - Repeat injections on the right side of the neck and leg, and on the left arm. Avoid injections on the left side of the neck until after the swallowing study.      Work-up: None     Therapy/equipment/braces: completed therapies and should continue HEP regularly. Consider new referrals in the fall. New SLP referral for more evaluation of swallowing.     Medications: No changes today; currently on clonazepam, sinemet and amantadine by Dr. Carlos.     Interventions: started the first round at 130 units and increased the dose to 270 units on 2/1/2023; kept on the same dose in April 2023. Decreased the total dose to 100 in July 2023 with no injections in RLE due to recent fracture. Increased the dose 10/24/23 from 100 to140 units with the goal of increasing effectiveness and prolonging duration of benefit of Botox injections. Increased dose to 170 units on 1/22/2024 added 30 units to right FDL to help facilitate comfort and AFO tolerance when he receives it. Increased from 260 to 300 7/2024 with more dose in the RLE for better control of his foot symptoms. No change in the total dose today but switched from RUE to LUE given changes in his clinical picture.    Referral / follow up with other providers: should repeat DEXA in 1 year 5/2025 and will need a new referral for bone health eval - will discuss at next visit. He will continue to follow-up with palliative care team, MTM and movement disorder clinic.     Follow up: in 12 weeks         Ember Arita MD  Physical Medicine & Rehabilitation

## 2025-04-22 NOTE — LETTER
4/22/2025       RE: Benjamin Mathews  [address]  [facility] [address]     Dear Colleague,    Thank you for referring your patient, Benjamin Mathews, to the [facility] PHYSICAL [facility]ICINE AND [facility]HABILITATION CLINIC [place] [facility]. Please see a copy of my visit note below.      [facility]    PM&R CLINIC NOTE  BOTULINUM TOXIN PROCEDURE      HPI  No chief complaint on file.    Benjamin Mathews is a 60 year old male who was referred to our clinic for more evaluation of his dystonia and trial of botulinum toxin injections (referral from Dr. Suazo). He was seen on 10/6/22 as initial consult; please see the consult note for details. She has history of Parkinsonism and is followed by Dr. Perry. He was referred to palliative care team and has been followed by them since then.       Background information   --saw urology team in Nov 2022 for LUTS likely due to BPH and was started on alfuzosin  --saw Dr. Carlos in Jan 2023; it was recommended to continue carbidopa/levodopa and amantadine. He is also on clonazepam prn. Ok to continue botox injections.   --had neuropsych testing 1/13/23; reviewed the results.    Was admitted on 5/20/23 after a fall resulting in right ankle fracture s/p closed reduction and external fixation by ortho team. He has been at [facility] since then.     Was followed by podiatry team for ulcer on L 2nd toe, which was closed/healed 12/29/23.    --Saw Dr. Carlos 7/24/24; reviewed note and recommendations.   - Continue follow with Dr. Arita for toxin injections and could consider right upper extremity injections to improve dystonia     --8/28/24 saw Dr. Carlos - meds were adjusted again.       --He is working closely with psychology team     SINCE LAST VISIT  Benjamin Mathews was last seen here in clinic on 1/21/25 1/31 - saw Dr. Carlos Office Visit with Ching Carlos DO  "(01/31/2025)     Was admitted on 4/4 with generalized weakness - today he reported a severe episode of diffuse muscle spasms \"totally seized up\" and simply couldn't move. They gave him valium per his report with good results.     4/18 - saw MTM team   -Start bringing clonazepam 1mg tablet with you when you are away. Take one of these if you experience significant spasms that cause you to feel locked up.       - Experienced shoulder ache and LUE tremor and movement recently   - Noticed symptoms starting from the eye going down to his LUE, leading to inability to move.  - Clonazepam prn at 1 mg is insufficient. He wants to have higher dose available as needed so he can take and avoid going to ER.   - Reported difficulty swallowing started two weeks ago, unrelated to previous injections.  - Completed physical therapy, last session at the end of January.  - Symptoms from previous side are now appearing on the opposite side, including elbow issues and foot curling.    Denied any side effects from the last round of injections.    Continues to find the injections beneficial.   The benefits wore off in 2.5 months. When botox is working, his neck movement and R shoulder ROM is a lot more fluid and he has less \"tugging sensation\" in his pec area and neck.     Injections to his RUE and RLE were beneficial as well. The benefits lasted for about a month with gradual decline in benefits after that.       PHYSICAL EXAM  Full exam was deferred  Skin was intact at the sites of the injection      ALLERGIES  Allergies   Allergen Reactions     Amantadine Other (See Comments)     Other reaction(s): Hallucinations  Hallucinations/ lost self control/gambling.     halluicnations  Hallucinations/ lost self control/gambling.     hallucinates  halluicnations  hallucinates  Hallucinations/ lost self control/gambling.        Quetiapine GI Disturbance, Diarrhea and Other (See Comments)     Diarrhea    Diarrhea  Other reaction(s): GI " Disturbance  Diarrhea       Duloxetine Other (See Comments)     suicidal  suicidal         CURRENT MEDICATIONS    Current Outpatient Medications:      acetaminophen (TYLENOL) 325 MG tablet, Take 1-2 tablets (325-650 mg) by mouth every 8 hours as needed for mild pain, Disp: 100 tablet, Rfl: 0     alfuzosin ER (UROXATRAL) 10 MG 24 hr tablet, Take 1 tablet (10 mg) by mouth daily., Disp: 90 tablet, Rfl: 1     amantadine (SYMMETREL) 100 MG capsule, Take 1 capsule (100 mg) by mouth 2 times daily., Disp: 60 capsule, Rfl: 11     ammonium lactate (LAC-HYDRIN) 12 % external lotion, Apply topically daily as needed, Disp: , Rfl:      ANTI-ITCH MAXIMUM STRENGTH 1 % external cream, Apply topically 2 times daily, Disp: , Rfl:      apixaban ANTICOAGULANT (ELIQUIS) 5 MG tablet, Take 5 mg by mouth 2 times daily, Disp: , Rfl:      atorvastatin (LIPITOR) 40 MG tablet, Take 1 tablet (40 mg) by mouth every evening, Disp: 30 tablet, Rfl: 0     bisacodyl (DULCOLAX) 10 MG suppository, Place 1 suppository (10 mg) rectally daily as needed for constipation, Disp: 10 suppository, Rfl: 0     carbidopa-levodopa (SINEMET CR)  MG CR tablet, Take 2 tablets by mouth at bedtime., Disp: 60 tablet, Rfl: 11     carbidopa-levodopa (SINEMET)  MG tablet, Take 2.5 tabs at 8 am, 12 pm and take 2 tabs at 4 pm., Disp: 210 tablet, Rfl: 11     cholecalciferol (VITAMIN D3) 125 mcg (5000 units) capsule, Take 1 capsule (125 mcg) by mouth daily, Disp: 30 capsule, Rfl: 0     clonazePAM (KLONOPIN) 1 MG tablet, Take 1 tablet scheduled at noon, may take 1-2 additional tablet PRN, Disp: 70 tablet, Rfl: 5     clonazePAM (KLONOPIN) 2 MG tablet, Take 1 tablet (2 mg) by mouth 2 times daily. Take at 8am and 4 pm, Disp: 60 tablet, Rfl: 5     cloZAPine (CLOZARIL) 50 MG tablet, Take 1 tablet (50 mg) by mouth At Bedtime, Disp: 30 tablet, Rfl: 0     diclofenac (VOLTAREN) 1 % topical gel, Apply 2 g topically 3 times daily as needed for moderate pain, Disp: 150 g, Rfl:  0     econazole nitrate 1 % external cream, Apply topically daily To toes and toenails., Disp: 85 g, Rfl: 5     ENEMEEZ MINI 283 MG/5ML enema, Place 1 enema rectally, Disp: , Rfl:      finasteride (PROSCAR) 5 MG tablet, Take 1 tablet (5 mg) by mouth daily., Disp: 90 tablet, Rfl: 3     gabapentin (NEURONTIN) 800 MG tablet, Take 1 tablet (800 mg) by mouth 3 times daily, Disp: 90 tablet, Rfl: 0     hydrOXYzine (ATARAX) 25 MG tablet, Take 2 tablets (50 mg) by mouth every 6 hours as needed for anxiety (up to 3 timrd daily), Disp: 20 tablet, Rfl: 0     ketoconazole (NIZORAL) 2 % external cream, Apply topically 2 times daily, Disp: , Rfl:      ketoconazole (NIZORAL) 2 % external shampoo, Apply topically once a week On Wednesdays, Disp: , Rfl:      lactulose (CHRONULAC) 10 GM/15ML solution, Take 15 mLs by mouth 2 times daily, Disp: 473 mL, Rfl: 11     linaclotide (LINZESS) 290 MCG capsule, Take 1 capsule (290 mcg) by mouth daily., Disp: 90 capsule, Rfl: 0     metoprolol succinate ER (TOPROL XL) 25 MG 24 hr tablet, Take 0.5 tablets (12.5 mg) by mouth 2 times daily, Disp: 30 tablet, Rfl: 0     metoprolol tartrate (LOPRESSOR) 25 MG tablet, Take 25 mg by mouth 2 times daily, Disp: , Rfl:      multivitamin, therapeutic (THERA-VIT) TABS tablet, Take 1 tablet by mouth daily, Disp: 30 tablet, Rfl: 0     pantoprazole (PROTONIX) 40 MG EC tablet, Take 1 tablet (40 mg) by mouth daily Take 1 tablet (40mg) by mouth daily at 8am, Disp: 30 tablet, Rfl: 0     polyethylene glycol (MIRALAX) 17 g packet, Take 1 packet by mouth daily, Disp: , Rfl:      SENEXON-S 8.6-50 MG tablet, Take 2 tablets by mouth 2 times daily, Disp: , Rfl:      Sodium Phosphates (ENEMA) 7-19 GM/118ML ENEM, , Disp: , Rfl:      tamsulosin (FLOMAX) 0.4 MG capsule, Take 0.4 mg by mouth daily, Disp: , Rfl:      traZODone (DESYREL) 100 MG tablet, Take 100 mg by mouth At Bedtime, Disp: , Rfl:      traZODone (DESYREL) 50 MG tablet, Take 1 tablet (50 mg) by mouth every morning,  Disp: 30 tablet, Rfl: 0     venlafaxine (EFFEXOR XR) 150 MG 24 hr capsule, Take 2 capsules (300 mg) by mouth daily, Disp: 60 capsule, Rfl: 0     vitamin C (ASCORBIC ACID) 500 MG tablet, Take 1 tablet (500 mg) by mouth daily, Disp: 30 tablet, Rfl: 0    Current Facility-Administered Medications:      botulinum toxin type A (BOTOX) 100 units injection 400 Units, 400 Units, Intramuscular, Q90 Days, , 300 Units at 25 1431       BOTULINUM NEUROTOXIN INJECTION PROCEDURES    VERIFICATION OF PATIENT IDENTIFICATION AND PROCEDURE     Initials   Patient Name PS   Patient  PS   Procedure Verified by: PS     Prior to the start of the procedure and with procedural staff participation, I verbally confirmed the patient s identity using two indicators, relevant allergies, that the procedure was appropriate and matched the consent or emergent situation, and that the correct equipment/implants were available. Immediately prior to starting the procedure I conducted the Time Out with the procedural staff and re-confirmed the patient s name, procedure, and site/side. (The Joint Commission universal protocol was followed.)  Yes    Sedation (Moderate or Deep): None    ABOVE ASSESSMENTS PERFORMED BY  Ember Arita MD      INDICATIONS FOR PROCEDURES  Benjamin Mathews is a 60 year old patient with cervical and segmental dystonia secondary to the diagnosis of Parkinson's disease. His baseline symptoms have been recalcitrant to oral medications and conservative therapy.  He is here today for reinjection with Botox.    GOAL OF PROCEDURE  The goal of this procedure is to increase active range of motion, improve volitional motor control, decrease pain  and enhance functional independence.      TOTAL DOSE ADMINISTERED  Dose Administered: 300 units  Botox (Botulinum Toxin Type A) 1:1 dilution -  Unavoidable Drug Waste: No  Diluent Used:  Preservative Free Normal Saline  Total Volume of Diluent Used: 3 ml  See MAR  NDC #: Botox 100u  ([address]-88)      CONSENT  The risks, benefits, and treatment options were discussed with Benjamin Mathews and he agreed to proceed.    Written consent was obtained by PS.     EQUIPMENT USED  Needle-35mm stimulating/recording  EMG/NCS Machine    SKIN PREPARATION  Skin preparation was performed using an alcohol wipe.    GUIDANCE DESCRIPTION  Electro-myographic guidance was necessary throughout the procedure to accurately identify all areas of dystonic muscles while avoiding injection of non-dystonic muscles and underlying muscles , neighboring nerves and nearby vascular structures.     AREA/MUSCLE INJECTED  Right neck/shoulder girdle  SCM 10 units at 1 site  Splenius capitis 15 units at 1 site  Splenius cervicis 15 units at 1 site  Levator a scap insertion 40 units at 1 site  Levator at neck 10 units at 1 site   Lateral trap 20 units at 2 sites   Pectoralis major 30 units at 1 site     Left upper extremity   Biceps 20 units at 1 site  Brachialis 20 units at 1 site     Right lower extremity  Flexor digitorum longus 40 units at 1 site  Gastrocnemius 50 units at 2 site  Fibular longus 30 units at 1 site      RESPONSE TO PROCEDURE  Benjamin Mathews tolerated the procedure well and there were no immediate complications. He was allowed to recover for an appropriate period of time and was discharged home in stable condition.    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  Osteopenia (DXA Lowest T-Score -1.8) likely due to disuse c/b limited functional mobility leading to increased fracture risk - assessment 5/2023     DOI: 05/20/2023, GLF, syncopal event, closed R trimalleolar ankle / pilon fracture-dislocation.  DOS: 05/21/2023, closed reduction, application of spanning external fixator.  DOS: 06/15/2023, ORIF R ankle/pilon, removal of external  fixator.    Assessment & Plan  Muscle Spasms and Weakness  - Recent muscle spasms and weakness led to an emergency room visit. Clonazepam was prescribed but the current dosage is insufficient.  - Message Mable and Dr. Carlos to adjust clonazepam orders for as-needed use every four hours.    Swallowing Difficulty  - Swallowing difficulty started two weeks ago, not related to previous injections.  - Referral for a swallowing study to assess muscle function.    Injections for Muscle Tightness  - Previous injections have been beneficial for muscle tightness and toe curling.  - Repeat injections on the right side of the neck and leg, and on the left arm. Avoid injections on the left side of the neck until after the swallowing study.      Work-up: None     Therapy/equipment/braces: completed therapies and should continue HEP regularly. Consider new referrals in the fall. New SLP referral for more evaluation of swallowing.     Medications: No changes today; currently on clonazepam, sinemet and amantadine by Dr. Carlos.     Interventions: started the first round at 130 units and increased the dose to 270 units on 2/1/2023; kept on the same dose in April 2023. Decreased the total dose to 100 in July 2023 with no injections in RLE due to recent fracture. Increased the dose 10/24/23 from 100 to140 units with the goal of increasing effectiveness and prolonging duration of benefit of Botox injections. Increased dose to 170 units on 1/22/2024 added 30 units to right FDL to help facilitate comfort and AFO tolerance when he receives it. Increased from 260 to 300 7/2024 with more dose in the RLE for better control of his foot symptoms. No change in the total dose today but switched from RUE to LUE given changes in his clinical picture.    Referral / follow up with other providers: should repeat DEXA in 1 year 5/2025 and will need a new referral for bone health eval - will discuss at next visit. He will continue to follow-up with  palliative care team, MTM and movement disorder clinic.     Follow up: in 12 weeks         Ember Arita MD  Physical Medicine & Rehabilitation       Again, thank you for allowing me to participate in the care of your patient.      Sincerely,    Ember Arita MD

## 2025-04-24 NOTE — TELEPHONE ENCOUNTER
PRIOR AUTHORIZATION DENIED    Medication: LINZESS 290 MCG PO CAPS  Insurance Company: [employer] for [place] Medicaid Phone [phone] Fax [phone]  Denial Date: 4/24/2025  Denial Reason(s): pt has managed care  Appeal Information: NA see encounter from 4/24/2025 for PA with Managed care  Patient Notified: no

## 2025-05-07 ENCOUNTER — APPOINTMENT (OUTPATIENT)
Dept: CT IMAGING | Facility: CLINIC | Age: 60
End: 2025-05-07
Attending: STUDENT IN AN ORGANIZED HEALTH CARE EDUCATION/TRAINING PROGRAM
Payer: COMMERCIAL

## 2025-05-07 ENCOUNTER — HOSPITAL ENCOUNTER (EMERGENCY)
Facility: CLINIC | Age: 60
Discharge: HOME | End: 2025-05-08
Attending: STUDENT IN AN ORGANIZED HEALTH CARE EDUCATION/TRAINING PROGRAM
Payer: COMMERCIAL

## 2025-05-07 DIAGNOSIS — S09.90XA TRAUMATIC INJURY OF HEAD, INITIAL ENCOUNTER: ICD-10-CM

## 2025-05-07 DIAGNOSIS — R55 SYNCOPE AND COLLAPSE: ICD-10-CM

## 2025-05-07 LAB
ALBUMIN SERPL BCG-MCNC: 4.2 G/DL (ref 3.5–5.2)
ALP SERPL-CCNC: 132 U/L (ref 40–150)
ALT SERPL W P-5'-P-CCNC: 12 U/L (ref 0–70)
ANION GAP SERPL CALCULATED.3IONS-SCNC: 13 MMOL/L (ref 7–15)
APTT PPP: 27 SECONDS (ref 22–38)
AST SERPL W P-5'-P-CCNC: 23 U/L (ref 0–45)
ATRIAL RATE - MUSE: 77 BPM
BILIRUB SERPL-MCNC: <0.2 MG/DL
BUN SERPL-MCNC: 25.7 MG/DL (ref 8–23)
CALCIUM SERPL-MCNC: 9.3 MG/DL (ref 8.8–10.4)
CHLORIDE SERPL-SCNC: 103 MMOL/L (ref 98–107)
CREAT SERPL-MCNC: 1 MG/DL (ref 0.67–1.17)
D DIMER PPP FEU-MCNC: <0.27 UG/ML FEU (ref 0–0.5)
DIASTOLIC BLOOD PRESSURE - MUSE: NORMAL MMHG
EGFRCR SERPLBLD CKD-EPI 2021: 86 ML/MIN/1.73M2
GLUCOSE SERPL-MCNC: 106 MG/DL (ref 70–99)
HCO3 SERPL-SCNC: 25 MMOL/L (ref 22–29)
INR PPP: 1.02 (ref 0.85–1.15)
INTERPRETATION ECG - MUSE: NORMAL
LACTATE SERPL-SCNC: 2.4 MMOL/L (ref 0.7–2)
MAGNESIUM SERPL-MCNC: 2.3 MG/DL (ref 1.7–2.3)
NT-PROBNP SERPL-MCNC: 103 PG/ML (ref 0–177)
P AXIS - MUSE: 54 DEGREES
POTASSIUM SERPL-SCNC: 4.4 MMOL/L (ref 3.4–5.3)
PR INTERVAL - MUSE: 162 MS
PROT SERPL-MCNC: 7.2 G/DL (ref 6.4–8.3)
PROTHROMBIN TIME: 13.7 SECONDS (ref 11.8–14.8)
QRS DURATION - MUSE: 90 MS
QT - MUSE: 372 MS
QTC - MUSE: 420 MS
R AXIS - MUSE: -55 DEGREES
SODIUM SERPL-SCNC: 141 MMOL/L (ref 135–145)
SYSTOLIC BLOOD PRESSURE - MUSE: NORMAL MMHG
T AXIS - MUSE: 54 DEGREES
TROPONIN T SERPL HS-MCNC: 10 NG/L
VENTRICULAR RATE- MUSE: 77 BPM

## 2025-05-07 PROCEDURE — 84484 ASSAY OF TROPONIN QUANT: CPT | Performed by: STUDENT IN AN ORGANIZED HEALTH CARE EDUCATION/TRAINING PROGRAM

## 2025-05-07 PROCEDURE — 85610 PROTHROMBIN TIME: CPT | Performed by: STUDENT IN AN ORGANIZED HEALTH CARE EDUCATION/TRAINING PROGRAM

## 2025-05-07 PROCEDURE — 82565 ASSAY OF CREATININE: CPT | Performed by: STUDENT IN AN ORGANIZED HEALTH CARE EDUCATION/TRAINING PROGRAM

## 2025-05-07 PROCEDURE — 72125 CT NECK SPINE W/O DYE: CPT

## 2025-05-07 PROCEDURE — 99285 EMERGENCY DEPT VISIT HI MDM: CPT | Performed by: STUDENT IN AN ORGANIZED HEALTH CARE EDUCATION/TRAINING PROGRAM

## 2025-05-07 PROCEDURE — 83880 ASSAY OF NATRIURETIC PEPTIDE: CPT | Performed by: STUDENT IN AN ORGANIZED HEALTH CARE EDUCATION/TRAINING PROGRAM

## 2025-05-07 PROCEDURE — 85379 FIBRIN DEGRADATION QUANT: CPT | Performed by: STUDENT IN AN ORGANIZED HEALTH CARE EDUCATION/TRAINING PROGRAM

## 2025-05-07 PROCEDURE — 85025 COMPLETE CBC W/AUTO DIFF WBC: CPT | Performed by: STUDENT IN AN ORGANIZED HEALTH CARE EDUCATION/TRAINING PROGRAM

## 2025-05-07 PROCEDURE — 36415 COLL VENOUS BLD VENIPUNCTURE: CPT | Performed by: STUDENT IN AN ORGANIZED HEALTH CARE EDUCATION/TRAINING PROGRAM

## 2025-05-07 PROCEDURE — 70450 CT HEAD/BRAIN W/O DYE: CPT | Mod: 26 | Performed by: RADIOLOGY

## 2025-05-07 PROCEDURE — 70450 CT HEAD/BRAIN W/O DYE: CPT

## 2025-05-07 PROCEDURE — 93010 ELECTROCARDIOGRAM REPORT: CPT | Performed by: STUDENT IN AN ORGANIZED HEALTH CARE EDUCATION/TRAINING PROGRAM

## 2025-05-07 PROCEDURE — 72125 CT NECK SPINE W/O DYE: CPT | Mod: 26 | Performed by: RADIOLOGY

## 2025-05-07 PROCEDURE — 93005 ELECTROCARDIOGRAM TRACING: CPT | Performed by: STUDENT IN AN ORGANIZED HEALTH CARE EDUCATION/TRAINING PROGRAM

## 2025-05-07 PROCEDURE — 99285 EMERGENCY DEPT VISIT HI MDM: CPT | Mod: 25 | Performed by: STUDENT IN AN ORGANIZED HEALTH CARE EDUCATION/TRAINING PROGRAM

## 2025-05-07 PROCEDURE — 83605 ASSAY OF LACTIC ACID: CPT | Performed by: STUDENT IN AN ORGANIZED HEALTH CARE EDUCATION/TRAINING PROGRAM

## 2025-05-07 PROCEDURE — 83735 ASSAY OF MAGNESIUM: CPT | Performed by: STUDENT IN AN ORGANIZED HEALTH CARE EDUCATION/TRAINING PROGRAM

## 2025-05-07 PROCEDURE — 85730 THROMBOPLASTIN TIME PARTIAL: CPT | Performed by: STUDENT IN AN ORGANIZED HEALTH CARE EDUCATION/TRAINING PROGRAM

## 2025-05-07 ASSESSMENT — ACTIVITIES OF DAILY LIVING (ADL)
ADLS_ACUITY_SCORE: 57
ADLS_ACUITY_SCORE: 57

## 2025-05-08 VITALS
HEART RATE: 79 BPM | DIASTOLIC BLOOD PRESSURE: 63 MMHG | SYSTOLIC BLOOD PRESSURE: 113 MMHG | OXYGEN SATURATION: 97 % | TEMPERATURE: 97.7 F | BODY MASS INDEX: 27.16 KG/M2 | HEIGHT: 77 IN | RESPIRATION RATE: 16 BRPM | WEIGHT: 230 LBS

## 2025-05-08 LAB
ALBUMIN UR-MCNC: 20 MG/DL
APPEARANCE UR: CLEAR
BASOPHILS # BLD AUTO: 0 10E3/UL (ref 0–0.2)
BASOPHILS NFR BLD AUTO: 0 %
BILIRUB UR QL STRIP: NEGATIVE
COLOR UR AUTO: YELLOW
EOSINOPHIL # BLD AUTO: 0.1 10E3/UL (ref 0–0.7)
EOSINOPHIL NFR BLD AUTO: 2 %
ERYTHROCYTE [DISTWIDTH] IN BLOOD BY AUTOMATED COUNT: 13.4 % (ref 10–15)
GLUCOSE UR STRIP-MCNC: NEGATIVE MG/DL
HCT VFR BLD AUTO: 39.7 % (ref 40–53)
HGB BLD-MCNC: 12.4 G/DL (ref 13.3–17.7)
HGB UR QL STRIP: NEGATIVE
HOLD SPECIMEN: NORMAL
HYALINE CASTS: 14 /LPF
IMM GRANULOCYTES # BLD: 0.1 10E3/UL
IMM GRANULOCYTES NFR BLD: 1 %
KETONES UR STRIP-MCNC: 10 MG/DL
LACTATE SERPL-SCNC: 0.7 MMOL/L (ref 0.7–2)
LEUKOCYTE ESTERASE UR QL STRIP: NEGATIVE
LYMPHOCYTES # BLD AUTO: 2 10E3/UL (ref 0.8–5.3)
LYMPHOCYTES NFR BLD AUTO: 22 %
MCH RBC QN AUTO: 30.3 PG (ref 26.5–33)
MCHC RBC AUTO-ENTMCNC: 31.2 G/DL (ref 31.5–36.5)
MCV RBC AUTO: 97 FL (ref 78–100)
MONOCYTES # BLD AUTO: 0.7 10E3/UL (ref 0–1.3)
MONOCYTES NFR BLD AUTO: 8 %
MUCOUS THREADS #/AREA URNS LPF: PRESENT /LPF
NEUTROPHILS # BLD AUTO: 6.1 10E3/UL (ref 1.6–8.3)
NEUTROPHILS NFR BLD AUTO: 68 %
NITRATE UR QL: NEGATIVE
NRBC # BLD AUTO: 0 10E3/UL
NRBC BLD AUTO-RTO: 0 /100
PH UR STRIP: 6 [PH] (ref 5–7)
PLATELET # BLD AUTO: 261 10E3/UL (ref 150–450)
RBC # BLD AUTO: 4.09 10E6/UL (ref 4.4–5.9)
RBC URINE: 1 /HPF
SP GR UR STRIP: 1.03 (ref 1–1.03)
TRANSITIONAL EPI: <1 /HPF
TROPONIN T SERPL HS-MCNC: 11 NG/L
UROBILINOGEN UR STRIP-MCNC: 2 MG/DL
WBC # BLD AUTO: 8.9 10E3/UL (ref 4–11)
WBC URINE: 2 /HPF

## 2025-05-08 PROCEDURE — 96361 HYDRATE IV INFUSION ADD-ON: CPT | Performed by: STUDENT IN AN ORGANIZED HEALTH CARE EDUCATION/TRAINING PROGRAM

## 2025-05-08 PROCEDURE — 96360 HYDRATION IV INFUSION INIT: CPT | Performed by: STUDENT IN AN ORGANIZED HEALTH CARE EDUCATION/TRAINING PROGRAM

## 2025-05-08 PROCEDURE — 83605 ASSAY OF LACTIC ACID: CPT | Performed by: STUDENT IN AN ORGANIZED HEALTH CARE EDUCATION/TRAINING PROGRAM

## 2025-05-08 PROCEDURE — 36415 COLL VENOUS BLD VENIPUNCTURE: CPT | Performed by: STUDENT IN AN ORGANIZED HEALTH CARE EDUCATION/TRAINING PROGRAM

## 2025-05-08 PROCEDURE — 258N000003 HC RX IP 258 OP 636: Performed by: STUDENT IN AN ORGANIZED HEALTH CARE EDUCATION/TRAINING PROGRAM

## 2025-05-08 PROCEDURE — 81001 URINALYSIS AUTO W/SCOPE: CPT | Performed by: STUDENT IN AN ORGANIZED HEALTH CARE EDUCATION/TRAINING PROGRAM

## 2025-05-08 RX ADMIN — SODIUM CHLORIDE 1000 ML: 0.9 INJECTION, SOLUTION INTRAVENOUS at 01:07

## 2025-05-08 ASSESSMENT — ACTIVITIES OF DAILY LIVING (ADL)
ADLS_ACUITY_SCORE: 61
ADLS_ACUITY_SCORE: 61

## 2025-05-08 NOTE — ED NOTES
"CT informed writer that \"they are good to bring a patient over whenever and we are holding a room for him\". Writer arranged transport for pt to [facility].   "

## 2025-05-08 NOTE — ED PROVIDER NOTES
ED Provider Note  [facility] EMERGENCY DEPARTMENT ([facility])    5/07/25       ED PROVIDER NOTE     History     Chief Complaint   Patient presents with    Syncope     Pt reports been constipated for 7 days. Had BM after suppository today and passed out. In bathroom with LOC for 15 minutes. Pt did strike head. Is on thinners. Responding appropriately. VSS. PERRLA.     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 Patient reports that he has been constipated for the past 6-7 days. He states that sat in the bathroom for 2 hours today, and was straining to pass a bowel movement. He passed out and fell on the floor. Patient states that he hit the back of his head on the floor. He notes that he has passed out once in the past. He also has some slight deficits in strength and balance on his left side from a prior stroke, but denies any changes in strength or balance. Denies changes in speaking. Denies numbness tingling. He endorses neuropathy at baseline.       Past Medical History  Past Medical History:   Diagnosis Date    Cerebral infarction (H)     Clotting disorder     PE 2019    Dystonia     Parkinson disease (H)      Past Surgical History:   Procedure Laterality Date    APPLY EXTERNAL FIXATOR LOWER EXTREMITY Right 05/21/2023    Procedure: Right  Ankle Closed Reduction and  External Fixator Placement;  Surgeon: Nicole Apodaca MD;  Location: [facility] OR    OPEN REDUCTION INTERNAL FIXATION ANKLE Right 06/15/2023    Procedure: OPEN REDUCTION INTERNAL FIXATION, FRACTURE, ANKLE, removal of external fixator device.;  Surgeon: Jose Bonilla MD;  Location: [facility] OR     acetaminophen (TYLENOL) 325 MG tablet  alfuzosin ER (UROXATRAL) 10 MG 24 hr  tablet  amantadine (SYMMETREL) 100 MG capsule  ammonium lactate (LAC-HYDRIN) 12 % external lotion  ANTI-ITCH MAXIMUM STRENGTH 1 % external cream  apixaban ANTICOAGULANT (ELIQUIS) 5 MG tablet  atorvastatin (LIPITOR) 40 MG tablet  bisacodyl (DULCOLAX) 10 MG suppository  carbidopa-levodopa (SINEMET CR)  MG CR tablet  carbidopa-levodopa (SINEMET)  MG tablet  cholecalciferol (VITAMIN D3) 125 mcg (5000 units) capsule  clonazePAM (KLONOPIN) 1 MG tablet  clonazePAM (KLONOPIN) 2 MG tablet  cloZAPine (CLOZARIL) 50 MG tablet  diclofenac (VOLTAREN) 1 % topical gel  econazole nitrate 1 % external cream  ENEMEEZ MINI 283 MG/5ML enema  finasteride (PROSCAR) 5 MG tablet  gabapentin (NEURONTIN) 800 MG tablet  hydrOXYzine (ATARAX) 25 MG tablet  ketoconazole (NIZORAL) 2 % external cream  ketoconazole (NIZORAL) 2 % external shampoo  lactulose (CHRONULAC) 10 GM/15ML solution  linaclotide (LINZESS) 290 MCG capsule  metoprolol succinate ER (TOPROL XL) 25 MG 24 hr tablet  metoprolol tartrate (LOPRESSOR) 25 MG tablet  multivitamin, therapeutic (THERA-VIT) TABS tablet  pantoprazole (PROTONIX) 40 MG EC tablet  polyethylene glycol (MIRALAX) 17 g packet  SENEXON-S 8.6-50 MG tablet  Sodium Phosphates (ENEMA) 7-19 GM/118ML ENEM  tamsulosin (FLOMAX) 0.4 MG capsule  traZODone (DESYREL) 100 MG tablet  traZODone (DESYREL) 50 MG tablet  venlafaxine (EFFEXOR XR) 150 MG 24 hr capsule  vitamin C (ASCORBIC ACID) 500 MG tablet      Allergies   Allergen Reactions    Amantadine Other (See Comments)     Other reaction(s): Hallucinations  Hallucinations/ lost self control/gambling.     halluicnations  Hallucinations/ lost self control/gambling.     hallucinates  halluicnations  hallucinates  Hallucinations/ lost self control/gambling.       Quetiapine GI Disturbance, Diarrhea and Other (See Comments)     Diarrhea    Diarrhea  Other reaction(s): GI Disturbance  Diarrhea      Duloxetine Other (See Comments)     suicidal  suicidal       Family  "History  Family History   Problem Relation Age of Onset    Other - See Comments Mother         pulmonary embolism from hip fracture    Pulmonary Embolism Mother     Parkinsonism Father     Neurologic Disorder Brother         dystonia    Dystonia Brother     Other - See Comments Brother             Neurologic Disorder Sister     Parkinsonism Sister         ?med related    Other - See Comments Sister         12/7/1950    Depression Sister     Heart Disease Nephew     Glaucoma Maternal Aunt     Glaucoma Maternal Uncle     Macular Degeneration No family hx of      Social History   Social History     Tobacco Use    Smoking status: Every Day     Types: Cigars, Cigarettes    Smokeless tobacco: Never   Vaping Use    Vaping status: Never Used   Substance Use Topics    Alcohol use: Not Currently     Comment: quit 2014    Drug use: Not Currently      A medically appropriate review of systems was performed with pertinent positives and negatives noted in the HPI, and all other systems negative.    Physical Exam   BP: 128/77  Pulse: 79  Temp: 98.6  F (37  C)  Resp: 16  Height: 194.3 cm (6' 4.5\")  Weight: 104.3 kg (230 lb)  SpO2: 100 %  Lying Orthostatic BP: 118/65  Lying Orthostatic Pulse: 75 bpm  Sitting Orthostatic BP: 119/74  Sitting Orthostatic Pulse: 70 bpm  Physical Exam  Vital Signs Reviewed  Gen: Well nourished, well developed, resting comfortably, no acute distress  HEENT: NC/AT, PERRL, EOMI, MMM. No hematoma, laceration.  Neck: Supple, FROM, non-tender without stepoff or deformity  CV: Regular Rate  Lungs/Chest: Normal Effort  Abd: Non-distended, non-tender  MSK/Back: FROM, no visible deformity, no long bone or major joint tenderness  Neuro: A&Ox3, GCS 15, CN II-XII unremarkable. BLE and BUE strength intact without drift, good  strength. Gait assessment deferred. Sensation intact.  Skin: Warm, Dry, Intact, no visible lesions      ED Course, Procedures, & Data      Procedures            EKG Interpretation:  "     Interpreted by Yvon Sierra MD  Time reviewed: 1015pm  Symptoms at time of EKG: Syncope   Rhythm: normal sinus   Rate: Normal  Axis: Normal  Ectopy: none  Conduction: normal  ST Segments/ T Waves: Non-specific ST-T wave changes    Clinical Impression: NSR, no FLYNN, no significant arrhythmia            IV Antibiotics given and/or elevated Lactate of 2.4 and no sepsis note found - Delete this reminder and enter the sepsis note or '.edcms' before signing chart.>>>     Results for orders placed or performed during the hospital encounter of 05/07/25   Head CT w/o contrast     Status: None    Narrative    EXAM: CT HEAD W/O CONTRAST  LOCATION: [facility]  DATE: 5/8/2025    INDICATION: Head trauma on thinners  COMPARISON: CT 7/31/2024.  TECHNIQUE: Routine CT Head without IV contrast. Multiplanar reformats. Dose reduction techniques were used.    FINDINGS:  INTRACRANIAL CONTENTS: No intracranial hemorrhage, extraaxial collection, or mass effect.  No CT evidence of acute infarct. Normal parenchymal attenuation. Normal ventricles and sulci.     VISUALIZED ORBITS/SINUSES/MASTOIDS: No intraorbital abnormality. No paranasal sinus mucosal disease. No middle ear or mastoid effusion.    BONES/SOFT TISSUES: No acute abnormality.      Impression    IMPRESSION:  1.  Normal head CT.   CT Cervical Spine w/o Contrast     Status: None    Narrative    EXAM: CT CERVICAL SPINE W/O CONTRAST  LOCATION: [facility]  DATE: 5/8/2025    INDICATION: Fall in shower  COMPARISON: None.  TECHNIQUE: Routine CT Cervical Spine without IV contrast. Multiplanar reformats. Dose reduction techniques were used.    FINDINGS:  VERTEBRA: Normal vertebral body heights and alignment. No fracture or posttraumatic subluxation.     CANAL/FORAMINA: At C3-4, severe left foraminal stenosis.    PARASPINAL: No extraspinal abnormality.      Impression    IMPRESSION:  1.  No  fracture or posttraumatic subluxation.     [place] Draw     Status: None (In process)    Narrative    The following orders were created for panel order [place] Draw.  Procedure                               Abnormality         Status                     ---------                               -----------         ------                     Extra Blue Top Tube[[id number]]                             In process                 Extra Red Top Tube[[id number]]                              In process                 Extra Green Top (Lithiu...[[id number]]                      In process                 Extra Purple Top Tube[[id number]]                           Final result                 Please view results for these tests on the individual orders.   Extra Purple Top Tube     Status: None   Result Value Ref Range    Hold Specimen [facility]    Lactic acid whole blood     Status: Abnormal   Result Value Ref Range    Lactic Acid 2.4 (H) 0.7 - 2.0 mmol/L   INR     Status: Normal   Result Value Ref Range    INR 1.02 0.85 - 1.15    PT 13.7 11.8 - 14.8 Seconds   Comprehensive metabolic panel     Status: Abnormal   Result Value Ref Range    Sodium 141 135 - 145 mmol/L    Potassium 4.4 3.4 - 5.3 mmol/L    Carbon Dioxide (CO2) 25 22 - 29 mmol/L    Anion Gap 13 7 - 15 mmol/L    Urea Nitrogen 25.7 (H) 8.0 - 23.0 mg/dL    Creatinine 1.00 0.67 - 1.17 mg/dL    GFR Estimate 86 >60 mL/min/1.73m2    Calcium 9.3 8.8 - 10.4 mg/dL    Chloride 103 98 - 107 mmol/L    Glucose 106 (H) 70 - 99 mg/dL    Alkaline Phosphatase 132 40 - 150 U/L    AST 23 0 - 45 U/L    ALT 12 0 - 70 U/L    Protein Total 7.2 6.4 - 8.3 g/dL    Albumin 4.2 3.5 - 5.2 g/dL    Bilirubin Total <0.2 <=1.2 mg/dL   Partial thromboplastin time     Status: Normal   Result Value Ref Range    aPTT 27 22 - 38 Seconds   UA with Microscopic reflex to Culture     Status: Abnormal    Specimen: Urine, Midstream   Result Value Ref Range    Color Urine Yellow Colorless, Straw, Light Yellow, Yellow     Appearance Urine Clear Clear    Glucose Urine Negative Negative mg/dL    Bilirubin Urine Negative Negative    Ketones Urine 10 (A) Negative mg/dL    Specific Gravity Urine 1.034 1.003 - 1.035    Blood Urine Negative Negative    pH Urine 6.0 5.0 - 7.0    Protein Albumin Urine 20 (A) Negative mg/dL    Urobilinogen Urine 2.0 (A) Normal mg/dL    Nitrite Urine Negative Negative    Leukocyte Esterase Urine Negative Negative    Mucus Urine Present (A) None Seen /LPF    RBC Urine 1 <=2 /HPF    WBC Urine 2 <=5 /HPF    Transitional Epithelials Urine <1 <=1 /HPF    Hyaline Casts Urine 14 (H) <=2 /LPF    Narrative    Urine Culture not indicated   D dimer quantitative     Status: Normal   Result Value Ref Range    D-Dimer Quantitative <0.27 0.00 - 0.50 ug/mL FEU    Narrative    This D-dimer assay is intended for use in conjunction with a clinical pretest probability assessment model to exclude pulmonary embolism (PE) and deep venous thrombosis (DVT) in outpatients suspected of PE or DVT. The cut-off value is 0.50 ug/mL FEU.    For patients 50 years of age or older, the application of age-adjusted cut-off values for D-Dimer may increase the specificity without significant effect on sensitivity. The literature suggested calculation age adjusted cut-off in ug/L = age in years x 10 ug/L. The results in this laboratory are reported as ug/mL rather than ug/L. The calculation for age adjusted cut off in ug/mL= age in years x 0.01 ug/mL. For example, the cut off for a 76 year old male is 76 x 0.01 ug/mL = 0.76 ug/mL (760 ug/L).    M Peter et al. Age adjusted D-dimer cut-off levels to rule out pulmonary embolism: The ADJUST-PE Study. PROSPER 2014;311:6579-0548.; HJ Contreras et al. Diagnostic accuracy of conventional or age adjusted D-dimer cutoff values in older patients with suspected venous thromboembolism. Systemic review and meta-analysis. BMJ 2013:346:f2492.   Troponin T, High Sensitivity     Status: Normal   Result Value Ref  Range    Troponin T, High Sensitivity 10 <=22 ng/L   Magnesium     Status: Normal   Result Value Ref Range    Magnesium 2.3 1.7 - 2.3 mg/dL   NT-proBNP     Status: Normal   Result Value Ref Range    NT-proBNP 103 0 - 177 pg/mL   CBC with platelets and differential     Status: Abnormal   Result Value Ref Range    WBC Count 8.9 4.0 - 11.0 10e3/uL    RBC Count 4.09 (L) 4.40 - 5.90 10e6/uL    Hemoglobin 12.4 (L) 13.3 - 17.7 g/dL    Hematocrit 39.7 (L) 40.0 - 53.0 %    MCV 97 78 - 100 fL    MCH 30.3 26.5 - 33.0 pg    MCHC 31.2 (L) 31.5 - 36.5 g/dL    RDW 13.4 10.0 - 15.0 %    Platelet Count 261 150 - 450 10e3/uL    % Neutrophils 68 %    % Lymphocytes 22 %    % Monocytes 8 %    % Eosinophils 2 %    % Basophils 0 %    % Immature Granulocytes 1 %    NRBCs per 100 WBC 0 <1 /100    Absolute Neutrophils 6.1 1.6 - 8.3 10e3/uL    Absolute Lymphocytes 2.0 0.8 - 5.3 10e3/uL    Absolute Monocytes 0.7 0.0 - 1.3 10e3/uL    Absolute Eosinophils 0.1 0.0 - 0.7 10e3/uL    Absolute Basophils 0.0 0.0 - 0.2 10e3/uL    Absolute Immature Granulocytes 0.1 <=0.4 10e3/uL    Absolute NRBCs 0.0 10e3/uL   Troponin T, High Sensitivity     Status: Normal   Result Value Ref Range    Troponin T, High Sensitivity 11 <=22 ng/L   Lactic acid whole blood with 1x repeat in 2 hr when >2     Status: Normal   Result Value Ref Range    Lactic Acid, Initial 0.7 0.7 - 2.0 mmol/L   EKG 12-lead, tracing only     Status: None   Result Value Ref Range    Systolic Blood Pressure  mmHg    Diastolic Blood Pressure  mmHg    Ventricular Rate 77 BPM    Atrial Rate 77 BPM    NM Interval 162 ms    QRS Duration 90 ms     ms    QTc 420 ms    P Axis 54 degrees    R AXIS -55 degrees    T Axis 54 degrees    Interpretation ECG       Sinus rhythm  Left axis deviation  Nonspecific T wave abnormality  Abnormal ECG  Unconfirmed report - interpretation of this ECG is computer generated - see medical record for final interpretation    Confirmed by - EMERGENCY ROOM, PHYSICIAN  ([id number]),  JACOB BERTRAND ([id number]) on 5/7/2025 10:32:23 PM     CBC with platelets differential     Status: Abnormal    Narrative    The following orders were created for panel order CBC with platelets differential.  Procedure                               Abnormality         Status                     ---------                               -----------         ------                     CBC with platelets and ...[[id number]]  Abnormal            Final result                 Please view results for these tests on the individual orders.     Medications   sodium chloride 0.9% BOLUS 1,000 mL (0 mLs Intravenous Stopped 5/8/25 0248)     Labs Ordered and Resulted from Time of ED Arrival to Time of ED Departure   LACTIC ACID WHOLE BLOOD - Abnormal       Result Value    Lactic Acid 2.4 (*)    COMPREHENSIVE METABOLIC PANEL - Abnormal    Sodium 141      Potassium 4.4      Carbon Dioxide (CO2) 25      Anion Gap 13      Urea Nitrogen 25.7 (*)     Creatinine 1.00      GFR Estimate 86      Calcium 9.3      Chloride 103      Glucose 106 (*)     Alkaline Phosphatase 132      AST 23      ALT 12      Protein Total 7.2      Albumin 4.2      Bilirubin Total <0.2     ROUTINE UA WITH MICROSCOPIC REFLEX TO CULTURE - Abnormal    Color Urine Yellow      Appearance Urine Clear      Glucose Urine Negative      Bilirubin Urine Negative      Ketones Urine 10 (*)     Specific Gravity Urine 1.034      Blood Urine Negative      pH Urine 6.0      Protein Albumin Urine 20 (*)     Urobilinogen Urine 2.0 (*)     Nitrite Urine Negative      Leukocyte Esterase Urine Negative      Mucus Urine Present (*)     RBC Urine 1      WBC Urine 2      Transitional Epithelials Urine <1      Hyaline Casts Urine 14 (*)    CBC WITH PLATELETS AND DIFFERENTIAL - Abnormal    WBC Count 8.9      RBC Count 4.09 (*)     Hemoglobin 12.4 (*)     Hematocrit 39.7 (*)     MCV 97      MCH 30.3      MCHC 31.2 (*)     RDW 13.4      Platelet Count 261      % Neutrophils 68      %  Lymphocytes 22      % Monocytes 8      % Eosinophils 2      % Basophils 0      % Immature Granulocytes 1      NRBCs per 100 WBC 0      Absolute Neutrophils 6.1      Absolute Lymphocytes 2.0      Absolute Monocytes 0.7      Absolute Eosinophils 0.1      Absolute Basophils 0.0      Absolute Immature Granulocytes 0.1      Absolute NRBCs 0.0     INR - Normal    INR 1.02      PT 13.7     PARTIAL THROMBOPLASTIN TIME - Normal    aPTT 27     D DIMER QUANTITATIVE - Normal    D-Dimer Quantitative <0.27     TROPONIN T, HIGH SENSITIVITY - Normal    Troponin T, High Sensitivity 10     MAGNESIUM - Normal    Magnesium 2.3     NT-PROBNP - Normal    NT-proBNP 103     TROPONIN T, HIGH SENSITIVITY - Normal    Troponin T, High Sensitivity 11     LACTIC ACID WHOLE BLOOD WITH 1X REPEAT IN 2 HR WHEN >2 - Normal    Lactic Acid, Initial 0.7       CT Cervical Spine w/o Contrast   Final Result   IMPRESSION:   1.  No fracture or posttraumatic subluxation.         Head CT w/o contrast   Final Result   IMPRESSION:   1.  Normal head CT.             Critical care was not performed.     Medical Decision Making  The patient's presentation was of high complexity (an acute health issue posing potential threat to life or bodily function).    The patient's evaluation involved:  ordering and/or review of 3+ test(s) in this encounter (see separate area of note for details)    The patient's management necessitated moderate risk (prescription drug management including medications given in the ED).    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 On arrival patient is afebrile with reassuring vital signs. He has a reassuring and non-focal neuro exam and there is no significant evidence of trauma on exam.    Story seems consistent was a  vasovagal episode in the setting of straining BM. He received some IV fluids, had reassuring vital signs and orthostatics. Workup does not suggest acute cardiopulmonary injury or significant traumatic injury. Abdomen soft and non-tender. CTH negative.    After MSE patient appears stable for discharge and outpatient follow-up. He was asking to go home saying he felt better fairly quickly after arrival. Follow-up and RTED precautions discussed.    I have reviewed the nursing notes. I have reviewed the findings, diagnosis, plan and need for follow up with the patient.    Discharge Medication List as of 5/8/2025  2:56 AM          Final diagnoses:   Syncope and collapse   Traumatic injury of head, initial encounter   I, Richard Escoto, am serving as a trained medical scribe to document services personally performed by Yovn Sierra MD, based on the provider's statements to me.     I, Yvon Sierra MD, was physically present and have reviewed and verified the accuracy of this note documented by Richard Escoto.     Yvon Sierra Jr., MD   [facility] EMERGENCY DEPARTMENT  5/7/2025     Yvon Sierra MD  05/08/25 [id number]

## 2025-05-08 NOTE — ED TRIAGE NOTES
Patient BIBA from a group home who had syncope after a bowel movement. Patient was given suppository and was sitting in the bathroom for 2-3 hours, bared down and passed out. Patient hit his head on the floor and had LOC, unaware how long was he on the floor. Patient hasn't had a bowel movement for 6-7 days, endorses abdominal pain which he rated 7/10 and nausea. Patient is on blood thinners.

## 2025-05-08 NOTE — DISCHARGE INSTRUCTIONS
Thank you for coming to the [facility].  You were seen in the emergency department.  Your workup in evaluation today was reassuring.  Your symptoms may be related to some dehydration as well as a vasovagal event occurring while straining of a bowel movement.  This can cause a temporary drop in your blood pressure.    Develop new or concerning symptoms you should immediately return to the emergency department otherwise follow-up closely with your primary care team.

## 2025-05-08 NOTE — ED NOTES
Pt resting calmly in bed. Alert and oriented x4. Watching TV. Urine sent and pt receiving IV bolus

## 2025-05-08 NOTE — ED NOTES
Orthostatic VS Ok'd by MD. Pt preparing for discharge. Writer called report to pt care facility in [facility] en route to pick pt up.

## 2025-05-08 NOTE — ED NOTES
Pt discharged by writer. Pt looks well. EMS assisted pt into wheelchair. Pt PIV removed. VSS. Pt able to stand and pivot in wheelchair and nursing facility given update about pt return. No concerns.

## 2025-05-12 ENCOUNTER — OFFICE VISIT (OUTPATIENT)
Dept: UROLOGY | Facility: CLINIC | Age: 60
End: 2025-05-12
Payer: COMMERCIAL

## 2025-05-12 VITALS
DIASTOLIC BLOOD PRESSURE: 68 MMHG | HEART RATE: 66 BPM | SYSTOLIC BLOOD PRESSURE: 104 MMHG | RESPIRATION RATE: 16 BRPM | OXYGEN SATURATION: 97 %

## 2025-05-12 DIAGNOSIS — G24.3 CERVICAL DYSTONIA: Primary | ICD-10-CM

## 2025-05-12 DIAGNOSIS — N50.812 PAIN IN BOTH TESTICLES: ICD-10-CM

## 2025-05-12 DIAGNOSIS — N40.1 BENIGN PROSTATIC HYPERPLASIA WITH WEAK URINARY STREAM: Primary | ICD-10-CM

## 2025-05-12 DIAGNOSIS — N39.44 NOCTURNAL ENURESIS: ICD-10-CM

## 2025-05-12 DIAGNOSIS — R39.12 BENIGN PROSTATIC HYPERPLASIA WITH WEAK URINARY STREAM: Primary | ICD-10-CM

## 2025-05-12 DIAGNOSIS — G24.9 DYSTONIA, UNSPECIFIED: ICD-10-CM

## 2025-05-12 DIAGNOSIS — N50.811 PAIN IN BOTH TESTICLES: ICD-10-CM

## 2025-05-12 RX ORDER — ALFUZOSIN HYDROCHLORIDE 10 MG/1
10 TABLET, EXTENDED RELEASE ORAL DAILY
Qty: 90 TABLET | Refills: 3 | Status: SHIPPED | OUTPATIENT
Start: 2025-05-12

## 2025-05-12 RX ORDER — FINASTERIDE 5 MG/1
5 TABLET, FILM COATED ORAL DAILY
Qty: 90 TABLET | Refills: 3 | Status: SHIPPED | OUTPATIENT
Start: 2025-05-12

## 2025-05-12 ASSESSMENT — PAIN SCALES - GENERAL: PAINLEVEL_OUTOF10: NO PAIN (0)
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
## 2025-05-12 NOTE — PATIENT INSTRUCTIONS
"Sony Mathews, it was nice to meet you!    Thank you for allowing us the privilege of caring for you. We hope we provided you with the excellent service you deserve.   Please let us know if there is anything else we can do for you.  We want you to be completely satisfied with your care experience.    To ensure the quality of our services, you may be receiving a patient satisfaction survey from an independent patient satisfaction monitoring company.    The greatest compliment you can give is a \"Likely to Recommend.\"    Your visit was with VERO Ceballos CNP today.    Instructions per today's visit:     Continue with medications   Consider moderation of fluids   Next step would be for an in office cystoscopy (look in the bladder with a scope ) and discussion about treatment for enlarged prostate         ___________________________________________________________________________  Important contact and scheduling information:  Please call our contact center at [phone] to schedule your next appointments or to reach our nurse triage line.  Please call during clinic hours Monday through Friday 8:00a - 4:30p if you have questions.  You can contact us anytime via [facility] [employer] and we will reply during clinic hours.    Lab results will be communicated through My Chart or letter (if My Chart not used). Please call the clinic if you have not received communication after 1 week or if you have any questions.?  __________________________________________________________________________    If labs were ordered today:    Please make an appointment to have them drawn at your convenience.     To schedule the Lab Appointment using [employer]:  Select \"Schedule an Appointment\"  Select \"Lab Only\"  Answer simple questions about where you would like to be seen and your type of insurance  For \"Which locations work for you?, select the location and set up the appointment.      "

## 2025-05-21 ENCOUNTER — TELEPHONE (OUTPATIENT)
Dept: UROLOGY | Facility: CLINIC | Age: 60
End: 2025-05-21
Payer: COMMERCIAL

## 2025-05-21 NOTE — CONFIDENTIAL NOTE
Needs to schedule a cysto with Dr. Cannon or other urologist.    Nay BENITEZ RN   [facility], Urology   5/21/2025 11:41 AM

## 2025-05-21 NOTE — TELEPHONE ENCOUNTER
[facility] Call Center    Phone Message    May a detailed message be left on voicemail: yes     Reason for Call: Appointment Intake. Patients assisted living calling to schedule. Patient advised them he would l;vianca to have the procedures that zachary discussed at his appointment with Erica on 5.12.25.    Referring Provider Name: Erica Prather  Diagnosis and/or Symptoms: BPH/enlarged prostate    Action Taken: Other: [facility]    Travel Screening: Not Applicable     Date of Service:                                                                  ;th

## 2025-05-22 NOTE — TELEPHONE ENCOUNTER
Left message for patient to call [phone] before 3:00pm today or after 7:00am tomorrow to help schedule cystoscopy with Dr. Cannon next available.  Cherry Leroy, CMA

## 2025-05-27 ENCOUNTER — TELEPHONE (OUTPATIENT)
Dept: GASTROENTEROLOGY | Facility: CLINIC | Age: 60
End: 2025-05-27
Payer: COMMERCIAL

## 2025-05-29 NOTE — CONFIDENTIAL NOTE
Pts nurse called back and appt scheduled.    Nay BENITEZ RN   [facility], Urology   5/29/2025 12:55 PM

## 2025-05-29 NOTE — TELEPHONE ENCOUNTER
Retail Pharmacy Prior Authorization Team   Phone: [phone]          Prior Authorization Not Needed per Insurance    Medication: LINZESS 290 MCG PO CAPS  Insurance Company: [employer] - Phone [phone] Fax [phone]  Expected CoPay: $    Pharmacy Filling the Rx: [facility] - [place], [place] - [address] [address]  Pharmacy Notified: YES  Patient Notified: YES (called pharmacy to verify they received a paid claim)      Called [facility] and spoke with Ivy - states that the drug is on formulary. Patient can only get 30 per 30 days due to a PMAP plan.

## 2025-05-29 NOTE — CONFIDENTIAL NOTE
Writer called and left a 2nd  for patient to schedule a cysto.    Nay BENITEZ RN   [facility], Urology   5/29/2025 11:07 AM

## 2025-06-03 DIAGNOSIS — K59.01 SLOW TRANSIT CONSTIPATION: ICD-10-CM

## 2025-07-15 ENCOUNTER — OFFICE VISIT (OUTPATIENT)
Dept: UROLOGY | Facility: CLINIC | Age: 60
End: 2025-07-15
Payer: COMMERCIAL

## 2025-07-15 ENCOUNTER — TRANSCRIBE ORDERS (OUTPATIENT)
Dept: OTHER | Age: 60
End: 2025-07-15

## 2025-07-15 VITALS — SYSTOLIC BLOOD PRESSURE: 101 MMHG | DIASTOLIC BLOOD PRESSURE: 66 MMHG | OXYGEN SATURATION: 97 % | HEART RATE: 69 BPM

## 2025-07-15 DIAGNOSIS — K59.09 CHRONIC CONSTIPATION: ICD-10-CM

## 2025-07-15 DIAGNOSIS — R13.10 DYSPHAGIA, UNSPECIFIED TYPE: Primary | ICD-10-CM

## 2025-07-15 DIAGNOSIS — R39.198 SLOWING OF URINARY STREAM: ICD-10-CM

## 2025-07-15 DIAGNOSIS — R32 ENURESIS: Primary | ICD-10-CM

## 2025-07-15 PROCEDURE — 3078F DIAST BP <80 MM HG: CPT | Performed by: UROLOGY

## 2025-07-15 PROCEDURE — 3074F SYST BP LT 130 MM HG: CPT | Performed by: UROLOGY

## 2025-07-15 PROCEDURE — 52000 CYSTOURETHROSCOPY: CPT | Performed by: UROLOGY

## 2025-07-15 PROCEDURE — 99214 OFFICE O/P EST MOD 30 MIN: CPT | Mod: 25 | Performed by: UROLOGY

## 2025-07-15 RX ORDER — MIRABEGRON 50 MG/1
50 TABLET, FILM COATED, EXTENDED RELEASE ORAL DAILY
Qty: 30 TABLET | Refills: 1 | Status: SHIPPED | OUTPATIENT
Start: 2025-07-15
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
## 2025-07-15 NOTE — PATIENT INSTRUCTIONS
Stop Finasteride  GI consult  Start new med from Dr. Cannon  Cystoscopy aftercare instructions:  Cystoscopy is generally a very safe test.     What can I expect after the procedure?  The most common side effects are:   Short-term swelling of the urethra. This can make it hard to urinate.   Some soreness or pain in your tummy and urethra.  Mild pain or burning when you pee.  The pain should stop a few minutes after you pee.  This may last for up to a week.  A small amount of blood in your pee for a few days, lasting up to a week.  A feeling like you need to pee often. But when you do, you may only pee a little.    Follow these instructions at home:  Medicines  Take your medicines only as told.  If you were given antibiotics, take them as told. Do not stop taking them even if you start to feel better.    General instructions  Eat and drink as told.  If a biopsy was taken, ask when your test results will be ready and how to get them. You may need to call or meet with your healthcare provider to get your results.    Contact a healthcare provider if:  Your pain gets worse.  Your pain does not improve with over the counter medicine.  You have trouble peeing.  You have a fever or chills.    Get help right away if:  You cannot pee.

## 2025-07-16 ENCOUNTER — PATIENT OUTREACH (OUTPATIENT)
Dept: CARE COORDINATION | Facility: CLINIC | Age: 60
End: 2025-07-16
Payer: COMMERCIAL

## 2025-07-16 DIAGNOSIS — G20.A1 PARKINSON'S DISEASE WITHOUT DYSKINESIA OR FLUCTUATING MANIFESTATIONS (H): ICD-10-CM

## 2025-07-16 DIAGNOSIS — G24.9 DYSTONIA: ICD-10-CM

## 2025-07-16 RX ORDER — CLONAZEPAM 2 MG/1
2 TABLET ORAL 2 TIMES DAILY
Qty: 60 TABLET | Refills: 5 | Status: SHIPPED | OUTPATIENT
Start: 2025-07-16

## 2025-07-16 NOTE — TELEPHONE ENCOUNTER
Pending Prescriptions:                       Disp   Refills    clonazePAM (KLONOPIN) 2 MG tablet         60 tab*5            Sig: Take 1 tablet (2 mg) by mouth 2 times daily. Take           at 8am and 4 pm    Next appt:    10/24/2025 10:45 AM (Arrive by 10:30 AM) Ching Carlos DO [facility]       Medication T'd for review and signature    CUCO Beal on 7/16/2025 at 11:41 AM

## 2025-07-22 ENCOUNTER — OFFICE VISIT (OUTPATIENT)
Dept: PHYSICAL MEDICINE AND REHAB | Facility: CLINIC | Age: 60
End: 2025-07-22
Payer: COMMERCIAL

## 2025-07-22 DIAGNOSIS — G24.9 DYSTONIA, UNSPECIFIED: ICD-10-CM

## 2025-07-22 DIAGNOSIS — G24.3 CERVICAL DYSTONIA: Primary | ICD-10-CM

## 2025-07-22 DIAGNOSIS — G20.A2 PARKINSON'S DISEASE WITHOUT DYSKINESIA, WITH FLUCTUATING MANIFESTATIONS (H): ICD-10-CM

## 2025-07-22 DIAGNOSIS — G24.8 SEGMENTAL DYSTONIA: ICD-10-CM

## 2025-07-22 PROCEDURE — 64642 CHEMODENERV 1 EXTREMITY 1-4: CPT | Performed by: PHYSICAL MEDICINE & REHABILITATION

## 2025-07-22 PROCEDURE — 64643 CHEMODENERV 1 EXTREM 1-4 EA: CPT | Performed by: PHYSICAL MEDICINE & REHABILITATION

## 2025-07-22 PROCEDURE — 64616 CHEMODENERV MUSC NECK DYSTON: CPT | Mod: RT | Performed by: PHYSICAL MEDICINE & REHABILITATION

## 2025-07-22 PROCEDURE — 95874 GUIDE NERV DESTR NEEDLE EMG: CPT | Performed by: PHYSICAL MEDICINE & REHABILITATION

## 2025-07-22 NOTE — PROGRESS NOTES
[facility]    PM&R CLINIC NOTE  BOTULINUM TOXIN PROCEDURE      HPI  No chief complaint on file.    Benjamin Mathews is a 60 year old male who was referred to our clinic for more evaluation of his dystonia and trial of botulinum toxin injections (referral from Dr. Suazo). He was seen on 10/6/22 as initial consult; please see the consult note for details. She has history of Parkinsonism and is followed by Dr. Perry. He was referred to palliative care team and has been followed by them since then.       Background information   --saw urology team in Nov 2022 for LUTS likely due to BPH and was started on alfuzosin  --saw Dr. Carlos in Jan 2023; it was recommended to continue carbidopa/levodopa and amantadine. He is also on clonazepam prn. Ok to continue botox injections.   --had neuropsych testing 1/13/23; reviewed the results.    Was admitted on 5/20/23 after a fall resulting in right ankle fracture s/p closed reduction and external fixation by ortho team. He has been at [facility] since then.     Was followed by podiatry team for ulcer on L 2nd toe, which was closed/healed 12/29/23.    --Saw Dr. Carlos 7/24/24; reviewed note and recommendations.   - Continue follow with Dr. Arita for toxin injections and could consider right upper extremity injections to improve dystonia     --8/28/24 saw Dr. Carlos - meds were adjusted again.       --He is working closely with psychology team     SINCE LAST VISIT  Benjamin Mathews was last seen here in clinic on 4/22/25    He has been overall stable since our last visit 3 months ago.  He was in the emergency room visit on May 7 for a syncopal episode.  Per ED note,  Story seems consistent was a vasovagal episode in the setting of straining BM. He received some IV fluids, had reassuring vital signs and orthostatics. Workup does not suggest acute cardiopulmonary injury or significant traumatic injury. Abdomen soft and  non-tender. CTH negative.     Working with psychology and his PCP regularly.  He is also working with urology team regarding enuresis; was started on  mirabegron 7/15    Saw Dr. Sol 6/20;  per her note -   PD/muscle spasm:   Discussed improvement in spasms since previous increased symptoms earlier this year. He would still like to reduce frequency if possible. Long acting carbidopa/levodopa formulations are unlikely to  be covered by insurance, though could consider utilizing carbidopa/levodopa ER doses during the day. As spasms do not follow any kind of specific pattern and since he's has previous sensitivity to carbidopa/levodopa, will not adjust this med. Could consider increasing amantadine, which he would like to try. Reviewed potential side effects.     -increase amantadine 100mg from twice daily to three times daily, at 8am, 12pm, 4pm     Follow-up: I will call you on 8/15/25 at 1pm    Today he was doing well.  Noted that he has problem with standing and balance.  He was not sure what exactly the contributing factor but reported some tightness in his legs and again difficulty with his balance.    Reported good results with higher dose of amantadine, resulting in improvement of his spasms, involuntary movements, and overall energy level.    He has started working with physical therapy and occupational therapy at home; had couple of OT sessions but no PT session yet.    Denies any side effects from the injections.  Noted that they have been very effective for his neck and right foot/toes.  He is overall more comfortable with the neck being more upright and last toe curling of his right foot.  He was not very sure about the impact of Botox injections for his left upper extremity.        PHYSICAL EXAM  Full exam was deferred  Skin was intact at the sites of the injection    Noted constant rhythmic and tremorous movements of his neck and bilateral upper extremities.  Neck was tilted to the right and rotated to  the left.  Tone was increased at bilateral shoulder abduction and elbow flexion > elbow extension  Tone was also increased in bilateral lower extremity at hip flexion and knee flexion  Had a few beats of clonus at both ankles worse on the right side      ALLERGIES  Allergies   Allergen Reactions    Quetiapine GI Disturbance, Diarrhea and Other (See Comments)     Diarrhea    Diarrhea  Other reaction(s): GI Disturbance  Diarrhea      Duloxetine Other (See Comments)     suicidal  suicidal         CURRENT MEDICATIONS    Current Outpatient Medications:     acetaminophen (TYLENOL) 325 MG tablet, Take 1-2 tablets (325-650 mg) by mouth every 8 hours as needed for mild pain, Disp: 100 tablet, Rfl: 0    alfuzosin ER (UROXATRAL) 10 MG 24 hr tablet, Take 1 tablet (10 mg) by mouth daily., Disp: 90 tablet, Rfl: 3    amantadine (SYMMETREL) 100 MG capsule, Take 1 capsule (100 mg) by mouth 3 times daily., Disp: 90 capsule, Rfl: 11    ammonium lactate (LAC-HYDRIN) 12 % external lotion, Apply topically daily as needed, Disp: , Rfl:     ANTI-ITCH MAXIMUM STRENGTH 1 % external cream, Apply topically 2 times daily, Disp: , Rfl:     apixaban ANTICOAGULANT (ELIQUIS) 5 MG tablet, Take 5 mg by mouth 2 times daily, Disp: , Rfl:     atorvastatin (LIPITOR) 40 MG tablet, Take 1 tablet (40 mg) by mouth every evening, Disp: 30 tablet, Rfl: 0    bisacodyl (DULCOLAX) 10 MG suppository, Place 1 suppository (10 mg) rectally daily as needed for constipation, Disp: 10 suppository, Rfl: 0    carbidopa-levodopa (SINEMET CR)  MG CR tablet, Take 2 tablets by mouth at bedtime., Disp: 60 tablet, Rfl: 11    carbidopa-levodopa (SINEMET)  MG tablet, Take 2.5 tabs at 8 am, 12 pm and take 2 tabs at 4 pm., Disp: 210 tablet, Rfl: 11    cholecalciferol (VITAMIN D3) 125 mcg (5000 units) capsule, Take 1 capsule (125 mcg) by mouth daily, Disp: 30 capsule, Rfl: 0    clonazePAM (KLONOPIN) 1 MG tablet, Take 1 tablet scheduled at noon, may take 1-2 additional  tablet PRN, Disp: 70 tablet, Rfl: 5    clonazePAM (KLONOPIN) 2 MG tablet, Take 1 tablet (2 mg) by mouth 2 times daily. Take at 8am and 4 pm, Disp: 60 tablet, Rfl: 5    cloZAPine (CLOZARIL) 50 MG tablet, Take 1 tablet (50 mg) by mouth At Bedtime, Disp: 30 tablet, Rfl: 0    diclofenac (VOLTAREN) 1 % topical gel, Apply 2 g topically 3 times daily as needed for moderate pain, Disp: 150 g, Rfl: 0    econazole nitrate 1 % external cream, Apply topically daily To toes and toenails., Disp: 85 g, Rfl: 5    ENEMEEZ MINI 283 MG/5ML enema, Place 1 enema rectally, Disp: , Rfl:     gabapentin (NEURONTIN) 800 MG tablet, Take 1 tablet (800 mg) by mouth 3 times daily, Disp: 90 tablet, Rfl: 0    hydrOXYzine (ATARAX) 25 MG tablet, Take 2 tablets (50 mg) by mouth every 6 hours as needed for anxiety (up to 3 timrd daily), Disp: 20 tablet, Rfl: 0    ketoconazole (NIZORAL) 2 % external cream, Apply topically 2 times daily, Disp: , Rfl:     ketoconazole (NIZORAL) 2 % external shampoo, Apply topically once a week On Wednesdays, Disp: , Rfl:     lactulose (CHRONULAC) 10 GM/15ML solution, Take 15 mLs by mouth 2 times daily, Disp: 473 mL, Rfl: 11    linaclotide (LINZESS) 290 MCG capsule, Take 1 capsule (290 mcg) by mouth daily., Disp: 90 capsule, Rfl: 0    metoprolol succinate ER (TOPROL XL) 25 MG 24 hr tablet, Take 0.5 tablets (12.5 mg) by mouth 2 times daily, Disp: 30 tablet, Rfl: 0    metoprolol tartrate (LOPRESSOR) 25 MG tablet, Take 25 mg by mouth 2 times daily, Disp: , Rfl:     mirabegron (MYRBETRIQ) 50 MG 24 hr tablet, Take 1 tablet (50 mg) by mouth daily., Disp: 30 tablet, Rfl: 1    multivitamin, therapeutic (THERA-VIT) TABS tablet, Take 1 tablet by mouth daily, Disp: 30 tablet, Rfl: 0    pantoprazole (PROTONIX) 40 MG EC tablet, Take 1 tablet (40 mg) by mouth daily Take 1 tablet (40mg) by mouth daily at 8am, Disp: 30 tablet, Rfl: 0    polyethylene glycol (MIRALAX) 17 g packet, Take 1 packet by mouth daily, Disp: , Rfl:     SENEXON-S  8.6-50 MG tablet, Take 2 tablets by mouth 2 times daily, Disp: , Rfl:     Sodium Phosphates (ENEMA) 7-19 GM/118ML ENEM, , Disp: , Rfl:     tamsulosin (FLOMAX) 0.4 MG capsule, Take 0.4 mg by mouth daily, Disp: , Rfl:     traZODone (DESYREL) 100 MG tablet, Take 100 mg by mouth At Bedtime, Disp: , Rfl:     traZODone (DESYREL) 50 MG tablet, Take 1 tablet (50 mg) by mouth every morning, Disp: 30 tablet, Rfl: 0    venlafaxine (EFFEXOR XR) 150 MG 24 hr capsule, Take 2 capsules (300 mg) by mouth daily, Disp: 60 capsule, Rfl: 0    vitamin C (ASCORBIC ACID) 500 MG tablet, Take 1 tablet (500 mg) by mouth daily, Disp: 30 tablet, Rfl: 0    Current Facility-Administered Medications:     botulinum toxin type A (BOTOX) 100 units injection 300 Units, 300 Units, Intramuscular, Q90 Days, Ember Arita MD       BOTULINUM NEUROTOXIN INJECTION PROCEDURES    VERIFICATION OF PATIENT IDENTIFICATION AND PROCEDURE     Initials   Patient Name PS   Patient  PS   Procedure Verified by: PS     Prior to the start of the procedure and with procedural staff participation, I verbally confirmed the patient s identity using two indicators, relevant allergies, that the procedure was appropriate and matched the consent or emergent situation, and that the correct equipment/implants were available. Immediately prior to starting the procedure I conducted the Time Out with the procedural staff and re-confirmed the patient s name, procedure, and site/side. (The Joint Commission universal protocol was followed.)  Yes    Sedation (Moderate or Deep): None    ABOVE ASSESSMENTS PERFORMED BY  Ember Arita MD      INDICATIONS FOR PROCEDURES  Benjamin Mathews is a 60 year old patient with cervical and segmental dystonia secondary to the diagnosis of Parkinson's disease. His baseline symptoms have been recalcitrant to oral medications and conservative therapy.  He is here today for reinjection with Botox.    GOAL OF PROCEDURE  The goal of this procedure is to  increase active range of motion, improve volitional motor control, decrease pain  and enhance functional independence.      TOTAL DOSE ADMINISTERED  Dose Administered: 300 units  Botox (Botulinum Toxin Type A) 1:1 dilution -  Unavoidable Drug Waste: No  Diluent Used:  Preservative Free Normal Saline  Total Volume of Diluent Used: 3 ml  See MAR  NDC #: Botox 100u ([address]-49)      CONSENT  The risks, benefits, and treatment options were discussed with Benjamin Mathews and he agreed to proceed.    Written consent was obtained by PS.     EQUIPMENT USED  Needle-35mm stimulating/recording  EMG/NCS Machine    SKIN PREPARATION  Skin preparation was performed using an alcohol wipe.    GUIDANCE DESCRIPTION  Electro-myographic guidance was necessary throughout the procedure to accurately identify all areas of dystonic muscles while avoiding injection of non-dystonic muscles and underlying muscles , neighboring nerves and nearby vascular structures.     AREA/MUSCLE INJECTED  Right neck/shoulder girdle  SCM 10 units at 1 site  Splenius capitis 15 units at 1 site  Splenius cervicis 15 units at 1 site  Levator a scap insertion 40 units at 1 site  Levator at neck 10 units at 1 site   Lateral trap 20 units at 2 sites   Pectoralis major 30 units at 1 site     Left upper extremity   Biceps 20 units at 1 site  Brachialis 20 units at 1 site     Right lower extremity  Flexor digitorum longus 40 units at 1 site  Gastrocnemius 50 units at 2 site  Fibular longus 30 units at 1 site      RESPONSE TO PROCEDURE  Benjamin Mathews tolerated the procedure well and there were no immediate complications. He was allowed to recover for an appropriate period of time and was discharged home in stable condition.    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  Osteopenia (DXA Lowest  T-Score -1.8) likely due to disuse c/b limited functional mobility leading to increased fracture risk - assessment 5/2023     DOI: 05/20/2023, GLF, syncopal event, closed R trimalleolar ankle / pilon fracture-dislocation.  DOS: 05/21/2023, closed reduction, application of spanning external fixator.  DOS: 06/15/2023, ORIF R ankle/pilon, removal of external fixator.    Spasms are under better control.  Higher dose of amantadine has been very beneficial as well.  But his function remains low and he is primarily using his power wheelchair.  Walking and standing are limited by increased tone/dystonia, and balance and parkinsonism.  Encouraged him to continue working with physical therapy and occupational therapy.  Also ask him to send me a [employer] message with their contact information so I can call and reviewed the plan.  We can consider adjusting the sites of injections and add more to the lower extremities for better control of tone if that seems to be contributing to his function.      Work-up: None     Therapy/equipment/braces: Continue PT and OT to work on standing, walking and balance.    Medications: No changes today; currently on clonazepam, sinemet and amantadine by Dr. Carlos.     Interventions: started the first round at 130 units and increased the dose to 270 units on 2/1/2023; kept on the same dose in April 2023. Decreased the total dose to 100 in July 2023 with no injections in RLE due to recent fracture. Increased the dose 10/24/23 from 100 to140 units with the goal of increasing effectiveness and prolonging duration of benefit of Botox injections. Increased dose to 170 units on 1/22/2024 added 30 units to right FDL to help facilitate comfort and AFO tolerance when he receives it. Increased from 260 to 300 7/2024 with more dose in the RLE for better control of his foot symptoms. No change in the total dose or regimen today.    Referral / follow up with other providers: should repeat DEXA in 1 year 5/2025  and will need a new referral for bone health eval - will discuss at next visit. He will continue to follow-up with palliative care team, MTM and movement disorder clinic.     Follow up: in 12 weeks         Ember Arita MD  Physical Medicine & Rehabilitation

## 2025-07-22 NOTE — LETTER
7/22/2025       RE: Benjamin Mathews  [address]  [facility] [address]     Dear Colleague,    Thank you for referring your patient, Benjamin Mathews, to the [facility] PHYSICAL MEDICINE AND REHABILITATION CLINIC [place] at [facility]. Please see a copy of my visit note below.      [facility] CLINIC    PM&R CLINIC NOTE  BOTULINUM TOXIN PROCEDURE      HPI  No chief complaint on file.    Benjamin Mathews is a 60 year old male who was referred to our clinic for more evaluation of his dystonia and trial of botulinum toxin injections (referral from Dr. Suazo). He was seen on 10/6/22 as initial consult; please see the consult note for details. She has history of Parkinsonism and is followed by Dr. Perry. He was referred to palliative care team and has been followed by them since then.       Background information   --saw urology team in Nov 2022 for LUTS likely due to BPH and was started on alfuzosin  --saw Dr. Carlos in Jan 2023; it was recommended to continue carbidopa/levodopa and amantadine. He is also on clonazepam prn. Ok to continue botox injections.   --had neuropsych testing 1/13/23; reviewed the results.    Was admitted on 5/20/23 after a fall resulting in right ankle fracture s/p closed reduction and external fixation by ortho team. He has been at TCU since then.     Was followed by podiatry team for ulcer on L 2nd toe, which was closed/healed 12/29/23.    --Saw Dr. Carlos 7/24/24; reviewed note and recommendations.   - Continue follow with Dr. Arita for toxin injections and could consider right upper extremity injections to improve dystonia     --8/28/24 saw Dr. Carlos - meds were adjusted again.       --He is working closely with psychology team     SINCE LAST VISIT  Benjamin Mathews was last seen here in clinic on 4/22/25    He has been overall stable since our last visit 3 months ago.  He  was in the emergency room visit on May 7 for a syncopal episode.  Per ED note,  Story seems consistent was a vasovagal episode in the setting of straining BM. He received some IV fluids, had reassuring vital signs and orthostatics. Workup does not suggest acute cardiopulmonary injury or significant traumatic injury. Abdomen soft and non-tender. CTH negative.     Working with psychology and his PCP regularly.  He is also working with urology team regarding enuresis; was started on  mirabegron 7/15    Saw Dr. Sol 6/20;  per her note -   PD/muscle spasm:   Discussed improvement in spasms since previous increased symptoms earlier this year. He would still like to reduce frequency if possible. Long acting carbidopa/levodopa formulations are unlikely to  be covered by insurance, though could consider utilizing carbidopa/levodopa ER doses during the day. As spasms do not follow any kind of specific pattern and since he's has previous sensitivity to carbidopa/levodopa, will not adjust this med. Could consider increasing amantadine, which he would like to try. Reviewed potential side effects.     -increase amantadine 100mg from twice daily to three times daily, at 8am, 12pm, 4pm     Follow-up: I will call you on 8/15/25 at 1pm    Today he was doing well.  Noted that he has problem with standing and balance.  He was not sure what exactly the contributing factor but reported some tightness in his legs and again difficulty with his balance.    Reported good results with higher dose of amantadine, resulting in improvement of his spasms, involuntary movements, and overall energy level.    He has started working with physical therapy and occupational therapy at home; had couple of OT sessions but no PT session yet.    Denies any side effects from the injections.  Noted that they have been very effective for his neck and right foot/toes.  He is overall more comfortable with the neck being more upright and last toe curling of his  right foot.  He was not very sure about the impact of Botox injections for his left upper extremity.        PHYSICAL EXAM  Full exam was deferred  Skin was intact at the sites of the injection    Noted constant rhythmic and tremorous movements of his neck and bilateral upper extremities.  Neck was tilted to the right and rotated to the left.  Tone was increased at bilateral shoulder abduction and elbow flexion > elbow extension  Tone was also increased in bilateral lower extremity at hip flexion and knee flexion  Had a few beats of clonus at both ankles worse on the right side      ALLERGIES  Allergies   Allergen Reactions     Quetiapine GI Disturbance, Diarrhea and Other (See Comments)     Diarrhea    Diarrhea  Other reaction(s): GI Disturbance  Diarrhea       Duloxetine Other (See Comments)     suicidal  suicidal         CURRENT MEDICATIONS    Current Outpatient Medications:      acetaminophen (TYLENOL) 325 MG tablet, Take 1-2 tablets (325-650 mg) by mouth every 8 hours as needed for mild pain, Disp: 100 tablet, Rfl: 0     alfuzosin ER (UROXATRAL) 10 MG 24 hr tablet, Take 1 tablet (10 mg) by mouth daily., Disp: 90 tablet, Rfl: 3     amantadine (SYMMETREL) 100 MG capsule, Take 1 capsule (100 mg) by mouth 3 times daily., Disp: 90 capsule, Rfl: 11     ammonium lactate (LAC-HYDRIN) 12 % external lotion, Apply topically daily as needed, Disp: , Rfl:      ANTI-ITCH MAXIMUM STRENGTH 1 % external cream, Apply topically 2 times daily, Disp: , Rfl:      apixaban ANTICOAGULANT (ELIQUIS) 5 MG tablet, Take 5 mg by mouth 2 times daily, Disp: , Rfl:      atorvastatin (LIPITOR) 40 MG tablet, Take 1 tablet (40 mg) by mouth every evening, Disp: 30 tablet, Rfl: 0     bisacodyl (DULCOLAX) 10 MG suppository, Place 1 suppository (10 mg) rectally daily as needed for constipation, Disp: 10 suppository, Rfl: 0     carbidopa-levodopa (SINEMET CR)  MG CR tablet, Take 2 tablets by mouth at bedtime., Disp: 60 tablet, Rfl: 11      carbidopa-levodopa (SINEMET)  MG tablet, Take 2.5 tabs at 8 am, 12 pm and take 2 tabs at 4 pm., Disp: 210 tablet, Rfl: 11     cholecalciferol (VITAMIN D3) 125 mcg (5000 units) capsule, Take 1 capsule (125 mcg) by mouth daily, Disp: 30 capsule, Rfl: 0     clonazePAM (KLONOPIN) 1 MG tablet, Take 1 tablet scheduled at noon, may take 1-2 additional tablet PRN, Disp: 70 tablet, Rfl: 5     clonazePAM (KLONOPIN) 2 MG tablet, Take 1 tablet (2 mg) by mouth 2 times daily. Take at 8am and 4 pm, Disp: 60 tablet, Rfl: 5     cloZAPine (CLOZARIL) 50 MG tablet, Take 1 tablet (50 mg) by mouth At Bedtime, Disp: 30 tablet, Rfl: 0     diclofenac (VOLTAREN) 1 % topical gel, Apply 2 g topically 3 times daily as needed for moderate pain, Disp: 150 g, Rfl: 0     econazole nitrate 1 % external cream, Apply topically daily To toes and toenails., Disp: 85 g, Rfl: 5     ENEMEEZ MINI 283 MG/5ML enema, Place 1 enema rectally, Disp: , Rfl:      gabapentin (NEURONTIN) 800 MG tablet, Take 1 tablet (800 mg) by mouth 3 times daily, Disp: 90 tablet, Rfl: 0     hydrOXYzine (ATARAX) 25 MG tablet, Take 2 tablets (50 mg) by mouth every 6 hours as needed for anxiety (up to 3 timrd daily), Disp: 20 tablet, Rfl: 0     ketoconazole (NIZORAL) 2 % external cream, Apply topically 2 times daily, Disp: , Rfl:      ketoconazole (NIZORAL) 2 % external shampoo, Apply topically once a week On Wednesdays, Disp: , Rfl:      lactulose (CHRONULAC) 10 GM/15ML solution, Take 15 mLs by mouth 2 times daily, Disp: 473 mL, Rfl: 11     linaclotide (LINZESS) 290 MCG capsule, Take 1 capsule (290 mcg) by mouth daily., Disp: 90 capsule, Rfl: 0     metoprolol succinate ER (TOPROL XL) 25 MG 24 hr tablet, Take 0.5 tablets (12.5 mg) by mouth 2 times daily, Disp: 30 tablet, Rfl: 0     metoprolol tartrate (LOPRESSOR) 25 MG tablet, Take 25 mg by mouth 2 times daily, Disp: , Rfl:      mirabegron (MYRBETRIQ) 50 MG 24 hr tablet, Take 1 tablet (50 mg) by mouth daily., Disp: 30 tablet,  Rfl: 1     multivitamin, therapeutic (THERA-VIT) TABS tablet, Take 1 tablet by mouth daily, Disp: 30 tablet, Rfl: 0     pantoprazole (PROTONIX) 40 MG EC tablet, Take 1 tablet (40 mg) by mouth daily Take 1 tablet (40mg) by mouth daily at 8am, Disp: 30 tablet, Rfl: 0     polyethylene glycol (MIRALAX) 17 g packet, Take 1 packet by mouth daily, Disp: , Rfl:      SENEXON-S 8.6-50 MG tablet, Take 2 tablets by mouth 2 times daily, Disp: , Rfl:      Sodium Phosphates (ENEMA) 7-19 GM/118ML ENEM, , Disp: , Rfl:      tamsulosin (FLOMAX) 0.4 MG capsule, Take 0.4 mg by mouth daily, Disp: , Rfl:      traZODone (DESYREL) 100 MG tablet, Take 100 mg by mouth At Bedtime, Disp: , Rfl:      traZODone (DESYREL) 50 MG tablet, Take 1 tablet (50 mg) by mouth every morning, Disp: 30 tablet, Rfl: 0     venlafaxine (EFFEXOR XR) 150 MG 24 hr capsule, Take 2 capsules (300 mg) by mouth daily, Disp: 60 capsule, Rfl: 0     vitamin C (ASCORBIC ACID) 500 MG tablet, Take 1 tablet (500 mg) by mouth daily, Disp: 30 tablet, Rfl: 0    Current Facility-Administered Medications:      botulinum toxin type A (BOTOX) 100 units injection 300 Units, 300 Units, Intramuscular, Q90 Days, Ember Arita MD       BOTULINUM NEUROTOXIN INJECTION PROCEDURES    VERIFICATION OF PATIENT IDENTIFICATION AND PROCEDURE     Initials   Patient Name PS   Patient  PS   Procedure Verified by: PS     Prior to the start of the procedure and with procedural staff participation, I verbally confirmed the patient s identity using two indicators, relevant allergies, that the procedure was appropriate and matched the consent or emergent situation, and that the correct equipment/implants were available. Immediately prior to starting the procedure I conducted the Time Out with the procedural staff and re-confirmed the patient s name, procedure, and site/side. (The Joint Commission universal protocol was followed.)  Yes    Sedation (Moderate or Deep): None    ABOVE ASSESSMENTS PERFORMED  BY  Ember Arita MD      INDICATIONS FOR PROCEDURES  Benjamin Mathews is a 60 year old patient with cervical and segmental dystonia secondary to the diagnosis of Parkinson's disease. His baseline symptoms have been recalcitrant to oral medications and conservative therapy.  He is here today for reinjection with Botox.    GOAL OF PROCEDURE  The goal of this procedure is to increase active range of motion, improve volitional motor control, decrease pain  and enhance functional independence.      TOTAL DOSE ADMINISTERED  Dose Administered: 300 units  Botox (Botulinum Toxin Type A) 1:1 dilution -  Unavoidable Drug Waste: No  Diluent Used:  Preservative Free Normal Saline  Total Volume of Diluent Used: 3 ml  See MAR  NDC #: Botox 100u ([address]-16)      CONSENT  The risks, benefits, and treatment options were discussed with Benjamin Mathews and he agreed to proceed.    Written consent was obtained by PS.     EQUIPMENT USED  Needle-35mm stimulating/recording  EMG/NCS Machine    SKIN PREPARATION  Skin preparation was performed using an alcohol wipe.    GUIDANCE DESCRIPTION  Electro-myographic guidance was necessary throughout the procedure to accurately identify all areas of dystonic muscles while avoiding injection of non-dystonic muscles and underlying muscles , neighboring nerves and nearby vascular structures.     AREA/MUSCLE INJECTED  Right neck/shoulder girdle  SCM 10 units at 1 site  Splenius capitis 15 units at 1 site  Splenius cervicis 15 units at 1 site  Levator a scap insertion 40 units at 1 site  Levator at neck 10 units at 1 site   Lateral trap 20 units at 2 sites   Pectoralis major 30 units at 1 site     Left upper extremity   Biceps 20 units at 1 site  Brachialis 20 units at 1 site     Right lower extremity  Flexor digitorum longus 40 units at 1 site  Gastrocnemius 50 units at 2 site  Fibular longus 30 units at 1 site      RESPONSE TO PROCEDURE  Benjamin Mathews tolerated the procedure well and there were no  immediate complications. He was allowed to recover for an appropriate period of time and was discharged home in stable condition.    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  Osteopenia (DXA Lowest T-Score -1.8) likely due to disuse c/b limited functional mobility leading to increased fracture risk - assessment 5/2023     DOI: 05/20/2023, GLF, syncopal event, closed R trimalleolar ankle / pilon fracture-dislocation.  DOS: 05/21/2023, closed reduction, application of spanning external fixator.  DOS: 06/15/2023, ORIF R ankle/pilon, removal of external fixator.    Spasms are under better control.  Higher dose of amantadine has been very beneficial as well.  But his function remains low and he is primarily using his power wheelchair.  Walking and standing are limited by increased tone/dystonia, and balance and parkinsonism.  Encouraged him to continue working with physical therapy and occupational therapy.  Also ask him to send me a [employer] message with their contact information so I can call and reviewed the plan.  We can consider adjusting the sites of injections and add more to the lower extremities for better control of tone if that seems to be contributing to his function.      Work-up: None     Therapy/equipment/braces: Continue PT and OT to work on standing, walking and balance.    Medications: No changes today; currently on clonazepam, sinemet and amantadine by Dr. Carlos.     Interventions: started the first round at 130 units and increased the dose to 270 units on 2/1/2023; kept on the same dose in April 2023. Decreased the total dose to 100 in July 2023 with no injections in RLE due to recent fracture. Increased the dose 10/24/23 from 100 to140 units with the goal of increasing effectiveness and prolonging duration of benefit of Botox  injections. Increased dose to 170 units on 1/22/2024 added 30 units to right FDL to help facilitate comfort and AFO tolerance when he receives it. Increased from 260 to 300 7/2024 with more dose in the RLE for better control of his foot symptoms. No change in the total dose or regimen today.    Referral / follow up with other providers: should repeat DEXA in 1 year 5/2025 and will need a new referral for bone health eval - will discuss at next visit. He will continue to follow-up with palliative care team, MTM and movement disorder clinic.     Follow up: in 12 weeks         Ember Arita MD  Physical Medicine & Rehabilitation           Again, thank you for allowing me to participate in the care of your patient.      Sincerely,    Ember Arita MD

## 2025-07-30 ENCOUNTER — OFFICE VISIT (OUTPATIENT)
Dept: PODIATRY | Facility: CLINIC | Age: 60
End: 2025-07-30
Payer: COMMERCIAL

## 2025-07-30 DIAGNOSIS — M20.42 HAMMERTOE OF LEFT FOOT: ICD-10-CM

## 2025-07-30 DIAGNOSIS — R60.0 PERIPHERAL EDEMA: ICD-10-CM

## 2025-07-30 DIAGNOSIS — B35.1 ONYCHOMYCOSIS: Primary | ICD-10-CM

## 2025-07-30 DIAGNOSIS — G60.8 PERIPHERAL SENSORY NEUROPATHY: ICD-10-CM

## 2025-07-30 PROCEDURE — 11720 DEBRIDE NAIL 1-5: CPT | Performed by: PODIATRIST

## 2025-07-30 PROCEDURE — 99213 OFFICE O/P EST LOW 20 MIN: CPT | Mod: 24 | Performed by: PODIATRIST
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
## 2025-07-30 NOTE — NURSING NOTE
Benjamin Mathews's chief complaint for this visit includes:  Chief Complaint   Patient presents with    RECHECK     Ingrown toe nail     PCP: Stephanie Maldonado    Referring Provider:  Referred Self, MD  No address on file    There were no vitals taken for this visit.  Data Unavailable     Do you need any medication refills at today's visit? NO    Allergies   Allergen Reactions    Quetiapine GI Disturbance, Diarrhea and Other (See Comments)     Diarrhea    Diarrhea  Other reaction(s): GI Disturbance  Diarrhea      Duloxetine Other (See Comments)     suicidal  suicidal         Jill Downey LPN

## 2025-08-04 ENCOUNTER — PATIENT OUTREACH (OUTPATIENT)
Dept: CARE COORDINATION | Facility: CLINIC | Age: 60
End: 2025-08-04
Payer: COMMERCIAL

## 2025-08-06 ENCOUNTER — PATIENT OUTREACH (OUTPATIENT)
Dept: CARE COORDINATION | Facility: CLINIC | Age: 60
End: 2025-08-06
Payer: COMMERCIAL

## 2025-08-16 ENCOUNTER — MYC MEDICAL ADVICE (OUTPATIENT)
Dept: GASTROENTEROLOGY | Facility: CLINIC | Age: 60
End: 2025-08-16
Payer: COMMERCIAL

## 2025-08-16 DIAGNOSIS — K59.01 SLOW TRANSIT CONSTIPATION: ICD-10-CM

## 2025-08-19 ENCOUNTER — TELEPHONE (OUTPATIENT)
Dept: UROLOGY | Facility: CLINIC | Age: 60
End: 2025-08-19
Payer: COMMERCIAL

## 2025-08-19 DIAGNOSIS — R32 ENURESIS: ICD-10-CM

## 2025-08-19 DIAGNOSIS — N39.44 NOCTURNAL ENURESIS: ICD-10-CM

## 2025-08-19 DIAGNOSIS — R39.198 SLOWING OF URINARY STREAM: ICD-10-CM

## 2025-08-19 DIAGNOSIS — K59.09 CHRONIC CONSTIPATION: Primary | ICD-10-CM

## 2025-08-29 ENCOUNTER — ANCILLARY PROCEDURE (OUTPATIENT)
Dept: GENERAL RADIOLOGY | Facility: CLINIC | Age: 60
End: 2025-08-29
Payer: COMMERCIAL

## 2025-08-29 DIAGNOSIS — R13.10 DYSPHAGIA, UNSPECIFIED TYPE: ICD-10-CM

## 2025-08-29 PROCEDURE — 74230 X-RAY XM SWLNG FUNCJ C+: CPT | Mod: GC | Performed by: STUDENT IN AN ORGANIZED HEALTH CARE EDUCATION/TRAINING PROGRAM

## 2025-08-29 RX ORDER — BARIUM SULFATE 400 MG/ML
SUSPENSION ORAL ONCE
Status: COMPLETED | OUTPATIENT
Start: 2025-08-29 | End: 2025-08-29

## 2025-08-29 RX ADMIN — BARIUM SULFATE 60 ML: 400 SUSPENSION ORAL at 10:19

## (undated) DEVICE — Device

## (undated) DEVICE — SU VICRYL 0 CT 36" J358H

## (undated) DEVICE — SU VICRYL 0 CT-1 27" J340H

## (undated) DEVICE — IMPLANTABLE DEVICE
Type: IMPLANTABLE DEVICE | Site: ANKLE | Status: NON-FUNCTIONAL
Removed: 2023-06-15

## (undated) DEVICE — LINEN TOWEL PACK X5 5464

## (undated) DEVICE — TOURNIQUET CUFF 30" REPRO BLUE 60-7070-105

## (undated) DEVICE — DRAPE C-ARMOR 5 SIDED 5523

## (undated) DEVICE — SPONGE LAP 18X18" X8435

## (undated) DEVICE — SOL WATER IRRIG 1000ML BOTTLE 2F7114

## (undated) DEVICE — ESU PENCIL W/SMOKE EVAC NEPTUNE STRYKER 0703-046-000

## (undated) DEVICE — PACK SET-UP STD 9102

## (undated) DEVICE — BNDG ELASTIC 6" DBL LENGTH UNSTERILE 6611-16

## (undated) DEVICE — SU ETHILON 3-0 PS-1 18" 1663H

## (undated) DEVICE — SU VICRYL 2-0 CT-2 27" UND J269H

## (undated) DEVICE — CAST PADDING 4" COTTON WEBRIL UNSTERILE 9084

## (undated) DEVICE — DRAPE C-ARM W/STRAPS 42X72" 07-CA104

## (undated) DEVICE — STRAP KNEE/BODY 31143004

## (undated) DEVICE — DRSG MEPILEX BORDER SACRUM 6.3X7.9" 282055

## (undated) DEVICE — DRSG KERLIX FLUFFS X5

## (undated) DEVICE — DRILL BIT 2.8X60MM

## (undated) DEVICE — DRSG ABDOMINAL 07 1/2X8" 7197D

## (undated) DEVICE — CAST PADDING 4" STERILE 9044S

## (undated) DEVICE — PACK LOWER EXTREMITY RIVERSIDE SOP32LEFSX

## (undated) DEVICE — LINEN ORTHO PACK 5446

## (undated) DEVICE — DRAPE IOBAN INCISE 23X17" 6650EZ

## (undated) DEVICE — GLOVE BIOGEL PI MICRO INDICATOR UNDERGLOVE SZ 8.0 48980

## (undated) DEVICE — DRSG XEROFORM 5X9" 8884431605

## (undated) DEVICE — CAST PLASTER SPLINT 5X30" 7395

## (undated) DEVICE — SUCTION TIP YANKAUER STR K87

## (undated) DEVICE — GOWN XLG DISP 9545

## (undated) DEVICE — GLOVE BIOGEL PI SZ 8.0 40880

## (undated) DEVICE — BNDG ELASTIC 4"X5YDS STERILE 6611-4S

## (undated) DEVICE — SOL NACL 0.9% IRRIG 1000ML BOTTLE 2F7124

## (undated) DEVICE — LINEN BACK PACK 5440

## (undated) DEVICE — DRAPE CONVERTORS U-DRAPE 60X72" 8476

## (undated) DEVICE — DRSG KERLIX 4 1/2"X4YDS ROLL 6730

## (undated) DEVICE — SUCTION MANIFOLD NEPTUNE 2 SYS 4 PORT 0702-020-000

## (undated) DEVICE — ESU GROUND PAD UNIVERSAL W/O CORD

## (undated) DEVICE — ESU GROUND PAD ADULT W/CORD E7507

## (undated) RX ORDER — ACETAMINOPHEN 325 MG/1
TABLET ORAL
Status: DISPENSED
Start: 2023-05-21

## (undated) RX ORDER — FENTANYL CITRATE 50 UG/ML
INJECTION, SOLUTION INTRAMUSCULAR; INTRAVENOUS
Status: DISPENSED
Start: 2023-05-21

## (undated) RX ORDER — FENTANYL CITRATE 50 UG/ML
INJECTION, SOLUTION INTRAMUSCULAR; INTRAVENOUS
Status: DISPENSED
Start: 2023-06-15

## (undated) RX ORDER — HYDROXYZINE HYDROCHLORIDE 25 MG/1
TABLET, FILM COATED ORAL
Status: DISPENSED
Start: 2023-05-21

## (undated) RX ORDER — HYDROMORPHONE HYDROCHLORIDE 1 MG/ML
INJECTION, SOLUTION INTRAMUSCULAR; INTRAVENOUS; SUBCUTANEOUS
Status: DISPENSED
Start: 2023-05-21

## (undated) RX ORDER — VANCOMYCIN HYDROCHLORIDE 1 G/20ML
INJECTION, POWDER, LYOPHILIZED, FOR SOLUTION INTRAVENOUS
Status: DISPENSED
Start: 2023-06-15